# Patient Record
Sex: FEMALE | Race: WHITE | HISPANIC OR LATINO | Employment: OTHER | URBAN - METROPOLITAN AREA
[De-identification: names, ages, dates, MRNs, and addresses within clinical notes are randomized per-mention and may not be internally consistent; named-entity substitution may affect disease eponyms.]

---

## 2017-01-13 RX ORDER — PEN NEEDLE, DIABETIC 29 G X1/2"
NEEDLE, DISPOSABLE MISCELLANEOUS
Qty: 120 EACH | Refills: 4 | Status: SHIPPED | OUTPATIENT
Start: 2017-01-13 | End: 2017-09-25 | Stop reason: SDUPTHER

## 2017-01-18 DIAGNOSIS — E11.42 TYPE 2 DIABETES MELLITUS WITH DIABETIC POLYNEUROPATHY: ICD-10-CM

## 2017-01-18 RX ORDER — GLIMEPIRIDE 2 MG/1
TABLET ORAL
Qty: 90 TABLET | Refills: 3 | Status: SHIPPED | OUTPATIENT
Start: 2017-01-18 | End: 2018-01-25 | Stop reason: SDUPTHER

## 2017-02-07 ENCOUNTER — OFFICE VISIT (OUTPATIENT)
Dept: PAIN MEDICINE | Facility: CLINIC | Age: 72
End: 2017-02-07
Payer: MEDICARE

## 2017-02-07 VITALS
DIASTOLIC BLOOD PRESSURE: 72 MMHG | BODY MASS INDEX: 26.13 KG/M2 | SYSTOLIC BLOOD PRESSURE: 116 MMHG | WEIGHT: 125 LBS | HEART RATE: 64 BPM

## 2017-02-07 DIAGNOSIS — M79.18 MYOFASCIAL MUSCLE PAIN: ICD-10-CM

## 2017-02-07 DIAGNOSIS — M25.511 RIGHT SHOULDER PAIN, UNSPECIFIED CHRONICITY: Primary | ICD-10-CM

## 2017-02-07 DIAGNOSIS — M47.816 FACET HYPERTROPHY OF LUMBAR REGION: ICD-10-CM

## 2017-02-07 DIAGNOSIS — M54.12 CERVICAL RADICULOPATHY: ICD-10-CM

## 2017-02-07 DIAGNOSIS — M75.81 TENDINITIS OF RIGHT ROTATOR CUFF: Primary | ICD-10-CM

## 2017-02-07 DIAGNOSIS — M54.2 NECK PAIN: ICD-10-CM

## 2017-02-07 PROCEDURE — 20553 NJX 1/MLT TRIGGER POINTS 3/>: CPT | Mod: PBBFAC,PO | Performed by: ANESTHESIOLOGY

## 2017-02-07 PROCEDURE — 20553 NJX 1/MLT TRIGGER POINTS 3/>: CPT | Mod: 59,S$PBB,, | Performed by: ANESTHESIOLOGY

## 2017-02-07 PROCEDURE — 20610 DRAIN/INJ JOINT/BURSA W/O US: CPT | Mod: 59,S$PBB,RT, | Performed by: ANESTHESIOLOGY

## 2017-02-07 PROCEDURE — 99214 OFFICE O/P EST MOD 30 MIN: CPT | Mod: 25,S$PBB,, | Performed by: ANESTHESIOLOGY

## 2017-02-07 PROCEDURE — 99213 OFFICE O/P EST LOW 20 MIN: CPT | Mod: PBBFAC,PO | Performed by: ANESTHESIOLOGY

## 2017-02-07 PROCEDURE — 99999 PR PBB SHADOW E&M-EST. PATIENT-LVL III: CPT | Mod: PBBFAC,,, | Performed by: ANESTHESIOLOGY

## 2017-02-07 PROCEDURE — 20610 DRAIN/INJ JOINT/BURSA W/O US: CPT | Mod: PBBFAC,PO | Performed by: ANESTHESIOLOGY

## 2017-02-07 RX ORDER — TRIAMCINOLONE ACETONIDE 40 MG/ML
40 INJECTION, SUSPENSION INTRA-ARTICULAR; INTRAMUSCULAR
Status: COMPLETED | OUTPATIENT
Start: 2017-02-07 | End: 2017-02-07

## 2017-02-07 RX ADMIN — TRIAMCINOLONE ACETONIDE 40 MG: 40 INJECTION, SUSPENSION INTRA-ARTICULAR; INTRAMUSCULAR at 09:02

## 2017-02-07 NOTE — MR AVS SNAPSHOT
[facility]  [address]  [place] [address]  Phone: [phone]                  Juliette Seo   2017 9:30 AM   Office Visit    Description:  Female : 1945   Provider:  Meagan Pena MD   Department:  [facility]           Reason for Visit     Low-back Pain     Neck Pain     Shoulder Pain           Diagnoses this Visit        Comments    Tendinitis of right rotator cuff    -  Primary     Cervical radiculopathy         Myofascial muscle pain         Facet hypertrophy of lumbar region                To Do List           Future Appointments        Provider Department Dept Phone    2/15/2017 7:00 AM [facility] [phone]    2/15/2017 7:30 AM [facility] [phone]    2017 7:00 AM [facility] [place] [phone]    3/1/2017 9:15 AM Danie Beltre MD [facility] [phone]    3/7/2017 9:45 AM [facility] [phone]      Goals (5 Years of Data)     None      [facility]     [facility] -  Assistance  Registered nurses in the [facility] provide clinical advisement, health education, appointment booking, and other advisory services.  Call for this free service at [phone].             Medications           Message regarding Medications     Verify the changes and/or additions to your medication regime listed below are the same as discussed with your clinician today.  If any of these changes or additions are incorrect, please notify your healthcare provider.             Verify that the below list of medications is an accurate representation of the medications you are currently taking.  If none reported, the list may be blank. If incorrect, please contact your healthcare provider. Carry this list with you in case of emergency.           Current Medications     ACCU-CHEK  FASTCLIX Misc once daily.     ACCU-CHEK SMARTVIEW TEST STRIP Strp USE AS DIRECTED TWICE A DAY    acetaminophen (TYLENOL) 325 MG tablet Take 500 mg by mouth every 6 (six) hours as needed for Pain.     aspirin 81 MG Chew Take 81 mg by mouth once daily.    atorvastatin (LIPITOR) 80 MG tablet TAKE 1 TABLET(S) EVERY OTHER DAY BY ORAL ROUTE FOR 90 DAYS.    BD INSULIN SYRINGE ULTRA-FINE 0.5 mL 31 gauge x 5/16 Syrg USE AS DIRECTED TWICE A DAY    BENZOCAINE/MENTHOL (LOZENGES MM) by Mucous Membrane route.    bimatoprost (LUMIGAN) 0.01 % Drop 1 drop every evening.    fluticasone (FLONASE) 50 mcg/actuation nasal spray USE 1 SPRAY IN EACH NOSTRIL 2 TIMES A DAY    gabapentin (NEURONTIN) 300 MG capsule TAKE 1 CAPSULE (300 MG TOTAL) BY MOUTH 3 (THREE) TIMES DAILY.    gabapentin (NEURONTIN) 300 MG capsule Take 1 capsule (300 mg total) by mouth 3 (three) times daily.    glimepiride (AMARYL) 2 MG tablet TAKE 1 TABLET (2 MG TOTAL) BY MOUTH BEFORE BREAKFAST.    hydrocortisone 2.5 % cream APPLY TO THE AFFECTED AREA(S) BY TOPICAL ROUTE THREE TIMES DAILY AS NEEDED FOR ITCHING    hydrOXYzine HCl (ATARAX) 25 MG tablet Take 1 tablet (25 mg total) by mouth 3 (three) times daily as needed for Itching.    insulin NPH (NOVOLIN N) 100 unit/mL injection 40u qAM 20u qPM    lisinopril (PRINIVIL,ZESTRIL) 20 MG tablet Take 1 tablet (20 mg total) by mouth once daily.    meclizine (ANTIVERT) 12.5 mg tablet TAKE 1 TABLET BY MOUTH 3 TIMES A DAY AS NEEDED    metoclopramide HCl (REGLAN) 5 MG tablet TAKE 1 TABLET (5 MG TOTAL) BY MOUTH 4 (FOUR) TIMES DAILY BEFORE MEALS AND NIGHTLY.    montelukast (SINGULAIR) 10 mg tablet Take 10 mg by mouth once daily.     omeprazole (PRILOSEC) 40 MG capsule Take 1 capsule (40 mg total) by mouth every morning.    PROAIR HFA 90 mcg/actuation inhaler Inhale 1-2 puffs into the lungs every 6 (six) hours as needed.     PROCTOSOL HC 2.5 % rectal cream APPLY TO RECTUM TWICE A DAY FOR 7 DAYS    RESTASIS 0.05 % ophthalmic emulsion  Place 1 drop into both eyes 2 (two) times daily.    simethicone (GAS RELIEF) 180 mg Cap Take by mouth.    sulfamethoxazole-trimethoprim 200-40 mg/5 ml (BACTRIM,SEPTRA) 200-40 mg/5 mL Susp Take by mouth every 12 (twelve) hours.    tizanidine (ZANAFLEX) 2 MG tablet TAKE 1 TABLET TWICE A DAY BY ORAL ROUTE FOR 30 DAYS.    tizanidine (ZANAFLEX) 2 MG tablet TAKE 1 TABLET TWICE A DAY BY ORAL ROUTE FOR 30 DAYS.    triamcinolone acetonide 0.5% (KENALOG) 0.5 % Crea Apply topically 2 (two) times daily.           Clinical Reference Information           Your Vitals Were     BP Pulse Weight BMI       116/72 64 56.7 kg (125 lb) 26.13 kg/m2       Blood Pressure          Most Recent Value    BP  116/72      Allergies as of 2/7/2017     Actos [Pioglitazone]      Immunizations Administered on Date of Encounter - 2/7/2017     None      Orders Placed During Today's Visit     Future Labs/Procedures Expected by Expires    MRI Cervical Spine Without Contrast  2/7/2017 2/7/2018    MRI Upper Extremity Joint W Wo Contrast Right  2/7/2017 2/7/2018      Language Assistance Services     ATTENTION: Language assistance services are available, free of charge. Please call [phone].      ATENCIÓN: Si carinela sergio, tiene a aragon disposición servicios gratuitos de asistencia lingüística. Llame al [phone].     EARL Ý: N?u b?n nói Ti?ng Vi?t, có các d?ch v? h? tr? ngôn ng? mi?n phí dành cho b?n. G?i s? [phone].         [facility] complies with applicable Federal civil rights laws and does not discriminate on the basis of race, color, national origin, age, disability, or sex.

## 2017-02-07 NOTE — PROGRESS NOTES
Chronic patient Established Note (Follow up visit)      SUBJECTIVE:    Juliette Seo presents to the clinic for a follow-up appointment for lower back, neck and right shoulder pain. Since the last visit, Juliette Seo states the pain has been worsening. Current pain intensity is 9/10.  She had bilateral L3,4,5 facet MBB on 10/26/16 with 80% pain relief , carla her main pian is over the right shoudelr/arm as well as neck     Pain Disability Index Review:  Last 3 PDI Scores 2/7/2017 11/7/2016 10/17/2016   Pain Disability Index (PDI) 52 34 44       Pain Medications:  - Opioids: Tylenol   - Adjuvant Medications: Gabapentin, Tizanidine and Aleve   - Anti-Coagulants: Aspirin  - Others: See Medication List     Opioid Contract: no     report:  Reviewed and consistent with medication use as prescribed.    Pain Procedures: 10/26/16 BILATERAL L3,4,5  LUMBAR FACET MEDIAL BRANCH NERVE BLOCK    Physical Therapy/Home Exercise: no    Imaging: Xray Knee 10/18/16  HISTORY: Pain    COMPARISON: None    FINDINGS: 5 views.      The osseous structures are intact with no evidence of fracture or dislocation.  There is degenerative osteoarthritic change of the bilateral tibiofemoral and patellofemoral compartments.  The joint spaces and soft tissues are otherwise unremarkable.   Impression       #1. As above.      Electronically signed by: EVAN SLOAN MD  Date: 10/18/16  Time: 09:38          Xray Cervical 10/18/16  HISTORY: Pain    COMPARISON: None    FINDINGS: 4 views.    The cervical spine alignment and vertebral body heights are well-maintained.  There is no evidence of osseous fracture.    There are advanced degenerative osteoarthritic changes of the cervical spine with facet hypertrophy and disc space narrowing.  The joint spaces, soft tissues, and osseous structures are otherwise unremarkable.   Impression       #1. As Above.        Electronically signed by: EVAN SLOAN MD  Date: 10/18/16  Time: 10:01          Xray Lumbar  10/17/16  Technique: AP, lateral and bilateral oblique views of the lumbar spine         Comparison: None    Results: The lumbar sagittal alignment is within normal limits.  The lungs are mild endplate degenerative change lumbar vertebral body heights and contours are within normal limits without evidence for acute fracture or subluxation.  No definite spondylolysis.  Surgical clips project over the upper abdomen.  Scattered vascular calcifications identified. Further evaluation as warrented clinically.   Impression    See above.      Electronically signed by: STEPHAN SMITH DO  Date: 10/17/16  Time: 14:26          CT Thoracic Spine 6/22/16  CT thoracic spine with and without contrast.    Findings: 75 mL of Omnipaque 350 IV contrast was administered for today's examination.    The visualized portion of the mediastinum is significant for calcification of the aorta, aortic annulus and coronary arteries.  The visualized portion of the intra-abdominal contents is significant for a punctate right-sided nonobstructing renal stone.  The visualized portion of the lungs is unremarkable.  There is no abnormal enhancement following the administration of contrast.  The thoracic spinal alignment and vertebral body heights are satisfactorily preserved.  There is no fracture, dislocation, or bony erosion.   Impression    No acute findings.  ______________________________________     Electronically signed by: ISA GUY MD  Date: 06/22/16  Time: 08:18              Allergies:   Review of patient's allergies indicates:   Allergen Reactions    Actos [pioglitazone] Nausea Only       Current Medications:   Current Outpatient Prescriptions   Medication Sig Dispense Refill    ACCU-CHEK FASTCLIX Misc once daily.   3    ACCU-CHEK SMARTVIEW TEST STRIP Strp USE AS DIRECTED TWICE A  strip 3    acetaminophen (TYLENOL) 325 MG tablet Take 500 mg by mouth every 6 (six) hours as needed for Pain.       aspirin 81 MG Chew Take 81 mg by  mouth once daily.      atorvastatin (LIPITOR) 80 MG tablet TAKE 1 TABLET(S) EVERY OTHER DAY BY ORAL ROUTE FOR 90 DAYS. 45 tablet 4    BD INSULIN SYRINGE ULTRA-FINE 0.5 mL 31 gauge x 5/16 Syrg USE AS DIRECTED TWICE A  each 4    BENZOCAINE/MENTHOL (LOZENGES MM) by Mucous Membrane route.      bimatoprost (LUMIGAN) 0.01 % Drop 1 drop every evening.      fluticasone (FLONASE) 50 mcg/actuation nasal spray USE 1 SPRAY IN EACH NOSTRIL 2 TIMES A DAY  1    gabapentin (NEURONTIN) 300 MG capsule TAKE 1 CAPSULE (300 MG TOTAL) BY MOUTH 3 (THREE) TIMES DAILY. 90 capsule 3    gabapentin (NEURONTIN) 300 MG capsule Take 1 capsule (300 mg total) by mouth 3 (three) times daily. 90 capsule 11    glimepiride (AMARYL) 2 MG tablet TAKE 1 TABLET (2 MG TOTAL) BY MOUTH BEFORE BREAKFAST. 90 tablet 3    hydrocortisone 2.5 % cream APPLY TO THE AFFECTED AREA(S) BY TOPICAL ROUTE THREE TIMES DAILY AS NEEDED FOR ITCHING  2    hydrOXYzine HCl (ATARAX) 25 MG tablet Take 1 tablet (25 mg total) by mouth 3 (three) times daily as needed for Itching. 100 tablet 1    insulin NPH (NOVOLIN N) 100 unit/mL injection 40u qAM 20u qPM 3 vial 4    lisinopril (PRINIVIL,ZESTRIL) 20 MG tablet Take 1 tablet (20 mg total) by mouth once daily. 90 tablet 3    meclizine (ANTIVERT) 12.5 mg tablet TAKE 1 TABLET BY MOUTH 3 TIMES A DAY AS NEEDED 60 tablet 1    metoclopramide HCl (REGLAN) 5 MG tablet TAKE 1 TABLET (5 MG TOTAL) BY MOUTH 4 (FOUR) TIMES DAILY BEFORE MEALS AND NIGHTLY. 360 tablet 3    montelukast (SINGULAIR) 10 mg tablet Take 10 mg by mouth once daily.   5    omeprazole (PRILOSEC) 40 MG capsule Take 1 capsule (40 mg total) by mouth every morning. 90 capsule 3    PROAIR HFA 90 mcg/actuation inhaler Inhale 1-2 puffs into the lungs every 6 (six) hours as needed.   5    PROCTOSOL HC 2.5 % rectal cream APPLY TO RECTUM TWICE A DAY FOR 7 DAYS 28.35 g 3    RESTASIS 0.05 % ophthalmic emulsion Place 1 drop into both eyes 2 (two) times daily.  3     simethicone (GAS RELIEF) 180 mg Cap Take by mouth.      sulfamethoxazole-trimethoprim 200-40 mg/5 ml (BACTRIM,SEPTRA) 200-40 mg/5 mL Susp Take by mouth every 12 (twelve) hours.      tizanidine (ZANAFLEX) 2 MG tablet TAKE 1 TABLET TWICE A DAY BY ORAL ROUTE FOR 30 DAYS. 60 tablet 1    tizanidine (ZANAFLEX) 2 MG tablet TAKE 1 TABLET TWICE A DAY BY ORAL ROUTE FOR 30 DAYS. 60 tablet 1    triamcinolone acetonide 0.5% (KENALOG) 0.5 % Crea Apply topically 2 (two) times daily. 15 g 0     No current facility-administered medications for this visit.        REVIEW OF SYSTEMS:    GENERAL: No weight loss, malaise or fevers.  HEENT: Negative for frequent or significant headaches.  NECK: + neck pain   RESPIRATORY: Negative for cough, wheezing or shortness of breath.  CARDIOVASCULAR: + CAD, Negative for chest pain, leg swelling or palpitations.  GI: Negative for abdominal discomfort, blood in stools or black stools or change in bowel habits.  MUSCULOSKELETAL: See HPI, + right shoulder pain   SKIN: Negative for lesions, rash, and itching.  PSYCH: Negative for sleep disturbance, mood disorder and recent psychosocial stressors.  HEMATOLOGY/LYMPHOLOGY: Negative for prolonged bleeding, bruising easily or swollen nodes.  NEURO: No history of headaches, syncope, paralysis, seizures or tremors.  All other reviewed and negative other than HPI.    Past Medical History:  Past Medical History   Diagnosis Date    Allergy     Anxiety     Arthritis      osteo    Asthma     Diabetes mellitus     Diabetes mellitus, type 2     GERD (gastroesophageal reflux disease)     High cholesterol     Hypertension        Past Surgical History:  Past Surgical History   Procedure Laterality Date    Hysterectomy      Cholecystectomy      Hernia repair         Family History:  Family History   Problem Relation Age of Onset    Stroke Mother     Diabetes Sister     Diabetes Brother        Social History:  Social History     Social History     Marital status:      Spouse name: N/A    Number of children: N/A    Years of education: N/A     Social History Main Topics    Smoking status: Never Smoker    Smokeless tobacco: Never Used    Alcohol use Yes    Drug use: No    Sexual activity: Yes     Partners: Male     Other Topics Concern    None     Social History Narrative       OBJECTIVE:    Visit Vitals    /72    Pulse 64    Wt 56.7 kg (125 lb)    BMI 26.13 kg/m2       PHYSICAL EXAMINATION:    General appearance: Well appearing, in no acute distress, alert and oriented x3.  Psych: Mood and affect appropriate.  Skin: Skin color, texture, turgor normal, no rashes or lesions, in both upper and lower body.  Head/face: Normocephalic, atraumatic. No palpable lymph nodes.  Neck: + pain to palpation over the cervical paraspinous muscle and C spine as well as right trapezus . Spurling Negative. No pain with neck flexion, extension, or lateral flexion.   Back: Straight leg raising in the sitting and supine positions is negative to radicular pain.   Extremities:+ inverted can test on the right shoulder Pain on ROM of the right shoulder Peripheral joint ROM is full and pain free without obvious instability or laxity in all four extremities. No deformities, edema, or skin discoloration. Good capillary refill.  Musculoskeletal: Shoulder, hip, sacroiliac and knee provocative maneuvers are negative. Bilateral upper and lower extremity strength is normal and symmetric. No atrophy or tone abnormalities are noted.   Neuro: Bilateral upper and lower extremity coordination and muscle stretch reflexes are physiologic and symmetric. Plantar response are downgoing. No loss of sensation is noted.  Gait: normal    ASSESSMENT: 71 y.o. year old female with lwo back, neck/ and right shoulder/arm   pain, consistent with lumbar facet arthropathy, rotator cuff tendinitis /subacromial bursitis of the right shoulder as well as myofacial muscle pain. Cervical  radiculopathy cannot be ruled out  She had bilateral L3,4,5 facet MBB on 10/26/16 with 80% pain relief , now her main pain is over the right shoulder /arm as well as neck     1. Tendinitis of right rotator cuff    2. Cervical radiculopathy    3. Myofascial muscle pain    4. Facet hypertrophy of lumbar region          PLAN:     - I have stressed the importance of physical activity and a home exercise plan to help with pain and improve health.  -Will perform right shoulder subacromial bursa streoid injection as well as TPI of the right trapezius and right cervical paraspinal muscles  -Will order MRI of  The right shoulder and C spine   - RTC in 1 month   - Counseled patient regarding the importance of physical therapy.    The above plan and management options were discussed at length with patient. Patient is in agreement with the above and verbalized understanding.    Meagan Pena MD  2/7/2017      INFORMED CONSENT: The procedure, risks, benefits and options were discussed with patient. There are no contraindications to the procedure. The patient expressed understanding and agreed to proceed. The personnel performing the procedure was discussed. I verify that I personally obtained Juliette's consent prior to the start of the procedure and the signed consent can be found on the patient's chart.    PROCEDURE: RIGHT SUBACROMIAL bursa SHOULDER STEROID INJECTION    The patient in the sitting position.The area of the shoulder was prepped with chlorhexidine x 2. A 25 gauge 1.5 inch needle was slowly advanced into the subacromial BURSA of the right shoulder.After negative aspiration 5 cc of a mixture of bupivacaine 0.25% and 20 mg Kenalog was easily injected with no resistance. Patient tolerated procedure well. No complications.              PROCEDURE: TRIGGER POINT INJECTION  The patient was placed in a seated position. The site of pain and procedure were confirmed with the patient prior to starting the procedure. The  patient's  trigger points were identified and marked. The skin was prepped with chlorhexidine three times.  A 25-gauge 1.5 inch  needle was advanced through the skin and subcutaneous tissues.  Aspiration for blood, air and CSF was negative.  A total of 5 ml of Bupivacaine 0.25% and 20 mg Kenalog  was injected at all trigger points.  No complications were evident. No specimens collected.    Meagan Pena MD  2/7/2017

## 2017-02-15 ENCOUNTER — HOSPITAL ENCOUNTER (OUTPATIENT)
Dept: RADIOLOGY | Facility: HOSPITAL | Age: 72
Discharge: HOME | End: 2017-02-15
Attending: ANESTHESIOLOGY
Payer: MEDICARE

## 2017-02-15 DIAGNOSIS — M54.12 CERVICAL RADICULOPATHY: ICD-10-CM

## 2017-02-15 DIAGNOSIS — M75.81 TENDINITIS OF RIGHT ROTATOR CUFF: ICD-10-CM

## 2017-02-15 PROCEDURE — 72141 MRI NECK SPINE W/O DYE: CPT | Mod: 26,,, | Performed by: RADIOLOGY

## 2017-02-15 PROCEDURE — 72141 MRI NECK SPINE W/O DYE: CPT | Mod: TC

## 2017-02-15 PROCEDURE — 73221 MRI JOINT UPR EXTREM W/O DYE: CPT | Mod: TC,RT

## 2017-02-15 PROCEDURE — 73221 MRI JOINT UPR EXTREM W/O DYE: CPT | Mod: 26,RT,, | Performed by: RADIOLOGY

## 2017-02-16 ENCOUNTER — TELEPHONE (OUTPATIENT)
Dept: PAIN MEDICINE | Facility: CLINIC | Age: 72
End: 2017-02-16

## 2017-02-16 NOTE — TELEPHONE ENCOUNTER
Called patient and informed her about MRi results. She is doing much better after right subacromial bursa steroid injection and TPI

## 2017-02-24 ENCOUNTER — LAB VISIT (OUTPATIENT)
Dept: LAB | Facility: HOSPITAL | Age: 72
End: 2017-02-24
Attending: INTERNAL MEDICINE
Payer: MEDICARE

## 2017-02-24 DIAGNOSIS — Z79.4 TYPE 2 DIABETES MELLITUS WITH DIABETIC POLYNEUROPATHY, WITH LONG-TERM CURRENT USE OF INSULIN: ICD-10-CM

## 2017-02-24 DIAGNOSIS — E78.00 PURE HYPERCHOLESTEROLEMIA: ICD-10-CM

## 2017-02-24 DIAGNOSIS — E11.42 TYPE 2 DIABETES MELLITUS WITH DIABETIC POLYNEUROPATHY, WITH LONG-TERM CURRENT USE OF INSULIN: ICD-10-CM

## 2017-02-24 LAB
CHOLEST/HDLC SERPL: 4.7 {RATIO}
ESTIMATED AVG GLUCOSE: 177 MG/DL
HBA1C MFR BLD HPLC: 7.8 %
HDL/CHOLESTEROL RATIO: 21.1 %
HDLC SERPL-MCNC: 251 MG/DL
HDLC SERPL-MCNC: 53 MG/DL
LDLC SERPL CALC-MCNC: 178.2 MG/DL
NONHDLC SERPL-MCNC: 198 MG/DL
TRIGL SERPL-MCNC: 99 MG/DL

## 2017-02-24 PROCEDURE — 80061 LIPID PANEL: CPT

## 2017-02-24 PROCEDURE — 83036 HEMOGLOBIN GLYCOSYLATED A1C: CPT | Mod: PO

## 2017-02-24 PROCEDURE — 36415 COLL VENOUS BLD VENIPUNCTURE: CPT | Mod: PO

## 2017-03-01 ENCOUNTER — OFFICE VISIT (OUTPATIENT)
Dept: INTERNAL MEDICINE | Facility: CLINIC | Age: 72
End: 2017-03-01
Payer: MEDICARE

## 2017-03-01 VITALS
RESPIRATION RATE: 12 BRPM | HEIGHT: 58 IN | DIASTOLIC BLOOD PRESSURE: 68 MMHG | BODY MASS INDEX: 26.15 KG/M2 | WEIGHT: 124.56 LBS | HEART RATE: 66 BPM | SYSTOLIC BLOOD PRESSURE: 105 MMHG | TEMPERATURE: 97 F

## 2017-03-01 DIAGNOSIS — I15.2 HYPERTENSION COMPLICATING DIABETES: ICD-10-CM

## 2017-03-01 DIAGNOSIS — I25.10 CORONARY ARTERY DISEASE INVOLVING NATIVE CORONARY ARTERY WITHOUT ANGINA PECTORIS, UNSPECIFIED WHETHER NATIVE OR TRANSPLANTED HEART: Primary | ICD-10-CM

## 2017-03-01 DIAGNOSIS — E11.59 HYPERTENSION COMPLICATING DIABETES: ICD-10-CM

## 2017-03-01 DIAGNOSIS — K21.9 GASTROESOPHAGEAL REFLUX DISEASE WITHOUT ESOPHAGITIS: ICD-10-CM

## 2017-03-01 DIAGNOSIS — F32.A DEPRESSION, UNSPECIFIED DEPRESSION TYPE: ICD-10-CM

## 2017-03-01 DIAGNOSIS — I10 ESSENTIAL HYPERTENSION: ICD-10-CM

## 2017-03-01 DIAGNOSIS — M17.0 PRIMARY OSTEOARTHRITIS OF BOTH KNEES: ICD-10-CM

## 2017-03-01 DIAGNOSIS — Z79.4 TYPE 2 DIABETES MELLITUS WITH DIABETIC POLYNEUROPATHY, WITH LONG-TERM CURRENT USE OF INSULIN: ICD-10-CM

## 2017-03-01 DIAGNOSIS — E78.00 PURE HYPERCHOLESTEROLEMIA: ICD-10-CM

## 2017-03-01 DIAGNOSIS — M48.061 SPINAL STENOSIS OF LUMBAR REGION: ICD-10-CM

## 2017-03-01 DIAGNOSIS — E11.42 TYPE 2 DIABETES MELLITUS WITH DIABETIC POLYNEUROPATHY, WITH LONG-TERM CURRENT USE OF INSULIN: ICD-10-CM

## 2017-03-01 PROBLEM — E11.9 HYPERTENSION COMPLICATING DIABETES: Status: ACTIVE | Noted: 2017-03-01

## 2017-03-01 PROCEDURE — 99213 OFFICE O/P EST LOW 20 MIN: CPT | Mod: PBBFAC,PN | Performed by: INTERNAL MEDICINE

## 2017-03-01 PROCEDURE — 99214 OFFICE O/P EST MOD 30 MIN: CPT | Mod: S$PBB,,, | Performed by: INTERNAL MEDICINE

## 2017-03-01 PROCEDURE — 99999 PR PBB SHADOW E&M-EST. PATIENT-LVL III: CPT | Mod: PBBFAC,,, | Performed by: INTERNAL MEDICINE

## 2017-03-01 NOTE — MR AVS SNAPSHOT
[facility]  [address] [place], [place] [address], [address]  [place] [address]  Phone: [phone]  Fax: [phone]                  Juliette Seo   3/1/2017 9:15 AM   Office Visit    Description:  Female : 1945   Provider:  Danie Beltre MD   Department:  [facility]           Reason for Visit     Follow-up     Results           Diagnoses this Visit        Comments    Coronary artery disease involving native coronary artery without angina pectoris, unspecified whether native or transplanted heart    -  Primary     Type 2 diabetes mellitus with diabetic polyneuropathy, with long-term current use of insulin         Essential hypertension         Primary osteoarthritis of both knees         Gastroesophageal reflux disease without esophagitis         Pure hypercholesterolemia         Depression, unspecified depression type         Hypertension complicating diabetes                To Do List           Future Appointments        Provider Department Dept Phone    3/7/2017 9:45 AM Meagan Pena MD [facility] Pain Management [phone]      Goals (5 Years of Data)     None      Follow-Up and Disposition     Return in about 3 months (around 2017).      [facility]     [facility] On Call Nurse [facility] - 24/7 Assistance  Registered nurses in the [facility] Call Center provide clinical advisement, health education, appointment booking, and other advisory services.  Call for this free service at [phone].             Medications           Message regarding Medications     Verify the changes and/or additions to your medication regime listed below are the same as discussed with your clinician today.  If any of these changes or additions are incorrect, please notify your healthcare provider.        STOP taking these medications     BENZOCAINE/MENTHOL (LOZENGES MM) by Mucous Membrane route.    sulfamethoxazole-trimethoprim 200-40 mg/5 ml (BACTRIM,SEPTRA) 200-40 mg/5 mL Susp Take by  mouth every 12 (twelve) hours.           Verify that the below list of medications is an accurate representation of the medications you are currently taking.  If none reported, the list may be blank. If incorrect, please contact your healthcare provider. Carry this list with you in case of emergency.           Current Medications     ACCU-CHEK FASTCLIX Misc once daily.     ACCU-CHEK SMARTVIEW TEST STRIP Strp USE AS DIRECTED TWICE A DAY    acetaminophen (TYLENOL) 325 MG tablet Take 500 mg by mouth every 6 (six) hours as needed for Pain.     aspirin 81 MG Chew Take 81 mg by mouth once daily.    atorvastatin (LIPITOR) 80 MG tablet TAKE 1 TABLET(S) EVERY OTHER DAY BY ORAL ROUTE FOR 90 DAYS.    BD INSULIN SYRINGE ULTRA-FINE 0.5 mL 31 gauge x 5/16 Syrg USE AS DIRECTED TWICE A DAY    bimatoprost (LUMIGAN) 0.01 % Drop 1 drop every evening.    fluticasone (FLONASE) 50 mcg/actuation nasal spray USE 1 SPRAY IN EACH NOSTRIL 2 TIMES A DAY    gabapentin (NEURONTIN) 300 MG capsule Take 1 capsule (300 mg total) by mouth 3 (three) times daily.    glimepiride (AMARYL) 2 MG tablet TAKE 1 TABLET (2 MG TOTAL) BY MOUTH BEFORE BREAKFAST.    hydrocortisone 2.5 % cream APPLY TO THE AFFECTED AREA(S) BY TOPICAL ROUTE THREE TIMES DAILY AS NEEDED FOR ITCHING    hydrOXYzine HCl (ATARAX) 25 MG tablet Take 1 tablet (25 mg total) by mouth 3 (three) times daily as needed for Itching.    insulin NPH (NOVOLIN N) 100 unit/mL injection 40u qAM 20u qPM    lisinopril (PRINIVIL,ZESTRIL) 20 MG tablet Take 1 tablet (20 mg total) by mouth once daily.    meclizine (ANTIVERT) 12.5 mg tablet TAKE 1 TABLET BY MOUTH 3 TIMES A DAY AS NEEDED    metoclopramide HCl (REGLAN) 5 MG tablet TAKE 1 TABLET (5 MG TOTAL) BY MOUTH 4 (FOUR) TIMES DAILY BEFORE MEALS AND NIGHTLY.    montelukast (SINGULAIR) 10 mg tablet Take 10 mg by mouth once daily.     omeprazole (PRILOSEC) 40 MG capsule Take 1 capsule (40 mg total) by mouth every morning.    PROAIR HFA 90 mcg/actuation inhaler  Inhale 1-2 puffs into the lungs every 6 (six) hours as needed.     PROCTOSOL HC 2.5 % rectal cream APPLY TO RECTUM TWICE A DAY FOR 7 DAYS    RESTASIS 0.05 % ophthalmic emulsion Place 1 drop into both eyes 2 (two) times daily.    simethicone (GAS RELIEF) 180 mg Cap Take by mouth.    tizanidine (ZANAFLEX) 2 MG tablet TAKE 1 TABLET TWICE A DAY BY ORAL ROUTE FOR 30 DAYS.    triamcinolone acetonide 0.5% (KENALOG) 0.5 % Crea Apply topically 2 (two) times daily.           Clinical Reference Information           Your Vitals Were     BP                   105/68           Blood Pressure          Most Recent Value    BP  105/68      Allergies as of 3/1/2017     Actos [Pioglitazone]      Immunizations Administered on Date of Encounter - 3/1/2017     None      Orders Placed During Today's Visit     Future Labs/Procedures Expected by Expires    Hemoglobin A1c  3/1/2017 4/30/2018    Lipid panel  3/1/2017 4/30/2018      Language Assistance Services     ATTENTION: Language assistance services are available, free of charge. Please call [phone].      ATENCIÓN: Si carinela sergio, tiene a aragon disposición servicios gratuitos de asistencia lingüística. Llame al [phone].     EARL Ý: N?u b?n nói Ti?ng Vi?t, có các d?ch v? h? tr? ngôn ng? mi?n phí dành cho b?n. G?i s? [phone].         [facility] complies with applicable Federal civil rights laws and does not discriminate on the basis of race, color, national origin, age, disability, or sex.

## 2017-03-01 NOTE — PROGRESS NOTES
Subjective:       Patient ID: Juliette Seo is a 71 y.o. female.    Chief Complaint: Follow-up (pain on right buttock) and Results    HPI  Checkup. Labs reviewed.  Stopped Lipitor x 2 weeks.  BSs erratic with epidural steroid injections, but LBP, paresthesias improved.  Still with L shoulder pain.  Eye check overdue.  No CP, SOB.  Mammo last year.  C/O pruritic lesion on R buttock.  Review of Systems   All other systems reviewed and are negative.      Objective:      Physical Exam   Constitutional: She appears well-developed. No distress.   HENT:   Head: Normocephalic.   Eyes: EOM are normal.   Neck: Normal range of motion. No tracheal deviation present.   Cardiovascular: Normal rate, regular rhythm, normal heart sounds and intact distal pulses.    Pulmonary/Chest: Effort normal and breath sounds normal. No respiratory distress.   Abdominal: Soft. Bowel sounds are normal. She exhibits no distension and no mass. There is no tenderness.   Musculoskeletal: She exhibits no edema.   Crepitation R shoulder   Neurological: She is alert. No cranial nerve deficit.   Skin: Skin is warm and dry. No rash noted. She is not diaphoretic. No erythema.   1 cm SK R buttock   Psychiatric: She has a normal mood and affect. Her behavior is normal.   Vitals reviewed.      Assessment:       1. Coronary artery disease involving native coronary artery without angina pectoris, unspecified whether native or transplanted heart    2. Type 2 diabetes mellitus with diabetic polyneuropathy, with long-term current use of insulin    3. Essential hypertension    4. Primary osteoarthritis of both knees    5. Gastroesophageal reflux disease without esophagitis    6. Pure hypercholesterolemia    7. Depression, unspecified depression type    8. Hypertension complicating diabetes    9. Spinal stenosis of lumbar region        Plan:       Juliette was seen today for follow-up and results.    Diagnoses and all orders for this visit:    Coronary artery disease  involving native coronary artery without angina pectoris, unspecified whether native or transplanted heart   Quiescent    Type 2 diabetes mellitus with diabetic polyneuropathy, with long-term current use of insulin  -     Hemoglobin A1c; Future    Essential hypertension   Well-cont    Primary osteoarthritis of both knees    Gastroesophageal reflux disease without esophagitis   Quiescent    Pure hypercholesterolemia  -     Lipid panel; Future   Resume Lipitor    Depression, unspecified depression type   Well-cont    Hypertension complicating diabetes      Return in about 3 months (around 6/1/2017).

## 2017-03-07 ENCOUNTER — OFFICE VISIT (OUTPATIENT)
Dept: PAIN MEDICINE | Facility: CLINIC | Age: 72
End: 2017-03-07
Payer: MEDICARE

## 2017-03-07 VITALS
SYSTOLIC BLOOD PRESSURE: 118 MMHG | DIASTOLIC BLOOD PRESSURE: 60 MMHG | WEIGHT: 125 LBS | HEART RATE: 74 BPM | BODY MASS INDEX: 26.13 KG/M2

## 2017-03-07 DIAGNOSIS — M75.81 TENDINITIS OF RIGHT ROTATOR CUFF: Primary | ICD-10-CM

## 2017-03-07 DIAGNOSIS — M47.816 LUMBAR FACET ARTHROPATHY: ICD-10-CM

## 2017-03-07 PROCEDURE — 99213 OFFICE O/P EST LOW 20 MIN: CPT | Mod: S$PBB,,, | Performed by: ANESTHESIOLOGY

## 2017-03-07 PROCEDURE — 99214 OFFICE O/P EST MOD 30 MIN: CPT | Mod: PBBFAC,PO | Performed by: ANESTHESIOLOGY

## 2017-03-07 PROCEDURE — 99999 PR PBB SHADOW E&M-EST. PATIENT-LVL IV: CPT | Mod: PBBFAC,,, | Performed by: ANESTHESIOLOGY

## 2017-03-07 NOTE — PROGRESS NOTES
Chronic patient Established Note (Follow up visit)      SUBJECTIVE:    Juliette Seo presents to the clinic for a follow-up appointment for lower back, cervical and right shoulder pain.She had 70% pain relief after TPI and right subacromial bursa steroid injection done on 2/7/17  Since the last visit, Juliette Seo states the pain has been improving . Current pain intensity is 5/10.  She previously has had good relief for her axial back pain after Bilateral L3,4,5  facet MBB on 10/26/16    Pain Disability Index Review:  Last 3 PDI Scores 3/7/2017 2/7/2017 11/7/2016   Pain Disability Index (PDI) 42 52 34       Pain Medications:  - Opioids: Tylenol   - Adjuvant Medications: Gabapentin, Tizanidine and Aleve   - Anti-Coagulants: Aspirin  - Others: See Medication List     Opioid Contract: no     report:  Reviewed and consistent with medication use as prescribed.    Pain Procedures: right subacromial bursa steroid injection  And TRIGGER POINT INJECTION 2/7/17   10/26/16 BILATERAL L3,4,5  LUMBAR FACET MEDIAL BRANCH NERVE BLOCK       Physical Therapy/Home Exercise: yes    Imaging:  MRI Cervical Spine Without Contrast 2/15/17  Narrative   Time of Procedure: 02/15/17 07:13:02  Accession # [id number]    MR of the cervical spine without contrast:    Technique: Multiplanar, multisequence MRI of the cervical spine obtained. No IV contrast.     Comparison: None     Findings:    Cervical spine alignment is within normal limits. Vertebral body heights and intervertebral disc spaces are satisfactorily maintained. No evidence of bone marrow replacement process is tumor or infection.  No fracture.     The spinal cord demonstrates no abnormal signal.  The base of the brain, base of the skull, and craniocervical junction demonstrate no significant abnormalities.  The adjacent soft tissue structures show no significant abnormalities.      C2-C3:  There is no posterior disc osteophyte complex formation. No significant central canal or  neural foraminal narrowing.    C3-C4: There is diffuse posterior disc osteophyte complex formation and mild bilateral uncovertebral spurring. No significant central canal or neural foraminal narrowing.    C4-C5:  Mild posterior disc osteophyte complex formation, uncovertebral spurring, and facet arthropathy causing mild bilateral neuroforaminal narrowing.  No central spinal canal stenosis.     C5-C6:  Mild diffuse posterior disc osteophyte complex formation causing effacement of the left anterior CSF sleeve with facet arthropathy and uncovertebral spurring causing mild bilateral neuroforaminal narrowing.     C6-C7:  Mild posterior disc osteophyte complex formation and facet arthropathy.No significant central canal or neural foraminal narrowing.    C7-T1:   There is no posterior disc osteophyte complex formation. No significant central canal or neural foraminal narrowing.   Impression        Mild degenerative changes as described above.      Electronically signed by: SHORTY KEATING MD  Date: 02/15/17  Time: 09:09        MRI Upper Extremity Joint Without Contrast Right 2/15/17  Narrative   Time of Procedure: 02/15/17 07:13:08  Accession # [id number]    MRI RIGHT SHOULDER    INDICATION: Right shoulder lesion    TECHNIQUE: MRI of right shoulder was performed on a 1.5T magnet utilizing the following sequences: Localizer; axial T2 FS; coronal T2 FS and PD FS; sagittal T1, T2 FS and PD FS.    COMPARISON: Not available.    FINDINGS:    Rotator cuff: Partial thickness tear of the posterior insertional fibers of the supraspinatus tendon and significant tendinopathy of the supraspinatus tendon.  Infraspinatus, teres minor, and subscapularis tendons are intact.    Labrum: Global degeneration.    Biceps: Long head biceps tendon is intact.    Bone: There is no fracture.  Bone marrow signal is unremarkable.    Acromioclavicular joint: Moderate arthrosis.    Cartilage: Articular cartilage of the glenohumeral joint is  preserved.    Miscellaneous: Small amount of fluid in the subdeltoid and subacromion bursae.   Impression       Partial-thickness tear of the posterior insertional fibers of the supraspinatus tendon with associated significant tendinopathy of the supraspinatus tendon.      Electronically signed by: SHORTY KEATING MD  Date: 02/15/17  Time: 08:36         Xray Knee 10/18/16  HISTORY: Pain    COMPARISON: None    FINDINGS: 5 views.      The osseous structures are intact with no evidence of fracture or dislocation.  There is degenerative osteoarthritic change of the bilateral tibiofemoral and patellofemoral compartments.  The joint spaces and soft tissues are otherwise unremarkable.   Impression       #1. As above.      Electronically signed by: EVAN SLOAN MD  Date: 10/18/16  Time: 09:38            Xray Cervical 10/18/16  HISTORY: Pain    COMPARISON: None    FINDINGS: 4 views.    The cervical spine alignment and vertebral body heights are well-maintained.  There is no evidence of osseous fracture.    There are advanced degenerative osteoarthritic changes of the cervical spine with facet hypertrophy and disc space narrowing.  The joint spaces, soft tissues, and osseous structures are otherwise unremarkable.   Impression       #1. As Above.        Electronically signed by: EVAN SLOAN MD  Date: 10/18/16  Time: 10:01            Xray Lumbar 10/17/16  Technique: AP, lateral and bilateral oblique views of the lumbar spine         Comparison: None    Results: The lumbar sagittal alignment is within normal limits.  The lungs are mild endplate degenerative change lumbar vertebral body heights and contours are within normal limits without evidence for acute fracture or subluxation.  No definite spondylolysis.  Surgical clips project over the upper abdomen.  Scattered vascular calcifications identified. Further evaluation as warrented clinically.   Impression    See above.      Electronically signed by: STEPHAN SMITH  DO  Date: 10/17/16  Time: 14:26            CT Thoracic Spine 6/22/16  CT thoracic spine with and without contrast.    Findings: 75 mL of Omnipaque 350 IV contrast was administered for today's examination.    The visualized portion of the mediastinum is significant for calcification of the aorta, aortic annulus and coronary arteries.  The visualized portion of the intra-abdominal contents is significant for a punctate right-sided nonobstructing renal stone.  The visualized portion of the lungs is unremarkable.  There is no abnormal enhancement following the administration of contrast.  The thoracic spinal alignment and vertebral body heights are satisfactorily preserved.  There is no fracture, dislocation, or bony erosion.   Impression    No acute findings.  ______________________________________     Electronically signed by: ISA GUY MD  Date: 06/22/16  Time: 08:18          Allergies:   Review of patient's allergies indicates:   Allergen Reactions    Actos [pioglitazone] Nausea Only       Current Medications:   Current Outpatient Prescriptions   Medication Sig Dispense Refill    ACCU-CHEK FASTCLIX Misc once daily.   3    ACCU-CHEK SMARTVIEW TEST STRIP Strp USE AS DIRECTED TWICE A  strip 3    acetaminophen (TYLENOL) 325 MG tablet Take 500 mg by mouth every 6 (six) hours as needed for Pain.       aspirin 81 MG Chew Take 81 mg by mouth once daily.      atorvastatin (LIPITOR) 80 MG tablet TAKE 1 TABLET(S) EVERY OTHER DAY BY ORAL ROUTE FOR 90 DAYS. 45 tablet 4    BD INSULIN SYRINGE ULTRA-FINE 0.5 mL 31 gauge x 5/16 Syrg USE AS DIRECTED TWICE A  each 4    bimatoprost (LUMIGAN) 0.01 % Drop 1 drop every evening.      fluticasone (FLONASE) 50 mcg/actuation nasal spray USE 1 SPRAY IN EACH NOSTRIL 2 TIMES A DAY  1    gabapentin (NEURONTIN) 300 MG capsule Take 1 capsule (300 mg total) by mouth 3 (three) times daily. 90 capsule 11    glimepiride (AMARYL) 2 MG tablet TAKE 1 TABLET (2 MG TOTAL) BY MOUTH  BEFORE BREAKFAST. 90 tablet 3    hydrocortisone 2.5 % cream APPLY TO THE AFFECTED AREA(S) BY TOPICAL ROUTE THREE TIMES DAILY AS NEEDED FOR ITCHING  2    hydrOXYzine HCl (ATARAX) 25 MG tablet Take 1 tablet (25 mg total) by mouth 3 (three) times daily as needed for Itching. 100 tablet 1    insulin NPH (NOVOLIN N) 100 unit/mL injection 40u qAM 20u qPM 3 vial 4    lisinopril (PRINIVIL,ZESTRIL) 20 MG tablet Take 1 tablet (20 mg total) by mouth once daily. 90 tablet 3    meclizine (ANTIVERT) 12.5 mg tablet TAKE 1 TABLET BY MOUTH 3 TIMES A DAY AS NEEDED 60 tablet 1    metoclopramide HCl (REGLAN) 5 MG tablet TAKE 1 TABLET (5 MG TOTAL) BY MOUTH 4 (FOUR) TIMES DAILY BEFORE MEALS AND NIGHTLY. 360 tablet 3    montelukast (SINGULAIR) 10 mg tablet Take 10 mg by mouth once daily.   5    omeprazole (PRILOSEC) 40 MG capsule Take 1 capsule (40 mg total) by mouth every morning. 90 capsule 3    PROAIR HFA 90 mcg/actuation inhaler Inhale 1-2 puffs into the lungs every 6 (six) hours as needed.   5    PROCTOSOL HC 2.5 % rectal cream APPLY TO RECTUM TWICE A DAY FOR 7 DAYS 28.35 g 3    RESTASIS 0.05 % ophthalmic emulsion Place 1 drop into both eyes 2 (two) times daily.  3    simethicone (GAS RELIEF) 180 mg Cap Take by mouth.      tizanidine (ZANAFLEX) 2 MG tablet TAKE 1 TABLET TWICE A DAY BY ORAL ROUTE FOR 30 DAYS. 60 tablet 1    triamcinolone acetonide 0.5% (KENALOG) 0.5 % Crea Apply topically 2 (two) times daily. 15 g 0     No current facility-administered medications for this visit.        REVIEW OF SYSTEMS:    GENERAL: No weight loss, malaise or fevers.  HEENT: Negative for frequent or significant headaches.  NECK: + neck pain   RESPIRATORY: Negative for cough, wheezing or shortness of breath.  CARDIOVASCULAR: + CAD, Negative for chest pain, leg swelling or palpitations.  GI: Negative for abdominal discomfort, blood in stools or black stools or change in bowel habits.  MUSCULOSKELETAL: See HPI, + right shoulder pain    SKIN: Negative for lesions, rash, and itching.  PSYCH: Negative for sleep disturbance, mood disorder and recent psychosocial stressors.  HEMATOLOGY/LYMPHOLOGY: Negative for prolonged bleeding, bruising easily or swollen nodes.  NEURO: No history of headaches, syncope, paralysis, seizures or tremors.  All other reviewed and negative other than HPI.    Past Medical History:  Past Medical History:   Diagnosis Date    Allergy     Anxiety     Arthritis     osteo    Asthma     Diabetes mellitus     Diabetes mellitus, type 2     GERD (gastroesophageal reflux disease)     High cholesterol     Hypertension        Past Surgical History:  Past Surgical History:   Procedure Laterality Date    CHOLECYSTECTOMY      HERNIA REPAIR      HYSTERECTOMY         Family History:  Family History   Problem Relation Age of Onset    Stroke Mother     Diabetes Sister     Diabetes Brother        Social History:  Social History     Social History    Marital status:      Spouse name: N/A    Number of children: N/A    Years of education: N/A     Social History Main Topics    Smoking status: Never Smoker    Smokeless tobacco: Never Used    Alcohol use Yes    Drug use: No    Sexual activity: Yes     Partners: Male     Other Topics Concern    None     Social History Narrative       OBJECTIVE:    /60  Pulse 74  Wt 56.7 kg (125 lb)  BMI 26.13 kg/m2    PHYSICAL EXAMINATION:    General appearance: Well appearing, in no acute distress, alert and oriented x3.  Psych: Mood and affect appropriate.  Skin: Skin color, texture, turgor normal, no rashes or lesions, in both upper and lower body.  Head/face: Normocephalic, atraumatic. No palpable lymph nodes.  Neck: + pain to palpation over the cervical paraspinous muscle and C spine as well as right trapezus . Spurling Negative. No pain with neck flexion, extension, or lateral flexion.   Back: Straight leg raising in the sitting and supine positions is negative to  radicular pain.   Extremities:+ inverted can test on the right shoulder Pain on ROM of the right shoulder Peripheral joint ROM is full and pain free without obvious instability or laxity in all four extremities. No deformities, edema, or skin discoloration. Good capillary refill.  Musculoskeletal: Shoulder, hip, sacroiliac and knee provocative maneuvers are negative. Bilateral upper and lower extremity strength is normal and symmetric. No atrophy or tone abnormalities are noted.   Neuro: Bilateral upper and lower extremity coordination and muscle stretch reflexes are physiologic and symmetric. Plantar response are downgoing. No loss of sensation is noted.  Gait: normal    ASSESSMENT: 71 y.o. year old female with low back, neck/ and right shoulder/arm  pain, consistent with lumbar facet arthropathy, rotator cuff tendinitis /subacromial bursitis of the right shoulder as well as myofacial muscle pain. She had bilateral L3,4,5 facet MBB on 10/26/16 with 80% pain relief , now her main pain is over the right shoulder /arm as well as neck .She had 70% pain relief after TPI and right subacromial bursa steroid injection done on 2/7/17     MRI of the right shoulder showed partial-thickness tear of the posterior insertional fibers of the supraspinatus tendon with associated significant tendinopathy of the supraspinatus tendon.      1. Tendinitis of right rotator cuff    2. Lumbar facet arthropathy          PLAN:     - I have stressed the importance of physical activity and a home exercise plan to help with pain and improve health.  - Referral to Physical therapy for right shoulder  - RTC in 2 months  - Counseled patient regarding the importance of physical therapy.    The above plan and management options were discussed at length with patient. Patient is in agreement with the above and verbalized understanding.    Meagan Pena  03/07/2017

## 2017-03-07 NOTE — MR AVS SNAPSHOT
[facility]  [address]  [place] [address]  Phone: [phone]                  Juliette Seo   3/7/2017 9:45 AM   Office Visit    Description:  Female : 1945   Provider:  Meagan Pena MD   Department:  [facility]           Reason for Visit     Low-back Pain     Cervical Spine Pain (C-spine)     Shoulder Pain           Diagnoses this Visit        Comments    Tendinitis of right rotator cuff    -  Primary     Lumbar facet arthropathy                To Do List           Future Appointments        Provider Department Dept Phone    3/7/2017 9:45 AM [facility] [phone]    2017 10:45 AM [facility] [phone]    2017 7:00 AM [facility] [place] [facility] [phone]    2017 10:00 AM Danie Beltre MD [facility] [phone]      Goals (5 Years of Data)     None      [facility]     [facility] Care [facility] -  Assistance  Registered nurses in the [facility] provide clinical advisement, health education, appointment booking, and other advisory services.  Call for this free service at [phone].             Medications           Message regarding Medications     Verify the changes and/or additions to your medication regime listed below are the same as discussed with your clinician today.  If any of these changes or additions are incorrect, please notify your healthcare provider.             Verify that the below list of medications is an accurate representation of the medications you are currently taking.  If none reported, the list may be blank. If incorrect, please contact your healthcare provider. Carry this list with you in case of emergency.           Current Medications     ACCU-CHEK FASTCLIX Misc once daily.     ACCU-CHEK SMARTVIEW TEST STRIP Strp USE AS DIRECTED TWICE A DAY    acetaminophen (TYLENOL)  325 MG tablet Take 500 mg by mouth every 6 (six) hours as needed for Pain.     aspirin 81 MG Chew Take 81 mg by mouth once daily.    atorvastatin (LIPITOR) 80 MG tablet TAKE 1 TABLET(S) EVERY OTHER DAY BY ORAL ROUTE FOR 90 DAYS.    BD INSULIN SYRINGE ULTRA-FINE 0.5 mL 31 gauge x 5/16 Syrg USE AS DIRECTED TWICE A DAY    bimatoprost (LUMIGAN) 0.01 % Drop 1 drop every evening.    fluticasone (FLONASE) 50 mcg/actuation nasal spray USE 1 SPRAY IN EACH NOSTRIL 2 TIMES A DAY    gabapentin (NEURONTIN) 300 MG capsule Take 1 capsule (300 mg total) by mouth 3 (three) times daily.    glimepiride (AMARYL) 2 MG tablet TAKE 1 TABLET (2 MG TOTAL) BY MOUTH BEFORE BREAKFAST.    hydrocortisone 2.5 % cream APPLY TO THE AFFECTED AREA(S) BY TOPICAL ROUTE THREE TIMES DAILY AS NEEDED FOR ITCHING    hydrOXYzine HCl (ATARAX) 25 MG tablet Take 1 tablet (25 mg total) by mouth 3 (three) times daily as needed for Itching.    insulin NPH (NOVOLIN N) 100 unit/mL injection 40u qAM 20u qPM    lisinopril (PRINIVIL,ZESTRIL) 20 MG tablet Take 1 tablet (20 mg total) by mouth once daily.    meclizine (ANTIVERT) 12.5 mg tablet TAKE 1 TABLET BY MOUTH 3 TIMES A DAY AS NEEDED    metoclopramide HCl (REGLAN) 5 MG tablet TAKE 1 TABLET (5 MG TOTAL) BY MOUTH 4 (FOUR) TIMES DAILY BEFORE MEALS AND NIGHTLY.    montelukast (SINGULAIR) 10 mg tablet Take 10 mg by mouth once daily.     omeprazole (PRILOSEC) 40 MG capsule Take 1 capsule (40 mg total) by mouth every morning.    PROAIR HFA 90 mcg/actuation inhaler Inhale 1-2 puffs into the lungs every 6 (six) hours as needed.     PROCTOSOL HC 2.5 % rectal cream APPLY TO RECTUM TWICE A DAY FOR 7 DAYS    RESTASIS 0.05 % ophthalmic emulsion Place 1 drop into both eyes 2 (two) times daily.    simethicone (GAS RELIEF) 180 mg Cap Take by mouth.    tizanidine (ZANAFLEX) 2 MG tablet TAKE 1 TABLET TWICE A DAY BY ORAL ROUTE FOR 30 DAYS.    triamcinolone acetonide 0.5% (KENALOG) 0.5 % Crea Apply topically 2 (two) times daily.            Clinical Reference Information           Your Vitals Were     BP Pulse Weight BMI       118/60 74 56.7 kg (125 lb) 26.13 kg/m2       Blood Pressure          Most Recent Value    BP  118/60      Allergies as of 3/7/2017     Actos [Pioglitazone]      Immunizations Administered on Date of Encounter - 3/7/2017     None      Orders Placed During Today's Visit      Normal Orders This Visit    Ambulatory consult to Physical Therapy       Language Assistance Services     ATTENTION: Language assistance services are available, free of charge. Please call [phone].      ATENCIÓN: Si habla sergio, tiene a aragon disposición servicios gratuitos de asistencia lingüística. Llame al [phone].     EARL Ý: N?u b?n nói Ti?ng Vi?t, có các d?ch v? h? tr? ngôn ng? mi?n phí dành cho b?n. G?i s? [phone].         [facility] complies with applicable Federal civil rights laws and does not discriminate on the basis of race, color, national origin, age, disability, or sex.

## 2017-03-17 ENCOUNTER — TELEPHONE (OUTPATIENT)
Dept: PAIN MEDICINE | Facility: CLINIC | Age: 72
End: 2017-03-17

## 2017-03-17 NOTE — TELEPHONE ENCOUNTER
----- Message from Mia Queen sent at 3/17/2017  8:56 AM CDT -----  Contact: [phone]  Patient is requesting to speak with you regarding pain in her right foot, patient states she may have to go emergency room

## 2017-03-21 ENCOUNTER — OFFICE VISIT (OUTPATIENT)
Dept: INTERNAL MEDICINE | Facility: CLINIC | Age: 72
End: 2017-03-21
Payer: MEDICARE

## 2017-03-21 ENCOUNTER — LAB VISIT (OUTPATIENT)
Dept: LAB | Facility: HOSPITAL | Age: 72
End: 2017-03-21
Attending: INTERNAL MEDICINE
Payer: MEDICARE

## 2017-03-21 VITALS
DIASTOLIC BLOOD PRESSURE: 80 MMHG | HEART RATE: 100 BPM | WEIGHT: 126.63 LBS | TEMPERATURE: 98 F | SYSTOLIC BLOOD PRESSURE: 130 MMHG | BODY MASS INDEX: 25.53 KG/M2 | RESPIRATION RATE: 12 BRPM | HEIGHT: 59 IN

## 2017-03-21 DIAGNOSIS — M25.571 ACUTE RIGHT ANKLE PAIN: Primary | ICD-10-CM

## 2017-03-21 DIAGNOSIS — M25.571 ACUTE RIGHT ANKLE PAIN: ICD-10-CM

## 2017-03-21 LAB — URATE SERPL-MCNC: 4.4 MG/DL

## 2017-03-21 PROCEDURE — 99213 OFFICE O/P EST LOW 20 MIN: CPT | Mod: S$PBB,,, | Performed by: INTERNAL MEDICINE

## 2017-03-21 PROCEDURE — 84550 ASSAY OF BLOOD/URIC ACID: CPT

## 2017-03-21 PROCEDURE — 36415 COLL VENOUS BLD VENIPUNCTURE: CPT | Mod: PO

## 2017-03-21 PROCEDURE — 99213 OFFICE O/P EST LOW 20 MIN: CPT | Mod: PBBFAC,PN | Performed by: INTERNAL MEDICINE

## 2017-03-21 PROCEDURE — 99999 PR PBB SHADOW E&M-EST. PATIENT-LVL III: CPT | Mod: PBBFAC,,, | Performed by: INTERNAL MEDICINE

## 2017-03-21 RX ORDER — PREDNISONE 20 MG/1
TABLET ORAL
Qty: 14 TABLET | Refills: 0 | Status: SHIPPED | OUTPATIENT
Start: 2017-03-21 | End: 2017-05-24 | Stop reason: ALTCHOICE

## 2017-03-21 RX ORDER — OXYCODONE AND ACETAMINOPHEN 5; 325 MG/1; MG/1
1 TABLET ORAL EVERY 4 HOURS PRN
Qty: 30 TABLET | Refills: 0 | Status: SHIPPED | OUTPATIENT
Start: 2017-03-21 | End: 2017-06-01

## 2017-03-21 NOTE — MR AVS SNAPSHOT
[facility] - Internal Medicine  [address] [place] [address] [place], [address]  [place] [address]  Phone: [phone]  Fax: [phone]                  Juliette Seo   3/21/2017 11:30 AM   Office Visit    Description:  Female : 1945   Provider:  Danie Beltre MD   Department:  [place] - Internal Medicine           Reason for Visit     Pain           Diagnoses this Visit        Comments    Acute right ankle pain    -  Primary            To Do List           Future Appointments        Provider Department Dept Phone    3/21/2017 11:30 AM [facility] [phone]    3/29/2017 10:15 AM David Mills Jr., KEN [place] - Podiatry [phone]    2017 10:45 AM Meagan Pena MD [place] - Pain Management [phone]    2017 7:00 AM LAB, [place] [facility] [phone]    2017 10:00 AM Danie Beltre MD [facility] Internal Medicine [phone]      Goals (5 Years of Data)     None      Follow-Up and Disposition     Return if symptoms worsen or fail to improve.       These Medications        Disp Refills Start End    predniSONE (DELTASONE) 20 MG tablet 14 tablet 0 3/21/2017     2 tabs po qd x 7 days    Pharmacy: [facility]/pharmacy #[id number] - [place] - [address] AT [facility] Ph #: [phone]       oxycodone-acetaminophen (PERCOCET) 5-325 mg per tablet 30 tablet 0 3/21/2017     Take 1 tablet by mouth every 4 (four) hours as needed for Pain. - Oral    Pharmacy: [facility]/pharmacy #[id number] - [place] - [address] AT [facility] Ph #: [phone]         [facility]     [facility] On Call Nurse Care Line -  Assistance  Registered nurses in the [facility] provide clinical advisement, health education, appointment booking, and other advisory services.  Call for this free service at [phone].             Medications           Message regarding Medications     Verify the changes and/or additions to your  medication regime listed below are the same as discussed with your clinician today.  If any of these changes or additions are incorrect, please notify your healthcare provider.        START taking these NEW medications        Refills    predniSONE (DELTASONE) 20 MG tablet 0    Si tabs po qd x 7 days    Class: Normal    oxycodone-acetaminophen (PERCOCET) 5-325 mg per tablet 0    Sig: Take 1 tablet by mouth every 4 (four) hours as needed for Pain.    Class: Normal    Route: Oral           Verify that the below list of medications is an accurate representation of the medications you are currently taking.  If none reported, the list may be blank. If incorrect, please contact your healthcare provider. Carry this list with you in case of emergency.           Current Medications     ACCU-CHEK FASTCLIX Misc once daily.     ACCU-CHEK SMARTVIEW TEST STRIP Strp USE AS DIRECTED TWICE A DAY    acetaminophen (TYLENOL) 325 MG tablet Take 500 mg by mouth every 6 (six) hours as needed for Pain.     aspirin 81 MG Chew Take 81 mg by mouth once daily.    atorvastatin (LIPITOR) 80 MG tablet TAKE 1 TABLET(S) EVERY OTHER DAY BY ORAL ROUTE FOR 90 DAYS.    BD INSULIN SYRINGE ULTRA-FINE 0.5 mL 31 gauge x 5/16 Syrg USE AS DIRECTED TWICE A DAY    bimatoprost (LUMIGAN) 0.01 % Drop 1 drop every evening.    fluticasone (FLONASE) 50 mcg/actuation nasal spray USE 1 SPRAY IN EACH NOSTRIL 2 TIMES A DAY    glimepiride (AMARYL) 2 MG tablet TAKE 1 TABLET (2 MG TOTAL) BY MOUTH BEFORE BREAKFAST.    hydrocortisone 2.5 % cream APPLY TO THE AFFECTED AREA(S) BY TOPICAL ROUTE THREE TIMES DAILY AS NEEDED FOR ITCHING    hydrOXYzine HCl (ATARAX) 25 MG tablet Take 1 tablet (25 mg total) by mouth 3 (three) times daily as needed for Itching.    insulin NPH (NOVOLIN N) 100 unit/mL injection 40u qAM 20u qPM    lisinopril (PRINIVIL,ZESTRIL) 20 MG tablet Take 1 tablet (20 mg total) by mouth once daily.    meclizine (ANTIVERT) 12.5 mg tablet TAKE 1 TABLET BY MOUTH 3  "TIMES A DAY AS NEEDED    metoclopramide HCl (REGLAN) 5 MG tablet TAKE 1 TABLET (5 MG TOTAL) BY MOUTH 4 (FOUR) TIMES DAILY BEFORE MEALS AND NIGHTLY.    montelukast (SINGULAIR) 10 mg tablet Take 10 mg by mouth once daily.     omeprazole (PRILOSEC) 40 MG capsule Take 1 capsule (40 mg total) by mouth every morning.    PROAIR HFA 90 mcg/actuation inhaler Inhale 1-2 puffs into the lungs every 6 (six) hours as needed.     PROCTOSOL HC 2.5 % rectal cream APPLY TO RECTUM TWICE A DAY FOR 7 DAYS    RESTASIS 0.05 % ophthalmic emulsion Place 1 drop into both eyes 2 (two) times daily.    simethicone (GAS RELIEF) 180 mg Cap Take by mouth.    tizanidine (ZANAFLEX) 2 MG tablet TAKE 1 TABLET TWICE A DAY BY ORAL ROUTE FOR 30 DAYS.    triamcinolone acetonide 0.5% (KENALOG) 0.5 % Crea Apply topically 2 (two) times daily.    gabapentin (NEURONTIN) 300 MG capsule Take 1 capsule (300 mg total) by mouth 3 (three) times daily.    oxycodone-acetaminophen (PERCOCET) 5-325 mg per tablet Take 1 tablet by mouth every 4 (four) hours as needed for Pain.    predniSONE (DELTASONE) 20 MG tablet 2 tabs po qd x 7 days           Clinical Reference Information           Your Vitals Were     BP Pulse Temp Resp Height Weight    130/80 100 97.7 °F (36.5 °C) (Oral) 12 4' 11" (1.499 m) 57.5 kg (126 lb 10.5 oz)    BMI                25.58 kg/m2          Blood Pressure          Most Recent Value    BP  130/80      Allergies as of 3/21/2017     Actos [Pioglitazone]      Immunizations Administered on Date of Encounter - 3/21/2017     None      Orders Placed During Today's Visit     Future Labs/Procedures Expected by Expires    Uric acid  3/21/2017 5/20/2018      Language Assistance Services     ATTENTION: Language assistance services are available, free of charge. Please call [phone].      ATENCIÓN: Si habla sergio, tiene a aragon disposición servicios gratuitos de asistencia lingüística. Llame al [phone].     CHÚ Ý: N?u b?n nói Ti?ng Vi?t, có các d?ch " v? h? tr? david ng? mi?n phí bernardinoh cho b?n. G?i s? [phone].         [facility] - Internal Medicine complies with applicable Federal civil rights laws and does not discriminate on the basis of race, color, national origin, age, disability, or sex.

## 2017-03-21 NOTE — PROGRESS NOTES
Subjective:       Patient ID: Juliette Seo is a 71 y.o. female.    Chief Complaint: Pain (of right foot x1 week)    HPI  Pt with 1 week of R ankle pain, bears weight with difficulty.  No FH of gout.  Denies trauma.  BSs OK.  Review of Systems    Objective:      Physical Exam   Musculoskeletal:   L medial ankle red, warm, tender       Assessment:       1. Acute right ankle pain        Plan:       Juliette was seen today for pain.    Diagnoses and all orders for this visit:    Acute right ankle pain  -     Uric acid; Future  -     predniSONE (DELTASONE) 20 MG tablet; 2 tabs po qd x 7 days  -     oxycodone-acetaminophen (PERCOCET) 5-325 mg per tablet; Take 1 tablet by mouth every 4 (four) hours as needed for Pain.      Return if symptoms worsen or fail to improve.

## 2017-03-22 ENCOUNTER — HOSPITAL ENCOUNTER (EMERGENCY)
Facility: HOSPITAL | Age: 72
Discharge: HOME | End: 2017-03-22
Attending: EMERGENCY MEDICINE
Payer: MEDICARE

## 2017-03-22 VITALS
WEIGHT: 120 LBS | SYSTOLIC BLOOD PRESSURE: 152 MMHG | HEIGHT: 59 IN | TEMPERATURE: 99 F | HEART RATE: 100 BPM | BODY MASS INDEX: 24.19 KG/M2 | DIASTOLIC BLOOD PRESSURE: 69 MMHG | OXYGEN SATURATION: 100 % | RESPIRATION RATE: 20 BRPM

## 2017-03-22 DIAGNOSIS — M10.9 GOUT, UNSPECIFIED CAUSE, UNSPECIFIED CHRONICITY, UNSPECIFIED SITE: Primary | ICD-10-CM

## 2017-03-22 LAB — GLUCOSE SERPL-MCNC: 111 MG/DL (ref 70–110)

## 2017-03-22 PROCEDURE — 82962 GLUCOSE BLOOD TEST: CPT

## 2017-03-22 PROCEDURE — 99283 EMERGENCY DEPT VISIT LOW MDM: CPT | Mod: 25

## 2017-03-22 RX ORDER — COLCHICINE 0.6 MG/1
TABLET ORAL
Qty: 3 TABLET | Refills: 1 | Status: SHIPPED | OUTPATIENT
Start: 2017-03-22 | End: 2017-05-24

## 2017-03-22 NOTE — ED AVS SNAPSHOT
[facility] [address]  [place] [address]               Juliette Seo   3/22/2017  4:29 PM   ED    Description:  Female : 1945   Department:  [facility]           Your Care was Coordinated By:     Provider Role From To    Nghia Ac MD Attending Provider [phone] --      Reason for Visit     Wound Check           Diagnoses this Visit        Comments    Gout, unspecified cause, unspecified chronicity, unspecified site    -  Primary       ED Disposition     ED Disposition Condition Comment    Discharge             To Do List           Follow-up Information     Follow up with Rheumatology. Schedule an appointment as soon as possible for a visit in 2 days.       These Medications        Disp Refills Start End    colchicine 0.6 mg tablet 3 tablet 1 3/22/2017     Take 2 tablets by mouth for the first dose, wait one hour and then take 1 tablet by mouth.    Pharmacy: [facility]/pharmacy #[id number] - [place] - [address] AT [facility] Ph #: [phone]         [facility] Call     [facility] Nurse Care Line -  Assistance  Registered nurses in the [facility] provide clinical advisement, health education, appointment booking, and other advisory services.  Call for this free service at [phone].             Medications           Message regarding Medications     Verify the changes and/or additions to your medication regime listed below are the same as discussed with your clinician today.  If any of these changes or additions are incorrect, please notify your healthcare provider.        START taking these NEW medications        Refills    colchicine 0.6 mg tablet 1    Sig: Take 2 tablets by mouth for the first dose, wait one hour and then take 1 tablet by mouth.    Class: Print           Verify that the below list of medications is an accurate representation of the medications you are currently taking.  If  none reported, the list may be blank. If incorrect, please contact your healthcare provider. Carry this list with you in case of emergency.           Current Medications     ACCU-CHEK FASTCLIX Misc once daily.     ACCU-CHEK SMARTVIEW TEST STRIP Strp USE AS DIRECTED TWICE A DAY    acetaminophen (TYLENOL) 325 MG tablet Take 500 mg by mouth every 6 (six) hours as needed for Pain.     aspirin 81 MG Chew Take 81 mg by mouth once daily.    atorvastatin (LIPITOR) 80 MG tablet TAKE 1 TABLET(S) EVERY OTHER DAY BY ORAL ROUTE FOR 90 DAYS.    BD INSULIN SYRINGE ULTRA-FINE 0.5 mL 31 gauge x 5/16 Syrg USE AS DIRECTED TWICE A DAY    bimatoprost (LUMIGAN) 0.01 % Drop 1 drop every evening.    colchicine 0.6 mg tablet Take 2 tablets by mouth for the first dose, wait one hour and then take 1 tablet by mouth.    fluticasone (FLONASE) 50 mcg/actuation nasal spray USE 1 SPRAY IN EACH NOSTRIL 2 TIMES A DAY    gabapentin (NEURONTIN) 300 MG capsule Take 1 capsule (300 mg total) by mouth 3 (three) times daily.    glimepiride (AMARYL) 2 MG tablet TAKE 1 TABLET (2 MG TOTAL) BY MOUTH BEFORE BREAKFAST.    hydrocortisone 2.5 % cream APPLY TO THE AFFECTED AREA(S) BY TOPICAL ROUTE THREE TIMES DAILY AS NEEDED FOR ITCHING    hydrOXYzine HCl (ATARAX) 25 MG tablet Take 1 tablet (25 mg total) by mouth 3 (three) times daily as needed for Itching.    insulin NPH (NOVOLIN N) 100 unit/mL injection 40u qAM 20u qPM    lisinopril (PRINIVIL,ZESTRIL) 20 MG tablet Take 1 tablet (20 mg total) by mouth once daily.    meclizine (ANTIVERT) 12.5 mg tablet TAKE 1 TABLET BY MOUTH 3 TIMES A DAY AS NEEDED    metoclopramide HCl (REGLAN) 5 MG tablet TAKE 1 TABLET (5 MG TOTAL) BY MOUTH 4 (FOUR) TIMES DAILY BEFORE MEALS AND NIGHTLY.    montelukast (SINGULAIR) 10 mg tablet Take 10 mg by mouth once daily.     omeprazole (PRILOSEC) 40 MG capsule Take 1 capsule (40 mg total) by mouth every morning.    oxycodone-acetaminophen (PERCOCET) 5-325 mg per tablet Take 1 tablet by mouth  "every 4 (four) hours as needed for Pain.    predniSONE (DELTASONE) 20 MG tablet 2 tabs po qd x 7 days    PROAIR HFA 90 mcg/actuation inhaler Inhale 1-2 puffs into the lungs every 6 (six) hours as needed.     PROCTOSOL HC 2.5 % rectal cream APPLY TO RECTUM TWICE A DAY FOR 7 DAYS    RESTASIS 0.05 % ophthalmic emulsion Place 1 drop into both eyes 2 (two) times daily.    simethicone (GAS RELIEF) 180 mg Cap Take by mouth.    tizanidine (ZANAFLEX) 2 MG tablet TAKE 1 TABLET TWICE A DAY BY ORAL ROUTE FOR 30 DAYS.    triamcinolone acetonide 0.5% (KENALOG) 0.5 % Crea Apply topically 2 (two) times daily.           Clinical Reference Information           Your Vitals Were     BP Pulse Temp Resp Height Weight    152/69 100 98.6 °F (37 °C) 20 4' 11" (1.499 m) 54.4 kg (120 lb)    SpO2 BMI             100% 24.24 kg/m2         Allergies as of 3/22/2017        Reactions    Actos [Pioglitazone] Nausea Only      Immunizations Administered on Date of Encounter - 3/22/2017     None      ED Micro, Lab, POCT     Start Ordered       Status Ordering Provider    03/22/17 0000 03/22/17 1530  POCT glucose     Comments:  This order was created through External Result Entry    Completed       ED Imaging Orders     None        Discharge Instructions         Eating to Prevent Gout  Gout is a painful form of arthritis caused by an excess of uric acid. This is a waste product made by the body. It builds up in the body and forms crystals that collect in the joints, bringing on a gout attack. Alcohol and certain foods can trigger a gout attack. Below are some guidelines for changing your diet to help you manage gout. Your healthcare provider can work with you to determine the best eating plan for you. Know that diet is only one part of managing gout. Take your medicines as prescribed and follow the other guidelines your healthcare provider has given you.  Foods to limit  Eating too many foods containing purines may increase the levels of uric acid in " your body and increase your risk for a gout attack. It may be best to limit these high-purine foods:  · Alcohol (beer, red wine). You may be told to avoid alcohol completely.  · Certain fish (anchovies, sardines, fish roes, herring, tuna, mussels, codfish, scallops, trout, and laurence)  · Certain meats (red meat, processed meat, valles, turkey, wild game, and goose)  · Sauces and gravies made with meat  · Organ meats (such as liver, kidneys, sweetbreads, and tripe)  · Legumes (such as dried beans, peas)  · Mushrooms, spinach, asparagus, and cauliflower  · Yeast and yeast extract supplements  Foods to try  Some foods may be helpful for people with gout. You may want to try adding some of the following foods to your diet:  · Dark berries: These include blueberries, blackberries, and cherries. These berries contain chemicals that may lower uric acid.  · Tofu: Tofu, which is made from soy, is a good source of protein. Studies have shown that it may be a better choice than meat for people with gout.  · Omega fatty acids: These acids are found in fatty fish (such as salmon), certain oils (such as flax, olive, or nut oils), or nuts. They may help prevent inflammation due to gout.  The following guidelines are recommended by the American Medical Association for people with gout. Your diet should be:  · High in fiber, whole grains, fruits, and vegetables.  · Low in protein (15% of calories should come from protein. Choose lean sources such as soy, lean meats, and poultry).  · Low in fat (no more than 30% of calories should come from fat, with only 10% coming from animal fat).   Date Last Reviewed: 6/17/2015  © 0949-9495 [employer]. [address] [place] [address], [place] [address]. All rights reserved. This information is not intended as a substitute for professional medical care. Always follow your healthcare professional's instructions.          Your Scheduled Appointments     Mar 29, 2017 10:15 AM CDT   New Patient  with David Mills Jr., DPM   [place] - Podiatry ([place])    [address] [facility]  [place] [address]   [phone]            May 08, 2017 10:45 AM CDT   Established Patient Visit with MD [facility] Enrique - Pain Management ([facility])    [address]  [place] [address]   [phone]            May 29, 2017  7:00 AM CDT   Fasting Lab with LAB, [place] [facility]    [address]  [place] [address]   [phone]            Jun 01, 2017 10:00 AM CDT   Established Patient Visit with MD César Candelaria - Internal Medicine ([facility])    [address], [address]  [place] [address]   [phone]               [facility] complies with applicable Federal civil rights laws and does not discriminate on the basis of race, color, national origin, age, disability, or sex.        Language Assistance Services     ATTENTION: Language assistance services are available, free of charge. Please call [phone].      ATENCIÓN: Si habla español, tiene a aragon disposición servicios gratuitos de asistencia lingüística. Llame al [phone].     CHÚ Ý: N?u b?n nói Ti?ng Vi?t, có các d?ch v? h? tr? ngôn ng? mi?n phí dành cho b?n. G?i s? [phone].

## 2017-03-22 NOTE — DISCHARGE INSTRUCTIONS
Eating to Prevent Gout  Gout is a painful form of arthritis caused by an excess of uric acid. This is a waste product made by the body. It builds up in the body and forms crystals that collect in the joints, bringing on a gout attack. Alcohol and certain foods can trigger a gout attack. Below are some guidelines for changing your diet to help you manage gout. Your healthcare provider can work with you to determine the best eating plan for you. Know that diet is only one part of managing gout. Take your medicines as prescribed and follow the other guidelines your healthcare provider has given you.  Foods to limit  Eating too many foods containing purines may increase the levels of uric acid in your body and increase your risk for a gout attack. It may be best to limit these high-purine foods:  · Alcohol (beer, red wine). You may be told to avoid alcohol completely.  · Certain fish (anchovies, sardines, fish roes, herring, tuna, mussels, codfish, scallops, trout, and laurence)  · Certain meats (red meat, processed meat, valles, turkey, wild game, and goose)  · Sauces and gravies made with meat  · Organ meats (such as liver, kidneys, sweetbreads, and tripe)  · Legumes (such as dried beans, peas)  · Mushrooms, spinach, asparagus, and cauliflower  · Yeast and yeast extract supplements  Foods to try  Some foods may be helpful for people with gout. You may want to try adding some of the following foods to your diet:  · Dark berries: These include blueberries, blackberries, and cherries. These berries contain chemicals that may lower uric acid.  · Tofu: Tofu, which is made from soy, is a good source of protein. Studies have shown that it may be a better choice than meat for people with gout.  · Omega fatty acids: These acids are found in fatty fish (such as salmon), certain oils (such as flax, olive, or nut oils), or nuts. They may help prevent inflammation due to gout.  The following guidelines are recommended by the  American Medical Association for people with gout. Your diet should be:  · High in fiber, whole grains, fruits, and vegetables.  · Low in protein (15% of calories should come from protein. Choose lean sources such as soy, lean meats, and poultry).  · Low in fat (no more than 30% of calories should come from fat, with only 10% coming from animal fat).   Date Last Reviewed: 6/17/2015  © 2524-0050 [employer]. [address] [place] [address], [place] [address]. All rights reserved. This information is not intended as a substitute for professional medical care. Always follow your healthcare professional's instructions.

## 2017-03-22 NOTE — ED TRIAGE NOTES
Pt reports pain redness and swelling to R medial ankle, reports drinking wine and eating meats and cheeses often.

## 2017-03-22 NOTE — ED PROVIDER NOTES
Encounter Date: 3/22/2017       History     Chief Complaint   Patient presents with    Wound Check     right foot red, swollen, and painful, nausea     Review of patient's allergies indicates:   Allergen Reactions    Actos [pioglitazone] Nausea Only     HPI   71 y.o. female presents to ER with complaint of constant sharp right medial ankle pain worse with movement and palpation and improves with rest.  No associated fever nausea vomiting.  Patient admits to drinking wine and eating red meat as well as cheeses.    Past Medical History:   Diagnosis Date    Allergy     Anxiety     Arthritis     osteo    Asthma     Diabetes mellitus     Diabetes mellitus, type 2     GERD (gastroesophageal reflux disease)     High cholesterol     Hypertension      Past Surgical History:   Procedure Laterality Date    CHOLECYSTECTOMY      HERNIA REPAIR      HYSTERECTOMY       Family History   Problem Relation Age of Onset    Stroke Mother     Diabetes Sister     Diabetes Brother      Social History   Substance Use Topics    Smoking status: Never Smoker    Smokeless tobacco: Never Used    Alcohol use Yes     Review of Systems  Constitution - denies fever   Eyes - No change in vision   Ear, Nose, Mouth, Throat - no dysphagia  Respiratory - no shortness of breath   Cardiovascular - Denies chest pain   Gastrointestinal -  Denies abdominal pain  Genitourinary - denies dysuria  Musculoskeletal - admits to right ankle pain  Skin - No rash or changes in skin   Neurological - No weakness, headache, loss of consciousness   All other systems reviewed and negative    Physical Exam   Initial Vitals   BP Pulse Resp Temp SpO2   03/22/17 1529 03/22/17 1529 03/22/17 1529 03/22/17 1529 03/22/17 1529   152/69 100 20 98.6 °F (37 °C) 100 %     Physical Exam  .  Nurses notes reviewed and confirmed.  Constitutional: Vitals reviewed.    Vitals:    03/22/17 1529   BP: (!) 152/69   Pulse: 100   Resp: 20   Temp: 98.6 °F (37 °C)    . No apparent  distress   Head: Atraumatic. Normocephalic   Eyes: EOM are normal. Pupils are equal, round, and reactive to light. Normal conjunctiva and lids   HENT: Mucous membranes moist, pharynx normal, external ears and nose normal in appearance, Hearing grossly normal   Neck: Normal range of motion. Neck supple. No masses, trachea midline  Cardiovascular: Normal rate, regular rhythm and normal heart sounds. No bruit, 2+ radial pulses equal, no edema   Pulmonary/Chest: normal respiratory effort, breath sounds normal. Chest not tender to palpation, breast and ribs symmetrical   Abdominal: Soft. There is no tenderness. Normal bowel sounds. Not Distended  Musculoskeletal: Right ankle tender to palpation medially, right ankle painful with active and passive range of motion, mild erythema skin overlying medial ankle and right great toe, pain with passive range of motion of great toe and tenderness to palpation of the medial aspect of the great toe, otherwise Normal range of motion without pain of major joints. No clubbing of digits, no obvious effusions. Major joints grossly stable, no spinal midline TTP and no step offs or deformities noted.   Neurological: Alert and oriented to person, place, and time. No focal neurological deficits.  Skin: Skin is warm and dry. No evidence of rash or cellulitis   Lymphatics: no cervical or axillary lymphadenopathy   Psychiatric: Normal mood and affect. Normal recent and remote memory    ED Course   Procedures  Labs Reviewed - No data to display     71-year-old female with medial ankle and right great toe pain concerning for podagra/gout versus arthritis.  I'll prescribe colchicine and have the patient follow up with primary care/rheumatology                       ED Course     Clinical Impression:   There were no encounter diagnoses.          Nghia Ac MD  03/22/17 0303

## 2017-03-24 LAB — POCT GLUCOSE: 111 MG/DL (ref 70–110)

## 2017-04-24 PROBLEM — G89.29 CHRONIC RIGHT SHOULDER PAIN: Status: ACTIVE | Noted: 2017-04-24

## 2017-04-24 PROBLEM — M25.511 CHRONIC RIGHT SHOULDER PAIN: Status: ACTIVE | Noted: 2017-04-24

## 2017-05-08 ENCOUNTER — HOSPITAL ENCOUNTER (OUTPATIENT)
Dept: RADIOLOGY | Facility: HOSPITAL | Age: 72
Discharge: HOME | End: 2017-05-08
Attending: ANESTHESIOLOGY
Payer: MEDICARE

## 2017-05-08 ENCOUNTER — OFFICE VISIT (OUTPATIENT)
Dept: PAIN MEDICINE | Facility: CLINIC | Age: 72
End: 2017-05-08
Payer: MEDICARE

## 2017-05-08 VITALS
SYSTOLIC BLOOD PRESSURE: 137 MMHG | HEIGHT: 59 IN | DIASTOLIC BLOOD PRESSURE: 63 MMHG | RESPIRATION RATE: 18 BRPM | HEART RATE: 102 BPM | WEIGHT: 124.75 LBS | BODY MASS INDEX: 25.15 KG/M2

## 2017-05-08 DIAGNOSIS — M47.816 LUMBAR FACET ARTHROPATHY: ICD-10-CM

## 2017-05-08 DIAGNOSIS — M46.1 SACROILIITIS: Primary | ICD-10-CM

## 2017-05-08 DIAGNOSIS — M46.1 SACROILIITIS: ICD-10-CM

## 2017-05-08 PROCEDURE — 72100 X-RAY EXAM L-S SPINE 2/3 VWS: CPT | Mod: 26,,, | Performed by: RADIOLOGY

## 2017-05-08 PROCEDURE — 99215 OFFICE O/P EST HI 40 MIN: CPT | Mod: 25,PBBFAC,PO | Performed by: ANESTHESIOLOGY

## 2017-05-08 PROCEDURE — 73521 X-RAY EXAM HIPS BI 2 VIEWS: CPT | Mod: TC

## 2017-05-08 PROCEDURE — 72100 X-RAY EXAM L-S SPINE 2/3 VWS: CPT | Mod: TC

## 2017-05-08 PROCEDURE — 99214 OFFICE O/P EST MOD 30 MIN: CPT | Mod: S$PBB,,, | Performed by: ANESTHESIOLOGY

## 2017-05-08 PROCEDURE — 73521 X-RAY EXAM HIPS BI 2 VIEWS: CPT | Mod: 26,,, | Performed by: RADIOLOGY

## 2017-05-08 PROCEDURE — 99999 PR PBB SHADOW E&M-EST. PATIENT-LVL V: CPT | Mod: 25,PBBFAC,, | Performed by: ANESTHESIOLOGY

## 2017-05-08 RX ORDER — MELOXICAM 7.5 MG/1
7.5 TABLET ORAL DAILY
Qty: 30 TABLET | Refills: 3 | Status: SHIPPED | OUTPATIENT
Start: 2017-05-08 | End: 2017-06-07

## 2017-05-08 NOTE — PROGRESS NOTES
Chronic patient Established Note (Follow up visit)      SUBJECTIVE:    Juliette Seo presents to the clinic for a follow-up appointment for lower back and right shoulder pain. Since the last visit, Juliette Seo states the pain has been worsening. Current pain intensity is 10/10.  The low back pain radiates to the right buttock./groin and hip.  She had 70% pain relief after TPI and right subacromial bursa steroid injection done on 2/7/17  She has been doing PT  She previously has had good relief for her axial back pain after Bilateral L3,4,5 facet MBB on 10/26/16    Pain Disability Index Review:  Last 3 PDI Scores 5/8/2017 3/7/2017 2/7/2017   Pain Disability Index (PDI) 52 [phone] 52       Pain Medications:    - Opioids:None   - Adjuvant Medications: Gabapentin, Tizanidine, Aleve, Tylenol  - Anti-Coagulants: Aspirin  - Others: See Medication List      Opioid Contract: no      report: Reviewed and consistent with medication use as prescribed.     Pain Procedures: right subacromial bursa steroid injection  And TRIGGER POINT INJECTION 2/7/17  10/26/16 BILATERAL L3,4,5  LUMBAR FACET MEDIAL BRANCH NERVE BLOCK        Physical Therapy/Home Exercise: yes     Imaging: MRI Cervical Spine Without Contrast 2/15/17  Time of Procedure: 02/15/17 07:13:02  Accession # [id number]    MR of the cervical spine without contrast:    Technique: Multiplanar, multisequence MRI of the cervical spine obtained. No IV contrast.     Comparison: None     Findings:    Cervical spine alignment is within normal limits. Vertebral body heights and intervertebral disc spaces are satisfactorily maintained. No evidence of bone marrow replacement process is tumor or infection.  No fracture.     The spinal cord demonstrates no abnormal signal.  The base of the brain, base of the skull, and craniocervical junction demonstrate no significant abnormalities.  The adjacent soft tissue structures show no significant abnormalities.      C2-C3:  There is no posterior  disc osteophyte complex formation. No significant central canal or neural foraminal narrowing.    C3-C4: There is diffuse posterior disc osteophyte complex formation and mild bilateral uncovertebral spurring. No significant central canal or neural foraminal narrowing.    C4-C5:  Mild posterior disc osteophyte complex formation, uncovertebral spurring, and facet arthropathy causing mild bilateral neuroforaminal narrowing.  No central spinal canal stenosis.     C5-C6:  Mild diffuse posterior disc osteophyte complex formation causing effacement of the left anterior CSF sleeve with facet arthropathy and uncovertebral spurring causing mild bilateral neuroforaminal narrowing.     C6-C7:  Mild posterior disc osteophyte complex formation and facet arthropathy.No significant central canal or neural foraminal narrowing.    C7-T1:   There is no posterior disc osteophyte complex formation. No significant central canal or neural foraminal narrowing.   Impression        Mild degenerative changes as described above.      Electronically signed by: SHORTY KEATING MD  Date: 02/15/17  Time: 09:09          MRI Upper Extremity Joint Without Contrast Right 2/15/17  Narrative   Time of Procedure: 02/15/17 07:13:08  Accession # [id number]    MRI RIGHT SHOULDER    INDICATION: Right shoulder lesion    TECHNIQUE: MRI of right shoulder was performed on a 1.5T magnet utilizing the following sequences: Localizer; axial T2 FS; coronal T2 FS and PD FS; sagittal T1, T2 FS and PD FS.    COMPARISON: Not available.    FINDINGS:    Rotator cuff: Partial thickness tear of the posterior insertional fibers of the supraspinatus tendon and significant tendinopathy of the supraspinatus tendon.  Infraspinatus, teres minor, and subscapularis tendons are intact.    Labrum: Global degeneration.    Biceps: Long head biceps tendon is intact.    Bone: There is no fracture.  Bone marrow signal is unremarkable.    Acromioclavicular joint: Moderate  arthrosis.    Cartilage: Articular cartilage of the glenohumeral joint is preserved.    Miscellaneous: Small amount of fluid in the subdeltoid and subacromion bursae.   Impression       Partial-thickness tear of the posterior insertional fibers of the supraspinatus tendon with associated significant tendinopathy of the supraspinatus tendon.      Electronically signed by: SHORTY KEATING MD  Date: 02/15/17  Time: 08:36          Xray Knee 10/18/16  HISTORY: Pain    COMPARISON: None    FINDINGS: 5 views.      The osseous structures are intact with no evidence of fracture or dislocation.  There is degenerative osteoarthritic change of the bilateral tibiofemoral and patellofemoral compartments.  The joint spaces and soft tissues are otherwise unremarkable.   Impression       #1. As above.      Electronically signed by: EVAN SLOAN MD  Date: 10/18/16  Time: 09:38            Xray Cervical 10/18/16  HISTORY: Pain    COMPARISON: None    FINDINGS: 4 views.    The cervical spine alignment and vertebral body heights are well-maintained.  There is no evidence of osseous fracture.    There are advanced degenerative osteoarthritic changes of the cervical spine with facet hypertrophy and disc space narrowing.  The joint spaces, soft tissues, and osseous structures are otherwise unremarkable.   Impression       #1. As Above.        Electronically signed by: EVAN SLOAN MD  Date: 10/18/16  Time: 10:01            Xray Lumbar 10/17/16  Technique: AP, lateral and bilateral oblique views of the lumbar spine         Comparison: None    Results: The lumbar sagittal alignment is within normal limits.  The lungs are mild endplate degenerative change lumbar vertebral body heights and contours are within normal limits without evidence for acute fracture or subluxation.  No definite spondylolysis.  Surgical clips project over the upper abdomen.  Scattered vascular calcifications identified. Further evaluation as warrented clinically.    Impression    See above.      Electronically signed by: STEPHAN SMITH DO  Date: 10/17/16  Time: 14:26            CT Thoracic Spine 6/22/16  CT thoracic spine with and without contrast.    Findings: 75 mL of Omnipaque 350 IV contrast was administered for today's examination.    The visualized portion of the mediastinum is significant for calcification of the aorta, aortic annulus and coronary arteries.  The visualized portion of the intra-abdominal contents is significant for a punctate right-sided nonobstructing renal stone.  The visualized portion of the lungs is unremarkable.  There is no abnormal enhancement following the administration of contrast.  The thoracic spinal alignment and vertebral body heights are satisfactorily preserved.  There is no fracture, dislocation, or bony erosion.   Impression    No acute findings.  ______________________________________     Electronically signed by: ISA GUY MD  Date: 06/22/16  Time: 08:18               Allergies:   Review of patient's allergies indicates:   Allergen Reactions    Actos [pioglitazone] Nausea Only       Current Medications:   Current Outpatient Prescriptions   Medication Sig Dispense Refill    ACCU-CHEK FASTCLIX Misc USE AS DIRECTED TWICE A  each 3    ACCU-CHEK SMARTVIEW TEST STRIP Strp USE AS DIRECTED TWICE A  strip 3    acetaminophen (TYLENOL) 325 MG tablet Take 500 mg by mouth every 6 (six) hours as needed for Pain.       aspirin 81 MG Chew Take 81 mg by mouth once daily.      atorvastatin (LIPITOR) 80 MG tablet TAKE 1 TABLET(S) EVERY OTHER DAY BY ORAL ROUTE FOR 90 DAYS. 45 tablet 4    BD INSULIN SYRINGE ULTRA-FINE 0.5 mL 31 gauge x 5/16 Syrg USE AS DIRECTED TWICE A  each 4    bimatoprost (LUMIGAN) 0.01 % Drop 1 drop every evening.      colchicine 0.6 mg tablet Take 2 tablets by mouth for the first dose, wait one hour and then take 1 tablet by mouth. 3 tablet 1    fluticasone (FLONASE) 50 mcg/actuation nasal spray USE 1  SPRAY IN EACH NOSTRIL 2 TIMES A DAY  1    glimepiride (AMARYL) 2 MG tablet TAKE 1 TABLET (2 MG TOTAL) BY MOUTH BEFORE BREAKFAST. 90 tablet 3    hydrocortisone 2.5 % cream APPLY TO THE AFFECTED AREA(S) BY TOPICAL ROUTE THREE TIMES DAILY AS NEEDED FOR ITCHING  2    hydrOXYzine HCl (ATARAX) 25 MG tablet Take 1 tablet (25 mg total) by mouth 3 (three) times daily as needed for Itching. 100 tablet 1    insulin NPH (NOVOLIN N) 100 unit/mL injection 40u qAM 20u qPM 3 vial 4    lisinopril (PRINIVIL,ZESTRIL) 20 MG tablet Take 1 tablet (20 mg total) by mouth once daily. 90 tablet 3    meclizine (ANTIVERT) 12.5 mg tablet TAKE 1 TABLET BY MOUTH 3 TIMES A DAY AS NEEDED 60 tablet 1    metoclopramide HCl (REGLAN) 5 MG tablet TAKE 1 TABLET (5 MG TOTAL) BY MOUTH 4 (FOUR) TIMES DAILY BEFORE MEALS AND NIGHTLY. 360 tablet 3    montelukast (SINGULAIR) 10 mg tablet Take 10 mg by mouth once daily.   5    omeprazole (PRILOSEC) 40 MG capsule Take 1 capsule (40 mg total) by mouth every morning. 90 capsule 3    PROAIR HFA 90 mcg/actuation inhaler Inhale 1-2 puffs into the lungs every 6 (six) hours as needed.   5    PROCTOSOL HC 2.5 % rectal cream APPLY TO RECTUM TWICE A DAY FOR 7 DAYS 28.35 g 3    RESTASIS 0.05 % ophthalmic emulsion Place 1 drop into both eyes 2 (two) times daily.  3    tizanidine (ZANAFLEX) 2 MG tablet TAKE 1 TABLET TWICE A DAY BY ORAL ROUTE FOR 30 DAYS. 60 tablet 1    gabapentin (NEURONTIN) 300 MG capsule Take 1 capsule (300 mg total) by mouth 3 (three) times daily. 90 capsule 11    oxycodone-acetaminophen (PERCOCET) 5-325 mg per tablet Take 1 tablet by mouth every 4 (four) hours as needed for Pain. 30 tablet 0    predniSONE (DELTASONE) 20 MG tablet 2 tabs po qd x 7 days 14 tablet 0    simethicone (GAS RELIEF) 180 mg Cap Take by mouth.      triamcinolone acetonide 0.5% (KENALOG) 0.5 % Crea Apply topically 2 (two) times daily. 15 g 0     No current facility-administered medications for this visit.   "      REVIEW OF SYSTEMS:    GENERAL: No weight loss, malaise or fevers.  HEENT: Negative for frequent or significant headaches.  NECK: + neck pain   RESPIRATORY: Negative for cough, wheezing or shortness of breath.  CARDIOVASCULAR: + CAD, Negative for chest pain, leg swelling or palpitations.  GI: Negative for abdominal discomfort, blood in stools or black stools or change in bowel habits.  MUSCULOSKELETAL: See HPI, + right shoulder pain   SKIN: Negative for lesions, rash, and itching.  PSYCH: Negative for sleep disturbance, mood disorder and recent psychosocial stressors.  HEMATOLOGY/LYMPHOLOGY: Negative for prolonged bleeding, bruising easily or swollen nodes.  NEURO: No history of headaches, syncope, paralysis, seizures or tremors.  All other reviewed and negative other than HPI    Past Medical History:  Past Medical History:   Diagnosis Date    Allergy     Anxiety     Arthritis     osteo    Asthma     Diabetes mellitus     Diabetes mellitus, type 2     GERD (gastroesophageal reflux disease)     High cholesterol     Hypertension        Past Surgical History:  Past Surgical History:   Procedure Laterality Date    CHOLECYSTECTOMY      HERNIA REPAIR      HYSTERECTOMY         Family History:  Family History   Problem Relation Age of Onset    Stroke Mother     Diabetes Sister     Diabetes Brother        Social History:  Social History     Social History    Marital status:      Spouse name: N/A    Number of children: N/A    Years of education: N/A     Social History Main Topics    Smoking status: Never Smoker    Smokeless tobacco: Never Used    Alcohol use Yes    Drug use: No    Sexual activity: Yes     Partners: Male     Other Topics Concern    None     Social History Narrative       OBJECTIVE:    /63 (BP Location: Left arm, Patient Position: Sitting, BP Method: Automatic)  Pulse 102  Resp 18  Ht 4' 11" (1.499 m)  Wt 56.6 kg (124 lb 12.5 oz)  BMI 25.2 kg/m2    PHYSICAL " EXAMINATION:    General appearance: Well appearing, in no acute distress, alert and oriented x3.  Psych: Mood and affect appropriate.  Skin: Skin color, texture, turgor normal, no rashes or lesions, in both upper and lower body.  Head/face: Normocephalic, atraumatic. No palpable lymph nodes.  Neck: + pain to palpation over the cervical paraspinous muscle and C spine as well as right trapezus . Spurling Negative. No pain with neck flexion, extension, or lateral flexion.   Back: Straight leg raising in the sitting and supine positions is negative to radicular pain. + TTP over the right PSIS and L spine   Extremities:+ inverted can test on the right shoulder Pain on ROM of the right shoulder Peripheral joint ROM is full and pain free without obvious instability or laxity in all four extremities. No deformities, edema, or skin discoloration. Good capillary refill.  Musculoskeletal: + Aguila test on the right. + facet loading . Bilateral upper and lower extremity strength is normal and symmetric. No atrophy or tone abnormalities are noted.   Neuro: Bilateral upper and lower extremity coordination and muscle stretch reflexes are physiologic and symmetric. Plantar response are downgoing. No loss of sensation is noted.  Gait: normal    ASSESSMENT: 71 y.o. year old female with worsening low back , right buttock.hip and groin  pain, consistent with likely sacroiliitis and lumbar facet arthropathy. She also has right shoulder pain related top rotator cuff tear and subacromial bursitis  MRI of the right shoulder showed partial-thickness tear of the posterior insertional fibers of the supraspinatus tendon with associated significant tendinopathy of the supraspinatus tendon.   She is doing PT for her shoudler pain      1. Sacroiliitis    2. Lumbar facet arthropathy          PLAN:     - I have stressed the importance of physical activity and a home exercise plan to help with pain and improve health.  - Continue PT  -Rx Mobic 7.5  mg once daily after food # 30 REFILLS 3. Pt to stop any other NSAIDS, and she can continue Tylenol .  -Order xray of the L spine and pelvis   -SF left SIJ steroid injection   -Total time spent face to face with patient was 25 minutes.   More than 50% of that time was spent in counseling  - RTC 3 weeks after injection  - Counseled patient regarding the importance of activity modification, constant sleeping habits and physical therapy.    The above plan and management options were discussed at length with patient. Patient is in agreement with the above and verbalized understanding.    Meagan Pena  05/08/2017

## 2017-05-15 NOTE — DISCHARGE INSTRUCTIONS
Home Care Instructions Pain Management:    1.  DIET:    You may resume your normal diet today.    2.  BATHING:    You may shower with luke warm water.    3.  DRESSING:    You may remove your bandage today.    4.  ACTIVITY LEVEL:      You may resume your normal activities 24 hours after your procedure.    5.  MEDICATIONS:    You may resume your normal medications today.    6.  SPECIAL INSTRUCTIONS:    No heat to the injection site for 24 hours including bath or shower, heating pad, moist heat or hot tubs.    Use an ice pack to the injection site for any pain or discomfort.  Apply ice packs for 20 minute intervals as needed.    If you have received any sedatives by mouth today, you can not drive for 12 hours.    If you have received sedation through an IV, you can not drive for 24 hours.    PLEASE CALL YOUR DOCTOR FOR THE FOLLOWIN.  Redness or swelling around the injection site.  2.  Fever of 101 degrees.  3.  Drainage (pus) from the injection site.  4.  For any continuous bleeding (some dried blood over the incision is normal.)    FOR EMERGENCIES:    If any unusual problems or difficulties occur during clinic hours, call [phone] or dial [phone].    Follow up with with your physician in 2-3 weeks.

## 2017-05-16 ENCOUNTER — TELEPHONE (OUTPATIENT)
Dept: PAIN MEDICINE | Facility: CLINIC | Age: 72
End: 2017-05-16

## 2017-05-16 NOTE — TELEPHONE ENCOUNTER
Attempted to contact patient regarding time of arrival for procedure that is scheduled on 5/18/17. No answer at this time.

## 2017-05-18 ENCOUNTER — HOSPITAL ENCOUNTER (OUTPATIENT)
Facility: HOSPITAL | Age: 72
Discharge: HOME | End: 2017-05-18
Attending: ANESTHESIOLOGY | Admitting: ANESTHESIOLOGY
Payer: MEDICARE

## 2017-05-18 ENCOUNTER — SURGERY (OUTPATIENT)
Age: 72
End: 2017-05-18

## 2017-05-18 VITALS
BODY MASS INDEX: 28.7 KG/M2 | HEIGHT: 55 IN | WEIGHT: 124 LBS | DIASTOLIC BLOOD PRESSURE: 55 MMHG | TEMPERATURE: 99 F | SYSTOLIC BLOOD PRESSURE: 132 MMHG | HEART RATE: 80 BPM | RESPIRATION RATE: 16 BRPM | OXYGEN SATURATION: 98 %

## 2017-05-18 LAB
GLUCOSE SERPL-MCNC: 185 MG/DL (ref 70–110)
GLUCOSE SERPL-MCNC: 228 MG/DL (ref 70–110)
POCT GLUCOSE: 185 MG/DL (ref 70–110)
POCT GLUCOSE: 228 MG/DL (ref 70–110)

## 2017-05-18 PROCEDURE — 27096 INJECT SACROILIAC JOINT: CPT | Mod: RT,,, | Performed by: ANESTHESIOLOGY

## 2017-05-18 PROCEDURE — 25500020 PHARM REV CODE 255: Performed by: ANESTHESIOLOGY

## 2017-05-18 PROCEDURE — 99152 MOD SED SAME PHYS/QHP 5/>YRS: CPT | Mod: ,,, | Performed by: ANESTHESIOLOGY

## 2017-05-18 PROCEDURE — 27096 INJECT SACROILIAC JOINT: CPT | Performed by: ANESTHESIOLOGY

## 2017-05-18 PROCEDURE — 25000003 PHARM REV CODE 250: Performed by: ANESTHESIOLOGY

## 2017-05-18 PROCEDURE — 63600175 PHARM REV CODE 636 W HCPCS: Performed by: ANESTHESIOLOGY

## 2017-05-18 RX ORDER — TRIAMCINOLONE ACETONIDE 40 MG/ML
INJECTION, SUSPENSION INTRA-ARTICULAR; INTRAMUSCULAR
Status: DISCONTINUED | OUTPATIENT
Start: 2017-05-18 | End: 2017-05-18 | Stop reason: HOSPADM

## 2017-05-18 RX ORDER — MIDAZOLAM HYDROCHLORIDE 1 MG/ML
INJECTION, SOLUTION INTRAMUSCULAR; INTRAVENOUS
Status: DISCONTINUED | OUTPATIENT
Start: 2017-05-18 | End: 2017-05-18 | Stop reason: HOSPADM

## 2017-05-18 RX ORDER — LIDOCAINE HYDROCHLORIDE 10 MG/ML
INJECTION INFILTRATION; PERINEURAL
Status: DISCONTINUED | OUTPATIENT
Start: 2017-05-18 | End: 2017-05-18 | Stop reason: HOSPADM

## 2017-05-18 RX ORDER — FENTANYL CITRATE 50 UG/ML
INJECTION, SOLUTION INTRAMUSCULAR; INTRAVENOUS
Status: DISCONTINUED | OUTPATIENT
Start: 2017-05-18 | End: 2017-05-18 | Stop reason: HOSPADM

## 2017-05-18 RX ORDER — SODIUM CHLORIDE, SODIUM LACTATE, POTASSIUM CHLORIDE, CALCIUM CHLORIDE 600; 310; 30; 20 MG/100ML; MG/100ML; MG/100ML; MG/100ML
INJECTION, SOLUTION INTRAVENOUS CONTINUOUS
Status: DISCONTINUED | OUTPATIENT
Start: 2017-05-18 | End: 2017-05-18 | Stop reason: HOSPADM

## 2017-05-18 RX ORDER — LIDOCAINE HYDROCHLORIDE 5 MG/ML
INJECTION, SOLUTION INFILTRATION; PERINEURAL
Status: DISCONTINUED | OUTPATIENT
Start: 2017-05-18 | End: 2017-05-18 | Stop reason: HOSPADM

## 2017-05-18 RX ADMIN — LIDOCAINE HYDROCHLORIDE 5 ML: 10 INJECTION, SOLUTION INFILTRATION; PERINEURAL at 09:05

## 2017-05-18 RX ADMIN — MIDAZOLAM 2 MG: 1 INJECTION INTRAMUSCULAR; INTRAVENOUS at 09:05

## 2017-05-18 RX ADMIN — IOHEXOL 5 ML: 300 INJECTION, SOLUTION INTRAVENOUS at 09:05

## 2017-05-18 RX ADMIN — LIDOCAINE HYDROCHLORIDE 5 ML: 5 INJECTION, SOLUTION INFILTRATION; PERINEURAL at 09:05

## 2017-05-18 RX ADMIN — TRIAMCINOLONE ACETONIDE 40 MG: 40 INJECTION, SUSPENSION INTRA-ARTICULAR; INTRAMUSCULAR at 09:05

## 2017-05-18 RX ADMIN — FENTANYL CITRATE 50 MCG: 50 INJECTION, SOLUTION INTRAMUSCULAR; INTRAVENOUS at 09:05

## 2017-05-18 NOTE — IP AVS SNAPSHOT
[facility]  [address]  [place] [address]  Phone: [phone]           Patient Discharge Instructions   Our goal is to set you up for success. This packet includes information on your condition, medications, and your home care.  It will help you care for yourself to prevent having to return to the hospital.     Please ask your nurse if you have any questions.      There are many details to remember when preparing to leave the hospital. Here is what you will need to do:    1. Take your medicine. If you are prescribed medications, review your Medication List on the following pages. You may have new medications to  at the pharmacy and others that you'll need to stop taking. Review the instructions for how and when to take your medications. Talk with your doctor or nurses if you are unsure of what to do.     2. Go to your follow-up appointments. Specific follow-up information is listed in the following pages. Your may be contacted by a nurse or clinical provider about future appointments. Be sure we have all of the phone numbers to reach you. Please contact your provider's office if you are unable to make an appointment.     3. Watch for warning signs. Your doctor or nurse will give you detailed warning signs to watch for and when to call for assistance. These instructions may also include educational information about your condition. If you experience any of warning signs to your health, call your doctor.           [facility]  Unless otherwise directed by your provider, please   contact [facility], our nurse care line   that is available for 24/7 assistance.     [phone] (toll-free)     Registered nurses in the [facility]   provide: appointment scheduling, clinical advisement, health education, and other advisory services.                  ** Verify the list of medication(s) below is accurate and up to date. Carry this with you in case of emergency. If your  medications have changed, please notify your healthcare provider.             Medication List      Notice     Cannot display discharge medications because the patient has not yet been admitted.               Please bring to all follow up appointments:    1. A copy of your discharge instructions.  2. All medicines you are currently taking in their original bottles.  3. Identification and insurance card.    Please arrive 15 minutes ahead of scheduled appointment time.    Please call 24 hours in advance if you must reschedule your appointment and/or time.        Your Scheduled Appointments     May 16, 2017  9:45 AM CDT   Established Physical Therapy with Elliot Dempsey, PT   [facility] ([facility])    [address] [place] [address]   [phone]            May 19, 2017 11:15 AM CDT   Established Physical Therapy with Elliot Dempsey PT   [facility] ([facility])    [address] [place] [address]   [phone]            May 29, 2017  7:00 AM CDT   Fasting Lab with [facility]    [address] [place] [address]   [phone]            Jun 01, 2017 10:00 AM CDT   Established Patient Visit with Danie Beltre MD   [facility] ([facility])    [address], [address]  [place] [address]   [phone]            Robin 15, 2017  9:00 AM CDT   Established Patient Visit with [facility] ([facility])    [address]  [place] [id number] [phone]              Your Future Surgeries/Procedures     May 18, 2017   Surgery with Meagan Pena MD   [facility] ([facility])    [address]  [place] [id number] [phone]                  Discharge Instructions       Home Care Instructions Pain Management:    1.  DIET:    You may  resume your normal diet today.    2.  BATHING:    You may shower with luke warm water.    3.  DRESSING:    You may remove your bandage today.    4.  ACTIVITY LEVEL:      You may resume your normal activities 24 hours after your procedure.    5.  MEDICATIONS:    You may resume your normal medications today.    6.  SPECIAL INSTRUCTIONS:    No heat to the injection site for 24 hours including bath or shower, heating pad, moist heat or hot tubs.    Use an ice pack to the injection site for any pain or discomfort.  Apply ice packs for 20 minute intervals as needed.    If you have received any sedatives by mouth today, you can not drive for 12 hours.    If you have received sedation through an IV, you can not drive for 24 hours.    PLEASE CALL YOUR DOCTOR FOR THE FOLLOWIN.  Redness or swelling around the injection site.  2.  Fever of 101 degrees.  3.  Drainage (pus) from the injection site.  4.  For any continuous bleeding (some dried blood over the incision is normal.)    FOR EMERGENCIES:    If any unusual problems or difficulties occur during clinic hours, call [phone] or dial 911.    Follow up with with your physician in 2-3 weeks.         Admission Information     Date & Time Provider Department CSN    2017  8:45 AM Meagan Pena MD [facility] [id number]      Care Providers     Provider Role Specialty Primary office phone    Meagan Pena MD Attending Provider Pain Medicine [phone]      Recent Lab Values        2015 2015 10/19/2015 2016 2016 2016 2016 2017      8:10 AM 10:10 AM 11:05 AM  7:10 AM  8:25 AM 11:30 AM  8:01 AM  7:42 AM    A1C 8.6 (H) 9.0 (H) 8.0 (H) 8.6 (H) 8.2 (H) 9.5 (H) 8.2 (H) 7.8 (H)    Comment for A1C at 11:30 AM on 2016:  According to ADA guidelines, hemoglobin A1C <7.0% represents  optimal control in non-pregnant diabetic patients.  Different  metrics may apply to specific populations.   Standards of Medical Care in  Diabetes - 2016.  For the purpose of screening for the presence of diabetes:  <5.7%     Consistent with the absence of diabetes  5.7-6.4%  Consistent with increasing risk for diabetes   (prediabetes)  >or=6.5%  Consistent with diabetes  Currently no consensus exists for use of hemoglobin A1C  for diagnosis of diabetes for children.      Comment for A1C at  8:01 AM on 11/28/2016:  According to ADA guidelines, hemoglobin A1C <7.0% represents  optimal control in non-pregnant diabetic patients.  Different  metrics may apply to specific populations.   Standards of Medical Care in Diabetes - 2016.  For the purpose of screening for the presence of diabetes:  <5.7%     Consistent with the absence of diabetes  5.7-6.4%  Consistent with increasing risk for diabetes   (prediabetes)  >or=6.5%  Consistent with diabetes  Currently no consensus exists for use of hemoglobin A1C  for diagnosis of diabetes for children.      Comment for A1C at  7:42 AM on 2/24/2017:  According to ADA guidelines, hemoglobin A1C <7.0% represents  optimal control in non-pregnant diabetic patients.  Different  metrics may apply to specific populations.   Standards of Medical Care in Diabetes - 2016.  For the purpose of screening for the presence of diabetes:  <5.7%     Consistent with the absence of diabetes  5.7-6.4%  Consistent with increasing risk for diabetes   (prediabetes)  >or=6.5%  Consistent with diabetes  Currently no consensus exists for use of hemoglobin A1C  for diagnosis of diabetes for children.        Allergies as of 5/15/2017        Reactions    Actos [Pioglitazone] Nausea Only      Advance Directives     An advance directive is a document which, in the event you are no longer able to make decisions for yourself, tells your healthcare team what kind of treatment you do or do not want to receive, or who you would like to make those decisions for you.  If you do not currently have an advance directive, [facility] encourages you to create one.   For more information call:  [phone]), [phone] ([phone]),  or log on to [email or web address].        Language Assistance Services     ATTENTION: Language assistance services are available, free of charge. Please call [phone].      ATENCIÓN: Si habla español, tiene a aragon disposición servicios gratuitos de asistencia lingüística. Llame al [phone].     CHÚ Ý: N?u b?n nói Ti?ng Vi?t, có các d?ch v? h? tr? ngôn ng? mi?n phí dành cho b?n. G?i s? [phone].        Diabetes Discharge Instructions                                    [facility] complies with applicable Federal civil rights laws and does not discriminate on the basis of race, color, national origin, age, disability, or sex.

## 2017-05-18 NOTE — H&P (VIEW-ONLY)
Chronic patient Established Note (Follow up visit)      SUBJECTIVE:    Juliette Seo presents to the clinic for a follow-up appointment for lower back and right shoulder pain. Since the last visit, Juliette Seo states the pain has been worsening. Current pain intensity is 10/10.  The low back pain radiates to the right buttock./groin and hip.  She had 70% pain relief after TPI and right subacromial bursa steroid injection done on 2/7/17  She has been doing PT  She previously has had good relief for her axial back pain after Bilateral L3,4,5 facet MBB on 10/26/16    Pain Disability Index Review:  Last 3 PDI Scores 5/8/2017 3/7/2017 2/7/2017   Pain Disability Index (PDI) 52 42 52       Pain Medications:    - Opioids:None   - Adjuvant Medications: Gabapentin, Tizanidine, Aleve, Tylenol  - Anti-Coagulants: Aspirin  - Others: See Medication List      Opioid Contract: no      report: Reviewed and consistent with medication use as prescribed.     Pain Procedures: right subacromial bursa steroid injection  And TRIGGER POINT INJECTION 2/7/17  10/26/16 BILATERAL L3,4,5  LUMBAR FACET MEDIAL BRANCH NERVE BLOCK        Physical Therapy/Home Exercise: yes     Imaging: MRI Cervical Spine Without Contrast 2/15/17  Time of Procedure: 02/15/17 07:13:02  Accession # [id number]    MR of the cervical spine without contrast:    Technique: Multiplanar, multisequence MRI of the cervical spine obtained. No IV contrast.     Comparison: None     Findings:    Cervical spine alignment is within normal limits. Vertebral body heights and intervertebral disc spaces are satisfactorily maintained. No evidence of bone marrow replacement process is tumor or infection.  No fracture.     The spinal cord demonstrates no abnormal signal.  The base of the brain, base of the skull, and craniocervical junction demonstrate no significant abnormalities.  The adjacent soft tissue structures show no significant abnormalities.      C2-C3:  There is no posterior  disc osteophyte complex formation. No significant central canal or neural foraminal narrowing.    C3-C4: There is diffuse posterior disc osteophyte complex formation and mild bilateral uncovertebral spurring. No significant central canal or neural foraminal narrowing.    C4-C5:  Mild posterior disc osteophyte complex formation, uncovertebral spurring, and facet arthropathy causing mild bilateral neuroforaminal narrowing.  No central spinal canal stenosis.     C5-C6:  Mild diffuse posterior disc osteophyte complex formation causing effacement of the left anterior CSF sleeve with facet arthropathy and uncovertebral spurring causing mild bilateral neuroforaminal narrowing.     C6-C7:  Mild posterior disc osteophyte complex formation and facet arthropathy.No significant central canal or neural foraminal narrowing.    C7-T1:   There is no posterior disc osteophyte complex formation. No significant central canal or neural foraminal narrowing.   Impression        Mild degenerative changes as described above.      Electronically signed by: SHORTY KEATING MD  Date: 02/15/17  Time: 09:09          MRI Upper Extremity Joint Without Contrast Right 2/15/17  Narrative   Time of Procedure: 02/15/17 07:13:08  Accession # [id number]    MRI RIGHT SHOULDER    INDICATION: Right shoulder lesion    TECHNIQUE: MRI of right shoulder was performed on a 1.5T magnet utilizing the following sequences: Localizer; axial T2 FS; coronal T2 FS and PD FS; sagittal T1, T2 FS and PD FS.    COMPARISON: Not available.    FINDINGS:    Rotator cuff: Partial thickness tear of the posterior insertional fibers of the supraspinatus tendon and significant tendinopathy of the supraspinatus tendon.  Infraspinatus, teres minor, and subscapularis tendons are intact.    Labrum: Global degeneration.    Biceps: Long head biceps tendon is intact.    Bone: There is no fracture.  Bone marrow signal is unremarkable.    Acromioclavicular joint: Moderate  arthrosis.    Cartilage: Articular cartilage of the glenohumeral joint is preserved.    Miscellaneous: Small amount of fluid in the subdeltoid and subacromion bursae.   Impression       Partial-thickness tear of the posterior insertional fibers of the supraspinatus tendon with associated significant tendinopathy of the supraspinatus tendon.      Electronically signed by: SHORTY KEATING MD  Date: 02/15/17  Time: 08:36          Xray Knee 10/18/16  HISTORY: Pain    COMPARISON: None    FINDINGS: 5 views.      The osseous structures are intact with no evidence of fracture or dislocation.  There is degenerative osteoarthritic change of the bilateral tibiofemoral and patellofemoral compartments.  The joint spaces and soft tissues are otherwise unremarkable.   Impression       #1. As above.      Electronically signed by: EVAN SLOAN MD  Date: 10/18/16  Time: 09:38            Xray Cervical 10/18/16  HISTORY: Pain    COMPARISON: None    FINDINGS: 4 views.    The cervical spine alignment and vertebral body heights are well-maintained.  There is no evidence of osseous fracture.    There are advanced degenerative osteoarthritic changes of the cervical spine with facet hypertrophy and disc space narrowing.  The joint spaces, soft tissues, and osseous structures are otherwise unremarkable.   Impression       #1. As Above.        Electronically signed by: EVAN SLOAN MD  Date: 10/18/16  Time: 10:01            Xray Lumbar 10/17/16  Technique: AP, lateral and bilateral oblique views of the lumbar spine         Comparison: None    Results: The lumbar sagittal alignment is within normal limits.  The lungs are mild endplate degenerative change lumbar vertebral body heights and contours are within normal limits without evidence for acute fracture or subluxation.  No definite spondylolysis.  Surgical clips project over the upper abdomen.  Scattered vascular calcifications identified. Further evaluation as warrented clinically.    Impression    See above.      Electronically signed by: STEPHAN SMITH DO  Date: 10/17/16  Time: 14:26            CT Thoracic Spine 6/22/16  CT thoracic spine with and without contrast.    Findings: 75 mL of Omnipaque 350 IV contrast was administered for today's examination.    The visualized portion of the mediastinum is significant for calcification of the aorta, aortic annulus and coronary arteries.  The visualized portion of the intra-abdominal contents is significant for a punctate right-sided nonobstructing renal stone.  The visualized portion of the lungs is unremarkable.  There is no abnormal enhancement following the administration of contrast.  The thoracic spinal alignment and vertebral body heights are satisfactorily preserved.  There is no fracture, dislocation, or bony erosion.   Impression    No acute findings.  ______________________________________     Electronically signed by: ISA GUY MD  Date: 06/22/16  Time: 08:18               Allergies:   Review of patient's allergies indicates:   Allergen Reactions    Actos [pioglitazone] Nausea Only       Current Medications:   Current Outpatient Prescriptions   Medication Sig Dispense Refill    ACCU-CHEK FASTCLIX Misc USE AS DIRECTED TWICE A  each 3    ACCU-CHEK SMARTVIEW TEST STRIP Strp USE AS DIRECTED TWICE A  strip 3    acetaminophen (TYLENOL) 325 MG tablet Take 500 mg by mouth every 6 (six) hours as needed for Pain.       aspirin 81 MG Chew Take 81 mg by mouth once daily.      atorvastatin (LIPITOR) 80 MG tablet TAKE 1 TABLET(S) EVERY OTHER DAY BY ORAL ROUTE FOR 90 DAYS. 45 tablet 4    BD INSULIN SYRINGE ULTRA-FINE 0.5 mL 31 gauge x 5/16 Syrg USE AS DIRECTED TWICE A  each 4    bimatoprost (LUMIGAN) 0.01 % Drop 1 drop every evening.      colchicine 0.6 mg tablet Take 2 tablets by mouth for the first dose, wait one hour and then take 1 tablet by mouth. 3 tablet 1    fluticasone (FLONASE) 50 mcg/actuation nasal spray USE 1  SPRAY IN EACH NOSTRIL 2 TIMES A DAY  1    glimepiride (AMARYL) 2 MG tablet TAKE 1 TABLET (2 MG TOTAL) BY MOUTH BEFORE BREAKFAST. 90 tablet 3    hydrocortisone 2.5 % cream APPLY TO THE AFFECTED AREA(S) BY TOPICAL ROUTE THREE TIMES DAILY AS NEEDED FOR ITCHING  2    hydrOXYzine HCl (ATARAX) 25 MG tablet Take 1 tablet (25 mg total) by mouth 3 (three) times daily as needed for Itching. 100 tablet 1    insulin NPH (NOVOLIN N) 100 unit/mL injection 40u qAM 20u qPM 3 vial 4    lisinopril (PRINIVIL,ZESTRIL) 20 MG tablet Take 1 tablet (20 mg total) by mouth once daily. 90 tablet 3    meclizine (ANTIVERT) 12.5 mg tablet TAKE 1 TABLET BY MOUTH 3 TIMES A DAY AS NEEDED 60 tablet 1    metoclopramide HCl (REGLAN) 5 MG tablet TAKE 1 TABLET (5 MG TOTAL) BY MOUTH 4 (FOUR) TIMES DAILY BEFORE MEALS AND NIGHTLY. 360 tablet 3    montelukast (SINGULAIR) 10 mg tablet Take 10 mg by mouth once daily.   5    omeprazole (PRILOSEC) 40 MG capsule Take 1 capsule (40 mg total) by mouth every morning. 90 capsule 3    PROAIR HFA 90 mcg/actuation inhaler Inhale 1-2 puffs into the lungs every 6 (six) hours as needed.   5    PROCTOSOL HC 2.5 % rectal cream APPLY TO RECTUM TWICE A DAY FOR 7 DAYS 28.35 g 3    RESTASIS 0.05 % ophthalmic emulsion Place 1 drop into both eyes 2 (two) times daily.  3    tizanidine (ZANAFLEX) 2 MG tablet TAKE 1 TABLET TWICE A DAY BY ORAL ROUTE FOR 30 DAYS. 60 tablet 1    gabapentin (NEURONTIN) 300 MG capsule Take 1 capsule (300 mg total) by mouth 3 (three) times daily. 90 capsule 11    oxycodone-acetaminophen (PERCOCET) 5-325 mg per tablet Take 1 tablet by mouth every 4 (four) hours as needed for Pain. 30 tablet 0    predniSONE (DELTASONE) 20 MG tablet 2 tabs po qd x 7 days 14 tablet 0    simethicone (GAS RELIEF) 180 mg Cap Take by mouth.      triamcinolone acetonide 0.5% (KENALOG) 0.5 % Crea Apply topically 2 (two) times daily. 15 g 0     No current facility-administered medications for this visit.   "      REVIEW OF SYSTEMS:    GENERAL: No weight loss, malaise or fevers.  HEENT: Negative for frequent or significant headaches.  NECK: + neck pain   RESPIRATORY: Negative for cough, wheezing or shortness of breath.  CARDIOVASCULAR: + CAD, Negative for chest pain, leg swelling or palpitations.  GI: Negative for abdominal discomfort, blood in stools or black stools or change in bowel habits.  MUSCULOSKELETAL: See HPI, + right shoulder pain   SKIN: Negative for lesions, rash, and itching.  PSYCH: Negative for sleep disturbance, mood disorder and recent psychosocial stressors.  HEMATOLOGY/LYMPHOLOGY: Negative for prolonged bleeding, bruising easily or swollen nodes.  NEURO: No history of headaches, syncope, paralysis, seizures or tremors.  All other reviewed and negative other than HPI    Past Medical History:  Past Medical History:   Diagnosis Date    Allergy     Anxiety     Arthritis     osteo    Asthma     Diabetes mellitus     Diabetes mellitus, type 2     GERD (gastroesophageal reflux disease)     High cholesterol     Hypertension        Past Surgical History:  Past Surgical History:   Procedure Laterality Date    CHOLECYSTECTOMY      HERNIA REPAIR      HYSTERECTOMY         Family History:  Family History   Problem Relation Age of Onset    Stroke Mother     Diabetes Sister     Diabetes Brother        Social History:  Social History     Social History    Marital status:      Spouse name: N/A    Number of children: N/A    Years of education: N/A     Social History Main Topics    Smoking status: Never Smoker    Smokeless tobacco: Never Used    Alcohol use Yes    Drug use: No    Sexual activity: Yes     Partners: Male     Other Topics Concern    None     Social History Narrative       OBJECTIVE:    /63 (BP Location: Left arm, Patient Position: Sitting, BP Method: Automatic)  Pulse 102  Resp 18  Ht 4' 11" (1.499 m)  Wt 56.6 kg (124 lb 12.5 oz)  BMI 25.2 kg/m2    PHYSICAL " EXAMINATION:    General appearance: Well appearing, in no acute distress, alert and oriented x3.  Psych: Mood and affect appropriate.  Skin: Skin color, texture, turgor normal, no rashes or lesions, in both upper and lower body.  Head/face: Normocephalic, atraumatic. No palpable lymph nodes.  Neck: + pain to palpation over the cervical paraspinous muscle and C spine as well as right trapezus . Spurling Negative. No pain with neck flexion, extension, or lateral flexion.   Back: Straight leg raising in the sitting and supine positions is negative to radicular pain. + TTP over the right PSIS and L spine   Extremities:+ inverted can test on the right shoulder Pain on ROM of the right shoulder Peripheral joint ROM is full and pain free without obvious instability or laxity in all four extremities. No deformities, edema, or skin discoloration. Good capillary refill.  Musculoskeletal: + Aguila test on the right. + facet loading . Bilateral upper and lower extremity strength is normal and symmetric. No atrophy or tone abnormalities are noted.   Neuro: Bilateral upper and lower extremity coordination and muscle stretch reflexes are physiologic and symmetric. Plantar response are downgoing. No loss of sensation is noted.  Gait: normal    ASSESSMENT: 71 y.o. year old female with worsening low back , right buttock.hip and groin  pain, consistent with likely sacroiliitis and lumbar facet arthropathy. She also has right shoulder pain related top rotator cuff tear and subacromial bursitis  MRI of the right shoulder showed partial-thickness tear of the posterior insertional fibers of the supraspinatus tendon with associated significant tendinopathy of the supraspinatus tendon.   She is doing PT for her shoudler pain      1. Sacroiliitis    2. Lumbar facet arthropathy          PLAN:     - I have stressed the importance of physical activity and a home exercise plan to help with pain and improve health.  - Continue PT  -Rx Mobic 7.5  mg once daily after food # 30 REFILLS 3. Pt to stop any other NSAIDS, and she can continue Tylenol .  -Order xray of the L spine and pelvis   -SF left SIJ steroid injection   -Total time spent face to face with patient was 25 minutes.   More than 50% of that time was spent in counseling  - RTC 3 weeks after injection  - Counseled patient regarding the importance of activity modification, constant sleeping habits and physical therapy.    The above plan and management options were discussed at length with patient. Patient is in agreement with the above and verbalized understanding.    Meagan Pena  05/08/2017

## 2017-05-18 NOTE — OP NOTE
Patient Name: Juliette Seo  MRN: [id number]    INFORMED CONSENT: The procedure, risks, benefits and options were discussed with patient. There are no contraindications to the procedure. The patient expressed understanding and agreed to proceed. The personnel performing the procedure was discussed. I verify that I personally obtained Juliette's consent prior to the start of the procedure and the signed consent can be found on the patient's chart.      Procedure Date: 5/18/2017  Anesthesia:   systemic  Pre Procedure diagnosis:   Sacroiliitis   Post-Procedure diagnosis:   Same    Sedation: Yes - Fentanyl 50 mcg and Midazolam 2 mg    PROCEDURE: right  SACROILIAC JOINT INJECTION  DESCRIPTION OF PROCEDURE: The patient was brought to the fluoroscopy suite.  IV access was obtained prior to the procedure. The patient was positioned prone on the fluoroscopy table. Continuous hemodynamic monitoring was initiated including blood pressure, EKG, and pulse oximetry.  The lumbosacral area was prepped with chlorhexidine three times and draped into a sterile field. The skin overlying the right  sacroiliac joint was anesthetized using 5 cc of lidocaine 1%. The inferior pole of the sacroiliac joint was identified by cephalo-oblique fluoroscopy. A 22 gauge, 3 1/2 inch spinal needle was slowly advanced through the sacroiliac joint capsule under fluoroscopic guidance. The needle position was confirmed using oblique, AP and lateral fluoroscopic imaging.  Negative aspiration was confirmed. 5 cc of Omnipaque 300 was injected confirming intra-articular contrast spread. A combination of 5 cc of Bupivacaine 0.25% and 40 mg kenalog was easily injected. The needle was removed and bleeding was nil.  A sterile dressing was applied. PATIENT was taken to the Post-block Recovery Area for further observation.      Blood Loss: Nill  Specimen: None    Pre Procedure Pain Level: 10/10  Post-Procedure Pain Level: 0/10        Discharge Diagnosis: Same as Pre and  Post Procedure  Condition on Discharge: Stable.  Diet on Discharge: Same as before.  Activity: as per instruction sheet.  Discharge to: Home with a responsible adult.  Follow up: as per Discharge instructions

## 2017-05-22 DIAGNOSIS — E11.42 DIABETIC POLYNEUROPATHY ASSOCIATED WITH TYPE 2 DIABETES MELLITUS: Primary | ICD-10-CM

## 2017-05-22 RX ORDER — BLOOD SUGAR DIAGNOSTIC
STRIP MISCELLANEOUS
Qty: 100 STRIP | Refills: 3 | Status: SHIPPED | OUTPATIENT
Start: 2017-05-22 | End: 2017-05-22 | Stop reason: SDUPTHER

## 2017-05-22 NOTE — TELEPHONE ENCOUNTER
----- Message from Rosie De La Cruz sent at 5/22/2017 10:23 AM CDT -----  Contact: [facility] called  Need a new rx sent over for her test strips.  MUST HAVE A DIAG CODE ON IT.  Ok to send electronically or by fax # [phone].  She is medicare

## 2017-05-24 ENCOUNTER — OFFICE VISIT (OUTPATIENT)
Dept: INTERNAL MEDICINE | Facility: CLINIC | Age: 72
End: 2017-05-24
Payer: MEDICARE

## 2017-05-24 VITALS
BODY MASS INDEX: 29.69 KG/M2 | DIASTOLIC BLOOD PRESSURE: 62 MMHG | HEART RATE: 78 BPM | SYSTOLIC BLOOD PRESSURE: 118 MMHG | TEMPERATURE: 97 F | RESPIRATION RATE: 12 BRPM | WEIGHT: 123.13 LBS

## 2017-05-24 DIAGNOSIS — N39.3 STRESS INCONTINENCE, FEMALE: ICD-10-CM

## 2017-05-24 DIAGNOSIS — M48.061 SPINAL STENOSIS OF LUMBAR REGION: Primary | ICD-10-CM

## 2017-05-24 PROCEDURE — 99213 OFFICE O/P EST LOW 20 MIN: CPT | Mod: S$PBB,,, | Performed by: INTERNAL MEDICINE

## 2017-05-24 PROCEDURE — 99999 PR PBB SHADOW E&M-EST. PATIENT-LVL III: CPT | Mod: PBBFAC,,, | Performed by: INTERNAL MEDICINE

## 2017-05-24 PROCEDURE — 99213 OFFICE O/P EST LOW 20 MIN: CPT | Mod: PBBFAC,PN | Performed by: INTERNAL MEDICINE

## 2017-05-24 RX ORDER — GABAPENTIN 300 MG/1
300 CAPSULE ORAL 3 TIMES DAILY
Qty: 270 CAPSULE | Refills: 3 | Status: SHIPPED | OUTPATIENT
Start: 2017-05-24 | End: 2018-01-25

## 2017-05-24 NOTE — PATIENT INSTRUCTIONS
Ejercicios de Kegel  Para los ejercicios de Kegel no necesita ropa ni equipos especiales. Son fáciles de aprender y fáciles de hacer. Y si los hace correctamente, nadie se dará cuenta de que los está haciendo, de manera que los puede hacer en cualquier lugar. Aragon proveedor de atención médica, aragon enfermera o aragon fisioterapeuta pueden responder latasha preguntas y ayudarle a comenzar.    Un suelo pélvico débil  Los músculos del suelo pélvico pueden debilitarse con la edad, un embarazo o un parto vaginal, shahid cirugía, tos crónica o falta de ejercicio. Si el suelo pélvico se debilita, la vejiga y los demás órganos pélvicos pueden bajar fuera de aragon sitio. También es posible que la uretra se adrian más fácilmente y cause la salida involuntaria de orina. Los ejercicios de Kegel le ayudarán a fortalecer los músculos del suelo pélvico. Así podrán soportar mejor los órganos pélvicos y controlar la salida de orina.  Cómo se hacen los ejercicios de Kegel  Pruebe todos los ejercicios de Kegel descritos a continuación. Mientras los hace, intente no  los músculos de las piernas, las nalgas o el estómago:  · Francis contracciones stephen si quisiera detener la salida de la orina, mandy hágalo cuando no esté orinando.  · Apriete el recto stephen si estuviera tratando de evitar la salida de gas. Contraiga el ano sin  las nalgas.  · Puede introducir leo o dos dedos en aragon vagina y apretarlos con aragon vagina para darse cuenta de cuáles son los músculos que debe contraer.  Intente mantener cada contracción contando lentamente hasta siddhartha. Probablemente al principio no podrá mantenerlas tanto tiempo, mandy siga practicando. Le resultará más fácil a medida que se van fortaleciendo los músculos del suelo pélvico. Con el tiempo, juan vez le recomienden el uso de pesos especiales en la vagina para hacer que los ejercicios surekha todavía más eficaces. Consulte a aragon proveedor de atención médica si tiene dificultades para hacer los ejercicios de  Glory.  Consejos útiles  Aquí tiene algunas sugerencias que puede seguir:  · Francis los ejercicios de Kegel con la frecuencia que pueda. Mientras más frecuentemente los francis, más pronto obtendrá resultados.  · Elija shahid actividad común stephen recordatorio. Por ejemplo, francis los ejercicios cada vez que se siente.  · Apriete el suelo pélvico antes de estornudar, levantarse de shahid silla, toser, reírse o alzar algo. [place] la protegerá de lesiones y puede ayudarle a evitar la pérdida involuntaria de orina.   Date Last Reviewed: 8/5/2015  © 4599-8877 [employer]. [address] [place] [id number]. Todos los derechos reservados. Esta información no pretende sustituir la atención médica profesional. Sólo aragon médico puede diagnosticar y tratar un problema de radha.

## 2017-05-29 ENCOUNTER — LAB VISIT (OUTPATIENT)
Dept: LAB | Facility: HOSPITAL | Age: 72
End: 2017-05-29
Attending: INTERNAL MEDICINE
Payer: MEDICARE

## 2017-05-29 DIAGNOSIS — E11.42 TYPE 2 DIABETES MELLITUS WITH DIABETIC POLYNEUROPATHY, WITH LONG-TERM CURRENT USE OF INSULIN: ICD-10-CM

## 2017-05-29 DIAGNOSIS — E78.00 PURE HYPERCHOLESTEROLEMIA: ICD-10-CM

## 2017-05-29 DIAGNOSIS — Z79.4 TYPE 2 DIABETES MELLITUS WITH DIABETIC POLYNEUROPATHY, WITH LONG-TERM CURRENT USE OF INSULIN: ICD-10-CM

## 2017-05-29 LAB
CHOLEST/HDLC SERPL: 4.9 {RATIO}
ESTIMATED AVG GLUCOSE: 192 MG/DL
HBA1C MFR BLD HPLC: 8.3 %
HDL/CHOLESTEROL RATIO: 20.5 %
HDLC SERPL-MCNC: 219 MG/DL
HDLC SERPL-MCNC: 45 MG/DL
LDLC SERPL CALC-MCNC: 150.2 MG/DL
NONHDLC SERPL-MCNC: 174 MG/DL
TRIGL SERPL-MCNC: 119 MG/DL

## 2017-05-29 PROCEDURE — 83036 HEMOGLOBIN GLYCOSYLATED A1C: CPT | Mod: PO

## 2017-05-29 PROCEDURE — 36415 COLL VENOUS BLD VENIPUNCTURE: CPT | Mod: PO

## 2017-05-29 PROCEDURE — 80061 LIPID PANEL: CPT

## 2017-06-01 ENCOUNTER — OFFICE VISIT (OUTPATIENT)
Dept: INTERNAL MEDICINE | Facility: CLINIC | Age: 72
End: 2017-06-01
Payer: MEDICARE

## 2017-06-01 VITALS
RESPIRATION RATE: 12 BRPM | HEART RATE: 60 BPM | HEIGHT: 55 IN | SYSTOLIC BLOOD PRESSURE: 110 MMHG | DIASTOLIC BLOOD PRESSURE: 68 MMHG | WEIGHT: 124 LBS | TEMPERATURE: 98 F | BODY MASS INDEX: 28.7 KG/M2

## 2017-06-01 DIAGNOSIS — I10 ESSENTIAL HYPERTENSION: ICD-10-CM

## 2017-06-01 DIAGNOSIS — I15.2 HYPERTENSION COMPLICATING DIABETES: ICD-10-CM

## 2017-06-01 DIAGNOSIS — N39.3 STRESS INCONTINENCE, FEMALE: ICD-10-CM

## 2017-06-01 DIAGNOSIS — M48.061 SPINAL STENOSIS OF LUMBAR REGION: ICD-10-CM

## 2017-06-01 DIAGNOSIS — K58.0 IRRITABLE BOWEL SYNDROME WITH DIARRHEA: ICD-10-CM

## 2017-06-01 DIAGNOSIS — E11.59 HYPERTENSION COMPLICATING DIABETES: ICD-10-CM

## 2017-06-01 DIAGNOSIS — E11.42 DIABETIC POLYNEUROPATHY ASSOCIATED WITH TYPE 2 DIABETES MELLITUS: ICD-10-CM

## 2017-06-01 DIAGNOSIS — M17.0 PRIMARY OSTEOARTHRITIS OF BOTH KNEES: ICD-10-CM

## 2017-06-01 DIAGNOSIS — Z79.4 TYPE 2 DIABETES MELLITUS WITH DIABETIC POLYNEUROPATHY, WITH LONG-TERM CURRENT USE OF INSULIN: Primary | ICD-10-CM

## 2017-06-01 DIAGNOSIS — L30.9 ECZEMA, UNSPECIFIED TYPE: ICD-10-CM

## 2017-06-01 DIAGNOSIS — E11.42 TYPE 2 DIABETES MELLITUS WITH DIABETIC POLYNEUROPATHY, WITH LONG-TERM CURRENT USE OF INSULIN: Primary | ICD-10-CM

## 2017-06-01 DIAGNOSIS — E78.00 PURE HYPERCHOLESTEROLEMIA: ICD-10-CM

## 2017-06-01 DIAGNOSIS — J30.9 ALLERGIC RHINITIS, UNSPECIFIED ALLERGIC RHINITIS TRIGGER, UNSPECIFIED RHINITIS SEASONALITY: ICD-10-CM

## 2017-06-01 DIAGNOSIS — K21.9 GASTROESOPHAGEAL REFLUX DISEASE WITHOUT ESOPHAGITIS: ICD-10-CM

## 2017-06-01 DIAGNOSIS — F32.A DEPRESSION, UNSPECIFIED DEPRESSION TYPE: ICD-10-CM

## 2017-06-01 PROCEDURE — 99999 PR PBB SHADOW E&M-EST. PATIENT-LVL IV: CPT | Mod: PBBFAC,,, | Performed by: INTERNAL MEDICINE

## 2017-06-01 PROCEDURE — 99214 OFFICE O/P EST MOD 30 MIN: CPT | Mod: PBBFAC,PN | Performed by: INTERNAL MEDICINE

## 2017-06-01 PROCEDURE — 99214 OFFICE O/P EST MOD 30 MIN: CPT | Mod: S$PBB,,, | Performed by: INTERNAL MEDICINE

## 2017-06-01 RX ORDER — ATORVASTATIN CALCIUM 80 MG/1
80 TABLET, FILM COATED ORAL DAILY
Qty: 90 TABLET | Refills: 3 | Status: SHIPPED | OUTPATIENT
Start: 2017-06-01 | End: 2021-02-17 | Stop reason: SDUPTHER

## 2017-06-01 RX ORDER — MONTELUKAST SODIUM 10 MG/1
10 TABLET ORAL DAILY
Qty: 90 TABLET | Refills: 3 | Status: SHIPPED | OUTPATIENT
Start: 2017-06-01 | End: 2018-10-04

## 2017-06-01 RX ORDER — TRIAMCINOLONE ACETONIDE 5 MG/G
CREAM TOPICAL 2 TIMES DAILY
Qty: 15 G | Refills: 0 | Status: SHIPPED | OUTPATIENT
Start: 2017-06-01 | End: 2017-08-07 | Stop reason: SDUPTHER

## 2017-06-01 NOTE — PROGRESS NOTES
Subjective:       Patient ID: Juliette Seo is a 71 y.o. female.    Chief Complaint: Follow-up; Results; and Back Pain    HPI  Checkup.  Labs reviewed.  BSs erratic.  States that glucometer recently broke.  No paresthesias.  Eye check overdue.  LBP at baseline.  Abd pain at baseline.  No CP, SOB.  Anxious over numerous issues.  No current GERD sxs.  Review of Systems   All other systems reviewed and are negative.      Objective:      Physical Exam   Constitutional: She appears well-developed. No distress.   HENT:   Head: Normocephalic.   Eyes: EOM are normal.   Neck: Normal range of motion. No tracheal deviation present.   Cardiovascular: Normal rate, regular rhythm, normal heart sounds and intact distal pulses.    Pulmonary/Chest: Effort normal and breath sounds normal. No respiratory distress.   Abdominal: Soft. Bowel sounds are normal. She exhibits no distension. There is no tenderness.   Musculoskeletal: Normal range of motion. She exhibits tenderness (Ls spine). She exhibits no edema.   Neurological: She is alert. No cranial nerve deficit. She exhibits normal muscle tone. Coordination normal.   Skin: Skin is warm and dry. No rash noted. She is not diaphoretic. No erythema.   Psychiatric: She has a normal mood and affect. Her behavior is normal.   Vitals reviewed.      Assessment:       1. Type 2 diabetes mellitus with diabetic polyneuropathy, with long-term current use of insulin    2. Spinal stenosis of lumbar region    3. Stress incontinence, female    4. Essential hypertension    5. Primary osteoarthritis of both knees    6. Gastroesophageal reflux disease without esophagitis    7. Pure hypercholesterolemia    8. Depression, unspecified depression type    9. Hypertension complicating diabetes    10. Irritable bowel syndrome with diarrhea    11. Diabetic polyneuropathy associated with type 2 diabetes mellitus    12. Eczema, unspecified type    13. Allergic rhinitis, unspecified allergic rhinitis trigger,  unspecified rhinitis seasonality        Plan:       Juliette was seen today for follow-up, results and back pain.    Diagnoses and all orders for this visit:    Type 2 diabetes mellitus with diabetic polyneuropathy, with long-term current use of insulin  -     Hemoglobin A1c; Future  -     Ambulatory Referral to Diabetes Education    Spinal stenosis of lumbar region   Per pain mgt    Stress incontinence, female    Essential hypertension   Well-cont    Primary osteoarthritis of both knees    Gastroesophageal reflux disease without esophagitis   Cont PPI    Pure hypercholesterolemia  -     Lipid panel; Future  -     atorvastatin (LIPITOR) 80 MG tablet; Take 1 tablet (80 mg total) by mouth once daily.    Depression, unspecified depression type   Cont rx    Hypertension complicating diabetes    Irritable bowel syndrome with diarrhea    Diabetic polyneuropathy associated with type 2 diabetes mellitus    Eczema, unspecified type  -     triamcinolone acetonide 0.5% (KENALOG) 0.5 % Crea; Apply topically 2 (two) times daily.    Allergic rhinitis, unspecified allergic rhinitis trigger, unspecified rhinitis seasonality  -     montelukast (SINGULAIR) 10 mg tablet; Take 1 tablet (10 mg total) by mouth once daily.      Return in about 3 months (around 9/1/2017).

## 2017-06-07 ENCOUNTER — HOSPITAL ENCOUNTER (EMERGENCY)
Facility: HOSPITAL | Age: 72
Discharge: HOME | End: 2017-06-07
Attending: EMERGENCY MEDICINE
Payer: MEDICARE

## 2017-06-07 VITALS
RESPIRATION RATE: 16 BRPM | BODY MASS INDEX: 27.77 KG/M2 | DIASTOLIC BLOOD PRESSURE: 85 MMHG | HEART RATE: 85 BPM | TEMPERATURE: 99 F | HEIGHT: 55 IN | OXYGEN SATURATION: 100 % | WEIGHT: 120 LBS | SYSTOLIC BLOOD PRESSURE: 138 MMHG

## 2017-06-07 DIAGNOSIS — M62.838 TRAPEZIUS MUSCLE SPASM: Primary | ICD-10-CM

## 2017-06-07 DIAGNOSIS — K21.9 GASTROESOPHAGEAL REFLUX DISEASE, ESOPHAGITIS PRESENCE NOT SPECIFIED: ICD-10-CM

## 2017-06-07 PROCEDURE — 99283 EMERGENCY DEPT VISIT LOW MDM: CPT | Mod: 25

## 2017-06-07 PROCEDURE — 25000003 PHARM REV CODE 250: Performed by: EMERGENCY MEDICINE

## 2017-06-07 PROCEDURE — 63600175 PHARM REV CODE 636 W HCPCS: Performed by: EMERGENCY MEDICINE

## 2017-06-07 PROCEDURE — 96372 THER/PROPH/DIAG INJ SC/IM: CPT

## 2017-06-07 RX ORDER — ORPHENADRINE CITRATE 30 MG/ML
60 INJECTION INTRAMUSCULAR; INTRAVENOUS
Status: COMPLETED | OUTPATIENT
Start: 2017-06-07 | End: 2017-06-07

## 2017-06-07 RX ORDER — OXYCODONE AND ACETAMINOPHEN 5; 325 MG/1; MG/1
1 TABLET ORAL EVERY 4 HOURS PRN
Qty: 15 TABLET | Refills: 0 | Status: SHIPPED | OUTPATIENT
Start: 2017-06-07 | End: 2017-09-01

## 2017-06-07 RX ORDER — KETOROLAC TROMETHAMINE 30 MG/ML
30 INJECTION, SOLUTION INTRAMUSCULAR; INTRAVENOUS
Status: COMPLETED | OUTPATIENT
Start: 2017-06-07 | End: 2017-06-07

## 2017-06-07 RX ORDER — OXYCODONE AND ACETAMINOPHEN 5; 325 MG/1; MG/1
1 TABLET ORAL
Status: COMPLETED | OUTPATIENT
Start: 2017-06-07 | End: 2017-06-07

## 2017-06-07 RX ORDER — SUCRALFATE 1 G/1
1 TABLET ORAL
Qty: 40 TABLET | Refills: 0 | Status: SHIPPED | OUTPATIENT
Start: 2017-06-07 | End: 2017-09-01

## 2017-06-07 RX ORDER — METHOCARBAMOL 500 MG/1
1000 TABLET, FILM COATED ORAL 3 TIMES DAILY
Qty: 30 TABLET | Refills: 0 | Status: SHIPPED | OUTPATIENT
Start: 2017-06-07 | End: 2017-06-12

## 2017-06-07 RX ADMIN — KETOROLAC TROMETHAMINE 30 MG: 30 INJECTION, SOLUTION INTRAMUSCULAR at 02:06

## 2017-06-07 RX ADMIN — OXYCODONE AND ACETAMINOPHEN 1 TABLET: 5; 325 TABLET ORAL at 02:06

## 2017-06-07 RX ADMIN — ORPHENADRINE CITRATE 60 MG: 30 INJECTION INTRAMUSCULAR; INTRAVENOUS at 02:06

## 2017-06-07 NOTE — ED NOTES
APPEARANCE: Alert, oriented and in no acute distress.  CARDIAC: Normal rate and rhythm, no murmur heard.   PERIPHERAL VASCULAR: peripheral pulses present. Normal cap refill. No edema. Warm to touch.    RESPIRATORY:Normal rate and effort, breath sounds clear bilaterally throughout chest. Respirations are equal and unlabored no obvious signs of distress.  GASTRO: soft, bowel sounds normal, no tenderness, no abdominal distention.  MUSC: Full ROM. No bony tenderness or soft tissue tenderness. No obvious deformity.  SKIN: Skin is warm and dry, normal skin turgor, mucous membranes moist.  NEURO: 5/5 strength major flexors/extensors bilaterally. Sensory intact to light touch bilaterally. [place] coma scale: eyes open spontaneously-4, oriented & converses-5, obeys commands-6. No neurological abnormalities.   MENTAL STATUS: awake, alert and aware of environment.  EYE: PERRL, both eyes: pupils brisk and reactive to light. Normal size.

## 2017-06-07 NOTE — ED PROVIDER NOTES
Encounter Date: 6/7/2017       History     Chief Complaint   Patient presents with    Sore Throat     sore throat with swelling to right side of neck and low grade fever.       Review of patient's allergies indicates:   Allergen Reactions    Actos [pioglitazone] Nausea Only     Patient is a 71-year-old female who complains of pain at the area of her right trapezial ridge.  She denies trauma.  She has no chest pain or shortness of breath.  No fever or cough.  Pain is worsened with movement of her neck and right shoulder.  No arm weakness and numbness.  Patient also complains of  and gastric reflux.  She is currently taking omeprazole which she says only helps some of the time.        The history is provided by the patient.     Past Medical History:   Diagnosis Date    Allergy     Anxiety     Arthritis     osteo    Asthma     Diabetes mellitus     Diabetes mellitus, type 2     GERD (gastroesophageal reflux disease)     High cholesterol     Hypertension      Past Surgical History:   Procedure Laterality Date    CHOLECYSTECTOMY      HERNIA REPAIR      HYSTERECTOMY       Family History   Problem Relation Age of Onset    Stroke Mother     Diabetes Sister     Diabetes Brother      Social History   Substance Use Topics    Smoking status: Never Smoker    Smokeless tobacco: Never Used    Alcohol use Yes     Review of Systems   HENT: Negative for sore throat and trouble swallowing.    Eyes: Negative for visual disturbance.   Respiratory: Negative for cough and shortness of breath.    Cardiovascular: Negative for chest pain.   Gastrointestinal: Negative for nausea and vomiting.   Musculoskeletal: Positive for myalgias and neck pain. Negative for back pain.   All other systems reviewed and are negative.      Physical Exam     Initial Vitals [06/07/17 1241]   BP Pulse Resp Temp SpO2   (!) 146/65 88 16 98.2 °F (36.8 °C) 99 %     Physical Exam    Nursing note and vitals reviewed.  Constitutional: No distress.    HENT:   Head: Normocephalic and atraumatic.   Mouth/Throat: Oropharynx is clear and moist.   Eyes: Conjunctivae and EOM are normal. Pupils are equal, round, and reactive to light.   Neck:   Decreased range of motion of the neck to the right side due to pain.  There is tenderness at the distal right SCM muscle extending onto the entire right trapezial ridge with palpable muscle spasm.   Cardiovascular: Normal rate, regular rhythm and normal heart sounds.   Pulmonary/Chest: Breath sounds normal.   Abdominal: Soft. There is no tenderness.   Musculoskeletal: Normal range of motion. She exhibits no edema.   Neurological: She is alert and oriented to person, place, and time. She has normal strength. No sensory deficit.   Skin: Skin is warm and dry. Capillary refill takes less than 2 seconds.   Psychiatric: Her behavior is normal. Thought content normal.         ED Course   Procedures  Labs Reviewed - No data to display                            ED Course     Clinical Impression:   The primary encounter diagnosis was Trapezius muscle spasm. A diagnosis of Gastroesophageal reflux disease, esophagitis presence not specified was also pertinent to this visit.          Johnson Lim MD  06/07/17 [id number]

## 2017-06-13 NOTE — PROGRESS NOTES
Chronic patient Established Note (Follow up visit)      SUBJECTIVE:    Juliette Seo presents to the clinic for a 4 wk follow-up appointment for lower back and right shoulder pain. She is S/P right  SACROILIAC JOINT INJECTION on 5/18/17 with 50% relief. Recent X-rays show Moderate to severe facet arthropathy is seen at L5/S1.  Mild disc space narrowing is seen throughout the lumbar spine.  Mild spondylosis is seen throughout the lumbar spine. She has had a Bilateral L3,4,5 MBB with 80% relief, discussed that we will repeat, she has mild pain on the left, most of her pain is right.   Since the last visit, Juliette Seo states the pain has been persistant. Current pain intensity is 8/10.    Pain Disability Index Review:  Last 3 PDI Scores 6/15/2017 5/8/2017 3/7/2017   Pain Disability Index (PDI) 51 52 42       Pain Medications:     - Opioids:None   - Adjuvant Medications: Gabapentin, Tizanidine, Aleve, Tylenol  - Anti-Coagulants: Aspirin  - Others: See Medication List     Opioid Contract: no     report:  Reviewed and consistent with medication use as prescribed.    Pain Procedures: 5/18/17 right  SACROILIAC JOINT INJECTION  right subacromial bursa steroid injection  And TRIGGER POINT INJECTION 2/7/17  10/26/16 BILATERAL L3,4,5  LUMBAR FACET MEDIAL BRANCH NERVE BLOCK       Physical Therapy/Home Exercise: yes    Imaging: MRI Cervical Spine Without Contrast 2/15/17  Time of Procedure: 02/15/17 07:13:02  Accession # [id number]    MR of the cervical spine without contrast:    Technique: Multiplanar, multisequence MRI of the cervical spine obtained. No IV contrast.     Comparison: None     Findings:    Cervical spine alignment is within normal limits. Vertebral body heights and intervertebral disc spaces are satisfactorily maintained. No evidence of bone marrow replacement process is tumor or infection.  No fracture.     The spinal cord demonstrates no abnormal signal.  The base of the brain, base of the skull, and  craniocervical junction demonstrate no significant abnormalities.  The adjacent soft tissue structures show no significant abnormalities.      C2-C3:  There is no posterior disc osteophyte complex formation. No significant central canal or neural foraminal narrowing.    C3-C4: There is diffuse posterior disc osteophyte complex formation and mild bilateral uncovertebral spurring. No significant central canal or neural foraminal narrowing.    C4-C5:  Mild posterior disc osteophyte complex formation, uncovertebral spurring, and facet arthropathy causing mild bilateral neuroforaminal narrowing.  No central spinal canal stenosis.     C5-C6:  Mild diffuse posterior disc osteophyte complex formation causing effacement of the left anterior CSF sleeve with facet arthropathy and uncovertebral spurring causing mild bilateral neuroforaminal narrowing.     C6-C7:  Mild posterior disc osteophyte complex formation and facet arthropathy.No significant central canal or neural foraminal narrowing.    C7-T1:   There is no posterior disc osteophyte complex formation. No significant central canal or neural foraminal narrowing.   Impression        Mild degenerative changes as described above.      Electronically signed by: SHORTY KEATING MD  Date: 02/15/17  Time: 09:09          MRI Upper Extremity Joint Without Contrast Right 2/15/17  Narrative   Time of Procedure: 02/15/17 07:13:08  Accession # [id number]    MRI RIGHT SHOULDER    INDICATION: Right shoulder lesion    TECHNIQUE: MRI of right shoulder was performed on a 1.5T magnet utilizing the following sequences: Localizer; axial T2 FS; coronal T2 FS and PD FS; sagittal T1, T2 FS and PD FS.    COMPARISON: Not available.    FINDINGS:    Rotator cuff: Partial thickness tear of the posterior insertional fibers of the supraspinatus tendon and significant tendinopathy of the supraspinatus tendon.  Infraspinatus, teres minor, and subscapularis tendons are intact.    Labrum: Global  degeneration.    Biceps: Long head biceps tendon is intact.    Bone: There is no fracture.  Bone marrow signal is unremarkable.    Acromioclavicular joint: Moderate arthrosis.    Cartilage: Articular cartilage of the glenohumeral joint is preserved.    Miscellaneous: Small amount of fluid in the subdeltoid and subacromion bursae.   Impression       Partial-thickness tear of the posterior insertional fibers of the supraspinatus tendon with associated significant tendinopathy of the supraspinatus tendon.      Electronically signed by: SHORTY KEATING MD  Date: 02/15/17  Time: 08:36          Xray Knee 10/18/16  HISTORY: Pain    COMPARISON: None    FINDINGS: 5 views.      The osseous structures are intact with no evidence of fracture or dislocation.  There is degenerative osteoarthritic change of the bilateral tibiofemoral and patellofemoral compartments.  The joint spaces and soft tissues are otherwise unremarkable.   Impression       #1. As above.      Electronically signed by: EVAN SLOAN MD  Date: 10/18/16  Time: 09:38          Xray Cervical 10/18/16  HISTORY: Pain    COMPARISON: None    FINDINGS: 4 views.    The cervical spine alignment and vertebral body heights are well-maintained.  There is no evidence of osseous fracture.    There are advanced degenerative osteoarthritic changes of the cervical spine with facet hypertrophy and disc space narrowing.  The joint spaces, soft tissues, and osseous structures are otherwise unremarkable.   Impression       #1. As Above.        Electronically signed by: EVAN SLOAN MD  Date: 10/18/16  Time: 10:01          Xray Lumbar 10/17/16  Technique: AP, lateral and bilateral oblique views of the lumbar spine         Comparison: None    Results: The lumbar sagittal alignment is within normal limits.  The lungs are mild endplate degenerative change lumbar vertebral body heights and contours are within normal limits without evidence for acute fracture or subluxation.  No  definite spondylolysis.  Surgical clips project over the upper abdomen.  Scattered vascular calcifications identified. Further evaluation as warrented clinically.   Impression    See above.      Electronically signed by: STEPHAN SMITH DO  Date: 10/17/16  Time: 14:26          CT Thoracic Spine 6/22/16  CT thoracic spine with and without contrast.    Findings: 75 mL of Omnipaque 350 IV contrast was administered for today's examination.    The visualized portion of the mediastinum is significant for calcification of the aorta, aortic annulus and coronary arteries.  The visualized portion of the intra-abdominal contents is significant for a punctate right-sided nonobstructing renal stone.  The visualized portion of the lungs is unremarkable.  There is no abnormal enhancement following the administration of contrast.  The thoracic spinal alignment and vertebral body heights are satisfactorily preserved.  There is no fracture, dislocation, or bony erosion.   Impression    No acute findings.  ______________________________________     Electronically signed by: ISA GUY MD  Date: 06/22/16  Time: 08:18           Allergies:   Review of patient's allergies indicates:   Allergen Reactions    Actos [pioglitazone] Nausea Only       Current Medications:   Current Outpatient Prescriptions   Medication Sig Dispense Refill    ACCU-CHEK FASTCLIX Misc USE AS DIRECTED TWICE A  each 3    atorvastatin (LIPITOR) 80 MG tablet Take 1 tablet (80 mg total) by mouth once daily. 90 tablet 3    BD INSULIN SYRINGE ULTRA-FINE 0.5 mL 31 gauge x 5/16 Syrg USE AS DIRECTED TWICE A  each 4    blood sugar diagnostic (ACCU-CHEK SMARTVIEW TEST STRIP) Strp USE AS DIRECTED TWICE A  strip 3    gabapentin (NEURONTIN) 300 MG capsule Take 1 capsule (300 mg total) by mouth 3 (three) times daily. 270 capsule 3    glimepiride (AMARYL) 2 MG tablet TAKE 1 TABLET (2 MG TOTAL) BY MOUTH BEFORE BREAKFAST. 90 tablet 3    hydrocortisone 2.5 %  cream APPLY TO THE AFFECTED AREA(S) BY TOPICAL ROUTE THREE TIMES DAILY AS NEEDED FOR ITCHING  2    hydrOXYzine HCl (ATARAX) 25 MG tablet Take 1 tablet (25 mg total) by mouth 3 (three) times daily as needed for Itching. 100 tablet 1    insulin NPH (NOVOLIN N) 100 unit/mL injection 40u qAM 20u qPM 3 vial 4    lisinopril (PRINIVIL,ZESTRIL) 20 MG tablet Take 1 tablet (20 mg total) by mouth once daily. 90 tablet 3    meclizine (ANTIVERT) 12.5 mg tablet TAKE 1 TABLET BY MOUTH 3 TIMES A DAY AS NEEDED 60 tablet 1    montelukast (SINGULAIR) 10 mg tablet Take 1 tablet (10 mg total) by mouth once daily. 90 tablet 3    omeprazole (PRILOSEC) 40 MG capsule Take 1 capsule (40 mg total) by mouth every morning. 90 capsule 3    ondansetron (ZOFRAN) 4 MG tablet Take 1 tablet (4 mg total) by mouth every 6 (six) hours. 20 tablet 0    PROAIR HFA 90 mcg/actuation inhaler Inhale 1-2 puffs into the lungs every 6 (six) hours as needed.   5    PROCTOSOL HC 2.5 % rectal cream APPLY TO RECTUM TWICE A DAY FOR 7 DAYS 28.35 g 3    RESTASIS 0.05 % ophthalmic emulsion Place 1 drop into both eyes 2 (two) times daily.  3    simethicone (GAS RELIEF) 180 mg Cap Take by mouth.      sucralfate (CARAFATE) 1 gram tablet Take 1 tablet (1 g total) by mouth 4 (four) times daily before meals and nightly. 40 tablet 0    tizanidine (ZANAFLEX) 2 MG tablet TAKE 1 TABLET TWICE A DAY BY ORAL ROUTE FOR 30 DAYS. 60 tablet 1    oxycodone-acetaminophen (PERCOCET) 5-325 mg per tablet Take 1 tablet by mouth every 4 (four) hours as needed for Pain. 15 tablet 0    triamcinolone acetonide 0.5% (KENALOG) 0.5 % Crea Apply topically 2 (two) times daily. 15 g 0     No current facility-administered medications for this visit.        REVIEW OF SYSTEMS:    GENERAL: No weight loss, malaise or fevers.  HEENT: Negative for frequent or significant headaches.  NECK: + neck pain   RESPIRATORY: Negative for cough, wheezing or shortness of breath.  CARDIOVASCULAR: + CAD,  Negative for chest pain, leg swelling or palpitations.  GI: Negative for abdominal discomfort, blood in stools or black stools or change in bowel habits.  MUSCULOSKELETAL: See HPI, + right shoulder pain   SKIN: Negative for lesions, rash, and itching.  PSYCH: Negative for sleep disturbance, mood disorder and recent psychosocial stressors.  HEMATOLOGY/LYMPHOLOGY: Negative for prolonged bleeding, bruising easily or swollen nodes.  NEURO: No history of headaches, syncope, paralysis, seizures or tremors.  All other reviewed and negative other than HPI     Past Medical History:  Past Medical History:   Diagnosis Date    Allergy     Anxiety     Arthritis     osteo    Asthma     Diabetes mellitus     Diabetes mellitus, type 2     GERD (gastroesophageal reflux disease)     High cholesterol     Hypertension        Past Surgical History:  Past Surgical History:   Procedure Laterality Date    CHOLECYSTECTOMY      HERNIA REPAIR      HYSTERECTOMY         Family History:  Family History   Problem Relation Age of Onset    Stroke Mother     Diabetes Sister     Diabetes Brother        Social History:  Social History     Social History    Marital status:      Spouse name: N/A    Number of children: N/A    Years of education: N/A     Social History Main Topics    Smoking status: Never Smoker    Smokeless tobacco: Never Used    Alcohol use Yes    Drug use: No    Sexual activity: Yes     Partners: Male     Other Topics Concern    None     Social History Narrative    None       OBJECTIVE:    /76   Pulse 98   Wt 56.2 kg (123 lb 14.4 oz)   BMI 31.01 kg/m²     PHYSICAL EXAMINATION:    General appearance: Well appearing, in no acute distress, alert and oriented x3.  Psych: Mood and affect appropriate.  Skin: Skin color, texture, turgor normal, no rashes or lesions, in both upper and lower body.  Head/face: Normocephalic, atraumatic. No palpable lymph nodes.  Neck: + pain to palpation over the cervical  paraspinous muscle and C spine as well as right trapezus . Spurling Negative. No pain with neck flexion, extension, or lateral flexion.   Back: Straight leg raising in the sitting and supine positions is negative to radicular pain. + TTP over the right PSIS and L spine   Extremities:+ inverted can test on the right shoulder Pain on ROM of the right shoulder Peripheral joint ROM is full and pain free without obvious instability or laxity in all four extremities. No deformities, edema, or skin discoloration. Good capillary refill.  Musculoskeletal: + Aguila test on the right. + facet loading . Bilateral upper and lower extremity strength is normal and symmetric. No atrophy or tone abnormalities are noted.   Neuro: Bilateral upper and lower extremity coordination and muscle stretch reflexes are physiologic and symmetric. Plantar response are downgoing. No loss of sensation is noted.  Gait: normal    ASSESSMENT: 71 y.o. year old female with  pain, consistent with      Diagnosis:    1. Lumbar facet arthropathy     2. Facet hypertrophy of lumbar region     3. Rotator cuff tendinitis, right     4. Sacroiliitis           PLAN:     - I have stressed the importance of physical activity and a home exercise plan to help with pain and improve health.  - Patient can continue with medications for now since they are providing benefits, using them appropriately, and without side effects.  - Counseled patient regarding the importance of activity modification, constant sleeping habits and physical therapy.  - Schedule for a Diagnostic/Therapeutic Medial branch block at Bilaterally @  L 3,4, and L5 to help with axial low back pain more on the right than left and progress with a home exercise program.   -I have explained the risks, benefits, and alternatives of the procedure in detail. The patient voices understanding and all questions have been answered. The patient agrees to proceed as planned. Written Consent obtained.   -RTC 2-3  weeks following procedure.  -The above plan and management options were discussed at length with patient. Patient is in agreement with the above and verbalized understanding. Dr. Pena   was consulted on this patient  and agrees with this plan.       RYLIE Macedo    06/15/2017

## 2017-06-15 ENCOUNTER — OFFICE VISIT (OUTPATIENT)
Dept: PAIN MEDICINE | Facility: CLINIC | Age: 72
End: 2017-06-15
Payer: MEDICARE

## 2017-06-15 VITALS
DIASTOLIC BLOOD PRESSURE: 76 MMHG | HEART RATE: 98 BPM | BODY MASS INDEX: 31.01 KG/M2 | WEIGHT: 123.88 LBS | SYSTOLIC BLOOD PRESSURE: 124 MMHG

## 2017-06-15 DIAGNOSIS — M47.816 FACET HYPERTROPHY OF LUMBAR REGION: ICD-10-CM

## 2017-06-15 DIAGNOSIS — M75.81 ROTATOR CUFF TENDINITIS, RIGHT: ICD-10-CM

## 2017-06-15 DIAGNOSIS — M47.816 LUMBAR FACET ARTHROPATHY: Primary | ICD-10-CM

## 2017-06-15 DIAGNOSIS — M47.816 FACET ARTHROPATHY, LUMBAR: Primary | ICD-10-CM

## 2017-06-15 DIAGNOSIS — M46.1 SACROILIITIS: ICD-10-CM

## 2017-06-15 PROCEDURE — 1125F AMNT PAIN NOTED PAIN PRSNT: CPT | Mod: ,,, | Performed by: NURSE PRACTITIONER

## 2017-06-15 PROCEDURE — 1159F MED LIST DOCD IN RCRD: CPT | Mod: ,,, | Performed by: NURSE PRACTITIONER

## 2017-06-15 PROCEDURE — 99214 OFFICE O/P EST MOD 30 MIN: CPT | Mod: PBBFAC,PO | Performed by: NURSE PRACTITIONER

## 2017-06-15 PROCEDURE — 99213 OFFICE O/P EST LOW 20 MIN: CPT | Mod: S$PBB,,, | Performed by: NURSE PRACTITIONER

## 2017-06-15 PROCEDURE — 99999 PR PBB SHADOW E&M-EST. PATIENT-LVL IV: CPT | Mod: PBBFAC,,, | Performed by: NURSE PRACTITIONER

## 2017-06-21 ENCOUNTER — TELEPHONE (OUTPATIENT)
Dept: PAIN MEDICINE | Facility: CLINIC | Age: 72
End: 2017-06-21

## 2017-06-21 NOTE — TELEPHONE ENCOUNTER
Spoke with patient regarding time of arrival for procedure that is scheduled on 6/22/17. Patient verbalized instructions and confirmed procedure date and time of arrival on 6/22/17 at 1030 AM.

## 2017-06-22 ENCOUNTER — SURGERY (OUTPATIENT)
Age: 72
End: 2017-06-22

## 2017-07-06 DIAGNOSIS — Z79.4 TYPE 2 DIABETES MELLITUS WITH DIABETIC POLYNEUROPATHY, WITH LONG-TERM CURRENT USE OF INSULIN: ICD-10-CM

## 2017-07-06 DIAGNOSIS — E11.42 TYPE 2 DIABETES MELLITUS WITH DIABETIC POLYNEUROPATHY, WITH LONG-TERM CURRENT USE OF INSULIN: ICD-10-CM

## 2017-07-06 RX ORDER — HUMAN INSULIN 100 [IU]/ML
INJECTION, SUSPENSION SUBCUTANEOUS
Qty: 30 ML | Refills: 4 | Status: SHIPPED | OUTPATIENT
Start: 2017-07-06 | End: 2018-02-25 | Stop reason: SDUPTHER

## 2017-07-10 RX ORDER — MELOXICAM 7.5 MG/1
7.5 TABLET ORAL DAILY
Qty: 90 TABLET | Refills: 1 | Status: SHIPPED | OUTPATIENT
Start: 2017-07-10 | End: 2017-09-01

## 2017-07-11 ENCOUNTER — CLINICAL SUPPORT (OUTPATIENT)
Dept: DIABETES | Facility: CLINIC | Age: 72
End: 2017-07-11
Payer: MEDICARE

## 2017-07-11 VITALS — WEIGHT: 125 LBS | BODY MASS INDEX: 31.29 KG/M2

## 2017-07-11 DIAGNOSIS — E11.42 TYPE 2 DIABETES MELLITUS WITH DIABETIC POLYNEUROPATHY, WITH LONG-TERM CURRENT USE OF INSULIN: ICD-10-CM

## 2017-07-11 DIAGNOSIS — Z79.4 TYPE 2 DIABETES MELLITUS WITH DIABETIC POLYNEUROPATHY, WITH LONG-TERM CURRENT USE OF INSULIN: ICD-10-CM

## 2017-07-11 PROCEDURE — G0108 DIAB MANAGE TRN  PER INDIV: HCPCS | Mod: PBBFAC,PN | Performed by: REGISTERED NURSE

## 2017-07-11 PROCEDURE — 99211 OFF/OP EST MAY X REQ PHY/QHP: CPT | Mod: PBBFAC,PN | Performed by: REGISTERED NURSE

## 2017-07-11 PROCEDURE — 99999 PR PBB SHADOW E&M-EST. PATIENT-LVL I: CPT | Mod: PBBFAC,,,

## 2017-07-11 NOTE — PROGRESS NOTES
DIABETES EDUCATION ASSESSMENT    : 1945  Referring Physician: Danie Beltre MD    Previous Diabetes Education: yes  YR: Individual:  1 hour  YR: Group:   Number of hours remaining in annual dsmt benefit - subject to insurance benefits 9 hours    Social History:  Occupation retired  Highest Level of Education 12  Learning Challenges: mild  Learning Styles: reading, listening, repeating  Primary Support:   Major Stressors: none    Diabetes History:  Diagnosed: 20 years  Family History:  Sisters and brothers    Home Glucose Monitoring Freqency: Pt has a meter and is testing before her breakfast and dinner doses of insulin. Instructed pt on the normal blood glucose ranges. Instructed pt to keep a record of her blood sugars and to bring the record to her md visits.    Labs: Recent A1C was 8.3%    Current Meal Plan: Meal plan explained to pt. Review of pts current meal plan showed that she drinks, juice, water and diet soda. Pt was advised that juice is a carb and needs to be counted as part of her food for her meal. Pt is eating 3 meals per day and eats out rarely. Pt is not measuring her food or reading labels. Pt is not eating balanced meals. Instructed pt on the food groups, how to read labels and count carbs. Pt was given sample menus and meal plans as examples. Discussed with pt the importance of eating her 3 completer meals per day. Discussed with pt foods that she likes that she could include in her meal plan. Pt had fair understanding of meal plan and compliance is hoped for.    Exercise Assessment:  Do you exercise: yes  Type of Exercise:  walking  Frequency:  Every day    Standards of Care  Last Eye Exam: due  Last Dental Exam: dentures  Last Podiatry Exam: never  Stress Test:    During today's visit the patient was introduces to/educated on the following content areas:  · Diabetes Disease Process and Treatment Options  · Chronic and Acute Complication: Hypoglycemia signs, symptoms, and  treatment.  · Nutritional Management  · Physical activity  · Diabetes Medications: Reviewed oral medication Glimperide, action, med/meal timing, s/e. Reviewed insulin Novolin onset, peak, duration, med/meal timing, and s/e.  · Blood Glucose Monitoring: , provided instructions on usage of machine, logs, AMBG schedule, blood glucose, and estimated glucose average goals. Patient was able to return back demonstration of use.  · Importance of Self Care: Standards of care for eye exam, foot exam, dental exam, stress test, and kidney tests.  · Addressing psychosocial issues and concerns  · Importance of making self care behavioral changes and goal setting.      EDUCATION PLAN:    Based on educational assessment:  Patient has selected the following goal(s) based on his/her individual needs: eat more balanced. The selected goal will have an impact on the patient's health by:lower blood sugars. In order to meet the above goal and self care plan, patient will attend the following Diabetic Self Management Sessions:  · Patient able to attend group session covering Basic Diabetes Self Management Class reflecting current evidence and diabetes practice guidelines.  · Patient will attend Diabetes Nutrition Workshop        Time spent counseling patient today 60 mins  Pt has had diabetes for 20 years. Pt has a family history of diabetes. Instructed pt to call for any problems or questions.      Provided with written materials and phone numbers for Clinic. Questions addressed.

## 2017-07-14 ENCOUNTER — CLINICAL SUPPORT (OUTPATIENT)
Dept: DIABETES | Facility: CLINIC | Age: 72
End: 2017-07-14
Payer: MEDICARE

## 2017-07-14 DIAGNOSIS — Z79.4 TYPE 2 DIABETES MELLITUS WITH DIABETIC POLYNEUROPATHY, WITH LONG-TERM CURRENT USE OF INSULIN: ICD-10-CM

## 2017-07-14 DIAGNOSIS — E11.42 TYPE 2 DIABETES MELLITUS WITH DIABETIC POLYNEUROPATHY, WITH LONG-TERM CURRENT USE OF INSULIN: ICD-10-CM

## 2017-07-14 PROCEDURE — G0109 DIAB MANAGE TRN IND/GROUP: HCPCS | Mod: PBBFAC,PN | Performed by: REGISTERED NURSE

## 2017-07-14 NOTE — PROGRESS NOTES
"Diabetes Education - Combined Group Class  Part I  "The Basics and Beyond of Diabetes Self-Management - Moving Forward with Diabetes"    Pre Test Score - Verbal pretest completed; patient participated during the verbal test using [place] Conversation Map discussion "On The Road to Better Managing Your Diabetes."  Patient attended Diabetes Self Management Training group class today. Class education content included:  Diabetes Overview  Monitoring and Use of Results  The ABCs of Diabetes  Acute and Chronic Complications - Reducing Risks  Medications  Behavior Change Strategies for Healthy Self-Care Behaviors, Done in both part I and II  Support Systems and Healthy Coping, Done in both Part I and II  Patient interaction during the [place] Conversation Map discussion "Continuing Your Journey with Diabetes" was excellent. Part I class time 3 hours--taught by MARITA Angel.    Part II, Post Test score :   "Eating and Moving with Diabetes: "It's all about Choice and Change" --Taught by   Class education content included:  Basic Meal Planning  Behavior Modification   Cholesterol Management  Portion Control  Sodium and Fiber Guidelines  Basic Carbohydrate Counting  Label Reading  Exercise Precautions/Guidelines-Taught by MRAITA Angel    Information presented today focused on behavior modification and meal planning. Pt was given time to practice planning meals and counting carbohydrates. Guidelines and precautions in regards to physical activity were also reviewed today. Pertinent handouts were provided as added resources.   Patient was provided with the self-care behavior change goal (STG) as identified on the initial assessment visit. Using this information along with the "My S.M.A.R.T Goals" worksheet; the patient participated in individualized goal development with a focus on developing personal strategies to promote health and behavior change.  Class evaluation was given and completed.   Patient was participative in both parts of the " class and asked appropriate questions.       Part II  hours; Total Class Duration:  hours,

## 2017-07-27 ENCOUNTER — OFFICE VISIT (OUTPATIENT)
Dept: PAIN MEDICINE | Facility: CLINIC | Age: 72
End: 2017-07-27
Payer: MEDICARE

## 2017-07-27 ENCOUNTER — TELEPHONE (OUTPATIENT)
Dept: PAIN MEDICINE | Facility: CLINIC | Age: 72
End: 2017-07-27

## 2017-07-27 VITALS
HEART RATE: 102 BPM | WEIGHT: 124.75 LBS | SYSTOLIC BLOOD PRESSURE: 119 MMHG | BODY MASS INDEX: 31.23 KG/M2 | DIASTOLIC BLOOD PRESSURE: 70 MMHG

## 2017-07-27 DIAGNOSIS — M25.511 RIGHT SHOULDER PAIN, UNSPECIFIED CHRONICITY: ICD-10-CM

## 2017-07-27 DIAGNOSIS — M47.812 FACET HYPERTROPHY OF CERVICAL REGION: Primary | ICD-10-CM

## 2017-07-27 DIAGNOSIS — M54.2 NECK PAIN: ICD-10-CM

## 2017-07-27 DIAGNOSIS — M47.812 FACET ARTHROPATHY, CERVICAL: ICD-10-CM

## 2017-07-27 DIAGNOSIS — M79.18 MYOFASCIAL MUSCLE PAIN: ICD-10-CM

## 2017-07-27 PROCEDURE — 99214 OFFICE O/P EST MOD 30 MIN: CPT | Mod: PBBFAC,PO | Performed by: NURSE PRACTITIONER

## 2017-07-27 PROCEDURE — 99999 PR PBB SHADOW E&M-EST. PATIENT-LVL IV: CPT | Mod: PBBFAC,,, | Performed by: NURSE PRACTITIONER

## 2017-07-27 PROCEDURE — 1159F MED LIST DOCD IN RCRD: CPT | Mod: ,,, | Performed by: NURSE PRACTITIONER

## 2017-07-27 PROCEDURE — 1125F AMNT PAIN NOTED PAIN PRSNT: CPT | Mod: ,,, | Performed by: NURSE PRACTITIONER

## 2017-07-27 PROCEDURE — 99213 OFFICE O/P EST LOW 20 MIN: CPT | Mod: S$PBB,,, | Performed by: NURSE PRACTITIONER

## 2017-07-27 NOTE — TELEPHONE ENCOUNTER
----- Message from Kerry Jean sent at 7/27/2017 11:51 AM CDT -----  Contact: Self [phone]  Patient is calling to see if she can come in today she is having severe pain. Please advice

## 2017-07-27 NOTE — PROGRESS NOTES
Chronic patient Established Note (Follow up visit)      SUBJECTIVE:    Juliette Seo presents to the clinic for a follow-up appointment for neck, thoracic and bilateral  shoulder pain R>L, latest imaging of shoulders shows that  there is degenerative osteoarthritic change of the right shoulder joint and of the visualized cervical spine.  The joint spaces and soft tissues are otherwise unremarkable. She states neck pain started 3 days ago, duration of pain is on and off, pain is soar and ache, she takes tylenol that helps but not for long, pain is aggravated with movement. She has no radicular symptoms currently.  Previous imaging was reviewed and discussed with the patient today, discussed that we will s/f a Cervical  MBB @ C3, 4, 5 Bilaterally.   This patient has multiple pain generators, discussed today that today it is her neck pain, therefore that's what we have planned to treat.   Since the last visit, Juliette Seo states the pain has been persistant. Current pain intensity is 10/10.    Pain Disability Index Review:  Last 3 PDI Scores 7/27/2017 6/15/2017 5/8/2017   Pain Disability Index (PDI) 51 51 52       Pain Medications:     - Opioids:None   - Adjuvant Medications: Gabapentin, Tizanidine, Aleve, Tylenol  - Anti-Coagulants: Aspirin  - Others: See Medication List    Opioid Contract: no     report:  Reviewed and consistent with medication use as prescribed.    Pain Procedures: 5/18/17 right  SACROILIAC JOINT INJECTION  right subacromial bursa steroid injection  And TRIGGER POINT INJECTION 2/7/17  10/26/16 BILATERAL L3,4,5  LUMBAR FACET MEDIAL BRANCH NERVE BLOCK    Physical Therapy/Home Exercise: yes    Imaging: MRI Cervical Spine Without Contrast 2/15/17  Time of Procedure: 02/15/17 07:13:02  Accession # [id number]    MR of the cervical spine without contrast:    Technique: Multiplanar, multisequence MRI of the cervical spine obtained. No IV contrast.     Comparison: None     Findings:    Cervical spine  alignment is within normal limits. Vertebral body heights and intervertebral disc spaces are satisfactorily maintained. No evidence of bone marrow replacement process is tumor or infection.  No fracture.     The spinal cord demonstrates no abnormal signal.  The base of the brain, base of the skull, and craniocervical junction demonstrate no significant abnormalities.  The adjacent soft tissue structures show no significant abnormalities.      C2-C3:  There is no posterior disc osteophyte complex formation. No significant central canal or neural foraminal narrowing.    C3-C4: There is diffuse posterior disc osteophyte complex formation and mild bilateral uncovertebral spurring. No significant central canal or neural foraminal narrowing.    C4-C5:  Mild posterior disc osteophyte complex formation, uncovertebral spurring, and facet arthropathy causing mild bilateral neuroforaminal narrowing.  No central spinal canal stenosis.     C5-C6:  Mild diffuse posterior disc osteophyte complex formation causing effacement of the left anterior CSF sleeve with facet arthropathy and uncovertebral spurring causing mild bilateral neuroforaminal narrowing.     C6-C7:  Mild posterior disc osteophyte complex formation and facet arthropathy.No significant central canal or neural foraminal narrowing.    C7-T1:   There is no posterior disc osteophyte complex formation. No significant central canal or neural foraminal narrowing.   Impression        Mild degenerative changes as described above.      Electronically signed by: SHORTY KEATING MD  Date: 02/15/17  Time: 09:09          MRI Upper Extremity Joint Without Contrast Right 2/15/17  Narrative   Time of Procedure: 02/15/17 07:13:08  Accession # [id number]    MRI RIGHT SHOULDER    INDICATION: Right shoulder lesion    TECHNIQUE: MRI of right shoulder was performed on a 1.5T magnet utilizing the following sequences: Localizer; axial T2 FS; coronal T2 FS and PD FS; sagittal T1, T2 FS and PD  FS.    COMPARISON: Not available.    FINDINGS:    Rotator cuff: Partial thickness tear of the posterior insertional fibers of the supraspinatus tendon and significant tendinopathy of the supraspinatus tendon.  Infraspinatus, teres minor, and subscapularis tendons are intact.    Labrum: Global degeneration.    Biceps: Long head biceps tendon is intact.    Bone: There is no fracture.  Bone marrow signal is unremarkable.    Acromioclavicular joint: Moderate arthrosis.    Cartilage: Articular cartilage of the glenohumeral joint is preserved.    Miscellaneous: Small amount of fluid in the subdeltoid and subacromion bursae.   Impression       Partial-thickness tear of the posterior insertional fibers of the supraspinatus tendon with associated significant tendinopathy of the supraspinatus tendon.      Electronically signed by: SHORTY KEATING MD  Date: 02/15/17  Time: 08:36          Xray Knee 10/18/16  HISTORY: Pain    COMPARISON: None    FINDINGS: 5 views.      The osseous structures are intact with no evidence of fracture or dislocation.  There is degenerative osteoarthritic change of the bilateral tibiofemoral and patellofemoral compartments.  The joint spaces and soft tissues are otherwise unremarkable.   Impression       #1. As above.      Electronically signed by: EVAN SLOAN MD  Date: 10/18/16  Time: 09:38          Xray Cervical 10/18/16  HISTORY: Pain    COMPARISON: None    FINDINGS: 4 views.    The cervical spine alignment and vertebral body heights are well-maintained.  There is no evidence of osseous fracture.    There are advanced degenerative osteoarthritic changes of the cervical spine with facet hypertrophy and disc space narrowing.  The joint spaces, soft tissues, and osseous structures are otherwise unremarkable.   Impression       #1. As Above.        Electronically signed by: EVAN SLOAN MD  Date: 10/18/16  Time: 10:01          Xray Lumbar 10/17/16  Technique: AP, lateral and bilateral oblique  views of the lumbar spine         Comparison: None    Results: The lumbar sagittal alignment is within normal limits.  The lungs are mild endplate degenerative change lumbar vertebral body heights and contours are within normal limits without evidence for acute fracture or subluxation.  No definite spondylolysis.  Surgical clips project over the upper abdomen.  Scattered vascular calcifications identified. Further evaluation as warrented clinically.   Impression    See above.      Electronically signed by: STEPHAN SMITH DO  Date: 10/17/16  Time: 14:26          CT Thoracic Spine 6/22/16  CT thoracic spine with and without contrast.    Findings: 75 mL of Omnipaque 350 IV contrast was administered for today's examination.    The visualized portion of the mediastinum is significant for calcification of the aorta, aortic annulus and coronary arteries.  The visualized portion of the intra-abdominal contents is significant for a punctate right-sided nonobstructing renal stone.  The visualized portion of the lungs is unremarkable.  There is no abnormal enhancement following the administration of contrast.  The thoracic spinal alignment and vertebral body heights are satisfactorily preserved.  There is no fracture, dislocation, or bony erosion.   Impression    No acute findings.  ______________________________________     Electronically signed by: ISA GUY MD  Date: 06/22/16  Time: 08:18          Allergies:   Review of patient's allergies indicates:   Allergen Reactions    Actos [pioglitazone] Nausea Only       Current Medications:   Current Outpatient Prescriptions   Medication Sig Dispense Refill    ACCU-CHEK FASTCLIX Misc USE AS DIRECTED TWICE A  each 3    atorvastatin (LIPITOR) 80 MG tablet Take 1 tablet (80 mg total) by mouth once daily. 90 tablet 3    BD INSULIN SYRINGE ULTRA-FINE 0.5 mL 31 gauge x 5/16 Syrg USE AS DIRECTED TWICE A  each 4    blood sugar diagnostic (ACCU-CHEK SMARTVIEW TEST STRIP)  Strp USE AS DIRECTED TWICE A  strip 3    gabapentin (NEURONTIN) 300 MG capsule Take 1 capsule (300 mg total) by mouth 3 (three) times daily. 270 capsule 3    glimepiride (AMARYL) 2 MG tablet TAKE 1 TABLET (2 MG TOTAL) BY MOUTH BEFORE BREAKFAST. 90 tablet 3    hydrocortisone 2.5 % cream APPLY TO THE AFFECTED AREA(S) BY TOPICAL ROUTE THREE TIMES DAILY AS NEEDED FOR ITCHING  2    hydrOXYzine HCl (ATARAX) 25 MG tablet Take 1 tablet (25 mg total) by mouth 3 (three) times daily as needed for Itching. 100 tablet 1    lisinopril (PRINIVIL,ZESTRIL) 20 MG tablet Take 1 tablet (20 mg total) by mouth once daily. 90 tablet 3    meclizine (ANTIVERT) 12.5 mg tablet TAKE 1 TABLET BY MOUTH 3 TIMES A DAY AS NEEDED 60 tablet 1    meloxicam (MOBIC) 7.5 MG tablet Take 1 tablet (7.5 mg total) by mouth once daily. 90 tablet 1    montelukast (SINGULAIR) 10 mg tablet Take 1 tablet (10 mg total) by mouth once daily. 90 tablet 3    NOVOLIN N 100 unit/mL injection INJECT SUBCUTANEOUSLY 40 UNITS EVERY MORNING & 20 UNITS EVERY EVENING 30 mL 4    omeprazole (PRILOSEC) 40 MG capsule Take 1 capsule (40 mg total) by mouth every morning. 90 capsule 3    ondansetron (ZOFRAN) 4 MG tablet Take 1 tablet (4 mg total) by mouth every 6 (six) hours. 20 tablet 0    PROAIR HFA 90 mcg/actuation inhaler Inhale 1-2 puffs into the lungs every 6 (six) hours as needed.   5    PROCTOSOL HC 2.5 % rectal cream APPLY TO RECTUM TWICE A DAY FOR 7 DAYS 28.35 g 3    RESTASIS 0.05 % ophthalmic emulsion Place 1 drop into both eyes 2 (two) times daily.  3    simethicone (GAS RELIEF) 180 mg Cap Take by mouth.      sucralfate (CARAFATE) 1 gram tablet Take 1 tablet (1 g total) by mouth 4 (four) times daily before meals and nightly. 40 tablet 0    tizanidine (ZANAFLEX) 2 MG tablet TAKE 1 TABLET TWICE A DAY BY ORAL ROUTE FOR 30 DAYS. 60 tablet 1    oxycodone-acetaminophen (PERCOCET) 5-325 mg per tablet Take 1 tablet by mouth every 4 (four) hours as needed  for Pain. 15 tablet 0    triamcinolone acetonide 0.5% (KENALOG) 0.5 % Crea Apply topically 2 (two) times daily. 15 g 0     No current facility-administered medications for this visit.        REVIEW OF SYSTEMS:    GENERAL: No weight loss, malaise or fevers.  HEENT: Negative for frequent or significant headaches.  NECK: + neck pain   RESPIRATORY: Negative for cough, wheezing or shortness of breath.  CARDIOVASCULAR: + CAD, Negative for chest pain, leg swelling or palpitations.  GI: Negative for abdominal discomfort, blood in stools or black stools or change in bowel habits.  MUSCULOSKELETAL: See HPI, + right shoulder pain   SKIN: Negative for lesions, rash, and itching.  PSYCH: Negative for sleep disturbance, mood disorder and recent psychosocial stressors.  HEMATOLOGY/LYMPHOLOGY: Negative for prolonged bleeding, bruising easily or swollen nodes.  NEURO: No history of headaches, syncope, paralysis, seizures or tremors.  All other reviewed and negative other than HPI     Past Medical History:  Past Medical History:   Diagnosis Date    Allergy     Anxiety     Arthritis     osteo    Asthma     Diabetes mellitus     Diabetes mellitus, type 2     GERD (gastroesophageal reflux disease)     High cholesterol     Hypertension        Past Surgical History:  Past Surgical History:   Procedure Laterality Date    CHOLECYSTECTOMY      HERNIA REPAIR      HYSTERECTOMY         Family History:  Family History   Problem Relation Age of Onset    Stroke Mother     Diabetes Sister     Diabetes Brother        Social History:  Social History     Social History    Marital status:      Spouse name: N/A    Number of children: N/A    Years of education: N/A     Social History Main Topics    Smoking status: Never Smoker    Smokeless tobacco: Never Used    Alcohol use Yes    Drug use: No    Sexual activity: Yes     Partners: Male     Other Topics Concern    None     Social History Narrative    None        OBJECTIVE:    /70   Pulse 102   Wt 56.6 kg (124 lb 12.5 oz)   BMI 31.23 kg/m²     PHYSICAL EXAMINATION:    General appearance: Well appearing, in no acute distress, alert and oriented x3.  Psych: Mood and affect appropriate.  Skin: Skin color, texture, turgor normal, no rashes or lesions, in both upper and lower body.  Head/face: Normocephalic, atraumatic. No palpable lymph nodes.  Neck: + pain to palpation over the cervical paraspinous muscle and C spine as well as right trapezus . Spurling +. + pain with neck flexion, extension, or lateral flexion.   Back: Straight leg raising in the sitting and supine positions is negative to radicular pain.   Extremities:+ inverted can test on the right shoulder Pain on ROM of the right shoulder Peripheral joint ROM is full and pain free without obvious instability or laxity in all four extremities. No deformities, edema, or skin discoloration. Good capillary refill.  Musculoskeletal: + Aguila test on the right. + facet loading mild. Bilateral upper and lower extremity strength is normal and symmetric. No atrophy or tone abnormalities are noted.   Neuro: Bilateral upper and lower extremity coordination and muscle stretch reflexes are physiologic and symmetric. Plantar response are downgoing. No loss of sensation is noted.  Gait: normal    ASSESSMENT: 71 y.o. year old female with neck, thoracic and bilateral  shoulder  pain      Diagnosis:    1. Facet hypertrophy of cervical region     2. Facet arthropathy, cervical     3. Right shoulder pain, unspecified chronicity  Ambulatory Referral to Orthopedics   4. Neck pain     5. Myofascial muscle pain           PLAN:     - I have stressed the importance of physical activity and a home exercise plan to help with pain and improve health.  - Patient can continue with medications for now since they are providing benefits, using them appropriately, and without side effects.  - Counseled patient regarding the importance of  activity modification, constant sleeping habits and physical therapy.  -Referral to Orthopedics for right shoulder pain.   - Schedule for a Diagnostic/Therapeutic Medial branch block at Cervical C 3,4, and 5 to help with axial neck  pain and progress with a home exercise program.  - I have explained the risks, benefits, and alternatives of the procedure in detail. The patient voices understanding and all questions have been answered. The patient agrees to proceed as planned. Written Consent obtained.   -RTC 2-3 weeks following procedure.  -The above plan and management options were discussed at length with patient. Patient is in agreement with the above and verbalized understanding. Dr. Pena   was consulted on this patient  and agrees with this plan.       RYLIE Macedo    07/27/2017

## 2017-07-27 NOTE — TELEPHONE ENCOUNTER
Spoke with patient regarding pain to her shoulders and arms. Patient stated that she is having  pain to her shoulders and arms and would like to see the doctor today if possible. Patient verbalized and confirmed appt date and time of arrival on 7/27/17 at 3 PM.

## 2017-07-28 ENCOUNTER — CLINICAL SUPPORT (OUTPATIENT)
Dept: DIABETES | Facility: CLINIC | Age: 72
End: 2017-07-28
Payer: MEDICARE

## 2017-07-28 DIAGNOSIS — Z79.4 TYPE 2 DIABETES MELLITUS WITH DIABETIC POLYNEUROPATHY, WITH LONG-TERM CURRENT USE OF INSULIN: ICD-10-CM

## 2017-07-28 DIAGNOSIS — E11.42 TYPE 2 DIABETES MELLITUS WITH DIABETIC POLYNEUROPATHY, WITH LONG-TERM CURRENT USE OF INSULIN: ICD-10-CM

## 2017-07-28 PROCEDURE — G0109 DIAB MANAGE TRN IND/GROUP: HCPCS | Mod: PBBFAC,PN | Performed by: REGISTERED NURSE

## 2017-07-28 NOTE — PROGRESS NOTES
"Diabetes Education - Combined Group Class  Part I  "The Basics and Beyond of Diabetes Self-Management - Moving Forward with Diabetes"    Pre Test Score - Verbal pretest completed; patient participated during the verbal test using [place] Conversation Map discussion "On The Road to Better Managing Your Diabetes."  Patient attended Diabetes Self Management Training group class today. Class education content included:  Diabetes Overview  Monitoring and Use of Results  The ABCs of Diabetes  Acute and Chronic Complications - Reducing Risks  Medications  Behavior Change Strategies for Healthy Self-Care Behaviors, Done in both part I and II  Support Systems and Healthy Coping, Done in both Part I and II  Patient interaction during the [place] Conversation Map discussion "Continuing Your Journey with Diabetes" was excellent. Part I class time  hours--taught by     Part II, Post Test score :   "Eating and Moving with Diabetes: "It's all about Choice and Change" --Taught by MARITA Angel  Class education content included:  Basic Meal Planning  Behavior Modification   Cholesterol Management  Portion Control  Sodium and Fiber Guidelines  Basic Carbohydrate Counting  Label Reading  Exercise Precautions/Guidelines-Taught by     Information presented today focused on behavior modification and meal planning. Pt was given time to practice planning meals and counting carbohydrates. Guidelines and precautions in regards to physical activity were also reviewed today. Pertinent handouts were provided as added resources.   Patient was provided with the self-care behavior change goal (STG) as identified on the initial assessment visit. Using this information along with the "My S.M.A.R.T Goals" worksheet; the patient participated in individualized goal development with a focus on developing personal strategies to promote health and behavior change.  Class evaluation was given and completed.   Patient was participative in both parts of the class and " asked appropriate questions.       Part II 3 hours; Total Class Duration:  hours,

## 2017-08-02 ENCOUNTER — TELEPHONE (OUTPATIENT)
Dept: PAIN MEDICINE | Facility: CLINIC | Age: 72
End: 2017-08-02

## 2017-08-02 NOTE — TELEPHONE ENCOUNTER
Spoke with patient regarding time of arrival for procedure that is scheduled on 8/3/17. Patient verbalized instructions and confirmed procedure date and time of arrival on 8/3/17 at 945 AM.

## 2017-08-03 ENCOUNTER — TELEPHONE (OUTPATIENT)
Dept: PAIN MEDICINE | Facility: CLINIC | Age: 72
End: 2017-08-03

## 2017-08-03 NOTE — TELEPHONE ENCOUNTER
----- Message from Valerie Lei sent at 8/3/2017  8:57 AM CDT -----  Contact: Self/ [phone]  Patient would like to speak with you about cancelling her procedure because her sugar is too high. Please advise.

## 2017-08-03 NOTE — TELEPHONE ENCOUNTER
Spoke with pt in regards to her procedure scheduled for this morning with Dr. Pena. Pt stated her blood sugar is too high to have her procedure done today and she will give us a call when she's ready to reschedule. Pt verbalized understanding.

## 2017-08-07 DIAGNOSIS — L30.9 ECZEMA, UNSPECIFIED TYPE: ICD-10-CM

## 2017-08-07 RX ORDER — HYDROCORTISONE 25 MG/G
CREAM TOPICAL
Qty: 1 TUBE | Refills: 3 | Status: SHIPPED | OUTPATIENT
Start: 2017-08-07 | End: 2018-01-25

## 2017-08-07 RX ORDER — TRIAMCINOLONE ACETONIDE 5 MG/G
CREAM TOPICAL
Qty: 15 G | Refills: 0 | Status: SHIPPED | OUTPATIENT
Start: 2017-08-07 | End: 2017-08-23 | Stop reason: SDUPTHER

## 2017-08-07 NOTE — TELEPHONE ENCOUNTER
"  SUBJECTIVE:                                                    Roslyn Palmer is a 60 year old female who presents to clinic today for the following health issues:    Elbow Pain     Onset: 5 days ago    Description:   Location: right elbow  Character: Sharp and acing    Intensity: 4/10    Progression of Symptoms: worse    Accompanying Signs & Symptoms:  Other symptoms: warmth, swelling and redness   History:   Previous similar pain: no       Precipitating factors:   Trauma or overuse: no     Alleviating factors:  Improved by: nothing       Therapies Tried and outcome: aleve- no improvement.    SUBJECTIVE:  Here today for the above symptoms. 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 She notes it's very painful to lean on her right elbow. Essentially full range of motion but it feels a bit stiff. Denies any fever chills or sense of illness. No previous history.    Review of systems otherwise negative.  Past medical, family, and social history reviewed and updated in chart.    OBJECTIVE:  /90 (BP Location: Left arm, Patient Position: Chair, Cuff Size: Adult Regular)  Pulse 98  Temp 98.5  F (36.9  C) (Oral)  Ht 1.666 m (5' 5.59\")  Wt 82.5 kg (181 lb 14.4 oz)  SpO2 99%  BMI 29.73 kg/m2  Alert, pleasant, upbeat, and in no apparent discomfort.  S1 and S2 normal, no murmurs, clicks, gallops or rubs. Regular rate and rhythm. Chest is clear; no wheezes or rales. No edema or JVD.  Right elbow - moderately swollen directly over the olecranon - well demarcated and soft. It is a bit red and warm to the touch  Other joints unaffected  Past labs reviewed with the patient.     ASSESSMENT / PLAN:  (M70.21) Olecranon bursitis of right elbow  (primary encounter diagnosis)  Comment: Unlikely to be septic based upon her symptoms as well as the fact that she is already on a daily antibiotic. I counseled patient on the benign nature of this, " Yes x 4   including treatment options and expected time course.  Plan: predniSONE (DELTASONE) 20 MG tablet        Discussed mechanism of action of the proposed medication, as well as potential effects, both good and bad.  Patient expressed understanding and agreed with treatment.     Hypertension - not currently controlled but had been - likely related to current pain- will follow up at next visit     Follow up as needed   ANGEL Macias MD    (Chart documentation completed in part with Dragon voice-recognition software.  Even though reviewed some grammatical, spelling, and word errors may remain.)

## 2017-08-15 ENCOUNTER — OFFICE VISIT (OUTPATIENT)
Dept: PAIN MEDICINE | Facility: CLINIC | Age: 72
End: 2017-08-15
Payer: MEDICARE

## 2017-08-15 ENCOUNTER — HOSPITAL ENCOUNTER (OUTPATIENT)
Dept: RADIOLOGY | Facility: HOSPITAL | Age: 72
Discharge: HOME | End: 2017-08-15
Attending: ANESTHESIOLOGY
Payer: MEDICARE

## 2017-08-15 VITALS
HEART RATE: 70 BPM | BODY MASS INDEX: 30.79 KG/M2 | SYSTOLIC BLOOD PRESSURE: 122 MMHG | DIASTOLIC BLOOD PRESSURE: 60 MMHG | WEIGHT: 123 LBS

## 2017-08-15 DIAGNOSIS — M47.812 ARTHROPATHY OF CERVICAL FACET JOINT: Primary | ICD-10-CM

## 2017-08-15 DIAGNOSIS — M75.80 ROTATOR CUFF TENDINITIS, UNSPECIFIED LATERALITY: ICD-10-CM

## 2017-08-15 DIAGNOSIS — M25.511 BILATERAL SHOULDER PAIN, UNSPECIFIED CHRONICITY: Primary | ICD-10-CM

## 2017-08-15 DIAGNOSIS — M25.512 BILATERAL SHOULDER PAIN, UNSPECIFIED CHRONICITY: Primary | ICD-10-CM

## 2017-08-15 PROCEDURE — 3074F SYST BP LT 130 MM HG: CPT | Mod: ,,, | Performed by: ANESTHESIOLOGY

## 2017-08-15 PROCEDURE — 73030 X-RAY EXAM OF SHOULDER: CPT | Mod: 26,LT,, | Performed by: RADIOLOGY

## 2017-08-15 PROCEDURE — 20610 DRAIN/INJ JOINT/BURSA W/O US: CPT | Mod: PBBFAC,PO | Performed by: ANESTHESIOLOGY

## 2017-08-15 PROCEDURE — 1125F AMNT PAIN NOTED PAIN PRSNT: CPT | Mod: ,,, | Performed by: ANESTHESIOLOGY

## 2017-08-15 PROCEDURE — 99999 PR PBB SHADOW E&M-EST. PATIENT-LVL IV: CPT | Mod: PBBFAC,,, | Performed by: ANESTHESIOLOGY

## 2017-08-15 PROCEDURE — 99214 OFFICE O/P EST MOD 30 MIN: CPT | Mod: PBBFAC,PO,25 | Performed by: ANESTHESIOLOGY

## 2017-08-15 PROCEDURE — 73030 X-RAY EXAM OF SHOULDER: CPT | Mod: TC,LT

## 2017-08-15 PROCEDURE — 99213 OFFICE O/P EST LOW 20 MIN: CPT | Mod: 25,S$PBB,, | Performed by: ANESTHESIOLOGY

## 2017-08-15 PROCEDURE — 3078F DIAST BP <80 MM HG: CPT | Mod: ,,, | Performed by: ANESTHESIOLOGY

## 2017-08-15 PROCEDURE — 20610 DRAIN/INJ JOINT/BURSA W/O US: CPT | Mod: S$PBB,,, | Performed by: ANESTHESIOLOGY

## 2017-08-15 PROCEDURE — 1159F MED LIST DOCD IN RCRD: CPT | Mod: ,,, | Performed by: ANESTHESIOLOGY

## 2017-08-15 RX ORDER — TRIAMCINOLONE ACETONIDE 40 MG/ML
40 INJECTION, SUSPENSION INTRA-ARTICULAR; INTRAMUSCULAR
Status: COMPLETED | OUTPATIENT
Start: 2017-08-15 | End: 2017-08-15

## 2017-08-15 RX ADMIN — TRIAMCINOLONE ACETONIDE 40 MG: 40 INJECTION, SUSPENSION INTRA-ARTICULAR; INTRAMUSCULAR at 11:08

## 2017-08-15 NOTE — PROGRESS NOTES
Chronic patient Established Note (Follow up visit)      SUBJECTIVE:    Juliette Seo presents to the clinic for a follow-up appointment for persistent neck, thoracic and shoulder which gives her axillary pain.  Pt was unable to have MBB 8/3/17 due to elevated glucose. Since the last visit, Juliette Seo states the pain has been worsening. Current pain intensity is 8/10.  She was SF bilateral C3,4,5 facet MBB, but procedure was postponed due to poorly controled glucose. Now she says the levels have been improved.   She also has right shoulder pain, and she partial supraspinatus tear and rotator cuff tendinitis , has had good relief from previous shoulder injection.    Pain Disability Index Review:  Last 3 PDI Scores 8/15/2017 7/27/2017 6/15/2017   Pain Disability Index (PDI) 55 51 51       Pain Medications:    - Opioids:None   - Adjuvant Medications: Gabapentin, Mobic, Aleve, Tylenol  - Anti-Coagulants: Aspirin  - Others: See Medication List    Opioid Contract: no     report:  Reviewed and consistent with medication use as prescribed.    Pain Procedures: 5/18/17 right  SACROILIAC JOINT INJECTION  right subacromial bursa steroid injection  And TRIGGER POINT INJECTION 2/7/17  10/26/16 BILATERAL L3,4,5  LUMBAR FACET MEDIAL BRANCH NERVE BLOCK    Physical Therapy/Home Exercise: no    Imaging:  MRI Cervical Spine Without Contrast 2/15/17  Time of Procedure: 02/15/17 07:13:02  Accession # [id number]    MR of the cervical spine without contrast:    Technique: Multiplanar, multisequence MRI of the cervical spine obtained. No IV contrast.     Comparison: None     Findings:    Cervical spine alignment is within normal limits. Vertebral body heights and intervertebral disc spaces are satisfactorily maintained. No evidence of bone marrow replacement process is tumor or infection.  No fracture.     The spinal cord demonstrates no abnormal signal.  The base of the brain, base of the skull, and craniocervical junction demonstrate no  significant abnormalities.  The adjacent soft tissue structures show no significant abnormalities.      C2-C3:  There is no posterior disc osteophyte complex formation. No significant central canal or neural foraminal narrowing.    C3-C4: There is diffuse posterior disc osteophyte complex formation and mild bilateral uncovertebral spurring. No significant central canal or neural foraminal narrowing.    C4-C5:  Mild posterior disc osteophyte complex formation, uncovertebral spurring, and facet arthropathy causing mild bilateral neuroforaminal narrowing.  No central spinal canal stenosis.     C5-C6:  Mild diffuse posterior disc osteophyte complex formation causing effacement of the left anterior CSF sleeve with facet arthropathy and uncovertebral spurring causing mild bilateral neuroforaminal narrowing.     C6-C7:  Mild posterior disc osteophyte complex formation and facet arthropathy.No significant central canal or neural foraminal narrowing.    C7-T1:   There is no posterior disc osteophyte complex formation. No significant central canal or neural foraminal narrowing.   Impression        Mild degenerative changes as described above.      Electronically signed by: SHORTY KEATING MD  Date: 02/15/17  Time: 09:09          MRI Upper Extremity Joint Without Contrast Right 2/15/17  Narrative   Time of Procedure: 02/15/17 07:13:08  Accession # [id number]    MRI RIGHT SHOULDER    INDICATION: Right shoulder lesion    TECHNIQUE: MRI of right shoulder was performed on a 1.5T magnet utilizing the following sequences: Localizer; axial T2 FS; coronal T2 FS and PD FS; sagittal T1, T2 FS and PD FS.    COMPARISON: Not available.    FINDINGS:    Rotator cuff: Partial thickness tear of the posterior insertional fibers of the supraspinatus tendon and significant tendinopathy of the supraspinatus tendon.  Infraspinatus, teres minor, and subscapularis tendons are intact.    Labrum: Global degeneration.    Biceps: Long head biceps tendon is  intact.    Bone: There is no fracture.  Bone marrow signal is unremarkable.    Acromioclavicular joint: Moderate arthrosis.    Cartilage: Articular cartilage of the glenohumeral joint is preserved.    Miscellaneous: Small amount of fluid in the subdeltoid and subacromion bursae.   Impression       Partial-thickness tear of the posterior insertional fibers of the supraspinatus tendon with associated significant tendinopathy of the supraspinatus tendon.      Electronically signed by: SHORTY KEATING MD  Date: 02/15/17  Time: 08:36          Xray Knee 10/18/16  HISTORY: Pain    COMPARISON: None    FINDINGS: 5 views.      The osseous structures are intact with no evidence of fracture or dislocation.  There is degenerative osteoarthritic change of the bilateral tibiofemoral and patellofemoral compartments.  The joint spaces and soft tissues are otherwise unremarkable.   Impression       #1. As above.      Electronically signed by: EVAN SLOAN MD  Date: 10/18/16  Time: 09:38          Xray Cervical 10/18/16  HISTORY: Pain    COMPARISON: None    FINDINGS: 4 views.    The cervical spine alignment and vertebral body heights are well-maintained.  There is no evidence of osseous fracture.    There are advanced degenerative osteoarthritic changes of the cervical spine with facet hypertrophy and disc space narrowing.  The joint spaces, soft tissues, and osseous structures are otherwise unremarkable.   Impression       #1. As Above.        Electronically signed by: EVAN SLOAN MD  Date: 10/18/16  Time: 10:01          Xray Lumbar 10/17/16  Technique: AP, lateral and bilateral oblique views of the lumbar spine         Comparison: None    Results: The lumbar sagittal alignment is within normal limits.  The lungs are mild endplate degenerative change lumbar vertebral body heights and contours are within normal limits without evidence for acute fracture or subluxation.  No definite spondylolysis.  Surgical clips project over  the upper abdomen.  Scattered vascular calcifications identified. Further evaluation as warrented clinically.   Impression    See above.      Electronically signed by: STEPHAN SMITH DO  Date: 10/17/16  Time: 14:26          CT Thoracic Spine 6/22/16  CT thoracic spine with and without contrast.    Findings: 75 mL of Omnipaque 350 IV contrast was administered for today's examination.    The visualized portion of the mediastinum is significant for calcification of the aorta, aortic annulus and coronary arteries.  The visualized portion of the intra-abdominal contents is significant for a punctate right-sided nonobstructing renal stone.  The visualized portion of the lungs is unremarkable.  There is no abnormal enhancement following the administration of contrast.  The thoracic spinal alignment and vertebral body heights are satisfactorily preserved.  There is no fracture, dislocation, or bony erosion.   Impression    No acute findings.  ______________________________________     Electronically signed by: ISA GUY MD  Date: 06/22/16  Time: 08:18           Allergies:   Review of patient's allergies indicates:   Allergen Reactions    Actos [pioglitazone] Nausea Only       Current Medications:   Current Outpatient Prescriptions   Medication Sig Dispense Refill    ACCU-CHEK FASTCLIX Misc USE AS DIRECTED TWICE A  each 3    atorvastatin (LIPITOR) 80 MG tablet Take 1 tablet (80 mg total) by mouth once daily. 90 tablet 3    BD INSULIN SYRINGE ULTRA-FINE 0.5 mL 31 gauge x 5/16 Syrg USE AS DIRECTED TWICE A  each 4    blood sugar diagnostic (ACCU-CHEK SMARTVIEW TEST STRIP) Strp USE AS DIRECTED TWICE A  strip 3    gabapentin (NEURONTIN) 300 MG capsule Take 1 capsule (300 mg total) by mouth 3 (three) times daily. 270 capsule 3    glimepiride (AMARYL) 2 MG tablet TAKE 1 TABLET (2 MG TOTAL) BY MOUTH BEFORE BREAKFAST. 90 tablet 3    hydrocortisone 2.5 % cream APPLY TO THE AFFECTED AREA(S) BY TOPICAL ROUTE  THREE TIMES DAILY AS NEEDED FOR ITCHING  2    hydrOXYzine HCl (ATARAX) 25 MG tablet Take 1 tablet (25 mg total) by mouth 3 (three) times daily as needed for Itching. 100 tablet 1    lisinopril (PRINIVIL,ZESTRIL) 20 MG tablet Take 1 tablet (20 mg total) by mouth once daily. 90 tablet 3    meclizine (ANTIVERT) 12.5 mg tablet TAKE 1 TABLET BY MOUTH 3 TIMES A DAY AS NEEDED 60 tablet 1    meloxicam (MOBIC) 7.5 MG tablet Take 1 tablet (7.5 mg total) by mouth once daily. 90 tablet 1    montelukast (SINGULAIR) 10 mg tablet Take 1 tablet (10 mg total) by mouth once daily. 90 tablet 3    NOVOLIN N 100 unit/mL injection INJECT SUBCUTANEOUSLY 40 UNITS EVERY MORNING & 20 UNITS EVERY EVENING 30 mL 4    omeprazole (PRILOSEC) 40 MG capsule Take 1 capsule (40 mg total) by mouth every morning. 90 capsule 3    ondansetron (ZOFRAN) 4 MG tablet Take 1 tablet (4 mg total) by mouth every 6 (six) hours. 20 tablet 0    PROAIR HFA 90 mcg/actuation inhaler Inhale 1-2 puffs into the lungs every 6 (six) hours as needed.   5    PROCTOSOL HC 2.5 % rectal cream APPLY TO RECTUM TWICE A DAY FOR 7 DAYS 1 Tube 3    RESTASIS 0.05 % ophthalmic emulsion Place 1 drop into both eyes 2 (two) times daily.  3    simethicone (GAS RELIEF) 180 mg Cap Take by mouth.      sucralfate (CARAFATE) 1 gram tablet Take 1 tablet (1 g total) by mouth 4 (four) times daily before meals and nightly. 40 tablet 0    triamcinolone acetonide 0.5% (KENALOG) 0.5 % Crea APPLY TOPICALLY TWICE A DAY 15 g 0    oxycodone-acetaminophen (PERCOCET) 5-325 mg per tablet Take 1 tablet by mouth every 4 (four) hours as needed for Pain. 15 tablet 0    tizanidine (ZANAFLEX) 2 MG tablet TAKE 1 TABLET TWICE A DAY BY ORAL ROUTE FOR 30 DAYS. 60 tablet 1     No current facility-administered medications for this visit.        REVIEW OF SYSTEMS:    GENERAL: No weight loss, malaise or fevers.  HEENT: Negative for frequent or significant headaches.  NECK: + neck pain   RESPIRATORY:  Negative for cough, wheezing or shortness of breath.  CARDIOVASCULAR: + CAD, Negative for chest pain, leg swelling or palpitations.  GI: Negative for abdominal discomfort, blood in stools or black stools or change in bowel habits.  MUSCULOSKELETAL: See HPI, + right shoulder pain   SKIN: Negative for lesions, rash, and itching.  PSYCH: Negative for sleep disturbance, mood disorder and recent psychosocial stressors.  HEMATOLOGY/LYMPHOLOGY: Negative for prolonged bleeding, bruising easily or swollen nodes.  NEURO: No history of headaches, syncope, paralysis, seizures or tremors.  All other reviewed and negative other than HPI.    Past Medical History:  Past Medical History:   Diagnosis Date    Allergy     Anxiety     Arthritis     osteo    Asthma     Diabetes mellitus     Diabetes mellitus, type 2     GERD (gastroesophageal reflux disease)     High cholesterol     Hypertension        Past Surgical History:  Past Surgical History:   Procedure Laterality Date    CHOLECYSTECTOMY      HERNIA REPAIR      HYSTERECTOMY         Family History:  Family History   Problem Relation Age of Onset    Stroke Mother     Diabetes Sister     Diabetes Brother        Social History:  Social History     Social History    Marital status:      Spouse name: N/A    Number of children: N/A    Years of education: N/A     Social History Main Topics    Smoking status: Never Smoker    Smokeless tobacco: Never Used    Alcohol use Yes    Drug use: No    Sexual activity: Yes     Partners: Male     Other Topics Concern    None     Social History Narrative    None       OBJECTIVE:    /60   Pulse 70   Wt 55.8 kg (123 lb)   BMI 30.79 kg/m²     PHYSICAL EXAMINATION:    General appearance: Well appearing, in no acute distress, alert and oriented x3.  Psych: Mood and affect appropriate.  Skin: Skin color, texture, turgor normal, no rashes or lesions, in both upper and lower body.  Head/face: Normocephalic, atraumatic.  No palpable lymph nodes.  Neck: + pain to palpation over the cervical paraspinous muscle and C spine as well as right trapezus . Spurling +. + pain with neck flexion, extension, or lateral flexion.   Back: Straight leg raising in the sitting and supine positions is negative to radicular pain.   Extremities:+ inverted can test on the shoulder Pain on ROM of both  shoulders Peripheral joint ROM is full and pain free without obvious instability or laxity in all four extremities. No deformities, edema, or skin discoloration. Good capillary refill.  Musculoskeletal: + Aguila test on the right. + facet loading mild. Bilateral upper and lower extremity strength is normal and symmetric. No atrophy or tone abnormalities are noted.   Neuro: Bilateral upper and lower extremity coordination and muscle stretch reflexes are physiologic and symmetric. Plantar response are downgoing. No loss of sensation is noted.  Gait: normal    ASSESSMENT: 71 y.o. year old female with neck, bilateral   shoulder and thoracic pain, consistent with     1. Arthropathy of cervical facet joint    2. Rotator cuff tendinitis, unspecified laterality        PLAN:     - I have stressed the importance of physical activity and a home exercise plan to help with pain and improve health.  -Order xray left shoulder   -Will perform bilateral subacromial bursa steroid injection  - RTC in 1 month   - Counseled patient regarding the importance of activity modification, constant sleeping habits and physical therapy.    The above plan and management options were discussed at length with patient. Patient is in agreement with the above and verbalized understanding.    Meagan Pena  08/15/2017        INFORMED CONSENT: The procedure, risks, benefits and options were discussed with patient. There are no contraindications to the procedure. The patient expressed understanding and agreed to proceed. The personnel performing the procedure was discussed. I verify that I  personally obtained Juliette's consent prior to the start of the procedure and the signed consent can be found on the patient's chart.    PROCEDURE: BILATERAL SUBACROMIAL BURSA SHOULDER STEROID INJECTION    The patient in the sitting position.The area of the shoulder was prepped with chlorhexidine x 2. A 25 gauge 1.5 inch needle was slowly advanced into the subacromial bursa of the shoulder.After negative aspiration 5 cc of a mixture of bupivacaine 0.25% and 20 mg Kenalog was easily injected  On each side with no resistance. Patient tolerated procedure well. No complications.    Meagan Pena MD  8/15/2017

## 2017-08-23 DIAGNOSIS — L30.9 ECZEMA, UNSPECIFIED TYPE: ICD-10-CM

## 2017-08-23 RX ORDER — TRIAMCINOLONE ACETONIDE 5 MG/G
CREAM TOPICAL
Qty: 15 G | Refills: 0 | Status: SHIPPED | OUTPATIENT
Start: 2017-08-23 | End: 2017-11-21 | Stop reason: SDUPTHER

## 2017-08-28 ENCOUNTER — LAB VISIT (OUTPATIENT)
Dept: LAB | Facility: HOSPITAL | Age: 72
End: 2017-08-28
Attending: INTERNAL MEDICINE
Payer: MEDICARE

## 2017-08-28 DIAGNOSIS — E78.00 PURE HYPERCHOLESTEROLEMIA: ICD-10-CM

## 2017-08-28 DIAGNOSIS — E11.42 TYPE 2 DIABETES MELLITUS WITH DIABETIC POLYNEUROPATHY, WITH LONG-TERM CURRENT USE OF INSULIN: ICD-10-CM

## 2017-08-28 DIAGNOSIS — Z79.4 TYPE 2 DIABETES MELLITUS WITH DIABETIC POLYNEUROPATHY, WITH LONG-TERM CURRENT USE OF INSULIN: ICD-10-CM

## 2017-08-28 LAB
CHOLEST/HDLC SERPL: 4.7 {RATIO}
ESTIMATED AVG GLUCOSE: 200 MG/DL
HBA1C MFR BLD HPLC: 8.6 %
HDL/CHOLESTEROL RATIO: 21.4 %
HDLC SERPL-MCNC: 159 MG/DL
HDLC SERPL-MCNC: 34 MG/DL
LDLC SERPL CALC-MCNC: 98 MG/DL
NONHDLC SERPL-MCNC: 125 MG/DL
TRIGL SERPL-MCNC: 135 MG/DL

## 2017-08-28 PROCEDURE — 80061 LIPID PANEL: CPT

## 2017-08-28 PROCEDURE — 83036 HEMOGLOBIN GLYCOSYLATED A1C: CPT | Mod: PO

## 2017-08-28 PROCEDURE — 36415 COLL VENOUS BLD VENIPUNCTURE: CPT | Mod: PO

## 2017-08-29 ENCOUNTER — OFFICE VISIT (OUTPATIENT)
Dept: ORTHOPEDICS | Facility: CLINIC | Age: 72
End: 2017-08-29
Payer: MEDICARE

## 2017-08-29 VITALS — WEIGHT: 123 LBS | HEIGHT: 55 IN | BODY MASS INDEX: 28.46 KG/M2

## 2017-08-29 DIAGNOSIS — G89.29 CHRONIC RIGHT SHOULDER PAIN: Primary | ICD-10-CM

## 2017-08-29 DIAGNOSIS — M25.511 CHRONIC RIGHT SHOULDER PAIN: Primary | ICD-10-CM

## 2017-08-29 PROCEDURE — 1125F AMNT PAIN NOTED PAIN PRSNT: CPT | Mod: ,,, | Performed by: ORTHOPAEDIC SURGERY

## 2017-08-29 PROCEDURE — 99213 OFFICE O/P EST LOW 20 MIN: CPT | Mod: PBBFAC,PO | Performed by: ORTHOPAEDIC SURGERY

## 2017-08-29 PROCEDURE — 99204 OFFICE O/P NEW MOD 45 MIN: CPT | Mod: S$PBB,,, | Performed by: ORTHOPAEDIC SURGERY

## 2017-08-29 PROCEDURE — 1159F MED LIST DOCD IN RCRD: CPT | Mod: ,,, | Performed by: ORTHOPAEDIC SURGERY

## 2017-08-29 PROCEDURE — 99999 PR PBB SHADOW E&M-EST. PATIENT-LVL III: CPT | Mod: PBBFAC,,, | Performed by: ORTHOPAEDIC SURGERY

## 2017-08-29 NOTE — PROGRESS NOTES
INITIAL VISIT AND PREOP H AND P    CHIEF COMPLAINT:  Right shoulder pain.    HISTORY OF PRESENT ILLNESS:  A 71-year-old female with ongoing symptoms in right   shoulder and arm for the past year or so.  Symptoms seem to wax and wane, but   are largely in the right arm and shoulder area.  She has seen Dr. Pena for   injections in her neck, back and shoulder with temporary improvement, but   symptoms recur.  She has had an MRI of the right shoulder in the past.    PAST MEDICAL HISTORY:  Significant for allergies, anxiety, arthritis, asthma,   diabetes, GERD, elevated cholesterol and hypertension.    PAST SURGICAL HISTORY:  Includes hysterectomy, cholecystectomy and hernia   repair.    FAMILY HISTORY:  Positive for diabetes and stroke.    SOCIAL HISTORY:  The patient does not smoke, drinks alcohol occasionally.    REVIEW OF SYSTEMS:  Negative fever, chills, rashes.    CURRENT MEDICATIONS:  Reviewed on chart.    ALLERGIES:  Actos.    PHYSICAL EXAMINATION:  GENERAL:  Well-developed, well-nourished female in no acute distress, alert and   oriented x3.  HEENT:  Unremarkable.  LUNGS:  Clear to auscultation.  HEART:  Regular rate and rhythm.  ABDOMEN:  Soft, nontender.  EXTREMITIES:  Significant for the right shoulder demonstrating some tenderness   in anterolateral acromion.  Range of motion of right shoulder is limited   secondary to pain.  Positive impingement sign, positive supraspinatus stress   test, some weakness of the rotator cuff on the right side is noted.    X-RAYS:  AP and lateral of the right shoulder were reviewed demonstrates   irregularity at the greater tuberosity and spurring at the anterolateral   acromion.    MRI of the right shoulder were reviewed demonstrates a partial versus complete   tear of the rotator cuff supraspinatus tendon including AC arthritis.    IMPRESSION:  1.  Partial versus complete tear of rotator cuff, right shoulder, symptomatic.  2.  AC arthritis, right shoulder.    PLAN:  Because  of the patient's ongoing symptoms, which have failed to improve   with nonoperative treatment, I have recommended surgery for the right shoulder.    This would include right shoulder arthroscopy, AC resection and rotator cuff   repair.  The risks and benefits of surgery were explained to the patient, she   understands and would like to go ahead and set this up as an outpatient surgery.      LIA  dd: 08/29/2017 09:25:57 (CDT)  td: 08/29/2017 23:50:39 (CDT)  Doc ID   #[id number]  Job ID #[id number]    CC:

## 2017-08-29 NOTE — LETTER
August 29, 2017        Shaan Toledo, [place]  [address]  [address]  [facility] [address]             [facility]  [address]  [facility] [address]  Phone: [phone]   Patient: Juliette Seo   MR Number: [id number]   YOB: 1945   Date of Visit: 8/29/2017       Dear Dr. Toledo:    Thank you for referring Juliette Seo to me for evaluation. Below are the relevant portions of my assessment and plan of care.            If you have questions, please do not hesitate to call me. I look forward to following Juliette along with you.    Sincerely,      Sarmad Wesley Jr., MD           CC  No Recipients

## 2017-09-01 ENCOUNTER — OFFICE VISIT (OUTPATIENT)
Dept: INTERNAL MEDICINE | Facility: CLINIC | Age: 72
End: 2017-09-01
Payer: MEDICARE

## 2017-09-01 VITALS
BODY MASS INDEX: 28.57 KG/M2 | RESPIRATION RATE: 20 BRPM | SYSTOLIC BLOOD PRESSURE: 132 MMHG | WEIGHT: 123.44 LBS | HEIGHT: 55 IN | TEMPERATURE: 97 F | DIASTOLIC BLOOD PRESSURE: 48 MMHG | HEART RATE: 92 BPM

## 2017-09-01 DIAGNOSIS — M48.061 SPINAL STENOSIS OF LUMBAR REGION: ICD-10-CM

## 2017-09-01 DIAGNOSIS — E11.42 DIABETIC POLYNEUROPATHY ASSOCIATED WITH TYPE 2 DIABETES MELLITUS: ICD-10-CM

## 2017-09-01 DIAGNOSIS — K58.0 IRRITABLE BOWEL SYNDROME WITH DIARRHEA: ICD-10-CM

## 2017-09-01 DIAGNOSIS — M17.0 PRIMARY OSTEOARTHRITIS OF BOTH KNEES: ICD-10-CM

## 2017-09-01 DIAGNOSIS — F32.A DEPRESSION, UNSPECIFIED DEPRESSION TYPE: ICD-10-CM

## 2017-09-01 DIAGNOSIS — E78.00 PURE HYPERCHOLESTEROLEMIA: ICD-10-CM

## 2017-09-01 DIAGNOSIS — I15.2 HYPERTENSION COMPLICATING DIABETES: ICD-10-CM

## 2017-09-01 DIAGNOSIS — I10 ESSENTIAL HYPERTENSION: ICD-10-CM

## 2017-09-01 DIAGNOSIS — E11.42 TYPE 2 DIABETES MELLITUS WITH DIABETIC POLYNEUROPATHY, WITH LONG-TERM CURRENT USE OF INSULIN: Primary | ICD-10-CM

## 2017-09-01 DIAGNOSIS — N39.3 STRESS INCONTINENCE, FEMALE: ICD-10-CM

## 2017-09-01 DIAGNOSIS — I25.10 CORONARY ARTERY DISEASE INVOLVING NATIVE CORONARY ARTERY WITHOUT ANGINA PECTORIS, UNSPECIFIED WHETHER NATIVE OR TRANSPLANTED HEART: ICD-10-CM

## 2017-09-01 DIAGNOSIS — E11.59 HYPERTENSION COMPLICATING DIABETES: ICD-10-CM

## 2017-09-01 DIAGNOSIS — Z79.4 TYPE 2 DIABETES MELLITUS WITH DIABETIC POLYNEUROPATHY, WITH LONG-TERM CURRENT USE OF INSULIN: Primary | ICD-10-CM

## 2017-09-01 DIAGNOSIS — K21.9 GASTROESOPHAGEAL REFLUX DISEASE WITHOUT ESOPHAGITIS: ICD-10-CM

## 2017-09-01 PROCEDURE — 3078F DIAST BP <80 MM HG: CPT | Mod: ,,, | Performed by: INTERNAL MEDICINE

## 2017-09-01 PROCEDURE — 1159F MED LIST DOCD IN RCRD: CPT | Mod: ,,, | Performed by: INTERNAL MEDICINE

## 2017-09-01 PROCEDURE — 3075F SYST BP GE 130 - 139MM HG: CPT | Mod: ,,, | Performed by: INTERNAL MEDICINE

## 2017-09-01 PROCEDURE — 1126F AMNT PAIN NOTED NONE PRSNT: CPT | Mod: ,,, | Performed by: INTERNAL MEDICINE

## 2017-09-01 PROCEDURE — 3045F PR MOST RECENT HEMOGLOBIN A1C LEVEL 7.0-9.0%: CPT | Mod: ,,, | Performed by: INTERNAL MEDICINE

## 2017-09-01 PROCEDURE — 99214 OFFICE O/P EST MOD 30 MIN: CPT | Mod: S$PBB,,, | Performed by: INTERNAL MEDICINE

## 2017-09-01 PROCEDURE — 99215 OFFICE O/P EST HI 40 MIN: CPT | Mod: PBBFAC,PN | Performed by: INTERNAL MEDICINE

## 2017-09-01 PROCEDURE — 99999 PR PBB SHADOW E&M-EST. PATIENT-LVL V: CPT | Mod: PBBFAC,,, | Performed by: INTERNAL MEDICINE

## 2017-09-01 NOTE — PROGRESS NOTES
Subjective:       Patient ID: Juliette Seo is a 71 y.o. female.    Chief Complaint: Follow-up and Results    HPI  Checkup.  Labs reviewed.  Most BSs > 150.  Has minimal pedal paresthesias.  Eye check UTD.  Still with LBP, r shoulder pain.  schedued for R rotator cuff repair.  Anxious.  Abdominal pain minimal.  Review of Systems   All other systems reviewed and are negative.      Objective:      Physical Exam   Constitutional: She appears well-developed. No distress.   HENT:   Head: Normocephalic.   Eyes: EOM are normal.   Neck: Normal range of motion. No tracheal deviation present.   Cardiovascular: Normal rate, regular rhythm, normal heart sounds and intact distal pulses.    Pulses:       Dorsalis pedis pulses are 3+ on the right side, and 3+ on the left side.        Posterior tibial pulses are 3+ on the right side, and 3+ on the left side.   Pulmonary/Chest: Effort normal and breath sounds normal. No respiratory distress.   Abdominal: Soft. Bowel sounds are normal. She exhibits no distension. There is no tenderness.   Musculoskeletal: She exhibits no edema.        Right foot: There is deformity (bunion, hammer toes). There is normal range of motion.        Left foot: There is deformity (bunion, hammer toes). There is normal range of motion.   Feet:   Right Foot:   Protective Sensation: 6 sites tested. 6 sites sensed.   Left Foot:   Protective Sensation: 6 sites tested. 6 sites sensed.   Neurological: She is alert. No cranial nerve deficit. She exhibits normal muscle tone. Coordination normal.   Skin: Skin is warm and dry. No rash noted. She is not diaphoretic. No erythema.   Psychiatric: She has a normal mood and affect. Her behavior is normal.   Vitals reviewed.      Assessment:       1. Type 2 diabetes mellitus with diabetic polyneuropathy, with long-term current use of insulin    2. Spinal stenosis of lumbar region    3. Stress incontinence, female    4. Essential hypertension    5. Primary osteoarthritis of  both knees    6. Gastroesophageal reflux disease without esophagitis    7. Pure hypercholesterolemia    8. Depression, unspecified depression type    9. Hypertension complicating diabetes    10. Irritable bowel syndrome with diarrhea    11. Diabetic polyneuropathy associated with type 2 diabetes mellitus    12. Coronary artery disease involving native coronary artery without angina pectoris, unspecified whether native or transplanted heart        Plan:       Juliette was seen today for follow-up and results.    Diagnoses and all orders for this visit:    Type 2 diabetes mellitus with diabetic polyneuropathy, with long-term current use of insulin  -     Hemoglobin A1c; Future   Raise insulin to 48u qAM 24u qPM    Spinal stenosis of lumbar region    Stress incontinence, female    Essential hypertension   Well-cont    Primary osteoarthritis of both knees    Gastroesophageal reflux disease without esophagitis    Pure hypercholesterolemia   Well-cont    Depression, unspecified depression type   Cont rx    Hypertension complicating diabetes    Irritable bowel syndrome with diarrhea    Diabetic polyneuropathy associated with type 2 diabetes mellitus    Coronary artery disease involving native coronary artery without angina pectoris, unspecified whether native or transplanted heart   Quiescent      Return in about 3 months (around 12/1/2017).

## 2017-09-21 DIAGNOSIS — R20.2 NOTALGIA PARESTHETICA: ICD-10-CM

## 2017-09-21 RX ORDER — HYDROXYZINE HYDROCHLORIDE 25 MG/1
25 TABLET, FILM COATED ORAL 3 TIMES DAILY PRN
Qty: 100 TABLET | Refills: 1 | Status: SHIPPED | OUTPATIENT
Start: 2017-09-21 | End: 2017-11-29 | Stop reason: SDUPTHER

## 2017-09-25 ENCOUNTER — ANESTHESIA EVENT (OUTPATIENT)
Dept: SURGERY | Facility: HOSPITAL | Age: 72
End: 2017-09-25
Payer: MEDICARE

## 2017-09-25 ENCOUNTER — CLINICAL SUPPORT (OUTPATIENT)
Dept: LAB | Facility: HOSPITAL | Age: 72
End: 2017-09-25
Attending: ORTHOPAEDIC SURGERY
Payer: MEDICARE

## 2017-09-25 ENCOUNTER — HOSPITAL ENCOUNTER (OUTPATIENT)
Dept: PREADMISSION TESTING | Facility: HOSPITAL | Age: 72
Discharge: HOME | End: 2017-09-25
Attending: ORTHOPAEDIC SURGERY
Payer: MEDICARE

## 2017-09-25 VITALS
DIASTOLIC BLOOD PRESSURE: 69 MMHG | SYSTOLIC BLOOD PRESSURE: 154 MMHG | HEART RATE: 85 BPM | RESPIRATION RATE: 18 BRPM | WEIGHT: 123 LBS | HEIGHT: 59 IN | BODY MASS INDEX: 24.8 KG/M2

## 2017-09-25 DIAGNOSIS — G89.29 CHRONIC RIGHT SHOULDER PAIN: ICD-10-CM

## 2017-09-25 DIAGNOSIS — Z01.818 PRE-OP TESTING: Primary | ICD-10-CM

## 2017-09-25 DIAGNOSIS — Z01.818 PRE-OP TESTING: ICD-10-CM

## 2017-09-25 DIAGNOSIS — M25.511 CHRONIC RIGHT SHOULDER PAIN: ICD-10-CM

## 2017-09-25 PROCEDURE — 93005 ELECTROCARDIOGRAM TRACING: CPT

## 2017-09-25 PROCEDURE — 93010 ELECTROCARDIOGRAM REPORT: CPT | Mod: ,,, | Performed by: INTERNAL MEDICINE

## 2017-09-25 RX ORDER — LIDOCAINE HYDROCHLORIDE 10 MG/ML
1 INJECTION, SOLUTION EPIDURAL; INFILTRATION; INTRACAUDAL; PERINEURAL ONCE
Status: CANCELLED | OUTPATIENT
Start: 2017-09-25 | End: 2017-09-25

## 2017-09-25 RX ORDER — SODIUM CHLORIDE, SODIUM LACTATE, POTASSIUM CHLORIDE, CALCIUM CHLORIDE 600; 310; 30; 20 MG/100ML; MG/100ML; MG/100ML; MG/100ML
INJECTION, SOLUTION INTRAVENOUS CONTINUOUS
Status: CANCELLED | OUTPATIENT
Start: 2017-09-25

## 2017-09-25 RX ORDER — FAMOTIDINE 10 MG/ML
20 INJECTION INTRAVENOUS
Status: CANCELLED | OUTPATIENT
Start: 2017-09-25 | End: 2017-09-25

## 2017-09-25 RX ORDER — PEN NEEDLE, DIABETIC 29 G X1/2"
NEEDLE, DISPOSABLE MISCELLANEOUS
Qty: 180 EACH | Refills: 4 | Status: SHIPPED | OUTPATIENT
Start: 2017-09-25 | End: 2018-11-11 | Stop reason: SDUPTHER

## 2017-09-25 NOTE — ANESTHESIA PREPROCEDURE EVALUATION
09/25/2017  Juliette Seo is a 71 y.o., female is scheduled for right RCR under reg/gen on 9/29/2017.    Past Surgical History:   Procedure Laterality Date    CHOLECYSTECTOMY      HERNIA REPAIR      HYSTERECTOMY           Anesthesia Evaluation    I have reviewed the Patient Summary Reports.    I have reviewed the Nursing Notes.   I have reviewed the Medications.     Review of Systems  Anesthesia Hx:  No problems with previous Anesthesia  History of prior surgery of interest to airway management or planning: Previous anesthesia: General  Denies Personal Hx of Anesthesia complications.   Social:  Non-Smoker, No Alcohol Use    Hematology/Oncology:  Hematology Normal        EENT/Dental:EENT/Dental Normal   Cardiovascular:   Exercise tolerance: good Hypertension, well controlled CAD asymptomatic  Denies Dysrhythmias.   Denies Angina. hyperlipidemia        Pulmonary:   Denies Asthma.  Denies Shortness of breath.    Renal/:  Renal/ Normal     Hepatic/GI:   GERD, poorly controlled  Bowel Conditions:  Irritable Bowel Syndrome    Musculoskeletal:   Right shoulder with decreased ROM Spine Disorders: cervical and lumbar Disc disease    Neurological:  Neurology Normal    Endocrine:   Diabetes, poorly controlled, type 2, using insulin  Diabetes, Type 2 Diabetes , controlled by insulin pump. , most recent HgA1c value was 8.6 on 8/2017.    Psych:   depression          Physical Exam  General:  Obesity    Airway/Jaw/Neck:  Airway Findings: Mouth Opening: Normal Tongue: Normal  General Airway Assessment: Adult  Mallampati: II  TM Distance: Normal, at least 6 cm  Jaw/Neck Findings:  Neck Findings:  Short Neck, Girth Increased     Eyes/Ears/Nose:  EYES/EARS/NOSE FINDINGS: Normal   Dental:  Dental Findings:   Chest/Lungs:  Chest/Lungs Clear    Heart/Vascular:  Heart Findings: Normal Heart murmur: negative     Abdomen:  Abdomen Findings: Normal    Musculoskeletal:  Musculoskeletal Findings: (right shoulder) Tender Joint     Mental Status:  Mental Status Findings: Normal        Anesthesia Plan  Type of Anesthesia, risks & benefits discussed:  Anesthesia Type:  regional, general  Patient's Preference:   Intra-op Monitoring Plan:   Intra-op Monitoring Plan Comments:   Post Op Pain Control Plan:   Post Op Pain Control Plan Comments:   Induction:   IV  Beta Blocker:  Patient is not currently on a Beta-Blocker (No further documentation required).       Informed Consent: Patient understands risks and agrees with Anesthesia plan.  Questions answered.   ASA Score: 3     Day of Surgery Review of History & Physical:    H&P update referred to the surgeon.     Anesthesia Plan Notes: Anesthesia consent will be obtained prior to procedure on 9/29/2017.        Ready For Surgery From Anesthesia Perspective.

## 2017-09-25 NOTE — DISCHARGE INSTRUCTIONS
Your surgery is scheduled for 9/29/17.    Please report to Outpatient Surgery Intake Office on the 2nd FLOOR at 9:30a.m.          INSTRUCTIONS IMPORTANT!!!  ¨ Do not eat or drink after 12 midnight-including water. OK to brush teeth, no   gum, candy or mints!    ¨ Take only these medicines with a small swallow of water-morning of surgery: Lisinopril        ____  Proceed to [facility] on 9/25/17 for additional blood test.        ____  Do not wear makeup, including mascara.  ____  No powder, lotions or creams to surgical area.  ____  Please remove all jewelry, including piercings and leave at home.  ____  No money or valuables needed. Please leave at home.  ____  Please bring any documents given by your doctor.  ____  If going home the same day, arrange for a ride home. You will not be able to             drive if Anesthesia was used.  ____  Wear loose fitting clothing. Allow for dressings, bandages.  ____  Stop Aspirin, Ibuprofen, Motrin and Aleve at least 3-5 days before surgery, unless otherwise instructed by your doctor, or the nurse.   You MAY use Tylenol/acetaminophen until day of surgery.  ____  Wash the surgical area with Hibiclens the night before surgery, and again the             morning of surgery.  Be sure to rinse hibiclens off completely (if instructed by nurse).  ____  If you take diabetic medication, do not take am of surgery unless instructed by Doctor.  ____  Call MD for temperature above 101 degrees.  ____ Stop taking any Fish Oil supplement or any Vitamins that contain Vitamin E at least 5 days prior to surgery.  ____ Do Not wear your contact lenses the day of your procedure.  You may wear your glasses.        I have read or had read and explained to me, and understand the above information.  Additional comments or instructions:  For additional questions call [phone]     Take a Hibiclens shower twice a day for 3 days prior to surgery, including the morning of surgery.   Gargle  with Listerine twice a day for 3 days prior to surgery, including the morning of surgery.      Pre-Op Bathing Instructions    Before surgery, you can play an important role in your own health.    Because skin is not sterile, we need to be sure that your skin is as free of germs as possible. By following the instructions below, you can reduce the number of germs on your skin before surgery.    IMPORTANT: You will need to shower with a special soap called Hibiclens*, available at any pharmacy.  If you are allergic to Chlorhexidine (the antiseptic in Hibiclens), use an antibacterial soap such as Dial Soap for your preoperative shower.  You will shower with Hibiclens both the night before your surgery and the morning of your surgery.  Do not use Hibiclens on the head, face or genitals to avoid injury to those areas.    STEP #1: THE NIGHT BEFORE YOUR SURGERY     1. Do not shave the area of your body where your surgery will be performed.  2. Shower and wash your hair and body as usual with your normal soap and shampoo.  3. Rinse your hair and body thoroughly after you shower to remove all soap residue.  4. With your hand, apply one packet of Hibiclens soap to the surgical site.   5. Wash the site gently for five (5) minutes. Do not scrub your skin too hard.   6. Do not wash with your regular soap after Hibiclens is used.  7. Rinse your body thoroughly.  8. Pat yourself dry with a clean, soft towel.  9. Do not use lotion, cream, or powder.  10. Wear clean clothes.    STEP #2: THE MORNING OF YOUR SURGERY     1. Repeat Step #1.    * Not to be used by people allergic to Chlorhexidine.          Anesthesia: General Anesthesia     You are watched continuously during your procedure by your anesthesia provider.     Youre due to have surgery. During surgery, youll be given medicine called anesthesia or anesthetic. This will keep you comfortable and pain-free. Your anesthesia provider will use general anesthesia.  What is general  anesthesia?  General anesthesia puts you into a state like deep sleep. It goes into the bloodstream (IV anesthetics), into the lungs (gas anesthetics), or both. You feel nothing during the procedure. You will not remember it. During the procedure, the anesthesia provider monitors you continuously. He or she checks your heart rate and rhythm, blood pressure, breathing, and blood oxygen.  · IV anesthetics. IV anesthetics are given through an IV line in your arm. Theyre often given first. This is so you are asleep before a gas anesthetic is started. Some kinds of IV anesthetics relieve pain. Others relax you. Your doctor will decide which kind is best in your case.  · Gas anesthetics. Gas anesthetics are breathed into the lungs. They are often used to keep you asleep. They can be given through a facemask or a tube placed in your larynx or trachea (breathing tube).  ¨ If you have a facemask, your anesthesia provider will most likely place it over your nose and mouth while youre still awake. Youll breathe oxygen through the mask as your IV anesthetic is started. Gas anesthetic may be added through the mask.  ¨ If you have a tube in the larynx or trachea, it will be inserted into your throat after youre asleep.  Anesthesia tools and medicines  You will likely have:  · IV anesthetics. These are put into an IV line into your bloodstream.  · Gas anesthetics. You breathe these anesthetics into your lungs, where they pass into your bloodstream.  · Pulse oximeter. This is a small clip that is attached to the end of your finger. This measures your blood oxygen level.  · Electrocardiography leads (electrodes). These are small sticky pads that are placed on your chest. They record your heart rate and rhythm.  · Blood pressure cuff. This reads your blood pressure.  Risks and possible complications  General anesthesia has some risks. These include:  · Breathing problems  · Nausea and vomiting  · Sore throat or hoarseness  (usually temporary)  · Allergic reaction to the anesthetic  · Irregular heartbeat (rare)  · Cardiac arrest (rare)   Anesthesia safety  · Follow all instructions you are given for how long not to eat or drink before your procedure.  · Be sure your doctor knows what medicines and drugs you take. This includes over-the-counter medicines, herbs, supplements, alcohol or other drugs. You will be asked when those were last taken.  · Have an adult family member or friend drive you home after the procedure.  · For the first 24 hours after your surgery:  ¨ Do not drive or use heavy equipment.  ¨ Do not make important decisions or sign legal documents. If important decisions or signing legal documents is necessary during the first 24 hours after surgery, have a trusted family member or spouse act on your behalf.  ¨ Avoid alcohol.  ¨ Have a responsible adult stay with you. He or she can watch for problems and help keep you safe.  Date Last Reviewed: 12/1/2016 © 2000-2017 [employer]. [address] [place] [address]. All rights reserved. This information is not intended as a substitute for professional medical care. Always follow your healthcare professional's instructions.        Shoulder Arthroscopy  The shoulder is your bodys most flexible joint. It lets the arm move in almost any direction. But this flexibility has a price -- it makes the joint prone to injury. If you have a shoulder problem, a surgical procedure called arthroscopy can help.     A camera in the arthroscope allows your surgeon to view your shoulder joint on a monitor.   Your orthopaedic evaluation  Your doctor will ask about your symptoms and the history of your shoulder problem. He or she will examine your shoulder and may give you tests, such as an X-ray or MRI. These help your doctor find the cause of your shoulder problem.  Arthroscopy: Looking inside your joint  Arthroscopy is a procedure that allows your doctor to see and work  inside your shoulder joint. Your doctor makes small incision in your shoulder and inserts a long, thin, lighted instrument, called an arthroscope. During surgery, the arthroscope sends live video images from inside your joint to a screen that your doctor views. Using these images, your doctor can diagnose and treat your shoulder problem. Because arthroscopy uses much smaller incisions than open surgery, recovery is often shorter and less painful.  Risks and possible complications of shoulder arthroscopy  · Stiffness or ongoing pain in your shoulder  · Bleeding or blood clots  · Infection  · Damage to nerves or blood vessels  You may still need open surgery after having arthroscopy.  Date Last Reviewed: 9/10/2015  © 2923-6971 [employer]. [address] [place] [address], [place] [address]. All rights reserved. This information is not intended as a substitute for professional medical care. Always follow your healthcare professional's instructions.

## 2017-09-29 ENCOUNTER — ANESTHESIA (OUTPATIENT)
Dept: SURGERY | Facility: HOSPITAL | Age: 72
End: 2017-09-29
Payer: MEDICARE

## 2017-09-29 ENCOUNTER — SURGERY (OUTPATIENT)
Age: 72
End: 2017-09-29

## 2017-09-29 ENCOUNTER — HOSPITAL ENCOUNTER (OUTPATIENT)
Facility: HOSPITAL | Age: 72
Discharge: HOME | End: 2017-09-29
Attending: ORTHOPAEDIC SURGERY | Admitting: ORTHOPAEDIC SURGERY
Payer: MEDICARE

## 2017-09-29 DIAGNOSIS — M25.511 CHRONIC RIGHT SHOULDER PAIN: ICD-10-CM

## 2017-09-29 DIAGNOSIS — G89.29 CHRONIC RIGHT SHOULDER PAIN: ICD-10-CM

## 2017-09-29 LAB
POCT GLUCOSE: 174 MG/DL (ref 70–110)
POCT GLUCOSE: 176 MG/DL (ref 70–110)

## 2017-09-29 PROCEDURE — 25000003 PHARM REV CODE 250: Performed by: EMERGENCY MEDICINE

## 2017-09-29 PROCEDURE — 37000009 HC ANESTHESIA EA ADD 15 MINS: Performed by: ORTHOPAEDIC SURGERY

## 2017-09-29 PROCEDURE — 76942 ECHO GUIDE FOR BIOPSY: CPT | Performed by: ANESTHESIOLOGY

## 2017-09-29 PROCEDURE — 71000033 HC RECOVERY, INTIAL HOUR: Performed by: ORTHOPAEDIC SURGERY

## 2017-09-29 PROCEDURE — 63600175 PHARM REV CODE 636 W HCPCS: Performed by: EMERGENCY MEDICINE

## 2017-09-29 PROCEDURE — 71000015 HC POSTOP RECOV 1ST HR: Performed by: ORTHOPAEDIC SURGERY

## 2017-09-29 PROCEDURE — 29826 SHO ARTHRS SRG DECOMPRESSION: CPT | Mod: RT,,, | Performed by: ORTHOPAEDIC SURGERY

## 2017-09-29 PROCEDURE — C1713 ANCHOR/SCREW BN/BN,TIS/BN: HCPCS | Performed by: ORTHOPAEDIC SURGERY

## 2017-09-29 PROCEDURE — 27200664 HC NERVE BLOCK COMPLETE KIT: Performed by: ANESTHESIOLOGY

## 2017-09-29 PROCEDURE — 25000003 PHARM REV CODE 250: Performed by: NURSE PRACTITIONER

## 2017-09-29 PROCEDURE — 29827 SHO ARTHRS SRG RT8TR CUF RPR: CPT | Mod: RT,,, | Performed by: ORTHOPAEDIC SURGERY

## 2017-09-29 PROCEDURE — 63600175 PHARM REV CODE 636 W HCPCS: Performed by: ORTHOPAEDIC SURGERY

## 2017-09-29 PROCEDURE — 36000711: Performed by: ORTHOPAEDIC SURGERY

## 2017-09-29 PROCEDURE — 29824 SHO ARTHRS SRG DSTL CLAVICLC: CPT | Mod: 51,RT,, | Performed by: ORTHOPAEDIC SURGERY

## 2017-09-29 PROCEDURE — 27201423 OPTIME MED/SURG SUP & DEVICES STERILE SUPPLY: Performed by: ORTHOPAEDIC SURGERY

## 2017-09-29 PROCEDURE — 27200665 HC NERVE BLOCK NEEDLE/ CATHETER: Performed by: ANESTHESIOLOGY

## 2017-09-29 PROCEDURE — 37000008 HC ANESTHESIA 1ST 15 MINUTES: Performed by: ORTHOPAEDIC SURGERY

## 2017-09-29 PROCEDURE — 71000016 HC POSTOP RECOV ADDL HR: Performed by: ORTHOPAEDIC SURGERY

## 2017-09-29 PROCEDURE — 36000710: Performed by: ORTHOPAEDIC SURGERY

## 2017-09-29 PROCEDURE — 63600175 PHARM REV CODE 636 W HCPCS: Performed by: ANESTHESIOLOGY

## 2017-09-29 DEVICE — ANCHOR SUT KNOTLESS MAG 2: Type: IMPLANTABLE DEVICE | Site: SHOULDER | Status: FUNCTIONAL

## 2017-09-29 RX ORDER — ONDANSETRON 2 MG/ML
4 INJECTION INTRAMUSCULAR; INTRAVENOUS DAILY PRN
Status: DISCONTINUED | OUTPATIENT
Start: 2017-09-29 | End: 2017-09-29 | Stop reason: HOSPADM

## 2017-09-29 RX ORDER — KETOROLAC TROMETHAMINE 30 MG/ML
INJECTION, SOLUTION INTRAMUSCULAR; INTRAVENOUS
Status: DISCONTINUED | OUTPATIENT
Start: 2017-09-29 | End: 2017-09-29

## 2017-09-29 RX ORDER — LIDOCAINE HYDROCHLORIDE 10 MG/ML
1 INJECTION, SOLUTION EPIDURAL; INFILTRATION; INTRACAUDAL; PERINEURAL ONCE
Status: DISCONTINUED | OUTPATIENT
Start: 2017-09-29 | End: 2017-09-29 | Stop reason: HOSPADM

## 2017-09-29 RX ORDER — CEFAZOLIN SODIUM 2 G/50ML
2 SOLUTION INTRAVENOUS
Status: DISCONTINUED | OUTPATIENT
Start: 2017-09-29 | End: 2017-09-29 | Stop reason: HOSPADM

## 2017-09-29 RX ORDER — ONDANSETRON 8 MG/1
8 TABLET, ORALLY DISINTEGRATING ORAL EVERY 8 HOURS PRN
Status: DISCONTINUED | OUTPATIENT
Start: 2017-09-29 | End: 2017-09-29 | Stop reason: HOSPADM

## 2017-09-29 RX ORDER — PROPOFOL 10 MG/ML
VIAL (ML) INTRAVENOUS
Status: DISCONTINUED | OUTPATIENT
Start: 2017-09-29 | End: 2017-09-29

## 2017-09-29 RX ORDER — FENTANYL CITRATE 50 UG/ML
INJECTION, SOLUTION INTRAMUSCULAR; INTRAVENOUS
Status: DISCONTINUED | OUTPATIENT
Start: 2017-09-29 | End: 2017-09-29

## 2017-09-29 RX ORDER — SUCCINYLCHOLINE CHLORIDE 20 MG/ML
INJECTION INTRAMUSCULAR; INTRAVENOUS
Status: DISCONTINUED | OUTPATIENT
Start: 2017-09-29 | End: 2017-09-29

## 2017-09-29 RX ORDER — SODIUM CHLORIDE, SODIUM LACTATE, POTASSIUM CHLORIDE, CALCIUM CHLORIDE 600; 310; 30; 20 MG/100ML; MG/100ML; MG/100ML; MG/100ML
INJECTION, SOLUTION INTRAVENOUS CONTINUOUS
Status: DISCONTINUED | OUTPATIENT
Start: 2017-09-29 | End: 2017-09-29 | Stop reason: HOSPADM

## 2017-09-29 RX ORDER — MIDAZOLAM HYDROCHLORIDE 1 MG/ML
INJECTION, SOLUTION INTRAMUSCULAR; INTRAVENOUS
Status: DISCONTINUED | OUTPATIENT
Start: 2017-09-29 | End: 2017-09-29

## 2017-09-29 RX ORDER — CLONIDINE 100 UG/ML
INJECTION, SOLUTION EPIDURAL
Status: DISCONTINUED | OUTPATIENT
Start: 2017-09-29 | End: 2017-09-29

## 2017-09-29 RX ORDER — HYDROMORPHONE HYDROCHLORIDE 2 MG/ML
0.4 INJECTION, SOLUTION INTRAMUSCULAR; INTRAVENOUS; SUBCUTANEOUS EVERY 5 MIN PRN
Status: DISCONTINUED | OUTPATIENT
Start: 2017-09-29 | End: 2017-09-29 | Stop reason: HOSPADM

## 2017-09-29 RX ORDER — ROCURONIUM BROMIDE 10 MG/ML
INJECTION, SOLUTION INTRAVENOUS
Status: DISCONTINUED | OUTPATIENT
Start: 2017-09-29 | End: 2017-09-29

## 2017-09-29 RX ORDER — HYDROMORPHONE HYDROCHLORIDE 2 MG/ML
0.5 INJECTION, SOLUTION INTRAMUSCULAR; INTRAVENOUS; SUBCUTANEOUS EVERY 5 MIN PRN
Status: DISCONTINUED | OUTPATIENT
Start: 2017-09-29 | End: 2017-09-29 | Stop reason: HOSPADM

## 2017-09-29 RX ORDER — ONDANSETRON 2 MG/ML
INJECTION INTRAMUSCULAR; INTRAVENOUS
Status: DISCONTINUED | OUTPATIENT
Start: 2017-09-29 | End: 2017-09-29

## 2017-09-29 RX ORDER — KETOROLAC TROMETHAMINE 30 MG/ML
15 INJECTION, SOLUTION INTRAMUSCULAR; INTRAVENOUS ONCE
Status: DISCONTINUED | OUTPATIENT
Start: 2017-09-29 | End: 2017-09-29 | Stop reason: HOSPADM

## 2017-09-29 RX ORDER — EPINEPHRINE 1 MG/ML
INJECTION, SOLUTION INTRACARDIAC; INTRAMUSCULAR; INTRAVENOUS; SUBCUTANEOUS
Status: DISCONTINUED | OUTPATIENT
Start: 2017-09-29 | End: 2017-09-29

## 2017-09-29 RX ORDER — DEXAMETHASONE SODIUM PHOSPHATE 4 MG/ML
INJECTION, SOLUTION INTRA-ARTICULAR; INTRALESIONAL; INTRAMUSCULAR; INTRAVENOUS; SOFT TISSUE
Status: DISCONTINUED | OUTPATIENT
Start: 2017-09-29 | End: 2017-09-29

## 2017-09-29 RX ORDER — ACETAMINOPHEN 325 MG/1
650 TABLET ORAL EVERY 4 HOURS PRN
Status: DISCONTINUED | OUTPATIENT
Start: 2017-09-29 | End: 2017-09-29 | Stop reason: HOSPADM

## 2017-09-29 RX ORDER — SODIUM CHLORIDE 0.9 % (FLUSH) 0.9 %
3 SYRINGE (ML) INJECTION
Status: DISCONTINUED | OUTPATIENT
Start: 2017-09-29 | End: 2017-09-29 | Stop reason: HOSPADM

## 2017-09-29 RX ORDER — ONDANSETRON 8 MG/1
8 TABLET, ORALLY DISINTEGRATING ORAL EVERY 6 HOURS PRN
Qty: 12 TABLET | Refills: 0 | Status: SHIPPED | OUTPATIENT
Start: 2017-09-29 | End: 2018-01-25

## 2017-09-29 RX ORDER — OXYCODONE AND ACETAMINOPHEN 5; 325 MG/1; MG/1
1 TABLET ORAL
Status: DISCONTINUED | OUTPATIENT
Start: 2017-09-29 | End: 2017-09-29 | Stop reason: HOSPADM

## 2017-09-29 RX ORDER — ROPIVACAINE HYDROCHLORIDE 2 MG/ML
INJECTION, SOLUTION EPIDURAL; INFILTRATION; PERINEURAL
Status: DISCONTINUED | OUTPATIENT
Start: 2017-09-29 | End: 2017-09-29

## 2017-09-29 RX ORDER — PHENYLEPHRINE HYDROCHLORIDE 10 MG/ML
INJECTION INTRAVENOUS
Status: DISCONTINUED | OUTPATIENT
Start: 2017-09-29 | End: 2017-09-29

## 2017-09-29 RX ORDER — LIDOCAINE HCL/PF 100 MG/5ML
SYRINGE (ML) INTRAVENOUS
Status: DISCONTINUED | OUTPATIENT
Start: 2017-09-29 | End: 2017-09-29

## 2017-09-29 RX ORDER — HYDROMORPHONE HYDROCHLORIDE 2 MG/ML
0.2 INJECTION, SOLUTION INTRAMUSCULAR; INTRAVENOUS; SUBCUTANEOUS EVERY 5 MIN PRN
Status: DISCONTINUED | OUTPATIENT
Start: 2017-09-29 | End: 2017-09-29 | Stop reason: HOSPADM

## 2017-09-29 RX ORDER — EPINEPHRINE 1 MG/ML
INJECTION, SOLUTION INTRACARDIAC; INTRAMUSCULAR; INTRAVENOUS; SUBCUTANEOUS
Status: DISCONTINUED | OUTPATIENT
Start: 2017-09-29 | End: 2017-09-29 | Stop reason: HOSPADM

## 2017-09-29 RX ORDER — ACETAMINOPHEN 10 MG/ML
INJECTION, SOLUTION INTRAVENOUS
Status: DISCONTINUED | OUTPATIENT
Start: 2017-09-29 | End: 2017-09-29

## 2017-09-29 RX ORDER — OXYCODONE HYDROCHLORIDE 5 MG/1
10 TABLET ORAL EVERY 4 HOURS PRN
Status: DISCONTINUED | OUTPATIENT
Start: 2017-09-29 | End: 2017-09-29 | Stop reason: HOSPADM

## 2017-09-29 RX ORDER — OXYCODONE AND ACETAMINOPHEN 7.5; 325 MG/1; MG/1
1 TABLET ORAL EVERY 4 HOURS PRN
Qty: 40 TABLET | Refills: 0 | Status: SHIPPED | OUTPATIENT
Start: 2017-09-29 | End: 2018-06-14

## 2017-09-29 RX ADMIN — KETOROLAC TROMETHAMINE 30 MG: 30 INJECTION, SOLUTION INTRAMUSCULAR; INTRAVENOUS at 01:09

## 2017-09-29 RX ADMIN — EPINEPHRINE 3 ML: 1 INJECTION, SOLUTION INTRAMUSCULAR; SUBCUTANEOUS at 12:09

## 2017-09-29 RX ADMIN — CLONIDINE HYDROCHLORIDE 0.25 MCG: 0.1 INJECTION, SOLUTION EPIDURAL at 11:09

## 2017-09-29 RX ADMIN — ROPIVACAINE HYDROCHLORIDE 30 ML: 2 INJECTION, SOLUTION EPIDURAL; INFILTRATION at 11:09

## 2017-09-29 RX ADMIN — ACETAMINOPHEN 1000 MG: 10 INJECTION, SOLUTION INTRAVENOUS at 01:09

## 2017-09-29 RX ADMIN — CEFAZOLIN SODIUM 2 G: 2 SOLUTION INTRAVENOUS at 12:09

## 2017-09-29 RX ADMIN — ROCURONIUM BROMIDE 5 MG: 10 INJECTION, SOLUTION INTRAVENOUS at 12:09

## 2017-09-29 RX ADMIN — PROPOFOL 200 MG: 10 INJECTION, EMULSION INTRAVENOUS at 12:09

## 2017-09-29 RX ADMIN — DEXAMETHASONE SODIUM PHOSPHATE 8 MG: 4 INJECTION, SOLUTION INTRAMUSCULAR; INTRAVENOUS at 12:09

## 2017-09-29 RX ADMIN — PHENYLEPHRINE HYDROCHLORIDE 100 MCG: 10 INJECTION INTRAVENOUS at 12:09

## 2017-09-29 RX ADMIN — FENTANYL CITRATE 100 MCG: 50 INJECTION, SOLUTION INTRAMUSCULAR; INTRAVENOUS at 12:09

## 2017-09-29 RX ADMIN — PHENYLEPHRINE HYDROCHLORIDE 100 MCG: 10 INJECTION INTRAVENOUS at 01:09

## 2017-09-29 RX ADMIN — LIDOCAINE HYDROCHLORIDE 80 MG: 20 INJECTION, SOLUTION INTRAVENOUS at 12:09

## 2017-09-29 RX ADMIN — SODIUM CHLORIDE, SODIUM LACTATE, POTASSIUM CHLORIDE, AND CALCIUM CHLORIDE 10 ML/HR: 600; 310; 30; 20 INJECTION, SOLUTION INTRAVENOUS at 10:09

## 2017-09-29 RX ADMIN — SUCCINYLCHOLINE CHLORIDE 120 MG: 20 INJECTION, SOLUTION INTRAMUSCULAR; INTRAVENOUS at 12:09

## 2017-09-29 RX ADMIN — ONDANSETRON 4 MG: 2 INJECTION, SOLUTION INTRAMUSCULAR; INTRAVENOUS at 01:09

## 2017-09-29 RX ADMIN — MIDAZOLAM 5 MG: 1 INJECTION INTRAMUSCULAR; INTRAVENOUS at 11:09

## 2017-09-29 RX ADMIN — EPINEPHRINE 150 MCG: 1 INJECTION, SOLUTION INTRAMUSCULAR; SUBCUTANEOUS at 11:09

## 2017-09-29 NOTE — ANESTHESIA POSTPROCEDURE EVALUATION
"Anesthesia Post Evaluation    Patient: Juliette Seo    Procedure(s) Performed: Procedure(s) (LRB):  REPAIR ROTATOR CUFF ARTHROSCOPIC (Right)  RESECTION-ACROMIOCLAVICULAR JOINT-ARTHROSCOPIC (Right)    Final Anesthesia Type: general  Patient location during evaluation: PACU  Patient participation: Yes- Able to Participate  Level of consciousness: awake and alert  Post-procedure vital signs: reviewed and stable  Pain management: adequate  Airway patency: patent  PONV status at discharge: No PONV  Anesthetic complications: no      Cardiovascular status: blood pressure returned to baseline  Respiratory status: unassisted  Hydration status: euvolemic  Follow-up not needed.        Visit Vitals  BP (!) 145/65   Pulse 95   Temp 36.4 °C (97.5 °F)   Resp 20   Ht 4' 11" (1.499 m)   Wt 55.8 kg (123 lb)   SpO2 98%   Breastfeeding? No   BMI 24.84 kg/m²       Pain/Shira Score: Pain Assessment Performed: Yes (9/29/2017  2:27 PM)  Presence of Pain: denies (9/29/2017  2:27 PM)  Shira Score: 10 (9/29/2017  2:27 PM)      "

## 2017-09-29 NOTE — PLAN OF CARE
Pt arrived to PACU with CORDELIA Mata RN and Dr Cameron. Pt sleeping but arouses to voice. vss (see flow sheet) respirations even and unlabored. Dressing to RIGHT has small amount of sanguinous drainage. Area marked. Pt denies pain. Pt's family updated.

## 2017-09-29 NOTE — ANESTHESIA PROCEDURE NOTES
Peripheral    Patient location during procedure: holding area   Block not for primary anesthetic.  Reason for block: at surgeon's request and post-op pain management   Post-op Pain Location: shoulder pain  Start time: 9/29/2017 11:32 AM  Timeout: 9/29/2017 11:25 AM   End time: 9/29/2017 11:43 AM  Surgery related to: right shoulder rotator cuff injury  Staffing  Anesthesiologist: PA MODI  Performed: anesthesiologist   Preanesthetic Checklist  Completed: patient identified, site marked, surgical consent, pre-op evaluation, timeout performed, IV checked, risks and benefits discussed and monitors and equipment checked  Peripheral Block  Patient position: supine  Prep: ChloraPrep and site prepped and draped  Patient monitoring: heart rate, cardiac monitor, continuous pulse ox, continuous capnometry and frequent blood pressure checks  Block type: interscalene  Laterality: right  Injection technique: continuous  Needle  Needle type: echogenic   Needle gauge: 20 G  Needle length: 4 in  Needle localization: ultrasound guidance and nerve stimulator  Needle insertion depth: 5 cm  Catheter type: non-stimulating  Catheter size: 18 G  Catheter at skin depth: 5 cm  Test dose: negative   -ultrasound image captured on disc.  Assessment  Injection assessment: negative aspiration, negative parasthesia and local visualized surrounding nerve  Paresthesia pain: none  Heart rate change: no  Slow fractionated injection: yes  Medications:  Bolus administered: 30 mL of 0.2 ropivacaine  Epinephrine added: 5 mcg/mL (1/200,000)  Additional Notes  Clonidine 25mcg added

## 2017-09-29 NOTE — BRIEF OP NOTE
[facility]  Brief Operative Note     SUMMARY     Surgery Date: 9/29/2017     Surgeon(s) and Role:     * Sarmad Wesley Jr., MD - Primary    Assisting Surgeon: None    Pre-op Diagnosis:  Chronic right shoulder pain [M25.511, G89.29]    Post-op Diagnosis:  Post-Op Diagnosis Codes:     * Chronic right shoulder pain [M25.511, G89.29]    Procedure(s) (LRB):  REPAIR ROTATOR CUFF ARTHROSCOPIC (Right)  RESECTION-ACROMIOCLAVICULAR JOINT-ARTHROSCOPIC (Right)    Anesthesia: General    Description of the findings of the procedure: rot cuff tear right shoulder    Findings/Key Components: rot cuff repair right shoulder    Estimated Blood Loss: 7 mL         Specimens:   Specimen (12h ago through future)    None          Discharge Note    SUMMARY     Admit Date: 9/29/2017    Discharge Date and Time:  09/29/2017 1:44 PM    Hospital Course (synopsis of major diagnoses, care, treatment, and services provided during the course of the hospital stay): see op note     Final Diagnosis: Post-Op Diagnosis Codes:     * Chronic right shoulder pain [M25.511, G89.29]    Disposition: Home or Self Care    Follow Up/Patient Instructions:     Medications:  Reconciled Home Medications:   Current Discharge Medication List      START taking these medications    Details   oxycodone-acetaminophen (PERCOCET) 7.5-325 mg per tablet Take 1 tablet by mouth every 4 (four) hours as needed for Pain.  Qty: 40 tablet, Refills: 0         CONTINUE these medications which have NOT CHANGED    Details   lisinopril (PRINIVIL,ZESTRIL) 20 MG tablet Take 1 tablet (20 mg total) by mouth once daily.  Qty: 90 tablet, Refills: 3    Associated Diagnoses: Essential hypertension      PROAIR HFA 90 mcg/actuation inhaler Inhale 1-2 puffs into the lungs every 6 (six) hours as needed.   Refills: 5      PROCTOSOL HC 2.5 % rectal cream APPLY TO RECTUM TWICE A DAY FOR 7 DAYS  Qty: 1 Tube, Refills: 3      RESTASIS 0.05 % ophthalmic emulsion Place 1 drop into both  eyes 2 (two) times daily.  Refills: 3      simethicone (GAS RELIEF) 180 mg Cap Take by mouth.      tizanidine (ZANAFLEX) 2 MG tablet TAKE 1 TABLET TWICE A DAY BY ORAL ROUTE FOR 30 DAYS.  Qty: 60 tablet, Refills: 1      ACCU-CHEK FASTCLIX Misc USE AS DIRECTED TWICE A DAY  Qty: 102 each, Refills: 3    Comments: DX Code Needed  .      atorvastatin (LIPITOR) 80 MG tablet Take 1 tablet (80 mg total) by mouth once daily.  Qty: 90 tablet, Refills: 3    Associated Diagnoses: Pure hypercholesterolemia      BD INSULIN SYRINGE ULTRA-FINE 0.5 mL 31 gauge x 5/16 Syrg USE AS DIRECTED TWICE A DAY  Qty: 180 each, Refills: 4      blood sugar diagnostic (ACCU-CHEK SMARTVIEW TEST STRIP) Strp USE AS DIRECTED TWICE A DAY  Qty: 100 strip, Refills: 3    Comments: DX Code Needed  NEED NEW RX WITH DIAGNOSIS CODE.      gabapentin (NEURONTIN) 300 MG capsule Take 1 capsule (300 mg total) by mouth 3 (three) times daily.  Qty: 270 capsule, Refills: 3      glimepiride (AMARYL) 2 MG tablet TAKE 1 TABLET (2 MG TOTAL) BY MOUTH BEFORE BREAKFAST.  Qty: 90 tablet, Refills: 3    Associated Diagnoses: Type 2 diabetes mellitus with diabetic polyneuropathy      hydrocortisone 2.5 % cream APPLY TO THE AFFECTED AREA(S) BY TOPICAL ROUTE THREE TIMES DAILY AS NEEDED FOR ITCHING  Refills: 2      hydrOXYzine HCl (ATARAX) 25 MG tablet TAKE 1 TABLET (25 MG TOTAL) BY MOUTH 3 (THREE) TIMES DAILY AS NEEDED FOR ITCHING.  Qty: 100 tablet, Refills: 1    Associated Diagnoses: Notalgia paresthetica      meclizine (ANTIVERT) 12.5 mg tablet TAKE 1 TABLET BY MOUTH 3 TIMES A DAY AS NEEDED  Qty: 60 tablet, Refills: 1      montelukast (SINGULAIR) 10 mg tablet Take 1 tablet (10 mg total) by mouth once daily.  Qty: 90 tablet, Refills: 3    Associated Diagnoses: Allergic rhinitis, unspecified allergic rhinitis trigger, unspecified rhinitis seasonality      NOVOLIN N 100 unit/mL injection INJECT SUBCUTANEOUSLY 40 UNITS EVERY MORNING & 20 UNITS EVERY EVENING  Qty: 30 mL, Refills: 4     Associated Diagnoses: Type 2 diabetes mellitus with diabetic polyneuropathy, with long-term current use of insulin      omeprazole (PRILOSEC) 40 MG capsule Take 1 capsule (40 mg total) by mouth every morning.  Qty: 90 capsule, Refills: 3    Associated Diagnoses: Gastroesophageal reflux disease without esophagitis      triamcinolone acetonide 0.5% (KENALOG) 0.5 % Crea APPLY TOPICALLY TWICE A DAY  Qty: 15 g, Refills: 0    Associated Diagnoses: Eczema, unspecified type             Discharge Procedure Orders  Diet general     Shower on day dressing removed (No bath)     Call MD for:  temperature >100.4     Call MD for:  persistent nausea and vomiting     Call MD for:  severe uncontrolled pain     Ice to affected area     Remove dressing in 24 hours       Follow-up Information     Sarmad Wesley Jr, MD. Schedule an appointment as soon as possible for a visit in 9 days.    Specialties:  Hand Surgery, Orthopedic Surgery  Why:  For suture removal  Contact information:  [address]  [address]  [facility] [id number] [phone]

## 2017-09-29 NOTE — PLAN OF CARE
Handoff communication given to or team. Patient transported on stretcher to surgery in good condition with nurse and crna

## 2017-09-29 NOTE — PLAN OF CARE
Patient awake and alert. Sates no pain.  Dressing and sling on to r shoulder and arm. Ice pack on. Discharge instructions gone over with patient and family.

## 2017-09-29 NOTE — PLAN OF CARE
Dr Cameron at bedside to connect pain ball. Pt meets PACU discharge criteria. Pt signed out of PACU by Dr Crowder

## 2017-09-29 NOTE — TRANSFER OF CARE
"Anesthesia Transfer of Care Note    Patient: Juliette Seo    Procedure(s) Performed: Procedure(s) (LRB):  REPAIR ROTATOR CUFF ARTHROSCOPIC (Right)  RESECTION-ACROMIOCLAVICULAR JOINT-ARTHROSCOPIC (Right)    Patient location: PACU    Anesthesia Type: general    Transport from OR: Transported from OR on 6-10 L/min O2 by face mask with adequate spontaneous ventilation    Post pain: adequate analgesia    Post assessment: no apparent anesthetic complications and tolerated procedure well    Post vital signs: stable    Level of consciousness: alert and responds to stimulation    Nausea/Vomiting: no nausea/vomiting    Complications: none    Transfer of care protocol was followed      Last vitals:   Visit Vitals  BP (!) 180/89   Pulse 86   Temp 36.2 °C (97.2 °F) (Temporal)   Resp 18   Ht 4' 11" (1.499 m)   Wt 55.8 kg (123 lb)   SpO2 100%   Breastfeeding? No   BMI 24.84 kg/m²     "

## 2017-09-29 NOTE — H&P
INITIAL VISIT AND PREOP H AND P     CHIEF COMPLAINT:  Right shoulder pain.     HISTORY OF PRESENT ILLNESS:  A 71-year-old female with ongoing symptoms in right   shoulder and arm for the past year or so.  Symptoms seem to wax and wane, but   are largely in the right arm and shoulder area.  She has seen Dr. Pena for   injections in her neck, back and shoulder with temporary improvement, but   symptoms recur.  She has had an MRI of the right shoulder in the past.     PAST MEDICAL HISTORY:  Significant for allergies, anxiety, arthritis, asthma,   diabetes, GERD, elevated cholesterol and hypertension.     PAST SURGICAL HISTORY:  Includes hysterectomy, cholecystectomy and hernia   repair.     FAMILY HISTORY:  Positive for diabetes and stroke.     SOCIAL HISTORY:  The patient does not smoke, drinks alcohol occasionally.     REVIEW OF SYSTEMS:  Negative fever, chills, rashes.     CURRENT MEDICATIONS:  Reviewed on chart.     ALLERGIES:  Actos.     PHYSICAL EXAMINATION:  GENERAL:  Well-developed, well-nourished female in no acute distress, alert and   oriented x3.  HEENT:  Unremarkable.  LUNGS:  Clear to auscultation.  HEART:  Regular rate and rhythm.  ABDOMEN:  Soft, nontender.  EXTREMITIES:  Significant for the right shoulder demonstrating some tenderness   in anterolateral acromion.  Range of motion of right shoulder is limited   secondary to pain.  Positive impingement sign, positive supraspinatus stress   test, some weakness of the rotator cuff on the right side is noted.     X-RAYS:  AP and lateral of the right shoulder were reviewed demonstrates   irregularity at the greater tuberosity and spurring at the anterolateral   acromion.     MRI of the right shoulder were reviewed demonstrates a partial versus complete   tear of the rotator cuff supraspinatus tendon including AC arthritis.     IMPRESSION:  1.  Partial versus complete tear of rotator cuff, right shoulder, symptomatic.  2.  AC arthritis, right  shoulder.     PLAN:  Because of the patient's ongoing symptoms, which have failed to improve   with nonoperative treatment, I have recommended surgery for the right shoulder.    This would include right shoulder arthroscopy, AC resection and rotator cuff   repair.  The risks and benefits of surgery were explained to the patient, she   understands and would like to go ahead and set this up as an outpatient surgery.

## 2017-09-30 NOTE — OP NOTE
DATE OF PROCEDURE:  09/29/2017    PREOPERATIVE DIAGNOSES:  1.  Right shoulder rotator cuff tear.  2.  Right shoulder acromioclavicular arthritis.    POSTOPERATIVE DIAGNOSES:  1.  Right shoulder rotator cuff tear.  2.  Right shoulder acromioclavicular arthritis.    OPERATIVE PROCEDURES:  1.  Right shoulder arthroscopy with subacromial decompression.  2.  Right shoulder arthroscopic rotator cuff repair using Opus suture anchors   x3.  3.  Right shoulder arthroscopic acromioclavicular joint resection.    SURGEON:  Sarmad Wesley Jr., M.D.    ANESTHESIA:  General endotracheal.    ESTIMATED BLOOD LOSS:  Minimal.    COMPLICATIONS:  None.    SPECIMENS:  None.    BRIEF INDICATIONS:  A 71-year-old female with right shoulder pain related to   rotator cuff tear, taken to surgery for the above procedure.    OPERATIVE PROCEDURE IN DETAIL:  After operative consent was obtained, the   patient brought to the Operating Room, placed supine on the operating room   table.  Anesthesia by GET method performed by the Anesthesia staff.  After the   patient was asleep, carefully turned to lateral decubitus position and   stabilized on the bean bag.  The right shoulder and upper extremity prepped and   draped out in the normal sterile fashion.  The right arm suspended in   longitudinal traction 12 pounds posterolateral stab incision made with a #15   blade and the scope inserted into the right shoulder joint.  Diagnostic   arthroscopy showed a large rotator cuff tear measuring about 4 cm in width.  A   lateral portal was created with a #15 blade, the sucker shaver inserted   laterally and a complete bursectomy performed.  The leading edge of the rotator   cuff was freshened up, the tear was identified again and the CA ligament divided   and excised.  The bur was brought in and acromioplasty performed from lateral   to medial, decompressing the subacromial space all the way to the AC joint.  The   greater tuberosity was just touched up a bit  to allow for attachment of the   rotator cuff.  The distal clavicle was degenerative and the AC joint was excised   with the bur removing the distal 8 mm of bone in the distal clavicle.  After   smoothing all bony surfaces, attention then turned back to the rotator cuff   where 3 sutures of inverted horizontal mattress sutures were placed in the   leading edge of the rotator cuff.  Then 3 anchors were placed, 2 medial and 1   lateral sequentially through accessory portals superiorly.  The 3 anchors were   placed sequentially and tightened up locking the suture into the anchor and   trimming the suture out.  The lateral anchor brought the tendon in a nice   footprint from the back to front.  Good repair was achieved.  Range of motion   checked and noted to be full without impingement.  All bony surfaces smoothed   down.  An excess fluid and debris evacuated.  The instruments were removed.  The   portals closed using interrupted 3-0 nylon suture on the skin.  Sterile   dressing applied followed by a pillow sling.  The patient extubated and brought   to the Recovery Room in stable condition.  All sponge and needle counts were   reported as correct.  No complications.      LIA  dd: 09/29/2017 13:48:35 (CDT)  td: 09/30/2017 01:08:17 (CD)  Doc ID   #[id number]  Job ID #[id number]    CC:

## 2017-10-02 VITALS
TEMPERATURE: 98 F | HEART RATE: 98 BPM | HEIGHT: 59 IN | DIASTOLIC BLOOD PRESSURE: 68 MMHG | WEIGHT: 123 LBS | SYSTOLIC BLOOD PRESSURE: 142 MMHG | RESPIRATION RATE: 20 BRPM | BODY MASS INDEX: 24.8 KG/M2 | OXYGEN SATURATION: 98 %

## 2017-10-02 DIAGNOSIS — I10 ESSENTIAL HYPERTENSION: ICD-10-CM

## 2017-10-02 RX ORDER — LISINOPRIL 20 MG/1
TABLET ORAL
Qty: 90 TABLET | Refills: 3 | Status: SHIPPED | OUTPATIENT
Start: 2017-10-02 | End: 2018-10-03 | Stop reason: SDUPTHER

## 2017-10-04 ENCOUNTER — TELEPHONE (OUTPATIENT)
Dept: ORTHOPEDICS | Facility: CLINIC | Age: 72
End: 2017-10-04

## 2017-10-04 NOTE — TELEPHONE ENCOUNTER
----- Message from Helen Reece sent at 10/4/2017 11:54 AM CDT -----  Contact: [phone]/ Ashley pt's daughter   Patient needs a post op scheduled 7-9 days after her procedure but the soonest appt is 3 weeks away. Please advise.

## 2017-10-09 ENCOUNTER — OFFICE VISIT (OUTPATIENT)
Dept: ORTHOPEDICS | Facility: CLINIC | Age: 72
End: 2017-10-09
Payer: MEDICARE

## 2017-10-09 VITALS — BODY MASS INDEX: 24.8 KG/M2 | WEIGHT: 123 LBS | HEIGHT: 59 IN

## 2017-10-09 DIAGNOSIS — G89.29 CHRONIC RIGHT SHOULDER PAIN: ICD-10-CM

## 2017-10-09 DIAGNOSIS — M25.511 CHRONIC RIGHT SHOULDER PAIN: ICD-10-CM

## 2017-10-09 DIAGNOSIS — M75.121 COMPLETE TEAR OF RIGHT ROTATOR CUFF: Primary | ICD-10-CM

## 2017-10-09 PROCEDURE — 99213 OFFICE O/P EST LOW 20 MIN: CPT | Mod: PBBFAC,PO | Performed by: ORTHOPAEDIC SURGERY

## 2017-10-09 PROCEDURE — 99024 POSTOP FOLLOW-UP VISIT: CPT | Mod: ,,, | Performed by: ORTHOPAEDIC SURGERY

## 2017-10-09 PROCEDURE — 99999 PR PBB SHADOW E&M-EST. PATIENT-LVL III: CPT | Mod: PBBFAC,,, | Performed by: ORTHOPAEDIC SURGERY

## 2017-10-09 RX ORDER — HYDROCODONE BITARTRATE AND ACETAMINOPHEN 5; 325 MG/1; MG/1
1 TABLET ORAL EVERY 4 HOURS PRN
Qty: 40 TABLET | Refills: 0 | Status: SHIPPED | OUTPATIENT
Start: 2017-10-09 | End: 2017-10-19

## 2017-10-09 NOTE — PROGRESS NOTES
INITIAL VISIT HISTORY:  Ms. Seo in followup of rotator cuff repair of the   right shoulder.  She is about 10 days postop.  She is doing well except for some   pain.    PHYSICAL EXAMINATION:  RIGHT SHOULDER:  The incision is healing well.  No evidence of infection.    Slight bruising.  Slight swelling.  Sutures are in place.    Range of motion is slight limited secondary to pain.    PLAN:  Sutures removed and instructed in appropriate wound care.  Continue with   use of the sling mainly when out of the house.  She can have it off at home for   light activities only.  We will also order some therapy in [facility]   where she lives for passive and active assist range of motion.  No heavy   lifting.  Follow up in 3-4 weeks.      LIA  dd: 10/09/2017 09:40:24 (CDT)  td: 10/09/2017 21:48:50 (CDT)  Doc ID   #[id number]  Job ID #[id number]    CC:

## 2017-11-09 ENCOUNTER — OFFICE VISIT (OUTPATIENT)
Dept: ORTHOPEDICS | Facility: CLINIC | Age: 72
End: 2017-11-09
Payer: MEDICARE

## 2017-11-09 DIAGNOSIS — M25.511 CHRONIC RIGHT SHOULDER PAIN: Primary | ICD-10-CM

## 2017-11-09 DIAGNOSIS — G89.29 CHRONIC RIGHT SHOULDER PAIN: Primary | ICD-10-CM

## 2017-11-09 PROCEDURE — 99999 PR PBB SHADOW E&M-EST. PATIENT-LVL III: CPT | Mod: PBBFAC,,, | Performed by: ORTHOPAEDIC SURGERY

## 2017-11-09 PROCEDURE — 99024 POSTOP FOLLOW-UP VISIT: CPT | Mod: ,,, | Performed by: ORTHOPAEDIC SURGERY

## 2017-11-09 PROCEDURE — 99213 OFFICE O/P EST LOW 20 MIN: CPT | Mod: PBBFAC,PO | Performed by: ORTHOPAEDIC SURGERY

## 2017-11-09 RX ORDER — HYDROCODONE BITARTRATE AND ACETAMINOPHEN 5; 325 MG/1; MG/1
1 TABLET ORAL EVERY 6 HOURS PRN
Qty: 40 TABLET | Refills: 0 | Status: SHIPPED | OUTPATIENT
Start: 2017-11-09 | End: 2017-11-19

## 2017-11-09 NOTE — PROGRESS NOTES
HISTORY OF PRESENT ILLNESS:  Ms. Seo about five weeks out right shoulder   rotator cuff repair.  She did have a large tear.  Currently in therapy, having   some pain and stiffness.    PHYSICAL EXAMINATION:  RIGHT SHOULDER:  The incisions are all well healed.  No evidence of infection.    Some slight swelling.  Range of motion is limited.  Both stiffness and some   pain.    PLAN:  I recommended she continue therapy.  She also is wearing the sling pretty   constantly now, so I want her to start weaning out of the sling, especially at   home, increase activities a bit.  No heavy lifting.  Norco for pain.  Follow up   in three to four weeks.      LIA  dd: 11/09/2017 09:25:06 (CST)  td: 11/09/2017 21:52:33 (CST)  Doc ID   #[id number]  Job ID #[id number]    CC:

## 2017-11-16 DIAGNOSIS — M25.519 SHOULDER PAIN, UNSPECIFIED CHRONICITY, UNSPECIFIED LATERALITY: Primary | ICD-10-CM

## 2017-11-17 ENCOUNTER — LAB VISIT (OUTPATIENT)
Dept: LAB | Facility: HOSPITAL | Age: 72
End: 2017-11-17
Attending: INTERNAL MEDICINE
Payer: MEDICARE

## 2017-11-17 DIAGNOSIS — Z79.4 TYPE 2 DIABETES MELLITUS WITH DIABETIC POLYNEUROPATHY, WITH LONG-TERM CURRENT USE OF INSULIN: ICD-10-CM

## 2017-11-17 DIAGNOSIS — E11.42 TYPE 2 DIABETES MELLITUS WITH DIABETIC POLYNEUROPATHY, WITH LONG-TERM CURRENT USE OF INSULIN: ICD-10-CM

## 2017-11-17 LAB
ESTIMATED AVG GLUCOSE: 174 MG/DL
HBA1C MFR BLD HPLC: 7.7 %

## 2017-11-17 PROCEDURE — 36415 COLL VENOUS BLD VENIPUNCTURE: CPT | Mod: PO

## 2017-11-17 PROCEDURE — 83036 HEMOGLOBIN GLYCOSYLATED A1C: CPT | Mod: PO

## 2017-11-21 DIAGNOSIS — L30.9 ECZEMA, UNSPECIFIED TYPE: ICD-10-CM

## 2017-11-21 RX ORDER — TRIAMCINOLONE ACETONIDE 5 MG/G
CREAM TOPICAL
Qty: 15 G | Refills: 0 | Status: SHIPPED | OUTPATIENT
Start: 2017-11-21 | End: 2018-07-28 | Stop reason: SDUPTHER

## 2017-11-23 DIAGNOSIS — K21.9 GASTROESOPHAGEAL REFLUX DISEASE WITHOUT ESOPHAGITIS: ICD-10-CM

## 2017-11-27 RX ORDER — OMEPRAZOLE 40 MG/1
40 CAPSULE, DELAYED RELEASE ORAL EVERY MORNING
Qty: 90 CAPSULE | Refills: 3 | Status: SHIPPED | OUTPATIENT
Start: 2017-11-27 | End: 2018-12-12 | Stop reason: SDUPTHER

## 2017-11-29 DIAGNOSIS — R20.2 NOTALGIA PARESTHETICA: ICD-10-CM

## 2017-11-29 RX ORDER — HYDROXYZINE HYDROCHLORIDE 25 MG/1
25 TABLET, FILM COATED ORAL 3 TIMES DAILY PRN
Qty: 100 TABLET | Refills: 1 | Status: SHIPPED | OUTPATIENT
Start: 2017-11-29 | End: 2018-02-08 | Stop reason: SDUPTHER

## 2017-12-01 ENCOUNTER — OFFICE VISIT (OUTPATIENT)
Dept: INTERNAL MEDICINE | Facility: CLINIC | Age: 72
End: 2017-12-01
Payer: MEDICARE

## 2017-12-01 VITALS
WEIGHT: 124.75 LBS | HEIGHT: 59 IN | HEART RATE: 108 BPM | SYSTOLIC BLOOD PRESSURE: 130 MMHG | RESPIRATION RATE: 16 BRPM | DIASTOLIC BLOOD PRESSURE: 60 MMHG | BODY MASS INDEX: 25.15 KG/M2 | TEMPERATURE: 98 F

## 2017-12-01 DIAGNOSIS — E78.00 PURE HYPERCHOLESTEROLEMIA: ICD-10-CM

## 2017-12-01 DIAGNOSIS — I10 ESSENTIAL HYPERTENSION: ICD-10-CM

## 2017-12-01 DIAGNOSIS — Z23 NEED FOR PROPHYLACTIC VACCINATION AND INOCULATION AGAINST INFLUENZA: ICD-10-CM

## 2017-12-01 DIAGNOSIS — E11.42 TYPE 2 DIABETES MELLITUS WITH DIABETIC POLYNEUROPATHY, WITH LONG-TERM CURRENT USE OF INSULIN: Primary | ICD-10-CM

## 2017-12-01 DIAGNOSIS — L30.9 ECZEMA, UNSPECIFIED TYPE: ICD-10-CM

## 2017-12-01 DIAGNOSIS — F32.A DEPRESSION, UNSPECIFIED DEPRESSION TYPE: ICD-10-CM

## 2017-12-01 DIAGNOSIS — Z79.4 TYPE 2 DIABETES MELLITUS WITH DIABETIC POLYNEUROPATHY, WITH LONG-TERM CURRENT USE OF INSULIN: Primary | ICD-10-CM

## 2017-12-01 DIAGNOSIS — L29.9 PRURITUS: ICD-10-CM

## 2017-12-01 PROCEDURE — 99214 OFFICE O/P EST MOD 30 MIN: CPT | Mod: PBBFAC,PN | Performed by: INTERNAL MEDICINE

## 2017-12-01 PROCEDURE — 99999 PR PBB SHADOW E&M-EST. PATIENT-LVL IV: CPT | Mod: PBBFAC,,, | Performed by: INTERNAL MEDICINE

## 2017-12-01 PROCEDURE — 99214 OFFICE O/P EST MOD 30 MIN: CPT | Mod: S$PBB,,, | Performed by: INTERNAL MEDICINE

## 2017-12-01 PROCEDURE — G0008 ADMIN INFLUENZA VIRUS VAC: HCPCS | Mod: PBBFAC,PN

## 2017-12-01 NOTE — PROGRESS NOTES
Subjective:       Patient ID: Juliette Seo is a 72 y.o. female.    Chief Complaint: Results; Follow-up; Cough (dry cough x 2 days); and itchy (all over body)    HPI  Checkup.  Labs reviewed.  BSs better.  No paresthesias, eye check UTD.  C/O tatal body pruritus w/o rash.  No resp to antihistamines.  Bathing 2/d.  No CP, SOB, nausea.  Review of Systems   Respiratory: Positive for cough.    All other systems reviewed and are negative.      Objective:      Physical Exam   Constitutional: She appears well-developed. No distress.   HENT:   Head: Normocephalic.   Eyes: EOM are normal.   Neck: Normal range of motion. No tracheal deviation present.   Cardiovascular: Normal rate, regular rhythm, normal heart sounds and intact distal pulses.    Pulmonary/Chest: Effort normal and breath sounds normal. No respiratory distress.   Abdominal: Soft. Bowel sounds are normal. She exhibits no distension. There is no tenderness.   Musculoskeletal: Normal range of motion. She exhibits no edema.   Neurological: She is alert. No cranial nerve deficit. She exhibits normal muscle tone. Coordination normal.   Skin: Skin is warm and dry. No rash noted. She is not diaphoretic. No erythema.   Psychiatric: She has a normal mood and affect. Her behavior is normal.   Vitals reviewed.      Assessment:       1. Type 2 diabetes mellitus with diabetic polyneuropathy, with long-term current use of insulin    2. Essential hypertension    3. Pure hypercholesterolemia    4. Depression, unspecified depression type    5. Eczema, unspecified type    6. Pruritus    7. Need for prophylactic vaccination and inoculation against influenza        Plan:       Juliette was seen today for results, follow-up, cough and itchy.    Diagnoses and all orders for this visit:    Type 2 diabetes mellitus with diabetic polyneuropathy, with long-term current use of insulin  -     Hemoglobin A1c; Future    Essential hypertension   Well-cont    Pure  hypercholesterolemia    Depression, unspecified depression type   Cont rx      Pruritus  -     Ambulatory referral to Dermatology      Return in about 6 months (around 6/1/2018).

## 2017-12-28 ENCOUNTER — TELEPHONE (OUTPATIENT)
Dept: ORTHOPEDICS | Facility: CLINIC | Age: 72
End: 2017-12-28

## 2018-01-08 ENCOUNTER — OFFICE VISIT (OUTPATIENT)
Dept: ORTHOPEDICS | Facility: CLINIC | Age: 73
End: 2018-01-08
Payer: MEDICARE

## 2018-01-08 DIAGNOSIS — G89.29 CHRONIC RIGHT SHOULDER PAIN: ICD-10-CM

## 2018-01-08 DIAGNOSIS — M75.121 COMPLETE TEAR OF RIGHT ROTATOR CUFF: Primary | ICD-10-CM

## 2018-01-08 DIAGNOSIS — M25.511 CHRONIC RIGHT SHOULDER PAIN: ICD-10-CM

## 2018-01-08 PROCEDURE — 99213 OFFICE O/P EST LOW 20 MIN: CPT | Mod: S$PBB,,, | Performed by: ORTHOPAEDIC SURGERY

## 2018-01-08 PROCEDURE — 99999 PR PBB SHADOW E&M-EST. PATIENT-LVL II: CPT | Mod: PBBFAC,,, | Performed by: ORTHOPAEDIC SURGERY

## 2018-01-08 PROCEDURE — 99212 OFFICE O/P EST SF 10 MIN: CPT | Mod: PBBFAC,PO | Performed by: ORTHOPAEDIC SURGERY

## 2018-01-08 RX ORDER — HYDROCODONE BITARTRATE AND ACETAMINOPHEN 5; 325 MG/1; MG/1
1 TABLET ORAL EVERY 12 HOURS PRN
Qty: 60 TABLET | Refills: 0 | Status: SHIPPED | OUTPATIENT
Start: 2018-01-08 | End: 2018-01-18

## 2018-01-08 RX ORDER — MELOXICAM 15 MG/1
15 TABLET ORAL DAILY
Qty: 30 TABLET | Refills: 1 | Status: SHIPPED | OUTPATIENT
Start: 2018-01-08 | End: 2018-02-07

## 2018-01-08 NOTE — PROGRESS NOTES
HISTORY OF PRESENT ILLNESS:  Ms. Seo in followup rotator cuff repair of the   right shoulder.  She is about three months' postop.  She did have a large tear.    She is currently in therapy, but reporting continuing pain and difficulty with   use of the right arm.  She is also having similar problems in the opposite left   shoulder.    PHYSICAL EXAMINATION:  RIGHT SHOULDER:  The incision is well healed.  Swelling minimal.  No tenderness.    Range of motion right shoulder is pretty good passively, but she has limited   active elevation due to some weakness.    PLAN:  I have reassured her that it should improve over time.  As a matter of   fact, she is already getting better, so I would like her to continue therapy for   another month or two.  Increase activities as tolerated.  As far as pain   medicine is concerned I want her to start cutting back on that no more than two   per day, but I did refill Norco 5 mg.  We will add some meloxicam 15 mg once a   day for anti-inflammatory effect.  Follow up in one month for recheck.      LIA  dd: 01/08/2018 11:09:08 (CST)  td: 01/09/2018 03:45:45 (CST)  Doc ID   #[id number]  Job ID #[id number]    CC:

## 2018-01-25 ENCOUNTER — OFFICE VISIT (OUTPATIENT)
Dept: INTERNAL MEDICINE | Facility: CLINIC | Age: 73
End: 2018-01-25
Payer: MEDICARE

## 2018-01-25 VITALS
SYSTOLIC BLOOD PRESSURE: 126 MMHG | TEMPERATURE: 97 F | BODY MASS INDEX: 24.56 KG/M2 | HEART RATE: 92 BPM | DIASTOLIC BLOOD PRESSURE: 58 MMHG | WEIGHT: 121.56 LBS | RESPIRATION RATE: 18 BRPM

## 2018-01-25 DIAGNOSIS — J20.9 ACUTE BRONCHITIS, UNSPECIFIED ORGANISM: Primary | ICD-10-CM

## 2018-01-25 DIAGNOSIS — E11.42 TYPE 2 DIABETES MELLITUS WITH DIABETIC POLYNEUROPATHY: ICD-10-CM

## 2018-01-25 PROCEDURE — 99999 PR PBB SHADOW E&M-EST. PATIENT-LVL III: CPT | Mod: PBBFAC,,, | Performed by: INTERNAL MEDICINE

## 2018-01-25 PROCEDURE — 99213 OFFICE O/P EST LOW 20 MIN: CPT | Mod: PBBFAC,PN | Performed by: INTERNAL MEDICINE

## 2018-01-25 PROCEDURE — 99213 OFFICE O/P EST LOW 20 MIN: CPT | Mod: S$PBB,,, | Performed by: INTERNAL MEDICINE

## 2018-01-25 RX ORDER — ALBUTEROL SULFATE 90 UG/1
2 AEROSOL, METERED RESPIRATORY (INHALATION) EVERY 6 HOURS PRN
Qty: 18 G | Refills: 6 | Status: SHIPPED | OUTPATIENT
Start: 2018-01-25 | End: 2018-04-08 | Stop reason: SDUPTHER

## 2018-01-25 RX ORDER — CODEINE PHOSPHATE AND GUAIFENESIN 10; 100 MG/5ML; MG/5ML
5 SOLUTION ORAL 3 TIMES DAILY PRN
Qty: 180 ML | Refills: 0 | Status: SHIPPED | OUTPATIENT
Start: 2018-01-25 | End: 2018-02-04

## 2018-01-25 RX ORDER — GLIMEPIRIDE 2 MG/1
TABLET ORAL
Qty: 90 TABLET | Refills: 3 | Status: SHIPPED | OUTPATIENT
Start: 2018-01-25 | End: 2019-04-02 | Stop reason: SDUPTHER

## 2018-01-25 NOTE — PROGRESS NOTES
Subjective:       Patient ID: Juliette Seo is a 72 y.o. female.    Chief Complaint: Cough (x 3 weeks, tessalon perles not working)    HPI  Pt now with 3-4 weeks of nonprod cough, some coryza, had fever the 1st 2 days.  Tested - for the flu, had a clar CXR 10 days ago.  No resp to Tessalon Perles.  Review of Systems    Objective:      Physical Exam   Constitutional: She appears well-developed. No distress.   HENT:   Head: Normocephalic.   Mouth/Throat: Oropharynx is clear and moist.   Eyes: EOM are normal.   Neck: Normal range of motion. No tracheal deviation present.   Cardiovascular: Normal rate, regular rhythm and intact distal pulses.    Pulmonary/Chest: Effort normal and breath sounds normal. No respiratory distress.   Abdominal: She exhibits no distension.   Musculoskeletal: Normal range of motion. She exhibits no edema.   Neurological: She is alert. No cranial nerve deficit. She exhibits normal muscle tone. Coordination normal.   Skin: Skin is warm and dry. No rash noted. She is not diaphoretic. No erythema.   Psychiatric: She has a normal mood and affect. Her behavior is normal.   Vitals reviewed.      Assessment:       1. Acute bronchitis, unspecified organism        Plan:       Juliette was seen today for cough.    Diagnoses and all orders for this visit:    Acute bronchitis, unspecified organism  -     albuterol 90 mcg/actuation inhaler; Inhale 2 puffs into the lungs every 6 (six) hours as needed for Wheezing.  -     guaifenesin-codeine 100-10 mg/5 ml (CHERATUSSIN AC)  mg/5 mL syrup; Take 5 mLs by mouth 3 (three) times daily as needed for Cough.      Follow-up if symptoms worsen or fail to improve.

## 2018-02-08 DIAGNOSIS — R20.2 NOTALGIA PARESTHETICA: ICD-10-CM

## 2018-02-08 RX ORDER — HYDROXYZINE HYDROCHLORIDE 25 MG/1
25 TABLET, FILM COATED ORAL 3 TIMES DAILY PRN
Qty: 100 TABLET | Refills: 1 | Status: SHIPPED | OUTPATIENT
Start: 2018-02-08 | End: 2021-11-19 | Stop reason: SDUPTHER

## 2018-02-25 DIAGNOSIS — E11.42 TYPE 2 DIABETES MELLITUS WITH DIABETIC POLYNEUROPATHY, WITH LONG-TERM CURRENT USE OF INSULIN: ICD-10-CM

## 2018-02-25 DIAGNOSIS — Z79.4 TYPE 2 DIABETES MELLITUS WITH DIABETIC POLYNEUROPATHY, WITH LONG-TERM CURRENT USE OF INSULIN: ICD-10-CM

## 2018-02-26 RX ORDER — HUMAN INSULIN 100 [IU]/ML
INJECTION, SUSPENSION SUBCUTANEOUS
Qty: 30 ML | Refills: 4 | Status: SHIPPED | OUTPATIENT
Start: 2018-02-26 | End: 2019-01-05 | Stop reason: SDUPTHER

## 2018-03-08 ENCOUNTER — OFFICE VISIT (OUTPATIENT)
Dept: ORTHOPEDICS | Facility: CLINIC | Age: 73
End: 2018-03-08
Payer: MEDICARE

## 2018-03-08 DIAGNOSIS — G89.29 CHRONIC RIGHT SHOULDER PAIN: Primary | ICD-10-CM

## 2018-03-08 DIAGNOSIS — M25.511 CHRONIC RIGHT SHOULDER PAIN: Primary | ICD-10-CM

## 2018-03-08 PROCEDURE — 99213 OFFICE O/P EST LOW 20 MIN: CPT | Mod: S$PBB,25,, | Performed by: ORTHOPAEDIC SURGERY

## 2018-03-08 PROCEDURE — 99999 PR PBB SHADOW E&M-EST. PATIENT-LVL II: CPT | Mod: PBBFAC,,, | Performed by: ORTHOPAEDIC SURGERY

## 2018-03-08 PROCEDURE — 20610 DRAIN/INJ JOINT/BURSA W/O US: CPT | Mod: S$PBB,RT,, | Performed by: ORTHOPAEDIC SURGERY

## 2018-03-08 PROCEDURE — 20610 DRAIN/INJ JOINT/BURSA W/O US: CPT | Mod: PBBFAC,PO | Performed by: ORTHOPAEDIC SURGERY

## 2018-03-08 PROCEDURE — 99212 OFFICE O/P EST SF 10 MIN: CPT | Mod: PBBFAC,PO | Performed by: ORTHOPAEDIC SURGERY

## 2018-03-08 RX ORDER — DICLOFENAC SODIUM 10 MG/G
2 GEL TOPICAL 3 TIMES DAILY
Qty: 1 TUBE | Refills: 3 | Status: SHIPPED | OUTPATIENT
Start: 2018-03-08 | End: 2018-06-14

## 2018-03-08 RX ORDER — TRAMADOL HYDROCHLORIDE 50 MG/1
50 TABLET ORAL EVERY 12 HOURS PRN
Qty: 40 TABLET | Refills: 0 | Status: SHIPPED | OUTPATIENT
Start: 2018-03-08 | End: 2018-03-18

## 2018-03-08 RX ORDER — TRIAMCINOLONE ACETONIDE 40 MG/ML
40 INJECTION, SUSPENSION INTRA-ARTICULAR; INTRAMUSCULAR
Status: COMPLETED | OUTPATIENT
Start: 2018-03-08 | End: 2018-03-08

## 2018-03-08 RX ADMIN — TRIAMCINOLONE ACETONIDE 40 MG: 40 INJECTION, SUSPENSION INTRA-ARTICULAR; INTRAMUSCULAR at 08:03

## 2018-03-08 NOTE — PROGRESS NOTES
HISTORY OF PRESENT ILLNESS:  Ms. Seo almost five months out from rotator cuff   repair of the right shoulder.  She did have a large tear and she is finally   doing better.  She had sort of a slow progress through therapy last few months,   had a lot of pain, but symptoms are finally better.    PHYSICAL EXAMINATION:  RIGHT SHOULDER:  No tenderness, no swelling.  Range of motion much improved   including active elevation with minimal pain.    However, the opposite left shoulder is hurting worse now and on examination, she   has a moderately positive impingement sign on the left.  Rotator cuff strength   intact.    PLAN:  For the right shoulder, I would like her to continue a home exercise   program.    For the left shoulder, she would like to have an injection performed today.    After pause for timeout, she identified the left shoulder injected with   combination of Kenalog 40 mg, 2 mL Xylocaine, sterile technique.    I also am trying to get her off the hydrocodone, so we will switch her over to   tramadol now augment with Advil or Motrin and we have also prescribed some   Voltaren gel for topical use.  Follow up in 1-2 months.      LIA  dd: 03/08/2018 08:55:43 (CST)  td: 03/09/2018 04:08:59 (CST)  Doc ID   #[id number]  Job ID #[id number]    CC:

## 2018-04-09 ENCOUNTER — OFFICE VISIT (OUTPATIENT)
Dept: ORTHOPEDICS | Facility: CLINIC | Age: 73
End: 2018-04-09
Payer: MEDICARE

## 2018-04-09 ENCOUNTER — PES CALL (OUTPATIENT)
Dept: ADMINISTRATIVE | Facility: CLINIC | Age: 73
End: 2018-04-09

## 2018-04-09 VITALS — HEIGHT: 64 IN | BODY MASS INDEX: 20.49 KG/M2 | WEIGHT: 120 LBS

## 2018-04-09 DIAGNOSIS — G89.29 CHRONIC RIGHT SHOULDER PAIN: Primary | ICD-10-CM

## 2018-04-09 DIAGNOSIS — M25.511 CHRONIC RIGHT SHOULDER PAIN: Primary | ICD-10-CM

## 2018-04-09 PROCEDURE — 99213 OFFICE O/P EST LOW 20 MIN: CPT | Mod: S$PBB,,, | Performed by: ORTHOPAEDIC SURGERY

## 2018-04-09 PROCEDURE — 99999 PR PBB SHADOW E&M-EST. PATIENT-LVL III: CPT | Mod: PBBFAC,,, | Performed by: ORTHOPAEDIC SURGERY

## 2018-04-09 PROCEDURE — 99213 OFFICE O/P EST LOW 20 MIN: CPT | Mod: PBBFAC,PO | Performed by: ORTHOPAEDIC SURGERY

## 2018-04-09 NOTE — PROGRESS NOTES
HISTORY OF PRESENT ILLNESS:  Ms. Seo in followup of rotator cuff repair,   right shoulder.  She is about six months' postop and is really just started   getting better in the last several months, but she is much better now.  Her left   shoulder is also better since her injection.    PHYSICAL EXAMINATION:  Right shoulder has excellent passive range of motion,   still a little bit of limited active elevation.  Strength is improved.  Left   shoulder has full range of motion without pain.    PLAN:  Continue home exercises for the right shoulder, Advil or Motrin by mouth.    She takes Ultram occasionally.  Increase activities as tolerated.  Follow up   in 1-2 months or as needed.      LIA  dd: 04/09/2018 08:35:13 (CDT)  td: 04/09/2018 23:25:34 (CDT)  Doc ID   #[id number]  Job ID #[id number]    CC:

## 2018-04-23 ENCOUNTER — OFFICE VISIT (OUTPATIENT)
Dept: INTERNAL MEDICINE | Facility: CLINIC | Age: 73
End: 2018-04-23
Payer: MEDICARE

## 2018-04-23 VITALS
WEIGHT: 121.94 LBS | TEMPERATURE: 99 F | DIASTOLIC BLOOD PRESSURE: 64 MMHG | HEIGHT: 55 IN | RESPIRATION RATE: 16 BRPM | SYSTOLIC BLOOD PRESSURE: 132 MMHG | BODY MASS INDEX: 28.22 KG/M2 | HEART RATE: 84 BPM

## 2018-04-23 DIAGNOSIS — R05.9 COUGH: Primary | ICD-10-CM

## 2018-04-23 DIAGNOSIS — E11.59 HYPERTENSION COMPLICATING DIABETES: ICD-10-CM

## 2018-04-23 DIAGNOSIS — I10 ESSENTIAL HYPERTENSION: ICD-10-CM

## 2018-04-23 DIAGNOSIS — Z12.39 BREAST CANCER SCREENING, HIGH RISK PATIENT: ICD-10-CM

## 2018-04-23 DIAGNOSIS — I15.2 HYPERTENSION COMPLICATING DIABETES: ICD-10-CM

## 2018-04-23 DIAGNOSIS — M48.061 SPINAL STENOSIS OF LUMBAR REGION WITHOUT NEUROGENIC CLAUDICATION: ICD-10-CM

## 2018-04-23 DIAGNOSIS — E11.42 TYPE 2 DIABETES MELLITUS WITH DIABETIC POLYNEUROPATHY, WITH LONG-TERM CURRENT USE OF INSULIN: ICD-10-CM

## 2018-04-23 DIAGNOSIS — F33.42 RECURRENT MAJOR DEPRESSIVE DISORDER, IN FULL REMISSION: ICD-10-CM

## 2018-04-23 DIAGNOSIS — Z79.4 TYPE 2 DIABETES MELLITUS WITH DIABETIC POLYNEUROPATHY, WITH LONG-TERM CURRENT USE OF INSULIN: ICD-10-CM

## 2018-04-23 DIAGNOSIS — R05.3 COUGH, PERSISTENT: ICD-10-CM

## 2018-04-23 DIAGNOSIS — R94.2 RESTRICTIVE PATTERN PRESENT ON PULMONARY FUNCTION TESTING: ICD-10-CM

## 2018-04-23 PROBLEM — M46.1 SACROILIITIS: Status: RESOLVED | Noted: 2017-05-08 | Resolved: 2018-04-23

## 2018-04-23 PROBLEM — M47.812 ARTHROPATHY OF CERVICAL FACET JOINT: Status: RESOLVED | Noted: 2017-08-15 | Resolved: 2018-04-23

## 2018-04-23 PROCEDURE — 99215 OFFICE O/P EST HI 40 MIN: CPT | Mod: S$PBB,,, | Performed by: INTERNAL MEDICINE

## 2018-04-23 PROCEDURE — 99999 PR PBB SHADOW E&M-EST. PATIENT-LVL IV: CPT | Mod: PBBFAC,,, | Performed by: INTERNAL MEDICINE

## 2018-04-23 PROCEDURE — 99214 OFFICE O/P EST MOD 30 MIN: CPT | Mod: PBBFAC,PN | Performed by: INTERNAL MEDICINE

## 2018-04-23 RX ORDER — MELOXICAM 15 MG/1
15 TABLET ORAL DAILY
Refills: 1 | COMMUNITY
Start: 2018-03-07 | End: 2018-10-04

## 2018-04-23 RX ORDER — BIMATOPROST 0.1 MG/ML
1 SOLUTION/ DROPS OPHTHALMIC NIGHTLY
Refills: 4 | Status: ON HOLD | COMMUNITY
Start: 2018-02-24 | End: 2023-06-17 | Stop reason: CLARIF

## 2018-04-23 NOTE — PROGRESS NOTES
Subjective:       Patient ID: Juliette Seo is a 72 y.o. female.    Chief Complaint: Cough (prod cough (white)) and Abdominal Pain    HPI  Pt now with 4-5 months waxing and waning cough and wheezing.  No f/c, + PITTMAN, responds some what to albuterol MDI. CXR clear.   also with chronic cough.  PFT today show a restrictive pattern. No hx of asthma.BSs .  Neck pain, LBP improving.  Having her usual abd pain.  Review of Systems    Objective:      Physical Exam   Constitutional: She appears well-developed. No distress.   HENT:   Head: Normocephalic.   Eyes: EOM are normal.   Neck: Normal range of motion. No tracheal deviation present.   Cardiovascular: Normal rate, regular rhythm, normal heart sounds and intact distal pulses.    Pulmonary/Chest: Effort normal. No respiratory distress. She has wheezes (expiratory all fields).   Abdominal: She exhibits no distension.   Musculoskeletal: Normal range of motion. She exhibits no edema.   Neurological: She is alert. No cranial nerve deficit. She exhibits normal muscle tone. Coordination normal.   Skin: Skin is warm and dry. No rash noted. She is not diaphoretic. No erythema.   Psychiatric: She has a normal mood and affect. Her behavior is normal.   Vitals reviewed.      Assessment:       1. Cough    2. Essential hypertension    3. Type 2 diabetes mellitus with diabetic polyneuropathy, with long-term current use of insulin    4. Recurrent major depressive disorder, in full remission    5. Spinal stenosis of lumbar region without neurogenic claudication    6. Hypertension complicating diabetes    7. Breast cancer screening, high risk patient    8. Cough, persistent    9. Restrictive pattern present on pulmonary function testing        Plan:       Juliette was seen today for cough and abdominal pain.    Diagnoses and all orders for this visit:    Cough  -     Spirometry without Bronchodilator   High res CT chest  Essential hypertension   Well-cont    Type 2 diabetes  mellitus with diabetic polyneuropathy, with long-term current use of insulin   Cont rx    Recurrent major depressive disorder, in full remission   Cont rx    Spinal stenosis of lumbar region without neurogenic claudication   Cont rx    Hypertension complicating diabetes   monitor    Follow-up if symptoms worsen or fail to improve.

## 2018-04-24 ENCOUNTER — HOSPITAL ENCOUNTER (OUTPATIENT)
Dept: RADIOLOGY | Facility: HOSPITAL | Age: 73
Discharge: HOME | End: 2018-04-24
Attending: INTERNAL MEDICINE
Payer: MEDICARE

## 2018-04-24 VITALS — WEIGHT: 121 LBS | BODY MASS INDEX: 28 KG/M2 | HEIGHT: 55 IN

## 2018-04-24 DIAGNOSIS — R94.2 RESTRICTIVE PATTERN PRESENT ON PULMONARY FUNCTION TESTING: ICD-10-CM

## 2018-04-24 DIAGNOSIS — Z12.39 BREAST CANCER SCREENING, HIGH RISK PATIENT: ICD-10-CM

## 2018-04-24 DIAGNOSIS — R05.3 COUGH, PERSISTENT: ICD-10-CM

## 2018-04-24 PROCEDURE — 71250 CT THORAX DX C-: CPT | Mod: TC,PO

## 2018-04-24 PROCEDURE — 77067 SCR MAMMO BI INCL CAD: CPT | Mod: TC,PO

## 2018-05-09 DIAGNOSIS — J47.9 BRONCHIECTASIS WITHOUT COMPLICATION: Primary | ICD-10-CM

## 2018-05-09 DIAGNOSIS — R92.8 ABNORMAL MAMMOGRAM: ICD-10-CM

## 2018-05-18 ENCOUNTER — HOSPITAL ENCOUNTER (OUTPATIENT)
Dept: RADIOLOGY | Facility: HOSPITAL | Age: 73
Discharge: HOME | End: 2018-05-18
Attending: INTERNAL MEDICINE
Payer: MEDICARE

## 2018-05-18 DIAGNOSIS — R92.8 ABNORMAL MAMMOGRAM: ICD-10-CM

## 2018-05-18 PROCEDURE — 77065 DX MAMMO INCL CAD UNI: CPT | Mod: 26,LT,, | Performed by: RADIOLOGY

## 2018-05-18 PROCEDURE — 77061 BREAST TOMOSYNTHESIS UNI: CPT | Mod: 26,LT,, | Performed by: RADIOLOGY

## 2018-05-18 PROCEDURE — 77065 DX MAMMO INCL CAD UNI: CPT | Mod: TC,LT

## 2018-05-18 PROCEDURE — 77061 BREAST TOMOSYNTHESIS UNI: CPT | Mod: TC,LT

## 2018-05-21 ENCOUNTER — TELEPHONE (OUTPATIENT)
Dept: INTERNAL MEDICINE | Facility: CLINIC | Age: 73
End: 2018-05-21

## 2018-06-14 ENCOUNTER — HOSPITAL ENCOUNTER (OUTPATIENT)
Dept: PULMONOLOGY | Facility: CLINIC | Age: 73
Discharge: HOME | End: 2018-06-14
Payer: MEDICARE

## 2018-06-14 ENCOUNTER — OFFICE VISIT (OUTPATIENT)
Dept: PULMONOLOGY | Facility: CLINIC | Age: 73
End: 2018-06-14
Payer: MEDICARE

## 2018-06-14 VITALS
HEART RATE: 103 BPM | WEIGHT: 121 LBS | HEIGHT: 55 IN | OXYGEN SATURATION: 98 % | DIASTOLIC BLOOD PRESSURE: 70 MMHG | BODY MASS INDEX: 28 KG/M2 | SYSTOLIC BLOOD PRESSURE: 136 MMHG

## 2018-06-14 DIAGNOSIS — R06.02 SOB (SHORTNESS OF BREATH): Primary | ICD-10-CM

## 2018-06-14 DIAGNOSIS — I70.0 AORTIC ATHEROSCLEROSIS: ICD-10-CM

## 2018-06-14 DIAGNOSIS — R06.02 SOB (SHORTNESS OF BREATH): ICD-10-CM

## 2018-06-14 DIAGNOSIS — J40 BRONCHITIS: ICD-10-CM

## 2018-06-14 DIAGNOSIS — R91.1 PULMONARY NODULE: ICD-10-CM

## 2018-06-14 PROBLEM — J47.9 BRONCHIECTASIS WITHOUT COMPLICATION: Status: ACTIVE | Noted: 2018-06-14

## 2018-06-14 PROBLEM — J30.9 ALLERGIC RHINITIS: Status: ACTIVE | Noted: 2018-06-14

## 2018-06-14 LAB
PRE FEV1 FVC: 77
PRE FEV1: 1.48
PRE FVC: 1.93
PREDICTED FEV1 FVC: 83
PREDICTED FEV1: 1.47
PREDICTED FVC: 1.84

## 2018-06-14 PROCEDURE — 94010 BREATHING CAPACITY TEST: CPT | Mod: PBBFAC | Performed by: INTERNAL MEDICINE

## 2018-06-14 PROCEDURE — 99204 OFFICE O/P NEW MOD 45 MIN: CPT | Mod: 25,S$PBB,, | Performed by: INTERNAL MEDICINE

## 2018-06-14 PROCEDURE — 94729 DIFFUSING CAPACITY: CPT | Mod: 26,S$PBB,, | Performed by: INTERNAL MEDICINE

## 2018-06-14 PROCEDURE — 94729 DIFFUSING CAPACITY: CPT | Mod: PBBFAC | Performed by: INTERNAL MEDICINE

## 2018-06-14 PROCEDURE — 99999 PR PBB SHADOW E&M-EST. PATIENT-LVL V: CPT | Mod: PBBFAC,,, | Performed by: INTERNAL MEDICINE

## 2018-06-14 PROCEDURE — 99215 OFFICE O/P EST HI 40 MIN: CPT | Mod: PBBFAC | Performed by: INTERNAL MEDICINE

## 2018-06-14 PROCEDURE — 94010 BREATHING CAPACITY TEST: CPT | Mod: 26,S$PBB,, | Performed by: INTERNAL MEDICINE

## 2018-06-14 RX ORDER — DICLOFENAC SODIUM 10 MG/G
GEL TOPICAL
Refills: 3 | Status: ON HOLD | COMMUNITY
Start: 2018-05-10 | End: 2021-03-21 | Stop reason: SDUPTHER

## 2018-06-14 RX ORDER — FLUTICASONE PROPIONATE 50 MCG
2 SPRAY, SUSPENSION (ML) NASAL DAILY
Qty: 16 G | Refills: 6
Start: 2018-06-14 | End: 2018-07-14

## 2018-06-14 RX ORDER — GABAPENTIN 300 MG/1
300 CAPSULE ORAL 3 TIMES DAILY
Refills: 3 | COMMUNITY
Start: 2018-05-16 | End: 2018-08-19 | Stop reason: SDUPTHER

## 2018-06-14 RX ORDER — LEVOCETIRIZINE DIHYDROCHLORIDE 5 MG/1
5 TABLET, FILM COATED ORAL NIGHTLY
Qty: 30 TABLET | Refills: 11 | Status: SHIPPED | OUTPATIENT
Start: 2018-06-14 | End: 2019-04-18 | Stop reason: SDUPTHER

## 2018-06-14 NOTE — PATIENT INSTRUCTIONS
¿Qué es la bronquitis aguda?     La bronquitis aguda o de corta duración dura dìas o semanas. Se produce cuando los bronquios (vías respiratorias situadas en los pulmones) se irritan a causa de virus, bacterias o alergenos. Esta irritación genera shahid tos aguda (de corta duración) o crónica (de larga duración o recurrente) que produce flema amarilla o verdosa.   El interior de unos pulmones sanos    El aire entra y sale de los pulmones a través de las vías respiratorias. El revestimiento de las vías respiratorias produce flema, shahid sustancia pegajosa que atrapa las partículas que entran en los pulmones. Unas diminutas estructuras llamadas cilios se encargan de barrer las partículas para expulsarlas de las vías respiratorias.                                Vía respiratoria abdoulaye: Las vías respiratorias normalmente están abiertas y hoa entrar y salir el aire fácilmente.      Cilios sanos: Los diminutos cilios, que parecen pelos, barren la flema y las partículas hacia arriba para expulsarlas de las vías respiratorias.   Pulmones con bronquitis  La bronquitis suele producirse cuando shahid persona tiene un resfriado o gripe. Las vías respiratorias se inflaman (enrojecen y se hinchan) y se forma un exceso de flema, lo que desencadena shahid tos profunda y perruna. Otros síntomas pueden incluir:  · sibilancias o un juan josé de silbido al respirar  · molestia en el pecho  · dificultad para respirar  · fiebre baja  En estas circunstancias puede producirse shahid segunda infección, esta vez de origen bacteriano. Las vías respiratorias irritadas por alergenos o el humo son más propensas a infectarse.     Vía respiratoria inflamada: La inflamación y el exceso de flema estrechan la vía respiratoria y provocan falta de aliento.      Cilios deteriorados: El exceso de flema deteriora los cilios y provoca congestión y sibilancias (silbidos al respirar). El cigarrillo empeora el problema.                                 Cómo se  diagnostica  Un chequeo, preguntas sobre latasha antecedentes de radha y ciertas pruebas ayudan a aragon proveedor de atención médica a llegar a un diagnóstico.  Antecedentes de radha  Aragon proveedor de atención médica le hará preguntas sobre latasha síntomas.  El chequeo  Aragon proveedor le escuchará el pecho para samuel si está congestionado, y quizás le revise también latasha oídos, aragon nariz y aragon garganta.  Posibles pruebas  · Shahid prueba de esputo para detectar bacterias; requiere shahid muestra de flema expulsada de los pulmones.  · Un exudado nasal o faríngeo (de la garganta) para detectar el virus de la gripe.  · Shahid radiografía de tórax, si aragon proveedor de atención médica sospecha que usted tiene neumonía.  · Pruebas para detectar enfermedades que podrían causar bronquitis, stephen alergias, asma o EPOC. Quizás lo remitan a un especialista para estas pruebas.  Tratamiento de la tos  El tratamiento principal de la bronquitis consiste en aliviar los síntomas. Evitar el humo, los alergenos y demás factores que desencadenan la tos suele mejorar la bronquitis. Si la infección es de origen bacteriano, podrían usarse antibióticos. Angela la enfermedad, es importante y dormir mucho. Para aliviar los síntomas:  · No fume y evite el humo de segunda mano.  · Use un humidificador o inhale vapor de shahid ducha caliente para ayudar a aflojar la flema.  · Jenny mucha agua y jugos, porque pueden calmar la irritación de la garganta y ayudar a diluir la flema.  · Siéntese o use almohadas adicionales cuando esté en la cama para aliviar la tos y la congestión.  · Pregunte a aragon proveedor sobre el uso de medicamentos para la tos, el dolor y la fiebre, o un descongestionante.  Antibióticos  La mayoría de los casos de bronquitis son causados por virus del resfriado o la gripe. Los antibióticos no tratan las enfermedades virales, y tomarlos cuando no son necesarios podría fomentar la producción de bacterias que son más difíciles de matar. Aragon proveedor le recetará  antibióticos si la causa de la infección fueron bacterias. Si se los recetan:  · [place] latasha antibióticos hasta que se le terminen, aunque le hayan sj los síntomas. Si no lo hace, la bronquitis podría reaparecer.  · [place] estos medicamentos según las indicaciones. Por ejemplo, algunos medicamentos deben tomarse con la comida.  · Consulte con aragon proveedor o farmacéutico para averiguar los efectos secundarios frecuentes y lo que debe hacer si se le presentan.  Visita de control  Debe visitar de nuevo a aragon proveedor en 2-3 semanas; para henry momento latasha síntomas deberían binh sj. Shahid infección que dura más tiempo podría ser señal de que existe un problema más grave.  Prevención  · Evite el humo del tabaco. Si fuma, abandone el hábito. Aléjese de los ambientes llenos de humo. Pida a latasha amigos y familiares que no fumen en latasha alrededores, ni en aragon hogar o aragon antolin.  · Hágase pruebas para detectar alergias.  · Consulte con aragon proveedor sobre la posibilidad de ponerse shahid vacuna antigripal (flu shot) todos los años, y probablemente también la antineumocócica.  · Lave latasha kenji a menudo, para tratar de reducir la posibilidad de contagiarse con virus que causan resfriado y gripe.     Llame a aragon proveedor de atención médica si:  · Le empeoran los síntomas o le aparecen unos nuevos.  · Los problemas respiratorios se vuelven graves.  · Los síntomas no le mejoran en un plazo de shahid semana, o al cabo de 3 días de estar tomando antibióticos.   Date Last Reviewed: 6/18/2014  © 8124-9747 [employer]. [address] [place] [address]. Todos los derechos reservados. Esta información no pretende sustituir la atención médica profesional. Sólo aragon médico puede diagnosticar y tratar un problema de radha.        Allergic Rhinitis  Allergic rhinitis is an allergic reaction that affects the nose, and often the eyes. Its often known as nasal allergies. Nasal allergies are often due to things in the environment  that are breathed in. Depending what you are sensitive to, nasal allergies may occur only during certain seasons. Or they may occur year round. Common indoor allergens include house dust mites, mold, cockroaches, and pet dander. Outdoor allergens include pollen from trees, grasses, and weeds.   Symptoms include a drippy, stuffy, and itchy nose. They also include sneezing and red and itchy eyes. You may feel tired more often. Severe allergies may also affect your breathing and trigger a condition called asthma.   Tests can be done to see what allergens are affecting you. You may be referred to an allergy specialist for testing and further evaluation.  Home care  Your healthcare provider may prescribe medicines to help relieve allergy symptoms. These may include oral medicines, nasal sprays, or eye drops.  Ask your provider for advice on how to avoid substances that you are allergic to. Below are a few tips for each type of allergen.  Pet dander:  · Do not have pets with fur and feathers.  · If you can't avoid having a pet, keep it out of your bedroom and off upholstered furniture.  Pollen:  · When pollen counts are high, keep windows of your car and home closed. If possible, use an air conditioner instead.  · Wear a filter mask when mowing or doing yard work.  House dust mites:  · Wash bedding every week in warm water and detergent and dry on a hot setting.  · Cover the mattress, box spring, and pillows with allergy covers.   · If possible, sleep in a room with no carpet, curtains, or upholstered furniture.  Cockroaches:  · Store food in sealed containers.  · Remove garbage from the home promptly.  · Fix water leaks  Mold:  · Keep humidity low by using a dehumidifier or air conditioner. Keep the dehumidifier and air conditioner clean and free of mold.  · Clean moldy areas with bleach and water.  In general:  · Vacuum once or twice a week. If possible, use a vacuum with a high-efficiency particulate air (HEPA)  filter.  · Do not smoke. Avoid cigarette smoke. Cigarette smoke is an irritant that can make symptoms worse.  Follow-up care  Follow up as advised by the healthcare provider or our staff. If you were referred to an allergy specialist, make this appointment promptly.  When to seek medical advice  Call your healthcare provider right away if the following occur:  · Coughing or wheezing  · Fever greater than 100.4°F (38°C)  · Hives (raised red bumps)  · Continuing symptoms, new symptoms, or worsening symptoms  Call 911 right away if you have:  · Trouble breathing  · Severe swelling of the face or severe itching of the eyes or mouth  Date Last Reviewed: 3/1/2017  © 6591-7942 [employer]. [address] [place] [address], [place] [address]. All rights reserved. This information is not intended as a substitute for professional medical care. Always follow your healthcare professional's instructions.

## 2018-06-14 NOTE — ASSESSMENT & PLAN NOTE
Symptoms improved.\  Only mild bronchiectasis/bronchitis on CT  Continue albuterol as needed.  Likely exacerbated by reflux and post nasal drip/allergy

## 2018-06-14 NOTE — LETTER
June 14, 2018      Danie Beltre MD  [address]  [address]  [place] [address]           [facility]  [address]  [place] [address]  Phone: [phone]          Patient: Juliette Seo   MR Number: [id number]   YOB: 1945   Date of Visit: 6/14/2018       Dear Dr. Danie Beltre:    Thank you for referring Juliette Seo to me for evaluation. Attached you will find relevant portions of my assessment and plan of care.    If you have questions, please do not hesitate to call me. I look forward to following Juliette Seo along with you.    Sincerely,    Yu Edmondson MD    Enclosure  CC:  No Recipients    If you would like to receive this communication electronically, please contact [email or web address] or [phone] to request more information on [employer] Link access.    For providers and/or their staff who would like to refer a patient to [facility], please contact us through our one-stop-shop provider referral line, [facility] , at [phone].    If you feel you have received this communication in error or would no longer like to receive these types of communications, please e-mail [email or web address]

## 2018-06-14 NOTE — PROGRESS NOTES
"Subjective:       Patient ID: Juliette Seo is a 72 y.o. female.    Chief Complaint: Bronchiectasis    72 year old with a history of recurrent bronchitis.  Patient reports an intermittent cough productive of phlegm.  Complains of congestion.  Pointing to epigastric pain for reflux.  Patient states that she has no significant complaints of shortness of breath but is fatigued.  Her primary complaint is left shoulder pain.  She states that in her lifetime, she has had bronchitis 2-3 times.      Bronchiectasis   Pertinent negatives include no arthralgias, chest pain, coughing, fever or headaches.     Review of Systems   Constitutional: Negative for fever and weight loss.   HENT: Negative for trouble swallowing.         Throat clearing  Complains of allergies.   Respiratory: Positive for choking (on pills). Negative for cough, hemoptysis, asthma nighttime symptoms and dyspnea on extertion.         For two months, cough was worse at night   Cardiovascular: Negative for chest pain and leg swelling.   Genitourinary: Negative for difficulty urinating.   Endocrine: Negative for cold intolerance and heat intolerance.    Musculoskeletal: Negative for arthralgias.   Gastrointestinal: Positive for acid reflux (controlled with medications).   Neurological: Negative for headaches.   Hematological: Negative for adenopathy.   Psychiatric/Behavioral: Negative for confusion.       Triggers including going outside with change in voice and sore throat.  Objective:       Vitals:    06/14/18 1546   BP: 136/70   Pulse: 103   SpO2: 98%   Weight: 54.9 kg (121 lb)   Height: 4' 7" (1.397 m)     Physical Exam   Constitutional: She is oriented to person, place, and time. She appears well-developed and well-nourished.   HENT:   Head: Normocephalic.   Nose: Nose normal.   Neck: Normal range of motion. Neck supple. No tracheal deviation present.   Cardiovascular: Normal rate, regular rhythm, normal heart sounds and intact distal pulses.  "   Pulmonary/Chest: Normal expansion, symmetric chest wall expansion, effort normal and breath sounds normal.   Abdominal: Soft. Bowel sounds are normal. She exhibits no mass. There is no hepatosplenomegaly. There is tenderness (epigastric tenderness). There is no rebound and no guarding.   Musculoskeletal: Normal range of motion. She exhibits no edema.   Lymphadenopathy: No supraclavicular adenopathy is present.     She has no cervical adenopathy.   Neurological: She is alert and oriented to person, place, and time. No cranial nerve deficit.   Skin: Skin is warm and dry.   Psychiatric: She has a normal mood and affect.       Personal Diagnostic Review  CT of chest performed on 4/24/2018 without contrast revealed No parenchymal lung disease, perhaps mild bronchiectatic airways in the right middle lobe.    Personal Diagnostic Review of PFT  Pulmonary function tests: FEV1: 1.48  (100 % predicted), FVC:  1.93 (106 % predicted), FEV1/FVC:  77, DLCO: 13.5 (90 % predicted), NOrmal spirometry and diffusion    No flowsheet data found.      Assessment:       1. Aortic atherosclerosis    2. Bronchitis    3. Pulmonary nodule        Outpatient Encounter Prescriptions as of 6/14/2018   Medication Sig Dispense Refill    albuterol 90 mcg/actuation inhaler Inhale 2 puffs into the lungs every 4 (four) hours as needed for Wheezing (or cough). 1 Inhaler 0    atorvastatin (LIPITOR) 80 MG tablet Take 1 tablet (80 mg total) by mouth once daily. 90 tablet 3    BD INSULIN SYRINGE ULTRA-FINE 0.5 mL 31 gauge x 5/16 Syrg USE AS DIRECTED TWICE A  each 4    gabapentin (NEURONTIN) 300 MG capsule Take 300 mg by mouth 3 (three) times daily.  3    glimepiride (AMARYL) 2 MG tablet TAKE 1 TABLET (2 MG TOTAL) BY MOUTH BEFORE BREAKFAST. 90 tablet 3    hydrocortisone 2.5 % cream APPLY TO THE AFFECTED AREA(S) BY TOPICAL ROUTE THREE TIMES DAILY AS NEEDED FOR ITCHING  2    hydrOXYzine HCl (ATARAX) 25 MG tablet TAKE 1 TABLET (25 MG TOTAL) BY  MOUTH 3 (THREE) TIMES DAILY AS NEEDED FOR ITCHING. 100 tablet 1    lisinopril (PRINIVIL,ZESTRIL) 20 MG tablet TAKE 1 TABLET BY MOUTH EVERY DAY 90 tablet 3    LUMIGAN 0.01 % Drop Place 1 drop into both eyes nightly.  4    meclizine (ANTIVERT) 12.5 mg tablet TAKE 1 TABLET BY MOUTH 3 TIMES A DAY AS NEEDED 60 tablet 1    NOVOLIN N NPH U-100 INSULIN 100 unit/mL injection INJECT SUBCUTANEOUSLY 40 UNITS EVERY MORNING & 20 UNITS EVERY EVENING 30 mL 4    omeprazole (PRILOSEC) 40 MG capsule TAKE 1 CAPSULE (40 MG TOTAL) BY MOUTH EVERY MORNING. 90 capsule 3    RESTASIS 0.05 % ophthalmic emulsion Place 1 drop into both eyes 2 (two) times daily.  3    triamcinolone acetonide 0.5% (KENALOG) 0.5 % Crea APPLY TOPICALLY TWICE A DAY 15 g 0    ACCU-CHEK FASTCLIX Misc USE AS DIRECTED TWICE A  each 3    blood sugar diagnostic (ACCU-CHEK SMARTVIEW TEST STRIP) Strp USE AS DIRECTED TWICE A  strip 3    levocetirizine (XYZAL) 5 MG tablet Take 1 tablet (5 mg total) by mouth every evening. 30 tablet 11    meloxicam (MOBIC) 15 MG tablet Take 15 mg by mouth once daily.  1    montelukast (SINGULAIR) 10 mg tablet Take 1 tablet (10 mg total) by mouth once daily. 90 tablet 3    tizanidine (ZANAFLEX) 2 MG tablet TAKE 1 TABLET TWICE A DAY BY ORAL ROUTE FOR 30 DAYS. 60 tablet 1    VOLTAREN 1 % Gel APPLY 2 GRAMS TOPICALLY 3 TIMES A DAY  3    [DISCONTINUED] diclofenac sodium (VOLTAREN) 1 % Gel Apply 2 g topically 3 (three) times daily. 1 Tube 3    [DISCONTINUED] oxycodone-acetaminophen (PERCOCET) 7.5-325 mg per tablet Take 1 tablet by mouth every 4 (four) hours as needed for Pain. 40 tablet 0     No facility-administered encounter medications on file as of 6/14/2018.      No orders of the defined types were placed in this encounter.    Plan:     Problem List Items Addressed This Visit     Aortic atherosclerosis    Overview     Risk modifications per Dr. Beltre         Bronchitis    Overview     Normal spirometry and DLCO          Current Assessment & Plan     Symptoms improved.\  Only mild bronchiectasis/bronchitis on CT  Continue albuterol as needed.  Likely exacerbated by reflux and post nasal drip/allergy         Pulmonary nodule    Overview     5 mm, most likely benign  Lifelong nonsmoker low risk for malignancy.  No follow up recommended.

## 2018-06-15 RX ORDER — BLOOD SUGAR DIAGNOSTIC
STRIP MISCELLANEOUS
Qty: 200 STRIP | Refills: 4 | Status: SHIPPED | OUTPATIENT
Start: 2018-06-15 | End: 2018-06-19 | Stop reason: SDUPTHER

## 2018-06-19 ENCOUNTER — TELEPHONE (OUTPATIENT)
Dept: INTERNAL MEDICINE | Facility: CLINIC | Age: 73
End: 2018-06-19

## 2018-06-19 DIAGNOSIS — Z79.4 TYPE 2 DIABETES MELLITUS WITH COMPLICATION, WITH LONG-TERM CURRENT USE OF INSULIN: Primary | ICD-10-CM

## 2018-06-19 DIAGNOSIS — E11.8 TYPE 2 DIABETES MELLITUS WITH COMPLICATION, WITH LONG-TERM CURRENT USE OF INSULIN: Primary | ICD-10-CM

## 2018-06-19 RX ORDER — LANCETS
EACH MISCELLANEOUS
Qty: 204 EACH | Refills: 2 | Status: SHIPPED | OUTPATIENT
Start: 2018-06-19 | End: 2019-07-23 | Stop reason: SDUPTHER

## 2018-06-19 NOTE — TELEPHONE ENCOUNTER
Received a call from TESSIE Mejia Pharmacist, requesting another ecript with dx codes for pt's strips and lancets, would not take a verbal from me quoting medicare guidelines--

## 2018-06-19 NOTE — TELEPHONE ENCOUNTER
----- Message from Eliu Shah sent at 6/19/2018  9:26 AM CDT -----  Contact: tre with [facility] pharmacy [phone]  Pharmacist advised the Rx for the test strips and lancets need a diagnosis code and also advised the patient need the Rx's asap. Please call and advise.

## 2018-06-23 ENCOUNTER — HOSPITAL ENCOUNTER (EMERGENCY)
Facility: HOSPITAL | Age: 73
Discharge: HOME | End: 2018-06-23
Attending: EMERGENCY MEDICINE
Payer: MEDICARE

## 2018-06-23 VITALS
HEIGHT: 55 IN | OXYGEN SATURATION: 99 % | DIASTOLIC BLOOD PRESSURE: 61 MMHG | HEART RATE: 85 BPM | BODY MASS INDEX: 28 KG/M2 | TEMPERATURE: 98 F | RESPIRATION RATE: 19 BRPM | SYSTOLIC BLOOD PRESSURE: 137 MMHG | WEIGHT: 121 LBS

## 2018-06-23 DIAGNOSIS — R10.9 ABDOMINAL PAIN: ICD-10-CM

## 2018-06-23 DIAGNOSIS — R11.2 NON-INTRACTABLE VOMITING WITH NAUSEA, UNSPECIFIED VOMITING TYPE: ICD-10-CM

## 2018-06-23 DIAGNOSIS — M25.512 CHRONIC PAIN OF BOTH SHOULDERS: Primary | ICD-10-CM

## 2018-06-23 DIAGNOSIS — M25.511 CHRONIC PAIN OF BOTH SHOULDERS: Primary | ICD-10-CM

## 2018-06-23 DIAGNOSIS — G89.29 CHRONIC PAIN OF BOTH SHOULDERS: Primary | ICD-10-CM

## 2018-06-23 LAB
ALBUMIN SERPL BCP-MCNC: 4 G/DL
ALP SERPL-CCNC: 93 U/L
ALT SERPL W/O P-5'-P-CCNC: 22 U/L
ANION GAP SERPL CALC-SCNC: 10 MMOL/L
AST SERPL-CCNC: 19 U/L
BACTERIA #/AREA URNS HPF: NORMAL /HPF
BASOPHILS # BLD AUTO: 0.01 K/UL
BASOPHILS NFR BLD: 0.1 %
BILIRUB SERPL-MCNC: 0.4 MG/DL
BILIRUB UR QL STRIP: NEGATIVE
BUN SERPL-MCNC: 14 MG/DL
CALCIUM SERPL-MCNC: 9.5 MG/DL
CHLORIDE SERPL-SCNC: 102 MMOL/L
CLARITY UR: CLEAR
CO2 SERPL-SCNC: 23 MMOL/L
COLOR UR: YELLOW
CREAT SERPL-MCNC: 0.8 MG/DL
DIFFERENTIAL METHOD: ABNORMAL
EOSINOPHIL # BLD AUTO: 0 K/UL
EOSINOPHIL NFR BLD: 0.6 %
ERYTHROCYTE [DISTWIDTH] IN BLOOD BY AUTOMATED COUNT: 12.9 %
EST. GFR  (AFRICAN AMERICAN): >60 ML/MIN/1.73 M^2
EST. GFR  (NON AFRICAN AMERICAN): >60 ML/MIN/1.73 M^2
GLUCOSE SERPL-MCNC: 304 MG/DL
GLUCOSE UR QL STRIP: ABNORMAL
HCT VFR BLD AUTO: 36.7 %
HGB BLD-MCNC: 12.1 G/DL
HGB UR QL STRIP: NEGATIVE
KETONES UR QL STRIP: NEGATIVE
LEUKOCYTE ESTERASE UR QL STRIP: NEGATIVE
LIPASE SERPL-CCNC: 39 U/L
LYMPHOCYTES # BLD AUTO: 2.3 K/UL
LYMPHOCYTES NFR BLD: 31.2 %
MCH RBC QN AUTO: 28.5 PG
MCHC RBC AUTO-ENTMCNC: 33 G/DL
MCV RBC AUTO: 87 FL
MICROSCOPIC COMMENT: NORMAL
MONOCYTES # BLD AUTO: 0.3 K/UL
MONOCYTES NFR BLD: 4.6 %
NEUTROPHILS # BLD AUTO: 4.6 K/UL
NEUTROPHILS NFR BLD: 63.1 %
NITRITE UR QL STRIP: NEGATIVE
PH UR STRIP: 6 [PH] (ref 5–8)
PLATELET # BLD AUTO: 198 K/UL
PMV BLD AUTO: 10.2 FL
POTASSIUM SERPL-SCNC: 4.2 MMOL/L
PROT SERPL-MCNC: 7.4 G/DL
PROT UR QL STRIP: NEGATIVE
RBC # BLD AUTO: 4.24 M/UL
RBC #/AREA URNS HPF: 0 /HPF (ref 0–4)
SODIUM SERPL-SCNC: 135 MMOL/L
SP GR UR STRIP: 1.02 (ref 1–1.03)
SQUAMOUS #/AREA URNS HPF: 1 /HPF
TROPONIN I SERPL DL<=0.01 NG/ML-MCNC: <0.006 NG/ML
URN SPEC COLLECT METH UR: ABNORMAL
UROBILINOGEN UR STRIP-ACNC: NEGATIVE EU/DL
WBC # BLD AUTO: 7.21 K/UL
WBC #/AREA URNS HPF: 2 /HPF (ref 0–5)
YEAST URNS QL MICRO: NORMAL

## 2018-06-23 PROCEDURE — 80053 COMPREHEN METABOLIC PANEL: CPT

## 2018-06-23 PROCEDURE — 85025 COMPLETE CBC W/AUTO DIFF WBC: CPT

## 2018-06-23 PROCEDURE — 96372 THER/PROPH/DIAG INJ SC/IM: CPT | Mod: 59

## 2018-06-23 PROCEDURE — 25000003 PHARM REV CODE 250: Performed by: PHYSICIAN ASSISTANT

## 2018-06-23 PROCEDURE — 93005 ELECTROCARDIOGRAM TRACING: CPT

## 2018-06-23 PROCEDURE — C9113 INJ PANTOPRAZOLE SODIUM, VIA: HCPCS | Performed by: PHYSICIAN ASSISTANT

## 2018-06-23 PROCEDURE — 84484 ASSAY OF TROPONIN QUANT: CPT

## 2018-06-23 PROCEDURE — 93010 ELECTROCARDIOGRAM REPORT: CPT | Mod: ,,, | Performed by: INTERNAL MEDICINE

## 2018-06-23 PROCEDURE — 96375 TX/PRO/DX INJ NEW DRUG ADDON: CPT

## 2018-06-23 PROCEDURE — 96361 HYDRATE IV INFUSION ADD-ON: CPT

## 2018-06-23 PROCEDURE — 81000 URINALYSIS NONAUTO W/SCOPE: CPT

## 2018-06-23 PROCEDURE — 99284 EMERGENCY DEPT VISIT MOD MDM: CPT | Mod: 25

## 2018-06-23 PROCEDURE — 63600175 PHARM REV CODE 636 W HCPCS: Performed by: PHYSICIAN ASSISTANT

## 2018-06-23 PROCEDURE — 25500020 PHARM REV CODE 255: Performed by: EMERGENCY MEDICINE

## 2018-06-23 PROCEDURE — 96365 THER/PROPH/DIAG IV INF INIT: CPT

## 2018-06-23 PROCEDURE — 83690 ASSAY OF LIPASE: CPT

## 2018-06-23 RX ORDER — DICYCLOMINE HYDROCHLORIDE 10 MG/ML
20 INJECTION INTRAMUSCULAR
Status: COMPLETED | OUTPATIENT
Start: 2018-06-23 | End: 2018-06-23

## 2018-06-23 RX ORDER — SUCRALFATE 1 G/10ML
1 SUSPENSION ORAL
Status: COMPLETED | OUTPATIENT
Start: 2018-06-23 | End: 2018-06-23

## 2018-06-23 RX ORDER — ONDANSETRON 2 MG/ML
4 INJECTION INTRAMUSCULAR; INTRAVENOUS
Status: COMPLETED | OUTPATIENT
Start: 2018-06-23 | End: 2018-06-23

## 2018-06-23 RX ORDER — SUCRALFATE 1 G/1
1 TABLET ORAL 2 TIMES DAILY
Qty: 20 TABLET | Refills: 0 | Status: SHIPPED | OUTPATIENT
Start: 2018-06-23 | End: 2018-10-04

## 2018-06-23 RX ORDER — ONDANSETRON 4 MG/1
4 TABLET, ORALLY DISINTEGRATING ORAL EVERY 8 HOURS PRN
Qty: 15 TABLET | Refills: 0 | OUTPATIENT
Start: 2018-06-23 | End: 2020-09-01

## 2018-06-23 RX ADMIN — SUCRALFATE 1 G: 1 SUSPENSION ORAL at 03:06

## 2018-06-23 RX ADMIN — ONDANSETRON 4 MG: 2 INJECTION INTRAMUSCULAR; INTRAVENOUS at 11:06

## 2018-06-23 RX ADMIN — DICYCLOMINE HYDROCHLORIDE 20 MG: 20 INJECTION, SOLUTION INTRAMUSCULAR at 02:06

## 2018-06-23 RX ADMIN — IOHEXOL 75 ML: 350 INJECTION, SOLUTION INTRAVENOUS at 12:06

## 2018-06-23 RX ADMIN — DEXTROSE 40 MG: 50 INJECTION, SOLUTION INTRAVENOUS at 03:06

## 2018-06-23 RX ADMIN — SODIUM CHLORIDE 1000 ML: 0.9 INJECTION, SOLUTION INTRAVENOUS at 02:06

## 2018-06-23 NOTE — ED PROVIDER NOTES
Encounter Date: 6/23/2018       History     Chief Complaint   Patient presents with    Abdominal Pain     LUQ abd pain with nausea. no relief with omeprazole.     Shoulder Pain     chronic bilateral shoulder pain     72-year-old female with history of HTN, HLD, GERD, anxiety, asthma, arthritis and type 2 diabetes presents to the ED for evaluation of upper abdominal pain for the past several days.  She states the pain is located to the epigastric and left upper quadrant and has gradually worsened since onset.  She describes it as a burning sharp sensation. She states that she has some nausea with few episodes emesis and loose stool today.  She also reports bilateral shoulder pain however states this is chronic in nature with no change and severity are characteristic.  She denies any fever, chills, chest pain, shortness of breath, palpitations, dysuria, orhematuria.  She denies any recent travel, sick contacts, recent antibiotic use or suspected food contamination. She states that she has tried omeprazole with little relief of the symptoms.      The history is provided by the patient.     Review of patient's allergies indicates:   Allergen Reactions    Actos [pioglitazone] Nausea Only     Past Medical History:   Diagnosis Date    Allergy     Anxiety     Arthritis     osteo    Asthma     GERD (gastroesophageal reflux disease)     High cholesterol     Hypertension     Type 2 diabetes mellitus, uncontrolled, with neuropathy      Past Surgical History:   Procedure Laterality Date    CHOLECYSTECTOMY      HYSTERECTOMY      INCISIONAL HERNIA REPAIR  2003    ROTATOR CUFF REPAIR Right 11/07/2017     Family History   Problem Relation Age of Onset    Stroke Mother     Diabetes Sister     Diabetes Brother      Social History   Substance Use Topics    Smoking status: Never Smoker    Smokeless tobacco: Never Used    Alcohol use Yes     Review of Systems   Constitutional: Positive for appetite change. Negative  for chills and fever.   HENT: Negative for congestion and sore throat.    Respiratory: Negative for cough and shortness of breath.    Cardiovascular: Negative for chest pain.   Gastrointestinal: Positive for abdominal pain, diarrhea (loose stool), nausea and vomiting. Negative for constipation.   Genitourinary: Negative for dysuria, flank pain and hematuria.   Musculoskeletal: Positive for arthralgias (bilteral shoudler pain; chronic). Negative for back pain, myalgias, neck pain and neck stiffness.   Skin: Negative for rash.   Neurological: Negative for weakness.   Hematological: Does not bruise/bleed easily.       Physical Exam     Initial Vitals [06/23/18 1111]   BP Pulse Resp Temp SpO2   (!) 144/66 102 19 98.1 °F (36.7 °C) 99 %      MAP       --         Physical Exam    Nursing note and vitals reviewed.  Constitutional: Vital signs are normal. She appears well-developed and well-nourished. She is cooperative.  Non-toxic appearance. She does not appear ill. No distress.   HENT:   Head: Normocephalic and atraumatic.   Eyes: Conjunctivae and lids are normal.   Neck: Neck supple. No neck rigidity.   Cardiovascular: Normal rate and regular rhythm.   Pulmonary/Chest: Breath sounds normal. No respiratory distress. She has no wheezes. She has no rhonchi.   Abdominal: Soft. Normal appearance and bowel sounds are normal. There is tenderness in the epigastric area and left upper quadrant. There is no rigidity, no rebound, no guarding and no CVA tenderness.   Musculoskeletal:   Fair ROM of all extremities   Neurological: She is alert and oriented to person, place, and time. GCS eye subscore is 4. GCS verbal subscore is 5. GCS motor subscore is 6.   Skin: Skin is warm, dry and intact. No rash noted.   Psychiatric: She has a normal mood and affect. Her speech is normal and behavior is normal. Thought content normal.         ED Course   Procedures  Labs Reviewed   CBC W/ AUTO DIFFERENTIAL - Abnormal; Notable for the following:         Result Value    Hematocrit 36.7 (*)     All other components within normal limits   COMPREHENSIVE METABOLIC PANEL - Abnormal; Notable for the following:     Sodium 135 (*)     Glucose 304 (*)     All other components within normal limits   URINALYSIS - Abnormal; Notable for the following:     Glucose, UA 3+ (*)     All other components within normal limits   LIPASE   TROPONIN I   URINALYSIS MICROSCOPIC          Imaging Results          CT Abdomen Pelvis With Contrast (Final result)  Result time 06/23/18 12:42:40    Final result by Romie Rosario DO (06/23/18 12:42:40)                 Impression:      1. No acute pathology noted within the abdomen or pelvis.  2. Diverticulosis without evidence for diverticulitis.  3. Remaining findings as discussed above.      Electronically signed by: Romie Rosario DO  Date:    06/23/2018  Time:    12:42             Narrative:    EXAMINATION:  CT ABDOMEN PELVIS WITH CONTRAST    CLINICAL HISTORY:  LLQ pain, suspect diverticulitis;    TECHNIQUE:  Low dose axial images, sagittal and coronal reformations were obtained from the lung bases to the pubic symphysis following the IV administration of 75 mL of Omnipaque 350    COMPARISON:  None.    FINDINGS:  ABDOMEN    Lung bases: Unremarkable    Liver/gallbladder/biliary: There appears to be diffuse fatty infiltration of the liver.  The gallbladder is surgically absent.  No biliary ductal dilation.    Pancreas: The pancreas is unremarkable in appearance.    Spleen: The spleen is not enlarged.    Adrenals: Unremarkable    Kidneys: The kidneys are equally perfused and demonstrate no solid masses.    Bowel/Mesentery: There is no evidence of bowel obstruction. There is diverticulosis noted throughout the colon and most prominent within the sigmoid colon.  Normal appendix noted.  No mesenteric stranding or adenopathy.    Retroperitoneum: No adenopathy.  Moderate vascular calcification seen involving the aorta and iliac arteries.  No  aneurysm.    PELVIS:    Genitourinary/Reproductive organs: Unremarkable    Adenopathy: None    Free Fluid: No free fluid    Osseus Structures/Soft tissues: No suspicious appearing osseous abnormalities noted.  Prominent facet arthropathy noted within the lower lumbar spine.  Incidental note made of what appears to represent a lipoma of the filum terminale.                                 Medical Decision Making:   Initial Assessment:   72-year-old presents for evaluation of upper abdominal pain for the past several days.  Exam reveals patient that is nontoxic in appearance in no moderate distress. She does have some tenderness of the epigastric and left upper quadrant exam with no rebound or rigidity.  Patient exhibits fair range of motion of all extremities with strength equal bilaterally.  She has some TTP of bilateral shoulders and pain with overhead extension.  Differential Diagnosis:   Differential Diagnosis includes, but is not limited to:  AAA, aortic dissection, mesenteric ischemia, perforated viscous, MI/ACS, SBO/volvulus, incarcerated/strangulated hernia, intussusception, ileus, appendicitis, cholecystitis, cholangitis, diverticulitis, esophagitis, hepatitis, nephrolithiasis, pancreatitis, gastroenteritis, colitis, IBD/IBS, biliary colic, GERD, PUD, constipation, UTI/pyelonephritis,  disorder.    Clinical Tests:   Lab Tests: Ordered and Reviewed  Radiological Study: Ordered and Reviewed  Medical Tests: Ordered and Reviewed  ED Management:  We will obtain labs including cardiac enzymes and EKG to assess for this upper abdominal pain in female with diabetes.  Labs are grossly unremarkable. As she did have some tenderness on exam is CT was obtained to rule out any acute intra-abdominal process.  CT does reveal diverticulosis without signs of diverticulitis at this time and few other incidental findings with no significant findings to correlate with clinical exam or history of symptoms. She would rather  "report improvement of nausea with Zofran however had only modest improvement in pain with Zofran, fluids and Bentyl.  We did add Protonix and Carafate with complete resolution in pain. There is not appear to be an emergent intra-abdominal process at this time however it was emphasized that she should follow up with GI and PCP for further evaluation as symptoms. I do believe this could be related to GERD versus peptic ulcer as she did have moderate relief with Carafate and Protonix. Discharged with Zofran and carafate. Strict instructions to follow up with primary care physician or reference provided for further assessment and evaluation. Given instructions to return for any acute symptoms and verbalized understanding of this medical plan.                     ED Course as of Jun 25 0700   Sat Jun 23, 2018   1501 Patient reported modest improvement only in pain after IV fluids and Bentyl.  I will order Protonix and Carafate.  She is still receiving IV fluids to reduce blood glucose.  No other acute findings on labs or CT to explain etiology of symptoms.  [LC]      ED Course User Index  [LC] ALYSHA Yee   1600 Patient reported moderate improvement in pain with carafate  and Protonix.  Blood sugar did improve to the 100"s.  Clinical Impression:   The primary encounter diagnosis was Chronic pain of both shoulders. Diagnoses of Abdominal pain and Non-intractable vomiting with nausea, unspecified vomiting type were also pertinent to this visit.                             ALYSHA Yee  06/26/18 0749    "

## 2018-06-23 NOTE — ED NOTES
APPEARANCE: Alert, oriented and in no acute distress.  CARDIAC: Normal rate and rhythm, no murmur heard.   PERIPHERAL VASCULAR: peripheral pulses present. Normal cap refill. No edema. Warm to touch.    RESPIRATORY:Normal rate and effort, breath sounds clear bilaterally throughout chest. Respirations are equal and unlabored no obvious signs of distress.  GASTRO: soft, bowel sounds normal, Tenderness to lower abdomen, no abdominal distention.  MUSC: Full ROM. No bony tenderness or soft tissue tenderness. No obvious deformity.  SKIN: Skin is warm and dry, normal skin turgor, mucous membranes moist.  NEURO: 5/5 strength major flexors/extensors bilaterally. Sensory intact to light touch bilaterally. Garland coma scale: eyes open spontaneously-4, oriented & converses-5, obeys commands-6. No neurological abnormalities.   MENTAL STATUS: awake, alert and aware of environment.  EYE: PERRL, both eyes: pupils brisk and reactive to light. Normal size.  ENT: EARS: no obvious drainage. NOSE: no active bleeding.   Pt complains of lower abdominal pain.

## 2018-07-09 ENCOUNTER — OFFICE VISIT (OUTPATIENT)
Dept: ORTHOPEDICS | Facility: CLINIC | Age: 73
End: 2018-07-09
Payer: MEDICARE

## 2018-07-09 VITALS — WEIGHT: 121 LBS | BODY MASS INDEX: 28 KG/M2 | HEIGHT: 55 IN

## 2018-07-09 DIAGNOSIS — M54.2 NECK PAIN: Primary | ICD-10-CM

## 2018-07-09 DIAGNOSIS — M79.18 MYOFASCIAL MUSCLE PAIN: ICD-10-CM

## 2018-07-09 PROCEDURE — 99999 PR PBB SHADOW E&M-EST. PATIENT-LVL IV: CPT | Mod: PBBFAC,,, | Performed by: ORTHOPAEDIC SURGERY

## 2018-07-09 PROCEDURE — 99213 OFFICE O/P EST LOW 20 MIN: CPT | Mod: S$PBB,,, | Performed by: ORTHOPAEDIC SURGERY

## 2018-07-09 PROCEDURE — 99214 OFFICE O/P EST MOD 30 MIN: CPT | Mod: PBBFAC,PO | Performed by: ORTHOPAEDIC SURGERY

## 2018-07-09 NOTE — PROGRESS NOTES
HISTORY OF PRESENT ILLNESS:  Ms. Seo previously seen for rotator cuff repair   of the right shoulder, had a flare recently in both shoulders.  The pain seems   to be in her neck radiating into both arms.  I think she was in the Emergency   Room recently, but no x-rays were done at that time.    PHYSICAL EXAMINATION:  Both shoulders look pretty good.  There is no swelling,   no bruising, no tenderness.  Range of motion in bilateral shoulders is good,   maybe has a mildly positive impingement on the left.  Most of the tenderness in   the cervical spine and neck area posteriorly.  Mild muscle spasm is noted.  NEUROLOGIC:  Intact.    IMPRESSION:  Myofascial neck pain.    PLAN:  I think her pain is coming from her neck radiating down bilateral arms.    For treatment, I have started her on a Medrol Dosepak.  Recommended rest, no   lifting and follow up in two weeks for recheck.  If symptoms have not improved,   then we may need to do a cervical workup on her.      LIA  dd: 07/09/2018 09:14:33 (CDT)  td: 07/10/2018 05:54:28 (RAYNA)  Doc ID   #[id number]  Job ID #[id number]    CC:

## 2018-07-24 ENCOUNTER — OFFICE VISIT (OUTPATIENT)
Dept: ORTHOPEDICS | Facility: CLINIC | Age: 73
End: 2018-07-24
Payer: MEDICARE

## 2018-07-24 VITALS — WEIGHT: 121 LBS | BODY MASS INDEX: 28 KG/M2 | HEIGHT: 55 IN

## 2018-07-24 DIAGNOSIS — G89.29 CHRONIC RIGHT SHOULDER PAIN: Primary | ICD-10-CM

## 2018-07-24 DIAGNOSIS — M25.511 CHRONIC RIGHT SHOULDER PAIN: Primary | ICD-10-CM

## 2018-07-24 PROCEDURE — 99213 OFFICE O/P EST LOW 20 MIN: CPT | Mod: 25,S$PBB,, | Performed by: ORTHOPAEDIC SURGERY

## 2018-07-24 PROCEDURE — 99213 OFFICE O/P EST LOW 20 MIN: CPT | Mod: PBBFAC,PO,25 | Performed by: ORTHOPAEDIC SURGERY

## 2018-07-24 PROCEDURE — 20610 DRAIN/INJ JOINT/BURSA W/O US: CPT | Mod: S$PBB,LT,, | Performed by: ORTHOPAEDIC SURGERY

## 2018-07-24 PROCEDURE — 20610 DRAIN/INJ JOINT/BURSA W/O US: CPT | Mod: PBBFAC,PO | Performed by: ORTHOPAEDIC SURGERY

## 2018-07-24 PROCEDURE — 99999 PR PBB SHADOW E&M-EST. PATIENT-LVL III: CPT | Mod: PBBFAC,,, | Performed by: ORTHOPAEDIC SURGERY

## 2018-07-24 RX ORDER — TRIAMCINOLONE ACETONIDE 40 MG/ML
40 INJECTION, SUSPENSION INTRA-ARTICULAR; INTRAMUSCULAR
Status: COMPLETED | OUTPATIENT
Start: 2018-07-24 | End: 2018-07-24

## 2018-07-24 RX ADMIN — TRIAMCINOLONE ACETONIDE 40 MG: 40 INJECTION, SUSPENSION INTRA-ARTICULAR; INTRAMUSCULAR at 11:07

## 2018-07-24 NOTE — PROGRESS NOTES
HISTORY OF PRESENT ILLNESS:  Ms. Seo seen previously for her right shoulder,   doing better with the right shoulder, but now having similar symptoms in the   opposite left shoulder.  Symptoms are worse with overhead lifting.  No recent   trauma or injury is reported.  No numbness or tingling reported in the left arm.    PHYSICAL EXAMINATION:  RIGHT SHOULDER:  No tenderness, no swelling, full range of motion, good   strength.  LEFT SHOULDER:  Mildly positive impingement sign.  No swelling.  No tenderness.    Rotator cuff strength intact.  No instability.    IMPRESSION:  Left shoulder impingement.    PLAN:  After pause for timeout, the patient identified the left shoulder   injected with Kenalog 40 mg, 2 mL Xylocaine, sterile technique.  Tolerated the   procedure well without complications.    Continue Advil and Motrin by mouth, avoid overhead lifting and follow up as   needed.  If symptoms persist, we will need to get an MRI scan of the left   shoulder.      LIA  dd: 07/24/2018 11:10:26 (CDT)  td: 07/24/2018 15:10:16 (CDT)  Doc ID   #[id number]  Job ID #[id number]    CC:

## 2018-07-28 DIAGNOSIS — L30.9 ECZEMA, UNSPECIFIED TYPE: ICD-10-CM

## 2018-08-06 RX ORDER — TRIAMCINOLONE ACETONIDE 5 MG/G
CREAM TOPICAL
Qty: 15 G | Refills: 0 | Status: SHIPPED | OUTPATIENT
Start: 2018-08-06 | End: 2019-04-18 | Stop reason: SDUPTHER

## 2018-08-06 RX ORDER — MECLIZINE HCL 12.5 MG 12.5 MG/1
TABLET ORAL
Qty: 60 TABLET | Refills: 1 | Status: SHIPPED | OUTPATIENT
Start: 2018-08-06 | End: 2020-03-16

## 2018-08-20 RX ORDER — GABAPENTIN 300 MG/1
CAPSULE ORAL
Qty: 270 CAPSULE | Refills: 3 | Status: SHIPPED | OUTPATIENT
Start: 2018-08-20 | End: 2018-10-18

## 2018-09-14 DIAGNOSIS — E11.9 TYPE 2 DIABETES MELLITUS WITHOUT COMPLICATION: ICD-10-CM

## 2018-10-03 DIAGNOSIS — I10 ESSENTIAL HYPERTENSION: ICD-10-CM

## 2018-10-03 RX ORDER — LISINOPRIL 20 MG/1
TABLET ORAL
Qty: 90 TABLET | Refills: 3 | Status: SHIPPED | OUTPATIENT
Start: 2018-10-03 | End: 2019-11-16 | Stop reason: SDUPTHER

## 2018-10-04 ENCOUNTER — OFFICE VISIT (OUTPATIENT)
Dept: INTERNAL MEDICINE | Facility: CLINIC | Age: 73
End: 2018-10-04
Payer: MEDICARE

## 2018-10-04 VITALS
OXYGEN SATURATION: 98 % | WEIGHT: 123.13 LBS | TEMPERATURE: 97 F | BODY MASS INDEX: 28.49 KG/M2 | HEART RATE: 85 BPM | DIASTOLIC BLOOD PRESSURE: 70 MMHG | SYSTOLIC BLOOD PRESSURE: 150 MMHG | HEIGHT: 55 IN

## 2018-10-04 DIAGNOSIS — Z79.4 TYPE 2 DIABETES MELLITUS WITH COMPLICATION, WITH LONG-TERM CURRENT USE OF INSULIN: Primary | ICD-10-CM

## 2018-10-04 DIAGNOSIS — F33.42 RECURRENT MAJOR DEPRESSIVE DISORDER, IN FULL REMISSION: ICD-10-CM

## 2018-10-04 DIAGNOSIS — M15.9 PRIMARY OSTEOARTHRITIS INVOLVING MULTIPLE JOINTS: ICD-10-CM

## 2018-10-04 DIAGNOSIS — J47.9 BRONCHIECTASIS WITHOUT COMPLICATION: ICD-10-CM

## 2018-10-04 DIAGNOSIS — E11.8 TYPE 2 DIABETES MELLITUS WITH COMPLICATION, WITH LONG-TERM CURRENT USE OF INSULIN: Primary | ICD-10-CM

## 2018-10-04 DIAGNOSIS — M81.0 OSTEOPOROSIS, UNSPECIFIED OSTEOPOROSIS TYPE, UNSPECIFIED PATHOLOGICAL FRACTURE PRESENCE: ICD-10-CM

## 2018-10-04 DIAGNOSIS — I10 ESSENTIAL HYPERTENSION: ICD-10-CM

## 2018-10-04 DIAGNOSIS — Z91.89 AT RISK FOR OSTEOPOROSIS: ICD-10-CM

## 2018-10-04 PROCEDURE — 99214 OFFICE O/P EST MOD 30 MIN: CPT | Mod: S$PBB,,, | Performed by: INTERNAL MEDICINE

## 2018-10-04 PROCEDURE — 99213 OFFICE O/P EST LOW 20 MIN: CPT | Mod: PBBFAC,PN,25 | Performed by: INTERNAL MEDICINE

## 2018-10-04 PROCEDURE — 90662 IIV NO PRSV INCREASED AG IM: CPT | Mod: PBBFAC,PN

## 2018-10-04 PROCEDURE — 99999 PR PBB SHADOW E&M-EST. PATIENT-LVL III: CPT | Mod: PBBFAC,,, | Performed by: INTERNAL MEDICINE

## 2018-10-04 RX ORDER — DICLOFENAC SODIUM 50 MG/1
50 TABLET, DELAYED RELEASE ORAL 2 TIMES DAILY
Qty: 180 TABLET | Refills: 3 | Status: SHIPPED | OUTPATIENT
Start: 2018-10-04 | End: 2019-01-08 | Stop reason: SDUPTHER

## 2018-10-04 NOTE — PROGRESS NOTES
Subjective:       Patient ID: Juliette Seo is a 72 y.o. female.    Chief Complaint: Back Pain (back and shoulder, and neck pain, she thinks she had a reaction to a medication)    HPI  Pt c/o worsening pain in her neck and shoulders.  No radicular sxs.  Also with pain in her l foot.  No paresthesias.  BSs erratic,  Having her usual abd pain.  No depression.  Anxious. No cough, SOB  Review of Systems   All other systems reviewed and are negative.      Objective:      Physical Exam   Constitutional: She appears well-developed. No distress.   HENT:   Head: Normocephalic.   Mouth/Throat: Oropharynx is clear and moist.   Eyes: EOM are normal.   Neck: Normal range of motion. No tracheal deviation present.   Cardiovascular: Normal rate, regular rhythm, normal heart sounds and intact distal pulses.   Pulses:       Dorsalis pedis pulses are 3+ on the right side, and 3+ on the left side.        Posterior tibial pulses are 1+ on the right side, and 1+ on the left side.   Pulmonary/Chest: Effort normal and breath sounds normal. No respiratory distress.   Abdominal: Soft. Bowel sounds are normal. She exhibits no distension. There is no tenderness.   Musculoskeletal: Normal range of motion. She exhibits no edema.        Right foot: There is deformity (bunion).        Left foot: There is deformity (bunion).   Feet:   Right Foot:   Protective Sensation: 0 sites tested. 0 sites sensed.   Left Foot:   Protective Sensation: 0 sites tested. 0 sites sensed.   Neurological: She is alert. No cranial nerve deficit. She exhibits normal muscle tone. Coordination normal.   Skin: Skin is warm and dry. No rash noted. She is not diaphoretic. No erythema.   Psychiatric: She has a normal mood and affect. Her behavior is normal.   Vitals reviewed.      Assessment:       1. Type 2 diabetes mellitus with complication, with long-term current use of insulin    2. Bronchiectasis without complication    3. Essential hypertension    4. Recurrent major  depressive disorder, in full remission    5. Primary osteoarthritis involving multiple joints    6. At risk for osteoporosis    7. Osteoporosis, unspecified osteoporosis type, unspecified pathological fracture presence        Plan:       Juliette was seen today for back pain.    Diagnoses and all orders for this visit:    Type 2 diabetes mellitus with complication, with long-term current use of insulin  -     Influenza - High Dose (65+) (PF) (IM)  -     Hemoglobin A1c; Future  -     Lipid panel; Future    Bronchiectasis without complication   Quiescent    Essential hypertension   Well-cont    Recurrent major depressive disorder, in full remission   Cont rx    Primary osteoarthritis involving multiple joints  -     diclofenac (VOLTAREN) 50 MG EC tablet; Take 1 tablet (50 mg total) by mouth 2 (two) times daily.    At risk for osteoporosis  -     DXA Bone Density Spine And Hip; Future    Osteoporosis, unspecified osteoporosis type, unspecified pathological fracture presence  -     DXA Bone Density Spine And Hip; Future      Follow-up in about 2 weeks (around 10/18/2018).

## 2018-10-08 ENCOUNTER — HOSPITAL ENCOUNTER (OUTPATIENT)
Dept: RADIOLOGY | Facility: HOSPITAL | Age: 73
Discharge: HOME | End: 2018-10-08
Attending: INTERNAL MEDICINE
Payer: MEDICARE

## 2018-10-08 ENCOUNTER — TELEPHONE (OUTPATIENT)
Dept: INTERNAL MEDICINE | Facility: CLINIC | Age: 73
End: 2018-10-08

## 2018-10-08 DIAGNOSIS — Z91.89 AT RISK FOR OSTEOPOROSIS: ICD-10-CM

## 2018-10-08 DIAGNOSIS — M81.0 OSTEOPOROSIS, UNSPECIFIED OSTEOPOROSIS TYPE, UNSPECIFIED PATHOLOGICAL FRACTURE PRESENCE: ICD-10-CM

## 2018-10-08 PROCEDURE — 77080 DXA BONE DENSITY AXIAL: CPT | Mod: TC,PO

## 2018-10-18 ENCOUNTER — OFFICE VISIT (OUTPATIENT)
Dept: INTERNAL MEDICINE | Facility: CLINIC | Age: 73
End: 2018-10-18
Payer: MEDICARE

## 2018-10-18 VITALS
HEART RATE: 78 BPM | SYSTOLIC BLOOD PRESSURE: 136 MMHG | TEMPERATURE: 99 F | BODY MASS INDEX: 28.16 KG/M2 | DIASTOLIC BLOOD PRESSURE: 76 MMHG | OXYGEN SATURATION: 96 % | HEIGHT: 55 IN | WEIGHT: 121.69 LBS

## 2018-10-18 DIAGNOSIS — I10 ESSENTIAL HYPERTENSION: ICD-10-CM

## 2018-10-18 DIAGNOSIS — Z79.4 TYPE 2 DIABETES MELLITUS WITH COMPLICATION, WITH LONG-TERM CURRENT USE OF INSULIN: ICD-10-CM

## 2018-10-18 DIAGNOSIS — E11.8 TYPE 2 DIABETES MELLITUS WITH COMPLICATION, WITH LONG-TERM CURRENT USE OF INSULIN: ICD-10-CM

## 2018-10-18 DIAGNOSIS — E78.2 MIXED HYPERLIPIDEMIA: ICD-10-CM

## 2018-10-18 DIAGNOSIS — F33.42 RECURRENT MAJOR DEPRESSIVE DISORDER, IN FULL REMISSION: ICD-10-CM

## 2018-10-18 DIAGNOSIS — Z00.00 ROUTINE GENERAL MEDICAL EXAMINATION AT A HEALTH CARE FACILITY: Primary | ICD-10-CM

## 2018-10-18 PROCEDURE — 99397 PER PM REEVAL EST PAT 65+ YR: CPT | Mod: S$PBB,,, | Performed by: INTERNAL MEDICINE

## 2018-10-18 PROCEDURE — 99214 OFFICE O/P EST MOD 30 MIN: CPT | Mod: PBBFAC,PN | Performed by: INTERNAL MEDICINE

## 2018-10-18 PROCEDURE — 99999 PR PBB SHADOW E&M-EST. PATIENT-LVL IV: CPT | Mod: PBBFAC,,, | Performed by: INTERNAL MEDICINE

## 2018-10-18 NOTE — PROGRESS NOTES
Subjective:       Patient ID: Juliette Seo is a 73 y.o. female.    Chief Complaint: Follow-up    HPI  Wellness check.  Labs, DEXA reviewed.  BSs labile.  Having less neck pain.  No CP, SOB.  Eye exem due.  Had stopped Lipitor, now resumed.  Having her usual abd pain.  Review of Systems   All other systems reviewed and are negative.      Objective:      Physical Exam   Constitutional: She appears well-developed. No distress.   HENT:   Head: Normocephalic.   Eyes: EOM are normal.   Neck: Normal range of motion. No tracheal deviation present.   Cardiovascular: Normal rate, regular rhythm, normal heart sounds and intact distal pulses.   Pulmonary/Chest: Effort normal and breath sounds normal. No respiratory distress.   Abdominal: Soft. Bowel sounds are normal. She exhibits distension. There is tenderness (mild, diffuse).   Musculoskeletal: Normal range of motion. She exhibits no edema.   Neurological: She is alert. No cranial nerve deficit. She exhibits normal muscle tone. Coordination normal.   Skin: Skin is warm and dry. No rash noted. She is not diaphoretic. No erythema.   Psychiatric: She has a normal mood and affect. Her behavior is normal.   Vitals reviewed.      Assessment:       1. Routine general medical examination at a health care facility    2. Type 2 diabetes mellitus with complication, with long-term current use of insulin    3. Essential hypertension    4. Recurrent major depressive disorder, in full remission    5. Mixed hyperlipidemia        Plan:       Juliette was seen today for follow-up.    Diagnoses and all orders for this visit:    Routine general medical examination at a health care facility    Type 2 diabetes mellitus with complication, with long-term current use of insulin  -     Diabetic Eye Screening Photo; Future  -     Hemoglobin A1c; Future    Essential hypertension   Well-cont    Recurrent major depressive disorder, in full remission   Cont rx  Mixed hyperlipidemia  -     Lipid panel;  Future      Follow-up in about 6 months (around 4/18/2019).

## 2018-11-12 RX ORDER — PEN NEEDLE, DIABETIC 29 G X1/2"
NEEDLE, DISPOSABLE MISCELLANEOUS
Qty: 180 EACH | Refills: 4 | Status: SHIPPED | OUTPATIENT
Start: 2018-11-12 | End: 2019-12-26

## 2018-12-12 DIAGNOSIS — K21.9 GASTROESOPHAGEAL REFLUX DISEASE WITHOUT ESOPHAGITIS: ICD-10-CM

## 2018-12-12 RX ORDER — OMEPRAZOLE 40 MG/1
40 CAPSULE, DELAYED RELEASE ORAL EVERY MORNING
Qty: 90 CAPSULE | Refills: 3 | Status: SHIPPED | OUTPATIENT
Start: 2018-12-12 | End: 2020-03-16

## 2018-12-14 ENCOUNTER — HOSPITAL ENCOUNTER (EMERGENCY)
Facility: HOSPITAL | Age: 73
Discharge: HOME | End: 2018-12-14
Attending: EMERGENCY MEDICINE
Payer: MEDICARE

## 2018-12-14 VITALS
SYSTOLIC BLOOD PRESSURE: 120 MMHG | RESPIRATION RATE: 18 BRPM | HEART RATE: 90 BPM | DIASTOLIC BLOOD PRESSURE: 62 MMHG | TEMPERATURE: 98 F | OXYGEN SATURATION: 98 %

## 2018-12-14 DIAGNOSIS — G89.29 CHRONIC LEFT SHOULDER PAIN: Primary | ICD-10-CM

## 2018-12-14 DIAGNOSIS — M25.512 CHRONIC LEFT SHOULDER PAIN: Primary | ICD-10-CM

## 2018-12-14 DIAGNOSIS — M25.512 SHOULDER PAIN, LEFT: ICD-10-CM

## 2018-12-14 DIAGNOSIS — M25.512 LEFT SHOULDER PAIN: ICD-10-CM

## 2018-12-14 LAB
ALBUMIN SERPL BCP-MCNC: 4.2 G/DL
ALP SERPL-CCNC: 104 U/L
ALT SERPL W/O P-5'-P-CCNC: 17 U/L
ANION GAP SERPL CALC-SCNC: 13 MMOL/L
AST SERPL-CCNC: 19 U/L
BASOPHILS # BLD AUTO: 0.01 K/UL
BASOPHILS NFR BLD: 0.1 %
BILIRUB SERPL-MCNC: 0.6 MG/DL
BILIRUB UR QL STRIP: NEGATIVE
BUN SERPL-MCNC: 12 MG/DL
CALCIUM SERPL-MCNC: 9.9 MG/DL
CHLORIDE SERPL-SCNC: 103 MMOL/L
CLARITY UR: CLEAR
CO2 SERPL-SCNC: 22 MMOL/L
COLOR UR: YELLOW
CREAT SERPL-MCNC: 0.8 MG/DL
DIFFERENTIAL METHOD: ABNORMAL
EOSINOPHIL # BLD AUTO: 0 K/UL
EOSINOPHIL NFR BLD: 0.4 %
ERYTHROCYTE [DISTWIDTH] IN BLOOD BY AUTOMATED COUNT: 12.6 %
EST. GFR  (AFRICAN AMERICAN): >60 ML/MIN/1.73 M^2
EST. GFR  (NON AFRICAN AMERICAN): >60 ML/MIN/1.73 M^2
GLUCOSE SERPL-MCNC: 204 MG/DL
GLUCOSE UR QL STRIP: NEGATIVE
HCT VFR BLD AUTO: 41.1 %
HGB BLD-MCNC: 13.7 G/DL
HGB UR QL STRIP: ABNORMAL
KETONES UR QL STRIP: NEGATIVE
LEUKOCYTE ESTERASE UR QL STRIP: NEGATIVE
LYMPHOCYTES # BLD AUTO: 1.4 K/UL
LYMPHOCYTES NFR BLD: 14.7 %
MCH RBC QN AUTO: 29.5 PG
MCHC RBC AUTO-ENTMCNC: 33.3 G/DL
MCV RBC AUTO: 88 FL
MONOCYTES # BLD AUTO: 0.6 K/UL
MONOCYTES NFR BLD: 5.7 %
NEUTROPHILS # BLD AUTO: 7.6 K/UL
NEUTROPHILS NFR BLD: 78.8 %
NITRITE UR QL STRIP: NEGATIVE
PH UR STRIP: 6 [PH] (ref 5–8)
PLATELET # BLD AUTO: 234 K/UL
PMV BLD AUTO: 9.9 FL
POTASSIUM SERPL-SCNC: 4.1 MMOL/L
PROT SERPL-MCNC: 8 G/DL
PROT UR QL STRIP: NEGATIVE
RBC # BLD AUTO: 4.65 M/UL
SODIUM SERPL-SCNC: 138 MMOL/L
SP GR UR STRIP: 1.02 (ref 1–1.03)
TROPONIN I SERPL DL<=0.01 NG/ML-MCNC: <0.006 NG/ML
URN SPEC COLLECT METH UR: ABNORMAL
UROBILINOGEN UR STRIP-ACNC: NEGATIVE EU/DL
WBC # BLD AUTO: 9.59 K/UL

## 2018-12-14 PROCEDURE — 80053 COMPREHEN METABOLIC PANEL: CPT

## 2018-12-14 PROCEDURE — 93005 ELECTROCARDIOGRAM TRACING: CPT

## 2018-12-14 PROCEDURE — 84484 ASSAY OF TROPONIN QUANT: CPT

## 2018-12-14 PROCEDURE — 25000003 PHARM REV CODE 250: Performed by: EMERGENCY MEDICINE

## 2018-12-14 PROCEDURE — 93010 ELECTROCARDIOGRAM REPORT: CPT | Mod: ,,, | Performed by: INTERNAL MEDICINE

## 2018-12-14 PROCEDURE — 81003 URINALYSIS AUTO W/O SCOPE: CPT

## 2018-12-14 PROCEDURE — 99285 EMERGENCY DEPT VISIT HI MDM: CPT | Mod: 25

## 2018-12-14 PROCEDURE — 85025 COMPLETE CBC W/AUTO DIFF WBC: CPT

## 2018-12-14 RX ORDER — ACETAMINOPHEN 500 MG
500 TABLET ORAL EVERY 4 HOURS PRN
Qty: 42 TABLET | Refills: 0 | Status: SHIPPED | OUTPATIENT
Start: 2018-12-14

## 2018-12-14 RX ORDER — HYDROCODONE BITARTRATE AND ACETAMINOPHEN 5; 325 MG/1; MG/1
2 TABLET ORAL
Status: COMPLETED | OUTPATIENT
Start: 2018-12-14 | End: 2018-12-14

## 2018-12-14 RX ORDER — ONDANSETRON 4 MG/1
4 TABLET, ORALLY DISINTEGRATING ORAL
Status: COMPLETED | OUTPATIENT
Start: 2018-12-14 | End: 2018-12-14

## 2018-12-14 RX ORDER — CYCLOBENZAPRINE HCL 10 MG
10 TABLET ORAL 3 TIMES DAILY PRN
Qty: 15 TABLET | Refills: 0 | Status: SHIPPED | OUTPATIENT
Start: 2018-12-14 | End: 2018-12-19

## 2018-12-14 RX ADMIN — HYDROCODONE BITARTRATE AND ACETAMINOPHEN 2 TABLET: 5; 325 TABLET ORAL at 10:12

## 2018-12-14 RX ADMIN — ONDANSETRON 4 MG: 4 TABLET, ORALLY DISINTEGRATING ORAL at 10:12

## 2018-12-14 NOTE — ED NOTES
Pt here c/o bilateral shoulder pain similar to previous pain but worse last 2 days. Hs no muscle relaxers at home -states they have helped her in the past. Took ibuprofen, ice packs with no relief. Denies SOB,nausea or chest discomfort. No edema present. Denies abd or urinary changes.speech is clear. resp are regular and unlabored.

## 2018-12-14 NOTE — ED PROVIDER NOTES
Encounter Date: 12/14/2018    SCRIBE #1 NOTE: I, Lukasz Bagley, am scribing for, and in the presence of,  Dr. Sarmad Morillo. I have scribed the entire note.       History     Chief Complaint   Patient presents with    Shoulder Pain     right side shoulder pain x 3 days. pain now in bilateral shoulders, L>R     Juliette Seo is a 73 y.o. female who  has a past medical history of Allergy, Anxiety, Arthritis, Asthma, GERD (gastroesophageal reflux disease), High cholesterol, Hypertension, and Type 2 diabetes mellitus, uncontrolled, with neuropathy.    The patient presents to the ED for evaluation of chronic left shoulder pain that worsened 2-3 days ago. Patient reports initially having left shoulder pain but notes pain is now in bilateral shoulders, worse on left. Ibuprofen 400 mg taken for pain today with minimal relief. She adenies headache, chest pain, SOB, vomiting, or abd pain. Patient states has been seen here before for same complaint and is followed by Dr. Wesley. No other complaints at this time.      The history is provided by the patient.     Review of patient's allergies indicates:   Allergen Reactions    Actos [pioglitazone] Nausea Only     Past Medical History:   Diagnosis Date    Allergy     Anxiety     Arthritis     osteo    Asthma     GERD (gastroesophageal reflux disease)     High cholesterol     Hypertension     Type 2 diabetes mellitus, uncontrolled, with neuropathy      Past Surgical History:   Procedure Laterality Date    BLOCK-JOINT-SACROILIAC- Right Right 5/18/2017    Performed [facility] at [facility] PAIN [facility]    BLOCK-NERVE-MEDIAL BRANCH-LUMBAR- Bilateral L3,4,5 Bilateral 10/26/2016    Performed by [facility] at [facility] PAIN [facility]    CHOLECYSTECTOMY      HYSTERECTOMY      INCISIONAL HERNIA REPAIR  2003    REPAIR ROTATOR CUFF ARTHROSCOPIC Right 9/29/2017    Performed by [facility]ad Wesley Jr., [facility] at [facility] OR    RESECTION-ACROMIOCLAVICULAR JOINT-ARTHROSCOPIC Right 9/29/2017     [facility] OR    ROTATOR CUFF REPAIR Right 11/07/2017     Family History   Problem Relation Age of Onset    Stroke Mother     Diabetes Sister     Diabetes Brother      Social History     Tobacco Use    Smoking status: Never Smoker    Smokeless tobacco: Never Used   Substance Use Topics    Alcohol use: Yes    Drug use: No     Review of Systems   Constitutional: Negative for chills and fever.   HENT: Negative for congestion, rhinorrhea and sore throat.    Eyes: Negative for redness and visual disturbance.   Respiratory: Negative for cough, shortness of breath and wheezing.    Cardiovascular: Negative for chest pain and palpitations.   Gastrointestinal: Negative for abdominal pain, diarrhea, nausea and vomiting.   Genitourinary: Negative for dysuria and hematuria.   Musculoskeletal: Negative for back pain and myalgias.        + bilateral shoulder pain, worse on left.   Skin: Negative for rash.   Neurological: Negative for dizziness, weakness and light-headedness.   Psychiatric/Behavioral: Negative for confusion.   All other systems reviewed and are negative.      Physical Exam     Initial Vitals [12/14/18 0945]   BP Pulse Resp Temp SpO2   (!) 143/72 109 20 97.9 °F (36.6 °C) 100 %      MAP       --         Physical Exam    Nursing note and vitals reviewed.  Constitutional: She appears well-developed and well-nourished.   HENT:   Head: Normocephalic and atraumatic.   Eyes: Conjunctivae and EOM are normal. Pupils are equal, round, and reactive to light.   Neck: Normal range of motion. Neck supple.   No bruit    Cardiovascular: Normal rate, regular rhythm, normal heart sounds and intact distal pulses.   Pulmonary/Chest: Breath sounds normal. No respiratory distress. She has no wheezes. She has no rales.   Abdominal: Soft. She exhibits no distension and no mass. There is no tenderness. There is no CVA tenderness.   Musculoskeletal: Normal range of motion. She exhibits tenderness.  She exhibits no edema.   Diffuse tenderness to left shoulder.   Tenderness to left trapezius muscle.  Full ROM.  Palpable radial pulse bilaterally.  R shoulder WNL    Neurological: She is alert and oriented to person, place, and time. She has normal strength. No cranial nerve deficit or sensory deficit.   Equal  strength bilaterally.  Sensitive to light touch.  Gait wnl    Skin: Skin is warm and dry.         ED Course   Procedures  Labs Reviewed   URINALYSIS, REFLEX TO URINE CULTURE - Abnormal; Notable for the following components:       Result Value    Occult Blood UA Trace (*)     All other components within normal limits    Narrative:     Preferred Collection Type->Urine, Clean Catch   CBC W/ AUTO DIFFERENTIAL - Abnormal; Notable for the following components:    Gran% 78.8 (*)     Lymph% 14.7 (*)     All other components within normal limits   COMPREHENSIVE METABOLIC PANEL - Abnormal; Notable for the following components:    CO2 22 (*)     Glucose 204 (*)     All other components within normal limits   TROPONIN I     EKG Readings: (Independently Interpreted)   EKG: Rate: 102 bpm. Sinus tachycardia. No ST segment changes. No STEMI.       Imaging Results          X-Ray Chest PA And Lateral (Final result)  Result time 12/14/18 11:02:25    Final result by Vince Tavares MD (12/14/18 11:02:25)                 Impression:      No acute findings.      Electronically signed by: Vince Tavares MD  Date:    12/14/2018  Time:    11:02             Narrative:    EXAMINATION:  XR CHEST PA AND LATERAL    CLINICAL HISTORY:  Pain in left shoulder    TECHNIQUE:  PA and lateral views of the chest were performed.    COMPARISON:  04/08/2018    FINDINGS:  The cardiomediastinal contour is within normal limits.  The lungs are clear.  No pneumothorax.  No pleural effusions.                               X-Ray Shoulder Trauma Left (Final result)  Result time 12/14/18 11:01:09    Final result by Vince Tavares MD (12/14/18 11:01:09)                  Impression:      No fracture identified.      Electronically signed by: Vince Tavares MD  Date:    12/14/2018  Time:    11:01             Narrative:    EXAMINATION:  XR SHOULDER TRAUMA 3 VIEW LEFT    CLINICAL HISTORY:  Pain in left shoulder    TECHNIQUE:  Three views of the left shoulder were performed.    COMPARISON  None    FINDINGS:  The alignment is within normal limits.  No fracture.  No marrow replacement process.  AC joint arthropathy noted.  Osteopenia noted.                                 Medical Decision Making:   Initial Assessment:   The patient presents to the ED for evaluation of chronic left shoulder pain that worsened 2-3 days ago.  Clinical Tests:   Lab Tests: Ordered and Reviewed  Radiological Study: Ordered and Reviewed  Medical Tests: Ordered and Reviewed  ED Management:  74 yo female with acute on chronic shoulder pain. Neuro exam non focal, patient initially tachycardic, HR to 90s on reassessment.  Suspect acute on chronic musculoskeletal pain. No ECG changes, troponin negative, labs wnl. Plan for dc with apap/ flexeril, and follow up with Dr. Wesley, return precautions for worsening pain, dizziness, weakness or other concern    After taking into careful account the historical factors and physical exam findings of the patient's presentation today, in conjunction with the empirical and objective data obtained on ED workup, no acute emergent medical condition has been identified. The patient appears to be low risk for an emergent medical condition and I feel it is safe and appropriate at this time for the patient to be discharged to follow-up as detailed in their discharge instructions for reevaluation and possible continued outpatient workup and management. I have discussed the specifics of the workup with the patient and the patient has verbalized understanding of the details of the workup, the diagnosis, the treatment plan, and the need for outpatient follow-up.  Although the  patient has no emergent etiology today this does not preclude the development of an emergent condition so in addition, I have advised the patient that they can return to the ED and/or activate EMS at any time with worsening of their symptoms, change of their symptoms, or with any other medical complaint.  The patient remained comfortable and stable during their visit in the ED.  Discharge and follow-up instructions discussed with the patient who expressed understanding and willingness to comply with my recommendations.                     ED Course as of Dec 14 1005   Fri Dec 14, 2018   0959 Sort note: Juliette Seo nontoxic/afebrile 73 y.o.  presented to the ED with c/o left shoulder pain with no recent trauma. She states pain radiates to left axilla.     Patient seen and medically screened by Physician assistant in Sort process due to ED crowding.  Appropriate tests and/or medications ordered.  Care transferred to an alternate provider when patient was placed in an Exam Room from the [facility] for physical exam, additional orders, and disposition. 10:00 AM. LC    [LC]      ED Course User Index  [LC] ALYSHA Yee     Clinical Impression:     1. Left shoulder pain    2. Shoulder pain, left            Disposition:   Disposition: Discharged  Condition: Stable       I, Sarmad Morillo,  personally performed the services described in this documentation. All medical record entries made by the scribe were at my direction and in my presence.  I have reviewed the chart and agree that the record reflects my personal performance and is accurate and complete. Sarmad Morillo M.D. 12:01 PM12/14/2018                   Sarmad Morillo Jr., MD  12/14/18 [id number]

## 2018-12-17 ENCOUNTER — TELEPHONE (OUTPATIENT)
Dept: ORTHOPEDICS | Facility: CLINIC | Age: 73
End: 2018-12-17

## 2018-12-17 NOTE — TELEPHONE ENCOUNTER
----- Message from Bria Mcbride sent at 12/17/2018  9:09 AM CST -----  Contact: [phone]/self  Pt would like to be seen for an hospital f/u for shoulder pain. States medication from ER is not enough. Please call and advise.

## 2018-12-18 ENCOUNTER — OFFICE VISIT (OUTPATIENT)
Dept: ORTHOPEDICS | Facility: CLINIC | Age: 73
End: 2018-12-18
Payer: MEDICARE

## 2018-12-18 VITALS — HEIGHT: 55 IN | BODY MASS INDEX: 28 KG/M2 | WEIGHT: 121 LBS

## 2018-12-18 DIAGNOSIS — M75.40 SHOULDER IMPINGEMENT SYNDROME, UNSPECIFIED LATERALITY: ICD-10-CM

## 2018-12-18 PROCEDURE — 99213 OFFICE O/P EST LOW 20 MIN: CPT | Mod: PBBFAC,PN,25 | Performed by: ORTHOPAEDIC SURGERY

## 2018-12-18 PROCEDURE — 20610 DRAIN/INJ JOINT/BURSA W/O US: CPT | Mod: 50,S$PBB,, | Performed by: ORTHOPAEDIC SURGERY

## 2018-12-18 PROCEDURE — 20610 DRAIN/INJ JOINT/BURSA W/O US: CPT | Mod: 50,PBBFAC,PN | Performed by: ORTHOPAEDIC SURGERY

## 2018-12-18 PROCEDURE — 99213 OFFICE O/P EST LOW 20 MIN: CPT | Mod: 25,S$PBB,, | Performed by: ORTHOPAEDIC SURGERY

## 2018-12-18 PROCEDURE — 99999 PR PBB SHADOW E&M-EST. PATIENT-LVL III: CPT | Mod: PBBFAC,,, | Performed by: ORTHOPAEDIC SURGERY

## 2018-12-18 RX ORDER — TRIAMCINOLONE ACETONIDE 40 MG/ML
40 INJECTION, SUSPENSION INTRA-ARTICULAR; INTRAMUSCULAR
Status: COMPLETED | OUTPATIENT
Start: 2018-12-18 | End: 2018-12-18

## 2018-12-18 RX ADMIN — TRIAMCINOLONE ACETONIDE 40 MG: 40 INJECTION, SUSPENSION INTRA-ARTICULAR; INTRAMUSCULAR at 09:12

## 2018-12-18 NOTE — PROGRESS NOTES
HISTORY OF PRESENT ILLNESS:  Ms. Seo in followup bilateral shoulder   impingement, having a flare-up on the right side this time.  Previously, it was   more in the left shoulder and I had recommended an MRI of left shoulder, but now   seems to be worse than the right.    PHYSICAL EXAMINATION:  Both shoulders have slight tenderness.  No bruising.  No   swelling.  Range of motion right shoulder limited secondary to pain.  Positive   impingement sign.  Slight weakness rotator cuff.  Left shoulder has pretty good   range of motion, but a mildly positive impingement sign.    IMPRESSION:  1.  Bilateral shoulder impingement.  2.  Possible rotator cuff tear, right shoulder.    PLAN:  The patient would like to try injections again today.  After pause for   timeout, she identified each shoulder, injected bilaterally with combination of   Kenalog 40 mg, 2 mL Xylocaine, sterile technique, tolerated the procedure well   without complication.    I have also recommended anti-inflammatory medication by mouth and we have   ordered an MRI scan of the right shoulder.  Follow up after the MRI is complete.      LIA  dd: 12/18/2018 09:43:32 (CST)  td: 12/19/2018 06:44:55 (CST)  Doc ID   #[id number]  Job ID #[id number]    CC:

## 2018-12-28 ENCOUNTER — TELEPHONE (OUTPATIENT)
Dept: ORTHOPEDICS | Facility: CLINIC | Age: 73
End: 2018-12-28

## 2018-12-28 NOTE — TELEPHONE ENCOUNTER
----- Message from Shelia Ribeiro sent at 12/28/2018 10:40 AM CST -----  Contact: Radu - [phone] with MRI   Radu with MRI in [place] was not able to do the test due to the patient has a Rodríguez In her shoulder. Please advise

## 2018-12-28 NOTE — TELEPHONE ENCOUNTER
Spoke with Radu, he stated because of the metal sammi it is hard to read the MRI. The MRI image is blurry due to the metal.

## 2018-12-31 NOTE — TELEPHONE ENCOUNTER
ATTEMPTED TO SPEAK WITH PT IN REGARDS TO PLANS FOR MRI. NO ANSWER NOTED. UNABLE TO LM. PHONE HUNG UP.

## 2019-01-05 DIAGNOSIS — E11.42 TYPE 2 DIABETES MELLITUS WITH DIABETIC POLYNEUROPATHY, WITH LONG-TERM CURRENT USE OF INSULIN: ICD-10-CM

## 2019-01-05 DIAGNOSIS — Z79.4 TYPE 2 DIABETES MELLITUS WITH DIABETIC POLYNEUROPATHY, WITH LONG-TERM CURRENT USE OF INSULIN: ICD-10-CM

## 2019-01-07 RX ORDER — HUMAN INSULIN 100 [IU]/ML
INJECTION, SUSPENSION SUBCUTANEOUS
Qty: 30 ML | Refills: 4 | Status: SHIPPED | OUTPATIENT
Start: 2019-01-07 | End: 2019-07-08 | Stop reason: SDUPTHER

## 2019-01-08 ENCOUNTER — OFFICE VISIT (OUTPATIENT)
Dept: ORTHOPEDICS | Facility: CLINIC | Age: 74
End: 2019-01-08
Payer: MEDICARE

## 2019-01-08 ENCOUNTER — HOSPITAL ENCOUNTER (OUTPATIENT)
Dept: RADIOLOGY | Facility: HOSPITAL | Age: 74
Discharge: HOME | End: 2019-01-08
Attending: ORTHOPAEDIC SURGERY
Payer: MEDICARE

## 2019-01-08 VITALS — BODY MASS INDEX: 28 KG/M2 | WEIGHT: 121 LBS | HEIGHT: 55 IN

## 2019-01-08 DIAGNOSIS — M25.511 CHRONIC RIGHT SHOULDER PAIN: ICD-10-CM

## 2019-01-08 DIAGNOSIS — M25.511 RIGHT SHOULDER PAIN, UNSPECIFIED CHRONICITY: Primary | ICD-10-CM

## 2019-01-08 DIAGNOSIS — M25.511 RIGHT SHOULDER PAIN, UNSPECIFIED CHRONICITY: ICD-10-CM

## 2019-01-08 DIAGNOSIS — G89.29 CHRONIC RIGHT SHOULDER PAIN: ICD-10-CM

## 2019-01-08 DIAGNOSIS — M15.9 PRIMARY OSTEOARTHRITIS INVOLVING MULTIPLE JOINTS: ICD-10-CM

## 2019-01-08 PROCEDURE — 99999 PR PBB SHADOW E&M-EST. PATIENT-LVL III: CPT | Mod: PBBFAC,,, | Performed by: ORTHOPAEDIC SURGERY

## 2019-01-08 PROCEDURE — 73030 X-RAY EXAM OF SHOULDER: CPT | Mod: 26,RT,, | Performed by: RADIOLOGY

## 2019-01-08 PROCEDURE — 99213 OFFICE O/P EST LOW 20 MIN: CPT | Mod: PBBFAC,25,PN | Performed by: ORTHOPAEDIC SURGERY

## 2019-01-08 PROCEDURE — 73030 XR SHOULDER COMPLETE 2 OR MORE VIEWS RIGHT: ICD-10-PCS | Mod: 26,RT,, | Performed by: RADIOLOGY

## 2019-01-08 PROCEDURE — 99213 PR OFFICE/OUTPT VISIT, EST, LEVL III, 20-29 MIN: ICD-10-PCS | Mod: S$PBB,,, | Performed by: ORTHOPAEDIC SURGERY

## 2019-01-08 PROCEDURE — 73030 X-RAY EXAM OF SHOULDER: CPT | Mod: TC,PN,RT

## 2019-01-08 PROCEDURE — 99999 PR PBB SHADOW E&M-EST. PATIENT-LVL III: ICD-10-PCS | Mod: PBBFAC,,, | Performed by: ORTHOPAEDIC SURGERY

## 2019-01-08 PROCEDURE — 99213 OFFICE O/P EST LOW 20 MIN: CPT | Mod: S$PBB,,, | Performed by: ORTHOPAEDIC SURGERY

## 2019-01-08 RX ORDER — DICLOFENAC SODIUM 50 MG/1
50 TABLET, DELAYED RELEASE ORAL 2 TIMES DAILY
Qty: 180 TABLET | Refills: 3 | Status: SHIPPED | OUTPATIENT
Start: 2019-01-08 | End: 2020-08-14

## 2019-01-08 NOTE — PROGRESS NOTES
HISTORY OF PRESENT ILLNESS:  Ms. Seo in followup of bilateral shoulder pain,   had injections last visit with good results.  She is reporting minimal pain now.    Also previously, I ordered an MRI of the right shoulder, but they did not do   it because she had some suture anchors in place.  They told her it could not be   done.    PHYSICAL EXAMINATION:  Both shoulders are nontender, no swelling, full range of   motion.  Negative impingement.    PLAN:  She is doing better, so I think we will just hold off on the MRI for now   and if we need to, we can always get a CT arthrogram of the right shoulder.    Continue anti-inflammatory medication by mouth and follow up in 2-3 months.      LIA  dd: 01/08/2019 09:57:07 (CST)  td: 01/09/2019 02:14:06 (CST)  Doc ID   #[id number]  Job ID #[id number]    CC:

## 2019-04-01 DIAGNOSIS — E11.42 TYPE 2 DIABETES MELLITUS WITH DIABETIC POLYNEUROPATHY: ICD-10-CM

## 2019-04-02 RX ORDER — GLIMEPIRIDE 2 MG/1
TABLET ORAL
Qty: 90 TABLET | Refills: 3 | Status: SHIPPED | OUTPATIENT
Start: 2019-04-02 | End: 2020-05-15

## 2019-04-15 ENCOUNTER — LAB VISIT (OUTPATIENT)
Dept: LAB | Facility: HOSPITAL | Age: 74
End: 2019-04-15
Attending: INTERNAL MEDICINE
Payer: MEDICARE

## 2019-04-15 DIAGNOSIS — E11.42 TYPE 2 DIABETES MELLITUS WITH DIABETIC POLYNEUROPATHY, WITH LONG-TERM CURRENT USE OF INSULIN: ICD-10-CM

## 2019-04-15 DIAGNOSIS — Z79.4 TYPE 2 DIABETES MELLITUS WITH DIABETIC POLYNEUROPATHY, WITH LONG-TERM CURRENT USE OF INSULIN: ICD-10-CM

## 2019-04-15 DIAGNOSIS — E11.9 TYPE 2 DIABETES MELLITUS WITHOUT COMPLICATION: ICD-10-CM

## 2019-04-15 LAB
CHOLEST SERPL-MCNC: 138 MG/DL (ref 120–199)
CHOLEST/HDLC SERPL: 4.2 {RATIO} (ref 2–5)
ESTIMATED AVG GLUCOSE: 163 MG/DL (ref 68–131)
HBA1C MFR BLD HPLC: 7.3 % (ref 4–5.6)
HDLC SERPL-MCNC: 33 MG/DL (ref 40–75)
HDLC SERPL: 23.9 % (ref 20–50)
LDLC SERPL CALC-MCNC: 82.8 MG/DL (ref 63–159)
NONHDLC SERPL-MCNC: 105 MG/DL
TRIGL SERPL-MCNC: 111 MG/DL (ref 30–150)

## 2019-04-15 PROCEDURE — 36415 COLL VENOUS BLD VENIPUNCTURE: CPT | Mod: PO

## 2019-04-15 PROCEDURE — 83036 HEMOGLOBIN GLYCOSYLATED A1C: CPT

## 2019-04-15 PROCEDURE — 80061 LIPID PANEL: CPT

## 2019-04-18 ENCOUNTER — OFFICE VISIT (OUTPATIENT)
Dept: INTERNAL MEDICINE | Facility: CLINIC | Age: 74
End: 2019-04-18
Payer: MEDICARE

## 2019-04-18 VITALS
HEART RATE: 81 BPM | DIASTOLIC BLOOD PRESSURE: 70 MMHG | OXYGEN SATURATION: 99 % | SYSTOLIC BLOOD PRESSURE: 130 MMHG | HEIGHT: 55 IN | WEIGHT: 121.56 LBS | BODY MASS INDEX: 28.13 KG/M2

## 2019-04-18 DIAGNOSIS — I10 ESSENTIAL HYPERTENSION: ICD-10-CM

## 2019-04-18 DIAGNOSIS — M15.9 PRIMARY OSTEOARTHRITIS INVOLVING MULTIPLE JOINTS: ICD-10-CM

## 2019-04-18 DIAGNOSIS — L30.9 ECZEMA, UNSPECIFIED TYPE: ICD-10-CM

## 2019-04-18 DIAGNOSIS — L29.9 PRURITUS: ICD-10-CM

## 2019-04-18 DIAGNOSIS — Z79.4 TYPE 2 DIABETES MELLITUS WITH COMPLICATION, WITH LONG-TERM CURRENT USE OF INSULIN: ICD-10-CM

## 2019-04-18 DIAGNOSIS — J47.9 BRONCHIECTASIS WITHOUT COMPLICATION: ICD-10-CM

## 2019-04-18 DIAGNOSIS — E11.42 TYPE 2 DIABETES MELLITUS WITH DIABETIC POLYNEUROPATHY, WITH LONG-TERM CURRENT USE OF INSULIN: ICD-10-CM

## 2019-04-18 DIAGNOSIS — E11.59 HYPERTENSION COMPLICATING DIABETES: ICD-10-CM

## 2019-04-18 DIAGNOSIS — F33.42 RECURRENT MAJOR DEPRESSIVE DISORDER, IN FULL REMISSION: ICD-10-CM

## 2019-04-18 DIAGNOSIS — I15.2 HYPERTENSION COMPLICATING DIABETES: ICD-10-CM

## 2019-04-18 DIAGNOSIS — Z79.4 TYPE 2 DIABETES MELLITUS WITH DIABETIC POLYNEUROPATHY, WITH LONG-TERM CURRENT USE OF INSULIN: ICD-10-CM

## 2019-04-18 DIAGNOSIS — Z00.00 ROUTINE GENERAL MEDICAL EXAMINATION AT A HEALTH CARE FACILITY: Primary | ICD-10-CM

## 2019-04-18 DIAGNOSIS — E11.8 TYPE 2 DIABETES MELLITUS WITH COMPLICATION, WITH LONG-TERM CURRENT USE OF INSULIN: ICD-10-CM

## 2019-04-18 DIAGNOSIS — E78.2 MIXED HYPERLIPIDEMIA: ICD-10-CM

## 2019-04-18 PROBLEM — J40 BRONCHITIS: Status: RESOLVED | Noted: 2018-06-14 | Resolved: 2019-04-18

## 2019-04-18 PROCEDURE — 99397 PER PM REEVAL EST PAT 65+ YR: CPT | Mod: S$PBB,,, | Performed by: INTERNAL MEDICINE

## 2019-04-18 PROCEDURE — 99397 PR PREVENTIVE VISIT,EST,65 & OVER: ICD-10-PCS | Mod: S$PBB,,, | Performed by: INTERNAL MEDICINE

## 2019-04-18 PROCEDURE — G0009 ADMIN PNEUMOCOCCAL VACCINE: HCPCS | Mod: PBBFAC,PN

## 2019-04-18 PROCEDURE — 99213 OFFICE O/P EST LOW 20 MIN: CPT | Mod: PBBFAC,PN,25 | Performed by: INTERNAL MEDICINE

## 2019-04-18 PROCEDURE — 99999 PR PBB SHADOW E&M-EST. PATIENT-LVL III: ICD-10-PCS | Mod: PBBFAC,,, | Performed by: INTERNAL MEDICINE

## 2019-04-18 PROCEDURE — 99999 PR PBB SHADOW E&M-EST. PATIENT-LVL III: CPT | Mod: PBBFAC,,, | Performed by: INTERNAL MEDICINE

## 2019-04-18 RX ORDER — LEVOCETIRIZINE DIHYDROCHLORIDE 5 MG/1
5 TABLET, FILM COATED ORAL NIGHTLY
Qty: 90 TABLET | Refills: 3 | Status: SHIPPED | OUTPATIENT
Start: 2019-04-18 | End: 2020-04-25 | Stop reason: SDUPTHER

## 2019-04-18 RX ORDER — HYDROCORTISONE 25 MG/G
CREAM TOPICAL
Qty: 1 TUBE | Refills: 2 | Status: ON HOLD | OUTPATIENT
Start: 2019-04-18 | End: 2021-03-21 | Stop reason: HOSPADM

## 2019-04-18 RX ORDER — TRIAMCINOLONE ACETONIDE 5 MG/G
CREAM TOPICAL
Qty: 15 G | Refills: 6 | Status: ON HOLD | OUTPATIENT
Start: 2019-04-18 | End: 2021-03-21 | Stop reason: HOSPADM

## 2019-04-18 NOTE — PROGRESS NOTES
Subjective:       Patient ID: Juliette Seo is a 73 y.o. female.    Chief Complaint: Follow-up (with labs)    HPI  Wellness check.  Chart reviewed.  BSs better.  Pt with chronic prod cough, no SOB.  No CP except when she doesn't take her PPI.  Arthralgias at baseline.  C/O pruritus.  Review of Systems   All other systems reviewed and are negative.      Objective:      Physical Exam   Constitutional: She appears well-developed. No distress.   HENT:   Head: Normocephalic.   Eyes: EOM are normal.   Neck: Normal range of motion. No tracheal deviation present.   Cardiovascular: Normal rate, regular rhythm, normal heart sounds and intact distal pulses.   Pulmonary/Chest: Effort normal and breath sounds normal. No respiratory distress.   Abdominal: Soft. Bowel sounds are normal. She exhibits no distension.   Musculoskeletal: Normal range of motion. She exhibits no edema.   Neurological: She is alert. No cranial nerve deficit. She exhibits normal muscle tone. Coordination normal.   Skin: Skin is warm and dry. No rash noted. She is not diaphoretic. No erythema.   Psychiatric: She has a normal mood and affect. Her behavior is normal.   Vitals reviewed.      Assessment:       1. Routine general medical examination at a health care facility    2. Type 2 diabetes mellitus with complication, with long-term current use of insulin    3. Essential hypertension    4. Recurrent major depressive disorder, in full remission    5. Mixed hyperlipidemia    6. Bronchiectasis without complication    7. Primary osteoarthritis involving multiple joints    8. Type 2 diabetes mellitus with diabetic polyneuropathy, with long-term current use of insulin    9. Hypertension complicating diabetes    10. Eczema, unspecified type    11. Pruritus        Plan:       Juliette was seen today for follow-up.    Diagnoses and all orders for this visit:    Routine general medical examination at a health care facility    Type 2 diabetes mellitus with  complication, with long-term current use of insulin  -     Hemoglobin A1c; Future    Essential hypertension   Well-cont    Recurrent major depressive disorder, in full remission   Well-cont    Mixed hyperlipidemia   Well-cont    Bronchiectasis without complication  -     Pneumococcal Polysaccharide Vaccine (23 Valent) (SQ/IM)    Primary osteoarthritis involving multiple joints   Stable    Type 2 diabetes mellitus with diabetic polyneuropathy, with long-term current use of insulin   Well-cont    Hypertension complicating diabetes   Well-cont    Eczema, unspecified type  -     triamcinolone acetonide 0.5% (KENALOG) 0.5 % Crea; APPLY TOPICALLY TWICE A DAY    Pruritus  -     hydrocortisone 2.5 % cream; APPLY TO THE AFFECTED AREA(S) BY TOPICAL ROUTE THREE TIMES DAILY AS NEEDED FOR ITCHING  -     levocetirizine (XYZAL) 5 MG tablet; Take 1 tablet (5 mg total) by mouth every evening.      Follow up in about 6 months (around 10/18/2019).

## 2019-05-13 ENCOUNTER — TELEPHONE (OUTPATIENT)
Dept: INTERNAL MEDICINE | Facility: CLINIC | Age: 74
End: 2019-05-13

## 2019-05-13 DIAGNOSIS — Z12.39 BREAST CANCER SCREENING, HIGH RISK PATIENT: Primary | ICD-10-CM

## 2019-05-13 NOTE — TELEPHONE ENCOUNTER
----- Message from Florencio Mccormick MA sent at 5/13/2019  2:27 PM CDT -----  Contact: Self   Type: Orders Request    What orders/ testing are being requested? Annual Mammogram screening    Is there a future appointment scheduled for the patient with PCP? Yes    When?10/18/2019    Comments:Please advice    Thanks      renato francois

## 2019-05-13 NOTE — TELEPHONE ENCOUNTER
----- Message from Amelia Burkett sent at 5/13/2019  2:18 PM CDT -----  Type:  Mammogram    Caller is requesting to schedule their annual mammogram appointment.  Order is not listed in EPIC.  Please enter order and contact patient to schedule.  Name of Caller: PT  Where would they like the mammogram performed? [facility]  Would the patient rather a call back or a response via [facility]? CALL BACK  Best Call Back Number: [phone]  Additional Information:

## 2019-06-07 ENCOUNTER — HOSPITAL ENCOUNTER (OUTPATIENT)
Dept: RADIOLOGY | Facility: HOSPITAL | Age: 74
Discharge: HOME | End: 2019-06-07
Attending: INTERNAL MEDICINE
Payer: MEDICARE

## 2019-06-07 VITALS — BODY MASS INDEX: 28 KG/M2 | HEIGHT: 55 IN | WEIGHT: 121 LBS

## 2019-06-07 DIAGNOSIS — Z12.39 BREAST CANCER SCREENING, HIGH RISK PATIENT: ICD-10-CM

## 2019-06-07 PROCEDURE — 77063 BREAST TOMOSYNTHESIS BI: CPT | Mod: 26,,, | Performed by: RADIOLOGY

## 2019-06-07 PROCEDURE — 77063 MAMMO DIGITAL SCREENING BILAT WITH TOMOSYNTHESIS_CAD: ICD-10-PCS | Mod: 26,,, | Performed by: RADIOLOGY

## 2019-06-07 PROCEDURE — 77067 SCR MAMMO BI INCL CAD: CPT | Mod: TC

## 2019-06-07 PROCEDURE — 77067 MAMMO DIGITAL SCREENING BILAT WITH TOMOSYNTHESIS_CAD: ICD-10-PCS | Mod: 26,,, | Performed by: RADIOLOGY

## 2019-06-07 PROCEDURE — 77067 SCR MAMMO BI INCL CAD: CPT | Mod: 26,,, | Performed by: RADIOLOGY

## 2019-07-08 DIAGNOSIS — Z79.4 TYPE 2 DIABETES MELLITUS WITH DIABETIC POLYNEUROPATHY, WITH LONG-TERM CURRENT USE OF INSULIN: ICD-10-CM

## 2019-07-08 DIAGNOSIS — E11.42 TYPE 2 DIABETES MELLITUS WITH DIABETIC POLYNEUROPATHY, WITH LONG-TERM CURRENT USE OF INSULIN: ICD-10-CM

## 2019-07-08 RX ORDER — HUMAN INSULIN 100 [IU]/ML
INJECTION, SUSPENSION SUBCUTANEOUS
Qty: 50 ML | Refills: 2 | Status: SHIPPED | OUTPATIENT
Start: 2019-07-08 | End: 2020-03-16

## 2019-07-23 DIAGNOSIS — E11.8 TYPE 2 DIABETES MELLITUS WITH COMPLICATION, WITH LONG-TERM CURRENT USE OF INSULIN: ICD-10-CM

## 2019-07-23 DIAGNOSIS — Z79.4 TYPE 2 DIABETES MELLITUS WITH COMPLICATION, WITH LONG-TERM CURRENT USE OF INSULIN: ICD-10-CM

## 2019-07-23 RX ORDER — BLOOD SUGAR DIAGNOSTIC
STRIP MISCELLANEOUS
Qty: 100 STRIP | Refills: 9 | Status: SHIPPED | OUTPATIENT
Start: 2019-07-23 | End: 2020-08-04

## 2019-07-23 RX ORDER — LANCETS
EACH MISCELLANEOUS
Qty: 204 EACH | Refills: 2 | Status: SHIPPED | OUTPATIENT
Start: 2019-07-23 | End: 2020-03-16

## 2019-10-04 ENCOUNTER — PATIENT OUTREACH (OUTPATIENT)
Dept: ADMINISTRATIVE | Facility: HOSPITAL | Age: 74
End: 2019-10-04

## 2019-10-08 ENCOUNTER — PES CALL (OUTPATIENT)
Dept: ADMINISTRATIVE | Facility: CLINIC | Age: 74
End: 2019-10-08

## 2019-10-21 ENCOUNTER — LAB VISIT (OUTPATIENT)
Dept: LAB | Facility: HOSPITAL | Age: 74
End: 2019-10-21
Attending: INTERNAL MEDICINE
Payer: MEDICARE

## 2019-10-21 DIAGNOSIS — Z79.4 TYPE 2 DIABETES MELLITUS WITH COMPLICATION, WITH LONG-TERM CURRENT USE OF INSULIN: ICD-10-CM

## 2019-10-21 DIAGNOSIS — E11.8 TYPE 2 DIABETES MELLITUS WITH COMPLICATION, WITH LONG-TERM CURRENT USE OF INSULIN: ICD-10-CM

## 2019-10-21 LAB
ESTIMATED AVG GLUCOSE: 169 MG/DL (ref 68–131)
HBA1C MFR BLD HPLC: 7.5 % (ref 4–5.6)

## 2019-10-21 PROCEDURE — 83036 HEMOGLOBIN GLYCOSYLATED A1C: CPT

## 2019-10-21 PROCEDURE — 36415 COLL VENOUS BLD VENIPUNCTURE: CPT | Mod: PO

## 2019-10-24 ENCOUNTER — OFFICE VISIT (OUTPATIENT)
Dept: INTERNAL MEDICINE | Facility: CLINIC | Age: 74
End: 2019-10-24
Payer: MEDICARE

## 2019-10-24 VITALS
SYSTOLIC BLOOD PRESSURE: 124 MMHG | HEIGHT: 58 IN | OXYGEN SATURATION: 98 % | HEART RATE: 108 BPM | BODY MASS INDEX: 24.81 KG/M2 | DIASTOLIC BLOOD PRESSURE: 70 MMHG | WEIGHT: 118.19 LBS

## 2019-10-24 DIAGNOSIS — E11.8 TYPE 2 DIABETES MELLITUS WITH COMPLICATION, WITH LONG-TERM CURRENT USE OF INSULIN: Primary | ICD-10-CM

## 2019-10-24 DIAGNOSIS — I10 ESSENTIAL HYPERTENSION: ICD-10-CM

## 2019-10-24 DIAGNOSIS — Z79.4 TYPE 2 DIABETES MELLITUS WITH COMPLICATION, WITH LONG-TERM CURRENT USE OF INSULIN: Primary | ICD-10-CM

## 2019-10-24 DIAGNOSIS — E78.2 MIXED HYPERLIPIDEMIA: ICD-10-CM

## 2019-10-24 DIAGNOSIS — F33.42 RECURRENT MAJOR DEPRESSIVE DISORDER, IN FULL REMISSION: ICD-10-CM

## 2019-10-24 DIAGNOSIS — M15.9 PRIMARY OSTEOARTHRITIS INVOLVING MULTIPLE JOINTS: ICD-10-CM

## 2019-10-24 PROCEDURE — 99999 PR PBB SHADOW E&M-EST. PATIENT-LVL III: CPT | Mod: PBBFAC,,, | Performed by: INTERNAL MEDICINE

## 2019-10-24 PROCEDURE — 99999 PR PBB SHADOW E&M-EST. PATIENT-LVL III: ICD-10-PCS | Mod: PBBFAC,,, | Performed by: INTERNAL MEDICINE

## 2019-10-24 PROCEDURE — 99214 PR OFFICE/OUTPT VISIT, EST, LEVL IV, 30-39 MIN: ICD-10-PCS | Mod: S$PBB,,, | Performed by: INTERNAL MEDICINE

## 2019-10-24 PROCEDURE — 90662 IIV NO PRSV INCREASED AG IM: CPT | Mod: PBBFAC,PN

## 2019-10-24 PROCEDURE — 99213 OFFICE O/P EST LOW 20 MIN: CPT | Mod: PBBFAC,PN | Performed by: INTERNAL MEDICINE

## 2019-10-24 PROCEDURE — 99214 OFFICE O/P EST MOD 30 MIN: CPT | Mod: S$PBB,,, | Performed by: INTERNAL MEDICINE

## 2019-10-24 RX ORDER — TIZANIDINE 4 MG/1
4 TABLET ORAL EVERY 6 HOURS PRN
Qty: 60 TABLET | Refills: 3 | Status: SHIPPED | OUTPATIENT
Start: 2019-10-24 | End: 2019-11-03

## 2019-10-24 NOTE — PROGRESS NOTES
Subjective:       Patient ID: Juliette Seo is a 74 y.o. female.    Chief Complaint: Follow-up    HPI  Checkup.  Chart reviewed.  Weight stable.  BSs mostly OK.  Arthralgias at baseline.  Abd pain waxing and waning.  No CP, SOB, but still with occ cough.  No dysuria, no pedal paresthesias.;  Review of Systems   All other systems reviewed and are negative.      Objective:      Physical Exam   Constitutional: She appears well-developed.   HENT:   Head: Normocephalic.   Eyes: EOM are normal.   Neck: Normal range of motion.   Cardiovascular: Normal rate, regular rhythm, normal heart sounds and intact distal pulses.   Pulses:       Dorsalis pedis pulses are 2+ on the right side, and 2+ on the left side.        Posterior tibial pulses are 2+ on the right side, and 2+ on the left side.   Pulmonary/Chest: Effort normal and breath sounds normal.   Abdominal: Soft. Bowel sounds are normal. She exhibits no distension. There is no tenderness. There is no guarding.   Musculoskeletal: She exhibits no edema.        Right foot: There is normal range of motion and no deformity.        Left foot: There is normal range of motion and no deformity.   Feet:   Right Foot:   Protective Sensation: 0 sites tested. 0 sites sensed.   Left Foot:   Protective Sensation: 0 sites tested. 0 sites sensed.   Lymphadenopathy:     She has no cervical adenopathy.   Neurological: She is alert. No cranial nerve deficit. She exhibits normal muscle tone. Coordination normal.   Skin: Skin is warm and dry.   Psychiatric: She has a normal mood and affect. Her behavior is normal.   Vitals reviewed.      Assessment:       1. Type 2 diabetes mellitus with complication, with long-term current use of insulin    2. Essential hypertension    3. Recurrent major depressive disorder, in full remission    4. Mixed hyperlipidemia    5. Primary osteoarthritis involving multiple joints        Plan:       Juliette was seen today for follow-up.    Diagnoses and all orders for  this visit:    Type 2 diabetes mellitus with complication, with long-term current use of insulin  -     Hemoglobin A1c; Future    Essential hypertension   Well-cont    Recurrent major depressive disorder, in full remission   Cont rx    Mixed hyperlipidemia  -     Comprehensive metabolic panel; Future  -     Lipid panel; Future    Primary osteoarthritis involving multiple joints  -     tiZANidine (ZANAFLEX) 4 MG tablet; Take 1 tablet (4 mg total) by mouth every 6 (six) hours as needed.    Other orders  -     Influenza - High Dose (65+) (PF) (IM)      Follow up in about 6 months (around 4/24/2020).

## 2019-11-16 DIAGNOSIS — I10 ESSENTIAL HYPERTENSION: ICD-10-CM

## 2019-11-18 RX ORDER — LISINOPRIL 20 MG/1
TABLET ORAL
Qty: 90 TABLET | Refills: 4 | Status: SHIPPED | OUTPATIENT
Start: 2019-11-18 | End: 2020-11-23 | Stop reason: SDUPTHER

## 2019-12-26 RX ORDER — PEN NEEDLE, DIABETIC 29 G X1/2"
NEEDLE, DISPOSABLE MISCELLANEOUS
Qty: 100 EACH | Refills: 4 | Status: SHIPPED | OUTPATIENT
Start: 2019-12-26 | End: 2020-08-10 | Stop reason: SDUPTHER

## 2020-03-14 DIAGNOSIS — E11.42 TYPE 2 DIABETES MELLITUS WITH DIABETIC POLYNEUROPATHY, WITH LONG-TERM CURRENT USE OF INSULIN: ICD-10-CM

## 2020-03-14 DIAGNOSIS — K21.9 GASTROESOPHAGEAL REFLUX DISEASE WITHOUT ESOPHAGITIS: ICD-10-CM

## 2020-03-14 DIAGNOSIS — Z79.4 TYPE 2 DIABETES MELLITUS WITH DIABETIC POLYNEUROPATHY, WITH LONG-TERM CURRENT USE OF INSULIN: ICD-10-CM

## 2020-03-14 DIAGNOSIS — Z79.4 TYPE 2 DIABETES MELLITUS WITH COMPLICATION, WITH LONG-TERM CURRENT USE OF INSULIN: ICD-10-CM

## 2020-03-14 DIAGNOSIS — E11.8 TYPE 2 DIABETES MELLITUS WITH COMPLICATION, WITH LONG-TERM CURRENT USE OF INSULIN: ICD-10-CM

## 2020-03-16 RX ORDER — HUMAN INSULIN 100 [IU]/ML
INJECTION, SUSPENSION SUBCUTANEOUS
Qty: 50 ML | Refills: 4 | Status: ON HOLD | OUTPATIENT
Start: 2020-03-16 | End: 2021-03-19

## 2020-03-16 RX ORDER — MECLIZINE HCL 12.5 MG 12.5 MG/1
TABLET ORAL
Qty: 60 TABLET | Refills: 4 | Status: ON HOLD | OUTPATIENT
Start: 2020-03-16 | End: 2021-03-21 | Stop reason: HOSPADM

## 2020-03-16 RX ORDER — LANCETS
EACH MISCELLANEOUS
Qty: 204 EACH | Refills: 4 | Status: SHIPPED | OUTPATIENT
Start: 2020-03-16 | End: 2021-03-23 | Stop reason: SDUPTHER

## 2020-03-16 RX ORDER — OMEPRAZOLE 40 MG/1
40 CAPSULE, DELAYED RELEASE ORAL EVERY MORNING
Qty: 90 CAPSULE | Refills: 4 | Status: SHIPPED | OUTPATIENT
Start: 2020-03-16 | End: 2021-03-24 | Stop reason: SDUPTHER

## 2020-04-25 DIAGNOSIS — L29.9 PRURITUS: ICD-10-CM

## 2020-04-25 RX ORDER — LEVOCETIRIZINE DIHYDROCHLORIDE 5 MG/1
TABLET, FILM COATED ORAL
Qty: 90 TABLET | Refills: 3 | Status: SHIPPED | OUTPATIENT
Start: 2020-04-25 | End: 2021-03-26 | Stop reason: SDUPTHER

## 2020-04-27 ENCOUNTER — OFFICE VISIT (OUTPATIENT)
Dept: FAMILY MEDICINE | Facility: CLINIC | Age: 75
End: 2020-04-27
Payer: MEDICARE

## 2020-04-27 VITALS
TEMPERATURE: 99 F | HEIGHT: 58 IN | HEART RATE: 82 BPM | WEIGHT: 122.94 LBS | SYSTOLIC BLOOD PRESSURE: 130 MMHG | BODY MASS INDEX: 25.8 KG/M2 | OXYGEN SATURATION: 99 % | DIASTOLIC BLOOD PRESSURE: 70 MMHG

## 2020-04-27 DIAGNOSIS — L30.4 INTERTRIGO: Primary | ICD-10-CM

## 2020-04-27 DIAGNOSIS — E11.8 TYPE 2 DIABETES MELLITUS WITH COMPLICATION, WITH LONG-TERM CURRENT USE OF INSULIN: ICD-10-CM

## 2020-04-27 DIAGNOSIS — Z79.4 TYPE 2 DIABETES MELLITUS WITH COMPLICATION, WITH LONG-TERM CURRENT USE OF INSULIN: ICD-10-CM

## 2020-04-27 PROCEDURE — 99213 OFFICE O/P EST LOW 20 MIN: CPT | Mod: S$GLB,,, | Performed by: INTERNAL MEDICINE

## 2020-04-27 PROCEDURE — 99213 PR OFFICE/OUTPT VISIT, EST, LEVL III, 20-29 MIN: ICD-10-PCS | Mod: S$GLB,,, | Performed by: INTERNAL MEDICINE

## 2020-04-27 RX ORDER — NYSTATIN 100000 [USP'U]/G
POWDER TOPICAL 4 TIMES DAILY
Qty: 1 BOTTLE | Refills: 1 | Status: ON HOLD | OUTPATIENT
Start: 2020-04-27 | End: 2021-03-21 | Stop reason: HOSPADM

## 2020-04-27 NOTE — PROGRESS NOTES
Subjective:       Patient ID: Juliette Seo is a 74 y.o. female.    Chief Complaint: Rash (pelvic and pain under right rib cage) and Cough    HPI   Pt c/o 3 days of pruritic, sl painful rash in groin.  BSs erratic.  Review of Systems   All other systems reviewed and are negative.      Objective:      Physical Exam   Constitutional: She appears well-developed.   HENT:   Head: Normocephalic.   Eyes: EOM are normal.   Neck: Normal range of motion.   Cardiovascular: Normal rate and intact distal pulses.   Pulmonary/Chest: Effort normal.   Abdominal: Bowel sounds are normal.   Musculoskeletal: Normal range of motion.   Lymphadenopathy:     She has no cervical adenopathy.   Neurological: She is alert. No cranial nerve deficit. She exhibits normal muscle tone. Coordination normal.   Skin: Skin is warm and dry. Rash (intertrigo groin) noted.   Psychiatric: She has a normal mood and affect. Her behavior is normal.   Vitals reviewed.      Assessment:       1. Intertrigo    2. Type 2 diabetes mellitus with complication, with long-term current use of insulin        Plan:       Juliette was seen today for rash and cough.    Diagnoses and all orders for this visit:    Intertrigo  -     nystatin (MYCOSTATIN) powder; Apply topically 4 (four) times daily.    Type 2 diabetes mellitus with complication, with long-term current use of insulin   Await labs    Follow up in about 2 weeks (around 5/11/2020), or if symptoms worsen or fail to improve.

## 2020-05-01 ENCOUNTER — LAB VISIT (OUTPATIENT)
Dept: LAB | Facility: HOSPITAL | Age: 75
End: 2020-05-01
Attending: INTERNAL MEDICINE
Payer: MEDICARE

## 2020-05-01 DIAGNOSIS — E78.2 MIXED HYPERLIPIDEMIA: ICD-10-CM

## 2020-05-01 DIAGNOSIS — E11.8 TYPE 2 DIABETES MELLITUS WITH COMPLICATION, WITH LONG-TERM CURRENT USE OF INSULIN: ICD-10-CM

## 2020-05-01 DIAGNOSIS — Z79.4 TYPE 2 DIABETES MELLITUS WITH COMPLICATION, WITH LONG-TERM CURRENT USE OF INSULIN: ICD-10-CM

## 2020-05-01 LAB
ALBUMIN SERPL BCP-MCNC: 4.4 G/DL (ref 3.5–5.2)
ALP SERPL-CCNC: 79 U/L (ref 38–126)
ALT SERPL W/O P-5'-P-CCNC: 23 U/L (ref 10–44)
ANION GAP SERPL CALC-SCNC: 9 MMOL/L (ref 8–16)
AST SERPL-CCNC: 24 U/L (ref 15–46)
BILIRUB SERPL-MCNC: 0.3 MG/DL (ref 0.1–1)
BUN SERPL-MCNC: 21 MG/DL (ref 7–17)
CALCIUM SERPL-MCNC: 9.1 MG/DL (ref 8.7–10.5)
CHLORIDE SERPL-SCNC: 103 MMOL/L (ref 95–110)
CHOLEST SERPL-MCNC: 212 MG/DL (ref 120–199)
CHOLEST/HDLC SERPL: 6.6 {RATIO} (ref 2–5)
CO2 SERPL-SCNC: 26 MMOL/L (ref 23–29)
CREAT SERPL-MCNC: 0.6 MG/DL (ref 0.5–1.4)
EST. GFR  (AFRICAN AMERICAN): >60 ML/MIN/1.73 M^2
EST. GFR  (NON AFRICAN AMERICAN): >60 ML/MIN/1.73 M^2
ESTIMATED AVG GLUCOSE: 197 MG/DL (ref 68–131)
GLUCOSE SERPL-MCNC: 221 MG/DL (ref 70–110)
HBA1C MFR BLD HPLC: 8.5 % (ref 4–5.6)
HDLC SERPL-MCNC: 32 MG/DL (ref 40–75)
HDLC SERPL: 15.1 % (ref 20–50)
LDLC SERPL CALC-MCNC: 143 MG/DL (ref 63–159)
NONHDLC SERPL-MCNC: 180 MG/DL
POTASSIUM SERPL-SCNC: 4.6 MMOL/L (ref 3.5–5.1)
PROT SERPL-MCNC: 7.6 G/DL (ref 6–8.4)
SODIUM SERPL-SCNC: 138 MMOL/L (ref 136–145)
TRIGL SERPL-MCNC: 185 MG/DL (ref 30–150)

## 2020-05-01 PROCEDURE — 83036 HEMOGLOBIN GLYCOSYLATED A1C: CPT

## 2020-05-01 PROCEDURE — 80061 LIPID PANEL: CPT

## 2020-05-01 PROCEDURE — 80053 COMPREHEN METABOLIC PANEL: CPT | Mod: PO

## 2020-05-01 PROCEDURE — 36415 COLL VENOUS BLD VENIPUNCTURE: CPT | Mod: PO

## 2020-05-08 ENCOUNTER — OFFICE VISIT (OUTPATIENT)
Dept: INTERNAL MEDICINE | Facility: CLINIC | Age: 75
End: 2020-05-08
Payer: MEDICARE

## 2020-05-08 VITALS
WEIGHT: 122.13 LBS | HEIGHT: 58 IN | TEMPERATURE: 98 F | BODY MASS INDEX: 25.64 KG/M2 | OXYGEN SATURATION: 98 % | HEART RATE: 87 BPM | SYSTOLIC BLOOD PRESSURE: 132 MMHG | DIASTOLIC BLOOD PRESSURE: 72 MMHG

## 2020-05-08 DIAGNOSIS — I10 ESSENTIAL HYPERTENSION: ICD-10-CM

## 2020-05-08 DIAGNOSIS — M54.31 SCIATICA OF RIGHT SIDE: ICD-10-CM

## 2020-05-08 DIAGNOSIS — E11.8 TYPE 2 DIABETES MELLITUS WITH COMPLICATION, WITH LONG-TERM CURRENT USE OF INSULIN: ICD-10-CM

## 2020-05-08 DIAGNOSIS — E78.2 MIXED HYPERLIPIDEMIA: ICD-10-CM

## 2020-05-08 DIAGNOSIS — J47.9 BRONCHIECTASIS WITHOUT COMPLICATION: ICD-10-CM

## 2020-05-08 DIAGNOSIS — Z79.4 TYPE 2 DIABETES MELLITUS WITH COMPLICATION, WITH LONG-TERM CURRENT USE OF INSULIN: ICD-10-CM

## 2020-05-08 DIAGNOSIS — F33.42 RECURRENT MAJOR DEPRESSIVE DISORDER, IN FULL REMISSION: ICD-10-CM

## 2020-05-08 DIAGNOSIS — Z00.00 ROUTINE GENERAL MEDICAL EXAMINATION AT A HEALTH CARE FACILITY: Primary | ICD-10-CM

## 2020-05-08 PROCEDURE — 99999 PR PBB SHADOW E&M-EST. PATIENT-LVL IV: CPT | Mod: PBBFAC,,, | Performed by: INTERNAL MEDICINE

## 2020-05-08 PROCEDURE — 99214 OFFICE O/P EST MOD 30 MIN: CPT | Mod: PBBFAC,PN | Performed by: INTERNAL MEDICINE

## 2020-05-08 PROCEDURE — 99999 PR PBB SHADOW E&M-EST. PATIENT-LVL IV: ICD-10-PCS | Mod: PBBFAC,,, | Performed by: INTERNAL MEDICINE

## 2020-05-08 PROCEDURE — 99397 PR PREVENTIVE VISIT,EST,65 & OVER: ICD-10-PCS | Mod: S$PBB,,, | Performed by: INTERNAL MEDICINE

## 2020-05-08 PROCEDURE — 99397 PER PM REEVAL EST PAT 65+ YR: CPT | Mod: S$PBB,,, | Performed by: INTERNAL MEDICINE

## 2020-05-08 RX ORDER — GABAPENTIN 100 MG/1
100 CAPSULE ORAL 3 TIMES DAILY
Qty: 90 CAPSULE | Refills: 11 | Status: SHIPPED | OUTPATIENT
Start: 2020-05-08 | End: 2021-03-26 | Stop reason: SDUPTHER

## 2020-05-08 NOTE — PROGRESS NOTES
Subjective:       Patient ID: Juliette Seo is a 74 y.o. female.    Chief Complaint: Cough and Rash    HPI  Wellness check.  Chart reviewed.  Weight stable.  BSs mostly in 200s, activity limited by pain in R sciatic nerve dist.  Worse with lying down and sitting.  No CP, sOB.  Has an occ nonprod cough.  Having more intertrigo cont w Nizoral.  Review of Systems   All other systems reviewed and are negative.      Objective:      Physical Exam   Constitutional: She appears well-developed.   HENT:   Head: Normocephalic.   Eyes: EOM are normal.   Neck: Normal range of motion.   Cardiovascular: Normal rate, regular rhythm, normal heart sounds and intact distal pulses.   Pulmonary/Chest: Effort normal and breath sounds normal.   Abdominal: Soft. Bowel sounds are normal. She exhibits no distension. There is no guarding.   Musculoskeletal: Normal range of motion.   Lymphadenopathy:     She has no cervical adenopathy.   Neurological: She is alert. No cranial nerve deficit. She exhibits normal muscle tone. Coordination normal.   -SLR   Skin: Skin is warm and dry.   Psychiatric: She has a normal mood and affect. Her behavior is normal.   Vitals reviewed.      Assessment:       1. Routine general medical examination at a health care facility    2. Type 2 diabetes mellitus with complication, with long-term current use of insulin    3. Essential hypertension    4. Recurrent major depressive disorder, in full remission    5. Mixed hyperlipidemia    6. Bronchiectasis without complication    7. Sciatica of right side        Plan:       Juliette was seen today for cough and rash.    Diagnoses and all orders for this visit:    Routine general medical examination at a health care facility    Type 2 diabetes mellitus with complication, with long-term current use of insulin  -     Hemoglobin A1C; Future    Essential hypertension   Well-cont    Recurrent major depressive disorder, in full remission   Well-cont    Mixed  hyperlipidemia   Well-cont    Bronchiectasis without complication   Stable    Sciatica of right side  -     Ambulatory referral/consult to Physical/Occupational Therapy; Future  -     gabapentin (NEURONTIN) 100 MG capsule; Take 1 capsule (100 mg total) by mouth 3 (three) times daily.      Follow up in about 3 months (around 8/8/2020).

## 2020-05-15 DIAGNOSIS — E11.42 TYPE 2 DIABETES MELLITUS WITH DIABETIC POLYNEUROPATHY: ICD-10-CM

## 2020-05-15 RX ORDER — GLIMEPIRIDE 2 MG/1
TABLET ORAL
Qty: 90 TABLET | Refills: 4 | Status: SHIPPED | OUTPATIENT
Start: 2020-05-15 | End: 2021-03-26 | Stop reason: ALTCHOICE

## 2020-05-19 PROBLEM — R52 PAIN: Status: ACTIVE | Noted: 2020-05-19

## 2020-05-19 PROBLEM — R53.1 WEAKNESS: Status: ACTIVE | Noted: 2020-05-19

## 2020-06-02 ENCOUNTER — TELEPHONE (OUTPATIENT)
Dept: ORTHOPEDICS | Facility: CLINIC | Age: 75
End: 2020-06-02

## 2020-06-02 ENCOUNTER — OFFICE VISIT (OUTPATIENT)
Dept: ORTHOPEDICS | Facility: CLINIC | Age: 75
End: 2020-06-02
Payer: MEDICARE

## 2020-06-02 ENCOUNTER — HOSPITAL ENCOUNTER (OUTPATIENT)
Dept: RADIOLOGY | Facility: HOSPITAL | Age: 75
Discharge: HOME | End: 2020-06-02
Attending: PHYSICIAN ASSISTANT
Payer: MEDICARE

## 2020-06-02 VITALS — HEIGHT: 58 IN | WEIGHT: 122 LBS | BODY MASS INDEX: 25.61 KG/M2

## 2020-06-02 DIAGNOSIS — M25.511 RIGHT SHOULDER PAIN, UNSPECIFIED CHRONICITY: ICD-10-CM

## 2020-06-02 DIAGNOSIS — M54.6 ACUTE RIGHT-SIDED THORACIC BACK PAIN: Primary | ICD-10-CM

## 2020-06-02 PROCEDURE — 73030 X-RAY EXAM OF SHOULDER: CPT | Mod: 26,RT,, | Performed by: RADIOLOGY

## 2020-06-02 PROCEDURE — 99999 PR PBB SHADOW E&M-EST. PATIENT-LVL IV: CPT | Mod: PBBFAC,,, | Performed by: PHYSICIAN ASSISTANT

## 2020-06-02 PROCEDURE — 73030 X-RAY EXAM OF SHOULDER: CPT | Mod: TC,PN,RT

## 2020-06-02 PROCEDURE — 99213 PR OFFICE/OUTPT VISIT, EST, LEVL III, 20-29 MIN: ICD-10-PCS | Mod: S$PBB,,, | Performed by: PHYSICIAN ASSISTANT

## 2020-06-02 PROCEDURE — 99213 OFFICE O/P EST LOW 20 MIN: CPT | Mod: S$PBB,,, | Performed by: PHYSICIAN ASSISTANT

## 2020-06-02 PROCEDURE — 99214 OFFICE O/P EST MOD 30 MIN: CPT | Mod: PBBFAC,25,PN | Performed by: PHYSICIAN ASSISTANT

## 2020-06-02 PROCEDURE — 99999 PR PBB SHADOW E&M-EST. PATIENT-LVL IV: ICD-10-PCS | Mod: PBBFAC,,, | Performed by: PHYSICIAN ASSISTANT

## 2020-06-02 PROCEDURE — 73030 XR SHOULDER COMPLETE 2 OR MORE VIEWS RIGHT: ICD-10-PCS | Mod: 26,RT,, | Performed by: RADIOLOGY

## 2020-06-02 RX ORDER — TIZANIDINE 4 MG/1
4 TABLET ORAL EVERY 6 HOURS PRN
COMMUNITY
End: 2023-01-10

## 2020-06-02 NOTE — TELEPHONE ENCOUNTER
----- Message from Valerie Lei sent at 6/2/2020  9:47 AM CDT -----  Contact: SElf [phone]  Patient would like to speak with you about being seen today for shoulder pain. Please advise.

## 2020-06-02 NOTE — TELEPHONE ENCOUNTER
Spoke with patient. Appointment made for today with ALYSHA Thornton. Patient is aware of date and time.

## 2020-06-03 NOTE — PROGRESS NOTES
"Subjective:      Patient ID: Juliette Seo is a 74 y.o. female.    Chief Complaint: Pain of the Right Shoulder      HPI: Juliette Seo is a 73 yo female in clinic today for right back and shoulder pain. Patient states the pain began yesterday when she woke up. She denies history of trauma, but states she picked up her grandchild and may have pulled something doing that.  She also has a history of right shoulder arthroscopic rotator cuff repair with 4 suture anchors done by Dr. Wesley in September 2017, but this has not caused any problems recently.    Past Medical History:   Diagnosis Date    Allergy     Anxiety     Arthritis     osteo    Asthma     GERD (gastroesophageal reflux disease)     High cholesterol     Hypertension     Type 2 diabetes mellitus, uncontrolled, with neuropathy        Current Outpatient Medications:     ACCU-CHEK FASTCLIX LANCET DRUM Misc, USE AS DIRECTED TWICE A DAY, Disp: 204 each, Rfl: 4    ACCU-CHEK SMARTVIEW TEST STRIP Strp, USE AS DIRECTED TWICE A DAY, Disp: 100 strip, Rfl: 9    acetaminophen (TYLENOL) 500 MG tablet, Take 1 tablet (500 mg total) by mouth every 4 (four) hours as needed for Pain., Disp: 42 tablet, Rfl: 0    BD INSULIN SYRINGE ULTRA-FINE 0.5 mL 31 gauge x 5/16" Syrg, USE AS DIRECTED TWICE A DAY, Disp: 100 each, Rfl: 4    glimepiride (AMARYL) 2 MG tablet, TAKE 1 TABLET (2 MG TOTAL) BY MOUTH BEFORE BREAKFAST., Disp: 90 tablet, Rfl: 4    hydrocortisone 2.5 % cream, APPLY TO THE AFFECTED AREA(S) BY TOPICAL ROUTE THREE TIMES DAILY AS NEEDED FOR ITCHING, Disp: 1 Tube, Rfl: 2    hydrOXYzine HCl (ATARAX) 25 MG tablet, TAKE 1 TABLET (25 MG TOTAL) BY MOUTH 3 (THREE) TIMES DAILY AS NEEDED FOR ITCHING., Disp: 100 tablet, Rfl: 1    levocetirizine (XYZAL) 5 MG tablet, TAKE 1 TABLET BY MOUTH EVERY DAY IN THE EVENING, Disp: 90 tablet, Rfl: 3    lisinopril (PRINIVIL,ZESTRIL) 20 MG tablet, TAKE 1 TABLET BY MOUTH EVERY DAY, Disp: 90 tablet, Rfl: 4    LUMIGAN 0.01 % Drop, " Place 1 drop into both eyes nightly., Disp: , Rfl: 4    meclizine (ANTIVERT) 12.5 mg tablet, TAKE 1 TABLET BY MOUTH 3 TIMES A DAY AS NEEDED, Disp: 60 tablet, Rfl: 4    NOVOLIN N NPH U-100 INSULIN 100 unit/mL injection, INJECT SUBCUTANEOUSLY 40 UNITS EVERY MORNING & 20 UNITS EVERY EVENING, Disp: 50 mL, Rfl: 4    omeprazole (PRILOSEC) 40 MG capsule, TAKE 1 CAPSULE (40 MG TOTAL) BY MOUTH EVERY MORNING., Disp: 90 capsule, Rfl: 4    tiZANidine (ZANAFLEX) 4 MG tablet, Take 4 mg by mouth every 6 (six) hours as needed., Disp: , Rfl:     triamcinolone acetonide 0.5% (KENALOG) 0.5 % Crea, APPLY TOPICALLY TWICE A DAY, Disp: 15 g, Rfl: 6    albuterol 90 mcg/actuation inhaler, Inhale 2 puffs into the lungs every 4 (four) hours as needed for Wheezing (or cough)., Disp: 1 Inhaler, Rfl: 0    atorvastatin (LIPITOR) 80 MG tablet, Take 1 tablet (80 mg total) by mouth once daily. (Patient not taking: Reported on 6/2/2020), Disp: 90 tablet, Rfl: 3    diclofenac (VOLTAREN) 50 MG EC tablet, Take 1 tablet (50 mg total) by mouth 2 (two) times daily. (Patient not taking: Reported on 6/2/2020), Disp: 180 tablet, Rfl: 3    gabapentin (NEURONTIN) 100 MG capsule, Take 1 capsule (100 mg total) by mouth 3 (three) times daily. (Patient not taking: Reported on 6/2/2020), Disp: 90 capsule, Rfl: 11    nystatin (MYCOSTATIN) powder, Apply topically 4 (four) times daily. (Patient not taking: Reported on 6/2/2020), Disp: 1 Bottle, Rfl: 1    ondansetron (ZOFRAN-ODT) 4 MG TbDL, Take 1 tablet (4 mg total) by mouth every 8 (eight) hours as needed. (Patient not taking: Reported on 6/2/2020), Disp: 15 tablet, Rfl: 0    ondansetron (ZOFRAN-ODT) 8 MG TbDL, Take 1 tablet (8 mg total) by mouth every 6 (six) hours as needed. (Patient not taking: Reported on 6/2/2020), Disp: 10 tablet, Rfl: 0    RESTASIS 0.05 % ophthalmic emulsion, Place 1 drop into both eyes 2 (two) times daily., Disp: , Rfl: 3    VOLTAREN 1 % Gel, APPLY 2 GRAMS TOPICALLY 3 TIMES A  "DAY, Disp: , Rfl: 3  Review of patient's allergies indicates:   Allergen Reactions    Actos [pioglitazone] Nausea Only    Darvocet a500 [propoxyphene n-acetaminophen] Nausea And Vomiting    Dilaudid [hydromorphone (bulk)] Nausea And Vomiting       Ht 4' 10" (1.473 m)   Wt 55.3 kg (122 lb)   BMI 25.50 kg/m²     Review of Systems   Constitution: Negative for chills and fever.   HENT: Negative for congestion and hoarse voice.    Eyes: Negative for discharge and redness.   Cardiovascular: Negative for chest pain and palpitations.   Respiratory: Negative for cough and wheezing.    Skin: Negative for itching and rash.   Musculoskeletal: Positive for myalgias. Negative for arthritis, joint pain, joint swelling and stiffness.   Gastrointestinal: Negative for diarrhea, nausea and vomiting.   Neurological: Negative for dizziness and numbness.   Psychiatric/Behavioral: Negative for altered mental status.   Allergic/Immunologic: Negative for environmental allergies and persistent infections.           Objective:    Ortho Exam   Right shoulder:  General appearance: Alert, oriented, no acute distress.  C spine exam: no tenderness noted.  Skin: No rashes or lesions on exposed areas.  Atrophy: none noted.  Tenderness to palpation: None.  AROM (deg): abduction-100°, flexion-110°, rotation- unrestricted, painful rotation- absent.  Rotator cuff: normal, Impingement test- negative.  Cross arm adduction test- negative.  Instability testing: negative.   Distal neuro: normal, normal 5/5 strength in all tested muscle groups.  Pulses: Positive peripheral pulses..        Assessment:     Imaging:  X-ray right shoulder obtained today shows suture anchors from surgery in place. No acute fracture or dislocation        1. Acute right-sided thoracic back pain    2. Right shoulder pain, unspecified chronicity          Plan:       I explained to the patient that there is nothing wrong with her x-ray physical exam of the than some muscle " soreness.  Therefore think that her symptoms are due to a strain of muscle in her back and should be self-limiting.  Instructed the patient to use a heating pad and rest until the symptoms resolve.  Follow-up if symptoms worsen or do not improve    Orders Placed This Encounter    X-ray Shoulder 2 or More Views Right     Follow up in about 6 weeks (around 7/14/2020).

## 2020-06-09 ENCOUNTER — TELEPHONE (OUTPATIENT)
Dept: ORTHOPEDICS | Facility: CLINIC | Age: 75
End: 2020-06-09

## 2020-06-09 RX ORDER — DICLOFENAC SODIUM 10 MG/G
4 GEL TOPICAL 4 TIMES DAILY
Qty: 1 TUBE | Refills: 3 | Status: SHIPPED | OUTPATIENT
Start: 2020-06-09

## 2020-06-09 NOTE — TELEPHONE ENCOUNTER
----- Message from Norberto Merrill PA-C sent at 6/9/2020  3:33 PM CDT -----  Contact: [phone]/ Daughter, Felicia  Yes, I told her I would order Voltaren. It has been sent in.    ----- Message -----  From: Katherine Gil MA  Sent: 6/9/2020   1:53 PM CDT  To: Norberto Merrill PA-C    Where you going to give her some type of cream?  ----- Message -----  From: Javier Ron  Sent: 6/9/2020  12:23 PM CDT  To: [facility] Staff    Patient requesting to speak with you regarding a cream that was supposed to be called in after the patient last office visit. She says she called the pharmacy and they have no record of anything being called in. Please advise.

## 2020-06-09 NOTE — TELEPHONE ENCOUNTER
Returned call to Felicia, Ms Rayray daughter let her know that Norberto Merrill PA-C called in [facility] for her mom.

## 2020-06-19 ENCOUNTER — HOSPITAL ENCOUNTER (OUTPATIENT)
Dept: RADIOLOGY | Facility: HOSPITAL | Age: 75
Discharge: HOME | End: 2020-06-19
Attending: PHYSICIAN ASSISTANT
Payer: MEDICARE

## 2020-06-19 ENCOUNTER — OFFICE VISIT (OUTPATIENT)
Dept: ORTHOPEDICS | Facility: CLINIC | Age: 75
End: 2020-06-19
Payer: MEDICARE

## 2020-06-19 VITALS — HEIGHT: 58 IN | BODY MASS INDEX: 25.61 KG/M2 | WEIGHT: 122 LBS

## 2020-06-19 DIAGNOSIS — G89.29 CHRONIC PAIN OF BOTH KNEES: ICD-10-CM

## 2020-06-19 DIAGNOSIS — M25.562 CHRONIC PAIN OF BOTH KNEES: ICD-10-CM

## 2020-06-19 DIAGNOSIS — M25.561 CHRONIC PAIN OF BOTH KNEES: ICD-10-CM

## 2020-06-19 DIAGNOSIS — M65.321 TRIGGER FINGER, RIGHT INDEX FINGER: Primary | ICD-10-CM

## 2020-06-19 DIAGNOSIS — M65.341 TRIGGER FINGER, RIGHT RING FINGER: ICD-10-CM

## 2020-06-19 PROCEDURE — 73562 X-RAY EXAM OF KNEE 3: CPT | Mod: TC,50,PN

## 2020-06-19 PROCEDURE — 99213 OFFICE O/P EST LOW 20 MIN: CPT | Mod: S$PBB,25,, | Performed by: PHYSICIAN ASSISTANT

## 2020-06-19 PROCEDURE — 99999 PR PBB SHADOW E&M-EST. PATIENT-LVL IV: ICD-10-PCS | Mod: PBBFAC,,, | Performed by: PHYSICIAN ASSISTANT

## 2020-06-19 PROCEDURE — 99214 OFFICE O/P EST MOD 30 MIN: CPT | Mod: PBBFAC,25,PN | Performed by: PHYSICIAN ASSISTANT

## 2020-06-19 PROCEDURE — 99213 PR OFFICE/OUTPT VISIT, EST, LEVL III, 20-29 MIN: ICD-10-PCS | Mod: S$PBB,25,, | Performed by: PHYSICIAN ASSISTANT

## 2020-06-19 PROCEDURE — 73562 XR KNEE 3 VIEW BILATERAL: ICD-10-PCS | Mod: 26,50,, | Performed by: RADIOLOGY

## 2020-06-19 PROCEDURE — 99999 PR PBB SHADOW E&M-EST. PATIENT-LVL IV: CPT | Mod: PBBFAC,,, | Performed by: PHYSICIAN ASSISTANT

## 2020-06-19 PROCEDURE — 20550 TENDON SHEATH: ICD-10-PCS | Mod: S$PBB,RT,, | Performed by: PHYSICIAN ASSISTANT

## 2020-06-19 PROCEDURE — 20550 NJX 1 TENDON SHEATH/LIGAMENT: CPT | Mod: PBBFAC,PN | Performed by: PHYSICIAN ASSISTANT

## 2020-06-19 PROCEDURE — 73562 X-RAY EXAM OF KNEE 3: CPT | Mod: 26,50,, | Performed by: RADIOLOGY

## 2020-06-19 PROCEDURE — 20550 NJX 1 TENDON SHEATH/LIGAMENT: CPT | Mod: S$PBB,RT,, | Performed by: PHYSICIAN ASSISTANT

## 2020-06-19 RX ORDER — TRIAMCINOLONE ACETONIDE 40 MG/ML
20 INJECTION, SUSPENSION INTRA-ARTICULAR; INTRAMUSCULAR
Status: COMPLETED | OUTPATIENT
Start: 2020-06-19 | End: 2020-06-19

## 2020-06-19 RX ADMIN — TRIAMCINOLONE ACETONIDE 20 MG: 40 INJECTION, SUSPENSION INTRA-ARTICULAR; INTRAMUSCULAR at 02:06

## 2020-06-19 NOTE — PROCEDURES
Tendon Sheath    Date/Time: 6/19/2020 2:00 PM  Performed by: Norberto Merrill PA-C  Authorized by: Norberto Merrill PA-C       Additional Comments: PROCEDURE:  I have explained the risks, benefits, and alternatives of the procedure in detail.  The patient voices understanding, gives consent, and all questions have been answered.  Pause for timeout. A sterile prep of the skin performed, then the right index and ring finger flexor tendon is injected using a 25 gauge needle with a combination of 0.5 cc 1% plain xylocaine and 20 mg of Kenalog.  The remaining 20 mg of Kenolog was properly wasted. The patient is cautioned and immediate relief of pain is secondary to the local anesthetic and will be temporary.  After the anesthetic wears off there may be a increase in pain that may last for a few hours or a few days and they should use ice to help alleviate this flair up of pain. Patient tolerated the procedure well.

## 2020-06-19 NOTE — PROGRESS NOTES
"Subjective:      Patient ID: Juliette Seo is a 74 y.o. female.    Chief Complaint: Follow-up of the Right Shoulder and Follow-up of the Right Hand      HPI: Juliette Seo is a 74-year-old female in clinic today for right hand pain.  Patient was seen a couple weeks ago for right-sided back pain which she states is somewhat improved, but not all the way better.  Her problem today is that her right ring and index finger gets stuck in a flexed position and she has to push them to extend.  This happens throughout the day, but is worse when she 1st wakes having to sometimes run them under warm water to mobilize them.  Patient also complains of bilateral knee pain, but states that she is only concerned about having hand treated today.    Past Medical History:   Diagnosis Date    Allergy     Anxiety     Arthritis     osteo    Asthma     GERD (gastroesophageal reflux disease)     High cholesterol     Hypertension     Type 2 diabetes mellitus, uncontrolled, with neuropathy        Current Outpatient Medications:     ACCU-CHEK FASTCLIX LANCET DRUM Misc, USE AS DIRECTED TWICE A DAY, Disp: 204 each, Rfl: 4    ACCU-CHEK SMARTVIEW TEST STRIP Strp, USE AS DIRECTED TWICE A DAY, Disp: 100 strip, Rfl: 9    acetaminophen (TYLENOL) 500 MG tablet, Take 1 tablet (500 mg total) by mouth every 4 (four) hours as needed for Pain., Disp: 42 tablet, Rfl: 0    BD INSULIN SYRINGE ULTRA-FINE 0.5 mL 31 gauge x 5/16" Syrg, USE AS DIRECTED TWICE A DAY, Disp: 100 each, Rfl: 4    diclofenac (VOLTAREN) 50 MG EC tablet, Take 1 tablet (50 mg total) by mouth 2 (two) times daily., Disp: 180 tablet, Rfl: 3    diclofenac sodium (VOLTAREN) 1 % Gel, Apply 4 g topically 4 (four) times daily., Disp: 1 Tube, Rfl: 3    gabapentin (NEURONTIN) 100 MG capsule, Take 1 capsule (100 mg total) by mouth 3 (three) times daily., Disp: 90 capsule, Rfl: 11    glimepiride (AMARYL) 2 MG tablet, TAKE 1 TABLET (2 MG TOTAL) BY MOUTH BEFORE BREAKFAST., Disp: 90 " tablet, Rfl: 4    hydrocortisone 2.5 % cream, APPLY TO THE AFFECTED AREA(S) BY TOPICAL ROUTE THREE TIMES DAILY AS NEEDED FOR ITCHING, Disp: 1 Tube, Rfl: 2    hydrOXYzine HCl (ATARAX) 25 MG tablet, TAKE 1 TABLET (25 MG TOTAL) BY MOUTH 3 (THREE) TIMES DAILY AS NEEDED FOR ITCHING., Disp: 100 tablet, Rfl: 1    levocetirizine (XYZAL) 5 MG tablet, TAKE 1 TABLET BY MOUTH EVERY DAY IN THE EVENING, Disp: 90 tablet, Rfl: 3    lisinopril (PRINIVIL,ZESTRIL) 20 MG tablet, TAKE 1 TABLET BY MOUTH EVERY DAY, Disp: 90 tablet, Rfl: 4    LUMIGAN 0.01 % Drop, Place 1 drop into both eyes nightly., Disp: , Rfl: 4    meclizine (ANTIVERT) 12.5 mg tablet, TAKE 1 TABLET BY MOUTH 3 TIMES A DAY AS NEEDED, Disp: 60 tablet, Rfl: 4    NOVOLIN N NPH U-100 INSULIN 100 unit/mL injection, INJECT SUBCUTANEOUSLY 40 UNITS EVERY MORNING & 20 UNITS EVERY EVENING, Disp: 50 mL, Rfl: 4    nystatin (MYCOSTATIN) powder, Apply topically 4 (four) times daily., Disp: 1 Bottle, Rfl: 1    omeprazole (PRILOSEC) 40 MG capsule, TAKE 1 CAPSULE (40 MG TOTAL) BY MOUTH EVERY MORNING., Disp: 90 capsule, Rfl: 4    ondansetron (ZOFRAN-ODT) 4 MG TbDL, Take 1 tablet (4 mg total) by mouth every 8 (eight) hours as needed., Disp: 15 tablet, Rfl: 0    ondansetron (ZOFRAN-ODT) 8 MG TbDL, Take 1 tablet (8 mg total) by mouth every 6 (six) hours as needed., Disp: 10 tablet, Rfl: 0    RESTASIS 0.05 % ophthalmic emulsion, Place 1 drop into both eyes 2 (two) times daily., Disp: , Rfl: 3    tiZANidine (ZANAFLEX) 4 MG tablet, Take 4 mg by mouth every 6 (six) hours as needed., Disp: , Rfl:     triamcinolone acetonide 0.5% (KENALOG) 0.5 % Crea, APPLY TOPICALLY TWICE A DAY, Disp: 15 g, Rfl: 6    VOLTAREN 1 % Gel, APPLY 2 GRAMS TOPICALLY 3 TIMES A DAY, Disp: , Rfl: 3    albuterol 90 mcg/actuation inhaler, Inhale 2 puffs into the lungs every 4 (four) hours as needed for Wheezing (or cough)., Disp: 1 Inhaler, Rfl: 0    atorvastatin (LIPITOR) 80 MG tablet, Take 1 tablet (80 mg  "total) by mouth once daily. (Patient not taking: Reported on 6/2/2020), Disp: 90 tablet, Rfl: 3  No current facility-administered medications for this visit.   Review of patient's allergies indicates:   Allergen Reactions    Actos [pioglitazone] Nausea Only    Darvocet a500 [propoxyphene n-acetaminophen] Nausea And Vomiting    Dilaudid [hydromorphone (bulk)] Nausea And Vomiting       Ht 4' 10" (1.473 m)   Wt 55.3 kg (122 lb)   BMI 25.50 kg/m²     Review of Systems   Constitution: Negative for chills and fever.   HENT: Negative for congestion and hoarse voice.    Eyes: Negative for discharge and redness.   Cardiovascular: Negative for chest pain and palpitations.   Respiratory: Negative for cough and wheezing.    Skin: Negative for itching and rash.   Musculoskeletal: Positive for joint swelling and stiffness. Negative for arthritis and joint pain.   Gastrointestinal: Negative for diarrhea, nausea and vomiting.   Neurological: Negative for dizziness and numbness.   Psychiatric/Behavioral: Negative for altered mental status.   Allergic/Immunologic: Negative for environmental allergies and persistent infections.           Objective:    Ortho Exam   Right hand:  Triggering noted at the index and ring finger  TTP over the A1 pulley  Sensation and pulses are intact  Cap refill brisk    GEN: Well developed, well nourished female. AAOX3. No acute distress.   Normocephalic, atraumatic.   YU  Breathing unlabored.  Mood and affect appropriate.        Assessment:     Imaging:  X-ray bilateral knees obtained today shows some degenerative changes.  No acute fractures or dislocation        1. Trigger finger, right index finger    2. Chronic pain of both knees    3. Trigger finger, right ring finger          Plan:       Recommended and performed corticosteroid injection of the right index and right ring finger for trigger finger.  Patient tolerated the procedure well.  I suggested that she may want to ice the injection " sites later this evening.  Patient will continue to take Tylenol for arthritic pain of heard bilateral knees and will follow up in clinic to discuss this with me at a later time.    Orders Placed This Encounter    Tendon Sheath    X-Ray Knee 3 View Bilateral    triamcinolone acetonide injection 20 mg    triamcinolone acetonide injection 20 mg     No follow-ups on file.

## 2020-07-17 ENCOUNTER — OFFICE VISIT (OUTPATIENT)
Dept: ORTHOPEDICS | Facility: CLINIC | Age: 75
End: 2020-07-17
Payer: MEDICARE

## 2020-07-17 VITALS — BODY MASS INDEX: 25.61 KG/M2 | WEIGHT: 122 LBS | HEIGHT: 58 IN

## 2020-07-17 DIAGNOSIS — M65.321 TRIGGER FINGER, RIGHT INDEX FINGER: Primary | ICD-10-CM

## 2020-07-17 DIAGNOSIS — M25.511 RIGHT SHOULDER PAIN, UNSPECIFIED CHRONICITY: ICD-10-CM

## 2020-07-17 DIAGNOSIS — G89.29 CHRONIC PAIN OF RIGHT KNEE: ICD-10-CM

## 2020-07-17 DIAGNOSIS — M25.561 CHRONIC PAIN OF RIGHT KNEE: ICD-10-CM

## 2020-07-17 DIAGNOSIS — M65.341 TRIGGER FINGER, RIGHT RING FINGER: ICD-10-CM

## 2020-07-17 PROCEDURE — 99214 OFFICE O/P EST MOD 30 MIN: CPT | Mod: PBBFAC,PN | Performed by: PHYSICIAN ASSISTANT

## 2020-07-17 PROCEDURE — 99213 PR OFFICE/OUTPT VISIT, EST, LEVL III, 20-29 MIN: ICD-10-PCS | Mod: S$PBB,,, | Performed by: PHYSICIAN ASSISTANT

## 2020-07-17 PROCEDURE — 99999 PR PBB SHADOW E&M-EST. PATIENT-LVL IV: ICD-10-PCS | Mod: PBBFAC,,, | Performed by: PHYSICIAN ASSISTANT

## 2020-07-17 PROCEDURE — 99999 PR PBB SHADOW E&M-EST. PATIENT-LVL IV: CPT | Mod: PBBFAC,,, | Performed by: PHYSICIAN ASSISTANT

## 2020-07-17 PROCEDURE — 99213 OFFICE O/P EST LOW 20 MIN: CPT | Mod: S$PBB,,, | Performed by: PHYSICIAN ASSISTANT

## 2020-07-17 NOTE — PROGRESS NOTES
"Subjective:      Patient ID: Juliette Seo is a 74 y.o. female.    Chief Complaint: Follow-up of the Right Shoulder, Pain of the Right Knee, and Follow-up of the Right Hand      HPI: Juliette Seo  Is a 74-year-old female in clinic today for follow-up of trigger finger of the right index and ring fingers.  Patient was seen about 1 month ago and received corticosteroid injections for these problems.  Patient reports that symptoms are no longer present since the injections.  She still complains of some pain of her right shoulder and right knee.  She has arthritis in each of these and is not currently treating them in any way.  Patient has prescription for Voltaren gel.    Past Medical History:   Diagnosis Date    Allergy     Anxiety     Arthritis     osteo    Asthma     GERD (gastroesophageal reflux disease)     High cholesterol     Hypertension     Type 2 diabetes mellitus, uncontrolled, with neuropathy        Current Outpatient Medications:     ACCU-CHEK FASTCLIX LANCET DRUM Misc, USE AS DIRECTED TWICE A DAY, Disp: 204 each, Rfl: 4    ACCU-CHEK SMARTVIEW TEST STRIP Strp, USE AS DIRECTED TWICE A DAY, Disp: 100 strip, Rfl: 9    acetaminophen (TYLENOL) 500 MG tablet, Take 1 tablet (500 mg total) by mouth every 4 (four) hours as needed for Pain., Disp: 42 tablet, Rfl: 0    albuterol 90 mcg/actuation inhaler, Inhale 2 puffs into the lungs every 4 (four) hours as needed for Wheezing (or cough)., Disp: 1 Inhaler, Rfl: 0    BD INSULIN SYRINGE ULTRA-FINE 0.5 mL 31 gauge x 5/16" Syrg, USE AS DIRECTED TWICE A DAY, Disp: 100 each, Rfl: 4    diclofenac (VOLTAREN) 50 MG EC tablet, Take 1 tablet (50 mg total) by mouth 2 (two) times daily., Disp: 180 tablet, Rfl: 3    diclofenac sodium (VOLTAREN) 1 % Gel, Apply 4 g topically 4 (four) times daily., Disp: 1 Tube, Rfl: 3    gabapentin (NEURONTIN) 100 MG capsule, Take 1 capsule (100 mg total) by mouth 3 (three) times daily., Disp: 90 capsule, Rfl: 11    glimepiride " (AMARYL) 2 MG tablet, TAKE 1 TABLET (2 MG TOTAL) BY MOUTH BEFORE BREAKFAST., Disp: 90 tablet, Rfl: 4    hydrocortisone 2.5 % cream, APPLY TO THE AFFECTED AREA(S) BY TOPICAL ROUTE THREE TIMES DAILY AS NEEDED FOR ITCHING, Disp: 1 Tube, Rfl: 2    hydrOXYzine HCl (ATARAX) 25 MG tablet, TAKE 1 TABLET (25 MG TOTAL) BY MOUTH 3 (THREE) TIMES DAILY AS NEEDED FOR ITCHING., Disp: 100 tablet, Rfl: 1    levocetirizine (XYZAL) 5 MG tablet, TAKE 1 TABLET BY MOUTH EVERY DAY IN THE EVENING, Disp: 90 tablet, Rfl: 3    lisinopril (PRINIVIL,ZESTRIL) 20 MG tablet, TAKE 1 TABLET BY MOUTH EVERY DAY, Disp: 90 tablet, Rfl: 4    LUMIGAN 0.01 % Drop, Place 1 drop into both eyes nightly., Disp: , Rfl: 4    meclizine (ANTIVERT) 12.5 mg tablet, TAKE 1 TABLET BY MOUTH 3 TIMES A DAY AS NEEDED, Disp: 60 tablet, Rfl: 4    NOVOLIN N NPH U-100 INSULIN 100 unit/mL injection, INJECT SUBCUTANEOUSLY 40 UNITS EVERY MORNING & 20 UNITS EVERY EVENING, Disp: 50 mL, Rfl: 4    nystatin (MYCOSTATIN) powder, Apply topically 4 (four) times daily., Disp: 1 Bottle, Rfl: 1    omeprazole (PRILOSEC) 40 MG capsule, TAKE 1 CAPSULE (40 MG TOTAL) BY MOUTH EVERY MORNING., Disp: 90 capsule, Rfl: 4    ondansetron (ZOFRAN-ODT) 4 MG TbDL, Take 1 tablet (4 mg total) by mouth every 8 (eight) hours as needed., Disp: 15 tablet, Rfl: 0    ondansetron (ZOFRAN-ODT) 8 MG TbDL, Take 1 tablet (8 mg total) by mouth every 6 (six) hours as needed., Disp: 10 tablet, Rfl: 0    RESTASIS 0.05 % ophthalmic emulsion, Place 1 drop into both eyes 2 (two) times daily., Disp: , Rfl: 3    tiZANidine (ZANAFLEX) 4 MG tablet, Take 4 mg by mouth every 6 (six) hours as needed., Disp: , Rfl:     triamcinolone acetonide 0.5% (KENALOG) 0.5 % Crea, APPLY TOPICALLY TWICE A DAY, Disp: 15 g, Rfl: 6    VOLTAREN 1 % Gel, APPLY 2 GRAMS TOPICALLY 3 TIMES A DAY, Disp: , Rfl: 3    atorvastatin (LIPITOR) 80 MG tablet, Take 1 tablet (80 mg total) by mouth once daily. (Patient not taking: Reported on  "6/2/2020), Disp: 90 tablet, Rfl: 3  Review of patient's allergies indicates:   Allergen Reactions    Actos [pioglitazone] Nausea Only    Darvocet a500 [propoxyphene n-acetaminophen] Nausea And Vomiting    Dilaudid [hydromorphone (bulk)] Nausea And Vomiting       Ht 4' 10" (1.473 m)   Wt 55.3 kg (122 lb)   BMI 25.50 kg/m²     Review of Systems   Constitution: Negative for chills and fever.   HENT: Negative for congestion and hoarse voice.    Eyes: Negative for discharge and redness.   Cardiovascular: Negative for chest pain and palpitations.   Respiratory: Negative for cough and wheezing.    Skin: Negative for itching and rash.   Musculoskeletal: Positive for arthritis and joint pain. Negative for joint swelling and stiffness.   Gastrointestinal: Negative for diarrhea, nausea and vomiting.   Neurological: Negative for dizziness and numbness.   Psychiatric/Behavioral: Negative for altered mental status.   Allergic/Immunologic: Negative for environmental allergies and persistent infections.           Objective:    Ortho Exam   Right hand:   Index and ring finger are no longer triggering, no tenderness noted   sensation and pulses are intact    Right shoulder:   No TTP noted  No pain with internal/ external rotation  Shoulder range of motion is full   Negative impingement test     Right knee:   No TTP noted  ROM full   No laxity  Sensation and pulses intact    GEN: Well developed, well nourished Female. AAOX3. No acute distress.   Normocephalic, atraumatic.   YU  Breathing unlabored.  Mood and affect  appropriate.        Assessment:     Imaging:  No new imaging ordered        1. Trigger finger, right index finger    2. Trigger finger, right ring finger    3. Right shoulder pain, unspecified chronicity    4. Chronic pain of right knee          Plan:       I showed the patient her x-ray of her knee and explained that she has arthritis and that she may benefit from corticosteroid injection.  Patient would like to " hold off on this for the time being.  She will begin taking over-the-counter Aleve for her pain as well as using her Voltaren prescription that I wrote for in the past.  Patient will follow up for potential injection in the future.  I explained that her Aleve will also help with the shoulder pain she has experienced     Follow up in about 4 weeks (around 8/14/2020).

## 2020-07-28 ENCOUNTER — TELEPHONE (OUTPATIENT)
Dept: ORTHOPEDICS | Facility: CLINIC | Age: 75
End: 2020-07-28

## 2020-07-28 NOTE — TELEPHONE ENCOUNTER
----- Message from Kerry Mendieta sent at 7/28/2020  9:22 AM CDT -----  Regarding: call back  Contact: [phone]  Type:  Same Day Appointment Request    Caller is requesting a same day appointment.  Caller declined first available appointment listed below.    Name of Caller: KARI ANDUJAR [[id number]]  When is the first available appointment? August   Symptoms: Last attending: Sprain of right knee having pain and also is not able to put pressure  Best Call Back Number: [phone]  Additional Information: patient was seen in the ER

## 2020-08-04 DIAGNOSIS — Z79.4 TYPE 2 DIABETES MELLITUS WITH COMPLICATION, WITH LONG-TERM CURRENT USE OF INSULIN: ICD-10-CM

## 2020-08-04 DIAGNOSIS — E11.8 TYPE 2 DIABETES MELLITUS WITH COMPLICATION, WITH LONG-TERM CURRENT USE OF INSULIN: ICD-10-CM

## 2020-08-04 RX ORDER — BLOOD SUGAR DIAGNOSTIC
STRIP MISCELLANEOUS
Qty: 100 STRIP | Refills: 4 | Status: SHIPPED | OUTPATIENT
Start: 2020-08-04 | End: 2021-03-23 | Stop reason: SDUPTHER

## 2020-08-04 NOTE — TELEPHONE ENCOUNTER
----- Message from Juliette Gerard sent at 8/4/2020 10:49 AM CDT -----  Contact: Daughter Ashley [phone]  Patient's daughter is requesting a refill for ACCU-CHEK SMARTVIEW TEST STRIP [facility]. [facility]. Please advise.

## 2020-08-05 ENCOUNTER — TELEPHONE (OUTPATIENT)
Dept: INTERNAL MEDICINE | Facility: CLINIC | Age: 75
End: 2020-08-05

## 2020-08-05 NOTE — TELEPHONE ENCOUNTER
----- Message from Evelin Gomez sent at 8/5/2020  9:17 AM CDT -----  Contact: Patient  Patient would like the office to call the pharmacy to see what happened with Test strip refill   Pharmacy say they never received anything     Please call to verify  [id number] - [place] - [address] AT [facility] [phone] (Phone)  [phone] (Fax)

## 2020-08-07 ENCOUNTER — LAB VISIT (OUTPATIENT)
Dept: LAB | Facility: HOSPITAL | Age: 75
End: 2020-08-07
Attending: INTERNAL MEDICINE
Payer: MEDICARE

## 2020-08-07 DIAGNOSIS — E11.8 TYPE 2 DIABETES MELLITUS WITH COMPLICATION, WITH LONG-TERM CURRENT USE OF INSULIN: ICD-10-CM

## 2020-08-07 DIAGNOSIS — Z79.4 TYPE 2 DIABETES MELLITUS WITH COMPLICATION, WITH LONG-TERM CURRENT USE OF INSULIN: ICD-10-CM

## 2020-08-07 LAB
ESTIMATED AVG GLUCOSE: 206 MG/DL (ref 68–131)
HBA1C MFR BLD HPLC: 8.8 % (ref 4–5.6)

## 2020-08-07 PROCEDURE — 36415 COLL VENOUS BLD VENIPUNCTURE: CPT | Mod: PO

## 2020-08-07 PROCEDURE — 83036 HEMOGLOBIN GLYCOSYLATED A1C: CPT

## 2020-08-14 ENCOUNTER — OFFICE VISIT (OUTPATIENT)
Dept: INTERNAL MEDICINE | Facility: CLINIC | Age: 75
End: 2020-08-14
Payer: MEDICARE

## 2020-08-14 VITALS
SYSTOLIC BLOOD PRESSURE: 120 MMHG | HEIGHT: 55 IN | HEART RATE: 77 BPM | OXYGEN SATURATION: 98 % | WEIGHT: 122.56 LBS | DIASTOLIC BLOOD PRESSURE: 64 MMHG | BODY MASS INDEX: 28.36 KG/M2

## 2020-08-14 DIAGNOSIS — I70.0 AORTIC ATHEROSCLEROSIS: ICD-10-CM

## 2020-08-14 DIAGNOSIS — Z79.4 TYPE 2 DIABETES MELLITUS WITH COMPLICATION, WITH LONG-TERM CURRENT USE OF INSULIN: Primary | ICD-10-CM

## 2020-08-14 DIAGNOSIS — I10 ESSENTIAL HYPERTENSION: ICD-10-CM

## 2020-08-14 DIAGNOSIS — E11.8 TYPE 2 DIABETES MELLITUS WITH COMPLICATION, WITH LONG-TERM CURRENT USE OF INSULIN: Primary | ICD-10-CM

## 2020-08-14 DIAGNOSIS — M25.561 RECURRENT PAIN OF RIGHT KNEE: ICD-10-CM

## 2020-08-14 DIAGNOSIS — R05.9 COUGH: ICD-10-CM

## 2020-08-14 DIAGNOSIS — E78.2 MIXED HYPERLIPIDEMIA: ICD-10-CM

## 2020-08-14 PROCEDURE — 99214 OFFICE O/P EST MOD 30 MIN: CPT | Mod: PBBFAC,PN | Performed by: INTERNAL MEDICINE

## 2020-08-14 PROCEDURE — 99214 PR OFFICE/OUTPT VISIT, EST, LEVL IV, 30-39 MIN: ICD-10-PCS | Mod: S$PBB,,, | Performed by: INTERNAL MEDICINE

## 2020-08-14 PROCEDURE — 99214 OFFICE O/P EST MOD 30 MIN: CPT | Mod: S$PBB,,, | Performed by: INTERNAL MEDICINE

## 2020-08-14 PROCEDURE — 99999 PR PBB SHADOW E&M-EST. PATIENT-LVL IV: CPT | Mod: PBBFAC,,, | Performed by: INTERNAL MEDICINE

## 2020-08-14 PROCEDURE — 99999 PR PBB SHADOW E&M-EST. PATIENT-LVL IV: ICD-10-PCS | Mod: PBBFAC,,, | Performed by: INTERNAL MEDICINE

## 2020-08-14 RX ORDER — ALBUTEROL SULFATE 90 UG/1
2 AEROSOL, METERED RESPIRATORY (INHALATION) EVERY 4 HOURS PRN
Qty: 18 G | Refills: 3 | Status: SHIPPED | OUTPATIENT
Start: 2020-08-14 | End: 2020-11-13 | Stop reason: SDUPTHER

## 2020-08-14 RX ORDER — MELOXICAM 7.5 MG/1
7.5 TABLET ORAL DAILY
Qty: 90 TABLET | Refills: 3 | Status: SHIPPED | OUTPATIENT
Start: 2020-08-14 | End: 2021-03-26 | Stop reason: SDUPTHER

## 2020-08-14 NOTE — PROGRESS NOTES
Subjective:       Patient ID: Juliette Seo is a 74 y.o. female.    Chief Complaint: Follow-up (with labs) and Medication Refill (albuterol inhaler)    HPI  Checkup.  Chart reviewed.  Weight stable.  BSs erratic.  C/O 1 month of R knee pain, having trouble walking, emil climbing steps.  No trauma.  Having some pedal paresthesias.  No CP, SOB.  Review of Systems   All other systems reviewed and are negative.      Objective:      Physical Exam  Vitals signs reviewed.   Constitutional:       Appearance: She is well-developed. She is obese.   HENT:      Head: Normocephalic.      Mouth/Throat:      Mouth: Mucous membranes are moist.   Eyes:      General: No scleral icterus.     Extraocular Movements: Extraocular movements intact.   Neck:      Musculoskeletal: Normal range of motion.   Cardiovascular:      Rate and Rhythm: Normal rate and regular rhythm.      Heart sounds: Normal heart sounds.   Pulmonary:      Effort: Pulmonary effort is normal.      Breath sounds: Normal breath sounds.   Abdominal:      General: There is no distension.   Musculoskeletal:      Right lower leg: No edema.      Left lower leg: No edema.      Comments: Crepitation R knee  No effusion   Lymphadenopathy:      Cervical: No cervical adenopathy.   Skin:     General: Skin is warm and dry.   Neurological:      Mental Status: She is alert.      Cranial Nerves: No cranial nerve deficit.      Motor: No abnormal muscle tone.      Coordination: Coordination normal.   Psychiatric:         Mood and Affect: Mood normal.         Behavior: Behavior normal.         Assessment:       1. Type 2 diabetes mellitus with complication, with long-term current use of insulin    2. Essential hypertension    3. Mixed hyperlipidemia    4. Aortic atherosclerosis    5. Recurrent pain of right knee        Plan:       Juliette was seen today for follow-up and medication refill.    Diagnoses and all orders for this visit:    Type 2 diabetes mellitus with complication, with  long-term current use of insulin  -     Hemoglobin A1C; Future    Essential hypertension   Well-cont    Mixed hyperlipidemia   Well-cont    Aortic atherosclerosis   Monitor    Recurrent pain of right knee  -     KNEE BRACE FOR HOME USE  -     meloxicam (MOBIC) 7.5 MG tablet; Take 1 tablet (7.5 mg total) by mouth once daily.   Has appt with ortho    Follow up in about 3 months (around 11/14/2020).

## 2020-08-20 ENCOUNTER — OFFICE VISIT (OUTPATIENT)
Dept: ORTHOPEDICS | Facility: CLINIC | Age: 75
End: 2020-08-20
Payer: MEDICARE

## 2020-08-20 VITALS — HEIGHT: 55 IN | WEIGHT: 122 LBS | BODY MASS INDEX: 28.23 KG/M2

## 2020-08-20 DIAGNOSIS — G89.29 CHRONIC PAIN OF BOTH KNEES: ICD-10-CM

## 2020-08-20 DIAGNOSIS — M25.511 RIGHT SHOULDER PAIN, UNSPECIFIED CHRONICITY: ICD-10-CM

## 2020-08-20 DIAGNOSIS — M25.561 CHRONIC PAIN OF BOTH KNEES: ICD-10-CM

## 2020-08-20 DIAGNOSIS — M65.341 TRIGGER FINGER, RIGHT RING FINGER: ICD-10-CM

## 2020-08-20 DIAGNOSIS — M54.31 SCIATIC NERVE PAIN, RIGHT: Primary | ICD-10-CM

## 2020-08-20 DIAGNOSIS — M65.321 TRIGGER FINGER, RIGHT INDEX FINGER: ICD-10-CM

## 2020-08-20 DIAGNOSIS — M25.562 CHRONIC PAIN OF BOTH KNEES: ICD-10-CM

## 2020-08-20 PROCEDURE — 99214 OFFICE O/P EST MOD 30 MIN: CPT | Mod: S$PBB,,, | Performed by: PHYSICIAN ASSISTANT

## 2020-08-20 PROCEDURE — 99214 PR OFFICE/OUTPT VISIT, EST, LEVL IV, 30-39 MIN: ICD-10-PCS | Mod: S$PBB,,, | Performed by: PHYSICIAN ASSISTANT

## 2020-08-20 PROCEDURE — 99214 OFFICE O/P EST MOD 30 MIN: CPT | Mod: PBBFAC,PN | Performed by: PHYSICIAN ASSISTANT

## 2020-08-20 PROCEDURE — 99999 PR PBB SHADOW E&M-EST. PATIENT-LVL IV: CPT | Mod: PBBFAC,,, | Performed by: PHYSICIAN ASSISTANT

## 2020-08-20 PROCEDURE — 99999 PR PBB SHADOW E&M-EST. PATIENT-LVL IV: ICD-10-PCS | Mod: PBBFAC,,, | Performed by: PHYSICIAN ASSISTANT

## 2020-08-20 RX ORDER — METHYLPREDNISOLONE 4 MG/1
TABLET ORAL
Qty: 1 PACKAGE | Refills: 1 | Status: SHIPPED | OUTPATIENT
Start: 2020-08-20 | End: 2020-09-10

## 2020-08-20 NOTE — PROGRESS NOTES
"Subjective:      Patient ID: Juliette Seo is a 74 y.o. female.    Chief Complaint: Pain of the Right Knee      HPI: Juliette Seo is a 74 year old female in clinic today for follow up of right shoulder and right knee pain. Patient also had right index and ring finger trigger finger injections about 2 months ago, but all symptoms have resolved from that.  Patient states right knee and right shoulder pain is the same that has been and is not getting much better with her Tylenol use.  Patient reports knee and recently starting the right buttocks and radiating down leg patient states this is worsened when straightening the leg.    Past Medical History:   Diagnosis Date    Allergy     Anxiety     Arthritis     osteo    Asthma     GERD (gastroesophageal reflux disease)     High cholesterol     Hypertension     Type 2 diabetes mellitus, uncontrolled, with neuropathy        Current Outpatient Medications:     ACCU-CHEK FASTCLIX LANCET DRUM Misc, USE AS DIRECTED TWICE A DAY, Disp: 204 each, Rfl: 4    ACCU-CHEK SMARTVIEW TEST STRIP Strp, USE AS DIRECTED TWICE A DAY, Disp: 100 strip, Rfl: 4    acetaminophen (TYLENOL) 500 MG tablet, Take 1 tablet (500 mg total) by mouth every 4 (four) hours as needed for Pain., Disp: 42 tablet, Rfl: 0    albuterol (PROVENTIL/VENTOLIN HFA) 90 mcg/actuation inhaler, Inhale 2 puffs into the lungs every 4 (four) hours as needed for Wheezing (or cough)., Disp: 18 g, Rfl: 3    BD INSULIN SYRINGE ULTRA-FINE 0.5 mL 31 gauge x 5/16" Syrg, USE AS DIRECTED TWICE A DAY, Disp: 90 each, Rfl: 2    diclofenac sodium (VOLTAREN) 1 % Gel, Apply 4 g topically 4 (four) times daily., Disp: 1 Tube, Rfl: 3    gabapentin (NEURONTIN) 100 MG capsule, Take 1 capsule (100 mg total) by mouth 3 (three) times daily., Disp: 90 capsule, Rfl: 11    glimepiride (AMARYL) 2 MG tablet, TAKE 1 TABLET (2 MG TOTAL) BY MOUTH BEFORE BREAKFAST., Disp: 90 tablet, Rfl: 4    hydrocortisone 2.5 % cream, APPLY TO THE AFFECTED " AREA(S) BY TOPICAL ROUTE THREE TIMES DAILY AS NEEDED FOR ITCHING, Disp: 1 Tube, Rfl: 2    hydrOXYzine HCl (ATARAX) 25 MG tablet, TAKE 1 TABLET (25 MG TOTAL) BY MOUTH 3 (THREE) TIMES DAILY AS NEEDED FOR ITCHING., Disp: 100 tablet, Rfl: 1    levocetirizine (XYZAL) 5 MG tablet, TAKE 1 TABLET BY MOUTH EVERY DAY IN THE EVENING, Disp: 90 tablet, Rfl: 3    lisinopril (PRINIVIL,ZESTRIL) 20 MG tablet, TAKE 1 TABLET BY MOUTH EVERY DAY, Disp: 90 tablet, Rfl: 4    LUMIGAN 0.01 % Drop, Place 1 drop into both eyes nightly., Disp: , Rfl: 4    meclizine (ANTIVERT) 12.5 mg tablet, TAKE 1 TABLET BY MOUTH 3 TIMES A DAY AS NEEDED, Disp: 60 tablet, Rfl: 4    meloxicam (MOBIC) 7.5 MG tablet, Take 1 tablet (7.5 mg total) by mouth once daily., Disp: 90 tablet, Rfl: 3    NOVOLIN N NPH U-100 INSULIN 100 unit/mL injection, INJECT SUBCUTANEOUSLY 40 UNITS EVERY MORNING & 20 UNITS EVERY EVENING, Disp: 50 mL, Rfl: 4    nystatin (MYCOSTATIN) powder, Apply topically 4 (four) times daily., Disp: 1 Bottle, Rfl: 1    omeprazole (PRILOSEC) 40 MG capsule, TAKE 1 CAPSULE (40 MG TOTAL) BY MOUTH EVERY MORNING., Disp: 90 capsule, Rfl: 4    ondansetron (ZOFRAN-ODT) 4 MG TbDL, Take 1 tablet (4 mg total) by mouth every 8 (eight) hours as needed., Disp: 15 tablet, Rfl: 0    ondansetron (ZOFRAN-ODT) 8 MG TbDL, Take 1 tablet (8 mg total) by mouth every 6 (six) hours as needed., Disp: 10 tablet, Rfl: 0    RESTASIS 0.05 % ophthalmic emulsion, Place 1 drop into both eyes 2 (two) times daily., Disp: , Rfl: 3    tiZANidine (ZANAFLEX) 4 MG tablet, Take 4 mg by mouth every 6 (six) hours as needed., Disp: , Rfl:     triamcinolone acetonide 0.5% (KENALOG) 0.5 % Crea, APPLY TOPICALLY TWICE A DAY, Disp: 15 g, Rfl: 6    VOLTAREN 1 % Gel, APPLY 2 GRAMS TOPICALLY 3 TIMES A DAY, Disp: , Rfl: 3    atorvastatin (LIPITOR) 80 MG tablet, Take 1 tablet (80 mg total) by mouth once daily. (Patient not taking: Reported on 8/14/2020), Disp: 90 tablet, Rfl: 3     "methylPREDNISolone (MEDROL DOSEPACK) 4 mg tablet, use as directed, Disp: 1 Package, Rfl: 1  Review of patient's allergies indicates:   Allergen Reactions    Actos [pioglitazone] Nausea Only    Darvocet a500 [propoxyphene n-acetaminophen] Nausea And Vomiting    Dilaudid [hydromorphone (bulk)] Nausea And Vomiting       Ht 4' 5" (1.346 m)   Wt 55.3 kg (122 lb)   BMI 30.54 kg/m²     ROS      Objective:    Ortho Exam   Right leg:  Positive straight leg raise  Knee is tender medially  ROM full  No laxity  Sensation and pulses intact    Right shoulder:  No TTP noted  No pain with internal/external rotation  ROM full    GEN: Well developed, well nourished female. AAOX3. No acute distress.   Normocephalic, atraumatic.   YU  Breathing unlabored.  Mood and affect appropriate.          Assessment:     Imaging: No new imaging ordered today        1. Sciatic nerve pain, right    2. Chronic pain of both knees    3. Right shoulder pain, unspecified chronicity    4. Trigger finger, right index finger    5. Trigger finger, right ring finger          Plan:       Dosepak ordered for sciatic nerve pain. Explained that it may also help with shoulder and knee pain the patient is experiencing. We discussed physical therapy, but the patient is opposed to this. Patient also opposed to corticosteroid injection at this time.    Orders Placed This Encounter    methylPREDNISolone (MEDROL DOSEPACK) 4 mg tablet     Follow up in about 6 weeks (around 10/1/2020).          "

## 2020-09-01 ENCOUNTER — TELEPHONE (OUTPATIENT)
Dept: INTERNAL MEDICINE | Facility: CLINIC | Age: 75
End: 2020-09-01

## 2020-09-01 NOTE — TELEPHONE ENCOUNTER
----- Message from Valerie Lei sent at 9/1/2020 12:26 PM CDT -----  Contact: SELF [phone]  Patient would like to speak with you about having pain. Please advise

## 2020-09-01 NOTE — TELEPHONE ENCOUNTER
Returned call back to pt, stated she's having Abdominal pain, and burning when she Urinates, advised pt that she need to come see Provider, pt stated that she will call office back tomorrow, when she can get someone to give her a ride.

## 2020-09-09 ENCOUNTER — TELEPHONE (OUTPATIENT)
Dept: INTERNAL MEDICINE | Facility: CLINIC | Age: 75
End: 2020-09-09

## 2020-09-09 NOTE — TELEPHONE ENCOUNTER
----- Message from Valerie Lei sent at 9/9/2020  1:43 PM CDT -----  Contact: SELF [phone]  Patient would like to speak with you about never receiving her knee brace Please advise

## 2020-09-09 NOTE — TELEPHONE ENCOUNTER
Called and spoke with pt's daughter, informed her that the order was faxed on the day of the office visit, and I just re-faxed it to them again, verbalized understanding

## 2020-09-29 ENCOUNTER — PATIENT MESSAGE (OUTPATIENT)
Dept: OTHER | Facility: OTHER | Age: 75
End: 2020-09-29

## 2020-09-29 ENCOUNTER — PATIENT OUTREACH (OUTPATIENT)
Dept: ADMINISTRATIVE | Facility: OTHER | Age: 75
End: 2020-09-29

## 2020-10-01 ENCOUNTER — PATIENT MESSAGE (OUTPATIENT)
Dept: INTERNAL MEDICINE | Facility: CLINIC | Age: 75
End: 2020-10-01

## 2020-10-01 ENCOUNTER — OFFICE VISIT (OUTPATIENT)
Dept: ORTHOPEDICS | Facility: CLINIC | Age: 75
End: 2020-10-01
Payer: MEDICARE

## 2020-10-01 VITALS — TEMPERATURE: 98 F

## 2020-10-01 DIAGNOSIS — M25.561 CHRONIC PAIN OF RIGHT KNEE: Primary | ICD-10-CM

## 2020-10-01 DIAGNOSIS — G89.29 CHRONIC PAIN OF RIGHT KNEE: Primary | ICD-10-CM

## 2020-10-01 PROCEDURE — 99213 OFFICE O/P EST LOW 20 MIN: CPT | Mod: S$PBB,25,, | Performed by: PHYSICIAN ASSISTANT

## 2020-10-01 PROCEDURE — 99999 PR PBB SHADOW E&M-EST. PATIENT-LVL III: ICD-10-PCS | Mod: PBBFAC,,, | Performed by: PHYSICIAN ASSISTANT

## 2020-10-01 PROCEDURE — 99213 PR OFFICE/OUTPT VISIT, EST, LEVL III, 20-29 MIN: ICD-10-PCS | Mod: S$PBB,25,, | Performed by: PHYSICIAN ASSISTANT

## 2020-10-01 PROCEDURE — 20610 PR DRAIN/INJECT LARGE JOINT/BURSA: ICD-10-PCS | Mod: S$PBB,RT,, | Performed by: PHYSICIAN ASSISTANT

## 2020-10-01 PROCEDURE — 20610 DRAIN/INJ JOINT/BURSA W/O US: CPT | Mod: S$PBB,RT,, | Performed by: PHYSICIAN ASSISTANT

## 2020-10-01 PROCEDURE — 99999 PR PBB SHADOW E&M-EST. PATIENT-LVL III: CPT | Mod: PBBFAC,,, | Performed by: PHYSICIAN ASSISTANT

## 2020-10-01 PROCEDURE — 99213 OFFICE O/P EST LOW 20 MIN: CPT | Mod: PBBFAC,PN | Performed by: PHYSICIAN ASSISTANT

## 2020-10-01 RX ORDER — DICLOFENAC SODIUM 10 MG/G
2 GEL TOPICAL
Qty: 1 TUBE | Refills: 2 | Status: SHIPPED | OUTPATIENT
Start: 2020-10-01 | End: 2022-01-21 | Stop reason: SDUPTHER

## 2020-10-01 RX ORDER — TRIAMCINOLONE ACETONIDE 40 MG/ML
40 INJECTION, SUSPENSION INTRA-ARTICULAR; INTRAMUSCULAR
Status: COMPLETED | OUTPATIENT
Start: 2020-10-01 | End: 2020-10-01

## 2020-10-01 RX ADMIN — TRIAMCINOLONE ACETONIDE 40 MG: 40 INJECTION, SUSPENSION INTRA-ARTICULAR; INTRAMUSCULAR at 03:10

## 2020-10-01 NOTE — PROCEDURES
Large Joint Aspiration/Injection    Date/Time: 10/1/2020 1:30 PM  Performed by: Norberto Merrill PA-C  Authorized by: Norberto Merrill PA-C        PROCEDURE:  I have explained the risks, benefits, and alternatives of the procedure in detail.  The patient voices understanding and all questions have been answered.  The patient agrees to proceed as planned. So after I performed a sterile prep of the skin in the normal fashion the right knee is injected using a 21 gauge needle with a combination of 1cc 1% plain xylocaine and 40mg triamcinolone.  The patient is cautioned that immediate relief of pain is secondary to the local anesthetic and will be temporary. After the anesthetic wears off there may be a increase in pain that may last for a few hours or a few days and they should use ice to help alleviate this this pain. Patient tolerated the procedure well.

## 2020-10-02 NOTE — PROGRESS NOTES
"Subjective:      Patient ID: Juliette Seo is a 74 y.o. female.    Chief Complaint: Follow-up (right knee, gatito hand pain)      HPI: Juliette Seo is a 74-year-old female in clinic today for follow-up of right knee pain.  Patient was offered corticosteroid injection at previous visit, but she declined.  Patient would like to have the injection performed today.  Patient also complains of mild swelling and stiffness of her hands, most notably the MCP joint of the right hand 3rd finger.  Patient has not tried any treatment for this ROM.  Patient denies any relevant trauma or history    Past Medical History:   Diagnosis Date    Allergy     Anxiety     Arthritis     osteo    Asthma     GERD (gastroesophageal reflux disease)     High cholesterol     Hypertension     Type 2 diabetes mellitus, uncontrolled, with neuropathy        Current Outpatient Medications:     ACCU-CHEK FASTCLIX LANCET DRUM Misc, USE AS DIRECTED TWICE A DAY, Disp: 204 each, Rfl: 4    ACCU-CHEK SMARTVIEW TEST STRIP Strp, USE AS DIRECTED TWICE A DAY, Disp: 100 strip, Rfl: 4    acetaminophen (TYLENOL) 500 MG tablet, Take 1 tablet (500 mg total) by mouth every 4 (four) hours as needed for Pain., Disp: 42 tablet, Rfl: 0    albuterol (PROVENTIL/VENTOLIN HFA) 90 mcg/actuation inhaler, Inhale 2 puffs into the lungs every 4 (four) hours as needed for Wheezing (or cough)., Disp: 18 g, Rfl: 3    BD INSULIN SYRINGE ULTRA-FINE 0.5 mL 31 gauge x 5/16" Syrg, USE AS DIRECTED TWICE A DAY, Disp: 90 each, Rfl: 2    diclofenac sodium (VOLTAREN) 1 % Gel, Apply 4 g topically 4 (four) times daily., Disp: 1 Tube, Rfl: 3    gabapentin (NEURONTIN) 100 MG capsule, Take 1 capsule (100 mg total) by mouth 3 (three) times daily., Disp: 90 capsule, Rfl: 11    glimepiride (AMARYL) 2 MG tablet, TAKE 1 TABLET (2 MG TOTAL) BY MOUTH BEFORE BREAKFAST., Disp: 90 tablet, Rfl: 4    hydrocortisone 2.5 % cream, APPLY TO THE AFFECTED AREA(S) BY TOPICAL ROUTE THREE TIMES DAILY AS " NEEDED FOR ITCHING, Disp: 1 Tube, Rfl: 2    hydrOXYzine HCl (ATARAX) 25 MG tablet, TAKE 1 TABLET (25 MG TOTAL) BY MOUTH 3 (THREE) TIMES DAILY AS NEEDED FOR ITCHING., Disp: 100 tablet, Rfl: 1    levocetirizine (XYZAL) 5 MG tablet, TAKE 1 TABLET BY MOUTH EVERY DAY IN THE EVENING, Disp: 90 tablet, Rfl: 3    lisinopril (PRINIVIL,ZESTRIL) 20 MG tablet, TAKE 1 TABLET BY MOUTH EVERY DAY, Disp: 90 tablet, Rfl: 4    LUMIGAN 0.01 % Drop, Place 1 drop into both eyes nightly., Disp: , Rfl: 4    meclizine (ANTIVERT) 12.5 mg tablet, TAKE 1 TABLET BY MOUTH 3 TIMES A DAY AS NEEDED, Disp: 60 tablet, Rfl: 4    meloxicam (MOBIC) 7.5 MG tablet, Take 1 tablet (7.5 mg total) by mouth once daily., Disp: 90 tablet, Rfl: 3    NOVOLIN N NPH U-100 INSULIN 100 unit/mL injection, INJECT SUBCUTANEOUSLY 40 UNITS EVERY MORNING & 20 UNITS EVERY EVENING, Disp: 50 mL, Rfl: 4    nystatin (MYCOSTATIN) powder, Apply topically 4 (four) times daily., Disp: 1 Bottle, Rfl: 1    omeprazole (PRILOSEC) 40 MG capsule, TAKE 1 CAPSULE (40 MG TOTAL) BY MOUTH EVERY MORNING., Disp: 90 capsule, Rfl: 4    ondansetron (ZOFRAN-ODT) 4 MG TbDL, Take 1 tablet (4 mg total) by mouth every 8 (eight) hours as needed., Disp: 10 tablet, Rfl: 0    RESTASIS 0.05 % ophthalmic emulsion, Place 1 drop into both eyes 2 (two) times daily., Disp: , Rfl: 3    tiZANidine (ZANAFLEX) 4 MG tablet, Take 4 mg by mouth every 6 (six) hours as needed., Disp: , Rfl:     triamcinolone acetonide 0.5% (KENALOG) 0.5 % Crea, APPLY TOPICALLY TWICE A DAY, Disp: 15 g, Rfl: 6    VOLTAREN 1 % Gel, APPLY 2 GRAMS TOPICALLY 3 TIMES A DAY, Disp: , Rfl: 3    atorvastatin (LIPITOR) 80 MG tablet, Take 1 tablet (80 mg total) by mouth once daily. (Patient not taking: Reported on 8/14/2020), Disp: 90 tablet, Rfl: 3    diclofenac sodium (VOLTAREN) 1 % Gel, Apply 2 g topically as needed., Disp: 1 Tube, Rfl: 2  No current facility-administered medications for this visit.   Review of patient's allergies  indicates:   Allergen Reactions    Actos [pioglitazone] Nausea Only    Darvocet a500 [propoxyphene n-acetaminophen] Nausea And Vomiting    Dilaudid [hydromorphone (bulk)] Nausea And Vomiting       Temp 98.3 °F (36.8 °C)     ROS      Objective:    Ortho Exam   Right knee:  TTP medially  ROM full  Sensation and pulses intact    Bilateral hands:  Diffuse TTP  Diffuse swelling  ROM full  Sensation and pulses intact    GEN: Well developed, well nourished female. AAOX3. No acute distress.   Normocephalic, atraumatic.   YU  Breathing unlabored.  Mood and affect appropriate.        Assessment:     Imaging:  No new imaging obtained today        1. Chronic pain of right knee          Plan:       Recommended performed corticosteroid injection of the right knee.  Patient tolerated the procedure well.  I have also prescribed Voltaren gel for the patient to use on her knees and hands.  Patient will follow-up in 6 weeks    Orders Placed This Encounter    Large Joint Aspiration/Injection    diclofenac sodium (VOLTAREN) 1 % Gel    triamcinolone acetonide injection 40 mg     Follow up in about 6 weeks (around 11/12/2020).

## 2020-10-30 ENCOUNTER — PATIENT OUTREACH (OUTPATIENT)
Dept: ADMINISTRATIVE | Facility: HOSPITAL | Age: 75
End: 2020-10-30

## 2020-11-13 ENCOUNTER — OFFICE VISIT (OUTPATIENT)
Dept: INTERNAL MEDICINE | Facility: CLINIC | Age: 75
End: 2020-11-13
Payer: MEDICARE

## 2020-11-13 ENCOUNTER — TELEPHONE (OUTPATIENT)
Dept: INTERNAL MEDICINE | Facility: CLINIC | Age: 75
End: 2020-11-13

## 2020-11-13 VITALS
DIASTOLIC BLOOD PRESSURE: 70 MMHG | HEART RATE: 77 BPM | WEIGHT: 121.56 LBS | SYSTOLIC BLOOD PRESSURE: 110 MMHG | OXYGEN SATURATION: 98 % | BODY MASS INDEX: 28.13 KG/M2 | HEIGHT: 55 IN

## 2020-11-13 DIAGNOSIS — R05.9 COUGH: ICD-10-CM

## 2020-11-13 DIAGNOSIS — E11.8 TYPE 2 DIABETES MELLITUS WITH COMPLICATION, WITH LONG-TERM CURRENT USE OF INSULIN: Primary | ICD-10-CM

## 2020-11-13 DIAGNOSIS — I10 ESSENTIAL HYPERTENSION: ICD-10-CM

## 2020-11-13 DIAGNOSIS — E78.2 MIXED HYPERLIPIDEMIA: ICD-10-CM

## 2020-11-13 DIAGNOSIS — R10.9 ABDOMINAL PAIN, UNSPECIFIED ABDOMINAL LOCATION: ICD-10-CM

## 2020-11-13 DIAGNOSIS — Z79.4 TYPE 2 DIABETES MELLITUS WITH COMPLICATION, WITH LONG-TERM CURRENT USE OF INSULIN: Primary | ICD-10-CM

## 2020-11-13 PROBLEM — R53.1 WEAKNESS: Status: RESOLVED | Noted: 2020-05-19 | Resolved: 2020-11-13

## 2020-11-13 PROBLEM — R52 PAIN: Status: RESOLVED | Noted: 2020-05-19 | Resolved: 2020-11-13

## 2020-11-13 PROBLEM — M79.18 MYOFASCIAL MUSCLE PAIN: Status: RESOLVED | Noted: 2017-02-07 | Resolved: 2020-11-13

## 2020-11-13 PROCEDURE — G0008 ADMIN INFLUENZA VIRUS VAC: HCPCS | Mod: PBBFAC,PN

## 2020-11-13 PROCEDURE — 99214 PR OFFICE/OUTPT VISIT, EST, LEVL IV, 30-39 MIN: ICD-10-PCS | Mod: S$PBB,,, | Performed by: INTERNAL MEDICINE

## 2020-11-13 PROCEDURE — 99999 PR PBB SHADOW E&M-EST. PATIENT-LVL V: ICD-10-PCS | Mod: PBBFAC,,, | Performed by: INTERNAL MEDICINE

## 2020-11-13 PROCEDURE — 99215 OFFICE O/P EST HI 40 MIN: CPT | Mod: PBBFAC,PN | Performed by: INTERNAL MEDICINE

## 2020-11-13 PROCEDURE — 99214 OFFICE O/P EST MOD 30 MIN: CPT | Mod: S$PBB,,, | Performed by: INTERNAL MEDICINE

## 2020-11-13 PROCEDURE — 90694 VACC AIIV4 NO PRSRV 0.5ML IM: CPT | Mod: PBBFAC,PN

## 2020-11-13 PROCEDURE — 99999 PR PBB SHADOW E&M-EST. PATIENT-LVL V: CPT | Mod: PBBFAC,,, | Performed by: INTERNAL MEDICINE

## 2020-11-13 RX ORDER — BENZONATATE 200 MG/1
200 CAPSULE ORAL 3 TIMES DAILY PRN
Qty: 30 CAPSULE | Refills: 3 | Status: SHIPPED | OUTPATIENT
Start: 2020-11-13 | End: 2020-11-23

## 2020-11-13 RX ORDER — ALBUTEROL SULFATE 90 UG/1
2 AEROSOL, METERED RESPIRATORY (INHALATION) EVERY 4 HOURS PRN
Qty: 18 G | Refills: 3 | Status: SHIPPED | OUTPATIENT
Start: 2020-11-13 | End: 2021-03-26 | Stop reason: SDUPTHER

## 2020-11-13 NOTE — PROGRESS NOTES
Subjective:       Patient ID: Juliette Seo is a 75 y.o. female.    Chief Complaint: Follow-up (3 month f/u, acid reflux), Diabetes, and Abdominal Pain (gas)    HPI  Recheck.  Chart reviewed.  Weight stable. BSs erratic.  Pt remains with chronic abd pain, recently worse.  Exacerbated perhaps by meals, usually comes on unexpectedly.  Rel with Mylanta.  Has had extended - w/u in the past.  Requests another GI referral.  Coughing again.  Review of Systems   All other systems reviewed and are negative.      Objective:      Physical Exam  Vitals signs reviewed.   Constitutional:       General: She is not in acute distress.     Appearance: She is well-developed.   HENT:      Head: Normocephalic.      Mouth/Throat:      Mouth: Mucous membranes are moist.   Eyes:      General: No scleral icterus.     Extraocular Movements: Extraocular movements intact.      Conjunctiva/sclera: Conjunctivae normal.   Neck:      Musculoskeletal: Normal range of motion.   Cardiovascular:      Rate and Rhythm: Normal rate and regular rhythm.      Pulses: Normal pulses.      Heart sounds: Normal heart sounds.   Pulmonary:      Effort: Pulmonary effort is normal.      Breath sounds: Normal breath sounds.   Abdominal:      General: There is no distension.   Musculoskeletal: Normal range of motion.      Right lower leg: No edema.      Left lower leg: No edema.   Lymphadenopathy:      Cervical: No cervical adenopathy.   Skin:     General: Skin is warm and dry.   Neurological:      General: No focal deficit present.      Mental Status: She is alert.      Cranial Nerves: No cranial nerve deficit.      Motor: No abnormal muscle tone.      Coordination: Coordination normal.   Psychiatric:         Mood and Affect: Mood normal.         Behavior: Behavior normal.         Assessment:       1. Type 2 diabetes mellitus with complication, with long-term current use of insulin    2. Essential hypertension    3. Mixed hyperlipidemia    4. Cough    5. Abdominal  pain, unspecified abdominal location        Plan:       Juliette was seen today for follow-up, diabetes and abdominal pain.    Diagnoses and all orders for this visit:    Type 2 diabetes mellitus with complication, with long-term current use of insulin  -     Influenza - Quadrivalent (Adjuvanted)  -     Hemoglobin A1C; Future    Essential hypertension   Well-cont    Mixed hyperlipidemia  -     Lipid Panel; Future    Cough  -     benzonatate (TESSALON) 200 MG capsule; Take 1 capsule (200 mg total) by mouth 3 (three) times daily as needed for Cough.  -     albuterol (PROVENTIL/VENTOLIN HFA) 90 mcg/actuation inhaler; Inhale 2 puffs into the lungs every 4 (four) hours as needed for Wheezing (or cough).    Abdominal pain, unspecified abdominal location  -     CBC Auto Differential; Future  -     Comprehensive Metabolic Panel; Future  -     Ambulatory referral/consult to Gastroenterology; Future      Follow up in about 6 months (around 5/13/2021).

## 2020-11-16 ENCOUNTER — TELEPHONE (OUTPATIENT)
Dept: GASTROENTEROLOGY | Facility: CLINIC | Age: 75
End: 2020-11-16

## 2020-12-04 ENCOUNTER — OFFICE VISIT (OUTPATIENT)
Dept: ORTHOPEDICS | Facility: CLINIC | Age: 75
End: 2020-12-04
Payer: MEDICARE

## 2020-12-04 ENCOUNTER — HOSPITAL ENCOUNTER (OUTPATIENT)
Dept: RADIOLOGY | Facility: HOSPITAL | Age: 75
Discharge: HOME | End: 2020-12-04
Attending: PHYSICIAN ASSISTANT
Payer: MEDICARE

## 2020-12-04 ENCOUNTER — TELEPHONE (OUTPATIENT)
Dept: ORTHOPEDICS | Facility: CLINIC | Age: 75
End: 2020-12-04

## 2020-12-04 VITALS — WEIGHT: 121.69 LBS | BODY MASS INDEX: 28.16 KG/M2 | HEIGHT: 55 IN | TEMPERATURE: 98 F

## 2020-12-04 DIAGNOSIS — G89.29 CHRONIC PAIN OF RIGHT KNEE: Primary | ICD-10-CM

## 2020-12-04 DIAGNOSIS — M25.551 HIP PAIN, BILATERAL: Primary | ICD-10-CM

## 2020-12-04 DIAGNOSIS — M25.551 HIP PAIN, BILATERAL: ICD-10-CM

## 2020-12-04 DIAGNOSIS — M25.552 HIP PAIN, BILATERAL: Primary | ICD-10-CM

## 2020-12-04 DIAGNOSIS — M25.552 HIP PAIN, BILATERAL: ICD-10-CM

## 2020-12-04 DIAGNOSIS — M15.9 PRIMARY OSTEOARTHRITIS INVOLVING MULTIPLE JOINTS: ICD-10-CM

## 2020-12-04 DIAGNOSIS — M25.561 CHRONIC PAIN OF RIGHT KNEE: Primary | ICD-10-CM

## 2020-12-04 DIAGNOSIS — M54.31 SCIATIC NERVE PAIN, RIGHT: ICD-10-CM

## 2020-12-04 PROCEDURE — 73521 X-RAY EXAM HIPS BI 2 VIEWS: CPT | Mod: 26,,, | Performed by: RADIOLOGY

## 2020-12-04 PROCEDURE — 99999 PR PBB SHADOW E&M-EST. PATIENT-LVL V: CPT | Mod: PBBFAC,,, | Performed by: PHYSICIAN ASSISTANT

## 2020-12-04 PROCEDURE — 73521 XR HIPS BILATERAL 2 VIEW INCL AP PELVIS: ICD-10-PCS | Mod: 26,,, | Performed by: RADIOLOGY

## 2020-12-04 PROCEDURE — 99215 OFFICE O/P EST HI 40 MIN: CPT | Mod: PBBFAC,25,PN | Performed by: PHYSICIAN ASSISTANT

## 2020-12-04 PROCEDURE — 99213 PR OFFICE/OUTPT VISIT, EST, LEVL III, 20-29 MIN: ICD-10-PCS | Mod: S$PBB,,, | Performed by: PHYSICIAN ASSISTANT

## 2020-12-04 PROCEDURE — 99213 OFFICE O/P EST LOW 20 MIN: CPT | Mod: S$PBB,,, | Performed by: PHYSICIAN ASSISTANT

## 2020-12-04 PROCEDURE — 99999 PR PBB SHADOW E&M-EST. PATIENT-LVL V: ICD-10-PCS | Mod: PBBFAC,,, | Performed by: PHYSICIAN ASSISTANT

## 2020-12-04 PROCEDURE — 73521 X-RAY EXAM HIPS BI 2 VIEWS: CPT | Mod: TC,PN

## 2020-12-04 NOTE — PROGRESS NOTES
"Subjective:      Patient ID: Juliette Seo is a 75 y.o. female.    Chief Complaint: Follow-up (right new c/o left hip pain)      HPI: Juliette Seo is a 75-year-old female in clinic today for follow-up of right knee pain.  Patient was last seen 2 months ago for this same problem at which time she received corticosteroid injection.  Patient reports that knee symptoms are much improved this time.  Patient has also been using Voltaren gel for her right knee, right hand, and right shoulder arthritis.  Patient reports some low back pain which began just a couple days ago.  She denies any history of trauma or injury.  She is unsure if this pain is associated with her sciatica that we have treated her for in the past.  Patient does not wish to receive any treatment for this at this time.     Past Medical History:   Diagnosis Date    Allergy     Anxiety     Arthritis     osteo    Asthma     GERD (gastroesophageal reflux disease)     High cholesterol     Hypertension     Type 2 diabetes mellitus, uncontrolled, with neuropathy        Current Outpatient Medications:     ACCU-CHEK FASTCLIX LANCET DRUM Misc, USE AS DIRECTED TWICE A DAY, Disp: 204 each, Rfl: 4    ACCU-CHEK SMARTVIEW TEST STRIP Strp, USE AS DIRECTED TWICE A DAY, Disp: 100 strip, Rfl: 4    acetaminophen (TYLENOL) 500 MG tablet, Take 1 tablet (500 mg total) by mouth every 4 (four) hours as needed for Pain., Disp: 42 tablet, Rfl: 0    albuterol (PROVENTIL/VENTOLIN HFA) 90 mcg/actuation inhaler, Inhale 2 puffs into the lungs every 4 (four) hours as needed for Wheezing (or cough)., Disp: 18 g, Rfl: 3    BD INSULIN SYRINGE ULTRA-FINE 0.5 mL 31 gauge x 5/16" Syrg, USE AS DIRECTED TWICE A DAY, Disp: 90 each, Rfl: 2    diclofenac sodium (VOLTAREN) 1 % Gel, Apply 4 g topically 4 (four) times daily., Disp: 1 Tube, Rfl: 3    diclofenac sodium (VOLTAREN) 1 % Gel, Apply 2 g topically as needed., Disp: 1 Tube, Rfl: 2    gabapentin (NEURONTIN) 100 MG capsule, Take " 1 capsule (100 mg total) by mouth 3 (three) times daily., Disp: 90 capsule, Rfl: 11    glimepiride (AMARYL) 2 MG tablet, TAKE 1 TABLET (2 MG TOTAL) BY MOUTH BEFORE BREAKFAST., Disp: 90 tablet, Rfl: 4    hydrocortisone 2.5 % cream, APPLY TO THE AFFECTED AREA(S) BY TOPICAL ROUTE THREE TIMES DAILY AS NEEDED FOR ITCHING, Disp: 1 Tube, Rfl: 2    hydrOXYzine HCl (ATARAX) 25 MG tablet, TAKE 1 TABLET (25 MG TOTAL) BY MOUTH 3 (THREE) TIMES DAILY AS NEEDED FOR ITCHING., Disp: 100 tablet, Rfl: 1    levocetirizine (XYZAL) 5 MG tablet, TAKE 1 TABLET BY MOUTH EVERY DAY IN THE EVENING, Disp: 90 tablet, Rfl: 3    lisinopriL (PRINIVIL,ZESTRIL) 20 MG tablet, TAKE 1 TABLET BY MOUTH EVERY DAY, Disp: 90 tablet, Rfl: 4    LUMIGAN 0.01 % Drop, Place 1 drop into both eyes nightly., Disp: , Rfl: 4    meclizine (ANTIVERT) 12.5 mg tablet, TAKE 1 TABLET BY MOUTH 3 TIMES A DAY AS NEEDED, Disp: 60 tablet, Rfl: 4    meloxicam (MOBIC) 7.5 MG tablet, Take 1 tablet (7.5 mg total) by mouth once daily., Disp: 90 tablet, Rfl: 3    NOVOLIN N NPH U-100 INSULIN 100 unit/mL injection, INJECT SUBCUTANEOUSLY 40 UNITS EVERY MORNING & 20 UNITS EVERY EVENING, Disp: 50 mL, Rfl: 4    nystatin (MYCOSTATIN) powder, Apply topically 4 (four) times daily., Disp: 1 Bottle, Rfl: 1    omeprazole (PRILOSEC) 40 MG capsule, TAKE 1 CAPSULE (40 MG TOTAL) BY MOUTH EVERY MORNING., Disp: 90 capsule, Rfl: 4    ondansetron (ZOFRAN-ODT) 4 MG TbDL, Take 1 tablet (4 mg total) by mouth every 8 (eight) hours as needed., Disp: 10 tablet, Rfl: 0    RESTASIS 0.05 % ophthalmic emulsion, Place 1 drop into both eyes 2 (two) times daily., Disp: , Rfl: 3    tiZANidine (ZANAFLEX) 4 MG tablet, Take 4 mg by mouth every 6 (six) hours as needed., Disp: , Rfl:     triamcinolone acetonide 0.5% (KENALOG) 0.5 % Crea, APPLY TOPICALLY TWICE A DAY, Disp: 15 g, Rfl: 6    VOLTAREN 1 % Gel, APPLY 2 GRAMS TOPICALLY 3 TIMES A DAY, Disp: , Rfl: 3    atorvastatin (LIPITOR) 80 MG tablet, Take 1  "tablet (80 mg total) by mouth once daily. (Patient not taking: Reported on 8/14/2020), Disp: 90 tablet, Rfl: 3  Review of patient's allergies indicates:   Allergen Reactions    Actos [pioglitazone] Nausea Only    Darvocet a500 [propoxyphene n-acetaminophen] Nausea And Vomiting    Dilaudid [hydromorphone (bulk)] Nausea And Vomiting       Temp 98.3 °F (36.8 °C)   Ht 4' 6" (1.372 m)   Wt 55.2 kg (121 lb 11.1 oz)   BMI 29.34 kg/m²     ROS      Objective:    Ortho Exam   Right knee:  No TTP  ROM full  Sensation and pulses intact    GEN: Well developed, well nourished female. AAOX3. No acute distress.   Normocephalic, atraumatic.   YU  Breathing unlabored.  Mood and affect appropriate.        Assessment:     Imaging:  X-ray of bilateral hips obtained today shows mild degenerative changes, no acute fracture dislocation        1. Chronic pain of right knee    2. Sciatic nerve pain, right    3. Primary osteoarthritis involving multiple joints          Plan:       Patient will continue with Voltaren for her diffuse osteoarthritis.  We will monitor symptoms of low back pain and consider treatment at her next visit if they persist     Follow up in about 6 weeks (around 1/15/2021).          "

## 2020-12-11 ENCOUNTER — PATIENT MESSAGE (OUTPATIENT)
Dept: OTHER | Facility: OTHER | Age: 75
End: 2020-12-11

## 2020-12-31 RX ORDER — PEN NEEDLE, DIABETIC 29 G X1/2"
NEEDLE, DISPOSABLE MISCELLANEOUS
Qty: 180 EACH | Refills: 4 | Status: SHIPPED | OUTPATIENT
Start: 2020-12-31 | End: 2021-09-29 | Stop reason: SDUPTHER

## 2021-01-13 ENCOUNTER — TELEPHONE (OUTPATIENT)
Dept: INTERNAL MEDICINE | Facility: CLINIC | Age: 76
End: 2021-01-13

## 2021-02-01 ENCOUNTER — PATIENT OUTREACH (OUTPATIENT)
Dept: ADMINISTRATIVE | Facility: HOSPITAL | Age: 76
End: 2021-02-01

## 2021-02-01 DIAGNOSIS — Z79.4 TYPE 2 DIABETES MELLITUS WITH COMPLICATION, WITH LONG-TERM CURRENT USE OF INSULIN: Primary | ICD-10-CM

## 2021-02-01 DIAGNOSIS — E11.8 TYPE 2 DIABETES MELLITUS WITH COMPLICATION, WITH LONG-TERM CURRENT USE OF INSULIN: Primary | ICD-10-CM

## 2021-02-04 ENCOUNTER — OFFICE VISIT (OUTPATIENT)
Dept: ORTHOPEDICS | Facility: CLINIC | Age: 76
End: 2021-02-04
Payer: MEDICARE

## 2021-02-04 VITALS
BODY MASS INDEX: 28.16 KG/M2 | DIASTOLIC BLOOD PRESSURE: 70 MMHG | WEIGHT: 121.69 LBS | HEART RATE: 89 BPM | HEIGHT: 55 IN | SYSTOLIC BLOOD PRESSURE: 137 MMHG

## 2021-02-04 DIAGNOSIS — M17.11 PRIMARY OSTEOARTHRITIS OF RIGHT KNEE: Primary | ICD-10-CM

## 2021-02-04 PROCEDURE — 3078F DIAST BP <80 MM HG: CPT | Mod: CPTII,S$GLB,, | Performed by: PHYSICIAN ASSISTANT

## 2021-02-04 PROCEDURE — 99999 PR PBB SHADOW E&M-EST. PATIENT-LVL V: CPT | Mod: PBBFAC,,, | Performed by: PHYSICIAN ASSISTANT

## 2021-02-04 PROCEDURE — 99213 OFFICE O/P EST LOW 20 MIN: CPT | Mod: 25,S$GLB,, | Performed by: PHYSICIAN ASSISTANT

## 2021-02-04 PROCEDURE — 1125F AMNT PAIN NOTED PAIN PRSNT: CPT | Mod: S$GLB,,, | Performed by: PHYSICIAN ASSISTANT

## 2021-02-04 PROCEDURE — 1125F PR PAIN SEVERITY QUANTIFIED, PAIN PRESENT: ICD-10-PCS | Mod: S$GLB,,, | Performed by: PHYSICIAN ASSISTANT

## 2021-02-04 PROCEDURE — 99213 PR OFFICE/OUTPT VISIT, EST, LEVL III, 20-29 MIN: ICD-10-PCS | Mod: 25,S$GLB,, | Performed by: PHYSICIAN ASSISTANT

## 2021-02-04 PROCEDURE — 3288F FALL RISK ASSESSMENT DOCD: CPT | Mod: CPTII,S$GLB,, | Performed by: PHYSICIAN ASSISTANT

## 2021-02-04 PROCEDURE — 20610 PR DRAIN/INJECT LARGE JOINT/BURSA: ICD-10-PCS | Mod: RT,S$GLB,, | Performed by: PHYSICIAN ASSISTANT

## 2021-02-04 PROCEDURE — 20610 DRAIN/INJ JOINT/BURSA W/O US: CPT | Mod: RT,S$GLB,, | Performed by: PHYSICIAN ASSISTANT

## 2021-02-04 PROCEDURE — 99999 PR PBB SHADOW E&M-EST. PATIENT-LVL V: ICD-10-PCS | Mod: PBBFAC,,, | Performed by: PHYSICIAN ASSISTANT

## 2021-02-04 PROCEDURE — 1159F MED LIST DOCD IN RCRD: CPT | Mod: S$GLB,,, | Performed by: PHYSICIAN ASSISTANT

## 2021-02-04 PROCEDURE — 1159F PR MEDICATION LIST DOCUMENTED IN MEDICAL RECORD: ICD-10-PCS | Mod: S$GLB,,, | Performed by: PHYSICIAN ASSISTANT

## 2021-02-04 PROCEDURE — 3075F PR MOST RECENT SYSTOLIC BLOOD PRESS GE 130-139MM HG: ICD-10-PCS | Mod: CPTII,S$GLB,, | Performed by: PHYSICIAN ASSISTANT

## 2021-02-04 PROCEDURE — 3288F PR FALLS RISK ASSESSMENT DOCUMENTED: ICD-10-PCS | Mod: CPTII,S$GLB,, | Performed by: PHYSICIAN ASSISTANT

## 2021-02-04 PROCEDURE — 3075F SYST BP GE 130 - 139MM HG: CPT | Mod: CPTII,S$GLB,, | Performed by: PHYSICIAN ASSISTANT

## 2021-02-04 PROCEDURE — 1101F PR PT FALLS ASSESS DOC 0-1 FALLS W/OUT INJ PAST YR: ICD-10-PCS | Mod: CPTII,S$GLB,, | Performed by: PHYSICIAN ASSISTANT

## 2021-02-04 PROCEDURE — 1101F PT FALLS ASSESS-DOCD LE1/YR: CPT | Mod: CPTII,S$GLB,, | Performed by: PHYSICIAN ASSISTANT

## 2021-02-04 PROCEDURE — 3078F PR MOST RECENT DIASTOLIC BLOOD PRESSURE < 80 MM HG: ICD-10-PCS | Mod: CPTII,S$GLB,, | Performed by: PHYSICIAN ASSISTANT

## 2021-02-04 RX ORDER — TRIAMCINOLONE ACETONIDE 40 MG/ML
40 INJECTION, SUSPENSION INTRA-ARTICULAR; INTRAMUSCULAR
Status: COMPLETED | OUTPATIENT
Start: 2021-02-04 | End: 2021-02-04

## 2021-02-04 RX ADMIN — TRIAMCINOLONE ACETONIDE 40 MG: 40 INJECTION, SUSPENSION INTRA-ARTICULAR; INTRAMUSCULAR at 03:02

## 2021-02-17 ENCOUNTER — OFFICE VISIT (OUTPATIENT)
Dept: INTERNAL MEDICINE | Facility: CLINIC | Age: 76
End: 2021-02-17
Payer: MEDICARE

## 2021-02-17 VITALS
OXYGEN SATURATION: 97 % | BODY MASS INDEX: 27.96 KG/M2 | TEMPERATURE: 98 F | HEIGHT: 55 IN | HEART RATE: 81 BPM | SYSTOLIC BLOOD PRESSURE: 126 MMHG | DIASTOLIC BLOOD PRESSURE: 76 MMHG | WEIGHT: 120.81 LBS

## 2021-02-17 DIAGNOSIS — R10.9 ABDOMINAL PAIN, UNSPECIFIED ABDOMINAL LOCATION: ICD-10-CM

## 2021-02-17 DIAGNOSIS — E78.00 PURE HYPERCHOLESTEROLEMIA: ICD-10-CM

## 2021-02-17 DIAGNOSIS — I70.0 AORTIC ATHEROSCLEROSIS: ICD-10-CM

## 2021-02-17 DIAGNOSIS — Z79.4 TYPE 2 DIABETES MELLITUS WITH COMPLICATION, WITH LONG-TERM CURRENT USE OF INSULIN: Primary | ICD-10-CM

## 2021-02-17 DIAGNOSIS — E11.8 TYPE 2 DIABETES MELLITUS WITH COMPLICATION, WITH LONG-TERM CURRENT USE OF INSULIN: ICD-10-CM

## 2021-02-17 DIAGNOSIS — J47.9 BRONCHIECTASIS WITHOUT COMPLICATION: ICD-10-CM

## 2021-02-17 DIAGNOSIS — E11.8 TYPE 2 DIABETES MELLITUS WITH COMPLICATION, WITH LONG-TERM CURRENT USE OF INSULIN: Primary | ICD-10-CM

## 2021-02-17 DIAGNOSIS — F33.42 RECURRENT MAJOR DEPRESSIVE DISORDER, IN FULL REMISSION: ICD-10-CM

## 2021-02-17 DIAGNOSIS — Z79.4 TYPE 2 DIABETES MELLITUS WITH COMPLICATION, WITH LONG-TERM CURRENT USE OF INSULIN: ICD-10-CM

## 2021-02-17 DIAGNOSIS — I10 ESSENTIAL HYPERTENSION: ICD-10-CM

## 2021-02-17 PROCEDURE — 3288F PR FALLS RISK ASSESSMENT DOCUMENTED: ICD-10-PCS | Mod: CPTII,S$GLB,, | Performed by: INTERNAL MEDICINE

## 2021-02-17 PROCEDURE — 3078F DIAST BP <80 MM HG: CPT | Mod: CPTII,S$GLB,, | Performed by: INTERNAL MEDICINE

## 2021-02-17 PROCEDURE — 99214 OFFICE O/P EST MOD 30 MIN: CPT | Mod: S$GLB,,, | Performed by: INTERNAL MEDICINE

## 2021-02-17 PROCEDURE — 1126F PR PAIN SEVERITY QUANTIFIED, NO PAIN PRESENT: ICD-10-PCS | Mod: S$GLB,,, | Performed by: INTERNAL MEDICINE

## 2021-02-17 PROCEDURE — 3052F PR MOST RECENT HEMOGLOBIN A1C LEVEL 8.0 - < 9.0%: ICD-10-PCS | Mod: CPTII,S$GLB,, | Performed by: INTERNAL MEDICINE

## 2021-02-17 PROCEDURE — 99999 PR PBB SHADOW E&M-EST. PATIENT-LVL V: CPT | Mod: PBBFAC,,, | Performed by: INTERNAL MEDICINE

## 2021-02-17 PROCEDURE — 3078F PR MOST RECENT DIASTOLIC BLOOD PRESSURE < 80 MM HG: ICD-10-PCS | Mod: CPTII,S$GLB,, | Performed by: INTERNAL MEDICINE

## 2021-02-17 PROCEDURE — 99499 RISK ADDL DX/OHS AUDIT: ICD-10-PCS | Mod: S$PBB,HCNC,, | Performed by: INTERNAL MEDICINE

## 2021-02-17 PROCEDURE — 3052F HG A1C>EQUAL 8.0%<EQUAL 9.0%: CPT | Mod: CPTII,S$GLB,, | Performed by: INTERNAL MEDICINE

## 2021-02-17 PROCEDURE — 99999 PR PBB SHADOW E&M-EST. PATIENT-LVL V: ICD-10-PCS | Mod: PBBFAC,,, | Performed by: INTERNAL MEDICINE

## 2021-02-17 PROCEDURE — 99499 UNLISTED E&M SERVICE: CPT | Mod: S$PBB,HCNC,, | Performed by: INTERNAL MEDICINE

## 2021-02-17 PROCEDURE — 1126F AMNT PAIN NOTED NONE PRSNT: CPT | Mod: S$GLB,,, | Performed by: INTERNAL MEDICINE

## 2021-02-17 PROCEDURE — 1101F PT FALLS ASSESS-DOCD LE1/YR: CPT | Mod: CPTII,S$GLB,, | Performed by: INTERNAL MEDICINE

## 2021-02-17 PROCEDURE — 99214 PR OFFICE/OUTPT VISIT, EST, LEVL IV, 30-39 MIN: ICD-10-PCS | Mod: S$GLB,,, | Performed by: INTERNAL MEDICINE

## 2021-02-17 PROCEDURE — 1159F MED LIST DOCD IN RCRD: CPT | Mod: S$GLB,,, | Performed by: INTERNAL MEDICINE

## 2021-02-17 PROCEDURE — 1159F PR MEDICATION LIST DOCUMENTED IN MEDICAL RECORD: ICD-10-PCS | Mod: S$GLB,,, | Performed by: INTERNAL MEDICINE

## 2021-02-17 PROCEDURE — 3288F FALL RISK ASSESSMENT DOCD: CPT | Mod: CPTII,S$GLB,, | Performed by: INTERNAL MEDICINE

## 2021-02-17 PROCEDURE — 3074F SYST BP LT 130 MM HG: CPT | Mod: CPTII,S$GLB,, | Performed by: INTERNAL MEDICINE

## 2021-02-17 PROCEDURE — 3074F PR MOST RECENT SYSTOLIC BLOOD PRESSURE < 130 MM HG: ICD-10-PCS | Mod: CPTII,S$GLB,, | Performed by: INTERNAL MEDICINE

## 2021-02-17 PROCEDURE — 1101F PR PT FALLS ASSESS DOC 0-1 FALLS W/OUT INJ PAST YR: ICD-10-PCS | Mod: CPTII,S$GLB,, | Performed by: INTERNAL MEDICINE

## 2021-02-17 RX ORDER — ATORVASTATIN CALCIUM 80 MG/1
80 TABLET, FILM COATED ORAL DAILY
Qty: 90 TABLET | Refills: 3 | Status: SHIPPED | OUTPATIENT
Start: 2021-02-17 | End: 2021-03-26 | Stop reason: SDUPTHER

## 2021-02-19 ENCOUNTER — TELEPHONE (OUTPATIENT)
Dept: INTERNAL MEDICINE | Facility: CLINIC | Age: 76
End: 2021-02-19

## 2021-03-01 ENCOUNTER — NURSE TRIAGE (OUTPATIENT)
Dept: ADMINISTRATIVE | Facility: CLINIC | Age: 76
End: 2021-03-01

## 2021-03-02 ENCOUNTER — HOSPITAL ENCOUNTER (INPATIENT)
Facility: HOSPITAL | Age: 76
LOS: 19 days | Discharge: HOME | DRG: 177 | End: 2021-03-21
Attending: EMERGENCY MEDICINE | Admitting: FAMILY MEDICINE
Payer: MEDICARE

## 2021-03-02 ENCOUNTER — INFUSION (OUTPATIENT)
Dept: INFECTIOUS DISEASES | Facility: HOSPITAL | Age: 76
DRG: 177 | End: 2021-03-02
Attending: EMERGENCY MEDICINE
Payer: MEDICARE

## 2021-03-02 VITALS
OXYGEN SATURATION: 97 % | TEMPERATURE: 99 F | DIASTOLIC BLOOD PRESSURE: 53 MMHG | HEART RATE: 95 BPM | HEIGHT: 59 IN | WEIGHT: 116 LBS | SYSTOLIC BLOOD PRESSURE: 102 MMHG | BODY MASS INDEX: 23.39 KG/M2 | RESPIRATION RATE: 32 BRPM

## 2021-03-02 DIAGNOSIS — K21.9 GASTROESOPHAGEAL REFLUX DISEASE WITHOUT ESOPHAGITIS: ICD-10-CM

## 2021-03-02 DIAGNOSIS — U07.1 COVID-19 VIRUS INFECTION: ICD-10-CM

## 2021-03-02 DIAGNOSIS — I10 ESSENTIAL HYPERTENSION: ICD-10-CM

## 2021-03-02 DIAGNOSIS — E11.42 DIABETIC POLYNEUROPATHY ASSOCIATED WITH TYPE 2 DIABETES MELLITUS: ICD-10-CM

## 2021-03-02 DIAGNOSIS — E78.2 MIXED HYPERLIPIDEMIA: ICD-10-CM

## 2021-03-02 DIAGNOSIS — U07.1 COVID-19: ICD-10-CM

## 2021-03-02 DIAGNOSIS — E11.8 TYPE 2 DIABETES MELLITUS WITH COMPLICATION, WITH LONG-TERM CURRENT USE OF INSULIN: ICD-10-CM

## 2021-03-02 DIAGNOSIS — Z79.4 TYPE 2 DIABETES MELLITUS WITH COMPLICATION, WITH LONG-TERM CURRENT USE OF INSULIN: ICD-10-CM

## 2021-03-02 DIAGNOSIS — J96.01 ACUTE HYPOXEMIC RESPIRATORY FAILURE DUE TO COVID-19: ICD-10-CM

## 2021-03-02 DIAGNOSIS — U07.1 PNEUMONIA DUE TO COVID-19 VIRUS: Primary | ICD-10-CM

## 2021-03-02 DIAGNOSIS — J12.82 PNEUMONIA DUE TO COVID-19 VIRUS: Primary | ICD-10-CM

## 2021-03-02 DIAGNOSIS — U07.1 ACUTE HYPOXEMIC RESPIRATORY FAILURE DUE TO COVID-19: ICD-10-CM

## 2021-03-02 DIAGNOSIS — R05.9 COUGH: ICD-10-CM

## 2021-03-02 DIAGNOSIS — R53.81 DEBILITY: ICD-10-CM

## 2021-03-02 DIAGNOSIS — U07.1 COVID-19: Primary | ICD-10-CM

## 2021-03-02 LAB
ALBUMIN SERPL BCP-MCNC: 2.7 G/DL (ref 3.5–5.2)
ALP SERPL-CCNC: 51 U/L (ref 55–135)
ALT SERPL W/O P-5'-P-CCNC: 20 U/L (ref 10–44)
ANION GAP SERPL CALC-SCNC: 11 MMOL/L (ref 8–16)
AST SERPL-CCNC: 42 U/L (ref 10–40)
BASOPHILS # BLD AUTO: 0 K/UL (ref 0–0.2)
BASOPHILS NFR BLD: 0 % (ref 0–1.9)
BILIRUB SERPL-MCNC: 0.3 MG/DL (ref 0.1–1)
BNP SERPL-MCNC: 38 PG/ML (ref 0–99)
BUN SERPL-MCNC: 10 MG/DL (ref 8–23)
BUN SERPL-MCNC: 8 MG/DL (ref 6–30)
CALCIUM SERPL-MCNC: 7.1 MG/DL (ref 8.7–10.5)
CHLORIDE SERPL-SCNC: 107 MMOL/L (ref 95–110)
CHLORIDE SERPL-SCNC: 110 MMOL/L (ref 95–110)
CK SERPL-CCNC: 75 U/L (ref 20–180)
CO2 SERPL-SCNC: 18 MMOL/L (ref 23–29)
CREAT SERPL-MCNC: 0.5 MG/DL (ref 0.5–1.4)
CREAT SERPL-MCNC: 0.7 MG/DL (ref 0.5–1.4)
CRP SERPL-MCNC: 141.1 MG/L (ref 0–8.2)
D DIMER PPP IA.FEU-MCNC: 1.48 MG/L FEU
DIFFERENTIAL METHOD: ABNORMAL
EOSINOPHIL # BLD AUTO: 0 K/UL (ref 0–0.5)
EOSINOPHIL NFR BLD: 0 % (ref 0–8)
ERYTHROCYTE [DISTWIDTH] IN BLOOD BY AUTOMATED COUNT: 12.7 % (ref 11.5–14.5)
EST. GFR  (AFRICAN AMERICAN): >60 ML/MIN/1.73 M^2
EST. GFR  (NON AFRICAN AMERICAN): >60 ML/MIN/1.73 M^2
ESTIMATED AVG GLUCOSE: 197 MG/DL (ref 68–131)
FERRITIN SERPL-MCNC: 589 NG/ML (ref 20–300)
GLUCOSE SERPL-MCNC: 80 MG/DL (ref 70–110)
GLUCOSE SERPL-MCNC: 93 MG/DL (ref 70–110)
HBA1C MFR BLD: 8.5 % (ref 4–5.6)
HCT VFR BLD AUTO: 34.3 % (ref 37–48.5)
HCT VFR BLD CALC: 33 %PCV (ref 36–54)
HGB BLD-MCNC: 11.5 G/DL (ref 12–16)
IMM GRANULOCYTES # BLD AUTO: 0.03 K/UL (ref 0–0.04)
IMM GRANULOCYTES NFR BLD AUTO: 0.7 % (ref 0–0.5)
LACTATE SERPL-SCNC: 1 MMOL/L (ref 0.5–2.2)
LDH SERPL L TO P-CCNC: 335 U/L (ref 110–260)
LYMPHOCYTES # BLD AUTO: 0.4 K/UL (ref 1–4.8)
LYMPHOCYTES NFR BLD: 8.5 % (ref 18–48)
MCH RBC QN AUTO: 29 PG (ref 27–31)
MCHC RBC AUTO-ENTMCNC: 33.5 G/DL (ref 32–36)
MCV RBC AUTO: 86 FL (ref 82–98)
MONOCYTES # BLD AUTO: 0.3 K/UL (ref 0.3–1)
MONOCYTES NFR BLD: 6.4 % (ref 4–15)
NEUTROPHILS # BLD AUTO: 3.7 K/UL (ref 1.8–7.7)
NEUTROPHILS NFR BLD: 84.4 % (ref 38–73)
NRBC BLD-RTO: 0 /100 WBC
PLATELET # BLD AUTO: 160 K/UL (ref 150–350)
PMV BLD AUTO: 10 FL (ref 9.2–12.9)
POC IONIZED CALCIUM: 1.06 MMOL/L (ref 1.06–1.42)
POC TCO2 (MEASURED): 23 MMOL/L (ref 23–29)
POCT GLUCOSE: 116 MG/DL (ref 70–110)
POCT GLUCOSE: 143 MG/DL (ref 70–110)
POCT GLUCOSE: 70 MG/DL (ref 70–110)
POTASSIUM BLD-SCNC: 3.5 MMOL/L (ref 3.5–5.1)
POTASSIUM SERPL-SCNC: 3.5 MMOL/L (ref 3.5–5.1)
PROCALCITONIN SERPL IA-MCNC: 0.07 NG/ML
PROT SERPL-MCNC: 5.9 G/DL (ref 6–8.4)
RBC # BLD AUTO: 3.97 M/UL (ref 4–5.4)
SAMPLE: ABNORMAL
SODIUM BLD-SCNC: 140 MMOL/L (ref 136–145)
SODIUM SERPL-SCNC: 139 MMOL/L (ref 136–145)
TROPONIN I SERPL DL<=0.01 NG/ML-MCNC: <0.006 NG/ML (ref 0–0.03)
WBC # BLD AUTO: 4.37 K/UL (ref 3.9–12.7)

## 2021-03-02 PROCEDURE — 82728 ASSAY OF FERRITIN: CPT

## 2021-03-02 PROCEDURE — C9399 UNCLASSIFIED DRUGS OR BIOLOG: HCPCS | Performed by: FAMILY MEDICINE

## 2021-03-02 PROCEDURE — 99285 PR EMERGENCY DEPT VISIT,LEVEL V: ICD-10-PCS | Mod: ,,, | Performed by: PHYSICIAN ASSISTANT

## 2021-03-02 PROCEDURE — 96374 THER/PROPH/DIAG INJ IV PUSH: CPT

## 2021-03-02 PROCEDURE — 25000003 PHARM REV CODE 250

## 2021-03-02 PROCEDURE — 63600175 PHARM REV CODE 636 W HCPCS

## 2021-03-02 PROCEDURE — 63600175 PHARM REV CODE 636 W HCPCS: Performed by: FAMILY MEDICINE

## 2021-03-02 PROCEDURE — 82550 ASSAY OF CK (CPK): CPT

## 2021-03-02 PROCEDURE — 80047 BASIC METABLC PNL IONIZED CA: CPT

## 2021-03-02 PROCEDURE — 83880 ASSAY OF NATRIURETIC PEPTIDE: CPT

## 2021-03-02 PROCEDURE — 86140 C-REACTIVE PROTEIN: CPT

## 2021-03-02 PROCEDURE — 80053 COMPREHEN METABOLIC PANEL: CPT

## 2021-03-02 PROCEDURE — 99285 EMERGENCY DEPT VISIT HI MDM: CPT | Mod: 25

## 2021-03-02 PROCEDURE — 84145 PROCALCITONIN (PCT): CPT

## 2021-03-02 PROCEDURE — 99222 PR INITIAL HOSPITAL CARE,LEVL II: ICD-10-PCS | Mod: AI,GC,, | Performed by: FAMILY MEDICINE

## 2021-03-02 PROCEDURE — 25000003 PHARM REV CODE 250: Performed by: FAMILY MEDICINE

## 2021-03-02 PROCEDURE — 83605 ASSAY OF LACTIC ACID: CPT

## 2021-03-02 PROCEDURE — 83615 LACTATE (LD) (LDH) ENZYME: CPT

## 2021-03-02 PROCEDURE — 94761 N-INVAS EAR/PLS OXIMETRY MLT: CPT

## 2021-03-02 PROCEDURE — 83036 HEMOGLOBIN GLYCOSYLATED A1C: CPT

## 2021-03-02 PROCEDURE — 99285 EMERGENCY DEPT VISIT HI MDM: CPT | Mod: ,,, | Performed by: PHYSICIAN ASSISTANT

## 2021-03-02 PROCEDURE — 84484 ASSAY OF TROPONIN QUANT: CPT

## 2021-03-02 PROCEDURE — 20600001 HC STEP DOWN PRIVATE ROOM

## 2021-03-02 PROCEDURE — 99222 1ST HOSP IP/OBS MODERATE 55: CPT | Mod: AI,GC,, | Performed by: FAMILY MEDICINE

## 2021-03-02 PROCEDURE — M0239 BAMLANIVIMAB-XXXX INFUSION: HCPCS

## 2021-03-02 PROCEDURE — 85025 COMPLETE CBC W/AUTO DIFF WBC: CPT

## 2021-03-02 PROCEDURE — 85379 FIBRIN DEGRADATION QUANT: CPT

## 2021-03-02 RX ORDER — GABAPENTIN 100 MG/1
100 CAPSULE ORAL 3 TIMES DAILY
Status: DISCONTINUED | OUTPATIENT
Start: 2021-03-02 | End: 2021-03-21 | Stop reason: HOSPADM

## 2021-03-02 RX ORDER — AZITHROMYCIN 250 MG/1
500 TABLET, FILM COATED ORAL DAILY
Status: COMPLETED | OUTPATIENT
Start: 2021-03-03 | End: 2021-03-05

## 2021-03-02 RX ORDER — LISINOPRIL 20 MG/1
20 TABLET ORAL DAILY
Status: DISCONTINUED | OUTPATIENT
Start: 2021-03-03 | End: 2021-03-03

## 2021-03-02 RX ORDER — TALC
6 POWDER (GRAM) TOPICAL NIGHTLY PRN
Status: DISCONTINUED | OUTPATIENT
Start: 2021-03-02 | End: 2021-03-21 | Stop reason: HOSPADM

## 2021-03-02 RX ORDER — CEFTRIAXONE 1 G/1
1 INJECTION, POWDER, FOR SOLUTION INTRAMUSCULAR; INTRAVENOUS
Status: DISCONTINUED | OUTPATIENT
Start: 2021-03-03 | End: 2021-03-02

## 2021-03-02 RX ORDER — IBUPROFEN 200 MG
16 TABLET ORAL
Status: DISCONTINUED | OUTPATIENT
Start: 2021-03-02 | End: 2021-03-09

## 2021-03-02 RX ORDER — CEFTRIAXONE 1 G/1
1 INJECTION, POWDER, FOR SOLUTION INTRAMUSCULAR; INTRAVENOUS
Status: COMPLETED | OUTPATIENT
Start: 2021-03-02 | End: 2021-03-06

## 2021-03-02 RX ORDER — METOCLOPRAMIDE HYDROCHLORIDE 5 MG/ML
10 INJECTION INTRAMUSCULAR; INTRAVENOUS
Status: ACTIVE | OUTPATIENT
Start: 2021-03-02 | End: 2021-03-02

## 2021-03-02 RX ORDER — ASCORBIC ACID 500 MG
500 TABLET ORAL 2 TIMES DAILY
Status: DISCONTINUED | OUTPATIENT
Start: 2021-03-02 | End: 2021-03-21 | Stop reason: HOSPADM

## 2021-03-02 RX ORDER — INSULIN ASPART 100 [IU]/ML
7 INJECTION, SOLUTION INTRAVENOUS; SUBCUTANEOUS
Status: DISCONTINUED | OUTPATIENT
Start: 2021-03-02 | End: 2021-03-03

## 2021-03-02 RX ORDER — GLUCAGON 1 MG
1 KIT INJECTION
Status: DISCONTINUED | OUTPATIENT
Start: 2021-03-02 | End: 2021-03-09

## 2021-03-02 RX ORDER — PANTOPRAZOLE SODIUM 40 MG/1
40 TABLET, DELAYED RELEASE ORAL DAILY
Refills: 4 | Status: DISCONTINUED | OUTPATIENT
Start: 2021-03-03 | End: 2021-03-21 | Stop reason: HOSPADM

## 2021-03-02 RX ORDER — LOPERAMIDE HYDROCHLORIDE 2 MG/1
2 CAPSULE ORAL EVERY 6 HOURS PRN
Status: DISCONTINUED | OUTPATIENT
Start: 2021-03-02 | End: 2021-03-21 | Stop reason: HOSPADM

## 2021-03-02 RX ORDER — AMOXICILLIN 250 MG
1 CAPSULE ORAL 2 TIMES DAILY
Status: DISCONTINUED | OUTPATIENT
Start: 2021-03-02 | End: 2021-03-21 | Stop reason: HOSPADM

## 2021-03-02 RX ORDER — HEPARIN SODIUM 5000 [USP'U]/ML
5000 INJECTION, SOLUTION INTRAVENOUS; SUBCUTANEOUS EVERY 8 HOURS
Status: DISCONTINUED | OUTPATIENT
Start: 2021-03-02 | End: 2021-03-21 | Stop reason: HOSPADM

## 2021-03-02 RX ORDER — HYDROCHLOROTHIAZIDE 25 MG/1
25 TABLET ORAL DAILY
Status: DISCONTINUED | OUTPATIENT
Start: 2021-03-02 | End: 2021-03-11

## 2021-03-02 RX ORDER — GUAIFENESIN/DEXTROMETHORPHAN 100-10MG/5
5 SYRUP ORAL EVERY 6 HOURS
Status: DISCONTINUED | OUTPATIENT
Start: 2021-03-02 | End: 2021-03-06

## 2021-03-02 RX ORDER — IBUPROFEN 200 MG
24 TABLET ORAL
Status: DISCONTINUED | OUTPATIENT
Start: 2021-03-02 | End: 2021-03-09

## 2021-03-02 RX ORDER — INSULIN ASPART 100 [IU]/ML
1-10 INJECTION, SOLUTION INTRAVENOUS; SUBCUTANEOUS
Status: DISCONTINUED | OUTPATIENT
Start: 2021-03-02 | End: 2021-03-09

## 2021-03-02 RX ORDER — BENZONATATE 100 MG/1
200 CAPSULE ORAL 3 TIMES DAILY PRN
Status: DISCONTINUED | OUTPATIENT
Start: 2021-03-02 | End: 2021-03-06

## 2021-03-02 RX ORDER — AZITHROMYCIN 250 MG/1
500 TABLET, FILM COATED ORAL
Status: DISCONTINUED | OUTPATIENT
Start: 2021-03-03 | End: 2021-03-02

## 2021-03-02 RX ORDER — SODIUM CHLORIDE 0.9 % (FLUSH) 0.9 %
10 SYRINGE (ML) INJECTION
Status: DISCONTINUED | OUTPATIENT
Start: 2021-03-02 | End: 2021-03-21 | Stop reason: HOSPADM

## 2021-03-02 RX ORDER — INSULIN ASPART 100 [IU]/ML
10 INJECTION, SOLUTION INTRAVENOUS; SUBCUTANEOUS
Status: DISCONTINUED | OUTPATIENT
Start: 2021-03-02 | End: 2021-03-02

## 2021-03-02 RX ORDER — ACETAMINOPHEN 325 MG/1
650 TABLET ORAL EVERY 4 HOURS PRN
Status: DISCONTINUED | OUTPATIENT
Start: 2021-03-02 | End: 2021-03-21 | Stop reason: HOSPADM

## 2021-03-02 RX ORDER — EPINEPHRINE 0.1 MG/ML
0.3 INJECTION INTRAVENOUS
Status: DISCONTINUED | OUTPATIENT
Start: 2021-03-02 | End: 2021-03-21 | Stop reason: HOSPADM

## 2021-03-02 RX ORDER — ATORVASTATIN CALCIUM 20 MG/1
80 TABLET, FILM COATED ORAL DAILY
Status: DISCONTINUED | OUTPATIENT
Start: 2021-03-03 | End: 2021-03-21 | Stop reason: HOSPADM

## 2021-03-02 RX ORDER — ALBUTEROL SULFATE 90 UG/1
2 AEROSOL, METERED RESPIRATORY (INHALATION)
Status: DISCONTINUED | OUTPATIENT
Start: 2021-03-02 | End: 2021-03-21 | Stop reason: HOSPADM

## 2021-03-02 RX ORDER — CETIRIZINE HYDROCHLORIDE 10 MG/1
10 TABLET ORAL NIGHTLY
Status: DISCONTINUED | OUTPATIENT
Start: 2021-03-02 | End: 2021-03-21 | Stop reason: HOSPADM

## 2021-03-02 RX ORDER — HYDROXYZINE HYDROCHLORIDE 25 MG/1
25 TABLET, FILM COATED ORAL 3 TIMES DAILY PRN
Status: DISCONTINUED | OUTPATIENT
Start: 2021-03-02 | End: 2021-03-21 | Stop reason: HOSPADM

## 2021-03-02 RX ORDER — ALBUTEROL SULFATE 90 UG/1
2 AEROSOL, METERED RESPIRATORY (INHALATION) EVERY 6 HOURS PRN
Status: DISCONTINUED | OUTPATIENT
Start: 2021-03-02 | End: 2021-03-21 | Stop reason: HOSPADM

## 2021-03-02 RX ORDER — CALCIUM CARBONATE 500(1250)
500 TABLET ORAL 3 TIMES DAILY
Status: DISPENSED | OUTPATIENT
Start: 2021-03-02 | End: 2021-03-03

## 2021-03-02 RX ORDER — ONDANSETRON 8 MG/1
8 TABLET, ORALLY DISINTEGRATING ORAL EVERY 8 HOURS PRN
Status: DISCONTINUED | OUTPATIENT
Start: 2021-03-02 | End: 2021-03-21 | Stop reason: HOSPADM

## 2021-03-02 RX ORDER — ONDANSETRON 2 MG/ML
4 INJECTION INTRAMUSCULAR; INTRAVENOUS EVERY 6 HOURS PRN
Status: DISCONTINUED | OUTPATIENT
Start: 2021-03-02 | End: 2021-03-21 | Stop reason: HOSPADM

## 2021-03-02 RX ORDER — HYDRALAZINE HYDROCHLORIDE 25 MG/1
25 TABLET, FILM COATED ORAL EVERY 8 HOURS PRN
Status: DISCONTINUED | OUTPATIENT
Start: 2021-03-02 | End: 2021-03-21 | Stop reason: HOSPADM

## 2021-03-02 RX ORDER — ONDANSETRON 4 MG/1
4 TABLET, ORALLY DISINTEGRATING ORAL ONCE AS NEEDED
Status: DISCONTINUED | OUTPATIENT
Start: 2021-03-02 | End: 2021-03-21 | Stop reason: HOSPADM

## 2021-03-02 RX ORDER — SODIUM CHLORIDE 0.9 % (FLUSH) 0.9 %
10 SYRINGE (ML) INJECTION
Status: DISCONTINUED | OUTPATIENT
Start: 2021-03-02 | End: 2021-03-09

## 2021-03-02 RX ORDER — INSULIN ASPART 100 [IU]/ML
12 INJECTION, SOLUTION INTRAVENOUS; SUBCUTANEOUS
Status: DISCONTINUED | OUTPATIENT
Start: 2021-03-02 | End: 2021-03-02

## 2021-03-02 RX ORDER — DIPHENHYDRAMINE HYDROCHLORIDE 50 MG/ML
25 INJECTION INTRAMUSCULAR; INTRAVENOUS ONCE AS NEEDED
Status: DISCONTINUED | OUTPATIENT
Start: 2021-03-02 | End: 2021-03-21 | Stop reason: HOSPADM

## 2021-03-02 RX ORDER — ACETAMINOPHEN 325 MG/1
650 TABLET ORAL ONCE AS NEEDED
Status: DISCONTINUED | OUTPATIENT
Start: 2021-03-02 | End: 2021-03-21 | Stop reason: HOSPADM

## 2021-03-02 RX ADMIN — CETIRIZINE HYDROCHLORIDE 10 MG: 10 TABLET, FILM COATED ORAL at 08:03

## 2021-03-02 RX ADMIN — BENZONATATE 200 MG: 100 CAPSULE ORAL at 08:03

## 2021-03-02 RX ADMIN — ACETAMINOPHEN 650 MG: 325 TABLET ORAL at 05:03

## 2021-03-02 RX ADMIN — MELATONIN TAB 3 MG 6 MG: 3 TAB at 08:03

## 2021-03-02 RX ADMIN — SODIUM CHLORIDE 700 MG: 9 INJECTION, SOLUTION INTRAVENOUS at 08:03

## 2021-03-02 RX ADMIN — INSULIN DETEMIR 20 UNITS: 100 INJECTION, SOLUTION SUBCUTANEOUS at 08:03

## 2021-03-02 RX ADMIN — OXYCODONE HYDROCHLORIDE AND ACETAMINOPHEN 500 MG: 500 TABLET ORAL at 08:03

## 2021-03-02 RX ADMIN — ONDANSETRON 4 MG: 2 INJECTION INTRAMUSCULAR; INTRAVENOUS at 05:03

## 2021-03-02 RX ADMIN — CALCIUM 500 MG: 500 TABLET ORAL at 06:03

## 2021-03-02 RX ADMIN — CEFTRIAXONE 1 G: 1 INJECTION, POWDER, FOR SOLUTION INTRAMUSCULAR; INTRAVENOUS at 06:03

## 2021-03-02 RX ADMIN — INSULIN ASPART 7 UNITS: 100 INJECTION, SOLUTION INTRAVENOUS; SUBCUTANEOUS at 06:03

## 2021-03-02 RX ADMIN — REMDESIVIR 200 MG: 100 INJECTION, POWDER, LYOPHILIZED, FOR SOLUTION INTRAVENOUS at 06:03

## 2021-03-02 RX ADMIN — GUAIFENESIN AND DEXTROMETHORPHAN 5 ML: 100; 10 SYRUP ORAL at 06:03

## 2021-03-02 RX ADMIN — HYDROCHLOROTHIAZIDE 25 MG: 25 TABLET ORAL at 06:03

## 2021-03-03 LAB
POCT GLUCOSE: 154 MG/DL (ref 70–110)
POCT GLUCOSE: 188 MG/DL (ref 70–110)
POCT GLUCOSE: 433 MG/DL (ref 70–110)
POCT GLUCOSE: 60 MG/DL (ref 70–110)
POCT GLUCOSE: 83 MG/DL (ref 70–110)

## 2021-03-03 PROCEDURE — 94761 N-INVAS EAR/PLS OXIMETRY MLT: CPT

## 2021-03-03 PROCEDURE — 20600001 HC STEP DOWN PRIVATE ROOM

## 2021-03-03 PROCEDURE — 63700000 PHARM REV CODE 250 ALT 637 W/O HCPCS: Performed by: FAMILY MEDICINE

## 2021-03-03 PROCEDURE — 63600175 PHARM REV CODE 636 W HCPCS: Performed by: FAMILY MEDICINE

## 2021-03-03 PROCEDURE — C9399 UNCLASSIFIED DRUGS OR BIOLOG: HCPCS | Performed by: FAMILY MEDICINE

## 2021-03-03 PROCEDURE — 99232 PR SUBSEQUENT HOSPITAL CARE,LEVL II: ICD-10-PCS | Mod: 95,,, | Performed by: FAMILY MEDICINE

## 2021-03-03 PROCEDURE — 99232 SBSQ HOSP IP/OBS MODERATE 35: CPT | Mod: 95,,, | Performed by: FAMILY MEDICINE

## 2021-03-03 PROCEDURE — 25000003 PHARM REV CODE 250: Performed by: FAMILY MEDICINE

## 2021-03-03 RX ORDER — LISINOPRIL 10 MG/1
10 TABLET ORAL DAILY
Status: DISCONTINUED | OUTPATIENT
Start: 2021-03-03 | End: 2021-03-13

## 2021-03-03 RX ORDER — MUPIROCIN 20 MG/G
OINTMENT TOPICAL 2 TIMES DAILY
Status: DISCONTINUED | OUTPATIENT
Start: 2021-03-03 | End: 2021-03-03

## 2021-03-03 RX ADMIN — ONDANSETRON 8 MG: 8 TABLET, ORALLY DISINTEGRATING ORAL at 12:03

## 2021-03-03 RX ADMIN — LISINOPRIL 10 MG: 10 TABLET ORAL at 09:03

## 2021-03-03 RX ADMIN — HYDROCHLOROTHIAZIDE 25 MG: 25 TABLET ORAL at 08:03

## 2021-03-03 RX ADMIN — CETIRIZINE HYDROCHLORIDE 10 MG: 10 TABLET, FILM COATED ORAL at 09:03

## 2021-03-03 RX ADMIN — HEPARIN SODIUM 5000 UNITS: 5000 INJECTION INTRAVENOUS; SUBCUTANEOUS at 06:03

## 2021-03-03 RX ADMIN — GABAPENTIN 100 MG: 100 CAPSULE ORAL at 08:03

## 2021-03-03 RX ADMIN — INSULIN ASPART 7 UNITS: 100 INJECTION, SOLUTION INTRAVENOUS; SUBCUTANEOUS at 08:03

## 2021-03-03 RX ADMIN — REMDESIVIR 100 MG: 100 INJECTION, POWDER, LYOPHILIZED, FOR SOLUTION INTRAVENOUS at 04:03

## 2021-03-03 RX ADMIN — AZITHROMYCIN MONOHYDRATE 500 MG: 250 TABLET ORAL at 08:03

## 2021-03-03 RX ADMIN — BENZONATATE 200 MG: 100 CAPSULE ORAL at 09:03

## 2021-03-03 RX ADMIN — THERA TABS 1 TABLET: TAB at 08:03

## 2021-03-03 RX ADMIN — CALCIUM 500 MG: 500 TABLET ORAL at 08:03

## 2021-03-03 RX ADMIN — GUAIFENESIN AND DEXTROMETHORPHAN 5 ML: 100; 10 SYRUP ORAL at 12:03

## 2021-03-03 RX ADMIN — HEPARIN SODIUM 5000 UNITS: 5000 INJECTION INTRAVENOUS; SUBCUTANEOUS at 09:03

## 2021-03-03 RX ADMIN — BIMATOPROST 1 DROP: 0.1 SOLUTION/ DROPS OPHTHALMIC at 09:03

## 2021-03-03 RX ADMIN — OXYCODONE HYDROCHLORIDE AND ACETAMINOPHEN 500 MG: 500 TABLET ORAL at 09:03

## 2021-03-03 RX ADMIN — ATORVASTATIN CALCIUM 80 MG: 20 TABLET, FILM COATED ORAL at 08:03

## 2021-03-03 RX ADMIN — HEPARIN SODIUM 5000 UNITS: 5000 INJECTION INTRAVENOUS; SUBCUTANEOUS at 02:03

## 2021-03-03 RX ADMIN — PANTOPRAZOLE SODIUM 40 MG: 40 TABLET, DELAYED RELEASE ORAL at 08:03

## 2021-03-03 RX ADMIN — GABAPENTIN 100 MG: 100 CAPSULE ORAL at 09:03

## 2021-03-03 RX ADMIN — GABAPENTIN 100 MG: 100 CAPSULE ORAL at 02:03

## 2021-03-03 RX ADMIN — HYDROXYZINE HYDROCHLORIDE 25 MG: 25 TABLET, FILM COATED ORAL at 09:03

## 2021-03-03 RX ADMIN — CEFTRIAXONE 1 G: 1 INJECTION, POWDER, FOR SOLUTION INTRAMUSCULAR; INTRAVENOUS at 05:03

## 2021-03-03 RX ADMIN — INSULIN ASPART 5 UNITS: 100 INJECTION, SOLUTION INTRAVENOUS; SUBCUTANEOUS at 09:03

## 2021-03-03 RX ADMIN — ACETAMINOPHEN 650 MG: 325 TABLET ORAL at 06:03

## 2021-03-03 RX ADMIN — GUAIFENESIN AND DEXTROMETHORPHAN 5 ML: 100; 10 SYRUP ORAL at 05:03

## 2021-03-03 RX ADMIN — DEXAMETHASONE 6 MG: 4 TABLET ORAL at 08:03

## 2021-03-03 RX ADMIN — MUPIROCIN: 20 OINTMENT TOPICAL at 10:03

## 2021-03-03 RX ADMIN — INSULIN DETEMIR 20 UNITS: 100 INJECTION, SOLUTION SUBCUTANEOUS at 08:03

## 2021-03-03 RX ADMIN — HYDROXYZINE HYDROCHLORIDE 25 MG: 25 TABLET, FILM COATED ORAL at 12:03

## 2021-03-03 RX ADMIN — GUAIFENESIN AND DEXTROMETHORPHAN 5 ML: 100; 10 SYRUP ORAL at 06:03

## 2021-03-03 RX ADMIN — OXYCODONE HYDROCHLORIDE AND ACETAMINOPHEN 500 MG: 500 TABLET ORAL at 08:03

## 2021-03-03 RX ADMIN — INSULIN DETEMIR 20 UNITS: 100 INJECTION, SOLUTION SUBCUTANEOUS at 09:03

## 2021-03-04 LAB
CRP SERPL-MCNC: 175.3 MG/L (ref 0–8.2)
D DIMER PPP IA.FEU-MCNC: 0.56 MG/L FEU
D DIMER PPP IA.FEU-MCNC: 0.56 MG/L FEU
FERRITIN SERPL-MCNC: 838 NG/ML (ref 20–300)
POCT GLUCOSE: 174 MG/DL (ref 70–110)
POCT GLUCOSE: 194 MG/DL (ref 70–110)
POCT GLUCOSE: 221 MG/DL (ref 70–110)
POCT GLUCOSE: 334 MG/DL (ref 70–110)

## 2021-03-04 PROCEDURE — 25000003 PHARM REV CODE 250: Performed by: STUDENT IN AN ORGANIZED HEALTH CARE EDUCATION/TRAINING PROGRAM

## 2021-03-04 PROCEDURE — 85379 FIBRIN DEGRADATION QUANT: CPT | Performed by: FAMILY MEDICINE

## 2021-03-04 PROCEDURE — 63600175 PHARM REV CODE 636 W HCPCS: Performed by: FAMILY MEDICINE

## 2021-03-04 PROCEDURE — 63700000 PHARM REV CODE 250 ALT 637 W/O HCPCS: Performed by: FAMILY MEDICINE

## 2021-03-04 PROCEDURE — 11000001 HC ACUTE MED/SURG PRIVATE ROOM

## 2021-03-04 PROCEDURE — 25000003 PHARM REV CODE 250: Performed by: FAMILY MEDICINE

## 2021-03-04 PROCEDURE — 82728 ASSAY OF FERRITIN: CPT | Performed by: FAMILY MEDICINE

## 2021-03-04 PROCEDURE — 99900035 HC TECH TIME PER 15 MIN (STAT)

## 2021-03-04 PROCEDURE — 25500020 PHARM REV CODE 255: Performed by: STUDENT IN AN ORGANIZED HEALTH CARE EDUCATION/TRAINING PROGRAM

## 2021-03-04 PROCEDURE — A4216 STERILE WATER/SALINE, 10 ML: HCPCS | Performed by: FAMILY MEDICINE

## 2021-03-04 PROCEDURE — 36415 COLL VENOUS BLD VENIPUNCTURE: CPT | Performed by: FAMILY MEDICINE

## 2021-03-04 PROCEDURE — 99233 SBSQ HOSP IP/OBS HIGH 50: CPT | Mod: ,,, | Performed by: STUDENT IN AN ORGANIZED HEALTH CARE EDUCATION/TRAINING PROGRAM

## 2021-03-04 PROCEDURE — 27000221 HC OXYGEN, UP TO 24 HOURS

## 2021-03-04 PROCEDURE — 99233 PR SUBSEQUENT HOSPITAL CARE,LEVL III: ICD-10-PCS | Mod: ,,, | Performed by: STUDENT IN AN ORGANIZED HEALTH CARE EDUCATION/TRAINING PROGRAM

## 2021-03-04 PROCEDURE — 86140 C-REACTIVE PROTEIN: CPT | Performed by: FAMILY MEDICINE

## 2021-03-04 RX ORDER — ALPRAZOLAM 0.5 MG/1
0.5 TABLET ORAL 3 TIMES DAILY PRN
Status: DISCONTINUED | OUTPATIENT
Start: 2021-03-04 | End: 2021-03-21 | Stop reason: HOSPADM

## 2021-03-04 RX ADMIN — BENZONATATE 200 MG: 100 CAPSULE ORAL at 11:03

## 2021-03-04 RX ADMIN — GABAPENTIN 100 MG: 100 CAPSULE ORAL at 09:03

## 2021-03-04 RX ADMIN — REMDESIVIR 100 MG: 100 INJECTION, POWDER, LYOPHILIZED, FOR SOLUTION INTRAVENOUS at 04:03

## 2021-03-04 RX ADMIN — HYDROCHLOROTHIAZIDE 25 MG: 25 TABLET ORAL at 08:03

## 2021-03-04 RX ADMIN — INSULIN DETEMIR 20 UNITS: 100 INJECTION, SOLUTION SUBCUTANEOUS at 09:03

## 2021-03-04 RX ADMIN — GUAIFENESIN AND DEXTROMETHORPHAN 5 ML: 100; 10 SYRUP ORAL at 05:03

## 2021-03-04 RX ADMIN — INSULIN ASPART 2 UNITS: 100 INJECTION, SOLUTION INTRAVENOUS; SUBCUTANEOUS at 05:03

## 2021-03-04 RX ADMIN — INSULIN DETEMIR 20 UNITS: 100 INJECTION, SOLUTION SUBCUTANEOUS at 08:03

## 2021-03-04 RX ADMIN — GABAPENTIN 100 MG: 100 CAPSULE ORAL at 04:03

## 2021-03-04 RX ADMIN — PANTOPRAZOLE SODIUM 40 MG: 40 TABLET, DELAYED RELEASE ORAL at 08:03

## 2021-03-04 RX ADMIN — CEFTRIAXONE 1 G: 1 INJECTION, POWDER, FOR SOLUTION INTRAMUSCULAR; INTRAVENOUS at 04:03

## 2021-03-04 RX ADMIN — INSULIN ASPART 4 UNITS: 100 INJECTION, SOLUTION INTRAVENOUS; SUBCUTANEOUS at 11:03

## 2021-03-04 RX ADMIN — HEPARIN SODIUM 5000 UNITS: 5000 INJECTION INTRAVENOUS; SUBCUTANEOUS at 06:03

## 2021-03-04 RX ADMIN — AZITHROMYCIN MONOHYDRATE 500 MG: 250 TABLET ORAL at 08:03

## 2021-03-04 RX ADMIN — CETIRIZINE HYDROCHLORIDE 10 MG: 10 TABLET, FILM COATED ORAL at 09:03

## 2021-03-04 RX ADMIN — HEPARIN SODIUM 5000 UNITS: 5000 INJECTION INTRAVENOUS; SUBCUTANEOUS at 09:03

## 2021-03-04 RX ADMIN — ALPRAZOLAM 0.5 MG: 0.5 TABLET ORAL at 05:03

## 2021-03-04 RX ADMIN — DEXAMETHASONE 6 MG: 4 TABLET ORAL at 08:03

## 2021-03-04 RX ADMIN — THERA TABS 1 TABLET: TAB at 08:03

## 2021-03-04 RX ADMIN — INSULIN ASPART 4 UNITS: 100 INJECTION, SOLUTION INTRAVENOUS; SUBCUTANEOUS at 09:03

## 2021-03-04 RX ADMIN — LISINOPRIL 10 MG: 10 TABLET ORAL at 08:03

## 2021-03-04 RX ADMIN — HEPARIN SODIUM 5000 UNITS: 5000 INJECTION INTRAVENOUS; SUBCUTANEOUS at 04:03

## 2021-03-04 RX ADMIN — GUAIFENESIN AND DEXTROMETHORPHAN 5 ML: 100; 10 SYRUP ORAL at 06:03

## 2021-03-04 RX ADMIN — GUAIFENESIN AND DEXTROMETHORPHAN 5 ML: 100; 10 SYRUP ORAL at 11:03

## 2021-03-04 RX ADMIN — MELATONIN TAB 3 MG 6 MG: 3 TAB at 09:03

## 2021-03-04 RX ADMIN — OXYCODONE HYDROCHLORIDE AND ACETAMINOPHEN 500 MG: 500 TABLET ORAL at 09:03

## 2021-03-04 RX ADMIN — IOHEXOL 75 ML: 350 INJECTION, SOLUTION INTRAVENOUS at 03:03

## 2021-03-04 RX ADMIN — DOCUSATE SODIUM AND SENNOSIDES 1 TABLET: 8.6; 5 TABLET, FILM COATED ORAL at 08:03

## 2021-03-04 RX ADMIN — BENZONATATE 200 MG: 100 CAPSULE ORAL at 09:03

## 2021-03-04 RX ADMIN — GABAPENTIN 100 MG: 100 CAPSULE ORAL at 08:03

## 2021-03-04 RX ADMIN — Medication 10 ML: at 09:03

## 2021-03-04 RX ADMIN — OXYCODONE HYDROCHLORIDE AND ACETAMINOPHEN 500 MG: 500 TABLET ORAL at 08:03

## 2021-03-04 RX ADMIN — ATORVASTATIN CALCIUM 80 MG: 20 TABLET, FILM COATED ORAL at 08:03

## 2021-03-04 RX ADMIN — INSULIN ASPART 2 UNITS: 100 INJECTION, SOLUTION INTRAVENOUS; SUBCUTANEOUS at 08:03

## 2021-03-05 LAB
ALBUMIN SERPL BCP-MCNC: 2.6 G/DL (ref 3.5–5.2)
ALP SERPL-CCNC: 64 U/L (ref 55–135)
ALT SERPL W/O P-5'-P-CCNC: 28 U/L (ref 10–44)
ANION GAP SERPL CALC-SCNC: 12 MMOL/L (ref 8–16)
AST SERPL-CCNC: 39 U/L (ref 10–40)
BASOPHILS # BLD AUTO: 0.02 K/UL (ref 0–0.2)
BASOPHILS NFR BLD: 0.3 % (ref 0–1.9)
BILIRUB SERPL-MCNC: 0.3 MG/DL (ref 0.1–1)
BUN SERPL-MCNC: 27 MG/DL (ref 8–23)
CALCIUM SERPL-MCNC: 8.1 MG/DL (ref 8.7–10.5)
CHLORIDE SERPL-SCNC: 104 MMOL/L (ref 95–110)
CO2 SERPL-SCNC: 22 MMOL/L (ref 23–29)
CREAT SERPL-MCNC: 0.9 MG/DL (ref 0.5–1.4)
D DIMER PPP IA.FEU-MCNC: 0.42 MG/L FEU
DIFFERENTIAL METHOD: ABNORMAL
EOSINOPHIL # BLD AUTO: 0 K/UL (ref 0–0.5)
EOSINOPHIL NFR BLD: 0 % (ref 0–8)
ERYTHROCYTE [DISTWIDTH] IN BLOOD BY AUTOMATED COUNT: 13 % (ref 11.5–14.5)
EST. GFR  (AFRICAN AMERICAN): >60 ML/MIN/1.73 M^2
EST. GFR  (NON AFRICAN AMERICAN): >60 ML/MIN/1.73 M^2
GIANT PLATELETS BLD QL SMEAR: PRESENT
GLUCOSE SERPL-MCNC: 217 MG/DL (ref 70–110)
HCT VFR BLD AUTO: 36.8 % (ref 37–48.5)
HGB BLD-MCNC: 12.3 G/DL (ref 12–16)
IMM GRANULOCYTES # BLD AUTO: 0.08 K/UL (ref 0–0.04)
IMM GRANULOCYTES NFR BLD AUTO: 1.1 % (ref 0–0.5)
LYMPHOCYTES # BLD AUTO: 1 K/UL (ref 1–4.8)
LYMPHOCYTES NFR BLD: 12.9 % (ref 18–48)
MCH RBC QN AUTO: 28.8 PG (ref 27–31)
MCHC RBC AUTO-ENTMCNC: 33.4 G/DL (ref 32–36)
MCV RBC AUTO: 86 FL (ref 82–98)
MONOCYTES # BLD AUTO: 0.3 K/UL (ref 0.3–1)
MONOCYTES NFR BLD: 4.4 % (ref 4–15)
NEUTROPHILS # BLD AUTO: 6.1 K/UL (ref 1.8–7.7)
NEUTROPHILS NFR BLD: 81.3 % (ref 38–73)
NRBC BLD-RTO: 0 /100 WBC
PLATELET # BLD AUTO: 328 K/UL (ref 150–350)
PLATELET BLD QL SMEAR: ABNORMAL
PMV BLD AUTO: 9.9 FL (ref 9.2–12.9)
POCT GLUCOSE: 153 MG/DL (ref 70–110)
POCT GLUCOSE: 188 MG/DL (ref 70–110)
POCT GLUCOSE: 261 MG/DL (ref 70–110)
POTASSIUM SERPL-SCNC: 4.3 MMOL/L (ref 3.5–5.1)
PROT SERPL-MCNC: 6.3 G/DL (ref 6–8.4)
RBC # BLD AUTO: 4.27 M/UL (ref 4–5.4)
SODIUM SERPL-SCNC: 138 MMOL/L (ref 136–145)
WBC # BLD AUTO: 7.47 K/UL (ref 3.9–12.7)

## 2021-03-05 PROCEDURE — 63600175 PHARM REV CODE 636 W HCPCS: Performed by: FAMILY MEDICINE

## 2021-03-05 PROCEDURE — C9399 UNCLASSIFIED DRUGS OR BIOLOG: HCPCS | Performed by: FAMILY MEDICINE

## 2021-03-05 PROCEDURE — 36415 COLL VENOUS BLD VENIPUNCTURE: CPT | Performed by: STUDENT IN AN ORGANIZED HEALTH CARE EDUCATION/TRAINING PROGRAM

## 2021-03-05 PROCEDURE — 11000001 HC ACUTE MED/SURG PRIVATE ROOM

## 2021-03-05 PROCEDURE — 85025 COMPLETE CBC W/AUTO DIFF WBC: CPT | Performed by: STUDENT IN AN ORGANIZED HEALTH CARE EDUCATION/TRAINING PROGRAM

## 2021-03-05 PROCEDURE — 85379 FIBRIN DEGRADATION QUANT: CPT | Performed by: STUDENT IN AN ORGANIZED HEALTH CARE EDUCATION/TRAINING PROGRAM

## 2021-03-05 PROCEDURE — 27000646 HC AEROBIKA DEVICE

## 2021-03-05 PROCEDURE — 99900035 HC TECH TIME PER 15 MIN (STAT)

## 2021-03-05 PROCEDURE — 94664 DEMO&/EVAL PT USE INHALER: CPT

## 2021-03-05 PROCEDURE — 25000003 PHARM REV CODE 250: Performed by: STUDENT IN AN ORGANIZED HEALTH CARE EDUCATION/TRAINING PROGRAM

## 2021-03-05 PROCEDURE — 99233 SBSQ HOSP IP/OBS HIGH 50: CPT | Mod: ,,, | Performed by: STUDENT IN AN ORGANIZED HEALTH CARE EDUCATION/TRAINING PROGRAM

## 2021-03-05 PROCEDURE — 94761 N-INVAS EAR/PLS OXIMETRY MLT: CPT

## 2021-03-05 PROCEDURE — 27000221 HC OXYGEN, UP TO 24 HOURS

## 2021-03-05 PROCEDURE — 63700000 PHARM REV CODE 250 ALT 637 W/O HCPCS: Performed by: FAMILY MEDICINE

## 2021-03-05 PROCEDURE — 99233 PR SUBSEQUENT HOSPITAL CARE,LEVL III: ICD-10-PCS | Mod: ,,, | Performed by: STUDENT IN AN ORGANIZED HEALTH CARE EDUCATION/TRAINING PROGRAM

## 2021-03-05 PROCEDURE — 80053 COMPREHEN METABOLIC PANEL: CPT | Performed by: STUDENT IN AN ORGANIZED HEALTH CARE EDUCATION/TRAINING PROGRAM

## 2021-03-05 PROCEDURE — 25000003 PHARM REV CODE 250: Performed by: FAMILY MEDICINE

## 2021-03-05 PROCEDURE — A4216 STERILE WATER/SALINE, 10 ML: HCPCS | Performed by: FAMILY MEDICINE

## 2021-03-05 RX ADMIN — GABAPENTIN 100 MG: 100 CAPSULE ORAL at 02:03

## 2021-03-05 RX ADMIN — ALPRAZOLAM 0.5 MG: 0.5 TABLET ORAL at 09:03

## 2021-03-05 RX ADMIN — OXYCODONE HYDROCHLORIDE AND ACETAMINOPHEN 500 MG: 500 TABLET ORAL at 09:03

## 2021-03-05 RX ADMIN — INSULIN DETEMIR 20 UNITS: 100 INJECTION, SOLUTION SUBCUTANEOUS at 09:03

## 2021-03-05 RX ADMIN — LISINOPRIL 10 MG: 10 TABLET ORAL at 09:03

## 2021-03-05 RX ADMIN — REMDESIVIR 100 MG: 100 INJECTION, POWDER, LYOPHILIZED, FOR SOLUTION INTRAVENOUS at 04:03

## 2021-03-05 RX ADMIN — HEPARIN SODIUM 5000 UNITS: 5000 INJECTION INTRAVENOUS; SUBCUTANEOUS at 05:03

## 2021-03-05 RX ADMIN — GABAPENTIN 100 MG: 100 CAPSULE ORAL at 09:03

## 2021-03-05 RX ADMIN — DOCUSATE SODIUM AND SENNOSIDES 1 TABLET: 8.6; 5 TABLET, FILM COATED ORAL at 09:03

## 2021-03-05 RX ADMIN — PANTOPRAZOLE SODIUM 40 MG: 40 TABLET, DELAYED RELEASE ORAL at 09:03

## 2021-03-05 RX ADMIN — CETIRIZINE HYDROCHLORIDE 10 MG: 10 TABLET, FILM COATED ORAL at 09:03

## 2021-03-05 RX ADMIN — MELATONIN TAB 3 MG 6 MG: 3 TAB at 09:03

## 2021-03-05 RX ADMIN — INSULIN ASPART 3 UNITS: 100 INJECTION, SOLUTION INTRAVENOUS; SUBCUTANEOUS at 09:03

## 2021-03-05 RX ADMIN — BIMATOPROST 1 DROP: 0.1 SOLUTION/ DROPS OPHTHALMIC at 09:03

## 2021-03-05 RX ADMIN — HEPARIN SODIUM 5000 UNITS: 5000 INJECTION INTRAVENOUS; SUBCUTANEOUS at 02:03

## 2021-03-05 RX ADMIN — BIMATOPROST 1 DROP: 0.1 SOLUTION/ DROPS OPHTHALMIC at 12:03

## 2021-03-05 RX ADMIN — DEXAMETHASONE 6 MG: 4 TABLET ORAL at 09:03

## 2021-03-05 RX ADMIN — HYDROCHLOROTHIAZIDE 25 MG: 25 TABLET ORAL at 09:03

## 2021-03-05 RX ADMIN — ATORVASTATIN CALCIUM 80 MG: 20 TABLET, FILM COATED ORAL at 09:03

## 2021-03-05 RX ADMIN — THERA TABS 1 TABLET: TAB at 09:03

## 2021-03-05 RX ADMIN — CEFTRIAXONE 1 G: 1 INJECTION, POWDER, FOR SOLUTION INTRAMUSCULAR; INTRAVENOUS at 04:03

## 2021-03-05 RX ADMIN — INSULIN ASPART 4 UNITS: 100 INJECTION, SOLUTION INTRAVENOUS; SUBCUTANEOUS at 04:03

## 2021-03-05 RX ADMIN — Medication 10 ML: at 09:03

## 2021-03-05 RX ADMIN — AZITHROMYCIN MONOHYDRATE 500 MG: 250 TABLET ORAL at 09:03

## 2021-03-05 RX ADMIN — GUAIFENESIN AND DEXTROMETHORPHAN 5 ML: 100; 10 SYRUP ORAL at 05:03

## 2021-03-05 RX ADMIN — GUAIFENESIN AND DEXTROMETHORPHAN 5 ML: 100; 10 SYRUP ORAL at 12:03

## 2021-03-05 RX ADMIN — HEPARIN SODIUM 5000 UNITS: 5000 INJECTION INTRAVENOUS; SUBCUTANEOUS at 09:03

## 2021-03-06 LAB
POCT GLUCOSE: 119 MG/DL (ref 70–110)
POCT GLUCOSE: 202 MG/DL (ref 70–110)
POCT GLUCOSE: 252 MG/DL (ref 70–110)
POCT GLUCOSE: 335 MG/DL (ref 70–110)
POCT GLUCOSE: 57 MG/DL (ref 70–110)
POCT GLUCOSE: 72 MG/DL (ref 70–110)

## 2021-03-06 PROCEDURE — 25000003 PHARM REV CODE 250: Performed by: FAMILY MEDICINE

## 2021-03-06 PROCEDURE — 63600175 PHARM REV CODE 636 W HCPCS: Performed by: FAMILY MEDICINE

## 2021-03-06 PROCEDURE — 11000001 HC ACUTE MED/SURG PRIVATE ROOM

## 2021-03-06 PROCEDURE — 25000003 PHARM REV CODE 250: Performed by: INTERNAL MEDICINE

## 2021-03-06 PROCEDURE — 94761 N-INVAS EAR/PLS OXIMETRY MLT: CPT

## 2021-03-06 PROCEDURE — 99233 PR SUBSEQUENT HOSPITAL CARE,LEVL III: ICD-10-PCS | Mod: 95,,, | Performed by: INTERNAL MEDICINE

## 2021-03-06 PROCEDURE — 27000221 HC OXYGEN, UP TO 24 HOURS

## 2021-03-06 PROCEDURE — 99233 SBSQ HOSP IP/OBS HIGH 50: CPT | Mod: 95,,, | Performed by: INTERNAL MEDICINE

## 2021-03-06 RX ORDER — GUAIFENESIN/DEXTROMETHORPHAN 100-10MG/5
5 SYRUP ORAL EVERY 6 HOURS PRN
Status: DISCONTINUED | OUTPATIENT
Start: 2021-03-06 | End: 2021-03-21 | Stop reason: HOSPADM

## 2021-03-06 RX ORDER — BENZONATATE 100 MG/1
100 CAPSULE ORAL 3 TIMES DAILY
Status: DISCONTINUED | OUTPATIENT
Start: 2021-03-06 | End: 2021-03-09

## 2021-03-06 RX ADMIN — GABAPENTIN 100 MG: 100 CAPSULE ORAL at 03:03

## 2021-03-06 RX ADMIN — GABAPENTIN 100 MG: 100 CAPSULE ORAL at 08:03

## 2021-03-06 RX ADMIN — INSULIN ASPART 4 UNITS: 100 INJECTION, SOLUTION INTRAVENOUS; SUBCUTANEOUS at 08:03

## 2021-03-06 RX ADMIN — HYDROCHLOROTHIAZIDE 25 MG: 25 TABLET ORAL at 08:03

## 2021-03-06 RX ADMIN — OXYCODONE HYDROCHLORIDE AND ACETAMINOPHEN 500 MG: 500 TABLET ORAL at 08:03

## 2021-03-06 RX ADMIN — DEXAMETHASONE 6 MG: 4 TABLET ORAL at 08:03

## 2021-03-06 RX ADMIN — HEPARIN SODIUM 5000 UNITS: 5000 INJECTION INTRAVENOUS; SUBCUTANEOUS at 03:03

## 2021-03-06 RX ADMIN — LISINOPRIL 10 MG: 10 TABLET ORAL at 08:03

## 2021-03-06 RX ADMIN — REMDESIVIR 100 MG: 100 INJECTION, POWDER, LYOPHILIZED, FOR SOLUTION INTRAVENOUS at 03:03

## 2021-03-06 RX ADMIN — INSULIN DETEMIR 20 UNITS: 100 INJECTION, SOLUTION SUBCUTANEOUS at 08:03

## 2021-03-06 RX ADMIN — CEFTRIAXONE 1 G: 1 INJECTION, POWDER, FOR SOLUTION INTRAMUSCULAR; INTRAVENOUS at 04:03

## 2021-03-06 RX ADMIN — GUAIFENESIN AND DEXTROMETHORPHAN 5 ML: 100; 10 SYRUP ORAL at 05:03

## 2021-03-06 RX ADMIN — ATORVASTATIN CALCIUM 80 MG: 20 TABLET, FILM COATED ORAL at 08:03

## 2021-03-06 RX ADMIN — CETIRIZINE HYDROCHLORIDE 10 MG: 10 TABLET, FILM COATED ORAL at 08:03

## 2021-03-06 RX ADMIN — BENZONATATE 100 MG: 100 CAPSULE ORAL at 04:03

## 2021-03-06 RX ADMIN — HEPARIN SODIUM 5000 UNITS: 5000 INJECTION INTRAVENOUS; SUBCUTANEOUS at 05:03

## 2021-03-06 RX ADMIN — DOCUSATE SODIUM AND SENNOSIDES 1 TABLET: 8.6; 5 TABLET, FILM COATED ORAL at 08:03

## 2021-03-06 RX ADMIN — BENZONATATE 100 MG: 100 CAPSULE ORAL at 08:03

## 2021-03-06 RX ADMIN — GUAIFENESIN AND DEXTROMETHORPHAN HYDROBROMIDE 1 TABLET: 600; 30 TABLET, EXTENDED RELEASE ORAL at 08:03

## 2021-03-06 RX ADMIN — PANTOPRAZOLE SODIUM 40 MG: 40 TABLET, DELAYED RELEASE ORAL at 08:03

## 2021-03-06 RX ADMIN — BIMATOPROST 1 DROP: 0.1 SOLUTION/ DROPS OPHTHALMIC at 09:03

## 2021-03-06 RX ADMIN — INSULIN ASPART 4 UNITS: 100 INJECTION, SOLUTION INTRAVENOUS; SUBCUTANEOUS at 03:03

## 2021-03-06 RX ADMIN — GUAIFENESIN AND DEXTROMETHORPHAN 5 ML: 100; 10 SYRUP ORAL at 11:03

## 2021-03-06 RX ADMIN — THERA TABS 1 TABLET: TAB at 08:03

## 2021-03-06 RX ADMIN — HEPARIN SODIUM 5000 UNITS: 5000 INJECTION INTRAVENOUS; SUBCUTANEOUS at 08:03

## 2021-03-07 LAB
ALBUMIN SERPL BCP-MCNC: 3 G/DL (ref 3.5–5.2)
ALP SERPL-CCNC: 74 U/L (ref 55–135)
ALT SERPL W/O P-5'-P-CCNC: 27 U/L (ref 10–44)
ANION GAP SERPL CALC-SCNC: 12 MMOL/L (ref 8–16)
AST SERPL-CCNC: 31 U/L (ref 10–40)
BASOPHILS # BLD AUTO: 0.02 K/UL (ref 0–0.2)
BASOPHILS NFR BLD: 0.2 % (ref 0–1.9)
BILIRUB SERPL-MCNC: 0.5 MG/DL (ref 0.1–1)
BUN SERPL-MCNC: 20 MG/DL (ref 8–23)
CALCIUM SERPL-MCNC: 8.8 MG/DL (ref 8.7–10.5)
CHLORIDE SERPL-SCNC: 103 MMOL/L (ref 95–110)
CO2 SERPL-SCNC: 25 MMOL/L (ref 23–29)
CREAT SERPL-MCNC: 0.7 MG/DL (ref 0.5–1.4)
D DIMER PPP IA.FEU-MCNC: 0.51 MG/L FEU
DIFFERENTIAL METHOD: ABNORMAL
EOSINOPHIL # BLD AUTO: 0 K/UL (ref 0–0.5)
EOSINOPHIL NFR BLD: 0 % (ref 0–8)
ERYTHROCYTE [DISTWIDTH] IN BLOOD BY AUTOMATED COUNT: 12.8 % (ref 11.5–14.5)
EST. GFR  (AFRICAN AMERICAN): >60 ML/MIN/1.73 M^2
EST. GFR  (NON AFRICAN AMERICAN): >60 ML/MIN/1.73 M^2
GLUCOSE SERPL-MCNC: 68 MG/DL (ref 70–110)
HCT VFR BLD AUTO: 43.6 % (ref 37–48.5)
HGB BLD-MCNC: 14.5 G/DL (ref 12–16)
IMM GRANULOCYTES # BLD AUTO: 0.21 K/UL (ref 0–0.04)
IMM GRANULOCYTES NFR BLD AUTO: 2.1 % (ref 0–0.5)
LYMPHOCYTES # BLD AUTO: 1.5 K/UL (ref 1–4.8)
LYMPHOCYTES NFR BLD: 14.8 % (ref 18–48)
MCH RBC QN AUTO: 28.7 PG (ref 27–31)
MCHC RBC AUTO-ENTMCNC: 33.3 G/DL (ref 32–36)
MCV RBC AUTO: 86 FL (ref 82–98)
MONOCYTES # BLD AUTO: 0.8 K/UL (ref 0.3–1)
MONOCYTES NFR BLD: 7.8 % (ref 4–15)
NEUTROPHILS # BLD AUTO: 7.4 K/UL (ref 1.8–7.7)
NEUTROPHILS NFR BLD: 75.1 % (ref 38–73)
NRBC BLD-RTO: 0 /100 WBC
PLATELET # BLD AUTO: 374 K/UL (ref 150–350)
PMV BLD AUTO: 10.1 FL (ref 9.2–12.9)
POCT GLUCOSE: 189 MG/DL (ref 70–110)
POCT GLUCOSE: 365 MG/DL (ref 70–110)
POCT GLUCOSE: 71 MG/DL (ref 70–110)
POTASSIUM SERPL-SCNC: 4.2 MMOL/L (ref 3.5–5.1)
PROT SERPL-MCNC: 7 G/DL (ref 6–8.4)
RBC # BLD AUTO: 5.05 M/UL (ref 4–5.4)
SODIUM SERPL-SCNC: 140 MMOL/L (ref 136–145)
WBC # BLD AUTO: 9.91 K/UL (ref 3.9–12.7)

## 2021-03-07 PROCEDURE — 80053 COMPREHEN METABOLIC PANEL: CPT | Performed by: STUDENT IN AN ORGANIZED HEALTH CARE EDUCATION/TRAINING PROGRAM

## 2021-03-07 PROCEDURE — 85379 FIBRIN DEGRADATION QUANT: CPT | Performed by: INTERNAL MEDICINE

## 2021-03-07 PROCEDURE — 99900035 HC TECH TIME PER 15 MIN (STAT)

## 2021-03-07 PROCEDURE — 85025 COMPLETE CBC W/AUTO DIFF WBC: CPT | Performed by: STUDENT IN AN ORGANIZED HEALTH CARE EDUCATION/TRAINING PROGRAM

## 2021-03-07 PROCEDURE — 94761 N-INVAS EAR/PLS OXIMETRY MLT: CPT

## 2021-03-07 PROCEDURE — 36415 COLL VENOUS BLD VENIPUNCTURE: CPT | Performed by: STUDENT IN AN ORGANIZED HEALTH CARE EDUCATION/TRAINING PROGRAM

## 2021-03-07 PROCEDURE — 27000221 HC OXYGEN, UP TO 24 HOURS

## 2021-03-07 PROCEDURE — 99233 PR SUBSEQUENT HOSPITAL CARE,LEVL III: ICD-10-PCS | Mod: 95,,, | Performed by: INTERNAL MEDICINE

## 2021-03-07 PROCEDURE — 94664 DEMO&/EVAL PT USE INHALER: CPT

## 2021-03-07 PROCEDURE — 99233 SBSQ HOSP IP/OBS HIGH 50: CPT | Mod: 95,,, | Performed by: INTERNAL MEDICINE

## 2021-03-07 PROCEDURE — 25000003 PHARM REV CODE 250: Performed by: FAMILY MEDICINE

## 2021-03-07 PROCEDURE — 25000003 PHARM REV CODE 250: Performed by: INTERNAL MEDICINE

## 2021-03-07 PROCEDURE — 11000001 HC ACUTE MED/SURG PRIVATE ROOM

## 2021-03-07 PROCEDURE — 27000646 HC AEROBIKA DEVICE

## 2021-03-07 PROCEDURE — 63600175 PHARM REV CODE 636 W HCPCS: Performed by: FAMILY MEDICINE

## 2021-03-07 PROCEDURE — 36415 COLL VENOUS BLD VENIPUNCTURE: CPT | Performed by: INTERNAL MEDICINE

## 2021-03-07 RX ADMIN — GABAPENTIN 100 MG: 100 CAPSULE ORAL at 08:03

## 2021-03-07 RX ADMIN — INSULIN DETEMIR 20 UNITS: 100 INJECTION, SOLUTION SUBCUTANEOUS at 08:03

## 2021-03-07 RX ADMIN — DOCUSATE SODIUM AND SENNOSIDES 1 TABLET: 8.6; 5 TABLET, FILM COATED ORAL at 08:03

## 2021-03-07 RX ADMIN — HYDROCHLOROTHIAZIDE 25 MG: 25 TABLET ORAL at 08:03

## 2021-03-07 RX ADMIN — DEXAMETHASONE 6 MG: 4 TABLET ORAL at 08:03

## 2021-03-07 RX ADMIN — CETIRIZINE HYDROCHLORIDE 10 MG: 10 TABLET, FILM COATED ORAL at 08:03

## 2021-03-07 RX ADMIN — ATORVASTATIN CALCIUM 80 MG: 20 TABLET, FILM COATED ORAL at 08:03

## 2021-03-07 RX ADMIN — OXYCODONE HYDROCHLORIDE AND ACETAMINOPHEN 500 MG: 500 TABLET ORAL at 08:03

## 2021-03-07 RX ADMIN — INSULIN ASPART 4 UNITS: 100 INJECTION, SOLUTION INTRAVENOUS; SUBCUTANEOUS at 08:03

## 2021-03-07 RX ADMIN — BENZONATATE 100 MG: 100 CAPSULE ORAL at 03:03

## 2021-03-07 RX ADMIN — GABAPENTIN 100 MG: 100 CAPSULE ORAL at 03:03

## 2021-03-07 RX ADMIN — PANTOPRAZOLE SODIUM 40 MG: 40 TABLET, DELAYED RELEASE ORAL at 08:03

## 2021-03-07 RX ADMIN — BIMATOPROST 1 DROP: 0.1 SOLUTION/ DROPS OPHTHALMIC at 09:03

## 2021-03-07 RX ADMIN — GUAIFENESIN AND DEXTROMETHORPHAN HYDROBROMIDE 1 TABLET: 600; 30 TABLET, EXTENDED RELEASE ORAL at 08:03

## 2021-03-07 RX ADMIN — INSULIN ASPART 8 UNITS: 100 INJECTION, SOLUTION INTRAVENOUS; SUBCUTANEOUS at 04:03

## 2021-03-07 RX ADMIN — BENZONATATE 100 MG: 100 CAPSULE ORAL at 08:03

## 2021-03-07 RX ADMIN — HEPARIN SODIUM 5000 UNITS: 5000 INJECTION INTRAVENOUS; SUBCUTANEOUS at 08:03

## 2021-03-07 RX ADMIN — LISINOPRIL 10 MG: 10 TABLET ORAL at 08:03

## 2021-03-07 RX ADMIN — HEPARIN SODIUM 5000 UNITS: 5000 INJECTION INTRAVENOUS; SUBCUTANEOUS at 03:03

## 2021-03-07 RX ADMIN — THERA TABS 1 TABLET: TAB at 08:03

## 2021-03-07 RX ADMIN — HEPARIN SODIUM 5000 UNITS: 5000 INJECTION INTRAVENOUS; SUBCUTANEOUS at 06:03

## 2021-03-08 LAB
POCT GLUCOSE: 119 MG/DL (ref 70–110)
POCT GLUCOSE: 184 MG/DL (ref 70–110)
POCT GLUCOSE: 340 MG/DL (ref 70–110)
POCT GLUCOSE: 346 MG/DL (ref 70–110)
POCT GLUCOSE: 460 MG/DL (ref 70–110)

## 2021-03-08 PROCEDURE — 94761 N-INVAS EAR/PLS OXIMETRY MLT: CPT

## 2021-03-08 PROCEDURE — 99233 SBSQ HOSP IP/OBS HIGH 50: CPT | Mod: 95,,, | Performed by: INTERNAL MEDICINE

## 2021-03-08 PROCEDURE — 25000003 PHARM REV CODE 250: Performed by: FAMILY MEDICINE

## 2021-03-08 PROCEDURE — 99233 PR SUBSEQUENT HOSPITAL CARE,LEVL III: ICD-10-PCS | Mod: 95,,, | Performed by: INTERNAL MEDICINE

## 2021-03-08 PROCEDURE — 25000003 PHARM REV CODE 250: Performed by: INTERNAL MEDICINE

## 2021-03-08 PROCEDURE — 63600175 PHARM REV CODE 636 W HCPCS: Performed by: FAMILY MEDICINE

## 2021-03-08 PROCEDURE — 11000001 HC ACUTE MED/SURG PRIVATE ROOM

## 2021-03-08 PROCEDURE — 27000221 HC OXYGEN, UP TO 24 HOURS

## 2021-03-08 PROCEDURE — 99900035 HC TECH TIME PER 15 MIN (STAT)

## 2021-03-08 RX ADMIN — HEPARIN SODIUM 5000 UNITS: 5000 INJECTION INTRAVENOUS; SUBCUTANEOUS at 08:03

## 2021-03-08 RX ADMIN — HYDROCHLOROTHIAZIDE 25 MG: 25 TABLET ORAL at 08:03

## 2021-03-08 RX ADMIN — INSULIN ASPART 5 UNITS: 100 INJECTION, SOLUTION INTRAVENOUS; SUBCUTANEOUS at 08:03

## 2021-03-08 RX ADMIN — INSULIN DETEMIR 20 UNITS: 100 INJECTION, SOLUTION SUBCUTANEOUS at 08:03

## 2021-03-08 RX ADMIN — BENZONATATE 100 MG: 100 CAPSULE ORAL at 08:03

## 2021-03-08 RX ADMIN — OXYCODONE HYDROCHLORIDE AND ACETAMINOPHEN 500 MG: 500 TABLET ORAL at 08:03

## 2021-03-08 RX ADMIN — CETIRIZINE HYDROCHLORIDE 10 MG: 10 TABLET, FILM COATED ORAL at 08:03

## 2021-03-08 RX ADMIN — INSULIN ASPART 2 UNITS: 100 INJECTION, SOLUTION INTRAVENOUS; SUBCUTANEOUS at 12:03

## 2021-03-08 RX ADMIN — PANTOPRAZOLE SODIUM 40 MG: 40 TABLET, DELAYED RELEASE ORAL at 08:03

## 2021-03-08 RX ADMIN — HEPARIN SODIUM 5000 UNITS: 5000 INJECTION INTRAVENOUS; SUBCUTANEOUS at 05:03

## 2021-03-08 RX ADMIN — INSULIN ASPART 8 UNITS: 100 INJECTION, SOLUTION INTRAVENOUS; SUBCUTANEOUS at 04:03

## 2021-03-08 RX ADMIN — GABAPENTIN 100 MG: 100 CAPSULE ORAL at 08:03

## 2021-03-08 RX ADMIN — ATORVASTATIN CALCIUM 80 MG: 20 TABLET, FILM COATED ORAL at 08:03

## 2021-03-08 RX ADMIN — LISINOPRIL 10 MG: 10 TABLET ORAL at 08:03

## 2021-03-08 RX ADMIN — GABAPENTIN 100 MG: 100 CAPSULE ORAL at 03:03

## 2021-03-08 RX ADMIN — GUAIFENESIN AND DEXTROMETHORPHAN HYDROBROMIDE 1 TABLET: 600; 30 TABLET, EXTENDED RELEASE ORAL at 08:03

## 2021-03-08 RX ADMIN — BENZONATATE 100 MG: 100 CAPSULE ORAL at 03:03

## 2021-03-08 RX ADMIN — DEXAMETHASONE 6 MG: 4 TABLET ORAL at 08:03

## 2021-03-08 RX ADMIN — HEPARIN SODIUM 5000 UNITS: 5000 INJECTION INTRAVENOUS; SUBCUTANEOUS at 03:03

## 2021-03-08 RX ADMIN — THERA TABS 1 TABLET: TAB at 08:03

## 2021-03-08 RX ADMIN — DOCUSATE SODIUM AND SENNOSIDES 1 TABLET: 8.6; 5 TABLET, FILM COATED ORAL at 08:03

## 2021-03-08 RX ADMIN — BIMATOPROST 1 DROP: 0.1 SOLUTION/ DROPS OPHTHALMIC at 08:03

## 2021-03-09 LAB
ALBUMIN SERPL BCP-MCNC: 2.8 G/DL (ref 3.5–5.2)
ALP SERPL-CCNC: 79 U/L (ref 55–135)
ALT SERPL W/O P-5'-P-CCNC: 28 U/L (ref 10–44)
ANION GAP SERPL CALC-SCNC: 12 MMOL/L (ref 8–16)
AST SERPL-CCNC: 30 U/L (ref 10–40)
BASOPHILS # BLD AUTO: 0.03 K/UL (ref 0–0.2)
BASOPHILS NFR BLD: 0.2 % (ref 0–1.9)
BILIRUB SERPL-MCNC: 0.7 MG/DL (ref 0.1–1)
BUN SERPL-MCNC: 27 MG/DL (ref 8–23)
CALCIUM SERPL-MCNC: 8.9 MG/DL (ref 8.7–10.5)
CHLORIDE SERPL-SCNC: 98 MMOL/L (ref 95–110)
CO2 SERPL-SCNC: 23 MMOL/L (ref 23–29)
CREAT SERPL-MCNC: 0.8 MG/DL (ref 0.5–1.4)
CRP SERPL-MCNC: 64.5 MG/L (ref 0–8.2)
D DIMER PPP IA.FEU-MCNC: 0.33 MG/L FEU
DIFFERENTIAL METHOD: ABNORMAL
EOSINOPHIL # BLD AUTO: 0 K/UL (ref 0–0.5)
EOSINOPHIL NFR BLD: 0.2 % (ref 0–8)
ERYTHROCYTE [DISTWIDTH] IN BLOOD BY AUTOMATED COUNT: 12.4 % (ref 11.5–14.5)
EST. GFR  (AFRICAN AMERICAN): >60 ML/MIN/1.73 M^2
EST. GFR  (NON AFRICAN AMERICAN): >60 ML/MIN/1.73 M^2
FERRITIN SERPL-MCNC: 513 NG/ML (ref 20–300)
GLUCOSE SERPL-MCNC: 95 MG/DL (ref 70–110)
HCT VFR BLD AUTO: 43.6 % (ref 37–48.5)
HGB BLD-MCNC: 14.7 G/DL (ref 12–16)
IMM GRANULOCYTES # BLD AUTO: 0.28 K/UL (ref 0–0.04)
IMM GRANULOCYTES NFR BLD AUTO: 2.2 % (ref 0–0.5)
LYMPHOCYTES # BLD AUTO: 1.4 K/UL (ref 1–4.8)
LYMPHOCYTES NFR BLD: 11.2 % (ref 18–48)
MCH RBC QN AUTO: 28.6 PG (ref 27–31)
MCHC RBC AUTO-ENTMCNC: 33.7 G/DL (ref 32–36)
MCV RBC AUTO: 85 FL (ref 82–98)
MONOCYTES # BLD AUTO: 0.8 K/UL (ref 0.3–1)
MONOCYTES NFR BLD: 6.6 % (ref 4–15)
NEUTROPHILS # BLD AUTO: 10 K/UL (ref 1.8–7.7)
NEUTROPHILS NFR BLD: 79.6 % (ref 38–73)
NRBC BLD-RTO: 0 /100 WBC
PLATELET # BLD AUTO: 406 K/UL (ref 150–350)
PMV BLD AUTO: 9.9 FL (ref 9.2–12.9)
POCT GLUCOSE: 225 MG/DL (ref 70–110)
POCT GLUCOSE: 332 MG/DL (ref 70–110)
POCT GLUCOSE: 442 MG/DL (ref 70–110)
POCT GLUCOSE: 87 MG/DL (ref 70–110)
POTASSIUM SERPL-SCNC: 3.9 MMOL/L (ref 3.5–5.1)
PROT SERPL-MCNC: 7 G/DL (ref 6–8.4)
RBC # BLD AUTO: 5.14 M/UL (ref 4–5.4)
SODIUM SERPL-SCNC: 133 MMOL/L (ref 136–145)
WBC # BLD AUTO: 12.51 K/UL (ref 3.9–12.7)

## 2021-03-09 PROCEDURE — 85379 FIBRIN DEGRADATION QUANT: CPT | Performed by: STUDENT IN AN ORGANIZED HEALTH CARE EDUCATION/TRAINING PROGRAM

## 2021-03-09 PROCEDURE — 94664 DEMO&/EVAL PT USE INHALER: CPT

## 2021-03-09 PROCEDURE — 99233 SBSQ HOSP IP/OBS HIGH 50: CPT | Mod: 95,,, | Performed by: INTERNAL MEDICINE

## 2021-03-09 PROCEDURE — C9399 UNCLASSIFIED DRUGS OR BIOLOG: HCPCS | Performed by: INTERNAL MEDICINE

## 2021-03-09 PROCEDURE — 85025 COMPLETE CBC W/AUTO DIFF WBC: CPT | Performed by: STUDENT IN AN ORGANIZED HEALTH CARE EDUCATION/TRAINING PROGRAM

## 2021-03-09 PROCEDURE — 80053 COMPREHEN METABOLIC PANEL: CPT | Performed by: STUDENT IN AN ORGANIZED HEALTH CARE EDUCATION/TRAINING PROGRAM

## 2021-03-09 PROCEDURE — 99900035 HC TECH TIME PER 15 MIN (STAT)

## 2021-03-09 PROCEDURE — 97116 GAIT TRAINING THERAPY: CPT

## 2021-03-09 PROCEDURE — 94761 N-INVAS EAR/PLS OXIMETRY MLT: CPT

## 2021-03-09 PROCEDURE — 97535 SELF CARE MNGMENT TRAINING: CPT

## 2021-03-09 PROCEDURE — 86140 C-REACTIVE PROTEIN: CPT | Performed by: INTERNAL MEDICINE

## 2021-03-09 PROCEDURE — 99233 PR SUBSEQUENT HOSPITAL CARE,LEVL III: ICD-10-PCS | Mod: 95,,, | Performed by: INTERNAL MEDICINE

## 2021-03-09 PROCEDURE — 63600175 PHARM REV CODE 636 W HCPCS: Performed by: INTERNAL MEDICINE

## 2021-03-09 PROCEDURE — 27000221 HC OXYGEN, UP TO 24 HOURS

## 2021-03-09 PROCEDURE — 11000001 HC ACUTE MED/SURG PRIVATE ROOM

## 2021-03-09 PROCEDURE — 97165 OT EVAL LOW COMPLEX 30 MIN: CPT

## 2021-03-09 PROCEDURE — 82728 ASSAY OF FERRITIN: CPT | Performed by: INTERNAL MEDICINE

## 2021-03-09 PROCEDURE — 97161 PT EVAL LOW COMPLEX 20 MIN: CPT

## 2021-03-09 PROCEDURE — 63600175 PHARM REV CODE 636 W HCPCS: Performed by: FAMILY MEDICINE

## 2021-03-09 PROCEDURE — 25000003 PHARM REV CODE 250: Performed by: INTERNAL MEDICINE

## 2021-03-09 PROCEDURE — 25000003 PHARM REV CODE 250: Performed by: FAMILY MEDICINE

## 2021-03-09 PROCEDURE — 36415 COLL VENOUS BLD VENIPUNCTURE: CPT | Performed by: INTERNAL MEDICINE

## 2021-03-09 RX ORDER — BENZONATATE 100 MG/1
100 CAPSULE ORAL 2 TIMES DAILY
Status: DISCONTINUED | OUTPATIENT
Start: 2021-03-10 | End: 2021-03-21 | Stop reason: HOSPADM

## 2021-03-09 RX ORDER — INSULIN ASPART 100 [IU]/ML
1-10 INJECTION, SOLUTION INTRAVENOUS; SUBCUTANEOUS
Status: DISCONTINUED | OUTPATIENT
Start: 2021-03-09 | End: 2021-03-13

## 2021-03-09 RX ORDER — GLUCAGON 1 MG
1 KIT INJECTION
Status: DISCONTINUED | OUTPATIENT
Start: 2021-03-09 | End: 2021-03-21 | Stop reason: HOSPADM

## 2021-03-09 RX ORDER — IBUPROFEN 200 MG
24 TABLET ORAL
Status: DISCONTINUED | OUTPATIENT
Start: 2021-03-09 | End: 2021-03-21 | Stop reason: HOSPADM

## 2021-03-09 RX ORDER — IBUPROFEN 200 MG
16 TABLET ORAL
Status: DISCONTINUED | OUTPATIENT
Start: 2021-03-09 | End: 2021-03-21 | Stop reason: HOSPADM

## 2021-03-09 RX ORDER — INSULIN ASPART 100 [IU]/ML
6 INJECTION, SOLUTION INTRAVENOUS; SUBCUTANEOUS
Status: DISCONTINUED | OUTPATIENT
Start: 2021-03-09 | End: 2021-03-09

## 2021-03-09 RX ORDER — INSULIN ASPART 100 [IU]/ML
10 INJECTION, SOLUTION INTRAVENOUS; SUBCUTANEOUS
Status: DISCONTINUED | OUTPATIENT
Start: 2021-03-10 | End: 2021-03-11

## 2021-03-09 RX ADMIN — GABAPENTIN 100 MG: 100 CAPSULE ORAL at 09:03

## 2021-03-09 RX ADMIN — HEPARIN SODIUM 5000 UNITS: 5000 INJECTION INTRAVENOUS; SUBCUTANEOUS at 08:03

## 2021-03-09 RX ADMIN — INSULIN ASPART 4 UNITS: 100 INJECTION, SOLUTION INTRAVENOUS; SUBCUTANEOUS at 12:03

## 2021-03-09 RX ADMIN — LISINOPRIL 10 MG: 10 TABLET ORAL at 09:03

## 2021-03-09 RX ADMIN — INSULIN ASPART 5 UNITS: 100 INJECTION, SOLUTION INTRAVENOUS; SUBCUTANEOUS at 08:03

## 2021-03-09 RX ADMIN — BIMATOPROST 1 DROP: 0.1 SOLUTION/ DROPS OPHTHALMIC at 08:03

## 2021-03-09 RX ADMIN — GUAIFENESIN AND DEXTROMETHORPHAN HYDROBROMIDE 1 TABLET: 600; 30 TABLET, EXTENDED RELEASE ORAL at 09:03

## 2021-03-09 RX ADMIN — INSULIN DETEMIR 20 UNITS: 100 INJECTION, SOLUTION SUBCUTANEOUS at 08:03

## 2021-03-09 RX ADMIN — THERA TABS 1 TABLET: TAB at 09:03

## 2021-03-09 RX ADMIN — DOCUSATE SODIUM AND SENNOSIDES 1 TABLET: 8.6; 5 TABLET, FILM COATED ORAL at 08:03

## 2021-03-09 RX ADMIN — DOCUSATE SODIUM AND SENNOSIDES 1 TABLET: 8.6; 5 TABLET, FILM COATED ORAL at 09:03

## 2021-03-09 RX ADMIN — OXYCODONE HYDROCHLORIDE AND ACETAMINOPHEN 500 MG: 500 TABLET ORAL at 08:03

## 2021-03-09 RX ADMIN — BENZONATATE 100 MG: 100 CAPSULE ORAL at 03:03

## 2021-03-09 RX ADMIN — ATORVASTATIN CALCIUM 80 MG: 20 TABLET, FILM COATED ORAL at 09:03

## 2021-03-09 RX ADMIN — OXYCODONE HYDROCHLORIDE AND ACETAMINOPHEN 500 MG: 500 TABLET ORAL at 09:03

## 2021-03-09 RX ADMIN — INSULIN DETEMIR 20 UNITS: 100 INJECTION, SOLUTION SUBCUTANEOUS at 09:03

## 2021-03-09 RX ADMIN — BENZONATATE 100 MG: 100 CAPSULE ORAL at 09:03

## 2021-03-09 RX ADMIN — HEPARIN SODIUM 5000 UNITS: 5000 INJECTION INTRAVENOUS; SUBCUTANEOUS at 03:03

## 2021-03-09 RX ADMIN — PANTOPRAZOLE SODIUM 40 MG: 40 TABLET, DELAYED RELEASE ORAL at 09:03

## 2021-03-09 RX ADMIN — HEPARIN SODIUM 5000 UNITS: 5000 INJECTION INTRAVENOUS; SUBCUTANEOUS at 06:03

## 2021-03-09 RX ADMIN — DEXAMETHASONE 6 MG: 4 TABLET ORAL at 09:03

## 2021-03-09 RX ADMIN — CETIRIZINE HYDROCHLORIDE 10 MG: 10 TABLET, FILM COATED ORAL at 08:03

## 2021-03-09 RX ADMIN — GABAPENTIN 100 MG: 100 CAPSULE ORAL at 08:03

## 2021-03-09 RX ADMIN — INSULIN ASPART 6 UNITS: 100 INJECTION, SOLUTION INTRAVENOUS; SUBCUTANEOUS at 04:03

## 2021-03-09 RX ADMIN — HYDROCHLOROTHIAZIDE 25 MG: 25 TABLET ORAL at 09:03

## 2021-03-09 RX ADMIN — INSULIN ASPART 8 UNITS: 100 INJECTION, SOLUTION INTRAVENOUS; SUBCUTANEOUS at 04:03

## 2021-03-09 RX ADMIN — GABAPENTIN 100 MG: 100 CAPSULE ORAL at 03:03

## 2021-03-10 LAB
POCT GLUCOSE: 255 MG/DL (ref 70–110)
POCT GLUCOSE: 255 MG/DL (ref 70–110)
POCT GLUCOSE: 313 MG/DL (ref 70–110)
POCT GLUCOSE: 99 MG/DL (ref 70–110)

## 2021-03-10 PROCEDURE — 94761 N-INVAS EAR/PLS OXIMETRY MLT: CPT

## 2021-03-10 PROCEDURE — C9399 UNCLASSIFIED DRUGS OR BIOLOG: HCPCS | Performed by: INTERNAL MEDICINE

## 2021-03-10 PROCEDURE — 11000001 HC ACUTE MED/SURG PRIVATE ROOM

## 2021-03-10 PROCEDURE — 25000003 PHARM REV CODE 250: Performed by: INTERNAL MEDICINE

## 2021-03-10 PROCEDURE — 99233 SBSQ HOSP IP/OBS HIGH 50: CPT | Mod: 95,,, | Performed by: INTERNAL MEDICINE

## 2021-03-10 PROCEDURE — 99233 PR SUBSEQUENT HOSPITAL CARE,LEVL III: ICD-10-PCS | Mod: 95,,, | Performed by: INTERNAL MEDICINE

## 2021-03-10 PROCEDURE — 99900035 HC TECH TIME PER 15 MIN (STAT)

## 2021-03-10 PROCEDURE — 63600175 PHARM REV CODE 636 W HCPCS: Performed by: INTERNAL MEDICINE

## 2021-03-10 PROCEDURE — 63600175 PHARM REV CODE 636 W HCPCS: Performed by: FAMILY MEDICINE

## 2021-03-10 PROCEDURE — 25000003 PHARM REV CODE 250: Performed by: FAMILY MEDICINE

## 2021-03-10 RX ADMIN — GABAPENTIN 100 MG: 100 CAPSULE ORAL at 02:03

## 2021-03-10 RX ADMIN — HYDROCHLOROTHIAZIDE 25 MG: 25 TABLET ORAL at 09:03

## 2021-03-10 RX ADMIN — BENZONATATE 100 MG: 100 CAPSULE ORAL at 08:03

## 2021-03-10 RX ADMIN — PANTOPRAZOLE SODIUM 40 MG: 40 TABLET, DELAYED RELEASE ORAL at 09:03

## 2021-03-10 RX ADMIN — GABAPENTIN 100 MG: 100 CAPSULE ORAL at 09:03

## 2021-03-10 RX ADMIN — OXYCODONE HYDROCHLORIDE AND ACETAMINOPHEN 500 MG: 500 TABLET ORAL at 08:03

## 2021-03-10 RX ADMIN — HEPARIN SODIUM 5000 UNITS: 5000 INJECTION INTRAVENOUS; SUBCUTANEOUS at 08:03

## 2021-03-10 RX ADMIN — ATORVASTATIN CALCIUM 80 MG: 20 TABLET, FILM COATED ORAL at 09:03

## 2021-03-10 RX ADMIN — INSULIN DETEMIR 17 UNITS: 100 INJECTION, SOLUTION SUBCUTANEOUS at 08:03

## 2021-03-10 RX ADMIN — BENZONATATE 100 MG: 100 CAPSULE ORAL at 09:03

## 2021-03-10 RX ADMIN — DOCUSATE SODIUM AND SENNOSIDES 1 TABLET: 8.6; 5 TABLET, FILM COATED ORAL at 09:03

## 2021-03-10 RX ADMIN — INSULIN ASPART 10 UNITS: 100 INJECTION, SOLUTION INTRAVENOUS; SUBCUTANEOUS at 09:03

## 2021-03-10 RX ADMIN — DEXAMETHASONE 6 MG: 4 TABLET ORAL at 09:03

## 2021-03-10 RX ADMIN — INSULIN ASPART 6 UNITS: 100 INJECTION, SOLUTION INTRAVENOUS; SUBCUTANEOUS at 05:03

## 2021-03-10 RX ADMIN — INSULIN ASPART 10 UNITS: 100 INJECTION, SOLUTION INTRAVENOUS; SUBCUTANEOUS at 12:03

## 2021-03-10 RX ADMIN — CETIRIZINE HYDROCHLORIDE 10 MG: 10 TABLET, FILM COATED ORAL at 08:03

## 2021-03-10 RX ADMIN — DOCUSATE SODIUM AND SENNOSIDES 1 TABLET: 8.6; 5 TABLET, FILM COATED ORAL at 08:03

## 2021-03-10 RX ADMIN — THERA TABS 1 TABLET: TAB at 09:03

## 2021-03-10 RX ADMIN — HEPARIN SODIUM 5000 UNITS: 5000 INJECTION INTRAVENOUS; SUBCUTANEOUS at 02:03

## 2021-03-10 RX ADMIN — HEPARIN SODIUM 5000 UNITS: 5000 INJECTION INTRAVENOUS; SUBCUTANEOUS at 06:03

## 2021-03-10 RX ADMIN — LISINOPRIL 10 MG: 10 TABLET ORAL at 09:03

## 2021-03-10 RX ADMIN — INSULIN DETEMIR 17 UNITS: 100 INJECTION, SOLUTION SUBCUTANEOUS at 09:03

## 2021-03-10 RX ADMIN — INSULIN ASPART 6 UNITS: 100 INJECTION, SOLUTION INTRAVENOUS; SUBCUTANEOUS at 12:03

## 2021-03-10 RX ADMIN — INSULIN ASPART 10 UNITS: 100 INJECTION, SOLUTION INTRAVENOUS; SUBCUTANEOUS at 05:03

## 2021-03-10 RX ADMIN — GUAIFENESIN AND DEXTROMETHORPHAN HYDROBROMIDE 1 TABLET: 600; 30 TABLET, EXTENDED RELEASE ORAL at 08:03

## 2021-03-10 RX ADMIN — INSULIN ASPART 4 UNITS: 100 INJECTION, SOLUTION INTRAVENOUS; SUBCUTANEOUS at 08:03

## 2021-03-10 RX ADMIN — BIMATOPROST 1 DROP: 0.1 SOLUTION/ DROPS OPHTHALMIC at 08:03

## 2021-03-10 RX ADMIN — OXYCODONE HYDROCHLORIDE AND ACETAMINOPHEN 500 MG: 500 TABLET ORAL at 09:03

## 2021-03-10 RX ADMIN — GUAIFENESIN AND DEXTROMETHORPHAN HYDROBROMIDE 1 TABLET: 600; 30 TABLET, EXTENDED RELEASE ORAL at 09:03

## 2021-03-11 PROBLEM — R53.81 DEBILITY: Status: ACTIVE | Noted: 2020-05-19

## 2021-03-11 LAB
ALBUMIN SERPL BCP-MCNC: 2.5 G/DL (ref 3.5–5.2)
ALP SERPL-CCNC: 75 U/L (ref 55–135)
ALT SERPL W/O P-5'-P-CCNC: 47 U/L (ref 10–44)
ANION GAP SERPL CALC-SCNC: 9 MMOL/L (ref 8–16)
AST SERPL-CCNC: 34 U/L (ref 10–40)
BASOPHILS # BLD AUTO: 0.01 K/UL (ref 0–0.2)
BASOPHILS NFR BLD: 0.1 % (ref 0–1.9)
BILIRUB SERPL-MCNC: 0.6 MG/DL (ref 0.1–1)
BUN SERPL-MCNC: 31 MG/DL (ref 8–23)
CALCIUM SERPL-MCNC: 8.7 MG/DL (ref 8.7–10.5)
CHLORIDE SERPL-SCNC: 100 MMOL/L (ref 95–110)
CO2 SERPL-SCNC: 23 MMOL/L (ref 23–29)
CREAT SERPL-MCNC: 0.9 MG/DL (ref 0.5–1.4)
CRP SERPL-MCNC: 49.4 MG/L (ref 0–8.2)
D DIMER PPP IA.FEU-MCNC: 0.41 MG/L FEU
DIFFERENTIAL METHOD: ABNORMAL
EOSINOPHIL # BLD AUTO: 0 K/UL (ref 0–0.5)
EOSINOPHIL NFR BLD: 0.1 % (ref 0–8)
ERYTHROCYTE [DISTWIDTH] IN BLOOD BY AUTOMATED COUNT: 12.4 % (ref 11.5–14.5)
EST. GFR  (AFRICAN AMERICAN): >60 ML/MIN/1.73 M^2
EST. GFR  (NON AFRICAN AMERICAN): >60 ML/MIN/1.73 M^2
FERRITIN SERPL-MCNC: 461 NG/ML (ref 20–300)
GLUCOSE SERPL-MCNC: 189 MG/DL (ref 70–110)
HCT VFR BLD AUTO: 41.4 % (ref 37–48.5)
HGB BLD-MCNC: 14 G/DL (ref 12–16)
IMM GRANULOCYTES # BLD AUTO: 0.16 K/UL (ref 0–0.04)
IMM GRANULOCYTES NFR BLD AUTO: 1.5 % (ref 0–0.5)
LYMPHOCYTES # BLD AUTO: 0.9 K/UL (ref 1–4.8)
LYMPHOCYTES NFR BLD: 8.8 % (ref 18–48)
MCH RBC QN AUTO: 28.5 PG (ref 27–31)
MCHC RBC AUTO-ENTMCNC: 33.8 G/DL (ref 32–36)
MCV RBC AUTO: 84 FL (ref 82–98)
MONOCYTES # BLD AUTO: 0.6 K/UL (ref 0.3–1)
MONOCYTES NFR BLD: 5.4 % (ref 4–15)
NEUTROPHILS # BLD AUTO: 8.9 K/UL (ref 1.8–7.7)
NEUTROPHILS NFR BLD: 84.1 % (ref 38–73)
NRBC BLD-RTO: 0 /100 WBC
PLATELET # BLD AUTO: 366 K/UL (ref 150–350)
PMV BLD AUTO: 10.1 FL (ref 9.2–12.9)
POCT GLUCOSE: 134 MG/DL (ref 70–110)
POCT GLUCOSE: 144 MG/DL (ref 70–110)
POCT GLUCOSE: 265 MG/DL (ref 70–110)
POCT GLUCOSE: 332 MG/DL (ref 70–110)
POTASSIUM SERPL-SCNC: 4.5 MMOL/L (ref 3.5–5.1)
PROT SERPL-MCNC: 6.6 G/DL (ref 6–8.4)
RBC # BLD AUTO: 4.92 M/UL (ref 4–5.4)
SODIUM SERPL-SCNC: 132 MMOL/L (ref 136–145)
WBC # BLD AUTO: 10.53 K/UL (ref 3.9–12.7)

## 2021-03-11 PROCEDURE — 63600175 PHARM REV CODE 636 W HCPCS: Performed by: FAMILY MEDICINE

## 2021-03-11 PROCEDURE — 25000003 PHARM REV CODE 250: Performed by: INTERNAL MEDICINE

## 2021-03-11 PROCEDURE — C9399 UNCLASSIFIED DRUGS OR BIOLOG: HCPCS | Performed by: INTERNAL MEDICINE

## 2021-03-11 PROCEDURE — 27000221 HC OXYGEN, UP TO 24 HOURS

## 2021-03-11 PROCEDURE — 99232 PR SUBSEQUENT HOSPITAL CARE,LEVL II: ICD-10-PCS | Mod: 95,,, | Performed by: INTERNAL MEDICINE

## 2021-03-11 PROCEDURE — 82728 ASSAY OF FERRITIN: CPT | Performed by: INTERNAL MEDICINE

## 2021-03-11 PROCEDURE — 63600175 PHARM REV CODE 636 W HCPCS: Performed by: INTERNAL MEDICINE

## 2021-03-11 PROCEDURE — 11000001 HC ACUTE MED/SURG PRIVATE ROOM

## 2021-03-11 PROCEDURE — 85025 COMPLETE CBC W/AUTO DIFF WBC: CPT | Performed by: STUDENT IN AN ORGANIZED HEALTH CARE EDUCATION/TRAINING PROGRAM

## 2021-03-11 PROCEDURE — 99232 SBSQ HOSP IP/OBS MODERATE 35: CPT | Mod: 95,,, | Performed by: INTERNAL MEDICINE

## 2021-03-11 PROCEDURE — 25000003 PHARM REV CODE 250: Performed by: FAMILY MEDICINE

## 2021-03-11 PROCEDURE — 80053 COMPREHEN METABOLIC PANEL: CPT | Performed by: STUDENT IN AN ORGANIZED HEALTH CARE EDUCATION/TRAINING PROGRAM

## 2021-03-11 PROCEDURE — 36415 COLL VENOUS BLD VENIPUNCTURE: CPT | Performed by: STUDENT IN AN ORGANIZED HEALTH CARE EDUCATION/TRAINING PROGRAM

## 2021-03-11 PROCEDURE — 94761 N-INVAS EAR/PLS OXIMETRY MLT: CPT

## 2021-03-11 PROCEDURE — 99900035 HC TECH TIME PER 15 MIN (STAT)

## 2021-03-11 PROCEDURE — 85379 FIBRIN DEGRADATION QUANT: CPT | Performed by: STUDENT IN AN ORGANIZED HEALTH CARE EDUCATION/TRAINING PROGRAM

## 2021-03-11 PROCEDURE — 86140 C-REACTIVE PROTEIN: CPT | Performed by: INTERNAL MEDICINE

## 2021-03-11 RX ORDER — INSULIN ASPART 100 [IU]/ML
13 INJECTION, SOLUTION INTRAVENOUS; SUBCUTANEOUS
Status: DISCONTINUED | OUTPATIENT
Start: 2021-03-11 | End: 2021-03-13

## 2021-03-11 RX ADMIN — MELATONIN TAB 3 MG 6 MG: 3 TAB at 08:03

## 2021-03-11 RX ADMIN — OXYCODONE HYDROCHLORIDE AND ACETAMINOPHEN 500 MG: 500 TABLET ORAL at 08:03

## 2021-03-11 RX ADMIN — HEPARIN SODIUM 5000 UNITS: 5000 INJECTION INTRAVENOUS; SUBCUTANEOUS at 06:03

## 2021-03-11 RX ADMIN — THERA TABS 1 TABLET: TAB at 09:03

## 2021-03-11 RX ADMIN — CETIRIZINE HYDROCHLORIDE 10 MG: 10 TABLET, FILM COATED ORAL at 08:03

## 2021-03-11 RX ADMIN — INSULIN ASPART 6 UNITS: 100 INJECTION, SOLUTION INTRAVENOUS; SUBCUTANEOUS at 04:03

## 2021-03-11 RX ADMIN — GUAIFENESIN AND DEXTROMETHORPHAN HYDROBROMIDE 1 TABLET: 600; 30 TABLET, EXTENDED RELEASE ORAL at 09:03

## 2021-03-11 RX ADMIN — OXYCODONE HYDROCHLORIDE AND ACETAMINOPHEN 500 MG: 500 TABLET ORAL at 09:03

## 2021-03-11 RX ADMIN — INSULIN DETEMIR 10 UNITS: 100 INJECTION, SOLUTION SUBCUTANEOUS at 08:03

## 2021-03-11 RX ADMIN — GABAPENTIN 100 MG: 100 CAPSULE ORAL at 08:03

## 2021-03-11 RX ADMIN — BENZONATATE 100 MG: 100 CAPSULE ORAL at 08:03

## 2021-03-11 RX ADMIN — INSULIN DETEMIR 10 UNITS: 100 INJECTION, SOLUTION SUBCUTANEOUS at 09:03

## 2021-03-11 RX ADMIN — DEXAMETHASONE 6 MG: 4 TABLET ORAL at 09:03

## 2021-03-11 RX ADMIN — PANTOPRAZOLE SODIUM 40 MG: 40 TABLET, DELAYED RELEASE ORAL at 09:03

## 2021-03-11 RX ADMIN — GUAIFENESIN AND DEXTROMETHORPHAN HYDROBROMIDE 1 TABLET: 600; 30 TABLET, EXTENDED RELEASE ORAL at 08:03

## 2021-03-11 RX ADMIN — HEPARIN SODIUM 5000 UNITS: 5000 INJECTION INTRAVENOUS; SUBCUTANEOUS at 02:03

## 2021-03-11 RX ADMIN — GABAPENTIN 100 MG: 100 CAPSULE ORAL at 09:03

## 2021-03-11 RX ADMIN — BIMATOPROST 1 DROP: 0.1 SOLUTION/ DROPS OPHTHALMIC at 09:03

## 2021-03-11 RX ADMIN — INSULIN ASPART 13 UNITS: 100 INJECTION, SOLUTION INTRAVENOUS; SUBCUTANEOUS at 08:03

## 2021-03-11 RX ADMIN — BENZONATATE 100 MG: 100 CAPSULE ORAL at 09:03

## 2021-03-11 RX ADMIN — INSULIN ASPART 13 UNITS: 100 INJECTION, SOLUTION INTRAVENOUS; SUBCUTANEOUS at 04:03

## 2021-03-11 RX ADMIN — INSULIN ASPART 13 UNITS: 100 INJECTION, SOLUTION INTRAVENOUS; SUBCUTANEOUS at 12:03

## 2021-03-11 RX ADMIN — ATORVASTATIN CALCIUM 80 MG: 20 TABLET, FILM COATED ORAL at 09:03

## 2021-03-11 RX ADMIN — DOCUSATE SODIUM AND SENNOSIDES 1 TABLET: 8.6; 5 TABLET, FILM COATED ORAL at 09:03

## 2021-03-11 RX ADMIN — DOCUSATE SODIUM AND SENNOSIDES 1 TABLET: 8.6; 5 TABLET, FILM COATED ORAL at 08:03

## 2021-03-11 RX ADMIN — GABAPENTIN 100 MG: 100 CAPSULE ORAL at 02:03

## 2021-03-12 LAB
POCT GLUCOSE: 116 MG/DL (ref 70–110)
POCT GLUCOSE: 156 MG/DL (ref 70–110)
POCT GLUCOSE: 163 MG/DL (ref 70–110)

## 2021-03-12 PROCEDURE — 25000003 PHARM REV CODE 250: Performed by: INTERNAL MEDICINE

## 2021-03-12 PROCEDURE — 25000003 PHARM REV CODE 250: Performed by: FAMILY MEDICINE

## 2021-03-12 PROCEDURE — 99900035 HC TECH TIME PER 15 MIN (STAT)

## 2021-03-12 PROCEDURE — 99232 PR SUBSEQUENT HOSPITAL CARE,LEVL II: ICD-10-PCS | Mod: 95,,, | Performed by: INTERNAL MEDICINE

## 2021-03-12 PROCEDURE — 27000221 HC OXYGEN, UP TO 24 HOURS

## 2021-03-12 PROCEDURE — 94761 N-INVAS EAR/PLS OXIMETRY MLT: CPT

## 2021-03-12 PROCEDURE — 63600175 PHARM REV CODE 636 W HCPCS: Performed by: FAMILY MEDICINE

## 2021-03-12 PROCEDURE — 99232 SBSQ HOSP IP/OBS MODERATE 35: CPT | Mod: 95,,, | Performed by: INTERNAL MEDICINE

## 2021-03-12 PROCEDURE — 94664 DEMO&/EVAL PT USE INHALER: CPT

## 2021-03-12 PROCEDURE — 97530 THERAPEUTIC ACTIVITIES: CPT

## 2021-03-12 PROCEDURE — 11000001 HC ACUTE MED/SURG PRIVATE ROOM

## 2021-03-12 RX ORDER — BISACODYL 5 MG
10 TABLET, DELAYED RELEASE (ENTERIC COATED) ORAL DAILY PRN
Status: DISCONTINUED | OUTPATIENT
Start: 2021-03-12 | End: 2021-03-21 | Stop reason: HOSPADM

## 2021-03-12 RX ADMIN — HEPARIN SODIUM 5000 UNITS: 5000 INJECTION INTRAVENOUS; SUBCUTANEOUS at 05:03

## 2021-03-12 RX ADMIN — ATORVASTATIN CALCIUM 80 MG: 20 TABLET, FILM COATED ORAL at 08:03

## 2021-03-12 RX ADMIN — DOCUSATE SODIUM AND SENNOSIDES 1 TABLET: 8.6; 5 TABLET, FILM COATED ORAL at 08:03

## 2021-03-12 RX ADMIN — DOCUSATE SODIUM AND SENNOSIDES 1 TABLET: 8.6; 5 TABLET, FILM COATED ORAL at 09:03

## 2021-03-12 RX ADMIN — OXYCODONE HYDROCHLORIDE AND ACETAMINOPHEN 500 MG: 500 TABLET ORAL at 08:03

## 2021-03-12 RX ADMIN — GABAPENTIN 100 MG: 100 CAPSULE ORAL at 02:03

## 2021-03-12 RX ADMIN — INSULIN DETEMIR 10 UNITS: 100 INJECTION, SOLUTION SUBCUTANEOUS at 08:03

## 2021-03-12 RX ADMIN — BENZONATATE 100 MG: 100 CAPSULE ORAL at 08:03

## 2021-03-12 RX ADMIN — GUAIFENESIN AND DEXTROMETHORPHAN HYDROBROMIDE 1 TABLET: 600; 30 TABLET, EXTENDED RELEASE ORAL at 08:03

## 2021-03-12 RX ADMIN — INSULIN ASPART 13 UNITS: 100 INJECTION, SOLUTION INTRAVENOUS; SUBCUTANEOUS at 08:03

## 2021-03-12 RX ADMIN — INSULIN ASPART 2 UNITS: 100 INJECTION, SOLUTION INTRAVENOUS; SUBCUTANEOUS at 11:03

## 2021-03-12 RX ADMIN — INSULIN ASPART 13 UNITS: 100 INJECTION, SOLUTION INTRAVENOUS; SUBCUTANEOUS at 11:03

## 2021-03-12 RX ADMIN — MELATONIN TAB 3 MG 6 MG: 3 TAB at 08:03

## 2021-03-12 RX ADMIN — THERA TABS 1 TABLET: TAB at 08:03

## 2021-03-12 RX ADMIN — BIMATOPROST 1 DROP: 0.1 SOLUTION/ DROPS OPHTHALMIC at 08:03

## 2021-03-12 RX ADMIN — CETIRIZINE HYDROCHLORIDE 10 MG: 10 TABLET, FILM COATED ORAL at 08:03

## 2021-03-12 RX ADMIN — HEPARIN SODIUM 5000 UNITS: 5000 INJECTION INTRAVENOUS; SUBCUTANEOUS at 02:03

## 2021-03-12 RX ADMIN — GABAPENTIN 100 MG: 100 CAPSULE ORAL at 08:03

## 2021-03-12 RX ADMIN — INSULIN ASPART 13 UNITS: 100 INJECTION, SOLUTION INTRAVENOUS; SUBCUTANEOUS at 03:03

## 2021-03-12 RX ADMIN — PANTOPRAZOLE SODIUM 40 MG: 40 TABLET, DELAYED RELEASE ORAL at 08:03

## 2021-03-12 RX ADMIN — BISACODYL 10 MG: 5 TABLET, COATED ORAL at 03:03

## 2021-03-13 LAB
ALBUMIN SERPL BCP-MCNC: 2.3 G/DL (ref 3.5–5.2)
ALP SERPL-CCNC: 65 U/L (ref 55–135)
ALT SERPL W/O P-5'-P-CCNC: 48 U/L (ref 10–44)
ANION GAP SERPL CALC-SCNC: 9 MMOL/L (ref 8–16)
AST SERPL-CCNC: 33 U/L (ref 10–40)
BASOPHILS # BLD AUTO: 0.01 K/UL (ref 0–0.2)
BASOPHILS NFR BLD: 0.1 % (ref 0–1.9)
BILIRUB SERPL-MCNC: 0.7 MG/DL (ref 0.1–1)
BUN SERPL-MCNC: 24 MG/DL (ref 8–23)
CALCIUM SERPL-MCNC: 8.2 MG/DL (ref 8.7–10.5)
CHLORIDE SERPL-SCNC: 101 MMOL/L (ref 95–110)
CO2 SERPL-SCNC: 22 MMOL/L (ref 23–29)
CREAT SERPL-MCNC: 0.8 MG/DL (ref 0.5–1.4)
CRP SERPL-MCNC: 67 MG/L (ref 0–8.2)
D DIMER PPP IA.FEU-MCNC: 0.94 MG/L FEU
DIFFERENTIAL METHOD: ABNORMAL
EOSINOPHIL # BLD AUTO: 0.2 K/UL (ref 0–0.5)
EOSINOPHIL NFR BLD: 1.6 % (ref 0–8)
ERYTHROCYTE [DISTWIDTH] IN BLOOD BY AUTOMATED COUNT: 12.9 % (ref 11.5–14.5)
EST. GFR  (AFRICAN AMERICAN): >60 ML/MIN/1.73 M^2
EST. GFR  (NON AFRICAN AMERICAN): >60 ML/MIN/1.73 M^2
FERRITIN SERPL-MCNC: 477 NG/ML (ref 20–300)
GLUCOSE SERPL-MCNC: 92 MG/DL (ref 70–110)
HCT VFR BLD AUTO: 42.4 % (ref 37–48.5)
HGB BLD-MCNC: 13.7 G/DL (ref 12–16)
IMM GRANULOCYTES # BLD AUTO: 0.11 K/UL (ref 0–0.04)
IMM GRANULOCYTES NFR BLD AUTO: 1.2 % (ref 0–0.5)
LYMPHOCYTES # BLD AUTO: 1.3 K/UL (ref 1–4.8)
LYMPHOCYTES NFR BLD: 13.7 % (ref 18–48)
MCH RBC QN AUTO: 28.4 PG (ref 27–31)
MCHC RBC AUTO-ENTMCNC: 32.3 G/DL (ref 32–36)
MCV RBC AUTO: 88 FL (ref 82–98)
MONOCYTES # BLD AUTO: 0.9 K/UL (ref 0.3–1)
MONOCYTES NFR BLD: 9.8 % (ref 4–15)
NEUTROPHILS # BLD AUTO: 6.9 K/UL (ref 1.8–7.7)
NEUTROPHILS NFR BLD: 73.6 % (ref 38–73)
NRBC BLD-RTO: 0 /100 WBC
PLATELET # BLD AUTO: 224 K/UL (ref 150–350)
PMV BLD AUTO: 10.6 FL (ref 9.2–12.9)
POCT GLUCOSE: 118 MG/DL (ref 70–110)
POCT GLUCOSE: 254 MG/DL (ref 70–110)
POCT GLUCOSE: 320 MG/DL (ref 70–110)
POCT GLUCOSE: 84 MG/DL (ref 70–110)
POTASSIUM SERPL-SCNC: 4.1 MMOL/L (ref 3.5–5.1)
PROT SERPL-MCNC: 6 G/DL (ref 6–8.4)
RBC # BLD AUTO: 4.82 M/UL (ref 4–5.4)
SODIUM SERPL-SCNC: 132 MMOL/L (ref 136–145)
WBC # BLD AUTO: 9.32 K/UL (ref 3.9–12.7)

## 2021-03-13 PROCEDURE — 63600175 PHARM REV CODE 636 W HCPCS: Performed by: FAMILY MEDICINE

## 2021-03-13 PROCEDURE — 25000003 PHARM REV CODE 250: Performed by: INTERNAL MEDICINE

## 2021-03-13 PROCEDURE — 97530 THERAPEUTIC ACTIVITIES: CPT

## 2021-03-13 PROCEDURE — 86140 C-REACTIVE PROTEIN: CPT | Performed by: INTERNAL MEDICINE

## 2021-03-13 PROCEDURE — 80053 COMPREHEN METABOLIC PANEL: CPT | Performed by: STUDENT IN AN ORGANIZED HEALTH CARE EDUCATION/TRAINING PROGRAM

## 2021-03-13 PROCEDURE — 82728 ASSAY OF FERRITIN: CPT | Performed by: INTERNAL MEDICINE

## 2021-03-13 PROCEDURE — 85025 COMPLETE CBC W/AUTO DIFF WBC: CPT | Performed by: STUDENT IN AN ORGANIZED HEALTH CARE EDUCATION/TRAINING PROGRAM

## 2021-03-13 PROCEDURE — 11000001 HC ACUTE MED/SURG PRIVATE ROOM

## 2021-03-13 PROCEDURE — 63600175 PHARM REV CODE 636 W HCPCS: Performed by: INTERNAL MEDICINE

## 2021-03-13 PROCEDURE — 85379 FIBRIN DEGRADATION QUANT: CPT | Performed by: STUDENT IN AN ORGANIZED HEALTH CARE EDUCATION/TRAINING PROGRAM

## 2021-03-13 PROCEDURE — 25000003 PHARM REV CODE 250: Performed by: FAMILY MEDICINE

## 2021-03-13 PROCEDURE — 99232 SBSQ HOSP IP/OBS MODERATE 35: CPT | Mod: 95,,, | Performed by: INTERNAL MEDICINE

## 2021-03-13 PROCEDURE — 36415 COLL VENOUS BLD VENIPUNCTURE: CPT | Performed by: STUDENT IN AN ORGANIZED HEALTH CARE EDUCATION/TRAINING PROGRAM

## 2021-03-13 PROCEDURE — 99232 PR SUBSEQUENT HOSPITAL CARE,LEVL II: ICD-10-PCS | Mod: 95,,, | Performed by: INTERNAL MEDICINE

## 2021-03-13 RX ORDER — LISINOPRIL 10 MG/1
10 TABLET ORAL NIGHTLY
Status: DISCONTINUED | OUTPATIENT
Start: 2021-03-14 | End: 2021-03-16

## 2021-03-13 RX ORDER — INSULIN ASPART 100 [IU]/ML
5 INJECTION, SOLUTION INTRAVENOUS; SUBCUTANEOUS
Status: DISCONTINUED | OUTPATIENT
Start: 2021-03-13 | End: 2021-03-13

## 2021-03-13 RX ORDER — INSULIN ASPART 100 [IU]/ML
3 INJECTION, SOLUTION INTRAVENOUS; SUBCUTANEOUS ONCE
Status: COMPLETED | OUTPATIENT
Start: 2021-03-13 | End: 2021-03-13

## 2021-03-13 RX ORDER — INSULIN ASPART 100 [IU]/ML
0-5 INJECTION, SOLUTION INTRAVENOUS; SUBCUTANEOUS
Status: DISCONTINUED | OUTPATIENT
Start: 2021-03-13 | End: 2021-03-21 | Stop reason: HOSPADM

## 2021-03-13 RX ADMIN — GABAPENTIN 100 MG: 100 CAPSULE ORAL at 08:03

## 2021-03-13 RX ADMIN — GABAPENTIN 100 MG: 100 CAPSULE ORAL at 02:03

## 2021-03-13 RX ADMIN — MELATONIN TAB 3 MG 6 MG: 3 TAB at 08:03

## 2021-03-13 RX ADMIN — INSULIN ASPART 2 UNITS: 100 INJECTION, SOLUTION INTRAVENOUS; SUBCUTANEOUS at 08:03

## 2021-03-13 RX ADMIN — HEPARIN SODIUM 5000 UNITS: 5000 INJECTION INTRAVENOUS; SUBCUTANEOUS at 02:03

## 2021-03-13 RX ADMIN — OXYCODONE HYDROCHLORIDE AND ACETAMINOPHEN 500 MG: 500 TABLET ORAL at 08:03

## 2021-03-13 RX ADMIN — GUAIFENESIN AND DEXTROMETHORPHAN HYDROBROMIDE 1 TABLET: 600; 30 TABLET, EXTENDED RELEASE ORAL at 08:03

## 2021-03-13 RX ADMIN — CETIRIZINE HYDROCHLORIDE 10 MG: 10 TABLET, FILM COATED ORAL at 08:03

## 2021-03-13 RX ADMIN — DOCUSATE SODIUM AND SENNOSIDES 1 TABLET: 8.6; 5 TABLET, FILM COATED ORAL at 08:03

## 2021-03-13 RX ADMIN — INSULIN ASPART 13 UNITS: 100 INJECTION, SOLUTION INTRAVENOUS; SUBCUTANEOUS at 08:03

## 2021-03-13 RX ADMIN — INSULIN ASPART 3 UNITS: 100 INJECTION, SOLUTION INTRAVENOUS; SUBCUTANEOUS at 05:03

## 2021-03-13 RX ADMIN — BENZONATATE 100 MG: 100 CAPSULE ORAL at 08:03

## 2021-03-13 RX ADMIN — HEPARIN SODIUM 5000 UNITS: 5000 INJECTION INTRAVENOUS; SUBCUTANEOUS at 08:03

## 2021-03-13 RX ADMIN — SODIUM CHLORIDE 500 ML: 0.9 INJECTION, SOLUTION INTRAVENOUS at 04:03

## 2021-03-13 RX ADMIN — INSULIN DETEMIR 10 UNITS: 100 INJECTION, SOLUTION SUBCUTANEOUS at 08:03

## 2021-03-13 RX ADMIN — BIMATOPROST 1 DROP: 0.1 SOLUTION/ DROPS OPHTHALMIC at 09:03

## 2021-03-13 RX ADMIN — PANTOPRAZOLE SODIUM 40 MG: 40 TABLET, DELAYED RELEASE ORAL at 08:03

## 2021-03-13 RX ADMIN — INSULIN ASPART 5 UNITS: 100 INJECTION, SOLUTION INTRAVENOUS; SUBCUTANEOUS at 12:03

## 2021-03-13 RX ADMIN — THERA TABS 1 TABLET: TAB at 08:03

## 2021-03-13 RX ADMIN — ATORVASTATIN CALCIUM 80 MG: 20 TABLET, FILM COATED ORAL at 08:03

## 2021-03-14 LAB
POCT GLUCOSE: 178 MG/DL (ref 70–110)
POCT GLUCOSE: 226 MG/DL (ref 70–110)
POCT GLUCOSE: 241 MG/DL (ref 70–110)
POCT GLUCOSE: 287 MG/DL (ref 70–110)
POCT GLUCOSE: 96 MG/DL (ref 70–110)

## 2021-03-14 PROCEDURE — 25000003 PHARM REV CODE 250: Performed by: INTERNAL MEDICINE

## 2021-03-14 PROCEDURE — 63600175 PHARM REV CODE 636 W HCPCS: Performed by: INTERNAL MEDICINE

## 2021-03-14 PROCEDURE — 63600175 PHARM REV CODE 636 W HCPCS: Performed by: FAMILY MEDICINE

## 2021-03-14 PROCEDURE — 94761 N-INVAS EAR/PLS OXIMETRY MLT: CPT

## 2021-03-14 PROCEDURE — 25000003 PHARM REV CODE 250: Performed by: FAMILY MEDICINE

## 2021-03-14 PROCEDURE — 99232 PR SUBSEQUENT HOSPITAL CARE,LEVL II: ICD-10-PCS | Mod: 95,,, | Performed by: INTERNAL MEDICINE

## 2021-03-14 PROCEDURE — 99232 SBSQ HOSP IP/OBS MODERATE 35: CPT | Mod: 95,,, | Performed by: INTERNAL MEDICINE

## 2021-03-14 PROCEDURE — 11000001 HC ACUTE MED/SURG PRIVATE ROOM

## 2021-03-14 PROCEDURE — C9399 UNCLASSIFIED DRUGS OR BIOLOG: HCPCS | Performed by: INTERNAL MEDICINE

## 2021-03-14 RX ADMIN — GABAPENTIN 100 MG: 100 CAPSULE ORAL at 08:03

## 2021-03-14 RX ADMIN — INSULIN DETEMIR 10 UNITS: 100 INJECTION, SOLUTION SUBCUTANEOUS at 08:03

## 2021-03-14 RX ADMIN — GUAIFENESIN AND DEXTROMETHORPHAN HYDROBROMIDE 1 TABLET: 600; 30 TABLET, EXTENDED RELEASE ORAL at 09:03

## 2021-03-14 RX ADMIN — HEPARIN SODIUM 5000 UNITS: 5000 INJECTION INTRAVENOUS; SUBCUTANEOUS at 06:03

## 2021-03-14 RX ADMIN — INSULIN ASPART 2 UNITS: 100 INJECTION, SOLUTION INTRAVENOUS; SUBCUTANEOUS at 04:03

## 2021-03-14 RX ADMIN — ATORVASTATIN CALCIUM 80 MG: 20 TABLET, FILM COATED ORAL at 08:03

## 2021-03-14 RX ADMIN — MELATONIN TAB 3 MG 6 MG: 3 TAB at 10:03

## 2021-03-14 RX ADMIN — GUAIFENESIN AND DEXTROMETHORPHAN HYDROBROMIDE 1 TABLET: 600; 30 TABLET, EXTENDED RELEASE ORAL at 08:03

## 2021-03-14 RX ADMIN — THERA TABS 1 TABLET: TAB at 08:03

## 2021-03-14 RX ADMIN — GABAPENTIN 100 MG: 100 CAPSULE ORAL at 03:03

## 2021-03-14 RX ADMIN — DOCUSATE SODIUM AND SENNOSIDES 1 TABLET: 8.6; 5 TABLET, FILM COATED ORAL at 09:03

## 2021-03-14 RX ADMIN — PANTOPRAZOLE SODIUM 40 MG: 40 TABLET, DELAYED RELEASE ORAL at 08:03

## 2021-03-14 RX ADMIN — BENZONATATE 100 MG: 100 CAPSULE ORAL at 08:03

## 2021-03-14 RX ADMIN — OXYCODONE HYDROCHLORIDE AND ACETAMINOPHEN 500 MG: 500 TABLET ORAL at 09:03

## 2021-03-14 RX ADMIN — CETIRIZINE HYDROCHLORIDE 10 MG: 10 TABLET, FILM COATED ORAL at 09:03

## 2021-03-14 RX ADMIN — INSULIN ASPART 3 UNITS: 100 INJECTION, SOLUTION INTRAVENOUS; SUBCUTANEOUS at 11:03

## 2021-03-14 RX ADMIN — LISINOPRIL 10 MG: 10 TABLET ORAL at 09:03

## 2021-03-14 RX ADMIN — DOCUSATE SODIUM AND SENNOSIDES 1 TABLET: 8.6; 5 TABLET, FILM COATED ORAL at 08:03

## 2021-03-14 RX ADMIN — HEPARIN SODIUM 5000 UNITS: 5000 INJECTION INTRAVENOUS; SUBCUTANEOUS at 03:03

## 2021-03-14 RX ADMIN — INSULIN ASPART 1 UNITS: 100 INJECTION, SOLUTION INTRAVENOUS; SUBCUTANEOUS at 09:03

## 2021-03-14 RX ADMIN — BENZONATATE 100 MG: 100 CAPSULE ORAL at 09:03

## 2021-03-14 RX ADMIN — GABAPENTIN 100 MG: 100 CAPSULE ORAL at 09:03

## 2021-03-14 RX ADMIN — OXYCODONE HYDROCHLORIDE AND ACETAMINOPHEN 500 MG: 500 TABLET ORAL at 08:03

## 2021-03-14 RX ADMIN — BIMATOPROST 1 DROP: 0.1 SOLUTION/ DROPS OPHTHALMIC at 09:03

## 2021-03-14 RX ADMIN — HEPARIN SODIUM 5000 UNITS: 5000 INJECTION INTRAVENOUS; SUBCUTANEOUS at 09:03

## 2021-03-15 LAB
ALBUMIN SERPL BCP-MCNC: 2.2 G/DL (ref 3.5–5.2)
ALP SERPL-CCNC: 67 U/L (ref 55–135)
ALT SERPL W/O P-5'-P-CCNC: 39 U/L (ref 10–44)
ANION GAP SERPL CALC-SCNC: 7 MMOL/L (ref 8–16)
AST SERPL-CCNC: 24 U/L (ref 10–40)
BACTERIA #/AREA URNS AUTO: ABNORMAL /HPF
BASOPHILS # BLD AUTO: 0.02 K/UL (ref 0–0.2)
BASOPHILS NFR BLD: 0.2 % (ref 0–1.9)
BILIRUB SERPL-MCNC: 0.6 MG/DL (ref 0.1–1)
BILIRUB UR QL STRIP: NEGATIVE
BUN SERPL-MCNC: 14 MG/DL (ref 8–23)
CALCIUM SERPL-MCNC: 7.9 MG/DL (ref 8.7–10.5)
CHLORIDE SERPL-SCNC: 104 MMOL/L (ref 95–110)
CLARITY UR REFRACT.AUTO: ABNORMAL
CO2 SERPL-SCNC: 23 MMOL/L (ref 23–29)
COLOR UR AUTO: ABNORMAL
CREAT SERPL-MCNC: 0.7 MG/DL (ref 0.5–1.4)
CRP SERPL-MCNC: 84.2 MG/L (ref 0–8.2)
D DIMER PPP IA.FEU-MCNC: 1.45 MG/L FEU
DIFFERENTIAL METHOD: ABNORMAL
EOSINOPHIL # BLD AUTO: 0.1 K/UL (ref 0–0.5)
EOSINOPHIL NFR BLD: 1.2 % (ref 0–8)
ERYTHROCYTE [DISTWIDTH] IN BLOOD BY AUTOMATED COUNT: 12.7 % (ref 11.5–14.5)
EST. GFR  (AFRICAN AMERICAN): >60 ML/MIN/1.73 M^2
EST. GFR  (NON AFRICAN AMERICAN): >60 ML/MIN/1.73 M^2
FERRITIN SERPL-MCNC: 451 NG/ML (ref 20–300)
GLUCOSE SERPL-MCNC: 179 MG/DL (ref 70–110)
GLUCOSE UR QL STRIP: ABNORMAL
HCT VFR BLD AUTO: 35.9 % (ref 37–48.5)
HGB BLD-MCNC: 12.1 G/DL (ref 12–16)
HGB UR QL STRIP: NEGATIVE
IMM GRANULOCYTES # BLD AUTO: 0.11 K/UL (ref 0–0.04)
IMM GRANULOCYTES NFR BLD AUTO: 1.2 % (ref 0–0.5)
KETONES UR QL STRIP: NEGATIVE
LEUKOCYTE ESTERASE UR QL STRIP: ABNORMAL
LYMPHOCYTES # BLD AUTO: 1.5 K/UL (ref 1–4.8)
LYMPHOCYTES NFR BLD: 16.3 % (ref 18–48)
MCH RBC QN AUTO: 29.1 PG (ref 27–31)
MCHC RBC AUTO-ENTMCNC: 33.7 G/DL (ref 32–36)
MCV RBC AUTO: 86 FL (ref 82–98)
MICROSCOPIC COMMENT: ABNORMAL
MONOCYTES # BLD AUTO: 0.8 K/UL (ref 0.3–1)
MONOCYTES NFR BLD: 9.3 % (ref 4–15)
NEUTROPHILS # BLD AUTO: 6.5 K/UL (ref 1.8–7.7)
NEUTROPHILS NFR BLD: 71.8 % (ref 38–73)
NITRITE UR QL STRIP: NEGATIVE
NRBC BLD-RTO: 0 /100 WBC
PH UR STRIP: 6 [PH] (ref 5–8)
PLATELET # BLD AUTO: 219 K/UL (ref 150–350)
PMV BLD AUTO: 10.7 FL (ref 9.2–12.9)
POCT GLUCOSE: 189 MG/DL (ref 70–110)
POCT GLUCOSE: 192 MG/DL (ref 70–110)
POCT GLUCOSE: 216 MG/DL (ref 70–110)
POCT GLUCOSE: 330 MG/DL (ref 70–110)
POTASSIUM SERPL-SCNC: 4.3 MMOL/L (ref 3.5–5.1)
PROT SERPL-MCNC: 5.9 G/DL (ref 6–8.4)
PROT UR QL STRIP: NEGATIVE
RBC # BLD AUTO: 4.16 M/UL (ref 4–5.4)
SODIUM SERPL-SCNC: 134 MMOL/L (ref 136–145)
SP GR UR STRIP: 1.01 (ref 1–1.03)
SQUAMOUS #/AREA URNS AUTO: 1 /HPF
URN SPEC COLLECT METH UR: ABNORMAL
WBC # BLD AUTO: 9.01 K/UL (ref 3.9–12.7)
WBC #/AREA URNS AUTO: 98 /HPF (ref 0–5)
YEAST UR QL AUTO: ABNORMAL

## 2021-03-15 PROCEDURE — 25000003 PHARM REV CODE 250: Performed by: STUDENT IN AN ORGANIZED HEALTH CARE EDUCATION/TRAINING PROGRAM

## 2021-03-15 PROCEDURE — 80053 COMPREHEN METABOLIC PANEL: CPT | Performed by: STUDENT IN AN ORGANIZED HEALTH CARE EDUCATION/TRAINING PROGRAM

## 2021-03-15 PROCEDURE — 85379 FIBRIN DEGRADATION QUANT: CPT | Performed by: STUDENT IN AN ORGANIZED HEALTH CARE EDUCATION/TRAINING PROGRAM

## 2021-03-15 PROCEDURE — 86140 C-REACTIVE PROTEIN: CPT | Performed by: INTERNAL MEDICINE

## 2021-03-15 PROCEDURE — 87086 URINE CULTURE/COLONY COUNT: CPT | Performed by: INTERNAL MEDICINE

## 2021-03-15 PROCEDURE — 63700000 PHARM REV CODE 250 ALT 637 W/O HCPCS: Performed by: INTERNAL MEDICINE

## 2021-03-15 PROCEDURE — 82728 ASSAY OF FERRITIN: CPT | Performed by: INTERNAL MEDICINE

## 2021-03-15 PROCEDURE — 25000003 PHARM REV CODE 250: Performed by: INTERNAL MEDICINE

## 2021-03-15 PROCEDURE — 94761 N-INVAS EAR/PLS OXIMETRY MLT: CPT

## 2021-03-15 PROCEDURE — 99232 PR SUBSEQUENT HOSPITAL CARE,LEVL II: ICD-10-PCS | Mod: 95,,, | Performed by: INTERNAL MEDICINE

## 2021-03-15 PROCEDURE — 81001 URINALYSIS AUTO W/SCOPE: CPT | Performed by: INTERNAL MEDICINE

## 2021-03-15 PROCEDURE — 11000001 HC ACUTE MED/SURG PRIVATE ROOM

## 2021-03-15 PROCEDURE — 36415 COLL VENOUS BLD VENIPUNCTURE: CPT | Performed by: STUDENT IN AN ORGANIZED HEALTH CARE EDUCATION/TRAINING PROGRAM

## 2021-03-15 PROCEDURE — 63600175 PHARM REV CODE 636 W HCPCS: Performed by: INTERNAL MEDICINE

## 2021-03-15 PROCEDURE — 25000003 PHARM REV CODE 250: Performed by: FAMILY MEDICINE

## 2021-03-15 PROCEDURE — 85025 COMPLETE CBC W/AUTO DIFF WBC: CPT | Performed by: STUDENT IN AN ORGANIZED HEALTH CARE EDUCATION/TRAINING PROGRAM

## 2021-03-15 PROCEDURE — 63600175 PHARM REV CODE 636 W HCPCS: Performed by: FAMILY MEDICINE

## 2021-03-15 PROCEDURE — 99232 SBSQ HOSP IP/OBS MODERATE 35: CPT | Mod: 95,,, | Performed by: INTERNAL MEDICINE

## 2021-03-15 RX ORDER — INSULIN ASPART 100 [IU]/ML
5 INJECTION, SOLUTION INTRAVENOUS; SUBCUTANEOUS
Status: DISCONTINUED | OUTPATIENT
Start: 2021-03-15 | End: 2021-03-17

## 2021-03-15 RX ORDER — ALBUTEROL SULFATE 90 UG/1
2 AEROSOL, METERED RESPIRATORY (INHALATION)
Status: CANCELLED | OUTPATIENT
Start: 2021-03-15

## 2021-03-15 RX ORDER — FLUCONAZOLE 100 MG/1
100 TABLET ORAL DAILY
Status: DISCONTINUED | OUTPATIENT
Start: 2021-03-15 | End: 2021-03-17

## 2021-03-15 RX ORDER — INSULIN ASPART 100 [IU]/ML
3 INJECTION, SOLUTION INTRAVENOUS; SUBCUTANEOUS
Status: DISCONTINUED | OUTPATIENT
Start: 2021-03-15 | End: 2021-03-15

## 2021-03-15 RX ADMIN — ALPRAZOLAM 0.5 MG: 0.5 TABLET ORAL at 09:03

## 2021-03-15 RX ADMIN — HEPARIN SODIUM 5000 UNITS: 5000 INJECTION INTRAVENOUS; SUBCUTANEOUS at 06:03

## 2021-03-15 RX ADMIN — DOCUSATE SODIUM AND SENNOSIDES 1 TABLET: 8.6; 5 TABLET, FILM COATED ORAL at 09:03

## 2021-03-15 RX ADMIN — GABAPENTIN 100 MG: 100 CAPSULE ORAL at 02:03

## 2021-03-15 RX ADMIN — OXYCODONE HYDROCHLORIDE AND ACETAMINOPHEN 500 MG: 500 TABLET ORAL at 09:03

## 2021-03-15 RX ADMIN — INSULIN ASPART 4 UNITS: 100 INJECTION, SOLUTION INTRAVENOUS; SUBCUTANEOUS at 12:03

## 2021-03-15 RX ADMIN — INSULIN DETEMIR 10 UNITS: 100 INJECTION, SOLUTION SUBCUTANEOUS at 08:03

## 2021-03-15 RX ADMIN — HEPARIN SODIUM 5000 UNITS: 5000 INJECTION INTRAVENOUS; SUBCUTANEOUS at 09:03

## 2021-03-15 RX ADMIN — CEFTRIAXONE 2 G: 2 INJECTION, SOLUTION INTRAVENOUS at 11:03

## 2021-03-15 RX ADMIN — HEPARIN SODIUM 5000 UNITS: 5000 INJECTION INTRAVENOUS; SUBCUTANEOUS at 02:03

## 2021-03-15 RX ADMIN — GABAPENTIN 100 MG: 100 CAPSULE ORAL at 09:03

## 2021-03-15 RX ADMIN — GUAIFENESIN AND DEXTROMETHORPHAN HYDROBROMIDE 1 TABLET: 600; 30 TABLET, EXTENDED RELEASE ORAL at 09:03

## 2021-03-15 RX ADMIN — BIMATOPROST 1 DROP: 0.1 SOLUTION/ DROPS OPHTHALMIC at 09:03

## 2021-03-15 RX ADMIN — PANTOPRAZOLE SODIUM 40 MG: 40 TABLET, DELAYED RELEASE ORAL at 08:03

## 2021-03-15 RX ADMIN — INSULIN ASPART 3 UNITS: 100 INJECTION, SOLUTION INTRAVENOUS; SUBCUTANEOUS at 05:03

## 2021-03-15 RX ADMIN — ATORVASTATIN CALCIUM 80 MG: 20 TABLET, FILM COATED ORAL at 08:03

## 2021-03-15 RX ADMIN — INSULIN ASPART 3 UNITS: 100 INJECTION, SOLUTION INTRAVENOUS; SUBCUTANEOUS at 12:03

## 2021-03-15 RX ADMIN — THERA TABS 1 TABLET: TAB at 08:03

## 2021-03-15 RX ADMIN — GUAIFENESIN AND DEXTROMETHORPHAN HYDROBROMIDE 1 TABLET: 600; 30 TABLET, EXTENDED RELEASE ORAL at 08:03

## 2021-03-15 RX ADMIN — BENZONATATE 100 MG: 100 CAPSULE ORAL at 08:03

## 2021-03-15 RX ADMIN — INSULIN ASPART 5 UNITS: 100 INJECTION, SOLUTION INTRAVENOUS; SUBCUTANEOUS at 05:03

## 2021-03-15 RX ADMIN — FLUCONAZOLE 100 MG: 100 TABLET ORAL at 05:03

## 2021-03-15 RX ADMIN — GABAPENTIN 100 MG: 100 CAPSULE ORAL at 08:03

## 2021-03-15 RX ADMIN — CETIRIZINE HYDROCHLORIDE 10 MG: 10 TABLET, FILM COATED ORAL at 09:03

## 2021-03-15 RX ADMIN — DOCUSATE SODIUM AND SENNOSIDES 1 TABLET: 8.6; 5 TABLET, FILM COATED ORAL at 08:03

## 2021-03-15 RX ADMIN — LISINOPRIL 10 MG: 10 TABLET ORAL at 09:03

## 2021-03-15 RX ADMIN — OXYCODONE HYDROCHLORIDE AND ACETAMINOPHEN 500 MG: 500 TABLET ORAL at 08:03

## 2021-03-15 RX ADMIN — BENZONATATE 100 MG: 100 CAPSULE ORAL at 09:03

## 2021-03-16 LAB
BACTERIA UR CULT: NORMAL
POCT GLUCOSE: 160 MG/DL (ref 70–110)
POCT GLUCOSE: 243 MG/DL (ref 70–110)
POCT GLUCOSE: 243 MG/DL (ref 70–110)
POCT GLUCOSE: 284 MG/DL (ref 70–110)

## 2021-03-16 PROCEDURE — 25000003 PHARM REV CODE 250: Performed by: INTERNAL MEDICINE

## 2021-03-16 PROCEDURE — 63700000 PHARM REV CODE 250 ALT 637 W/O HCPCS: Performed by: INTERNAL MEDICINE

## 2021-03-16 PROCEDURE — 25000003 PHARM REV CODE 250: Performed by: FAMILY MEDICINE

## 2021-03-16 PROCEDURE — 97535 SELF CARE MNGMENT TRAINING: CPT

## 2021-03-16 PROCEDURE — 99232 SBSQ HOSP IP/OBS MODERATE 35: CPT | Mod: 95,,, | Performed by: INTERNAL MEDICINE

## 2021-03-16 PROCEDURE — 11000001 HC ACUTE MED/SURG PRIVATE ROOM

## 2021-03-16 PROCEDURE — C9399 UNCLASSIFIED DRUGS OR BIOLOG: HCPCS | Performed by: INTERNAL MEDICINE

## 2021-03-16 PROCEDURE — 63600175 PHARM REV CODE 636 W HCPCS: Performed by: INTERNAL MEDICINE

## 2021-03-16 PROCEDURE — 25000003 PHARM REV CODE 250: Performed by: STUDENT IN AN ORGANIZED HEALTH CARE EDUCATION/TRAINING PROGRAM

## 2021-03-16 PROCEDURE — 63600175 PHARM REV CODE 636 W HCPCS: Performed by: FAMILY MEDICINE

## 2021-03-16 PROCEDURE — 99232 PR SUBSEQUENT HOSPITAL CARE,LEVL II: ICD-10-PCS | Mod: 95,,, | Performed by: INTERNAL MEDICINE

## 2021-03-16 RX ORDER — SODIUM CHLORIDE 9 MG/ML
INJECTION, SOLUTION INTRAVENOUS CONTINUOUS
Status: ACTIVE | OUTPATIENT
Start: 2021-03-16 | End: 2021-03-16

## 2021-03-16 RX ADMIN — GABAPENTIN 100 MG: 100 CAPSULE ORAL at 09:03

## 2021-03-16 RX ADMIN — INSULIN ASPART 2 UNITS: 100 INJECTION, SOLUTION INTRAVENOUS; SUBCUTANEOUS at 01:03

## 2021-03-16 RX ADMIN — MELATONIN TAB 3 MG 6 MG: 3 TAB at 08:03

## 2021-03-16 RX ADMIN — OXYCODONE HYDROCHLORIDE AND ACETAMINOPHEN 500 MG: 500 TABLET ORAL at 08:03

## 2021-03-16 RX ADMIN — ALPRAZOLAM 0.5 MG: 0.5 TABLET ORAL at 08:03

## 2021-03-16 RX ADMIN — GUAIFENESIN AND DEXTROMETHORPHAN HYDROBROMIDE 1 TABLET: 600; 30 TABLET, EXTENDED RELEASE ORAL at 08:03

## 2021-03-16 RX ADMIN — INSULIN ASPART 5 UNITS: 100 INJECTION, SOLUTION INTRAVENOUS; SUBCUTANEOUS at 09:03

## 2021-03-16 RX ADMIN — HEPARIN SODIUM 5000 UNITS: 5000 INJECTION INTRAVENOUS; SUBCUTANEOUS at 06:03

## 2021-03-16 RX ADMIN — FLUCONAZOLE 100 MG: 100 TABLET ORAL at 09:03

## 2021-03-16 RX ADMIN — INSULIN ASPART 5 UNITS: 100 INJECTION, SOLUTION INTRAVENOUS; SUBCUTANEOUS at 01:03

## 2021-03-16 RX ADMIN — ATORVASTATIN CALCIUM 80 MG: 20 TABLET, FILM COATED ORAL at 09:03

## 2021-03-16 RX ADMIN — ACETAMINOPHEN 650 MG: 325 TABLET ORAL at 08:03

## 2021-03-16 RX ADMIN — DOCUSATE SODIUM AND SENNOSIDES 1 TABLET: 8.6; 5 TABLET, FILM COATED ORAL at 09:03

## 2021-03-16 RX ADMIN — PANTOPRAZOLE SODIUM 40 MG: 40 TABLET, DELAYED RELEASE ORAL at 09:03

## 2021-03-16 RX ADMIN — CEFTRIAXONE 2 G: 2 INJECTION, SOLUTION INTRAVENOUS at 10:03

## 2021-03-16 RX ADMIN — INSULIN ASPART 5 UNITS: 100 INJECTION, SOLUTION INTRAVENOUS; SUBCUTANEOUS at 06:03

## 2021-03-16 RX ADMIN — GUAIFENESIN AND DEXTROMETHORPHAN HYDROBROMIDE 1 TABLET: 600; 30 TABLET, EXTENDED RELEASE ORAL at 09:03

## 2021-03-16 RX ADMIN — BENZONATATE 100 MG: 100 CAPSULE ORAL at 09:03

## 2021-03-16 RX ADMIN — THERA TABS 1 TABLET: TAB at 09:03

## 2021-03-16 RX ADMIN — INSULIN ASPART 1 UNITS: 100 INJECTION, SOLUTION INTRAVENOUS; SUBCUTANEOUS at 10:03

## 2021-03-16 RX ADMIN — INSULIN DETEMIR 12 UNITS: 100 INJECTION, SOLUTION SUBCUTANEOUS at 09:03

## 2021-03-16 RX ADMIN — INSULIN ASPART 2 UNITS: 100 INJECTION, SOLUTION INTRAVENOUS; SUBCUTANEOUS at 06:03

## 2021-03-16 RX ADMIN — GABAPENTIN 100 MG: 100 CAPSULE ORAL at 08:03

## 2021-03-16 RX ADMIN — HEPARIN SODIUM 5000 UNITS: 5000 INJECTION INTRAVENOUS; SUBCUTANEOUS at 02:03

## 2021-03-16 RX ADMIN — HEPARIN SODIUM 5000 UNITS: 5000 INJECTION INTRAVENOUS; SUBCUTANEOUS at 10:03

## 2021-03-16 RX ADMIN — BENZONATATE 100 MG: 100 CAPSULE ORAL at 08:03

## 2021-03-16 RX ADMIN — SODIUM CHLORIDE: 0.9 INJECTION, SOLUTION INTRAVENOUS at 11:03

## 2021-03-16 RX ADMIN — BIMATOPROST 1 DROP: 0.1 SOLUTION/ DROPS OPHTHALMIC at 08:03

## 2021-03-16 RX ADMIN — CETIRIZINE HYDROCHLORIDE 10 MG: 10 TABLET, FILM COATED ORAL at 08:03

## 2021-03-16 RX ADMIN — OXYCODONE HYDROCHLORIDE AND ACETAMINOPHEN 500 MG: 500 TABLET ORAL at 09:03

## 2021-03-16 RX ADMIN — GABAPENTIN 100 MG: 100 CAPSULE ORAL at 02:03

## 2021-03-17 LAB
ALBUMIN SERPL BCP-MCNC: 2 G/DL (ref 3.5–5.2)
ALP SERPL-CCNC: 77 U/L (ref 55–135)
ALT SERPL W/O P-5'-P-CCNC: 45 U/L (ref 10–44)
ANION GAP SERPL CALC-SCNC: 11 MMOL/L (ref 8–16)
AST SERPL-CCNC: 35 U/L (ref 10–40)
BASOPHILS # BLD AUTO: 0.03 K/UL (ref 0–0.2)
BASOPHILS NFR BLD: 0.3 % (ref 0–1.9)
BILIRUB SERPL-MCNC: 0.4 MG/DL (ref 0.1–1)
BNP SERPL-MCNC: 22 PG/ML (ref 0–99)
BUN SERPL-MCNC: 12 MG/DL (ref 8–23)
CALCIUM SERPL-MCNC: 7.8 MG/DL (ref 8.7–10.5)
CHLORIDE SERPL-SCNC: 108 MMOL/L (ref 95–110)
CO2 SERPL-SCNC: 19 MMOL/L (ref 23–29)
CORTIS SERPL-MCNC: 20.7 UG/DL (ref 4.3–22.4)
CREAT SERPL-MCNC: 0.7 MG/DL (ref 0.5–1.4)
CRP SERPL-MCNC: 134.1 MG/L (ref 0–8.2)
D DIMER PPP IA.FEU-MCNC: 0.71 MG/L FEU
DIFFERENTIAL METHOD: ABNORMAL
EOSINOPHIL # BLD AUTO: 0.1 K/UL (ref 0–0.5)
EOSINOPHIL NFR BLD: 0.8 % (ref 0–8)
ERYTHROCYTE [DISTWIDTH] IN BLOOD BY AUTOMATED COUNT: 12.9 % (ref 11.5–14.5)
EST. GFR  (AFRICAN AMERICAN): >60 ML/MIN/1.73 M^2
EST. GFR  (NON AFRICAN AMERICAN): >60 ML/MIN/1.73 M^2
FERRITIN SERPL-MCNC: 451 NG/ML (ref 20–300)
GLUCOSE SERPL-MCNC: 166 MG/DL (ref 70–110)
HCT VFR BLD AUTO: 33.5 % (ref 37–48.5)
HGB BLD-MCNC: 11.2 G/DL (ref 12–16)
IMM GRANULOCYTES # BLD AUTO: 0.08 K/UL (ref 0–0.04)
IMM GRANULOCYTES NFR BLD AUTO: 0.9 % (ref 0–0.5)
LACTATE SERPL-SCNC: 1.9 MMOL/L (ref 0.5–2.2)
LYMPHOCYTES # BLD AUTO: 1.5 K/UL (ref 1–4.8)
LYMPHOCYTES NFR BLD: 17.1 % (ref 18–48)
MCH RBC QN AUTO: 29.4 PG (ref 27–31)
MCHC RBC AUTO-ENTMCNC: 33.4 G/DL (ref 32–36)
MCV RBC AUTO: 88 FL (ref 82–98)
MONOCYTES # BLD AUTO: 1.1 K/UL (ref 0.3–1)
MONOCYTES NFR BLD: 12 % (ref 4–15)
NEUTROPHILS # BLD AUTO: 6.1 K/UL (ref 1.8–7.7)
NEUTROPHILS NFR BLD: 68.9 % (ref 38–73)
NRBC BLD-RTO: 0 /100 WBC
PLATELET # BLD AUTO: 190 K/UL (ref 150–350)
PMV BLD AUTO: 10.8 FL (ref 9.2–12.9)
POCT GLUCOSE: 158 MG/DL (ref 70–110)
POCT GLUCOSE: 222 MG/DL (ref 70–110)
POCT GLUCOSE: 255 MG/DL (ref 70–110)
POCT GLUCOSE: 296 MG/DL (ref 70–110)
POTASSIUM SERPL-SCNC: 3.7 MMOL/L (ref 3.5–5.1)
PROCALCITONIN SERPL IA-MCNC: 0.11 NG/ML
PROT SERPL-MCNC: 5.9 G/DL (ref 6–8.4)
RBC # BLD AUTO: 3.81 M/UL (ref 4–5.4)
SODIUM SERPL-SCNC: 138 MMOL/L (ref 136–145)
TSH SERPL DL<=0.005 MIU/L-ACNC: 0.92 UIU/ML (ref 0.4–4)
WBC # BLD AUTO: 8.82 K/UL (ref 3.9–12.7)

## 2021-03-17 PROCEDURE — 80053 COMPREHEN METABOLIC PANEL: CPT | Performed by: STUDENT IN AN ORGANIZED HEALTH CARE EDUCATION/TRAINING PROGRAM

## 2021-03-17 PROCEDURE — 63600175 PHARM REV CODE 636 W HCPCS: Performed by: FAMILY MEDICINE

## 2021-03-17 PROCEDURE — 82533 TOTAL CORTISOL: CPT | Performed by: INTERNAL MEDICINE

## 2021-03-17 PROCEDURE — 99233 SBSQ HOSP IP/OBS HIGH 50: CPT | Mod: 95,,, | Performed by: INTERNAL MEDICINE

## 2021-03-17 PROCEDURE — 83880 ASSAY OF NATRIURETIC PEPTIDE: CPT | Performed by: INTERNAL MEDICINE

## 2021-03-17 PROCEDURE — 63600175 PHARM REV CODE 636 W HCPCS: Performed by: INTERNAL MEDICINE

## 2021-03-17 PROCEDURE — 25000003 PHARM REV CODE 250: Performed by: FAMILY MEDICINE

## 2021-03-17 PROCEDURE — 11000001 HC ACUTE MED/SURG PRIVATE ROOM

## 2021-03-17 PROCEDURE — 84145 PROCALCITONIN (PCT): CPT | Performed by: INTERNAL MEDICINE

## 2021-03-17 PROCEDURE — 97116 GAIT TRAINING THERAPY: CPT

## 2021-03-17 PROCEDURE — 99233 PR SUBSEQUENT HOSPITAL CARE,LEVL III: ICD-10-PCS | Mod: 95,,, | Performed by: INTERNAL MEDICINE

## 2021-03-17 PROCEDURE — 36415 COLL VENOUS BLD VENIPUNCTURE: CPT | Performed by: INTERNAL MEDICINE

## 2021-03-17 PROCEDURE — 85025 COMPLETE CBC W/AUTO DIFF WBC: CPT | Performed by: STUDENT IN AN ORGANIZED HEALTH CARE EDUCATION/TRAINING PROGRAM

## 2021-03-17 PROCEDURE — 25000003 PHARM REV CODE 250: Performed by: INTERNAL MEDICINE

## 2021-03-17 PROCEDURE — 86140 C-REACTIVE PROTEIN: CPT | Performed by: INTERNAL MEDICINE

## 2021-03-17 PROCEDURE — 84443 ASSAY THYROID STIM HORMONE: CPT | Performed by: INTERNAL MEDICINE

## 2021-03-17 PROCEDURE — 99900035 HC TECH TIME PER 15 MIN (STAT)

## 2021-03-17 PROCEDURE — 87040 BLOOD CULTURE FOR BACTERIA: CPT | Performed by: INTERNAL MEDICINE

## 2021-03-17 PROCEDURE — 85379 FIBRIN DEGRADATION QUANT: CPT | Performed by: STUDENT IN AN ORGANIZED HEALTH CARE EDUCATION/TRAINING PROGRAM

## 2021-03-17 PROCEDURE — 94761 N-INVAS EAR/PLS OXIMETRY MLT: CPT

## 2021-03-17 PROCEDURE — 82728 ASSAY OF FERRITIN: CPT | Performed by: INTERNAL MEDICINE

## 2021-03-17 PROCEDURE — 25000003 PHARM REV CODE 250: Performed by: STUDENT IN AN ORGANIZED HEALTH CARE EDUCATION/TRAINING PROGRAM

## 2021-03-17 PROCEDURE — 83605 ASSAY OF LACTIC ACID: CPT | Performed by: INTERNAL MEDICINE

## 2021-03-17 PROCEDURE — 36415 COLL VENOUS BLD VENIPUNCTURE: CPT | Performed by: STUDENT IN AN ORGANIZED HEALTH CARE EDUCATION/TRAINING PROGRAM

## 2021-03-17 RX ORDER — CEFEPIME HYDROCHLORIDE 1 G/1
1 INJECTION, POWDER, FOR SOLUTION INTRAMUSCULAR; INTRAVENOUS
Status: DISCONTINUED | OUTPATIENT
Start: 2021-03-17 | End: 2021-03-21 | Stop reason: HOSPADM

## 2021-03-17 RX ORDER — INSULIN ASPART 100 [IU]/ML
7 INJECTION, SOLUTION INTRAVENOUS; SUBCUTANEOUS
Status: DISCONTINUED | OUTPATIENT
Start: 2021-03-18 | End: 2021-03-21 | Stop reason: HOSPADM

## 2021-03-17 RX ORDER — DICLOFENAC SODIUM 10 MG/G
2 GEL TOPICAL 3 TIMES DAILY
Status: DISCONTINUED | OUTPATIENT
Start: 2021-03-17 | End: 2021-03-21 | Stop reason: HOSPADM

## 2021-03-17 RX ADMIN — INSULIN ASPART 5 UNITS: 100 INJECTION, SOLUTION INTRAVENOUS; SUBCUTANEOUS at 09:03

## 2021-03-17 RX ADMIN — ATORVASTATIN CALCIUM 80 MG: 20 TABLET, FILM COATED ORAL at 09:03

## 2021-03-17 RX ADMIN — PANTOPRAZOLE SODIUM 40 MG: 40 TABLET, DELAYED RELEASE ORAL at 09:03

## 2021-03-17 RX ADMIN — DICLOFENAC SODIUM 2 G: 10 GEL TOPICAL at 02:03

## 2021-03-17 RX ADMIN — INSULIN ASPART 5 UNITS: 100 INJECTION, SOLUTION INTRAVENOUS; SUBCUTANEOUS at 03:03

## 2021-03-17 RX ADMIN — ALPRAZOLAM 0.5 MG: 0.5 TABLET ORAL at 08:03

## 2021-03-17 RX ADMIN — DOCUSATE SODIUM AND SENNOSIDES 1 TABLET: 8.6; 5 TABLET, FILM COATED ORAL at 09:03

## 2021-03-17 RX ADMIN — OXYCODONE HYDROCHLORIDE AND ACETAMINOPHEN 500 MG: 500 TABLET ORAL at 08:03

## 2021-03-17 RX ADMIN — BENZONATATE 100 MG: 100 CAPSULE ORAL at 08:03

## 2021-03-17 RX ADMIN — CEFEPIME 1 G: 1 INJECTION, POWDER, FOR SOLUTION INTRAMUSCULAR; INTRAVENOUS at 01:03

## 2021-03-17 RX ADMIN — GABAPENTIN 100 MG: 100 CAPSULE ORAL at 08:03

## 2021-03-17 RX ADMIN — HEPARIN SODIUM 5000 UNITS: 5000 INJECTION INTRAVENOUS; SUBCUTANEOUS at 05:03

## 2021-03-17 RX ADMIN — GABAPENTIN 100 MG: 100 CAPSULE ORAL at 02:03

## 2021-03-17 RX ADMIN — HEPARIN SODIUM 5000 UNITS: 5000 INJECTION INTRAVENOUS; SUBCUTANEOUS at 01:03

## 2021-03-17 RX ADMIN — DICLOFENAC SODIUM 2 G: 10 GEL TOPICAL at 08:03

## 2021-03-17 RX ADMIN — VANCOMYCIN HYDROCHLORIDE 1500 MG: 1.5 INJECTION, POWDER, LYOPHILIZED, FOR SOLUTION INTRAVENOUS at 01:03

## 2021-03-17 RX ADMIN — OXYCODONE HYDROCHLORIDE AND ACETAMINOPHEN 500 MG: 500 TABLET ORAL at 09:03

## 2021-03-17 RX ADMIN — GUAIFENESIN AND DEXTROMETHORPHAN HYDROBROMIDE 1 TABLET: 600; 30 TABLET, EXTENDED RELEASE ORAL at 08:03

## 2021-03-17 RX ADMIN — THERA TABS 1 TABLET: TAB at 09:03

## 2021-03-17 RX ADMIN — INSULIN ASPART 2 UNITS: 100 INJECTION, SOLUTION INTRAVENOUS; SUBCUTANEOUS at 12:03

## 2021-03-17 RX ADMIN — CETIRIZINE HYDROCHLORIDE 10 MG: 10 TABLET, FILM COATED ORAL at 08:03

## 2021-03-17 RX ADMIN — INSULIN ASPART 1 UNITS: 100 INJECTION, SOLUTION INTRAVENOUS; SUBCUTANEOUS at 08:03

## 2021-03-17 RX ADMIN — INSULIN DETEMIR 12 UNITS: 100 INJECTION, SOLUTION SUBCUTANEOUS at 09:03

## 2021-03-17 RX ADMIN — ALPRAZOLAM 0.5 MG: 0.5 TABLET ORAL at 09:03

## 2021-03-17 RX ADMIN — INSULIN ASPART 3 UNITS: 100 INJECTION, SOLUTION INTRAVENOUS; SUBCUTANEOUS at 03:03

## 2021-03-17 RX ADMIN — INSULIN ASPART 5 UNITS: 100 INJECTION, SOLUTION INTRAVENOUS; SUBCUTANEOUS at 12:03

## 2021-03-17 RX ADMIN — BIMATOPROST 1 DROP: 0.1 SOLUTION/ DROPS OPHTHALMIC at 08:03

## 2021-03-17 RX ADMIN — GUAIFENESIN AND DEXTROMETHORPHAN HYDROBROMIDE 1 TABLET: 600; 30 TABLET, EXTENDED RELEASE ORAL at 09:03

## 2021-03-17 RX ADMIN — GABAPENTIN 100 MG: 100 CAPSULE ORAL at 09:03

## 2021-03-17 RX ADMIN — DOCUSATE SODIUM AND SENNOSIDES 1 TABLET: 8.6; 5 TABLET, FILM COATED ORAL at 08:03

## 2021-03-17 RX ADMIN — DICLOFENAC SODIUM 2 G: 10 GEL TOPICAL at 10:03

## 2021-03-17 RX ADMIN — BENZONATATE 100 MG: 100 CAPSULE ORAL at 09:03

## 2021-03-17 RX ADMIN — HEPARIN SODIUM 5000 UNITS: 5000 INJECTION INTRAVENOUS; SUBCUTANEOUS at 09:03

## 2021-03-17 RX ADMIN — ACETAMINOPHEN 650 MG: 325 TABLET ORAL at 08:03

## 2021-03-18 LAB
POCT GLUCOSE: 158 MG/DL (ref 70–110)
POCT GLUCOSE: 221 MG/DL (ref 70–110)
POCT GLUCOSE: 264 MG/DL (ref 70–110)
POCT GLUCOSE: 276 MG/DL (ref 70–110)
POCT GLUCOSE: 295 MG/DL (ref 70–110)

## 2021-03-18 PROCEDURE — 99233 SBSQ HOSP IP/OBS HIGH 50: CPT | Mod: 95,,, | Performed by: INTERNAL MEDICINE

## 2021-03-18 PROCEDURE — 11000001 HC ACUTE MED/SURG PRIVATE ROOM

## 2021-03-18 PROCEDURE — 94664 DEMO&/EVAL PT USE INHALER: CPT

## 2021-03-18 PROCEDURE — 25000003 PHARM REV CODE 250: Performed by: FAMILY MEDICINE

## 2021-03-18 PROCEDURE — 63600175 PHARM REV CODE 636 W HCPCS: Performed by: FAMILY MEDICINE

## 2021-03-18 PROCEDURE — 25000003 PHARM REV CODE 250: Performed by: INTERNAL MEDICINE

## 2021-03-18 PROCEDURE — 99233 PR SUBSEQUENT HOSPITAL CARE,LEVL III: ICD-10-PCS | Mod: 95,,, | Performed by: INTERNAL MEDICINE

## 2021-03-18 PROCEDURE — 63600175 PHARM REV CODE 636 W HCPCS: Performed by: INTERNAL MEDICINE

## 2021-03-18 PROCEDURE — 94761 N-INVAS EAR/PLS OXIMETRY MLT: CPT

## 2021-03-18 PROCEDURE — 99900035 HC TECH TIME PER 15 MIN (STAT)

## 2021-03-18 PROCEDURE — 25000003 PHARM REV CODE 250: Performed by: STUDENT IN AN ORGANIZED HEALTH CARE EDUCATION/TRAINING PROGRAM

## 2021-03-18 RX ADMIN — OXYCODONE HYDROCHLORIDE AND ACETAMINOPHEN 500 MG: 500 TABLET ORAL at 08:03

## 2021-03-18 RX ADMIN — GABAPENTIN 100 MG: 100 CAPSULE ORAL at 08:03

## 2021-03-18 RX ADMIN — BENZONATATE 100 MG: 100 CAPSULE ORAL at 09:03

## 2021-03-18 RX ADMIN — INSULIN ASPART 3 UNITS: 100 INJECTION, SOLUTION INTRAVENOUS; SUBCUTANEOUS at 08:03

## 2021-03-18 RX ADMIN — DICLOFENAC SODIUM 2 G: 10 GEL TOPICAL at 02:03

## 2021-03-18 RX ADMIN — INSULIN ASPART 1 UNITS: 100 INJECTION, SOLUTION INTRAVENOUS; SUBCUTANEOUS at 09:03

## 2021-03-18 RX ADMIN — CEFEPIME 1 G: 1 INJECTION, POWDER, FOR SOLUTION INTRAMUSCULAR; INTRAVENOUS at 01:03

## 2021-03-18 RX ADMIN — THERA TABS 1 TABLET: TAB at 08:03

## 2021-03-18 RX ADMIN — DOCUSATE SODIUM AND SENNOSIDES 1 TABLET: 8.6; 5 TABLET, FILM COATED ORAL at 08:03

## 2021-03-18 RX ADMIN — BIMATOPROST 1 DROP: 0.1 SOLUTION/ DROPS OPHTHALMIC at 09:03

## 2021-03-18 RX ADMIN — OXYCODONE HYDROCHLORIDE AND ACETAMINOPHEN 500 MG: 500 TABLET ORAL at 09:03

## 2021-03-18 RX ADMIN — GABAPENTIN 100 MG: 100 CAPSULE ORAL at 02:03

## 2021-03-18 RX ADMIN — BENZONATATE 100 MG: 100 CAPSULE ORAL at 08:03

## 2021-03-18 RX ADMIN — INSULIN ASPART 7 UNITS: 100 INJECTION, SOLUTION INTRAVENOUS; SUBCUTANEOUS at 08:03

## 2021-03-18 RX ADMIN — MELATONIN TAB 3 MG 6 MG: 3 TAB at 09:03

## 2021-03-18 RX ADMIN — VANCOMYCIN HYDROCHLORIDE 1500 MG: 1.5 INJECTION, POWDER, LYOPHILIZED, FOR SOLUTION INTRAVENOUS at 02:03

## 2021-03-18 RX ADMIN — ALPRAZOLAM 0.5 MG: 0.5 TABLET ORAL at 08:03

## 2021-03-18 RX ADMIN — HEPARIN SODIUM 5000 UNITS: 5000 INJECTION INTRAVENOUS; SUBCUTANEOUS at 09:03

## 2021-03-18 RX ADMIN — DICLOFENAC SODIUM 2 G: 10 GEL TOPICAL at 09:03

## 2021-03-18 RX ADMIN — CETIRIZINE HYDROCHLORIDE 10 MG: 10 TABLET, FILM COATED ORAL at 09:03

## 2021-03-18 RX ADMIN — INSULIN ASPART 7 UNITS: 100 INJECTION, SOLUTION INTRAVENOUS; SUBCUTANEOUS at 12:03

## 2021-03-18 RX ADMIN — GUAIFENESIN AND DEXTROMETHORPHAN HYDROBROMIDE 1 TABLET: 600; 30 TABLET, EXTENDED RELEASE ORAL at 09:03

## 2021-03-18 RX ADMIN — GUAIFENESIN AND DEXTROMETHORPHAN HYDROBROMIDE 1 TABLET: 600; 30 TABLET, EXTENDED RELEASE ORAL at 08:03

## 2021-03-18 RX ADMIN — INSULIN ASPART 2 UNITS: 100 INJECTION, SOLUTION INTRAVENOUS; SUBCUTANEOUS at 04:03

## 2021-03-18 RX ADMIN — DOCUSATE SODIUM AND SENNOSIDES 1 TABLET: 8.6; 5 TABLET, FILM COATED ORAL at 09:03

## 2021-03-18 RX ADMIN — DICLOFENAC SODIUM 2 G: 10 GEL TOPICAL at 08:03

## 2021-03-18 RX ADMIN — PANTOPRAZOLE SODIUM 40 MG: 40 TABLET, DELAYED RELEASE ORAL at 08:03

## 2021-03-18 RX ADMIN — INSULIN ASPART 3 UNITS: 100 INJECTION, SOLUTION INTRAVENOUS; SUBCUTANEOUS at 12:03

## 2021-03-18 RX ADMIN — HEPARIN SODIUM 5000 UNITS: 5000 INJECTION INTRAVENOUS; SUBCUTANEOUS at 01:03

## 2021-03-18 RX ADMIN — INSULIN DETEMIR 12 UNITS: 100 INJECTION, SOLUTION SUBCUTANEOUS at 08:03

## 2021-03-18 RX ADMIN — ATORVASTATIN CALCIUM 80 MG: 20 TABLET, FILM COATED ORAL at 08:03

## 2021-03-18 RX ADMIN — INSULIN ASPART 7 UNITS: 100 INJECTION, SOLUTION INTRAVENOUS; SUBCUTANEOUS at 04:03

## 2021-03-18 RX ADMIN — GABAPENTIN 100 MG: 100 CAPSULE ORAL at 09:03

## 2021-03-18 RX ADMIN — HEPARIN SODIUM 5000 UNITS: 5000 INJECTION INTRAVENOUS; SUBCUTANEOUS at 05:03

## 2021-03-19 DIAGNOSIS — Z79.4 TYPE 2 DIABETES MELLITUS WITH DIABETIC POLYNEUROPATHY, WITH LONG-TERM CURRENT USE OF INSULIN: ICD-10-CM

## 2021-03-19 DIAGNOSIS — E11.42 TYPE 2 DIABETES MELLITUS WITH DIABETIC POLYNEUROPATHY, WITH LONG-TERM CURRENT USE OF INSULIN: ICD-10-CM

## 2021-03-19 LAB
ALBUMIN SERPL BCP-MCNC: 2.1 G/DL (ref 3.5–5.2)
ALP SERPL-CCNC: 93 U/L (ref 55–135)
ALT SERPL W/O P-5'-P-CCNC: 56 U/L (ref 10–44)
ANION GAP SERPL CALC-SCNC: 9 MMOL/L (ref 8–16)
AST SERPL-CCNC: 41 U/L (ref 10–40)
BASOPHILS # BLD AUTO: 0.04 K/UL (ref 0–0.2)
BASOPHILS NFR BLD: 0.5 % (ref 0–1.9)
BILIRUB SERPL-MCNC: 0.4 MG/DL (ref 0.1–1)
BUN SERPL-MCNC: 11 MG/DL (ref 8–23)
CALCIUM SERPL-MCNC: 8.1 MG/DL (ref 8.7–10.5)
CHLORIDE SERPL-SCNC: 104 MMOL/L (ref 95–110)
CO2 SERPL-SCNC: 25 MMOL/L (ref 23–29)
CREAT SERPL-MCNC: 0.7 MG/DL (ref 0.5–1.4)
CRP SERPL-MCNC: 105 MG/L (ref 0–8.2)
D DIMER PPP IA.FEU-MCNC: 0.95 MG/L FEU
DIFFERENTIAL METHOD: ABNORMAL
EOSINOPHIL # BLD AUTO: 0.1 K/UL (ref 0–0.5)
EOSINOPHIL NFR BLD: 1.7 % (ref 0–8)
ERYTHROCYTE [DISTWIDTH] IN BLOOD BY AUTOMATED COUNT: 13 % (ref 11.5–14.5)
EST. GFR  (AFRICAN AMERICAN): >60 ML/MIN/1.73 M^2
EST. GFR  (NON AFRICAN AMERICAN): >60 ML/MIN/1.73 M^2
FERRITIN SERPL-MCNC: 465 NG/ML (ref 20–300)
GLUCOSE SERPL-MCNC: 187 MG/DL (ref 70–110)
HCT VFR BLD AUTO: 32.6 % (ref 37–48.5)
HGB BLD-MCNC: 10.8 G/DL (ref 12–16)
IMM GRANULOCYTES # BLD AUTO: 0.07 K/UL (ref 0–0.04)
IMM GRANULOCYTES NFR BLD AUTO: 0.9 % (ref 0–0.5)
LYMPHOCYTES # BLD AUTO: 1.6 K/UL (ref 1–4.8)
LYMPHOCYTES NFR BLD: 19.4 % (ref 18–48)
MCH RBC QN AUTO: 28.7 PG (ref 27–31)
MCHC RBC AUTO-ENTMCNC: 33.1 G/DL (ref 32–36)
MCV RBC AUTO: 87 FL (ref 82–98)
MONOCYTES # BLD AUTO: 0.6 K/UL (ref 0.3–1)
MONOCYTES NFR BLD: 7.3 % (ref 4–15)
NEUTROPHILS # BLD AUTO: 5.7 K/UL (ref 1.8–7.7)
NEUTROPHILS NFR BLD: 70.2 % (ref 38–73)
NRBC BLD-RTO: 0 /100 WBC
PLATELET # BLD AUTO: 200 K/UL (ref 150–350)
PMV BLD AUTO: 10.7 FL (ref 9.2–12.9)
POCT GLUCOSE: 180 MG/DL (ref 70–110)
POCT GLUCOSE: 254 MG/DL (ref 70–110)
POCT GLUCOSE: 272 MG/DL (ref 70–110)
POCT GLUCOSE: 291 MG/DL (ref 70–110)
POTASSIUM SERPL-SCNC: 4 MMOL/L (ref 3.5–5.1)
PROT SERPL-MCNC: 6.4 G/DL (ref 6–8.4)
RBC # BLD AUTO: 3.76 M/UL (ref 4–5.4)
SODIUM SERPL-SCNC: 138 MMOL/L (ref 136–145)
VANCOMYCIN TROUGH SERPL-MCNC: 5.8 UG/ML (ref 10–22)
WBC # BLD AUTO: 8.1 K/UL (ref 3.9–12.7)

## 2021-03-19 PROCEDURE — 80053 COMPREHEN METABOLIC PANEL: CPT | Performed by: STUDENT IN AN ORGANIZED HEALTH CARE EDUCATION/TRAINING PROGRAM

## 2021-03-19 PROCEDURE — 80202 ASSAY OF VANCOMYCIN: CPT | Performed by: INTERNAL MEDICINE

## 2021-03-19 PROCEDURE — 82728 ASSAY OF FERRITIN: CPT | Performed by: INTERNAL MEDICINE

## 2021-03-19 PROCEDURE — 85379 FIBRIN DEGRADATION QUANT: CPT | Performed by: STUDENT IN AN ORGANIZED HEALTH CARE EDUCATION/TRAINING PROGRAM

## 2021-03-19 PROCEDURE — 99233 PR SUBSEQUENT HOSPITAL CARE,LEVL III: ICD-10-PCS | Mod: 95,,, | Performed by: INTERNAL MEDICINE

## 2021-03-19 PROCEDURE — 85025 COMPLETE CBC W/AUTO DIFF WBC: CPT | Performed by: STUDENT IN AN ORGANIZED HEALTH CARE EDUCATION/TRAINING PROGRAM

## 2021-03-19 PROCEDURE — 63600175 PHARM REV CODE 636 W HCPCS: Performed by: FAMILY MEDICINE

## 2021-03-19 PROCEDURE — 36415 COLL VENOUS BLD VENIPUNCTURE: CPT | Performed by: STUDENT IN AN ORGANIZED HEALTH CARE EDUCATION/TRAINING PROGRAM

## 2021-03-19 PROCEDURE — 99233 SBSQ HOSP IP/OBS HIGH 50: CPT | Mod: 95,,, | Performed by: INTERNAL MEDICINE

## 2021-03-19 PROCEDURE — 86140 C-REACTIVE PROTEIN: CPT | Performed by: INTERNAL MEDICINE

## 2021-03-19 PROCEDURE — 63600175 PHARM REV CODE 636 W HCPCS: Performed by: INTERNAL MEDICINE

## 2021-03-19 PROCEDURE — 11000001 HC ACUTE MED/SURG PRIVATE ROOM

## 2021-03-19 PROCEDURE — 36415 COLL VENOUS BLD VENIPUNCTURE: CPT | Performed by: INTERNAL MEDICINE

## 2021-03-19 PROCEDURE — 25000003 PHARM REV CODE 250: Performed by: INTERNAL MEDICINE

## 2021-03-19 PROCEDURE — 25000003 PHARM REV CODE 250: Performed by: FAMILY MEDICINE

## 2021-03-19 RX ORDER — HUMAN INSULIN 100 [IU]/ML
INJECTION, SUSPENSION SUBCUTANEOUS
Qty: 50 ML | Refills: 4 | Status: SHIPPED | OUTPATIENT
Start: 2021-03-19 | End: 2021-09-29 | Stop reason: SDUPTHER

## 2021-03-19 RX ADMIN — INSULIN ASPART 3 UNITS: 100 INJECTION, SOLUTION INTRAVENOUS; SUBCUTANEOUS at 12:03

## 2021-03-19 RX ADMIN — DOCUSATE SODIUM AND SENNOSIDES 1 TABLET: 8.6; 5 TABLET, FILM COATED ORAL at 08:03

## 2021-03-19 RX ADMIN — GABAPENTIN 100 MG: 100 CAPSULE ORAL at 04:03

## 2021-03-19 RX ADMIN — VANCOMYCIN HYDROCHLORIDE 1250 MG: 1.25 INJECTION, POWDER, LYOPHILIZED, FOR SOLUTION INTRAVENOUS at 04:03

## 2021-03-19 RX ADMIN — BIMATOPROST 1 DROP: 0.1 SOLUTION/ DROPS OPHTHALMIC at 08:03

## 2021-03-19 RX ADMIN — ATORVASTATIN CALCIUM 80 MG: 20 TABLET, FILM COATED ORAL at 08:03

## 2021-03-19 RX ADMIN — BENZONATATE 100 MG: 100 CAPSULE ORAL at 08:03

## 2021-03-19 RX ADMIN — INSULIN ASPART 3 UNITS: 100 INJECTION, SOLUTION INTRAVENOUS; SUBCUTANEOUS at 05:03

## 2021-03-19 RX ADMIN — GUAIFENESIN AND DEXTROMETHORPHAN HYDROBROMIDE 1 TABLET: 600; 30 TABLET, EXTENDED RELEASE ORAL at 08:03

## 2021-03-19 RX ADMIN — GABAPENTIN 100 MG: 100 CAPSULE ORAL at 08:03

## 2021-03-19 RX ADMIN — INSULIN ASPART 7 UNITS: 100 INJECTION, SOLUTION INTRAVENOUS; SUBCUTANEOUS at 12:03

## 2021-03-19 RX ADMIN — CEFEPIME 1 G: 1 INJECTION, POWDER, FOR SOLUTION INTRAMUSCULAR; INTRAVENOUS at 01:03

## 2021-03-19 RX ADMIN — INSULIN DETEMIR 12 UNITS: 100 INJECTION, SOLUTION SUBCUTANEOUS at 08:03

## 2021-03-19 RX ADMIN — THERA TABS 1 TABLET: TAB at 08:03

## 2021-03-19 RX ADMIN — HEPARIN SODIUM 5000 UNITS: 5000 INJECTION INTRAVENOUS; SUBCUTANEOUS at 05:03

## 2021-03-19 RX ADMIN — INSULIN ASPART 7 UNITS: 100 INJECTION, SOLUTION INTRAVENOUS; SUBCUTANEOUS at 08:03

## 2021-03-19 RX ADMIN — OXYCODONE HYDROCHLORIDE AND ACETAMINOPHEN 500 MG: 500 TABLET ORAL at 08:03

## 2021-03-19 RX ADMIN — INSULIN ASPART 7 UNITS: 100 INJECTION, SOLUTION INTRAVENOUS; SUBCUTANEOUS at 05:03

## 2021-03-19 RX ADMIN — CETIRIZINE HYDROCHLORIDE 10 MG: 10 TABLET, FILM COATED ORAL at 08:03

## 2021-03-19 RX ADMIN — DICLOFENAC SODIUM 2 G: 10 GEL TOPICAL at 08:03

## 2021-03-19 RX ADMIN — HEPARIN SODIUM 5000 UNITS: 5000 INJECTION INTRAVENOUS; SUBCUTANEOUS at 08:03

## 2021-03-19 RX ADMIN — INSULIN ASPART 1 UNITS: 100 INJECTION, SOLUTION INTRAVENOUS; SUBCUTANEOUS at 08:03

## 2021-03-19 RX ADMIN — HEPARIN SODIUM 5000 UNITS: 5000 INJECTION INTRAVENOUS; SUBCUTANEOUS at 01:03

## 2021-03-19 RX ADMIN — PANTOPRAZOLE SODIUM 40 MG: 40 TABLET, DELAYED RELEASE ORAL at 08:03

## 2021-03-20 LAB
POCT GLUCOSE: 145 MG/DL (ref 70–110)
POCT GLUCOSE: 162 MG/DL (ref 70–110)
POCT GLUCOSE: 242 MG/DL (ref 70–110)
POCT GLUCOSE: 317 MG/DL (ref 70–110)

## 2021-03-20 PROCEDURE — 99233 SBSQ HOSP IP/OBS HIGH 50: CPT | Mod: 95,,, | Performed by: INTERNAL MEDICINE

## 2021-03-20 PROCEDURE — 63600175 PHARM REV CODE 636 W HCPCS: Performed by: FAMILY MEDICINE

## 2021-03-20 PROCEDURE — 25000003 PHARM REV CODE 250: Performed by: INTERNAL MEDICINE

## 2021-03-20 PROCEDURE — 63600175 PHARM REV CODE 636 W HCPCS: Performed by: INTERNAL MEDICINE

## 2021-03-20 PROCEDURE — 99233 PR SUBSEQUENT HOSPITAL CARE,LEVL III: ICD-10-PCS | Mod: 95,,, | Performed by: INTERNAL MEDICINE

## 2021-03-20 PROCEDURE — 25000003 PHARM REV CODE 250: Performed by: FAMILY MEDICINE

## 2021-03-20 PROCEDURE — 11000001 HC ACUTE MED/SURG PRIVATE ROOM

## 2021-03-20 RX ADMIN — GUAIFENESIN AND DEXTROMETHORPHAN HYDROBROMIDE 1 TABLET: 600; 30 TABLET, EXTENDED RELEASE ORAL at 08:03

## 2021-03-20 RX ADMIN — DOCUSATE SODIUM AND SENNOSIDES 1 TABLET: 8.6; 5 TABLET, FILM COATED ORAL at 09:03

## 2021-03-20 RX ADMIN — GABAPENTIN 100 MG: 100 CAPSULE ORAL at 08:03

## 2021-03-20 RX ADMIN — DICLOFENAC SODIUM 2 G: 10 GEL TOPICAL at 02:03

## 2021-03-20 RX ADMIN — PANTOPRAZOLE SODIUM 40 MG: 40 TABLET, DELAYED RELEASE ORAL at 08:03

## 2021-03-20 RX ADMIN — OXYCODONE HYDROCHLORIDE AND ACETAMINOPHEN 500 MG: 500 TABLET ORAL at 09:03

## 2021-03-20 RX ADMIN — HEPARIN SODIUM 5000 UNITS: 5000 INJECTION INTRAVENOUS; SUBCUTANEOUS at 09:03

## 2021-03-20 RX ADMIN — VANCOMYCIN HYDROCHLORIDE 1250 MG: 1.25 INJECTION, POWDER, LYOPHILIZED, FOR SOLUTION INTRAVENOUS at 07:03

## 2021-03-20 RX ADMIN — MELATONIN TAB 3 MG 6 MG: 3 TAB at 09:03

## 2021-03-20 RX ADMIN — HEPARIN SODIUM 5000 UNITS: 5000 INJECTION INTRAVENOUS; SUBCUTANEOUS at 02:03

## 2021-03-20 RX ADMIN — ATORVASTATIN CALCIUM 80 MG: 20 TABLET, FILM COATED ORAL at 08:03

## 2021-03-20 RX ADMIN — GABAPENTIN 100 MG: 100 CAPSULE ORAL at 02:03

## 2021-03-20 RX ADMIN — INSULIN ASPART 7 UNITS: 100 INJECTION, SOLUTION INTRAVENOUS; SUBCUTANEOUS at 04:03

## 2021-03-20 RX ADMIN — BENZONATATE 100 MG: 100 CAPSULE ORAL at 08:03

## 2021-03-20 RX ADMIN — INSULIN ASPART 2 UNITS: 100 INJECTION, SOLUTION INTRAVENOUS; SUBCUTANEOUS at 09:03

## 2021-03-20 RX ADMIN — CEFEPIME 1 G: 1 INJECTION, POWDER, FOR SOLUTION INTRAMUSCULAR; INTRAVENOUS at 12:03

## 2021-03-20 RX ADMIN — VANCOMYCIN HYDROCHLORIDE 1250 MG: 1.25 INJECTION, POWDER, LYOPHILIZED, FOR SOLUTION INTRAVENOUS at 05:03

## 2021-03-20 RX ADMIN — INSULIN ASPART 7 UNITS: 100 INJECTION, SOLUTION INTRAVENOUS; SUBCUTANEOUS at 11:03

## 2021-03-20 RX ADMIN — ACETAMINOPHEN 650 MG: 325 TABLET ORAL at 12:03

## 2021-03-20 RX ADMIN — DICLOFENAC SODIUM 2 G: 10 GEL TOPICAL at 09:03

## 2021-03-20 RX ADMIN — ACETAMINOPHEN 650 MG: 325 TABLET ORAL at 09:03

## 2021-03-20 RX ADMIN — BENZONATATE 100 MG: 100 CAPSULE ORAL at 09:03

## 2021-03-20 RX ADMIN — BIMATOPROST 1 DROP: 0.1 SOLUTION/ DROPS OPHTHALMIC at 09:03

## 2021-03-20 RX ADMIN — CETIRIZINE HYDROCHLORIDE 10 MG: 10 TABLET, FILM COATED ORAL at 09:03

## 2021-03-20 RX ADMIN — INSULIN DETEMIR 12 UNITS: 100 INJECTION, SOLUTION SUBCUTANEOUS at 08:03

## 2021-03-20 RX ADMIN — GUAIFENESIN AND DEXTROMETHORPHAN HYDROBROMIDE 1 TABLET: 600; 30 TABLET, EXTENDED RELEASE ORAL at 09:03

## 2021-03-20 RX ADMIN — THERA TABS 1 TABLET: TAB at 08:03

## 2021-03-20 RX ADMIN — Medication 1 CAPSULE: at 09:03

## 2021-03-20 RX ADMIN — INSULIN ASPART 7 UNITS: 100 INJECTION, SOLUTION INTRAVENOUS; SUBCUTANEOUS at 08:03

## 2021-03-20 RX ADMIN — GABAPENTIN 100 MG: 100 CAPSULE ORAL at 09:03

## 2021-03-20 RX ADMIN — DICLOFENAC SODIUM 2 G: 10 GEL TOPICAL at 08:03

## 2021-03-20 RX ADMIN — OXYCODONE HYDROCHLORIDE AND ACETAMINOPHEN 500 MG: 500 TABLET ORAL at 08:03

## 2021-03-20 RX ADMIN — DOCUSATE SODIUM AND SENNOSIDES 1 TABLET: 8.6; 5 TABLET, FILM COATED ORAL at 08:03

## 2021-03-20 RX ADMIN — HEPARIN SODIUM 5000 UNITS: 5000 INJECTION INTRAVENOUS; SUBCUTANEOUS at 06:03

## 2021-03-20 RX ADMIN — CEFEPIME 1 G: 1 INJECTION, POWDER, FOR SOLUTION INTRAMUSCULAR; INTRAVENOUS at 02:03

## 2021-03-21 VITALS
HEIGHT: 59 IN | BODY MASS INDEX: 25.15 KG/M2 | DIASTOLIC BLOOD PRESSURE: 53 MMHG | SYSTOLIC BLOOD PRESSURE: 109 MMHG | HEART RATE: 74 BPM | TEMPERATURE: 98 F | RESPIRATION RATE: 22 BRPM | OXYGEN SATURATION: 96 % | WEIGHT: 124.75 LBS

## 2021-03-21 PROBLEM — R53.81 DEBILITY: Status: RESOLVED | Noted: 2020-05-19 | Resolved: 2021-03-21

## 2021-03-21 LAB
ALBUMIN SERPL BCP-MCNC: 2.2 G/DL (ref 3.5–5.2)
ALP SERPL-CCNC: 99 U/L (ref 55–135)
ALT SERPL W/O P-5'-P-CCNC: 64 U/L (ref 10–44)
ANION GAP SERPL CALC-SCNC: 10 MMOL/L (ref 8–16)
AST SERPL-CCNC: 53 U/L (ref 10–40)
BASOPHILS # BLD AUTO: 0.04 K/UL (ref 0–0.2)
BASOPHILS NFR BLD: 0.6 % (ref 0–1.9)
BILIRUB SERPL-MCNC: 0.5 MG/DL (ref 0.1–1)
BUN SERPL-MCNC: 11 MG/DL (ref 8–23)
CALCIUM SERPL-MCNC: 8.5 MG/DL (ref 8.7–10.5)
CHLORIDE SERPL-SCNC: 105 MMOL/L (ref 95–110)
CO2 SERPL-SCNC: 25 MMOL/L (ref 23–29)
CREAT SERPL-MCNC: 0.7 MG/DL (ref 0.5–1.4)
CRP SERPL-MCNC: 120.6 MG/L (ref 0–8.2)
D DIMER PPP IA.FEU-MCNC: 0.8 MG/L FEU
DIFFERENTIAL METHOD: ABNORMAL
EOSINOPHIL # BLD AUTO: 0.3 K/UL (ref 0–0.5)
EOSINOPHIL NFR BLD: 4.2 % (ref 0–8)
ERYTHROCYTE [DISTWIDTH] IN BLOOD BY AUTOMATED COUNT: 13.3 % (ref 11.5–14.5)
EST. GFR  (AFRICAN AMERICAN): >60 ML/MIN/1.73 M^2
EST. GFR  (NON AFRICAN AMERICAN): >60 ML/MIN/1.73 M^2
FERRITIN SERPL-MCNC: 486 NG/ML (ref 20–300)
GLUCOSE SERPL-MCNC: 137 MG/DL (ref 70–110)
HCT VFR BLD AUTO: 35.2 % (ref 37–48.5)
HGB BLD-MCNC: 11.5 G/DL (ref 12–16)
IMM GRANULOCYTES # BLD AUTO: 0.05 K/UL (ref 0–0.04)
IMM GRANULOCYTES NFR BLD AUTO: 0.7 % (ref 0–0.5)
LYMPHOCYTES # BLD AUTO: 1.7 K/UL (ref 1–4.8)
LYMPHOCYTES NFR BLD: 23.9 % (ref 18–48)
MCH RBC QN AUTO: 28.5 PG (ref 27–31)
MCHC RBC AUTO-ENTMCNC: 32.7 G/DL (ref 32–36)
MCV RBC AUTO: 87 FL (ref 82–98)
MONOCYTES # BLD AUTO: 0.5 K/UL (ref 0.3–1)
MONOCYTES NFR BLD: 6.7 % (ref 4–15)
NEUTROPHILS # BLD AUTO: 4.6 K/UL (ref 1.8–7.7)
NEUTROPHILS NFR BLD: 63.9 % (ref 38–73)
NRBC BLD-RTO: 0 /100 WBC
PLATELET # BLD AUTO: 217 K/UL (ref 150–350)
PMV BLD AUTO: 9.9 FL (ref 9.2–12.9)
POCT GLUCOSE: 180 MG/DL (ref 70–110)
POCT GLUCOSE: 310 MG/DL (ref 70–110)
POTASSIUM SERPL-SCNC: 4 MMOL/L (ref 3.5–5.1)
PROT SERPL-MCNC: 7 G/DL (ref 6–8.4)
RBC # BLD AUTO: 4.04 M/UL (ref 4–5.4)
SODIUM SERPL-SCNC: 140 MMOL/L (ref 136–145)
VANCOMYCIN TROUGH SERPL-MCNC: 22.4 UG/ML (ref 10–22)
WBC # BLD AUTO: 7.2 K/UL (ref 3.9–12.7)

## 2021-03-21 PROCEDURE — 85379 FIBRIN DEGRADATION QUANT: CPT | Performed by: STUDENT IN AN ORGANIZED HEALTH CARE EDUCATION/TRAINING PROGRAM

## 2021-03-21 PROCEDURE — 63600175 PHARM REV CODE 636 W HCPCS: Performed by: INTERNAL MEDICINE

## 2021-03-21 PROCEDURE — 80202 ASSAY OF VANCOMYCIN: CPT | Performed by: INTERNAL MEDICINE

## 2021-03-21 PROCEDURE — 1111F PR DISCHARGE MEDS RECONCILED W/ CURRENT OUTPATIENT MED LIST: ICD-10-PCS | Mod: 95,CPTII,, | Performed by: INTERNAL MEDICINE

## 2021-03-21 PROCEDURE — 1111F DSCHRG MED/CURRENT MED MERGE: CPT | Mod: 95,CPTII,, | Performed by: INTERNAL MEDICINE

## 2021-03-21 PROCEDURE — 80053 COMPREHEN METABOLIC PANEL: CPT | Performed by: STUDENT IN AN ORGANIZED HEALTH CARE EDUCATION/TRAINING PROGRAM

## 2021-03-21 PROCEDURE — 97116 GAIT TRAINING THERAPY: CPT

## 2021-03-21 PROCEDURE — 25000003 PHARM REV CODE 250: Performed by: INTERNAL MEDICINE

## 2021-03-21 PROCEDURE — 85025 COMPLETE CBC W/AUTO DIFF WBC: CPT | Performed by: STUDENT IN AN ORGANIZED HEALTH CARE EDUCATION/TRAINING PROGRAM

## 2021-03-21 PROCEDURE — 36415 COLL VENOUS BLD VENIPUNCTURE: CPT | Performed by: INTERNAL MEDICINE

## 2021-03-21 PROCEDURE — 63600175 PHARM REV CODE 636 W HCPCS: Performed by: FAMILY MEDICINE

## 2021-03-21 PROCEDURE — 82728 ASSAY OF FERRITIN: CPT | Performed by: INTERNAL MEDICINE

## 2021-03-21 PROCEDURE — 25000003 PHARM REV CODE 250: Performed by: FAMILY MEDICINE

## 2021-03-21 PROCEDURE — 86140 C-REACTIVE PROTEIN: CPT | Performed by: INTERNAL MEDICINE

## 2021-03-21 PROCEDURE — 99239 PR HOSPITAL DISCHARGE DAY,>30 MIN: ICD-10-PCS | Mod: 95,,, | Performed by: INTERNAL MEDICINE

## 2021-03-21 PROCEDURE — 99239 HOSP IP/OBS DSCHRG MGMT >30: CPT | Mod: 95,,, | Performed by: INTERNAL MEDICINE

## 2021-03-21 RX ORDER — GUAIFENESIN/DEXTROMETHORPHAN 100-10MG/5
5 SYRUP ORAL EVERY 6 HOURS PRN
Refills: 0 | COMMUNITY
Start: 2021-03-21 | End: 2021-03-31

## 2021-03-21 RX ORDER — LEVOFLOXACIN 750 MG/1
750 TABLET ORAL DAILY
Qty: 3 TABLET | Refills: 0 | Status: SHIPPED | OUTPATIENT
Start: 2021-03-22 | End: 2021-03-25

## 2021-03-21 RX ADMIN — DICLOFENAC SODIUM 2 G: 10 GEL TOPICAL at 08:03

## 2021-03-21 RX ADMIN — INSULIN ASPART 7 UNITS: 100 INJECTION, SOLUTION INTRAVENOUS; SUBCUTANEOUS at 01:03

## 2021-03-21 RX ADMIN — INSULIN ASPART 7 UNITS: 100 INJECTION, SOLUTION INTRAVENOUS; SUBCUTANEOUS at 08:03

## 2021-03-21 RX ADMIN — THERA TABS 1 TABLET: TAB at 08:03

## 2021-03-21 RX ADMIN — VANCOMYCIN HYDROCHLORIDE 1250 MG: 1.25 INJECTION, POWDER, LYOPHILIZED, FOR SOLUTION INTRAVENOUS at 08:03

## 2021-03-21 RX ADMIN — GUAIFENESIN AND DEXTROMETHORPHAN HYDROBROMIDE 1 TABLET: 600; 30 TABLET, EXTENDED RELEASE ORAL at 08:03

## 2021-03-21 RX ADMIN — DOCUSATE SODIUM AND SENNOSIDES 1 TABLET: 8.6; 5 TABLET, FILM COATED ORAL at 08:03

## 2021-03-21 RX ADMIN — CEFEPIME 1 G: 1 INJECTION, POWDER, FOR SOLUTION INTRAMUSCULAR; INTRAVENOUS at 01:03

## 2021-03-21 RX ADMIN — BENZONATATE 100 MG: 100 CAPSULE ORAL at 08:03

## 2021-03-21 RX ADMIN — GABAPENTIN 100 MG: 100 CAPSULE ORAL at 08:03

## 2021-03-21 RX ADMIN — INSULIN DETEMIR 12 UNITS: 100 INJECTION, SOLUTION SUBCUTANEOUS at 08:03

## 2021-03-21 RX ADMIN — Medication 1 CAPSULE: at 08:03

## 2021-03-21 RX ADMIN — HEPARIN SODIUM 5000 UNITS: 5000 INJECTION INTRAVENOUS; SUBCUTANEOUS at 06:03

## 2021-03-21 RX ADMIN — PANTOPRAZOLE SODIUM 40 MG: 40 TABLET, DELAYED RELEASE ORAL at 08:03

## 2021-03-21 RX ADMIN — ATORVASTATIN CALCIUM 80 MG: 20 TABLET, FILM COATED ORAL at 08:03

## 2021-03-22 LAB — BACTERIA BLD CULT: NORMAL

## 2021-03-24 ENCOUNTER — PATIENT MESSAGE (OUTPATIENT)
Dept: INTERNAL MEDICINE | Facility: CLINIC | Age: 76
End: 2021-03-24

## 2021-03-26 ENCOUNTER — OFFICE VISIT (OUTPATIENT)
Dept: FAMILY MEDICINE | Facility: CLINIC | Age: 76
End: 2021-03-26
Payer: MEDICARE

## 2021-03-26 VITALS
TEMPERATURE: 98 F | BODY MASS INDEX: 22.87 KG/M2 | HEART RATE: 92 BPM | SYSTOLIC BLOOD PRESSURE: 116 MMHG | HEIGHT: 59 IN | WEIGHT: 113.44 LBS | OXYGEN SATURATION: 95 % | DIASTOLIC BLOOD PRESSURE: 76 MMHG

## 2021-03-26 DIAGNOSIS — M25.561 RECURRENT PAIN OF RIGHT KNEE: ICD-10-CM

## 2021-03-26 DIAGNOSIS — E11.42 TYPE 2 DIABETES MELLITUS WITH DIABETIC POLYNEUROPATHY: ICD-10-CM

## 2021-03-26 DIAGNOSIS — K21.9 GASTROESOPHAGEAL REFLUX DISEASE WITHOUT ESOPHAGITIS: ICD-10-CM

## 2021-03-26 DIAGNOSIS — E11.8 TYPE 2 DIABETES MELLITUS WITH COMPLICATION, WITH LONG-TERM CURRENT USE OF INSULIN: ICD-10-CM

## 2021-03-26 DIAGNOSIS — R05.9 COUGH: ICD-10-CM

## 2021-03-26 DIAGNOSIS — Z79.4 TYPE 2 DIABETES MELLITUS WITH COMPLICATION, WITH LONG-TERM CURRENT USE OF INSULIN: ICD-10-CM

## 2021-03-26 DIAGNOSIS — L29.9 PRURITUS: ICD-10-CM

## 2021-03-26 DIAGNOSIS — E78.00 PURE HYPERCHOLESTEROLEMIA: ICD-10-CM

## 2021-03-26 DIAGNOSIS — Z09 HOSPITAL DISCHARGE FOLLOW-UP: Primary | ICD-10-CM

## 2021-03-26 DIAGNOSIS — M54.31 SCIATICA OF RIGHT SIDE: ICD-10-CM

## 2021-03-26 PROCEDURE — 99214 OFFICE O/P EST MOD 30 MIN: CPT | Mod: S$GLB,,, | Performed by: STUDENT IN AN ORGANIZED HEALTH CARE EDUCATION/TRAINING PROGRAM

## 2021-03-26 PROCEDURE — 1126F PR PAIN SEVERITY QUANTIFIED, NO PAIN PRESENT: ICD-10-PCS | Mod: S$GLB,,, | Performed by: STUDENT IN AN ORGANIZED HEALTH CARE EDUCATION/TRAINING PROGRAM

## 2021-03-26 PROCEDURE — 1101F PR PT FALLS ASSESS DOC 0-1 FALLS W/OUT INJ PAST YR: ICD-10-PCS | Mod: CPTII,S$GLB,, | Performed by: STUDENT IN AN ORGANIZED HEALTH CARE EDUCATION/TRAINING PROGRAM

## 2021-03-26 PROCEDURE — 1101F PT FALLS ASSESS-DOCD LE1/YR: CPT | Mod: CPTII,S$GLB,, | Performed by: STUDENT IN AN ORGANIZED HEALTH CARE EDUCATION/TRAINING PROGRAM

## 2021-03-26 PROCEDURE — 3288F PR FALLS RISK ASSESSMENT DOCUMENTED: ICD-10-PCS | Mod: CPTII,S$GLB,, | Performed by: STUDENT IN AN ORGANIZED HEALTH CARE EDUCATION/TRAINING PROGRAM

## 2021-03-26 PROCEDURE — 99214 PR OFFICE/OUTPT VISIT, EST, LEVL IV, 30-39 MIN: ICD-10-PCS | Mod: S$GLB,,, | Performed by: STUDENT IN AN ORGANIZED HEALTH CARE EDUCATION/TRAINING PROGRAM

## 2021-03-26 PROCEDURE — 3288F FALL RISK ASSESSMENT DOCD: CPT | Mod: CPTII,S$GLB,, | Performed by: STUDENT IN AN ORGANIZED HEALTH CARE EDUCATION/TRAINING PROGRAM

## 2021-03-26 PROCEDURE — 1126F AMNT PAIN NOTED NONE PRSNT: CPT | Mod: S$GLB,,, | Performed by: STUDENT IN AN ORGANIZED HEALTH CARE EDUCATION/TRAINING PROGRAM

## 2021-03-26 RX ORDER — ALBUTEROL SULFATE 90 UG/1
2 AEROSOL, METERED RESPIRATORY (INHALATION) EVERY 4 HOURS PRN
Qty: 18 G | Refills: 3 | Status: SHIPPED | OUTPATIENT
Start: 2021-03-26 | End: 2021-06-23

## 2021-03-26 RX ORDER — ATORVASTATIN CALCIUM 80 MG/1
80 TABLET, FILM COATED ORAL DAILY
Qty: 90 TABLET | Refills: 3 | Status: SHIPPED | OUTPATIENT
Start: 2021-03-26 | End: 2021-11-18 | Stop reason: SDUPTHER

## 2021-03-26 RX ORDER — LEVOCETIRIZINE DIHYDROCHLORIDE 5 MG/1
5 TABLET, FILM COATED ORAL NIGHTLY
Qty: 90 TABLET | Refills: 3 | Status: SHIPPED | OUTPATIENT
Start: 2021-03-26 | End: 2021-09-29 | Stop reason: SDUPTHER

## 2021-03-26 RX ORDER — GABAPENTIN 100 MG/1
100 CAPSULE ORAL 3 TIMES DAILY
Qty: 90 CAPSULE | Refills: 11 | Status: SHIPPED | OUTPATIENT
Start: 2021-03-26 | End: 2021-04-19 | Stop reason: SDUPTHER

## 2021-03-26 RX ORDER — BLOOD SUGAR DIAGNOSTIC
STRIP MISCELLANEOUS
Qty: 100 STRIP | Refills: 4 | Status: SHIPPED | OUTPATIENT
Start: 2021-03-26 | End: 2021-09-29 | Stop reason: SDUPTHER

## 2021-03-26 RX ORDER — LANCETS
EACH MISCELLANEOUS
Qty: 100 EACH | Refills: 4 | Status: SHIPPED | OUTPATIENT
Start: 2021-03-26 | End: 2021-09-29 | Stop reason: SDUPTHER

## 2021-03-26 RX ORDER — OMEPRAZOLE 40 MG/1
40 CAPSULE, DELAYED RELEASE ORAL EVERY MORNING
Qty: 90 CAPSULE | Refills: 4 | Status: SHIPPED | OUTPATIENT
Start: 2021-03-26 | End: 2021-11-18 | Stop reason: SDUPTHER

## 2021-03-26 RX ORDER — GLIMEPIRIDE 2 MG/1
TABLET ORAL
Qty: 90 TABLET | Refills: 0 | Status: SHIPPED | OUTPATIENT
Start: 2021-03-26 | End: 2021-06-17

## 2021-03-26 RX ORDER — MELOXICAM 7.5 MG/1
7.5 TABLET ORAL DAILY
Qty: 90 TABLET | Refills: 3 | Status: SHIPPED | OUTPATIENT
Start: 2021-03-26 | End: 2021-05-14

## 2021-04-01 ENCOUNTER — TELEPHONE (OUTPATIENT)
Dept: FAMILY MEDICINE | Facility: CLINIC | Age: 76
End: 2021-04-01

## 2021-04-19 ENCOUNTER — TELEPHONE (OUTPATIENT)
Dept: FAMILY MEDICINE | Facility: CLINIC | Age: 76
End: 2021-04-19

## 2021-04-19 DIAGNOSIS — M54.31 SCIATICA OF RIGHT SIDE: ICD-10-CM

## 2021-04-19 RX ORDER — GABAPENTIN 100 MG/1
100 CAPSULE ORAL 3 TIMES DAILY PRN
Qty: 90 CAPSULE | Refills: 0 | Status: SHIPPED | OUTPATIENT
Start: 2021-04-19 | End: 2021-04-21 | Stop reason: SDUPTHER

## 2021-04-21 DIAGNOSIS — M54.31 SCIATICA OF RIGHT SIDE: ICD-10-CM

## 2021-04-21 RX ORDER — GABAPENTIN 100 MG/1
100 CAPSULE ORAL 3 TIMES DAILY PRN
Qty: 270 CAPSULE | Refills: 4 | Status: SHIPPED | OUTPATIENT
Start: 2021-04-21 | End: 2022-01-21 | Stop reason: SDUPTHER

## 2021-05-03 ENCOUNTER — TELEPHONE (OUTPATIENT)
Dept: FAMILY MEDICINE | Facility: CLINIC | Age: 76
End: 2021-05-03

## 2021-05-07 ENCOUNTER — TELEPHONE (OUTPATIENT)
Dept: INTERNAL MEDICINE | Facility: CLINIC | Age: 76
End: 2021-05-07

## 2021-05-14 ENCOUNTER — TELEPHONE (OUTPATIENT)
Dept: FAMILY MEDICINE | Facility: CLINIC | Age: 76
End: 2021-05-14

## 2021-05-14 ENCOUNTER — OFFICE VISIT (OUTPATIENT)
Dept: ORTHOPEDICS | Facility: CLINIC | Age: 76
End: 2021-05-14
Payer: MEDICARE

## 2021-05-14 ENCOUNTER — OFFICE VISIT (OUTPATIENT)
Dept: FAMILY MEDICINE | Facility: CLINIC | Age: 76
End: 2021-05-14
Payer: MEDICARE

## 2021-05-14 ENCOUNTER — TELEPHONE (OUTPATIENT)
Dept: GASTROENTEROLOGY | Facility: CLINIC | Age: 76
End: 2021-05-14

## 2021-05-14 ENCOUNTER — OFFICE VISIT (OUTPATIENT)
Dept: GASTROENTEROLOGY | Facility: CLINIC | Age: 76
End: 2021-05-14
Payer: MEDICARE

## 2021-05-14 VITALS
DIASTOLIC BLOOD PRESSURE: 70 MMHG | SYSTOLIC BLOOD PRESSURE: 138 MMHG | HEART RATE: 97 BPM | OXYGEN SATURATION: 98 % | HEIGHT: 58 IN | BODY MASS INDEX: 24.24 KG/M2 | RESPIRATION RATE: 16 BRPM | WEIGHT: 115.5 LBS

## 2021-05-14 VITALS
DIASTOLIC BLOOD PRESSURE: 70 MMHG | WEIGHT: 114 LBS | HEART RATE: 95 BPM | SYSTOLIC BLOOD PRESSURE: 115 MMHG | HEIGHT: 58 IN | BODY MASS INDEX: 23.93 KG/M2

## 2021-05-14 VITALS
SYSTOLIC BLOOD PRESSURE: 130 MMHG | OXYGEN SATURATION: 95 % | HEIGHT: 58 IN | DIASTOLIC BLOOD PRESSURE: 70 MMHG | RESPIRATION RATE: 18 BRPM | WEIGHT: 114.88 LBS | BODY MASS INDEX: 24.11 KG/M2 | HEART RATE: 100 BPM

## 2021-05-14 DIAGNOSIS — M25.561 CHRONIC PAIN OF RIGHT KNEE: ICD-10-CM

## 2021-05-14 DIAGNOSIS — N20.0 RENAL CALCULUS, LEFT: Primary | ICD-10-CM

## 2021-05-14 DIAGNOSIS — K21.9 GASTROESOPHAGEAL REFLUX DISEASE, UNSPECIFIED WHETHER ESOPHAGITIS PRESENT: Primary | ICD-10-CM

## 2021-05-14 DIAGNOSIS — M16.11 PRIMARY OSTEOARTHRITIS OF RIGHT HIP: Primary | ICD-10-CM

## 2021-05-14 DIAGNOSIS — Z86.010 PERSONAL HISTORY OF COLONIC POLYPS: ICD-10-CM

## 2021-05-14 DIAGNOSIS — E11.22 TYPE 2 DIABETES MELLITUS WITH STAGE 1 CHRONIC KIDNEY DISEASE, WITH LONG-TERM CURRENT USE OF INSULIN: Primary | ICD-10-CM

## 2021-05-14 DIAGNOSIS — G89.29 CHRONIC PAIN OF RIGHT KNEE: ICD-10-CM

## 2021-05-14 DIAGNOSIS — R10.13 EPIGASTRIC PAIN: ICD-10-CM

## 2021-05-14 DIAGNOSIS — Z79.4 TYPE 2 DIABETES MELLITUS WITH STAGE 1 CHRONIC KIDNEY DISEASE, WITH LONG-TERM CURRENT USE OF INSULIN: Primary | ICD-10-CM

## 2021-05-14 DIAGNOSIS — N18.1 TYPE 2 DIABETES MELLITUS WITH STAGE 1 CHRONIC KIDNEY DISEASE, WITH LONG-TERM CURRENT USE OF INSULIN: Primary | ICD-10-CM

## 2021-05-14 PROBLEM — Z86.0100 PERSONAL HISTORY OF COLONIC POLYPS: Status: ACTIVE | Noted: 2021-05-14

## 2021-05-14 PROCEDURE — 1159F MED LIST DOCD IN RCRD: CPT | Mod: S$GLB,,, | Performed by: NURSE PRACTITIONER

## 2021-05-14 PROCEDURE — 3288F FALL RISK ASSESSMENT DOCD: CPT | Mod: CPTII,S$GLB,, | Performed by: ORTHOPAEDIC SURGERY

## 2021-05-14 PROCEDURE — 99499 UNLISTED E&M SERVICE: CPT | Mod: S$PBB,HCNC,, | Performed by: NURSE PRACTITIONER

## 2021-05-14 PROCEDURE — 1125F PR PAIN SEVERITY QUANTIFIED, PAIN PRESENT: ICD-10-PCS | Mod: S$GLB,,, | Performed by: FAMILY MEDICINE

## 2021-05-14 PROCEDURE — 99213 OFFICE O/P EST LOW 20 MIN: CPT | Mod: S$GLB,,, | Performed by: ORTHOPAEDIC SURGERY

## 2021-05-14 PROCEDURE — 99999 PR PBB SHADOW E&M-EST. PATIENT-LVL III: ICD-10-PCS | Mod: PBBFAC,,, | Performed by: FAMILY MEDICINE

## 2021-05-14 PROCEDURE — 1125F AMNT PAIN NOTED PAIN PRSNT: CPT | Mod: S$GLB,,, | Performed by: FAMILY MEDICINE

## 2021-05-14 PROCEDURE — 3075F PR MOST RECENT SYSTOLIC BLOOD PRESS GE 130-139MM HG: ICD-10-PCS | Mod: CPTII,S$GLB,, | Performed by: FAMILY MEDICINE

## 2021-05-14 PROCEDURE — 99214 OFFICE O/P EST MOD 30 MIN: CPT | Mod: S$GLB,,, | Performed by: FAMILY MEDICINE

## 2021-05-14 PROCEDURE — 3075F SYST BP GE 130 - 139MM HG: CPT | Mod: CPTII,S$GLB,, | Performed by: FAMILY MEDICINE

## 2021-05-14 PROCEDURE — 3078F DIAST BP <80 MM HG: CPT | Mod: CPTII,S$GLB,, | Performed by: FAMILY MEDICINE

## 2021-05-14 PROCEDURE — 1159F MED LIST DOCD IN RCRD: CPT | Mod: S$GLB,,, | Performed by: ORTHOPAEDIC SURGERY

## 2021-05-14 PROCEDURE — 1101F PR PT FALLS ASSESS DOC 0-1 FALLS W/OUT INJ PAST YR: ICD-10-PCS | Mod: CPTII,S$GLB,, | Performed by: NURSE PRACTITIONER

## 2021-05-14 PROCEDURE — 1125F AMNT PAIN NOTED PAIN PRSNT: CPT | Mod: S$GLB,,, | Performed by: NURSE PRACTITIONER

## 2021-05-14 PROCEDURE — 99999 PR PBB SHADOW E&M-EST. PATIENT-LVL V: CPT | Mod: PBBFAC,,, | Performed by: NURSE PRACTITIONER

## 2021-05-14 PROCEDURE — 99203 PR OFFICE/OUTPT VISIT, NEW, LEVL III, 30-44 MIN: ICD-10-PCS | Mod: S$GLB,,, | Performed by: NURSE PRACTITIONER

## 2021-05-14 PROCEDURE — 99213 PR OFFICE/OUTPT VISIT, EST, LEVL III, 20-29 MIN: ICD-10-PCS | Mod: S$GLB,,, | Performed by: ORTHOPAEDIC SURGERY

## 2021-05-14 PROCEDURE — 1125F PR PAIN SEVERITY QUANTIFIED, PAIN PRESENT: ICD-10-PCS | Mod: S$GLB,,, | Performed by: ORTHOPAEDIC SURGERY

## 2021-05-14 PROCEDURE — 3288F FALL RISK ASSESSMENT DOCD: CPT | Mod: CPTII,S$GLB,, | Performed by: FAMILY MEDICINE

## 2021-05-14 PROCEDURE — 99499 RISK ADDL DX/OHS AUDIT: ICD-10-PCS | Mod: S$PBB,HCNC,, | Performed by: FAMILY MEDICINE

## 2021-05-14 PROCEDURE — 99499 RISK ADDL DX/OHS AUDIT: ICD-10-PCS | Mod: S$PBB,HCNC,, | Performed by: NURSE PRACTITIONER

## 2021-05-14 PROCEDURE — 1159F PR MEDICATION LIST DOCUMENTED IN MEDICAL RECORD: ICD-10-PCS | Mod: S$GLB,,, | Performed by: ORTHOPAEDIC SURGERY

## 2021-05-14 PROCEDURE — 1101F PT FALLS ASSESS-DOCD LE1/YR: CPT | Mod: CPTII,S$GLB,, | Performed by: FAMILY MEDICINE

## 2021-05-14 PROCEDURE — 1101F PT FALLS ASSESS-DOCD LE1/YR: CPT | Mod: CPTII,S$GLB,, | Performed by: NURSE PRACTITIONER

## 2021-05-14 PROCEDURE — 3288F PR FALLS RISK ASSESSMENT DOCUMENTED: ICD-10-PCS | Mod: CPTII,S$GLB,, | Performed by: FAMILY MEDICINE

## 2021-05-14 PROCEDURE — 3288F PR FALLS RISK ASSESSMENT DOCUMENTED: ICD-10-PCS | Mod: CPTII,S$GLB,, | Performed by: ORTHOPAEDIC SURGERY

## 2021-05-14 PROCEDURE — 99999 PR PBB SHADOW E&M-EST. PATIENT-LVL V: ICD-10-PCS | Mod: PBBFAC,,, | Performed by: NURSE PRACTITIONER

## 2021-05-14 PROCEDURE — 99999 PR PBB SHADOW E&M-EST. PATIENT-LVL III: CPT | Mod: PBBFAC,,, | Performed by: ORTHOPAEDIC SURGERY

## 2021-05-14 PROCEDURE — 1101F PR PT FALLS ASSESS DOC 0-1 FALLS W/OUT INJ PAST YR: ICD-10-PCS | Mod: CPTII,S$GLB,, | Performed by: ORTHOPAEDIC SURGERY

## 2021-05-14 PROCEDURE — 3051F HG A1C>EQUAL 7.0%<8.0%: CPT | Mod: CPTII,S$GLB,, | Performed by: FAMILY MEDICINE

## 2021-05-14 PROCEDURE — 1101F PR PT FALLS ASSESS DOC 0-1 FALLS W/OUT INJ PAST YR: ICD-10-PCS | Mod: CPTII,S$GLB,, | Performed by: FAMILY MEDICINE

## 2021-05-14 PROCEDURE — 3078F PR MOST RECENT DIASTOLIC BLOOD PRESSURE < 80 MM HG: ICD-10-PCS | Mod: CPTII,S$GLB,, | Performed by: FAMILY MEDICINE

## 2021-05-14 PROCEDURE — 99999 PR PBB SHADOW E&M-EST. PATIENT-LVL III: CPT | Mod: PBBFAC,,, | Performed by: FAMILY MEDICINE

## 2021-05-14 PROCEDURE — 1159F PR MEDICATION LIST DOCUMENTED IN MEDICAL RECORD: ICD-10-PCS | Mod: S$GLB,,, | Performed by: FAMILY MEDICINE

## 2021-05-14 PROCEDURE — 99214 PR OFFICE/OUTPT VISIT, EST, LEVL IV, 30-39 MIN: ICD-10-PCS | Mod: S$GLB,,, | Performed by: FAMILY MEDICINE

## 2021-05-14 PROCEDURE — 3051F PR MOST RECENT HEMOGLOBIN A1C LEVEL 7.0 - < 8.0%: ICD-10-PCS | Mod: CPTII,S$GLB,, | Performed by: FAMILY MEDICINE

## 2021-05-14 PROCEDURE — 1159F PR MEDICATION LIST DOCUMENTED IN MEDICAL RECORD: ICD-10-PCS | Mod: S$GLB,,, | Performed by: NURSE PRACTITIONER

## 2021-05-14 PROCEDURE — 1159F MED LIST DOCD IN RCRD: CPT | Mod: S$GLB,,, | Performed by: FAMILY MEDICINE

## 2021-05-14 PROCEDURE — 3288F PR FALLS RISK ASSESSMENT DOCUMENTED: ICD-10-PCS | Mod: CPTII,S$GLB,, | Performed by: NURSE PRACTITIONER

## 2021-05-14 PROCEDURE — 3288F FALL RISK ASSESSMENT DOCD: CPT | Mod: CPTII,S$GLB,, | Performed by: NURSE PRACTITIONER

## 2021-05-14 PROCEDURE — 1101F PT FALLS ASSESS-DOCD LE1/YR: CPT | Mod: CPTII,S$GLB,, | Performed by: ORTHOPAEDIC SURGERY

## 2021-05-14 PROCEDURE — 99203 OFFICE O/P NEW LOW 30 MIN: CPT | Mod: S$GLB,,, | Performed by: NURSE PRACTITIONER

## 2021-05-14 PROCEDURE — 1125F PR PAIN SEVERITY QUANTIFIED, PAIN PRESENT: ICD-10-PCS | Mod: S$GLB,,, | Performed by: NURSE PRACTITIONER

## 2021-05-14 PROCEDURE — 99999 PR PBB SHADOW E&M-EST. PATIENT-LVL III: ICD-10-PCS | Mod: PBBFAC,,, | Performed by: ORTHOPAEDIC SURGERY

## 2021-05-14 PROCEDURE — 99499 UNLISTED E&M SERVICE: CPT | Mod: S$PBB,HCNC,, | Performed by: FAMILY MEDICINE

## 2021-05-14 PROCEDURE — 1125F AMNT PAIN NOTED PAIN PRSNT: CPT | Mod: S$GLB,,, | Performed by: ORTHOPAEDIC SURGERY

## 2021-05-14 RX ORDER — DULAGLUTIDE 0.75 MG/.5ML
0.75 INJECTION, SOLUTION SUBCUTANEOUS WEEKLY
Qty: 4 PEN | Refills: 11 | Status: SHIPPED | OUTPATIENT
Start: 2021-05-14 | End: 2021-05-14

## 2021-05-14 RX ORDER — DULAGLUTIDE 0.75 MG/.5ML
0.75 INJECTION, SOLUTION SUBCUTANEOUS WEEKLY
Qty: 4 PEN | Refills: 11 | Status: SHIPPED | OUTPATIENT
Start: 2021-05-14 | End: 2021-06-13

## 2021-05-14 RX ORDER — SODIUM, POTASSIUM,MAG SULFATES 17.5-3.13G
1 SOLUTION, RECONSTITUTED, ORAL ORAL DAILY
Qty: 1 KIT | Refills: 0 | Status: SHIPPED | OUTPATIENT
Start: 2021-05-14 | End: 2021-05-16

## 2021-05-14 RX ORDER — NAPROXEN 500 MG/1
500 TABLET ORAL 2 TIMES DAILY WITH MEALS
Qty: 60 TABLET | Refills: 1 | Status: SHIPPED | OUTPATIENT
Start: 2021-05-14 | End: 2021-07-06

## 2021-05-14 RX ORDER — FLASH GLUCOSE SENSOR
1 KIT MISCELLANEOUS 3 TIMES DAILY
Qty: 1 KIT | Refills: 11 | Status: SHIPPED | OUTPATIENT
Start: 2021-05-14 | End: 2021-05-17 | Stop reason: SDUPTHER

## 2021-05-17 ENCOUNTER — TELEPHONE (OUTPATIENT)
Dept: GASTROENTEROLOGY | Facility: CLINIC | Age: 76
End: 2021-05-17

## 2021-05-17 ENCOUNTER — PATIENT MESSAGE (OUTPATIENT)
Dept: FAMILY MEDICINE | Facility: CLINIC | Age: 76
End: 2021-05-17

## 2021-05-17 DIAGNOSIS — N18.1 TYPE 2 DIABETES MELLITUS WITH STAGE 1 CHRONIC KIDNEY DISEASE, WITH LONG-TERM CURRENT USE OF INSULIN: ICD-10-CM

## 2021-05-17 DIAGNOSIS — N20.0 KIDNEY STONE ON LEFT SIDE: Primary | ICD-10-CM

## 2021-05-17 DIAGNOSIS — E11.22 TYPE 2 DIABETES MELLITUS WITH STAGE 1 CHRONIC KIDNEY DISEASE, WITH LONG-TERM CURRENT USE OF INSULIN: ICD-10-CM

## 2021-05-17 DIAGNOSIS — Z79.4 TYPE 2 DIABETES MELLITUS WITH STAGE 1 CHRONIC KIDNEY DISEASE, WITH LONG-TERM CURRENT USE OF INSULIN: ICD-10-CM

## 2021-05-17 RX ORDER — FLASH GLUCOSE SENSOR
1 KIT MISCELLANEOUS 3 TIMES DAILY
Qty: 1 KIT | Refills: 11 | Status: ON HOLD | OUTPATIENT
Start: 2021-05-17 | End: 2023-06-17 | Stop reason: CLARIF

## 2021-05-17 RX ORDER — TAMSULOSIN HYDROCHLORIDE 0.4 MG/1
0.4 CAPSULE ORAL DAILY
Qty: 30 CAPSULE | Refills: 0 | Status: SHIPPED | OUTPATIENT
Start: 2021-05-17 | End: 2021-06-04

## 2021-05-18 ENCOUNTER — PATIENT MESSAGE (OUTPATIENT)
Dept: FAMILY MEDICINE | Facility: CLINIC | Age: 76
End: 2021-05-18

## 2021-05-19 DIAGNOSIS — E11.9 TYPE 2 DIABETES MELLITUS WITHOUT COMPLICATION: ICD-10-CM

## 2021-05-21 ENCOUNTER — TELEPHONE (OUTPATIENT)
Dept: GASTROENTEROLOGY | Facility: CLINIC | Age: 76
End: 2021-05-21

## 2021-05-25 ENCOUNTER — TELEPHONE (OUTPATIENT)
Dept: GASTROENTEROLOGY | Facility: CLINIC | Age: 76
End: 2021-05-25

## 2021-05-26 ENCOUNTER — OFFICE VISIT (OUTPATIENT)
Dept: UROLOGY | Facility: CLINIC | Age: 76
End: 2021-05-26
Payer: MEDICARE

## 2021-05-26 ENCOUNTER — TELEPHONE (OUTPATIENT)
Dept: GASTROENTEROLOGY | Facility: CLINIC | Age: 76
End: 2021-05-26

## 2021-05-26 VITALS
TEMPERATURE: 98 F | BODY MASS INDEX: 24.56 KG/M2 | HEIGHT: 58 IN | HEART RATE: 78 BPM | SYSTOLIC BLOOD PRESSURE: 126 MMHG | DIASTOLIC BLOOD PRESSURE: 61 MMHG | WEIGHT: 117 LBS | OXYGEN SATURATION: 96 %

## 2021-05-26 DIAGNOSIS — N20.0 KIDNEY STONE ON LEFT SIDE: Primary | ICD-10-CM

## 2021-05-26 DIAGNOSIS — M54.50 CHRONIC RIGHT-SIDED LOW BACK PAIN WITHOUT SCIATICA: ICD-10-CM

## 2021-05-26 DIAGNOSIS — R39.15 URINARY URGENCY: ICD-10-CM

## 2021-05-26 DIAGNOSIS — G89.29 CHRONIC RIGHT-SIDED LOW BACK PAIN WITHOUT SCIATICA: ICD-10-CM

## 2021-05-26 DIAGNOSIS — N39.41 URINARY INCONTINENCE, URGE: ICD-10-CM

## 2021-05-26 LAB
BILIRUB SERPL-MCNC: NORMAL MG/DL
BLOOD URINE, POC: NORMAL
CLARITY, POC UA: CLEAR
COLOR, POC UA: YELLOW
GLUCOSE UR QL STRIP: NORMAL
KETONES UR QL STRIP: NORMAL
LEUKOCYTE ESTERASE URINE, POC: NORMAL
NITRITE, POC UA: NORMAL
PH, POC UA: 6.5
PROTEIN, POC: NORMAL
SPECIFIC GRAVITY, POC UA: 1.01
UROBILINOGEN, POC UA: 0.2

## 2021-05-26 PROCEDURE — 1159F MED LIST DOCD IN RCRD: CPT | Mod: S$GLB,,, | Performed by: NURSE PRACTITIONER

## 2021-05-26 PROCEDURE — 1101F PR PT FALLS ASSESS DOC 0-1 FALLS W/OUT INJ PAST YR: ICD-10-PCS | Mod: CPTII,S$GLB,, | Performed by: NURSE PRACTITIONER

## 2021-05-26 PROCEDURE — 99204 PR OFFICE/OUTPT VISIT, NEW, LEVL IV, 45-59 MIN: ICD-10-PCS | Mod: 25,S$GLB,, | Performed by: NURSE PRACTITIONER

## 2021-05-26 PROCEDURE — 99204 OFFICE O/P NEW MOD 45 MIN: CPT | Mod: 25,S$GLB,, | Performed by: NURSE PRACTITIONER

## 2021-05-26 PROCEDURE — 1159F PR MEDICATION LIST DOCUMENTED IN MEDICAL RECORD: ICD-10-PCS | Mod: S$GLB,,, | Performed by: NURSE PRACTITIONER

## 2021-05-26 PROCEDURE — 81002 POCT URINE DIPSTICK WITHOUT MICROSCOPE: ICD-10-PCS | Mod: S$GLB,,, | Performed by: NURSE PRACTITIONER

## 2021-05-26 PROCEDURE — 1126F AMNT PAIN NOTED NONE PRSNT: CPT | Mod: S$GLB,,, | Performed by: NURSE PRACTITIONER

## 2021-05-26 PROCEDURE — 3288F PR FALLS RISK ASSESSMENT DOCUMENTED: ICD-10-PCS | Mod: CPTII,S$GLB,, | Performed by: NURSE PRACTITIONER

## 2021-05-26 PROCEDURE — 81002 URINALYSIS NONAUTO W/O SCOPE: CPT | Mod: S$GLB,,, | Performed by: NURSE PRACTITIONER

## 2021-05-26 PROCEDURE — 3288F FALL RISK ASSESSMENT DOCD: CPT | Mod: CPTII,S$GLB,, | Performed by: NURSE PRACTITIONER

## 2021-05-26 PROCEDURE — 87086 URINE CULTURE/COLONY COUNT: CPT | Performed by: NURSE PRACTITIONER

## 2021-05-26 PROCEDURE — 99999 PR PBB SHADOW E&M-EST. PATIENT-LVL IV: CPT | Mod: PBBFAC,,, | Performed by: NURSE PRACTITIONER

## 2021-05-26 PROCEDURE — 1126F PR PAIN SEVERITY QUANTIFIED, NO PAIN PRESENT: ICD-10-PCS | Mod: S$GLB,,, | Performed by: NURSE PRACTITIONER

## 2021-05-26 PROCEDURE — 99999 PR PBB SHADOW E&M-EST. PATIENT-LVL IV: ICD-10-PCS | Mod: PBBFAC,,, | Performed by: NURSE PRACTITIONER

## 2021-05-26 PROCEDURE — 1101F PT FALLS ASSESS-DOCD LE1/YR: CPT | Mod: CPTII,S$GLB,, | Performed by: NURSE PRACTITIONER

## 2021-05-28 LAB
BACTERIA UR CULT: NORMAL
BACTERIA UR CULT: NORMAL

## 2021-06-08 ENCOUNTER — NURSE TRIAGE (OUTPATIENT)
Dept: ADMINISTRATIVE | Facility: CLINIC | Age: 76
End: 2021-06-08

## 2021-06-08 PROBLEM — K21.9 GERD (GASTROESOPHAGEAL REFLUX DISEASE): Status: ACTIVE | Noted: 2021-06-08

## 2021-06-09 ENCOUNTER — TELEPHONE (OUTPATIENT)
Dept: NEUROLOGY | Facility: HOSPITAL | Age: 76
End: 2021-06-09

## 2021-06-14 ENCOUNTER — TELEPHONE (OUTPATIENT)
Dept: ORTHOPEDICS | Facility: CLINIC | Age: 76
End: 2021-06-14

## 2021-06-14 DIAGNOSIS — M25.569 KNEE PAIN, UNSPECIFIED CHRONICITY, UNSPECIFIED LATERALITY: Primary | ICD-10-CM

## 2021-06-15 ENCOUNTER — TELEPHONE (OUTPATIENT)
Dept: GASTROENTEROLOGY | Facility: CLINIC | Age: 76
End: 2021-06-15

## 2021-06-25 ENCOUNTER — HOSPITAL ENCOUNTER (OUTPATIENT)
Dept: RADIOLOGY | Facility: HOSPITAL | Age: 76
Discharge: HOME | End: 2021-06-25
Attending: ORTHOPAEDIC SURGERY
Payer: MEDICARE

## 2021-06-25 ENCOUNTER — OFFICE VISIT (OUTPATIENT)
Dept: ORTHOPEDICS | Facility: CLINIC | Age: 76
End: 2021-06-25
Payer: MEDICARE

## 2021-06-25 VITALS — BODY MASS INDEX: 22.67 KG/M2 | HEIGHT: 59 IN | WEIGHT: 112.44 LBS

## 2021-06-25 DIAGNOSIS — G89.29 CHRONIC PAIN OF RIGHT KNEE: ICD-10-CM

## 2021-06-25 DIAGNOSIS — M11.261 PSEUDOGOUT OF RIGHT KNEE: ICD-10-CM

## 2021-06-25 DIAGNOSIS — M25.552 HIP PAIN, BILATERAL: ICD-10-CM

## 2021-06-25 DIAGNOSIS — M16.11 PRIMARY OSTEOARTHRITIS OF RIGHT HIP: ICD-10-CM

## 2021-06-25 DIAGNOSIS — M15.9 PRIMARY OSTEOARTHRITIS INVOLVING MULTIPLE JOINTS: Primary | ICD-10-CM

## 2021-06-25 DIAGNOSIS — M25.561 CHRONIC PAIN OF RIGHT KNEE: ICD-10-CM

## 2021-06-25 DIAGNOSIS — M25.551 HIP PAIN, BILATERAL: ICD-10-CM

## 2021-06-25 PROCEDURE — 99999 PR PBB SHADOW E&M-EST. PATIENT-LVL IV: ICD-10-PCS | Mod: PBBFAC,,, | Performed by: ORTHOPAEDIC SURGERY

## 2021-06-25 PROCEDURE — 3288F FALL RISK ASSESSMENT DOCD: CPT | Mod: CPTII,S$GLB,, | Performed by: ORTHOPAEDIC SURGERY

## 2021-06-25 PROCEDURE — 1159F MED LIST DOCD IN RCRD: CPT | Mod: S$GLB,,, | Performed by: ORTHOPAEDIC SURGERY

## 2021-06-25 PROCEDURE — 73521 XR HIPS BILATERAL 2 VIEW INCL AP PELVIS: ICD-10-PCS | Mod: 26,,, | Performed by: RADIOLOGY

## 2021-06-25 PROCEDURE — 1125F PR PAIN SEVERITY QUANTIFIED, PAIN PRESENT: ICD-10-PCS | Mod: S$GLB,,, | Performed by: ORTHOPAEDIC SURGERY

## 2021-06-25 PROCEDURE — 73521 X-RAY EXAM HIPS BI 2 VIEWS: CPT | Mod: 26,,, | Performed by: RADIOLOGY

## 2021-06-25 PROCEDURE — 99213 OFFICE O/P EST LOW 20 MIN: CPT | Mod: S$GLB,,, | Performed by: ORTHOPAEDIC SURGERY

## 2021-06-25 PROCEDURE — 73560 X-RAY EXAM OF KNEE 1 OR 2: CPT | Mod: TC,PN,LT

## 2021-06-25 PROCEDURE — 99999 PR PBB SHADOW E&M-EST. PATIENT-LVL IV: CPT | Mod: PBBFAC,,, | Performed by: ORTHOPAEDIC SURGERY

## 2021-06-25 PROCEDURE — 1101F PT FALLS ASSESS-DOCD LE1/YR: CPT | Mod: CPTII,S$GLB,, | Performed by: ORTHOPAEDIC SURGERY

## 2021-06-25 PROCEDURE — 1159F PR MEDICATION LIST DOCUMENTED IN MEDICAL RECORD: ICD-10-PCS | Mod: S$GLB,,, | Performed by: ORTHOPAEDIC SURGERY

## 2021-06-25 PROCEDURE — 1101F PR PT FALLS ASSESS DOC 0-1 FALLS W/OUT INJ PAST YR: ICD-10-PCS | Mod: CPTII,S$GLB,, | Performed by: ORTHOPAEDIC SURGERY

## 2021-06-25 PROCEDURE — 1125F AMNT PAIN NOTED PAIN PRSNT: CPT | Mod: S$GLB,,, | Performed by: ORTHOPAEDIC SURGERY

## 2021-06-25 PROCEDURE — 73560 X-RAY EXAM OF KNEE 1 OR 2: CPT | Mod: 26,LT,, | Performed by: RADIOLOGY

## 2021-06-25 PROCEDURE — 73560 XR KNEE ORTHO RIGHT: ICD-10-PCS | Mod: 26,LT,, | Performed by: RADIOLOGY

## 2021-06-25 PROCEDURE — 3288F PR FALLS RISK ASSESSMENT DOCUMENTED: ICD-10-PCS | Mod: CPTII,S$GLB,, | Performed by: ORTHOPAEDIC SURGERY

## 2021-06-25 PROCEDURE — 99213 PR OFFICE/OUTPT VISIT, EST, LEVL III, 20-29 MIN: ICD-10-PCS | Mod: S$GLB,,, | Performed by: ORTHOPAEDIC SURGERY

## 2021-06-25 PROCEDURE — 73562 XR KNEE ORTHO RIGHT: ICD-10-PCS | Mod: 26,RT,, | Performed by: RADIOLOGY

## 2021-06-25 PROCEDURE — 73521 X-RAY EXAM HIPS BI 2 VIEWS: CPT | Mod: TC,PN

## 2021-06-25 PROCEDURE — 73562 X-RAY EXAM OF KNEE 3: CPT | Mod: 26,RT,, | Performed by: RADIOLOGY

## 2021-06-30 ENCOUNTER — OFFICE VISIT (OUTPATIENT)
Dept: NEUROLOGY | Facility: HOSPITAL | Age: 76
End: 2021-06-30
Attending: INTERNAL MEDICINE
Payer: MEDICARE

## 2021-06-30 VITALS
WEIGHT: 115.94 LBS | HEIGHT: 59 IN | DIASTOLIC BLOOD PRESSURE: 73 MMHG | HEART RATE: 94 BPM | TEMPERATURE: 99 F | BODY MASS INDEX: 23.37 KG/M2 | SYSTOLIC BLOOD PRESSURE: 138 MMHG

## 2021-06-30 DIAGNOSIS — Z86.010 PERSONAL HISTORY OF COLONIC POLYPS: ICD-10-CM

## 2021-06-30 PROCEDURE — 99215 OFFICE O/P EST HI 40 MIN: CPT | Performed by: INTERNAL MEDICINE

## 2021-06-30 RX ORDER — SODIUM, POTASSIUM,MAG SULFATES 17.5-3.13G
2 SOLUTION, RECONSTITUTED, ORAL ORAL ONCE
Qty: 1 KIT | Refills: 0 | Status: SHIPPED | OUTPATIENT
Start: 2021-06-30 | End: 2021-06-30

## 2021-07-06 ENCOUNTER — TELEPHONE (OUTPATIENT)
Dept: ENDOSCOPY | Facility: HOSPITAL | Age: 76
End: 2021-07-06

## 2021-07-07 ENCOUNTER — PATIENT MESSAGE (OUTPATIENT)
Dept: ADMINISTRATIVE | Facility: HOSPITAL | Age: 76
End: 2021-07-07

## 2021-07-07 ENCOUNTER — TELEPHONE (OUTPATIENT)
Dept: ENDOSCOPY | Facility: HOSPITAL | Age: 76
End: 2021-07-07

## 2021-07-08 ENCOUNTER — ANESTHESIA (OUTPATIENT)
Dept: ENDOSCOPY | Facility: HOSPITAL | Age: 76
End: 2021-07-08
Payer: MEDICARE

## 2021-07-08 ENCOUNTER — ANESTHESIA EVENT (OUTPATIENT)
Dept: ENDOSCOPY | Facility: HOSPITAL | Age: 76
End: 2021-07-08
Payer: MEDICARE

## 2021-07-08 ENCOUNTER — HOSPITAL ENCOUNTER (OUTPATIENT)
Facility: HOSPITAL | Age: 76
Discharge: HOME | End: 2021-07-08
Attending: INTERNAL MEDICINE | Admitting: INTERNAL MEDICINE
Payer: MEDICARE

## 2021-07-08 VITALS
HEIGHT: 58 IN | OXYGEN SATURATION: 100 % | SYSTOLIC BLOOD PRESSURE: 141 MMHG | RESPIRATION RATE: 17 BRPM | TEMPERATURE: 98 F | WEIGHT: 112 LBS | BODY MASS INDEX: 23.51 KG/M2 | HEART RATE: 77 BPM | DIASTOLIC BLOOD PRESSURE: 82 MMHG

## 2021-07-08 DIAGNOSIS — Z85.038 HISTORY OF COLON CANCER: ICD-10-CM

## 2021-07-08 DIAGNOSIS — Z86.010 PERSONAL HISTORY OF COLONIC POLYPS: Primary | ICD-10-CM

## 2021-07-08 DIAGNOSIS — C18.9 MALIGNANT NEOPLASM OF COLON, UNSPECIFIED PART OF COLON: ICD-10-CM

## 2021-07-08 DIAGNOSIS — K63.5 POLYP OF COLON, UNSPECIFIED PART OF COLON, UNSPECIFIED TYPE: ICD-10-CM

## 2021-07-08 LAB
GLUCOSE SERPL-MCNC: 169 MG/DL (ref 70–110)
POCT GLUCOSE: 169 MG/DL (ref 70–110)

## 2021-07-08 PROCEDURE — 88305 TISSUE EXAM BY PATHOLOGIST: CPT | Performed by: PATHOLOGY

## 2021-07-08 PROCEDURE — 45385 COLONOSCOPY W/LESION REMOVAL: CPT | Performed by: INTERNAL MEDICINE

## 2021-07-08 PROCEDURE — 88341 IMHCHEM/IMCYTCHM EA ADD ANTB: CPT | Performed by: PATHOLOGY

## 2021-07-08 PROCEDURE — 63600175 PHARM REV CODE 636 W HCPCS: Performed by: NURSE ANESTHETIST, CERTIFIED REGISTERED

## 2021-07-08 PROCEDURE — 44799 UNLISTED PX SMALL INTESTINE: CPT | Performed by: INTERNAL MEDICINE

## 2021-07-08 PROCEDURE — 27201238 HC BALLOON, OVERTUBE (ANY): Performed by: INTERNAL MEDICINE

## 2021-07-08 PROCEDURE — 27201089 HC SNARE, DISP (ANY): Performed by: INTERNAL MEDICINE

## 2021-07-08 PROCEDURE — 88342 IMHCHEM/IMCYTCHM 1ST ANTB: CPT | Mod: 26,,, | Performed by: PATHOLOGY

## 2021-07-08 PROCEDURE — 25000003 PHARM REV CODE 250: Performed by: INTERNAL MEDICINE

## 2021-07-08 PROCEDURE — 88341 PR IHC OR ICC EACH ADD'L SINGLE ANTIBODY  STAINPR: ICD-10-PCS | Mod: 26,,, | Performed by: PATHOLOGY

## 2021-07-08 PROCEDURE — 88305 TISSUE EXAM BY PATHOLOGIST: CPT | Mod: 26,,, | Performed by: PATHOLOGY

## 2021-07-08 PROCEDURE — 88342 IMHCHEM/IMCYTCHM 1ST ANTB: CPT | Mod: 91 | Performed by: PATHOLOGY

## 2021-07-08 PROCEDURE — 88342 IMHCHEM/IMCYTCHM 1ST ANTB: CPT | Performed by: PATHOLOGY

## 2021-07-08 PROCEDURE — 25000003 PHARM REV CODE 250: Performed by: NURSE ANESTHETIST, CERTIFIED REGISTERED

## 2021-07-08 PROCEDURE — 88341 IMHCHEM/IMCYTCHM EA ADD ANTB: CPT | Mod: 26,,, | Performed by: PATHOLOGY

## 2021-07-08 PROCEDURE — 37000008 HC ANESTHESIA 1ST 15 MINUTES: Performed by: INTERNAL MEDICINE

## 2021-07-08 PROCEDURE — 88342 CHG IMMUNOCYTOCHEMISTRY: ICD-10-PCS | Mod: 26,,, | Performed by: PATHOLOGY

## 2021-07-08 PROCEDURE — 88305 TISSUE EXAM BY PATHOLOGIST: ICD-10-PCS | Mod: 26,,, | Performed by: PATHOLOGY

## 2021-07-08 PROCEDURE — 37000009 HC ANESTHESIA EA ADD 15 MINS: Performed by: INTERNAL MEDICINE

## 2021-07-08 RX ORDER — LIDOCAINE HCL/PF 100 MG/5ML
SYRINGE (ML) INTRAVENOUS
Status: DISCONTINUED | OUTPATIENT
Start: 2021-07-08 | End: 2021-07-08

## 2021-07-08 RX ORDER — PROPOFOL 10 MG/ML
INJECTION, EMULSION INTRAVENOUS CONTINUOUS PRN
Status: DISCONTINUED | OUTPATIENT
Start: 2021-07-08 | End: 2021-07-08

## 2021-07-08 RX ORDER — SODIUM CHLORIDE, SODIUM LACTATE, POTASSIUM CHLORIDE, CALCIUM CHLORIDE 600; 310; 30; 20 MG/100ML; MG/100ML; MG/100ML; MG/100ML
INJECTION, SOLUTION INTRAVENOUS CONTINUOUS PRN
Status: DISCONTINUED | OUTPATIENT
Start: 2021-07-08 | End: 2021-07-08

## 2021-07-08 RX ORDER — SODIUM CHLORIDE 9 MG/ML
INJECTION, SOLUTION INTRAVENOUS CONTINUOUS
Status: DISCONTINUED | OUTPATIENT
Start: 2021-07-08 | End: 2021-07-08 | Stop reason: HOSPADM

## 2021-07-08 RX ORDER — DEXTROMETHORPHAN/PSEUDOEPHED 2.5-7.5/.8
DROPS ORAL
Status: COMPLETED | OUTPATIENT
Start: 2021-07-08 | End: 2021-07-08

## 2021-07-08 RX ORDER — SODIUM CHLORIDE 0.9 % (FLUSH) 0.9 %
10 SYRINGE (ML) INJECTION
Status: DISCONTINUED | OUTPATIENT
Start: 2021-07-08 | End: 2021-07-08 | Stop reason: HOSPADM

## 2021-07-08 RX ORDER — PROPOFOL 10 MG/ML
INJECTION, EMULSION INTRAVENOUS
Status: DISCONTINUED | OUTPATIENT
Start: 2021-07-08 | End: 2021-07-08

## 2021-07-08 RX ADMIN — SODIUM CHLORIDE, SODIUM LACTATE, POTASSIUM CHLORIDE, AND CALCIUM CHLORIDE: .6; .31; .03; .02 INJECTION, SOLUTION INTRAVENOUS at 02:07

## 2021-07-08 RX ADMIN — LIDOCAINE HYDROCHLORIDE 50 MG: 20 INJECTION, SOLUTION INTRAVENOUS at 03:07

## 2021-07-08 RX ADMIN — PROPOFOL 50 MG: 10 INJECTION, EMULSION INTRAVENOUS at 03:07

## 2021-07-08 RX ADMIN — SODIUM CHLORIDE: 0.9 INJECTION, SOLUTION INTRAVENOUS at 12:07

## 2021-07-08 RX ADMIN — SIMETHICONE 40 MG: 20 SUSPENSION/ DROPS ORAL at 03:07

## 2021-07-08 RX ADMIN — PROPOFOL 150 MCG/KG/MIN: 10 INJECTION, EMULSION INTRAVENOUS at 03:07

## 2021-07-13 ENCOUNTER — OFFICE VISIT (OUTPATIENT)
Dept: ORTHOPEDICS | Facility: CLINIC | Age: 76
End: 2021-07-13
Payer: MEDICARE

## 2021-07-13 VITALS — WEIGHT: 112 LBS | BODY MASS INDEX: 23.41 KG/M2

## 2021-07-13 DIAGNOSIS — M65.341 TRIGGER FINGER, RIGHT RING FINGER: ICD-10-CM

## 2021-07-13 DIAGNOSIS — M65.331 TRIGGER FINGER, RIGHT MIDDLE FINGER: ICD-10-CM

## 2021-07-13 DIAGNOSIS — M65.321 TRIGGER FINGER, RIGHT INDEX FINGER: Primary | ICD-10-CM

## 2021-07-13 PROCEDURE — 99999 PR PBB SHADOW E&M-EST. PATIENT-LVL IV: ICD-10-PCS | Mod: PBBFAC,,, | Performed by: PHYSICIAN ASSISTANT

## 2021-07-13 PROCEDURE — 1159F MED LIST DOCD IN RCRD: CPT | Mod: S$GLB,,, | Performed by: PHYSICIAN ASSISTANT

## 2021-07-13 PROCEDURE — 3288F PR FALLS RISK ASSESSMENT DOCUMENTED: ICD-10-PCS | Mod: CPTII,S$GLB,, | Performed by: PHYSICIAN ASSISTANT

## 2021-07-13 PROCEDURE — 3288F FALL RISK ASSESSMENT DOCD: CPT | Mod: CPTII,S$GLB,, | Performed by: PHYSICIAN ASSISTANT

## 2021-07-13 PROCEDURE — 1101F PR PT FALLS ASSESS DOC 0-1 FALLS W/OUT INJ PAST YR: ICD-10-PCS | Mod: CPTII,S$GLB,, | Performed by: PHYSICIAN ASSISTANT

## 2021-07-13 PROCEDURE — 20550 NJX 1 TENDON SHEATH/LIGAMENT: CPT | Mod: RT,S$GLB,, | Performed by: PHYSICIAN ASSISTANT

## 2021-07-13 PROCEDURE — 1101F PT FALLS ASSESS-DOCD LE1/YR: CPT | Mod: CPTII,S$GLB,, | Performed by: PHYSICIAN ASSISTANT

## 2021-07-13 PROCEDURE — 99213 OFFICE O/P EST LOW 20 MIN: CPT | Mod: 25,S$GLB,, | Performed by: PHYSICIAN ASSISTANT

## 2021-07-13 PROCEDURE — 1125F AMNT PAIN NOTED PAIN PRSNT: CPT | Mod: S$GLB,,, | Performed by: PHYSICIAN ASSISTANT

## 2021-07-13 PROCEDURE — 20550 PR INJECT TENDON SHEATH/LIGAMENT: ICD-10-PCS | Mod: RT,S$GLB,, | Performed by: PHYSICIAN ASSISTANT

## 2021-07-13 PROCEDURE — 99213 PR OFFICE/OUTPT VISIT, EST, LEVL III, 20-29 MIN: ICD-10-PCS | Mod: 25,S$GLB,, | Performed by: PHYSICIAN ASSISTANT

## 2021-07-13 PROCEDURE — 1159F PR MEDICATION LIST DOCUMENTED IN MEDICAL RECORD: ICD-10-PCS | Mod: S$GLB,,, | Performed by: PHYSICIAN ASSISTANT

## 2021-07-13 PROCEDURE — 99999 PR PBB SHADOW E&M-EST. PATIENT-LVL IV: CPT | Mod: PBBFAC,,, | Performed by: PHYSICIAN ASSISTANT

## 2021-07-13 PROCEDURE — 1125F PR PAIN SEVERITY QUANTIFIED, PAIN PRESENT: ICD-10-PCS | Mod: S$GLB,,, | Performed by: PHYSICIAN ASSISTANT

## 2021-07-13 RX ORDER — TRIAMCINOLONE ACETONIDE 40 MG/ML
40 INJECTION, SUSPENSION INTRA-ARTICULAR; INTRAMUSCULAR
Status: COMPLETED | OUTPATIENT
Start: 2021-07-13 | End: 2021-07-13

## 2021-07-13 RX ADMIN — TRIAMCINOLONE ACETONIDE 40 MG: 40 INJECTION, SUSPENSION INTRA-ARTICULAR; INTRAMUSCULAR at 08:07

## 2021-07-19 ENCOUNTER — PATIENT MESSAGE (OUTPATIENT)
Dept: ADMINISTRATIVE | Facility: HOSPITAL | Age: 76
End: 2021-07-19

## 2021-07-26 LAB
FINAL PATHOLOGIC DIAGNOSIS: NORMAL
GROSS: NORMAL
Lab: NORMAL
SUPPLEMENTAL DIAGNOSIS: NORMAL

## 2021-07-29 ENCOUNTER — TELEPHONE (OUTPATIENT)
Dept: NEUROLOGY | Facility: HOSPITAL | Age: 76
End: 2021-07-29

## 2021-08-19 ENCOUNTER — PATIENT OUTREACH (OUTPATIENT)
Dept: ADMINISTRATIVE | Facility: OTHER | Age: 76
End: 2021-08-19

## 2021-08-19 DIAGNOSIS — E11.8 TYPE 2 DIABETES MELLITUS WITH COMPLICATION, WITH LONG-TERM CURRENT USE OF INSULIN: Primary | ICD-10-CM

## 2021-08-19 DIAGNOSIS — Z79.4 TYPE 2 DIABETES MELLITUS WITH COMPLICATION, WITH LONG-TERM CURRENT USE OF INSULIN: Primary | ICD-10-CM

## 2021-09-21 ENCOUNTER — PATIENT MESSAGE (OUTPATIENT)
Dept: FAMILY MEDICINE | Facility: CLINIC | Age: 76
End: 2021-09-21

## 2021-09-29 ENCOUNTER — PATIENT MESSAGE (OUTPATIENT)
Dept: FAMILY MEDICINE | Facility: CLINIC | Age: 76
End: 2021-09-29

## 2021-09-29 DIAGNOSIS — Z79.4 TYPE 2 DIABETES MELLITUS WITH DIABETIC POLYNEUROPATHY, WITH LONG-TERM CURRENT USE OF INSULIN: ICD-10-CM

## 2021-09-29 DIAGNOSIS — L29.9 PRURITUS: ICD-10-CM

## 2021-09-29 DIAGNOSIS — Z79.4 TYPE 2 DIABETES MELLITUS WITH COMPLICATION, WITH LONG-TERM CURRENT USE OF INSULIN: ICD-10-CM

## 2021-09-29 DIAGNOSIS — E11.42 TYPE 2 DIABETES MELLITUS WITH DIABETIC POLYNEUROPATHY, WITH LONG-TERM CURRENT USE OF INSULIN: ICD-10-CM

## 2021-09-29 DIAGNOSIS — E11.8 TYPE 2 DIABETES MELLITUS WITH COMPLICATION, WITH LONG-TERM CURRENT USE OF INSULIN: ICD-10-CM

## 2021-09-29 RX ORDER — LANCETS
EACH MISCELLANEOUS
Qty: 100 EACH | Refills: 4 | Status: SHIPPED | OUTPATIENT
Start: 2021-09-29 | End: 2021-12-27 | Stop reason: SDUPTHER

## 2021-09-29 RX ORDER — LEVOCETIRIZINE DIHYDROCHLORIDE 5 MG/1
5 TABLET, FILM COATED ORAL NIGHTLY
Qty: 90 TABLET | Refills: 3 | Status: SHIPPED | OUTPATIENT
Start: 2021-09-29 | End: 2021-12-06 | Stop reason: SDUPTHER

## 2021-09-29 RX ORDER — HUMAN INSULIN 100 [IU]/ML
INJECTION, SUSPENSION SUBCUTANEOUS
Qty: 50 ML | Refills: 4 | Status: ON HOLD | OUTPATIENT
Start: 2021-09-29 | End: 2023-06-17 | Stop reason: CLARIF

## 2021-09-29 RX ORDER — NAPROXEN SODIUM 220 MG
TABLET ORAL
Qty: 180 EACH | Refills: 4 | Status: SHIPPED | OUTPATIENT
Start: 2021-09-29 | End: 2021-12-06 | Stop reason: SDUPTHER

## 2021-09-29 RX ORDER — SUCRALFATE 1 G/10ML
1 SUSPENSION ORAL
Qty: 414 ML | Refills: 0 | Status: SHIPPED | OUTPATIENT
Start: 2021-09-29 | End: 2023-01-10

## 2021-11-04 ENCOUNTER — PATIENT MESSAGE (OUTPATIENT)
Dept: ADMINISTRATIVE | Facility: HOSPITAL | Age: 76
End: 2021-11-04
Payer: MEDICARE

## 2021-11-18 ENCOUNTER — PATIENT MESSAGE (OUTPATIENT)
Dept: FAMILY MEDICINE | Facility: CLINIC | Age: 76
End: 2021-11-18
Payer: MEDICARE

## 2021-11-18 DIAGNOSIS — E11.9 TYPE 2 DIABETES MELLITUS WITHOUT COMPLICATION, UNSPECIFIED WHETHER LONG TERM INSULIN USE: ICD-10-CM

## 2021-11-18 DIAGNOSIS — R20.2 NOTALGIA PARESTHETICA: ICD-10-CM

## 2021-11-18 DIAGNOSIS — E11.42 TYPE 2 DIABETES MELLITUS WITH DIABETIC POLYNEUROPATHY: ICD-10-CM

## 2021-11-19 ENCOUNTER — TELEPHONE (OUTPATIENT)
Dept: FAMILY MEDICINE | Facility: CLINIC | Age: 76
End: 2021-11-19
Payer: MEDICARE

## 2021-11-19 RX ORDER — HYDROXYZINE HYDROCHLORIDE 25 MG/1
25 TABLET, FILM COATED ORAL 3 TIMES DAILY PRN
Qty: 100 TABLET | Refills: 1 | Status: SHIPPED | OUTPATIENT
Start: 2021-11-19 | End: 2023-01-10

## 2021-11-19 RX ORDER — GLIMEPIRIDE 2 MG/1
TABLET ORAL
Qty: 90 TABLET | Refills: 4 | Status: SHIPPED | OUTPATIENT
Start: 2021-11-19 | End: 2023-01-10

## 2021-12-02 ENCOUNTER — OFFICE VISIT (OUTPATIENT)
Dept: FAMILY MEDICINE | Facility: CLINIC | Age: 76
End: 2021-12-02
Payer: MEDICARE

## 2021-12-02 VITALS
SYSTOLIC BLOOD PRESSURE: 150 MMHG | BODY MASS INDEX: 24.87 KG/M2 | DIASTOLIC BLOOD PRESSURE: 70 MMHG | OXYGEN SATURATION: 98 % | WEIGHT: 118.5 LBS | HEART RATE: 94 BPM | HEIGHT: 58 IN

## 2021-12-02 DIAGNOSIS — M25.552 LEFT HIP PAIN: Primary | ICD-10-CM

## 2021-12-02 PROCEDURE — 99999 PR PBB SHADOW E&M-EST. PATIENT-LVL III: ICD-10-PCS | Mod: PBBFAC,,, | Performed by: FAMILY MEDICINE

## 2021-12-02 PROCEDURE — 96372 THER/PROPH/DIAG INJ SC/IM: CPT | Mod: PBBFAC,PN

## 2021-12-02 PROCEDURE — 99213 OFFICE O/P EST LOW 20 MIN: CPT | Mod: PBBFAC,PN | Performed by: FAMILY MEDICINE

## 2021-12-02 PROCEDURE — 99214 OFFICE O/P EST MOD 30 MIN: CPT | Mod: S$GLB,,, | Performed by: FAMILY MEDICINE

## 2021-12-02 PROCEDURE — 99214 PR OFFICE/OUTPT VISIT, EST, LEVL IV, 30-39 MIN: ICD-10-PCS | Mod: S$GLB,,, | Performed by: FAMILY MEDICINE

## 2021-12-02 PROCEDURE — 99999 PR PBB SHADOW E&M-EST. PATIENT-LVL III: CPT | Mod: PBBFAC,,, | Performed by: FAMILY MEDICINE

## 2021-12-02 RX ORDER — BETAMETHASONE SODIUM PHOSPHATE AND BETAMETHASONE ACETATE 3; 3 MG/ML; MG/ML
6 INJECTION, SUSPENSION INTRA-ARTICULAR; INTRALESIONAL; INTRAMUSCULAR; SOFT TISSUE ONCE
Status: COMPLETED | OUTPATIENT
Start: 2021-12-02 | End: 2021-12-02

## 2021-12-02 RX ADMIN — BETAMETHASONE SODIUM PHOSPHATE AND BETAMETHASONE ACETATE 6 MG: 3; 3 INJECTION, SUSPENSION INTRA-ARTICULAR; INTRALESIONAL; INTRAMUSCULAR at 11:12

## 2021-12-27 ENCOUNTER — PATIENT MESSAGE (OUTPATIENT)
Dept: FAMILY MEDICINE | Facility: CLINIC | Age: 76
End: 2021-12-27
Payer: MEDICARE

## 2022-01-11 ENCOUNTER — PATIENT MESSAGE (OUTPATIENT)
Dept: FAMILY MEDICINE | Facility: CLINIC | Age: 77
End: 2022-01-11
Payer: MEDICARE

## 2022-01-11 LAB
COMMENTS: ABNORMAL
EST. AVERAGE GLUCOSE BLD GHB EST-MCNC: 229 MG/DL
HBA1C MFR BLD: 9.6 % (ref 4.8–5.6)

## 2022-01-12 ENCOUNTER — TELEPHONE (OUTPATIENT)
Dept: FAMILY MEDICINE | Facility: CLINIC | Age: 77
End: 2022-01-12
Payer: MEDICARE

## 2022-01-12 ENCOUNTER — HOSPITAL ENCOUNTER (EMERGENCY)
Facility: HOSPITAL | Age: 77
Discharge: HOME | End: 2022-01-12
Attending: EMERGENCY MEDICINE
Payer: MEDICARE

## 2022-01-12 VITALS
DIASTOLIC BLOOD PRESSURE: 78 MMHG | TEMPERATURE: 98 F | SYSTOLIC BLOOD PRESSURE: 177 MMHG | RESPIRATION RATE: 19 BRPM | OXYGEN SATURATION: 100 % | HEART RATE: 100 BPM

## 2022-01-12 DIAGNOSIS — G89.29 CHRONIC RIGHT SHOULDER PAIN: ICD-10-CM

## 2022-01-12 DIAGNOSIS — M25.511 CHRONIC RIGHT SHOULDER PAIN: ICD-10-CM

## 2022-01-12 DIAGNOSIS — R52 PAIN: ICD-10-CM

## 2022-01-12 DIAGNOSIS — B02.29 POST HERPETIC NEURALGIA: Primary | ICD-10-CM

## 2022-01-12 PROCEDURE — 63600175 PHARM REV CODE 636 W HCPCS: Performed by: EMERGENCY MEDICINE

## 2022-01-12 PROCEDURE — 99284 EMERGENCY DEPT VISIT MOD MDM: CPT | Mod: 25,HCNC

## 2022-01-12 PROCEDURE — 25000003 PHARM REV CODE 250: Performed by: EMERGENCY MEDICINE

## 2022-01-12 PROCEDURE — 96372 THER/PROPH/DIAG INJ SC/IM: CPT | Mod: HCNC

## 2022-01-12 RX ORDER — GABAPENTIN 100 MG/1
100 CAPSULE ORAL ONCE
Status: COMPLETED | OUTPATIENT
Start: 2022-01-12 | End: 2022-01-12

## 2022-01-12 RX ORDER — DICLOFENAC SODIUM 10 MG/G
2 GEL TOPICAL 4 TIMES DAILY
Qty: 100 G | Refills: 0 | Status: ON HOLD | OUTPATIENT
Start: 2022-01-12 | End: 2022-06-03 | Stop reason: HOSPADM

## 2022-01-12 RX ORDER — KETOROLAC TROMETHAMINE 30 MG/ML
15 INJECTION, SOLUTION INTRAMUSCULAR; INTRAVENOUS
Status: COMPLETED | OUTPATIENT
Start: 2022-01-12 | End: 2022-01-12

## 2022-01-12 RX ORDER — GABAPENTIN 100 MG/1
100 CAPSULE ORAL 3 TIMES DAILY
Qty: 90 CAPSULE | Refills: 11 | Status: SHIPPED | OUTPATIENT
Start: 2022-01-12 | End: 2022-02-18 | Stop reason: ALTCHOICE

## 2022-01-12 RX ADMIN — GABAPENTIN 100 MG: 100 CAPSULE ORAL at 03:01

## 2022-01-12 RX ADMIN — KETOROLAC TROMETHAMINE 15 MG: 30 INJECTION, SOLUTION INTRAMUSCULAR; INTRAVENOUS at 03:01

## 2022-01-12 NOTE — TELEPHONE ENCOUNTER
----- Message from Jimmy Jones Jr., MD sent at 1/12/2022  7:00 AM CST -----  Can we set patient up with a visit to discuss her A1c?  thanks

## 2022-01-12 NOTE — ED PROVIDER NOTES
Encounter Date: 1/12/2022       History     Chief Complaint   Patient presents with    Shoulder Pain     Right shoulder pain x 2 week, pt states she fell on 12/25,  pt also complains of lower back pain  after fall as well, increased pain with movement     Rash     Noted to pannus x 1 mth, hx of DM      77 yo woman with a pmh of shingles in the past as well as hypertension and diabetes that presents for evaluation of a sensation of skin itching, pain in the shoulder and pain in the side.  She notes that she has had a surgery in this shoulder in the past and had been doing okay but the pain has returned in addition she gets intermittent burning in the skin along the shoulder and back which feels similar to when she had shingles in the past.  Nothing in particular has seemed to help with the symptoms, she was seen at another emergency department 2 weeks prior and they told things looked okay however afterwards she has continued to have some symptoms.  She has been unable to see her regular doctor since that happened.        Review of patient's allergies indicates:   Allergen Reactions    Actos [pioglitazone] Nausea Only    Darvocet a500 [propoxyphene n-acetaminophen] Nausea And Vomiting    Dilaudid [hydromorphone (bulk)] Nausea And Vomiting     Past Medical History:   Diagnosis Date    Allergy     Anxiety     Arthritis     osteo    Asthma     GERD (gastroesophageal reflux disease)     High cholesterol     Hypertension     Kidney stone     Type 2 diabetes mellitus, uncontrolled, with neuropathy     Urinary tract infection     Vaginal infection      Past Surgical History:   Procedure Laterality Date    CHOLECYSTECTOMY      COLONOSCOPY N/A 6/8/2021    Procedure: COLONOSCOPY;  Surgeon: Rain Pop MD;  Location: [facility];  Service: Endoscopy;  Laterality: N/A;    COLONOSCOPY N/A 7/8/2021    Procedure: colonoscopy with single balloon scope;  Surgeon: Steffen Dueñas MD;  Location: [facility];   Service: Endoscopy;  Laterality: N/A;    ESOPHAGOGASTRODUODENOSCOPY N/A 6/8/2021    Procedure: EGD (ESOPHAGOGASTRODUODENOSCOPY);  Surgeon: Rain Pop MD;  Location: [facility];  Service: Endoscopy;  Laterality: N/A;    EYE SURGERY      HYSTERECTOMY      INCISIONAL HERNIA REPAIR  2003    ROTATOR CUFF REPAIR Right 11/07/2017     Family History   Problem Relation Age of Onset    Stroke Mother     Diabetes Sister     Diabetes Brother     Kidney disease Neg Hx      Social History     Tobacco Use    Smoking status: Former Smoker    Smokeless tobacco: Never Used   Substance Use Topics    Alcohol use: Yes     Comment: social    Drug use: No     Review of Systems  Constitutional-no fever  HEENT-no congestion  Eyes-no redness  Respiratory-no shortness of breath  Cardio-no chest pain  GI-no abdominal pain  Endocrine-no cold intolerance  -no difficulty urinating  MSK-positive shoulder pain  Skin-positive skin burning  Allergy-no environmental allergy  Neurologic-, no headache  Hematology-no swollen nodes  Behavioral-no confusion  Physical Exam     Initial Vitals [01/12/22 1425]   BP Pulse Resp Temp SpO2   (!) 170/73 (!) 113 19 98.3 °F (36.8 °C) 98 %      MAP       --         Physical Exam  Constitutional:  Uncomfortable appearing 76-year-old female in mild distress  Eyes: Conjunctivae normal.  ENT       Head: Normocephalic, atraumatic.       Nose: Normal external appearance        Mouth/Throat: no strigulous respirations   Hematological/Lymphatic/Immunilogical: no visible lymphadenopathy   Cardiovascular: Normal rate,   Respiratory: Normal respiratory effort.   Gastrointestinal: non distended   Musculoskeletal: Normal range of motion in all extremities.  tenderness to palpation over the right deltoid, normal range of motion of the shoulder.  Neurologic: Alert, oriented. Normal speech and language. No gross focal neurologic deficits are appreciated.  Skin: Skin is warm, dry. No rash noted.  Psychiatric:  Mood and affect are normal.   ED Course   Procedures  Labs Reviewed - No data to display       Imaging Results          X-Ray Shoulder Complete 2 View Right (Final result)  Result time 01/12/22 16:15:21    Final result by Kemal Gil MD (01/12/22 16:15:21)                 Impression:      Mild degenerative and postoperative changes of the right shoulder.  No acute radiographic abnormality.  No significant change.      Electronically signed by: Kemal Gil  Date:    01/12/2022  Time:    16:15             Narrative:    EXAMINATION:  XR SHOULDER COMPLETE 2 OR MORE VIEWS RIGHT    CLINICAL HISTORY:  Pain, unspecified    TECHNIQUE:  Three large field of view images of the right shoulder    COMPARISON:  06/02/2020    FINDINGS:  There are 3 surgical anchors in the right humeral head.    Mild osteophytic spurring and degenerative change of the AC joint.  Mild osteophytic spurring of the humeral head.    No acute fracture, subluxation or dislocation.    No significant change.                               X-Ray Lumbar Spine Ap And Lateral (Final result)  Result time 01/12/22 15:51:08    Final result by Nathanael Kelly MD (01/12/22 15:51:08)                 Impression:      No fracture subluxation.      Electronically signed by: Nathanael Kelly MD  Date:    01/12/2022  Time:    15:51             Narrative:    EXAMINATION:  XR LUMBAR SPINE AP AND LATERAL    CLINICAL HISTORY:  pain;    TECHNIQUE:  AP, lateral and spot images were performed of the lumbar spine.    COMPARISON:  One thousand seventeen    FINDINGS:  Postop cholecystectomy and bilateral linear diagonal calcification both buttocks areas suspicious for previous injection sites.  Elevation left hemipelvis.  Nonobstructive tiny calcific density left mid renal pole also seen on CT exam 06/09/2021.  Degenerative disc spondylosis most prominent L1-L2.  Limited levoscoliosis thoracic lumbar junction, prompt facet joint arthropathy lumbosacral junction.  Mild  DJD included both hip joints.                               X-Ray Chest AP Portable (Final result)  Result time 01/12/22 15:56:05    Final result by Bart Castaneda IV, MD (01/12/22 15:56:05)                 Impression:      No acute intrathoracic abnormality.      Electronically signed by: Bart Castaneda  Date:    01/12/2022  Time:    15:56             Narrative:    EXAMINATION:  XR CHEST AP PORTABLE    CLINICAL HISTORY:  Pain, unspecified    TECHNIQUE:  Single frontal view of the chest was performed.    COMPARISON:  Portal chest radiograph from 01/06/2022.    FINDINGS:  Mediastinal structures are midline.  Stable mediastinal and hilar contours.  Stable cardiac silhouette.    Lungs are symmetrically expanded.  Stable chronic interstitial opacities throughout both lungs.  No new focal consolidation or mass.  No pneumothorax.  No significant pleural fluid.    Osseous structures are intact.  Postoperative changes in the right humeral head.                                 Medications   ketorolac injection 15 mg (15 mg Intramuscular Given 1/12/22 1546)   gabapentin capsule 100 mg (100 mg Oral Given 1/12/22 1546)     Medical Decision Making:   History:   Old Medical Records: I decided to obtain old medical records.  Old Records Summarized: records from clinic visits and records from previous admission(s).  Differential Diagnosis:   Post herpetic neuralgia, osteoarthritis, shoulder dislocation, shoulder fracture  Clinical Tests:   Radiological Study: Reviewed and Ordered  ED Management:  This is a longstanding issue for this woman, she likely has an element of post herpetic neuralgia, she is scheduled to be seen by her orthopedist as well as her primary physician moving forward.  We will plan for symptom care, encourage close follow-up.                      Clinical Impression:   Final diagnoses:  [R52] Pain  [B02.29] Post herpetic neuralgia (Primary)  [M25.511, G89.29] Chronic right shoulder pain          ED  Disposition Condition    Discharge Stable        ED Prescriptions     Medication Sig Dispense Start Date End Date Auth. Provider    gabapentin (NEURONTIN) 100 MG capsule Take 1 capsule (100 mg total) by mouth 3 (three) times daily. 90 capsule 1/12/2022 1/12/2023 Bart Mccollum MD    diclofenac sodium (VOLTAREN) 1 % Gel Apply 2 g topically 4 (four) times daily. 100 g 1/12/2022  Bart Mccollum MD        Follow-up Information     Follow up With Specialties Details Why Contact Info    Jimmy Jones Jr., MD Family Medicine Call today If symptoms worsen, For a follow up visit about today [address] [place] [address] [address] [place] [id number]  [phone]      Sarmad Wesley Jr., MD Hand Surgery, Orthopedic Surgery Schedule an appointment as soon as possible for a visit in 3 days For a follow up visit about today [address]  [facility] [address]  [phone]             Bart Mccollum MD  01/12/22 0576

## 2022-01-18 ENCOUNTER — OFFICE VISIT (OUTPATIENT)
Dept: FAMILY MEDICINE | Facility: CLINIC | Age: 77
End: 2022-01-18
Payer: MEDICARE

## 2022-01-18 VITALS
BODY MASS INDEX: 25.12 KG/M2 | WEIGHT: 119.69 LBS | HEIGHT: 58 IN | OXYGEN SATURATION: 98 % | DIASTOLIC BLOOD PRESSURE: 70 MMHG | HEART RATE: 91 BPM | SYSTOLIC BLOOD PRESSURE: 162 MMHG

## 2022-01-18 DIAGNOSIS — B02.9 HERPES ZOSTER WITHOUT COMPLICATION: ICD-10-CM

## 2022-01-18 DIAGNOSIS — B37.2 CANDIDAL DERMATITIS: ICD-10-CM

## 2022-01-18 DIAGNOSIS — E11.8 TYPE 2 DIABETES MELLITUS WITH COMPLICATION, WITH LONG-TERM CURRENT USE OF INSULIN: ICD-10-CM

## 2022-01-18 DIAGNOSIS — R07.89 RIGHT-SIDED CHEST WALL PAIN: Primary | ICD-10-CM

## 2022-01-18 DIAGNOSIS — Z79.4 TYPE 2 DIABETES MELLITUS WITH COMPLICATION, WITH LONG-TERM CURRENT USE OF INSULIN: ICD-10-CM

## 2022-01-18 PROCEDURE — 3288F FALL RISK ASSESSMENT DOCD: CPT | Mod: HCNC,CPTII,S$GLB, | Performed by: FAMILY MEDICINE

## 2022-01-18 PROCEDURE — 99214 PR OFFICE/OUTPT VISIT, EST, LEVL IV, 30-39 MIN: ICD-10-PCS | Mod: HCNC,S$GLB,, | Performed by: FAMILY MEDICINE

## 2022-01-18 PROCEDURE — 1101F PR PT FALLS ASSESS DOC 0-1 FALLS W/OUT INJ PAST YR: ICD-10-PCS | Mod: HCNC,CPTII,S$GLB, | Performed by: FAMILY MEDICINE

## 2022-01-18 PROCEDURE — 3051F PR MOST RECENT HEMOGLOBIN A1C LEVEL 7.0 - < 8.0%: ICD-10-PCS | Mod: HCNC,CPTII,S$GLB, | Performed by: FAMILY MEDICINE

## 2022-01-18 PROCEDURE — 99214 OFFICE O/P EST MOD 30 MIN: CPT | Mod: HCNC,S$GLB,, | Performed by: FAMILY MEDICINE

## 2022-01-18 PROCEDURE — 3051F HG A1C>EQUAL 7.0%<8.0%: CPT | Mod: HCNC,CPTII,S$GLB, | Performed by: FAMILY MEDICINE

## 2022-01-18 PROCEDURE — 1101F PT FALLS ASSESS-DOCD LE1/YR: CPT | Mod: HCNC,CPTII,S$GLB, | Performed by: FAMILY MEDICINE

## 2022-01-18 PROCEDURE — 1159F PR MEDICATION LIST DOCUMENTED IN MEDICAL RECORD: ICD-10-PCS | Mod: HCNC,CPTII,S$GLB, | Performed by: FAMILY MEDICINE

## 2022-01-18 PROCEDURE — 99499 RISK ADDL DX/OHS AUDIT: ICD-10-PCS | Mod: S$GLB,,, | Performed by: FAMILY MEDICINE

## 2022-01-18 PROCEDURE — 3077F PR MOST RECENT SYSTOLIC BLOOD PRESSURE >= 140 MM HG: ICD-10-PCS | Mod: HCNC,CPTII,S$GLB, | Performed by: FAMILY MEDICINE

## 2022-01-18 PROCEDURE — 3077F SYST BP >= 140 MM HG: CPT | Mod: HCNC,CPTII,S$GLB, | Performed by: FAMILY MEDICINE

## 2022-01-18 PROCEDURE — 1159F MED LIST DOCD IN RCRD: CPT | Mod: HCNC,CPTII,S$GLB, | Performed by: FAMILY MEDICINE

## 2022-01-18 PROCEDURE — 99999 PR PBB SHADOW E&M-EST. PATIENT-LVL V: ICD-10-PCS | Mod: PBBFAC,HCNC,, | Performed by: FAMILY MEDICINE

## 2022-01-18 PROCEDURE — 3078F DIAST BP <80 MM HG: CPT | Mod: HCNC,CPTII,S$GLB, | Performed by: FAMILY MEDICINE

## 2022-01-18 PROCEDURE — 3078F PR MOST RECENT DIASTOLIC BLOOD PRESSURE < 80 MM HG: ICD-10-PCS | Mod: HCNC,CPTII,S$GLB, | Performed by: FAMILY MEDICINE

## 2022-01-18 PROCEDURE — 3288F PR FALLS RISK ASSESSMENT DOCUMENTED: ICD-10-PCS | Mod: HCNC,CPTII,S$GLB, | Performed by: FAMILY MEDICINE

## 2022-01-18 PROCEDURE — 99999 PR PBB SHADOW E&M-EST. PATIENT-LVL V: CPT | Mod: PBBFAC,HCNC,, | Performed by: FAMILY MEDICINE

## 2022-01-18 PROCEDURE — 99499 UNLISTED E&M SERVICE: CPT | Mod: S$GLB,,, | Performed by: FAMILY MEDICINE

## 2022-01-18 RX ORDER — NYSTATIN 100000 [USP'U]/G
POWDER TOPICAL 4 TIMES DAILY
Qty: 60 G | Refills: 1 | Status: SHIPPED | OUTPATIENT
Start: 2022-01-18 | End: 2022-01-19 | Stop reason: SDUPTHER

## 2022-01-18 RX ORDER — VALACYCLOVIR HYDROCHLORIDE 1 G/1
1000 TABLET, FILM COATED ORAL 3 TIMES DAILY
Qty: 21 TABLET | Refills: 0 | Status: SHIPPED | OUTPATIENT
Start: 2022-01-18 | End: 2022-01-18 | Stop reason: SDUPTHER

## 2022-01-18 RX ORDER — NAPROXEN 500 MG/1
500 TABLET ORAL 2 TIMES DAILY PRN
Qty: 20 TABLET | Refills: 0 | Status: SHIPPED | OUTPATIENT
Start: 2022-01-18 | End: 2022-01-19 | Stop reason: SDUPTHER

## 2022-01-18 NOTE — PROGRESS NOTES
"[facility] Note    Chief Complaint      Chief Complaint   Patient presents with    Follow-up     History of Present Illness      Juliette Seo is a 76 y.o. female who presents with:    Patient dx'd with post herpetic neuralgia. She has had shingles before and describes the pain as a sharp electric pain from right mid back to right abdomen.  She has not noticed a rash but is extremely tender to light touch.  She has been to the ER twice for this in the past week.      She also has a "yeast rash" under her abdomen.  Itchy and red. No drainage or signs of infection.      Problem List Items Addressed This Visit        Endocrine    Type 2 diabetes mellitus with complication, with long-term current use of insulin    Relevant Medications    dulaglutide (TRULICITY) 0.75 mg/0.5 mL pen injector      Other Visit Diagnoses     Right-sided chest wall pain    -  Primary    Relevant Medications    naproxen (NAPROSYN) 500 MG tablet    Other Relevant Orders    X-Ray Ribs 2 View Right (Completed)    Herpes zoster without complication        Relevant Medications    valACYclovir (VALTREX) 1000 MG tablet    naproxen (NAPROSYN) 500 MG tablet    Candidal dermatitis        Relevant Medications    nystatin (MYCOSTATIN) powder          Health Maintenance   Topic Date Due    DEXA SCAN  10/08/2021    Foot Exam  11/13/2021    Eye Exam  12/02/2021    Hemoglobin A1c  03/23/2022    Lipid Panel  05/06/2022    Colonoscopy  07/08/2022    TETANUS VACCINE  06/01/2024    Hepatitis C Screening  Completed       Past Medical History:   Diagnosis Date    Allergy     Anxiety     Arthritis     osteo    Asthma     GERD (gastroesophageal reflux disease)     High cholesterol     Hypertension     Kidney stone     Type 2 diabetes mellitus, uncontrolled, with neuropathy     Urinary tract infection     Vaginal infection        Past Surgical History:   Procedure Laterality Date    CHOLECYSTECTOMY      COLONOSCOPY N/A " 6/8/2021    Procedure: COLONOSCOPY;  Surgeon: Rain Pop MD;  [facility] END[facility];  Service: Endoscopy;  Laterality: N/A;    COLONOSCOPY N/A 7/8/2021    Procedure: colonoscopy with single balloon scope;  Surgeon: Steffen Dueñas MD;  Location: [facility] ENDO;  Service: Endoscopy;  Laterality: N/A;    ESOPHAGOGASTRODUODENOSCOPY N/A 6/8/2021    Procedure: EGD (ESOPHAGOGASTRODUODENOSCOPY);  Surgeon: Rain Pop MD;  Location: [facility] ENDO;  Service: Endoscopy;  Laterality: N/A;    EYE SURGERY      HYSTERECTOMY      INCISIONAL HERNIA REPAIR  2003    ROTATOR CUFF REPAIR Right 11/07/2017       family history includes Diabetes in her brother and sister; Stroke in her mother.    Social History     Tobacco Use    Smoking status: Former Smoker    Smokeless tobacco: Never Used   Substance Use Topics    Alcohol use: Yes     Comment: social    Drug use: No       Review of Systems   Constitutional: Negative for chills, fever, malaise/fatigue and weight loss.   HENT: Negative for congestion, ear discharge and ear pain.    Eyes: Negative for blurred vision.   Respiratory: Negative for cough and shortness of breath.    Cardiovascular: Negative for chest pain and leg swelling.   Gastrointestinal: Negative for abdominal pain, constipation, diarrhea and vomiting.   Genitourinary: Negative for dysuria.   Musculoskeletal: Negative for myalgias.   Skin: Positive for itching and rash.   Neurological: Negative for dizziness, tingling, focal weakness, weakness and headaches.   Endo/Heme/Allergies: Does not bruise/bleed easily.   Psychiatric/Behavioral: Negative for depression and suicidal ideas.        Outpatient Encounter Medications as of 1/18/2022   Medication Sig Note Dispense Refill    acetaminophen (TYLENOL) 500 MG tablet Take 1 tablet (500 mg total) by mouth every 4 (four) hours as needed for Pain.  42 tablet 0    albuterol (PROVENTIL/VENTOLIN HFA) 90 mcg/actuation inhaler Inhale 2 puffs into the lungs every 4  "(four) hours as needed for Wheezing (or cough).  18 g 3    atorvastatin (LIPITOR) 80 MG tablet Take 1 tablet (80 mg total) by mouth once daily.  90 tablet 3    blood sugar diagnostic (ACCU-CHEK SMARTVIEW TEST STRIP) Strp USE AS DIRECTED TWICE A DAY  100 strip 4    diclofenac sodium (VOLTAREN) 1 % Gel Apply 4 g topically 4 (four) times daily.  1 Tube 3    diclofenac sodium (VOLTAREN) 1 % Gel Apply 2 g topically as needed.  1 Tube 2    diclofenac sodium (VOLTAREN) 1 % Gel Apply 2 g topically 4 (four) times daily.  100 g 0    flash glucose sensor (FREESTYLE BRYAN 14 DAY SENSOR) Kit 1 Device by Misc.(Non-Drug; Combo Route) route 3 (three) times daily.  1 kit 11    gabapentin (NEURONTIN) 100 MG capsule Take 1 capsule (100 mg total) by mouth 3 (three) times daily as needed (pain).  270 capsule 4    gabapentin (NEURONTIN) 100 MG capsule Take 1 capsule (100 mg total) by mouth 3 (three) times daily.  90 capsule 11    glimepiride (AMARYL) 2 MG tablet 1 tab po qdaily  90 tablet 4    hydrOXYzine HCL (ATARAX) 25 MG tablet Take 1 tablet (25 mg total) by mouth 3 (three) times daily as needed for Itching.  100 tablet 1    insulin NPH (NOVOLIN N NPH U-100 INSULIN) 100 unit/mL injection INJECT SUBCUTANEOUSLY 40 UNITS EVERY MORNING & 20 UNITS EVERY EVENING  50 mL 4    insulin syringe-needle U-100 (BD INSULIN SYRINGE ULTRA-FINE) 0.5 mL 31 gauge x 5/16" Syrg USE AS DIRECTED TWICE A DAY  180 each 4    Lactobacillus acidophilus Cap Take 2 capsules by mouth 2 (two) times daily.  60 each prn    lancets (ACCU-CHEK FASTCLIX LANCET DRUM) [facility] USE AS DIRECTED TWICE A DAY  100 each 4    levocetirizine (XYZAL) 5 MG tablet Take 1 tablet (5 mg total) by mouth every evening.  90 tablet 3    LUMIGAN 0.01 % Drop Place 1 drop into both eyes nightly.   4    multivitamin Tab Take 1 tablet by mouth once daily.       naproxen (NAPROSYN) 500 MG tablet Take 1 tablet (500 mg total) by mouth 2 (two) times daily with meals.  60 tablet 1    " "omeprazole (PRILOSEC) 40 MG capsule Take 1 capsule (40 mg total) by mouth every morning.  90 capsule 4    polyethylene glycol (GLYCOLAX) 17 gram PwPk Take 17 g by mouth once daily.  24 packet 0    RESTASIS 0.05 % ophthalmic emulsion Place 1 drop into both eyes 2 (two) times daily. 4/20/2015: Received from: External Pharmacy  3    sucralfate (CARAFATE) 100 mg/mL suspension Take 10 mLs (1 g total) by mouth 4 (four) times daily before meals and nightly.  414 mL 0    tiZANidine (ZANAFLEX) 4 MG tablet Take 4 mg by mouth every 6 (six) hours as needed.       [DISCONTINUED] dulaglutide (TRULICITY) 0.75 mg/0.5 mL pen injector Inject 0.75 mg into the skin every 7 days.  4 pen 11    dulaglutide (TRULICITY) 0.75 mg/0.5 mL pen injector Inject 1.5 mg into the skin every 7 days.  8 pen 11    naproxen (NAPROSYN) 500 MG tablet Take 1 tablet (500 mg total) by mouth 2 (two) times daily as needed (pain).  20 tablet 0    nystatin (MYCOSTATIN) powder Apply topically 4 (four) times daily. for 14 days  60 g 1    valACYclovir (VALTREX) 1000 MG tablet Take 1 tablet (1,000 mg total) by mouth 3 (three) times daily. for 7 days  21 tablet 0     No facility-administered encounter medications on file as of 1/18/2022.        Review of patient's allergies indicates:   Allergen Reactions    Actos [pioglitazone] Nausea Only    Darvocet a500 [propoxyphene n-acetaminophen] Nausea And Vomiting    Dilaudid [hydromorphone (bulk)] Nausea And Vomiting       Physical Exam      Vital Signs  Pulse: 91  SpO2: 98 %  BP: (!) 162/70  Height and Weight  Height: 4' 10" (147.3 cm)  Weight: 54.3 kg (119 lb 11.2 oz)  BSA (Calculated - sq m): 1.49 sq meters  BMI (Calculated): 25  Weight in (lb) to have BMI = 25: 119.4]    Physical Exam  Vitals reviewed.   Constitutional:       General: She is not in acute distress.     Appearance: Normal appearance.   HENT:      Head: Normocephalic.      Right Ear: External ear normal.      Left Ear: External ear normal.      " Nose: Nose normal.      Mouth/Throat:      Mouth: Mucous membranes are moist.   Eyes:      Extraocular Movements: Extraocular movements intact.      Conjunctiva/sclera: Conjunctivae normal.      Pupils: Pupils are equal, round, and reactive to light.   Pulmonary:      Effort: Pulmonary effort is normal.   Abdominal:      General: There is no distension.   Musculoskeletal:         General: Normal range of motion.      Cervical back: Normal range of motion.   Skin:     General: Skin is warm and dry.      Comments: Erythematous scaly rash on lower abdomen under fold.      No rash noted on back or abdomen but extreme ttp to light touch of skin.     Neurological:      General: No focal deficit present.      Mental Status: She is alert and oriented to person, place, and time. Mental status is at baseline.   Psychiatric:         Mood and Affect: Mood normal.         Behavior: Behavior normal.         Thought Content: Thought content normal.         Judgment: Judgment normal.          Laboratory:  CBC:  Recent Labs   Lab Result Units 01/06/22  1706   WBC K/uL 6.56   RBC M/uL 4.80   Hemoglobin g/dL 13.7   Hematocrit % 41.3   Platelets K/uL 203   MCV fL 86   MCH pg 28.5   MCHC g/dL 33.2     CMP:  Recent Labs   Lab Result Units 01/06/22  1706   Glucose mg/dL 359*   Calcium mg/dL 9.7   Albumin g/dL 4.8   Total Protein g/dL 7.9   Sodium mmol/L 139   Potassium mmol/L 3.8   CO2 mmol/L 28   Chloride mmol/L 98   BUN mg/dL 13   Alkaline Phosphatase U/L 113   ALT U/L 26   AST U/L 26   Total Bilirubin mg/dL 0.5     URINALYSIS:  Recent Labs   Lab Result Units 01/06/22  1751   Color, UA  Yellow   Specific Gravity, UA  1.010   pH, UA  7.0   Protein, UA  Negative   Bacteria /hpf Few*   Nitrite, UA  Negative   Leukocytes, UA  Negative   Urobilinogen, UA EU/dL Negative      LIPIDS:  No results for input(s): TSH, HDL, CHOL, TRIG, LDLCALC, CHOLHDL, NONHDLCHOL, TOTALCHOLEST in the last 2160 hours.  TSH:  No results for input(s): TSH in the last  2160 hours.  A1C:  No results for input(s): HGBA1C in the last 2160 hours.    Radiology:      Assessment/Plan     Juliette Seo is a 76 y.o.female with:    1. Right-sided chest wall pain  - X-Ray Ribs 2 View Right; Future  - naproxen (NAPROSYN) 500 MG tablet; Take 1 tablet (500 mg total) by mouth 2 (two) times daily as needed (pain).  Dispense: 20 tablet; Refill: 0    2. Herpes zoster without complication  - valACYclovir (VALTREX) 1000 MG tablet; Take 1 tablet (1,000 mg total) by mouth 3 (three) times daily. for 7 days  Dispense: 21 tablet; Refill: 0  - naproxen (NAPROSYN) 500 MG tablet; Take 1 tablet (500 mg total) by mouth 2 (two) times daily as needed (pain).  Dispense: 20 tablet; Refill: 0    3. Candidal dermatitis  - nystatin (MYCOSTATIN) powder; Apply topically 4 (four) times daily. for 14 days  Dispense: 60 g; Refill: 1    4. Type 2 diabetes mellitus with complication, with long-term current use of insulin  - dulaglutide (TRULICITY) 0.75 mg/0.5 mL pen injector; Inject 1.5 mg into the skin every 7 days.  Dispense: 8 pen; Refill: 11    Increase neurontine.  Lidocaine patches, nsaids and valtrex.  Chronic conditions stable.  Will continue to monitor.     Follow up as discussed    If symptoms worsen or do not improve return to clinic, go to Urgent Care or ER  Take Medications as directed      -Continue current medications and maintain follow up with specialists.      MD Camila Valadez [facility] - [place]

## 2022-01-19 DIAGNOSIS — R07.89 RIGHT-SIDED CHEST WALL PAIN: ICD-10-CM

## 2022-01-19 DIAGNOSIS — B37.2 CANDIDAL DERMATITIS: ICD-10-CM

## 2022-01-19 DIAGNOSIS — B02.9 HERPES ZOSTER WITHOUT COMPLICATION: ICD-10-CM

## 2022-01-20 ENCOUNTER — PATIENT OUTREACH (OUTPATIENT)
Dept: ADMINISTRATIVE | Facility: OTHER | Age: 77
End: 2022-01-20
Payer: MEDICARE

## 2022-01-20 RX ORDER — NYSTATIN 100000 [USP'U]/G
POWDER TOPICAL 4 TIMES DAILY
Qty: 60 G | Refills: 1 | Status: ON HOLD | OUTPATIENT
Start: 2022-01-20 | End: 2023-06-17 | Stop reason: HOSPADM

## 2022-01-20 RX ORDER — NAPROXEN 500 MG/1
500 TABLET ORAL 2 TIMES DAILY PRN
Qty: 20 TABLET | Refills: 0 | Status: SHIPPED | OUTPATIENT
Start: 2022-01-20 | End: 2022-01-21 | Stop reason: SDUPTHER

## 2022-01-20 NOTE — PROGRESS NOTES
Health Maintenance Due   Topic Date Due    COVID-19 Vaccine (1) Never done    DEXA SCAN  10/08/2021    Foot Exam  11/13/2021    Eye Exam  12/02/2021     Updates were requested from care everywhere.  Chart was reviewed for overdue Proactive [facility] Encounters (EULALIA) topics (CRS, Breast Cancer Screening, Eye exam)  Health Maintenance has been updated.  [place] immunization registry triggered.  Immunizations were reconciled.

## 2022-01-21 ENCOUNTER — OFFICE VISIT (OUTPATIENT)
Dept: ORTHOPEDICS | Facility: CLINIC | Age: 77
End: 2022-01-21
Payer: MEDICARE

## 2022-01-21 VITALS — HEIGHT: 58 IN | WEIGHT: 119.69 LBS | BODY MASS INDEX: 25.12 KG/M2

## 2022-01-21 DIAGNOSIS — M25.551 HIP PAIN, BILATERAL: ICD-10-CM

## 2022-01-21 DIAGNOSIS — M25.552 HIP PAIN, BILATERAL: ICD-10-CM

## 2022-01-21 DIAGNOSIS — M15.9 PRIMARY OSTEOARTHRITIS INVOLVING MULTIPLE JOINTS: Primary | ICD-10-CM

## 2022-01-21 PROCEDURE — 3288F FALL RISK ASSESSMENT DOCD: CPT | Mod: HCNC,CPTII,S$GLB, | Performed by: ORTHOPAEDIC SURGERY

## 2022-01-21 PROCEDURE — 99214 OFFICE O/P EST MOD 30 MIN: CPT | Mod: HCNC,S$GLB,, | Performed by: ORTHOPAEDIC SURGERY

## 2022-01-21 PROCEDURE — 99999 PR PBB SHADOW E&M-EST. PATIENT-LVL IV: CPT | Mod: PBBFAC,HCNC,, | Performed by: ORTHOPAEDIC SURGERY

## 2022-01-21 PROCEDURE — 1159F PR MEDICATION LIST DOCUMENTED IN MEDICAL RECORD: ICD-10-PCS | Mod: HCNC,CPTII,S$GLB, | Performed by: ORTHOPAEDIC SURGERY

## 2022-01-21 PROCEDURE — 1125F PR PAIN SEVERITY QUANTIFIED, PAIN PRESENT: ICD-10-PCS | Mod: HCNC,CPTII,S$GLB, | Performed by: ORTHOPAEDIC SURGERY

## 2022-01-21 PROCEDURE — 99214 PR OFFICE/OUTPT VISIT, EST, LEVL IV, 30-39 MIN: ICD-10-PCS | Mod: HCNC,S$GLB,, | Performed by: ORTHOPAEDIC SURGERY

## 2022-01-21 PROCEDURE — 99999 PR PBB SHADOW E&M-EST. PATIENT-LVL IV: ICD-10-PCS | Mod: PBBFAC,HCNC,, | Performed by: ORTHOPAEDIC SURGERY

## 2022-01-21 PROCEDURE — 1159F MED LIST DOCD IN RCRD: CPT | Mod: HCNC,CPTII,S$GLB, | Performed by: ORTHOPAEDIC SURGERY

## 2022-01-21 PROCEDURE — 1101F PR PT FALLS ASSESS DOC 0-1 FALLS W/OUT INJ PAST YR: ICD-10-PCS | Mod: HCNC,CPTII,S$GLB, | Performed by: ORTHOPAEDIC SURGERY

## 2022-01-21 PROCEDURE — 1160F PR REVIEW ALL MEDS BY PRESCRIBER/CLIN PHARMACIST DOCUMENTED: ICD-10-PCS | Mod: HCNC,CPTII,S$GLB, | Performed by: ORTHOPAEDIC SURGERY

## 2022-01-21 PROCEDURE — 1101F PT FALLS ASSESS-DOCD LE1/YR: CPT | Mod: HCNC,CPTII,S$GLB, | Performed by: ORTHOPAEDIC SURGERY

## 2022-01-21 PROCEDURE — 1160F RVW MEDS BY RX/DR IN RCRD: CPT | Mod: HCNC,CPTII,S$GLB, | Performed by: ORTHOPAEDIC SURGERY

## 2022-01-21 PROCEDURE — 1125F AMNT PAIN NOTED PAIN PRSNT: CPT | Mod: HCNC,CPTII,S$GLB, | Performed by: ORTHOPAEDIC SURGERY

## 2022-01-21 PROCEDURE — 3288F PR FALLS RISK ASSESSMENT DOCUMENTED: ICD-10-PCS | Mod: HCNC,CPTII,S$GLB, | Performed by: ORTHOPAEDIC SURGERY

## 2022-01-21 RX ORDER — INFLUENZA A VIRUS A/VICTORIA/2570/2019 IVR-215 (H1N1) ANTIGEN (FORMALDEHYDE INACTIVATED), INFLUENZA A VIRUS A/TASMANIA/503/2020 IVR-221 (H3N2) ANTIGEN (FORMALDEHYDE INACTIVATED), INFLUENZA B VIRUS B/PHUKET/3073/2013 ANTIGEN (FORMALDEHYDE INACTIVATED), AND INFLUENZA B VIRUS B/WASHINGTON/02/2019 ANTIGEN (FORMALDEHYDE INACTIVATED) 60; 60; 60; 60 UG/.7ML; UG/.7ML; UG/.7ML; UG/.7ML
INJECTION, SUSPENSION INTRAMUSCULAR
COMMUNITY
Start: 2021-11-07 | End: 2022-01-21 | Stop reason: ALTCHOICE

## 2022-01-21 NOTE — PROGRESS NOTES
Subjective:      Patient ID: Juliette Seo is a 76 y.o. female.    Chief Complaint: Hip Pain (bilateral ) and Knee Pain (right )    HPI    The patient is seen along with her daughter.  She complains of pain in both hips and both knees.  The left hip is the worse.  The pain does seem to radiate to the knee area.  She reports a recent injection from primary care was transiently helpful with the left hip.  She also complains about diffuse right-sided torso pain which has recently been diagnosed as shingles despite the absence of her rash.          Review of Systems   Constitutional: Negative for fever and weight loss.   HENT: Negative for congestion.    Eyes: Negative for visual disturbance.   Cardiovascular: Negative for chest pain.   Respiratory: Negative for shortness of breath.    Hematologic/Lymphatic: Negative for bleeding problem. Does not bruise/bleed easily.   Skin: Negative for poor wound healing.   Musculoskeletal: Positive for joint pain.   Gastrointestinal: Negative for abdominal pain.   Genitourinary: Negative for dysuria.   Neurological: Negative for seizures.   Psychiatric/Behavioral: Negative for altered mental status.   Allergic/Immunologic: Negative for persistent infections.         Objective:      Ortho/SPM Exam    Left hip    The patient is not in acute distress.   Body habitus is:normal.   Sclerae normal  The patient walks with a slight limp.   Respiratory distress:  none  The skin over the hip is:intact.   There is:no local tenderness.   Range of motion- Flexion 95, External rotation 35, internal rotation 25.  Resisted SLR negative.  Pain with rotation positive  Sciatic tension findings negative.  Shortening/lengthening compared to the contralateral side absent.  Pulses DP present, PT present.  Motor normal 5/5 strength in all tested muscle groups.   Sensory normal.    Right hip  Full painless range of motion without tenderness    Right knee  Nontender  No effusion  Full range of motion  Trace  valgus laxity    Left knee is identical    I reviewed the relevant imaging for the patient's condition:  Radiographs of both hips show mild joint space narrowing medially with slight protrusio.    Previous knee radiographs show minimal degenerative change          Assessment:       Encounter Diagnoses   Name Primary?    Primary osteoarthritis involving multiple joints Yes    Hip pain, bilateral         It is likely that a significant amount of the patient's overall discomfort is due to left hip osteoarthritis with referred pain to the knee area.    I cannot rule out some degree of arthrosis in both knees.    There may also be some sort of generalized medical condition contributing to her overall level of somatic discomfort.          Plan:       Juliette was seen today for hip pain and knee pain.    Diagnoses and all orders for this visit:    Primary osteoarthritis involving multiple joints    Hip pain, bilateral          I explained my diagnostic impression and the reasoning behind it in detail, using layman's terms.  Models and/or pictures were used to help in the explanation.    After discussing the possibility of hip replacement along with the expected clinical course and potential complications, the patient and her daughter wished to defer any decision about this until the return to their home which is being repaired.    Use of a cane was recommended    Medication per primary care    I also sent a message to the patient's coordinator about addressing her generalized pain syndrome.    X-ray in 6 months

## 2022-01-24 DIAGNOSIS — B37.2 CANDIDAL DERMATITIS: ICD-10-CM

## 2022-02-11 ENCOUNTER — TELEPHONE (OUTPATIENT)
Dept: ORTHOPEDICS | Facility: CLINIC | Age: 77
End: 2022-02-11
Payer: MEDICARE

## 2022-02-11 NOTE — TELEPHONE ENCOUNTER
Spoke with patient. Appointment made with Alla for 2/18 for 2 pm. Patient is aware of date and time.

## 2022-02-11 NOTE — TELEPHONE ENCOUNTER
----- Message from Griselda Lorenzo sent at 2/11/2022  2:55 PM CST -----  Regarding: Ed follow up  Contact: [phone]  Type:  Sooner Apoointment Request    Caller is requesting a sooner appointment.  Caller is requesting a message be sent to doctor.  Name of Caller: self   When is the first available appointment?   Symptoms: ED follow up with shoulder pain.   Would the patient rather a call back or a response via [facility]?  call  Best Call Back Number: [phone]  Additional Information:

## 2022-02-15 DIAGNOSIS — Z79.4 TYPE 2 DIABETES MELLITUS WITH COMPLICATION, WITH LONG-TERM CURRENT USE OF INSULIN: ICD-10-CM

## 2022-02-15 DIAGNOSIS — E11.8 TYPE 2 DIABETES MELLITUS WITH COMPLICATION, WITH LONG-TERM CURRENT USE OF INSULIN: ICD-10-CM

## 2022-02-16 ENCOUNTER — TELEPHONE (OUTPATIENT)
Dept: FAMILY MEDICINE | Facility: CLINIC | Age: 77
End: 2022-02-16
Payer: MEDICARE

## 2022-02-16 DIAGNOSIS — Z79.4 TYPE 2 DIABETES MELLITUS WITH COMPLICATION, WITH LONG-TERM CURRENT USE OF INSULIN: ICD-10-CM

## 2022-02-16 DIAGNOSIS — E11.8 TYPE 2 DIABETES MELLITUS WITH COMPLICATION, WITH LONG-TERM CURRENT USE OF INSULIN: ICD-10-CM

## 2022-02-16 RX ORDER — DULAGLUTIDE 1.5 MG/.5ML
1.5 INJECTION, SOLUTION SUBCUTANEOUS
Qty: 12 PEN | Refills: 0 | Status: SHIPPED | OUTPATIENT
Start: 2022-02-16 | End: 2022-06-08

## 2022-02-16 NOTE — TELEPHONE ENCOUNTER
----- Message from Vaishnavi Verma sent at 2/16/2022  9:32 AM CST -----  Contact: Jane @ [facility]-[phone]  Type:  Pharmacy Calling to Clarify an RX    Name of Caller:Jane  Pharmacy Name: [facility]  Prescription Name:dulaglutide (TRULICITY) 0.75 mg/0.5 mL pen injector  What do they need to clarify?: regarding Verifying the Dosage and the Directions on the Prescription   Best Call Back Number:[phone]

## 2022-02-16 NOTE — TELEPHONE ENCOUNTER
Care Due:                  Date            Visit Type   Department     Provider  --------------------------------------------------------------------------------                                EP -                              PRIMARY      [facility]  Last Visit: 01-      CARE ([facility])   [facility] Rosie Jones  Next Visit: None Scheduled  None         None Found                                                            Last  Test          Frequency    Reason                     Performed    Due Date  --------------------------------------------------------------------------------    Lipid Panel.  12 months..  atorvastatin.............  05- 05-    Powered by [employer]. Reference number: [id number].   2/15/2022 9:43:34 PM CST

## 2022-02-16 NOTE — TELEPHONE ENCOUNTER
----- Message from Haley Haynes sent at 2/16/2022 11:00 AM CST -----  Contact: [phone]/ [facility]  [facility]   Regarding: called stating direction do not match quantity on rx  TRULICITY. Pharamcy states they got the same prescription sent over   Would the patient rather a call back or a response via [facility]? Call back  Best Call Back Number: [phone]  Additional Information:

## 2022-02-18 ENCOUNTER — OFFICE VISIT (OUTPATIENT)
Dept: ORTHOPEDICS | Facility: CLINIC | Age: 77
End: 2022-02-18
Payer: MEDICARE

## 2022-02-18 ENCOUNTER — TELEPHONE (OUTPATIENT)
Dept: FAMILY MEDICINE | Facility: CLINIC | Age: 77
End: 2022-02-18
Payer: MEDICARE

## 2022-02-18 VITALS — WEIGHT: 119 LBS | BODY MASS INDEX: 24.98 KG/M2 | HEIGHT: 58 IN

## 2022-02-18 DIAGNOSIS — M25.511 ACUTE PAIN OF RIGHT SHOULDER: Primary | ICD-10-CM

## 2022-02-18 DIAGNOSIS — B02.29 POST HERPETIC NEURALGIA: ICD-10-CM

## 2022-02-18 PROCEDURE — 20610 DRAIN/INJ JOINT/BURSA W/O US: CPT | Mod: HCNC,RT,S$GLB, | Performed by: ORTHOPAEDIC SURGERY

## 2022-02-18 PROCEDURE — 1125F AMNT PAIN NOTED PAIN PRSNT: CPT | Mod: HCNC,CPTII,S$GLB, | Performed by: ORTHOPAEDIC SURGERY

## 2022-02-18 PROCEDURE — 99999 PR PBB SHADOW E&M-EST. PATIENT-LVL IV: CPT | Mod: PBBFAC,HCNC,, | Performed by: ORTHOPAEDIC SURGERY

## 2022-02-18 PROCEDURE — 20610 LARGE JOINT ASPIRATION/INJECTION: R SUBACROMIAL BURSA: ICD-10-PCS | Mod: HCNC,RT,S$GLB, | Performed by: ORTHOPAEDIC SURGERY

## 2022-02-18 PROCEDURE — 99214 PR OFFICE/OUTPT VISIT, EST, LEVL IV, 30-39 MIN: ICD-10-PCS | Mod: HCNC,25,S$GLB, | Performed by: ORTHOPAEDIC SURGERY

## 2022-02-18 PROCEDURE — 1125F PR PAIN SEVERITY QUANTIFIED, PAIN PRESENT: ICD-10-PCS | Mod: HCNC,CPTII,S$GLB, | Performed by: ORTHOPAEDIC SURGERY

## 2022-02-18 PROCEDURE — 3288F FALL RISK ASSESSMENT DOCD: CPT | Mod: HCNC,CPTII,S$GLB, | Performed by: ORTHOPAEDIC SURGERY

## 2022-02-18 PROCEDURE — 99214 OFFICE O/P EST MOD 30 MIN: CPT | Mod: HCNC,25,S$GLB, | Performed by: ORTHOPAEDIC SURGERY

## 2022-02-18 PROCEDURE — 1159F PR MEDICATION LIST DOCUMENTED IN MEDICAL RECORD: ICD-10-PCS | Mod: HCNC,CPTII,S$GLB, | Performed by: ORTHOPAEDIC SURGERY

## 2022-02-18 PROCEDURE — 1159F MED LIST DOCD IN RCRD: CPT | Mod: HCNC,CPTII,S$GLB, | Performed by: ORTHOPAEDIC SURGERY

## 2022-02-18 PROCEDURE — 3288F PR FALLS RISK ASSESSMENT DOCUMENTED: ICD-10-PCS | Mod: HCNC,CPTII,S$GLB, | Performed by: ORTHOPAEDIC SURGERY

## 2022-02-18 PROCEDURE — 1101F PR PT FALLS ASSESS DOC 0-1 FALLS W/OUT INJ PAST YR: ICD-10-PCS | Mod: HCNC,CPTII,S$GLB, | Performed by: ORTHOPAEDIC SURGERY

## 2022-02-18 PROCEDURE — 99999 PR PBB SHADOW E&M-EST. PATIENT-LVL IV: ICD-10-PCS | Mod: PBBFAC,HCNC,, | Performed by: ORTHOPAEDIC SURGERY

## 2022-02-18 PROCEDURE — 1101F PT FALLS ASSESS-DOCD LE1/YR: CPT | Mod: HCNC,CPTII,S$GLB, | Performed by: ORTHOPAEDIC SURGERY

## 2022-02-18 RX ORDER — TRIAMCINOLONE ACETONIDE 40 MG/ML
40 INJECTION, SUSPENSION INTRA-ARTICULAR; INTRAMUSCULAR
Status: DISCONTINUED | OUTPATIENT
Start: 2022-02-18 | End: 2022-02-18 | Stop reason: HOSPADM

## 2022-02-18 RX ORDER — PREGABALIN 75 MG/1
75 CAPSULE ORAL 2 TIMES DAILY
Qty: 60 CAPSULE | Refills: 0 | Status: ON HOLD | OUTPATIENT
Start: 2022-02-18 | End: 2023-06-17 | Stop reason: HOSPADM

## 2022-02-18 RX ADMIN — TRIAMCINOLONE ACETONIDE 40 MG: 40 INJECTION, SUSPENSION INTRA-ARTICULAR; INTRAMUSCULAR at 02:02

## 2022-02-18 NOTE — PROGRESS NOTES
Subjective:      Patient ID: Juliette Seo is a 76 y.o. female.    Chief Complaint: Pain of the Right Shoulder      HPI: Juliette Seo is here with complaints of right shoulder pain for 2-3 months duration. She reports pain is located from the right sided mid thoracic area and wraps around under her arm and breast, includes her shoulder and anterior arm. Pain is difficult to characterize --- initially she only complained of burning and itching in this area with sensitivity of the skin to light touch and sensitivity to her clothes rubbing. Later, she complained of shoulder pain and axilla pain with movement of her arm that resolved with rest of her arm. ADLs (mopping) are difficult 2/2 pain with use of her arm. She has recently been seen by ED and PCP and dx with herpetic neuralgia. She has been treated with gabapentin, naprosyn, and Voltaren. She was also rx Valtrex but has not been taking this. She reports no improvement with this treatment.     Past Medical History:   Diagnosis Date    Allergy     Anxiety     Arthritis     osteo    Asthma     Diabetic retinopathy     GERD (gastroesophageal reflux disease)     High cholesterol     Hypertension     Kidney stone     Type 2 diabetes mellitus, uncontrolled, with neuropathy     Urinary tract infection     Vaginal infection        Current Outpatient Medications:     albuterol (PROVENTIL/VENTOLIN HFA) 90 mcg/actuation inhaler, Inhale 2 puffs into the lungs every 4 (four) hours as needed for Wheezing (or cough)., Disp: 18 g, Rfl: 3    atorvastatin (LIPITOR) 80 MG tablet, Take 1 tablet (80 mg total) by mouth once daily., Disp: 90 tablet, Rfl: 3    blood sugar diagnostic (ACCU-CHEK SMARTVIEW TEST STRIP) Strp, USE AS DIRECTED TWICE A DAY, Disp: 100 strip, Rfl: 4    diclofenac sodium (VOLTAREN) 1 % Gel, Apply 4 g topically 4 (four) times daily., Disp: 1 Tube, Rfl: 3    diclofenac sodium (VOLTAREN) 1 % Gel, Apply 2 g topically 4 (four) times daily., Disp: 100  "g, Rfl: 0    dulaglutide (TRULICITY) 1.5 mg/0.5 mL pen injector, Inject 1.5 mg into the skin every 7 days., Disp: 12 pen, Rfl: 0    flash glucose sensor (FREESTYLE BRYAN 14 DAY SENSOR) Kit, 1 Device by Misc.(Non-Drug; Combo Route) route 3 (three) times daily., Disp: 1 kit, Rfl: 11    glimepiride (AMARYL) 2 MG tablet, 1 tab po qdaily, Disp: 90 tablet, Rfl: 4    hydrOXYzine HCL (ATARAX) 25 MG tablet, Take 1 tablet (25 mg total) by mouth 3 (three) times daily as needed for Itching., Disp: 100 tablet, Rfl: 1    insulin NPH (NOVOLIN N NPH U-100 INSULIN) 100 unit/mL injection, INJECT SUBCUTANEOUSLY 40 UNITS EVERY MORNING & 20 UNITS EVERY EVENING, Disp: 50 mL, Rfl: 4    insulin syringe-needle U-100 (BD INSULIN SYRINGE ULTRA-FINE) 0.5 mL 31 gauge x 5/16" Syrg, USE AS DIRECTED TWICE A DAY, Disp: 180 each, Rfl: 4    Lactobacillus acidophilus Cap, Take 2 capsules by mouth 2 (two) times daily., Disp: 60 each, Rfl: prn    lancets (ACCU-CHEK FASTCLIX LANCET DRUM) [facility], USE AS DIRECTED TWICE A DAY, Disp: 100 each, Rfl: 4    levocetirizine (XYZAL) 5 MG tablet, Take 1 tablet (5 mg total) by mouth every evening., Disp: 90 tablet, Rfl: 3    LUMIGAN 0.01 % Drop, Place 1 drop into both eyes nightly., Disp: , Rfl: 4    multivitamin Tab, Take 1 tablet by mouth once daily., Disp: , Rfl:     naproxen (NAPROSYN) 500 MG tablet, Take 1 tablet (500 mg total) by mouth 2 (two) times daily with meals., Disp: 60 tablet, Rfl: 1    omeprazole (PRILOSEC) 40 MG capsule, Take 1 capsule (40 mg total) by mouth every morning., Disp: 90 capsule, Rfl: 4    RESTASIS 0.05 % ophthalmic emulsion, Place 1 drop into both eyes 2 (two) times daily., Disp: , Rfl: 3    tiZANidine (ZANAFLEX) 4 MG tablet, Take 4 mg by mouth every 6 (six) hours as needed., Disp: , Rfl:     acetaminophen (TYLENOL) 500 MG tablet, Take 1 tablet (500 mg total) by mouth every 4 (four) hours as needed for Pain. (Patient not taking: No sig reported), Disp: 42 tablet, Rfl: 0    " "nystatin (MYCOSTATIN) powder, Apply topically 4 (four) times daily. for 14 days (Patient not taking: Reported on 1/21/2022), Disp: 60 g, Rfl: 1    polyethylene glycol (GLYCOLAX) 17 gram PwPk, Take 17 g by mouth once daily. (Patient not taking: No sig reported), Disp: 24 packet, Rfl: 0    pregabalin (LYRICA) 75 MG capsule, Take 1 capsule (75 mg total) by mouth 2 (two) times daily., Disp: 60 capsule, Rfl: 0    sucralfate (CARAFATE) 100 mg/mL suspension, Take 10 mLs (1 g total) by mouth 4 (four) times daily before meals and nightly. (Patient not taking: No sig reported), Disp: 414 mL, Rfl: 0    valACYclovir (VALTREX) 1000 MG tablet, Take 1 tablet (1,000 mg total) by mouth 3 (three) times daily. for 7 days, Disp: 21 tablet, Rfl: 0  Review of patient's allergies indicates:   Allergen Reactions    Actos [pioglitazone] Nausea Only    Darvocet a500 [propoxyphene n-acetaminophen] Nausea And Vomiting    Dilaudid [hydromorphone (bulk)] Nausea And Vomiting       Ht 4' 10" (1.473 m)   Wt 54 kg (119 lb)   BMI 24.87 kg/m²     Review of Systems   Constitutional: Negative for chills and fever.   Cardiovascular: Negative for chest pain and palpitations.   Respiratory: Negative for shortness of breath and wheezing.    Skin: Negative for poor wound healing and rash.   Musculoskeletal: Positive for joint pain, myalgias and stiffness.   Gastrointestinal: Negative for nausea and vomiting.   Genitourinary: Negative for dysuria and hematuria.   Neurological: Positive for numbness and paresthesias. Negative for seizures and tremors.   Psychiatric/Behavioral: Negative for altered mental status.   Allergic/Immunologic: Negative for environmental allergies and persistent infections.         Objective:    Ortho Exam       Right SHOULDER:  Skin: No rashes or lesions on exposed areas. Allodynia present from the mid back to the shoulder and proximal arm. Scope incisions noted  Atrophy: none noted.  Tenderness to palpation: global, " axilla.  AROM and PROM painful  AROM (deg): abduction- 45, flexion-145, rotation- restricted, painful rotation- present.  Rotator cuff: limited by pain  Martino Impingement test- positive.  Neer Impingement test- positive.  Cross arm adduction test- positive.  Drop arm - negative.  Instability testing: negative.   Distal neuro: normal, no muscle wasting or atrophy.  Pulses: Positive peripheral pulses..    Assessment:     Imaging:  I have personally reviewed and interpreted the radiographs. Xray of the right shoulder shows PO changes, good fixation of the anchors, no failure. Mild degenerative changes of the AV and GH joint but overall unremarkable.         1. Acute pain of right shoulder    2. Post herpetic neuralgia          Plan:       Presentation is atypical and difficult to decipher  I do feel like she is having nerve pain, possibly related to post herpetic neuralgia  However, she also has pain with ROM of the shoulder  I decided to perform diagnostic and possibly therapeutic CSI of the right shoulder today  Alphabet has not been helpful x1 month; try Lyrica  Considered Medrol dose pack but DM has been slightly uncontrolled. May consider if sx persist  Continue oral and topical NSAID  Recommend pt f/u with PCP and possibly neurology.      Orders Placed This Encounter    Large Joint Aspiration/Injection: R subacromial bursa    pregabalin (LYRICA) 75 MG capsule     Follow up in about 3 weeks (around 3/11/2022).

## 2022-02-18 NOTE — PATIENT INSTRUCTIONS
Stop taking Gabapentin  Start taking Lyrica   Continue taking Naprosyn and tylenol  Monitor your glucose closely, If above 300 for 3 days call your primary care doctor  Follow-up with PCP and consider neurology if sx persist

## 2022-02-18 NOTE — TELEPHONE ENCOUNTER
----- Message from Aminata Pandya sent at 2/18/2022 10:26 AM CST -----  Contact: Jane holbrook/ [facility]   [phone]  2/16/2022 11:00 AM CST -----  Contact: [phone]/ [facility]  [facility]   Regarding: called stating direction do not match quantity on rx  TRULICITY. Pharamcy states they got the same prescription sent over   Would the patient rather a call back or a response via [facility]? Call back  Best Call Back Number: [phone]  Additional Information:

## 2022-02-18 NOTE — PROCEDURES
Large Joint Aspiration/Injection: R subacromial bursa    Date/Time: 2/18/2022 2:00 PM  Performed by: Alla Ballard PA-C  Authorized by: Alla Ballard PA-C     Location:  Shoulder  Site:  R subacromial bursa  Medications:  40 mg triamcinolone acetonide 40 mg/mL    PROCEDURE NOTE:  I have explained the risks, benefits, and alternatives of the procedure in detail.  The patient voices understanding and all questions have been answered.  The patient agrees to proceed as planned, consents to injection. Pause for timeout. After a sterile prep of the skin performed in the normal fashion, the right shoulder is injected from the posterior approach using a 22 gauge needle with a combination of 2cc 1% lidocaine and 40 mg of kenalog. The patient is cautioned and immediate relief of pain is secondary to the local anesthetic and will be temporary.  After the anesthetic wears off there may be a increase in pain that may last for a few hours or a few days and they should use ice to help alleviate this flair up of pain.

## 2022-03-09 NOTE — TELEPHONE ENCOUNTER
----- Message from Shirley James sent at 5/16/2017  2:02 PM CDT -----  Contact: self, [phone]  Patient called in returning your call. Please advise.    
----- Message from Shirley James sent at 5/16/2017  2:02 PM CDT -----  Contact: self, [phone]  Patient called in returning your call. Please advise.    
Spoke with patient regarding time of arrival for procedure that is scheduled on 5/18/17. Patient verbalized instructions and confirmed procedure date and time of arrival on 5/18/17 at 745 AM.   
Previously Declined (within the last year)

## 2022-03-10 ENCOUNTER — OUTPATIENT CASE MANAGEMENT (OUTPATIENT)
Dept: ADMINISTRATIVE | Facility: OTHER | Age: 77
End: 2022-03-10
Payer: MEDICARE

## 2022-03-11 ENCOUNTER — PATIENT MESSAGE (OUTPATIENT)
Dept: ADMINISTRATIVE | Facility: OTHER | Age: 77
End: 2022-03-11
Payer: MEDICARE

## 2022-04-07 ENCOUNTER — PATIENT OUTREACH (OUTPATIENT)
Dept: ADMINISTRATIVE | Facility: OTHER | Age: 77
End: 2022-04-07
Payer: MEDICARE

## 2022-04-07 DIAGNOSIS — Z79.4 TYPE 2 DIABETES MELLITUS WITH COMPLICATION, WITH LONG-TERM CURRENT USE OF INSULIN: Primary | ICD-10-CM

## 2022-04-07 DIAGNOSIS — E11.8 TYPE 2 DIABETES MELLITUS WITH COMPLICATION, WITH LONG-TERM CURRENT USE OF INSULIN: Primary | ICD-10-CM

## 2022-04-07 NOTE — PROGRESS NOTES
Health Maintenance Due   Topic Date Due    COVID-19 Vaccine (1) Never done    DEXA Scan  10/08/2021    Foot Exam  11/13/2021    Eye Exam  12/02/2021    Hemoglobin A1c  03/23/2022     Updates were requested from care everywhere.  Chart was reviewed for overdue [facility]active [facility] Encounters (EULALIA) topics (CRS, Breast Cancer Screening, Eye exam)  Health Maintenance has been updated.  LINKS immunization registry triggered.  Immunizations were reconciled.  Order placed for diabetic eye screening photo.

## 2022-05-09 ENCOUNTER — PATIENT MESSAGE (OUTPATIENT)
Dept: ORTHOPEDICS | Facility: CLINIC | Age: 77
End: 2022-05-09
Payer: MEDICARE

## 2022-05-16 ENCOUNTER — PATIENT OUTREACH (OUTPATIENT)
Dept: ADMINISTRATIVE | Facility: OTHER | Age: 77
End: 2022-05-16
Payer: MEDICARE

## 2022-05-16 NOTE — PROGRESS NOTES
Health Maintenance Due   Topic Date Due    COVID-19 Vaccine (1) Never done    DEXA Scan  10/08/2021    Foot Exam  11/13/2021    Eye Exam  12/02/2021    Hemoglobin A1c  03/23/2022    Lipid Panel  05/06/2022    Colonoscopy  07/08/2022     Updates were requested from care everywhere.  Chart was reviewed for overdue [facility] Encounters (EULALIA) topics (CRS, Breast Cancer Screening, Eye exam)  Health Maintenance has been updated.  LINKS immunization registry triggered.  Immunizations were reconciled.

## 2022-05-17 ENCOUNTER — HOSPITAL ENCOUNTER (OUTPATIENT)
Dept: RADIOLOGY | Facility: HOSPITAL | Age: 77
Discharge: HOME | End: 2022-05-17
Attending: ORTHOPAEDIC SURGERY
Payer: MEDICARE

## 2022-05-17 ENCOUNTER — OFFICE VISIT (OUTPATIENT)
Dept: ORTHOPEDICS | Facility: CLINIC | Age: 77
End: 2022-05-17
Payer: MEDICARE

## 2022-05-17 ENCOUNTER — TELEPHONE (OUTPATIENT)
Dept: ORTHOPEDICS | Facility: CLINIC | Age: 77
End: 2022-05-17
Payer: MEDICARE

## 2022-05-17 VITALS — BODY MASS INDEX: 24.98 KG/M2 | HEIGHT: 58 IN | WEIGHT: 119 LBS

## 2022-05-17 DIAGNOSIS — M65.321 TRIGGER FINGER, RIGHT INDEX FINGER: Primary | ICD-10-CM

## 2022-05-17 DIAGNOSIS — Z41.9 ELECTIVE SURGERY: Primary | ICD-10-CM

## 2022-05-17 DIAGNOSIS — M79.643 PAIN OF HAND, UNSPECIFIED LATERALITY: Primary | ICD-10-CM

## 2022-05-17 DIAGNOSIS — M79.643 PAIN OF HAND, UNSPECIFIED LATERALITY: ICD-10-CM

## 2022-05-17 DIAGNOSIS — M65.331 TRIGGER FINGER, RIGHT MIDDLE FINGER: ICD-10-CM

## 2022-05-17 DIAGNOSIS — M65.332 TRIGGER FINGER, LEFT MIDDLE FINGER: ICD-10-CM

## 2022-05-17 DIAGNOSIS — B02.29 POST HERPETIC NEURALGIA: ICD-10-CM

## 2022-05-17 DIAGNOSIS — M65.341 TRIGGER FINGER, RIGHT RING FINGER: ICD-10-CM

## 2022-05-17 DIAGNOSIS — M13.0 POLYARTHROPATHY: ICD-10-CM

## 2022-05-17 PROCEDURE — 99214 PR OFFICE/OUTPT VISIT, EST, LEVL IV, 30-39 MIN: ICD-10-PCS | Mod: 25,S$GLB,, | Performed by: ORTHOPAEDIC SURGERY

## 2022-05-17 PROCEDURE — 1101F PR PT FALLS ASSESS DOC 0-1 FALLS W/OUT INJ PAST YR: ICD-10-PCS | Mod: CPTII,S$GLB,, | Performed by: ORTHOPAEDIC SURGERY

## 2022-05-17 PROCEDURE — 20550 TENDON SHEATH: ICD-10-PCS | Mod: LT,S$GLB,, | Performed by: ORTHOPAEDIC SURGERY

## 2022-05-17 PROCEDURE — 1125F AMNT PAIN NOTED PAIN PRSNT: CPT | Mod: CPTII,S$GLB,, | Performed by: ORTHOPAEDIC SURGERY

## 2022-05-17 PROCEDURE — 73130 X-RAY EXAM OF HAND: CPT | Mod: TC,50,PN

## 2022-05-17 PROCEDURE — 99214 OFFICE O/P EST MOD 30 MIN: CPT | Mod: 25,S$GLB,, | Performed by: ORTHOPAEDIC SURGERY

## 2022-05-17 PROCEDURE — 73130 XR HAND COMPLETE 3 VIEWS BILATERAL: ICD-10-PCS | Mod: 26,50,, | Performed by: RADIOLOGY

## 2022-05-17 PROCEDURE — 99999 PR PBB SHADOW E&M-EST. PATIENT-LVL III: CPT | Mod: PBBFAC,,, | Performed by: ORTHOPAEDIC SURGERY

## 2022-05-17 PROCEDURE — 1159F PR MEDICATION LIST DOCUMENTED IN MEDICAL RECORD: ICD-10-PCS | Mod: CPTII,S$GLB,, | Performed by: ORTHOPAEDIC SURGERY

## 2022-05-17 PROCEDURE — 3288F PR FALLS RISK ASSESSMENT DOCUMENTED: ICD-10-PCS | Mod: CPTII,S$GLB,, | Performed by: ORTHOPAEDIC SURGERY

## 2022-05-17 PROCEDURE — 20550 NJX 1 TENDON SHEATH/LIGAMENT: CPT | Mod: LT,S$GLB,, | Performed by: ORTHOPAEDIC SURGERY

## 2022-05-17 PROCEDURE — 73130 X-RAY EXAM OF HAND: CPT | Mod: 26,50,, | Performed by: RADIOLOGY

## 2022-05-17 PROCEDURE — 3288F FALL RISK ASSESSMENT DOCD: CPT | Mod: CPTII,S$GLB,, | Performed by: ORTHOPAEDIC SURGERY

## 2022-05-17 PROCEDURE — 99999 PR PBB SHADOW E&M-EST. PATIENT-LVL III: ICD-10-PCS | Mod: PBBFAC,,, | Performed by: ORTHOPAEDIC SURGERY

## 2022-05-17 PROCEDURE — 1101F PT FALLS ASSESS-DOCD LE1/YR: CPT | Mod: CPTII,S$GLB,, | Performed by: ORTHOPAEDIC SURGERY

## 2022-05-17 PROCEDURE — 1159F MED LIST DOCD IN RCRD: CPT | Mod: CPTII,S$GLB,, | Performed by: ORTHOPAEDIC SURGERY

## 2022-05-17 PROCEDURE — 1125F PR PAIN SEVERITY QUANTIFIED, PAIN PRESENT: ICD-10-PCS | Mod: CPTII,S$GLB,, | Performed by: ORTHOPAEDIC SURGERY

## 2022-05-17 RX ORDER — TRIAMCINOLONE ACETONIDE 40 MG/ML
20 INJECTION, SUSPENSION INTRA-ARTICULAR; INTRAMUSCULAR
Status: DISCONTINUED | OUTPATIENT
Start: 2022-05-17 | End: 2022-05-17 | Stop reason: HOSPADM

## 2022-05-17 RX ORDER — CEFAZOLIN SODIUM 2 G/50ML
2 SOLUTION INTRAVENOUS
Status: CANCELLED | OUTPATIENT
Start: 2022-05-17

## 2022-05-17 RX ORDER — MUPIROCIN 20 MG/G
OINTMENT TOPICAL
Status: CANCELLED | OUTPATIENT
Start: 2022-05-17

## 2022-05-17 RX ORDER — SODIUM CHLORIDE 9 MG/ML
INJECTION, SOLUTION INTRAVENOUS CONTINUOUS
Status: CANCELLED | OUTPATIENT
Start: 2022-05-17

## 2022-05-17 RX ADMIN — TRIAMCINOLONE ACETONIDE 20 MG: 40 INJECTION, SUSPENSION INTRA-ARTICULAR; INTRAMUSCULAR at 09:05

## 2022-05-17 NOTE — H&P (VIEW-ONLY)
Subjective:      Patient ID: Juliette Seo is a 76 y.o. female.    Chief Complaint: Follow-up (Hand Pain )      HPI: Juliette Seo is here with complaints of bilateral hand pain. She was last seen for triggering of the right index, middle and ring fingers by Norberto Merrill about 10 months ago and treated with CSI. She reports injections were helpful but painful locking has recurred. Today, she complains of swelling, stiffness, and locking of her right index middle and ring fingers. She is also having painful locking of the left middle finger but to a lesser degree.     She was seen in the ED last week for similar complaints bilateral hand complaints and for other joint pain complaints (right elbow, shoulder, hip, and neck). She was treated with Medrol dose pack and Voltaren. She has only taken 1 steroid pill. The ED recommended work up for RA and pt would like to proceed with this now.     Past Medical History:   Diagnosis Date    Allergy     Anxiety     Arthritis     osteo    Asthma     Diabetic retinopathy     GERD (gastroesophageal reflux disease)     High cholesterol     Hypertension     Kidney stone     Type 2 diabetes mellitus, uncontrolled, with neuropathy     Urinary tract infection     Vaginal infection        Current Outpatient Medications:     acetaminophen (TYLENOL) 500 MG tablet, Take 1 tablet (500 mg total) by mouth every 4 (four) hours as needed for Pain., Disp: 42 tablet, Rfl: 0    albuterol (PROVENTIL/VENTOLIN HFA) 90 mcg/actuation inhaler, Inhale 2 puffs into the lungs every 4 (four) hours as needed for Wheezing (or cough)., Disp: 18 g, Rfl: 3    atorvastatin (LIPITOR) 80 MG tablet, Take 1 tablet (80 mg total) by mouth once daily., Disp: 90 tablet, Rfl: 3    blood sugar diagnostic (ACCU-CHEK SMARTVIEW TEST STRIP) Strp, USE AS DIRECTED TWICE A DAY, Disp: 100 strip, Rfl: 4    diclofenac (VOLTAREN) 75 MG EC tablet, Take 1 tablet (75 mg total) by mouth 2 (two) times daily., Disp: 20  "tablet, Rfl: 0    diclofenac sodium (VOLTAREN) 1 % Gel, Apply 4 g topically 4 (four) times daily., Disp: 1 Tube, Rfl: 3    dulaglutide (TRULICITY) 1.5 mg/0.5 mL pen injector, Inject 1.5 mg into the skin every 7 days., Disp: 12 pen, Rfl: 0    flash glucose sensor (FREESTYLE BRYAN 14 DAY SENSOR) Kit, 1 Device by Misc.(Non-Drug; Combo Route) route 3 (three) times daily., Disp: 1 kit, Rfl: 11    glimepiride (AMARYL) 2 MG tablet, 1 tab po qdaily, Disp: 90 tablet, Rfl: 4    hydrOXYzine HCL (ATARAX) 25 MG tablet, Take 1 tablet (25 mg total) by mouth 3 (three) times daily as needed for Itching., Disp: 100 tablet, Rfl: 1    insulin NPH (NOVOLIN N NPH U-100 INSULIN) 100 unit/mL injection, INJECT SUBCUTANEOUSLY 40 UNITS EVERY MORNING & 20 UNITS EVERY EVENING, Disp: 50 mL, Rfl: 4    insulin syringe-needle U-100 (BD INSULIN SYRINGE ULTRA-FINE) 0.5 mL 31 gauge x 5/16" Syrg, USE AS DIRECTED TWICE A DAY, Disp: 180 each, Rfl: 4    Lactobacillus acidophilus Cap, Take 2 capsules by mouth 2 (two) times daily., Disp: 60 each, Rfl: prn    lancets (ACCU-CHEK FASTCLIX LANCET DRUM) [facility], USE AS DIRECTED TWICE A DAY, Disp: 100 each, Rfl: 4    levocetirizine (XYZAL) 5 MG tablet, Take 1 tablet (5 mg total) by mouth every evening., Disp: 90 tablet, Rfl: 3    LUMIGAN 0.01 % Drop, Place 1 drop into both eyes nightly., Disp: , Rfl: 4    methylPREDNISolone (MEDROL DOSEPACK) 4 mg tablet, Take as directed on package, Disp: 1 each, Rfl: 0    multivitamin Tab, Take 1 tablet by mouth once daily., Disp: , Rfl:     naproxen (NAPROSYN) 500 MG tablet, Take 1 tablet (500 mg total) by mouth 2 (two) times daily with meals., Disp: 60 tablet, Rfl: 1    omeprazole (PRILOSEC) 40 MG capsule, Take 1 capsule (40 mg total) by mouth every morning., Disp: 90 capsule, Rfl: 4    RESTASIS 0.05 % ophthalmic emulsion, Place 1 drop into both eyes 2 (two) times daily., Disp: , Rfl: 3    tiZANidine (ZANAFLEX) 4 MG tablet, Take 4 mg by mouth every 6 (six) hours " "as needed., Disp: , Rfl:     diclofenac sodium (VOLTAREN) 1 % Gel, Apply 2 g topically 4 (four) times daily., Disp: 100 g, Rfl: 0    nystatin (MYCOSTATIN) powder, Apply topically 4 (four) times daily. for 14 days (Patient not taking: Reported on 1/21/2022), Disp: 60 g, Rfl: 1    polyethylene glycol (GLYCOLAX) 17 gram PwPk, Take 17 g by mouth once daily. (Patient not taking: No sig reported), Disp: 24 packet, Rfl: 0    pregabalin (LYRICA) 75 MG capsule, Take 1 capsule (75 mg total) by mouth 2 (two) times daily., Disp: 60 capsule, Rfl: 0    sucralfate (CARAFATE) 100 mg/mL suspension, Take 10 mLs (1 g total) by mouth 4 (four) times daily before meals and nightly. (Patient not taking: No sig reported), Disp: 414 mL, Rfl: 0    valACYclovir (VALTREX) 1000 MG tablet, Take 1 tablet (1,000 mg total) by mouth 3 (three) times daily. for 7 days, Disp: 21 tablet, Rfl: 0  Review of patient's allergies indicates:   Allergen Reactions    Actos [pioglitazone] Nausea Only    Darvocet a500 [propoxyphene n-acetaminophen] Nausea And Vomiting    Dilaudid [hydromorphone (bulk)] Nausea And Vomiting       Ht 4' 10" (1.473 m)   Wt 54 kg (119 lb)   BMI 24.87 kg/m²     Review of Systems   Constitutional: Negative for chills and fever.   Cardiovascular: Negative for chest pain and palpitations.   Respiratory: Negative for shortness of breath and wheezing.    Skin: Negative for poor wound healing and rash.   Musculoskeletal: Positive for arthritis, joint pain, myalgias and stiffness.   Gastrointestinal: Negative for nausea and vomiting.   Genitourinary: Negative for dysuria and hematuria.   Neurological: Negative for seizures and tremors.   Psychiatric/Behavioral: Negative for altered mental status.   Allergic/Immunologic: Negative for environmental allergies and persistent infections.         Objective:    Ortho Exam       Bilateral hand UE: Skin intact. Swelling moderate scattered joints. TTP flexor tendons of the right index, " middle, ring, and left middle finegrs. TTP of most PIP joints. ROM full, locks in felxion. Sensation intact. Tinels na. Pulses present. Cap refill brisk.   GEN: Well developed, well nourished female. AAOX3. No acute distress.   Normocephalic, atraumatic.   YU  Breathing unlabored.  Mood and affect appropriate.     Assessment:     Imaging:  I have personally reviewed and interpreted the radiographs. Xray of the bilateral hand shows djd of the DIP joints bilaterally, also some joint space narrowing of PIP and MCP specially Right index and middle           1. Trigger finger, right index finger    2. Polyarthropathy    3. Post herpetic neuralgia    4. Trigger finger, right middle finger    5. Trigger finger, right ring finger    6. Trigger finger, left middle finger          Plan:       Orders Placed This Encounter    Tendon Sheath    Sedimentation rate    C-reactive protein    ANGELINA    Cyclic citrul peptide antibody, IgG    Rheumatoid factor    HLA B27 Antigen    Ambulatory referral/consult to Family Practice    Case Request Operating Room: RELEASE, TRIGGER FINGER; right index, middle, ring      I explained the nature of trigger fingers, I explained her treatment options to include conservative management vs surgical release.   We had a long discussion that the surgery with fix the problem with the tendons (trigger finger) but will not resolve stiffness or pain related to underlying arthritis. She understands and would like to proceed with trigger finger release for the right index, middle, and ring fingers.     Pre, melissa, and post operative procedures and expectations discussed. All questions were answered. Consent forms were explained and signed by the patient.   Juliette Seo will contact us if there are any questions, concerns, or changes in medical status prior to surgery.    Fort he left hand trigger finger, recommended and performed CSI today. Stop medrol dose pack from ED.  Continue NSAID    She does  have degenerative changes noted on xray at the MP and PIP joints, she also complains of multiple joint pain. Will initiate RA work up.     She was previously seen for herpetic neuralgia and treated with Lyrica. She reports significant improvement with this medication and would like to continue. I recommend she f/u with PCP for chronic use/ refill of this medication. She has request referral for new PCP as Dr. Jones is no longer working at [facility].     Follow up for PO care.

## 2022-05-17 NOTE — PROCEDURES
Tendon Sheath    Date/Time: 5/17/2022 9:00 AM  Performed by: Alla Ballard PA-C  Authorized by: Alla Ballard PA-C     Location:  Long finger  Site:  L long flexor tendon sheath  Medications:  20 mg triamcinolone acetonide 40 mg/mL    PROCEDURE:  I have explained the risks, benefits, and alternatives of the procedure in detail.  The patient voices understanding, gives consent, and all questions have been answered.  Pause for timeout. A sterile prep of the skin performed, then the left middle finger flexor tendon is injected using a 25 gauge needle with a combination of 0.5 cc 1% plain xylocaine and 20 mg of Kenalog.  The remaining 20 mg of Kenolog was properly wasted. The patient is cautioned and immediate relief of pain is secondary to the local anesthetic and will be temporary.  After the anesthetic wears off there may be a increase in pain that may last for a few hours or a few days and they should use ice to help alleviate this flair up of pain. Patient tolerated the procedure well.

## 2022-05-17 NOTE — PROGRESS NOTES
Subjective:      Patient ID: Juliette Seo is a 76 y.o. female.    Chief Complaint: Follow-up (Hand Pain )      HPI: Juliette Seo is here with complaints of bilateral hand pain. She was last seen for triggering of the right index, middle and ring fingers by Norberto Merrill about 10 months ago and treated with CSI. She reports injections were helpful but painful locking has recurred. Today, she complains of swelling, stiffness, and locking of her right index middle and ring fingers. She is also having painful locking of the left middle finger but to a lesser degree.     She was seen in the ED last week for similar complaints bilateral hand complaints and for other joint pain complaints (right elbow, shoulder, hip, and neck). She was treated with Medrol dose pack and Voltaren. She has only taken 1 steroid pill. The ED recommended work up for RA and pt would like to proceed with this now.     Past Medical History:   Diagnosis Date    Allergy     Anxiety     Arthritis     osteo    Asthma     Diabetic retinopathy     GERD (gastroesophageal reflux disease)     High cholesterol     Hypertension     Kidney stone     Type 2 diabetes mellitus, uncontrolled, with neuropathy     Urinary tract infection     Vaginal infection        Current Outpatient Medications:     acetaminophen (TYLENOL) 500 MG tablet, Take 1 tablet (500 mg total) by mouth every 4 (four) hours as needed for Pain., Disp: 42 tablet, Rfl: 0    albuterol (PROVENTIL/VENTOLIN HFA) 90 mcg/actuation inhaler, Inhale 2 puffs into the lungs every 4 (four) hours as needed for Wheezing (or cough)., Disp: 18 g, Rfl: 3    atorvastatin (LIPITOR) 80 MG tablet, Take 1 tablet (80 mg total) by mouth once daily., Disp: 90 tablet, Rfl: 3    blood sugar diagnostic (ACCU-CHEK SMARTVIEW TEST STRIP) Strp, USE AS DIRECTED TWICE A DAY, Disp: 100 strip, Rfl: 4    diclofenac (VOLTAREN) 75 MG EC tablet, Take 1 tablet (75 mg total) by mouth 2 (two) times daily., Disp: 20  "tablet, Rfl: 0    diclofenac sodium (VOLTAREN) 1 % Gel, Apply 4 g topically 4 (four) times daily., Disp: 1 Tube, Rfl: 3    dulaglutide (TRULICITY) 1.5 mg/0.5 mL pen injector, Inject 1.5 mg into the skin every 7 days., Disp: 12 pen, Rfl: 0    flash glucose sensor (FREESTYLE BRYAN 14 DAY SENSOR) Kit, 1 Device by Misc.(Non-Drug; Combo Route) route 3 (three) times daily., Disp: 1 kit, Rfl: 11    glimepiride (AMARYL) 2 MG tablet, 1 tab po qdaily, Disp: 90 tablet, Rfl: 4    hydrOXYzine HCL (ATARAX) 25 MG tablet, Take 1 tablet (25 mg total) by mouth 3 (three) times daily as needed for Itching., Disp: 100 tablet, Rfl: 1    insulin NPH (NOVOLIN N NPH U-100 INSULIN) 100 unit/mL injection, INJECT SUBCUTANEOUSLY 40 UNITS EVERY MORNING & 20 UNITS EVERY EVENING, Disp: 50 mL, Rfl: 4    insulin syringe-needle U-100 (BD INSULIN SYRINGE ULTRA-FINE) 0.5 mL 31 gauge x 5/16" Syrg, USE AS DIRECTED TWICE A DAY, Disp: 180 each, Rfl: 4    Lactobacillus acidophilus Cap, Take 2 capsules by mouth 2 (two) times daily., Disp: 60 each, Rfl: prn    lancets (ACCU-CHEK FASTCLIX LANCET DRUM) [facility], USE AS DIRECTED TWICE A DAY, Disp: 100 each, Rfl: 4    levocetirizine (XYZAL) 5 MG tablet, Take 1 tablet (5 mg total) by mouth every evening., Disp: 90 tablet, Rfl: 3    LUMIGAN 0.01 % Drop, Place 1 drop into both eyes nightly., Disp: , Rfl: 4    methylPREDNISolone (MEDROL DOSEPACK) 4 mg tablet, Take as directed on package, Disp: 1 each, Rfl: 0    multivitamin Tab, Take 1 tablet by mouth once daily., Disp: , Rfl:     naproxen (NAPROSYN) 500 MG tablet, Take 1 tablet (500 mg total) by mouth 2 (two) times daily with meals., Disp: 60 tablet, Rfl: 1    omeprazole (PRILOSEC) 40 MG capsule, Take 1 capsule (40 mg total) by mouth every morning., Disp: 90 capsule, Rfl: 4    RESTASIS 0.05 % ophthalmic emulsion, Place 1 drop into both eyes 2 (two) times daily., Disp: , Rfl: 3    tiZANidine (ZANAFLEX) 4 MG tablet, Take 4 mg by mouth every 6 (six) hours " "as needed., Disp: , Rfl:     diclofenac sodium (VOLTAREN) 1 % Gel, Apply 2 g topically 4 (four) times daily., Disp: 100 g, Rfl: 0    nystatin (MYCOSTATIN) powder, Apply topically 4 (four) times daily. for 14 days (Patient not taking: Reported on 1/21/2022), Disp: 60 g, Rfl: 1    polyethylene glycol (GLYCOLAX) 17 gram PwPk, Take 17 g by mouth once daily. (Patient not taking: No sig reported), Disp: 24 packet, Rfl: 0    pregabalin (LYRICA) 75 MG capsule, Take 1 capsule (75 mg total) by mouth 2 (two) times daily., Disp: 60 capsule, Rfl: 0    sucralfate (CARAFATE) 100 mg/mL suspension, Take 10 mLs (1 g total) by mouth 4 (four) times daily before meals and nightly. (Patient not taking: No sig reported), Disp: 414 mL, Rfl: 0    valACYclovir (VALTREX) 1000 MG tablet, Take 1 tablet (1,000 mg total) by mouth 3 (three) times daily. for 7 days, Disp: 21 tablet, Rfl: 0  Review of patient's allergies indicates:   Allergen Reactions    Actos [pioglitazone] Nausea Only    Darvocet a500 [propoxyphene n-acetaminophen] Nausea And Vomiting    Dilaudid [hydromorphone (bulk)] Nausea And Vomiting       Ht 4' 10" (1.473 m)   Wt 54 kg (119 lb)   BMI 24.87 kg/m²     Review of Systems   Constitutional: Negative for chills and fever.   Cardiovascular: Negative for chest pain and palpitations.   Respiratory: Negative for shortness of breath and wheezing.    Skin: Negative for poor wound healing and rash.   Musculoskeletal: Positive for arthritis, joint pain, myalgias and stiffness.   Gastrointestinal: Negative for nausea and vomiting.   Genitourinary: Negative for dysuria and hematuria.   Neurological: Negative for seizures and tremors.   Psychiatric/Behavioral: Negative for altered mental status.   Allergic/Immunologic: Negative for environmental allergies and persistent infections.         Objective:    Ortho Exam       Bilateral hand UE: Skin intact. Swelling moderate scattered joints. TTP flexor tendons of the right index, " middle, ring, and left middle finegrs. TTP of most PIP joints. ROM full, locks in felxion. Sensation intact. Tinels na. Pulses present. Cap refill brisk.   GEN: Well developed, well nourished female. AAOX3. No acute distress.   Normocephalic, atraumatic.   YU  Breathing unlabored.  Mood and affect appropriate.     Assessment:     Imaging:  I have personally reviewed and interpreted the radiographs. Xray of the bilateral hand shows djd of the DIP joints bilaterally, also some joint space narrowing of PIP and MCP specially Right index and middle           1. Trigger finger, right index finger    2. Polyarthropathy    3. Post herpetic neuralgia    4. Trigger finger, right middle finger    5. Trigger finger, right ring finger    6. Trigger finger, left middle finger          Plan:       Orders Placed This Encounter    Tendon Sheath    Sedimentation rate    C-reactive protein    ANGELINA    Cyclic citrul peptide antibody, IgG    Rheumatoid factor    HLA B27 Antigen    Ambulatory referral/consult to Family Practice    Case Request Operating Room: RELEASE, TRIGGER FINGER; right index, middle, ring      I explained the nature of trigger fingers, I explained her treatment options to include conservative management vs surgical release.   We had a long discussion that the surgery with fix the problem with the tendons (trigger finger) but will not resolve stiffness or pain related to underlying arthritis. She understands and would like to proceed with trigger finger release for the right index, middle, and ring fingers.     Pre, melissa, and post operative procedures and expectations discussed. All questions were answered. Consent forms were explained and signed by the patient.   Juliette Seo will contact us if there are any questions, concerns, or changes in medical status prior to surgery.    [place] he left hand trigger finger, recommended and performed CSI today. Stop medrol dose pack from ED.  Continue NSAID    She does  have degenerative changes noted on xray at the MP and PIP joints, she also complains of multiple joint pain. Will initiate RA work up.     She was previously seen for herpetic neuralgia and treated with Lyrica. She reports significant improvement with this medication and would like to continue. I recommend she f/u with PCP for chronic use/ refill of this medication. She has request referral for new PCP as Dr. Jones is no longer working at [facility].     Follow up for PO care.

## 2022-05-23 ENCOUNTER — PATIENT MESSAGE (OUTPATIENT)
Dept: ADMINISTRATIVE | Facility: HOSPITAL | Age: 77
End: 2022-05-23
Payer: MEDICARE

## 2022-05-31 ENCOUNTER — LAB VISIT (OUTPATIENT)
Dept: FAMILY MEDICINE | Facility: CLINIC | Age: 77
End: 2022-05-31
Payer: MEDICARE

## 2022-05-31 DIAGNOSIS — Z41.9 ELECTIVE SURGERY: ICD-10-CM

## 2022-05-31 PROCEDURE — U0005 INFEC AGEN DETEC AMPLI PROBE: HCPCS | Performed by: ORTHOPAEDIC SURGERY

## 2022-05-31 PROCEDURE — U0003 INFECTIOUS AGENT DETECTION BY NUCLEIC ACID (DNA OR RNA); SEVERE ACUTE RESPIRATORY SYNDROME CORONAVIRUS 2 (SARS-COV-2) (CORONAVIRUS DISEASE [COVID-19]), AMPLIFIED PROBE TECHNIQUE, MAKING USE OF HIGH THROUGHPUT TECHNOLOGIES AS DESCRIBED BY CMS-2020-01-R: HCPCS | Performed by: ORTHOPAEDIC SURGERY

## 2022-06-01 ENCOUNTER — PATIENT OUTREACH (OUTPATIENT)
Dept: ADMINISTRATIVE | Facility: HOSPITAL | Age: 77
End: 2022-06-01
Payer: MEDICARE

## 2022-06-01 LAB
SARS-COV-2 RNA RESP QL NAA+PROBE: NOT DETECTED
SARS-COV-2- CYCLE NUMBER: NORMAL

## 2022-06-03 ENCOUNTER — HOSPITAL ENCOUNTER (OUTPATIENT)
Facility: HOSPITAL | Age: 77
Discharge: HOME | End: 2022-06-03
Attending: ORTHOPAEDIC SURGERY | Admitting: ORTHOPAEDIC SURGERY
Payer: MEDICARE

## 2022-06-03 ENCOUNTER — ANESTHESIA (OUTPATIENT)
Dept: SURGERY | Facility: HOSPITAL | Age: 77
End: 2022-06-03
Payer: MEDICARE

## 2022-06-03 ENCOUNTER — ANESTHESIA EVENT (OUTPATIENT)
Dept: SURGERY | Facility: HOSPITAL | Age: 77
End: 2022-06-03
Payer: MEDICARE

## 2022-06-03 VITALS
HEIGHT: 59 IN | HEART RATE: 86 BPM | WEIGHT: 120 LBS | BODY MASS INDEX: 24.19 KG/M2 | TEMPERATURE: 98 F | DIASTOLIC BLOOD PRESSURE: 72 MMHG | SYSTOLIC BLOOD PRESSURE: 164 MMHG | RESPIRATION RATE: 16 BRPM | OXYGEN SATURATION: 100 %

## 2022-06-03 DIAGNOSIS — M65.321 TRIGGER FINGER, RIGHT INDEX FINGER: ICD-10-CM

## 2022-06-03 DIAGNOSIS — M65.341 TRIGGER FINGER, RIGHT RING FINGER: ICD-10-CM

## 2022-06-03 DIAGNOSIS — M65.331 TRIGGER FINGER, RIGHT MIDDLE FINGER: ICD-10-CM

## 2022-06-03 LAB — POCT GLUCOSE: 265 MG/DL (ref 70–110)

## 2022-06-03 PROCEDURE — 37000008 HC ANESTHESIA 1ST 15 MINUTES: Performed by: ORTHOPAEDIC SURGERY

## 2022-06-03 PROCEDURE — 26055 INCISE FINGER TENDON SHEATH: CPT | Mod: F6,,, | Performed by: ORTHOPAEDIC SURGERY

## 2022-06-03 PROCEDURE — 63600175 PHARM REV CODE 636 W HCPCS: Performed by: ANESTHESIOLOGY

## 2022-06-03 PROCEDURE — 36000706: Performed by: ORTHOPAEDIC SURGERY

## 2022-06-03 PROCEDURE — 71000015 HC POSTOP RECOV 1ST HR: Performed by: ORTHOPAEDIC SURGERY

## 2022-06-03 PROCEDURE — 63600175 PHARM REV CODE 636 W HCPCS: Performed by: ORTHOPAEDIC SURGERY

## 2022-06-03 PROCEDURE — 25000003 PHARM REV CODE 250: Performed by: ORTHOPAEDIC SURGERY

## 2022-06-03 PROCEDURE — 26055 PR INCISE FINGER TENDON SHEATH: ICD-10-PCS | Mod: 51,F7,, | Performed by: ORTHOPAEDIC SURGERY

## 2022-06-03 PROCEDURE — 36000707: Performed by: ORTHOPAEDIC SURGERY

## 2022-06-03 PROCEDURE — 37000009 HC ANESTHESIA EA ADD 15 MINS: Performed by: ORTHOPAEDIC SURGERY

## 2022-06-03 PROCEDURE — 25000003 PHARM REV CODE 250: Performed by: NURSE ANESTHETIST, CERTIFIED REGISTERED

## 2022-06-03 PROCEDURE — 63600175 PHARM REV CODE 636 W HCPCS: Performed by: NURSE ANESTHETIST, CERTIFIED REGISTERED

## 2022-06-03 PROCEDURE — 71000016 HC POSTOP RECOV ADDL HR: Performed by: ORTHOPAEDIC SURGERY

## 2022-06-03 RX ORDER — LIDOCAINE HYDROCHLORIDE 10 MG/ML
1 INJECTION, SOLUTION EPIDURAL; INFILTRATION; INTRACAUDAL; PERINEURAL ONCE
Status: DISCONTINUED | OUTPATIENT
Start: 2022-06-03 | End: 2022-06-03 | Stop reason: HOSPADM

## 2022-06-03 RX ORDER — ACETAMINOPHEN 325 MG/1
650 TABLET ORAL EVERY 4 HOURS PRN
Status: DISCONTINUED | OUTPATIENT
Start: 2022-06-03 | End: 2022-06-03 | Stop reason: HOSPADM

## 2022-06-03 RX ORDER — ACETAMINOPHEN 10 MG/ML
INJECTION, SOLUTION INTRAVENOUS
Status: DISCONTINUED | OUTPATIENT
Start: 2022-06-03 | End: 2022-06-03

## 2022-06-03 RX ORDER — MUPIROCIN 20 MG/G
OINTMENT TOPICAL
Status: DISCONTINUED | OUTPATIENT
Start: 2022-06-03 | End: 2022-06-03 | Stop reason: HOSPADM

## 2022-06-03 RX ORDER — LIDOCAINE HCL/PF 100 MG/5ML
SYRINGE (ML) INTRAVENOUS
Status: DISCONTINUED | OUTPATIENT
Start: 2022-06-03 | End: 2022-06-03

## 2022-06-03 RX ORDER — CEFAZOLIN SODIUM 2 G/50ML
2 SOLUTION INTRAVENOUS
Status: COMPLETED | OUTPATIENT
Start: 2022-06-03 | End: 2022-06-03

## 2022-06-03 RX ORDER — HYDROCODONE BITARTRATE AND ACETAMINOPHEN 5; 325 MG/1; MG/1
1 TABLET ORAL EVERY 4 HOURS PRN
Qty: 12 TABLET | Refills: 0 | Status: SHIPPED | OUTPATIENT
Start: 2022-06-03 | End: 2023-01-10

## 2022-06-03 RX ORDER — PROPOFOL 10 MG/ML
VIAL (ML) INTRAVENOUS CONTINUOUS PRN
Status: DISCONTINUED | OUTPATIENT
Start: 2022-06-03 | End: 2022-06-03

## 2022-06-03 RX ORDER — BUPIVACAINE HYDROCHLORIDE 2.5 MG/ML
INJECTION, SOLUTION EPIDURAL; INFILTRATION; INTRACAUDAL
Status: DISCONTINUED | OUTPATIENT
Start: 2022-06-03 | End: 2022-06-03 | Stop reason: HOSPADM

## 2022-06-03 RX ORDER — OXYCODONE HYDROCHLORIDE 5 MG/1
10 TABLET ORAL EVERY 4 HOURS PRN
Status: DISCONTINUED | OUTPATIENT
Start: 2022-06-03 | End: 2022-06-03 | Stop reason: HOSPADM

## 2022-06-03 RX ORDER — ONDANSETRON 2 MG/ML
INJECTION INTRAMUSCULAR; INTRAVENOUS
Status: DISCONTINUED | OUTPATIENT
Start: 2022-06-03 | End: 2022-06-03

## 2022-06-03 RX ORDER — SODIUM CHLORIDE, SODIUM LACTATE, POTASSIUM CHLORIDE, CALCIUM CHLORIDE 600; 310; 30; 20 MG/100ML; MG/100ML; MG/100ML; MG/100ML
INJECTION, SOLUTION INTRAVENOUS CONTINUOUS
Status: DISCONTINUED | OUTPATIENT
Start: 2022-06-03 | End: 2022-06-03 | Stop reason: HOSPADM

## 2022-06-03 RX ORDER — ONDANSETRON 8 MG/1
8 TABLET, ORALLY DISINTEGRATING ORAL EVERY 8 HOURS PRN
Status: DISCONTINUED | OUTPATIENT
Start: 2022-06-03 | End: 2022-06-03 | Stop reason: HOSPADM

## 2022-06-03 RX ORDER — SODIUM CHLORIDE 9 MG/ML
INJECTION, SOLUTION INTRAVENOUS CONTINUOUS
Status: DISCONTINUED | OUTPATIENT
Start: 2022-06-03 | End: 2022-06-03 | Stop reason: HOSPADM

## 2022-06-03 RX ORDER — KETOROLAC TROMETHAMINE 30 MG/ML
15 INJECTION, SOLUTION INTRAMUSCULAR; INTRAVENOUS ONCE
Status: DISCONTINUED | OUTPATIENT
Start: 2022-06-03 | End: 2022-06-03 | Stop reason: HOSPADM

## 2022-06-03 RX ORDER — LIDOCAINE HYDROCHLORIDE 10 MG/ML
INJECTION, SOLUTION EPIDURAL; INFILTRATION; INTRACAUDAL; PERINEURAL
Status: DISCONTINUED | OUTPATIENT
Start: 2022-06-03 | End: 2022-06-03 | Stop reason: HOSPADM

## 2022-06-03 RX ADMIN — ACETAMINOPHEN 1000 MG: 10 INJECTION, SOLUTION INTRAVENOUS at 09:06

## 2022-06-03 RX ADMIN — CEFAZOLIN SODIUM 2 G: 2 SOLUTION INTRAVENOUS at 09:06

## 2022-06-03 RX ADMIN — PROPOFOL 150 MCG/KG/MIN: 10 INJECTION, EMULSION INTRAVENOUS at 09:06

## 2022-06-03 RX ADMIN — SODIUM CHLORIDE, SODIUM LACTATE, POTASSIUM CHLORIDE, AND CALCIUM CHLORIDE: .6; .31; .03; .02 INJECTION, SOLUTION INTRAVENOUS at 07:06

## 2022-06-03 RX ADMIN — GLYCOPYRROLATE 0.2 MG: 0.2 INJECTION, SOLUTION INTRAMUSCULAR; INTRAVITREAL at 09:06

## 2022-06-03 RX ADMIN — ONDANSETRON 4 MG: 2 INJECTION, SOLUTION INTRAMUSCULAR; INTRAVENOUS at 09:06

## 2022-06-03 RX ADMIN — LIDOCAINE HYDROCHLORIDE 100 MG: 20 INJECTION, SOLUTION INTRAVENOUS at 09:06

## 2022-06-03 NOTE — PLAN OF CARE
Discharge instructions on medications, signs and symptoms when to call the MD, diet, activity, post-op care and follow-up visit are given to the patient and patient's daughter.  Both verbalized complete understanding on the above discharge instructions.  Patient is awake and alert.  Denies pain.  Patient received medications from Outpatient Pharmacy.  Patient is wheeled to the Exit per Outpatient Surgery staff.  Voiced no concerns.  Safety maintained.

## 2022-06-03 NOTE — ANESTHESIA POSTPROCEDURE EVALUATION
Anesthesia Post Evaluation    Patient: Juliette Seo    Procedure(s) Performed: Procedure(s) (LRB):  RELEASE, TRIGGER FINGER; right index, middle, ring (Right)    Final Anesthesia Type: general      Patient location during evaluation: PACU  Patient participation: Yes- Able to Participate  Level of consciousness: awake and alert, oriented and awake  Post-procedure vital signs: reviewed and stable  Pain management: adequate  Airway patency: patent    PONV status at discharge: No PONV  Anesthetic complications: no      Cardiovascular status: blood pressure returned to baseline  Respiratory status: unassisted and room air  Hydration status: euvolemic  Follow-up not needed.          Vitals Value Taken Time   /72 06/03/22 1052   Temp 36.6 °C (97.8 °F) 06/03/22 1052   Pulse 86 06/03/22 1052   Resp 16 06/03/22 1052   SpO2 100 % 06/03/22 1052         No case tracking events are documented in the log.      Pain/Shira Score: No data recorded

## 2022-06-03 NOTE — PLAN OF CARE
Patient transferred to room from Surgery.  Patient is drowsy but arousable.  Daughter is at bedside.  Dressings of Xeroform, gauze and ace wrap are intact to right hand.  Denies pain.  IVF infusing.  Safety is maintained with stretcher at the lowest, wheels locked and side rails up.  Call light within reach.  Will continue to monitor.

## 2022-06-03 NOTE — ANESTHESIA POSTPROCEDURE EVALUATION
Anesthesia Post Evaluation    Patient: Juliette Seo    Procedure(s) Performed: Procedure(s) (LRB):  RELEASE, TRIGGER FINGER; right index, middle, ring (Right)    Final Anesthesia Type: MAC      Patient location during evaluation: OPS  Patient participation: Yes- Able to Participate  Level of consciousness: awake  Post-procedure vital signs: reviewed and stable  Pain management: adequate  Airway patency: patent    PONV status at discharge: No PONV                Vitals Value Taken Time   BP  06/03/22 1022   Temp  06/03/22 1022   Pulse  06/03/22 1022   Resp  06/03/22 1022   SpO2  06/03/22 1022         No case tracking events are documented in the log.      Pain/Shira Score: No data recorded

## 2022-06-03 NOTE — ANESTHESIA PREPROCEDURE EVALUATION
06/03/2022  Juliette Seo is a 76 y.o., female presents for trigger finger release under MAC.    Past Medical History:   Diagnosis Date    Allergy     Anxiety     Arthritis     osteo    Asthma     Diabetic retinopathy     GERD (gastroesophageal reflux disease)     High cholesterol     Hypertension     Kidney stone     Type 2 diabetes mellitus, uncontrolled, with neuropathy     Urinary tract infection     Vaginal infection      Past Surgical History:   Procedure Laterality Date    CHOLECYSTECTOMY      COLONOSCOPY N/A 6/8/2021    Procedure: COLONOSCOPY;  Surgeon: Rain Pop MD;  Location: [facility];  Service: Endoscopy;  Laterality: N/A;    COLONOSCOPY N/A 7/8/2021    Procedure: colonoscopy with single balloon scope;  Surgeon: Steffen Dueñas MD;  [facility];  Service: Endoscopy;  Laterality: N/A;    ESOPHAGOGASTRODUODENOSCOPY N/A 6/8/2021    Procedure: EGD (ESOPHAGOGASTRODUODENOSCOPY);  Surgeon: Rain Pop MD;  Location: [facility];  Service: Endoscopy;  Laterality: N/A;    EYE SURGERY      HYSTERECTOMY      INCISIONAL HERNIA REPAIR  2003    ROTATOR CUFF REPAIR Right 11/07/2017           Pre-op Assessment    I have reviewed the Patient Summary Reports.     I have reviewed the Nursing Notes. I have reviewed the NPO Status.   I have reviewed the Medications.     Review of Systems  Anesthesia Hx:  No problems with previous Anesthesia    Cardiovascular:   Hypertension CAD      Pulmonary:   Asthma    Renal/:   Chronic Renal Disease    Hepatic/GI:   GERD    Musculoskeletal:   Arthritis     Neurological:   Neuromuscular Disease,    Endocrine:   Diabetes    Psych:   Psychiatric History          Physical Exam  General: Well nourished, Cooperative, Oriented and Alert    Airway:  Mallampati: II   Mouth Opening: Normal  TM Distance: Normal  Tongue: Normal  Neck ROM:  Normal ROM    Chest/Lungs:  Clear to auscultation, Normal Respiratory Rate    Heart:  Rate: Normal  Rhythm: Regular Rhythm  Sounds: Normal        Anesthesia Plan  Type of Anesthesia, risks & benefits discussed:    Anesthesia Type: MAC  Intra-op Monitoring Plan: Standard ASA Monitors  Post Op Pain Control Plan: multimodal analgesia  Induction:  IV  Informed Consent: Informed consent signed with the Patient and all parties understand the risks and agree with anesthesia plan.  All questions answered.   ASA Score: 2    Ready For Surgery From Anesthesia Perspective.     .

## 2022-06-03 NOTE — TRANSFER OF CARE
"Anesthesia Transfer of Care Note    Patient: Juliette Seo    Procedure(s) Performed: Procedure(s) (LRB):  RELEASE, TRIGGER FINGER; right index, middle, ring (Right)    Patient location: OPS    Anesthesia Type: MAC    Transport from OR: Transported from OR on room air with adequate spontaneous ventilation    Post pain: adequate analgesia    Post assessment: no apparent anesthetic complications    Post vital signs: stable    Level of consciousness: awake    Nausea/Vomiting: no nausea/vomiting    Complications: none    Transfer of care protocol was followed      Last vitals:   Visit Vitals  BP (!) 191/75 (BP Location: Right arm, Patient Position: Lying)   Pulse 92   Temp 37.2 °C (99 °F) (Skin)   Resp 20   Ht 4' 11" (1.499 m)   Wt 54.4 kg (120 lb)   SpO2 98%   Breastfeeding No   BMI 24.24 kg/m²     "

## 2022-06-03 NOTE — OP NOTE
[facility] - [facility] (Hospital)  Operative Note      Date of Procedure: 6/3/2022     Procedure: Procedure(s) (LRB):  RELEASE, TRIGGER FINGER; right index, middle, ring (Right)     Surgeon(s) and Role:     * Sarmad Wesley Jr., MD - Primary    Assisting Surgeon: None    Pre-Operative Diagnosis: Trigger finger, right index finger [M65.321]  Trigger finger, right middle finger [M65.331]  Trigger finger, right ring finger [M65.341]    Post-Operative Diagnosis: Post-Op Diagnosis Codes:     * Trigger finger, right index finger [M65.321]     * Trigger finger, right middle finger [M65.331]     * Trigger finger, right ring finger [M65.341]    Anesthesia: Local MAC    Operative Findings (including complications, if any):  Triggering of the right index middle and ring fingers     Description of Technical Procedures:     Preop diagnosis:  1. Triggering right index finger.    2. Triggering of the right middle finger    3. Triggering of the right ring finger.    Postop diagnosis:  Same.    Operative procedure:  1. Trigger release right index finger.    2. Trigger release right middle finger.    3. Trigger release right ring finger.      Surgeon:  Luplilo.    Anesthesia:  Mac.    EBL:  Minimal.    Operative procedure in detail as follows:    After operative consent was obtained the patient brought the operating room placed supine operating table.  Anesthesia by IV sedation followed by injection of xylocaine Marcaine combination is the operative site right palm.  Tourniquet applied right arm right upper extremity prepped and draped out in the normal sterile fashion.  Esmarch used to exsanguinate tourniquet inflated 225 mmHg.    The index finger was approached 1st with a volar oblique incision made in the distal crease with a 15 blade.  Careful diss ection used to expose the A1 pulley which was released longitudinally with the [place] blade and scissors.  A 0 also released range of motion check noted to be full without triggering the  wound irrigated and closed with interrupted 5 O nylon suture.    A 2nd incision made then made at the volar base of the right middle finger with 15 blade.  Similar dissection used to expose the A1 pulley which was released longitudinally.  Range of motion check noted to be full without triggering.  The wound irrigated and closed with interrupted 5 O nylon suture.    A 3rd incision made at the base  of the right ring finger.  Similar exposure of the A1 pulley followed by release of the pulley longitudinally with the [place] bl with interrupted hellen and scissors.  Range of motion check noted to be full without triggering the wound irrigated and closed with 5 0 nylon horizontal mattress suture.    Sterile dressings applied all incisions followed by light wrap.  Tourniquet deflated patient brought to recovery room stable condition all sponge needle counts reported as correct no complications           Significant Surgical Tasks Conducted by the Assistant(s), if Applicable: none    Estimated Blood Loss (EBL): * No values recorded between 6/3/2022  9:49 AM and 6/3/2022 10:10 AM *           Implants: * No implants in log *    Specimens:   Specimen (24h ago, onward)            None                  Condition: Good    Disposition: PACU - hemodynamically stable.    Attestation: I was present and scrubbed for the entire procedure.    Discharge Note    OUTCOME: Patient tolerated treatment/procedure well without complication and is now ready for discharge.    DISPOSITION: Home or Self Care    FINAL DIAGNOSIS:  Trigger finger, right index finger    FOLLOWUP: In clinic    DISCHARGE INSTRUCTIONS:    Discharge Procedure Orders   Diet general     Call MD for:  temperature >100.4     Call MD for:  persistent nausea and vomiting     Call MD for:  severe uncontrolled pain     Keep surgical extremity elevated     Remove dressing in 72 hours

## 2022-06-07 NOTE — TELEPHONE ENCOUNTER
Refill Routing Note   Medication(s) are not appropriate for processing by [facility] for the following reason(s):      - Patient has been seen in the ED/Hospital since the last PCP visit    [facility] action(s):  Defer Medication-related problems identified: Requires labs        Medication reconciliation completed: No     Appointments  past 12m or future 3m with PCP    Date Provider   Last Visit   1/18/2022 Jimmy Jones Jr., MD   Next Visit   Visit date not found Jimmy Jones Jr., MD   ED visits in past 90 days: 2        Note composed:6:02 PM 06/07/2022

## 2022-06-07 NOTE — TELEPHONE ENCOUNTER
Care Due:                  Date            Visit Type   Department     Provider  --------------------------------------------------------------------------------                                EP                               PRIMARY      [facility]  Last Visit: 01-      CARE ([facility])   [facility] Rosie Jones  Next Visit: None Scheduled  None         None Found                                                            Last  Test          Frequency    Reason                     Performed    Due Date  --------------------------------------------------------------------------------    HBA1C.......  6 months...  glimepiride, insulin.....  01-   07-    Lipid Panel.  12 months..  atorvastatin.............  05- 05-    [employer] Embedded Care Gaps. Reference number: [id number]. 6/07/2022   12:26:56 PM CDT

## 2022-06-08 RX ORDER — DULAGLUTIDE 1.5 MG/.5ML
1.5 INJECTION, SOLUTION SUBCUTANEOUS
Qty: 12 PEN | Refills: 0 | Status: SHIPPED | OUTPATIENT
Start: 2022-06-08 | End: 2022-09-06

## 2022-06-10 DIAGNOSIS — E11.8 TYPE 2 DIABETES MELLITUS WITH COMPLICATION, WITH LONG-TERM CURRENT USE OF INSULIN: ICD-10-CM

## 2022-06-10 DIAGNOSIS — Z79.4 TYPE 2 DIABETES MELLITUS WITH COMPLICATION, WITH LONG-TERM CURRENT USE OF INSULIN: ICD-10-CM

## 2022-06-10 NOTE — TELEPHONE ENCOUNTER
No new care gaps identified.  [facility] Embedded Care Gaps. Reference number: [id number]. 6/10/2022   2:03:41 PM CDT

## 2022-06-13 RX ORDER — BLOOD SUGAR DIAGNOSTIC
STRIP MISCELLANEOUS
Qty: 200 EACH | Refills: 1 | Status: ON HOLD | OUTPATIENT
Start: 2022-06-13 | End: 2023-06-17 | Stop reason: CLARIF

## 2022-06-13 NOTE — TELEPHONE ENCOUNTER
Please notify patient:   1. Dr. Jones is no longer practicing at this clinic   2. I refilled Juliette Seo medication for 180 days.   3. She will need to establish with a new primary care provider for medical issues as well as refill of medications  Orders Placed This Encounter    ACCU-CHEK SMARTVIEW TEST STRIP Strp       Dr. Nadine White D.O.   Family Medicine

## 2022-06-13 NOTE — TELEPHONE ENCOUNTER
Refill Routing Note   Medication(s) are not appropriate for processing by [facility] for the following reason(s):      - Patient has been seen in the ED/Hospital since the last PCP visit    [facility] action(s):  Defer          Medication reconciliation completed: No     Appointments  past 12m or future 3m with PCP    Date Provider   Last Visit   1/18/2022 Jimmy Joens Jr., MD   Next Visit   Visit date not found Jimmy Jones Jr., MD   ED visits in past 90 days: 2        Note composed:7:28 AM 06/13/2022

## 2022-06-17 ENCOUNTER — OFFICE VISIT (OUTPATIENT)
Dept: ORTHOPEDICS | Facility: CLINIC | Age: 77
End: 2022-06-17
Payer: MEDICARE

## 2022-06-17 VITALS — HEIGHT: 59 IN | BODY MASS INDEX: 24.19 KG/M2 | WEIGHT: 120 LBS

## 2022-06-17 DIAGNOSIS — Z98.890 S/P TRIGGER FINGER RELEASE: Primary | ICD-10-CM

## 2022-06-17 PROCEDURE — 3288F FALL RISK ASSESSMENT DOCD: CPT | Mod: CPTII,S$GLB,, | Performed by: ORTHOPAEDIC SURGERY

## 2022-06-17 PROCEDURE — 1101F PT FALLS ASSESS-DOCD LE1/YR: CPT | Mod: CPTII,S$GLB,, | Performed by: ORTHOPAEDIC SURGERY

## 2022-06-17 PROCEDURE — 1125F PR PAIN SEVERITY QUANTIFIED, PAIN PRESENT: ICD-10-PCS | Mod: CPTII,S$GLB,, | Performed by: ORTHOPAEDIC SURGERY

## 2022-06-17 PROCEDURE — 1159F PR MEDICATION LIST DOCUMENTED IN MEDICAL RECORD: ICD-10-PCS | Mod: CPTII,S$GLB,, | Performed by: ORTHOPAEDIC SURGERY

## 2022-06-17 PROCEDURE — 99024 POSTOP FOLLOW-UP VISIT: CPT | Mod: S$GLB,,, | Performed by: ORTHOPAEDIC SURGERY

## 2022-06-17 PROCEDURE — 1125F AMNT PAIN NOTED PAIN PRSNT: CPT | Mod: CPTII,S$GLB,, | Performed by: ORTHOPAEDIC SURGERY

## 2022-06-17 PROCEDURE — 1159F MED LIST DOCD IN RCRD: CPT | Mod: CPTII,S$GLB,, | Performed by: ORTHOPAEDIC SURGERY

## 2022-06-17 PROCEDURE — 3288F PR FALLS RISK ASSESSMENT DOCUMENTED: ICD-10-PCS | Mod: CPTII,S$GLB,, | Performed by: ORTHOPAEDIC SURGERY

## 2022-06-17 PROCEDURE — 99999 PR PBB SHADOW E&M-EST. PATIENT-LVL II: CPT | Mod: PBBFAC,,, | Performed by: ORTHOPAEDIC SURGERY

## 2022-06-17 PROCEDURE — 99024 PR POST-OP FOLLOW-UP VISIT: ICD-10-PCS | Mod: S$GLB,,, | Performed by: ORTHOPAEDIC SURGERY

## 2022-06-17 PROCEDURE — 1101F PR PT FALLS ASSESS DOC 0-1 FALLS W/OUT INJ PAST YR: ICD-10-PCS | Mod: CPTII,S$GLB,, | Performed by: ORTHOPAEDIC SURGERY

## 2022-06-17 PROCEDURE — 99999 PR PBB SHADOW E&M-EST. PATIENT-LVL II: ICD-10-PCS | Mod: PBBFAC,,, | Performed by: ORTHOPAEDIC SURGERY

## 2022-06-17 NOTE — PROGRESS NOTES
Subjective:      Patient ID: Juliette Seo is a 76 y.o. female.    Chief Complaint: Post-op Evaluation (2 wk post op/ right trigger finger)      HPI: Juliette Seo is here for a postop visit. she is 14 days s/p right index, middle, and ring trigger finger release and is doing well. Pt reports pain ok, difficult at times. Triggering has resolved. Post surgical complaints include: pain, swelling, soreness.     Past Medical History:   Diagnosis Date    Allergy     Anxiety     Arthritis     osteo    Asthma     Diabetic retinopathy     GERD (gastroesophageal reflux disease)     High cholesterol     Hypertension     Kidney stone     Type 2 diabetes mellitus, uncontrolled, with neuropathy     Urinary tract infection     Vaginal infection        Current Outpatient Medications:     ACCU-CHEK SMARTVIEW TEST STRIP Strp, USE  STRIP TO CHECK GLUCOSE TWICE DAILY, Disp: 200 each, Rfl: 1    acetaminophen (TYLENOL) 500 MG tablet, Take 1 tablet (500 mg total) by mouth every 4 (four) hours as needed for Pain., Disp: 42 tablet, Rfl: 0    albuterol (PROVENTIL/VENTOLIN HFA) 90 mcg/actuation inhaler, Inhale 2 puffs into the lungs every 4 (four) hours as needed for Wheezing (or cough)., Disp: 18 g, Rfl: 3    aspirin (ECOTRIN) 81 MG EC tablet, Take 81 mg by mouth once daily., Disp: , Rfl:     atorvastatin (LIPITOR) 80 MG tablet, Take 1 tablet (80 mg total) by mouth once daily., Disp: 90 tablet, Rfl: 3    diclofenac (VOLTAREN) 75 MG EC tablet, Take 1 tablet (75 mg total) by mouth 2 (two) times daily., Disp: 20 tablet, Rfl: 0    diclofenac sodium (VOLTAREN) 1 % Gel, Apply 4 g topically 4 (four) times daily., Disp: 1 Tube, Rfl: 3    flash glucose sensor (FREESTYLE BRYAN 14 DAY SENSOR) Kit, 1 Device by Misc.(Non-Drug; Combo Route) route 3 (three) times daily., Disp: 1 kit, Rfl: 11    glimepiride (AMARYL) 2 MG tablet, 1 tab po qdaily, Disp: 90 tablet, Rfl: 4    HYDROcodone-acetaminophen (NORCO) 5-325 mg per tablet, Take 1  "tablet by mouth every 4 (four) hours as needed for Pain., Disp: 12 tablet, Rfl: 0    hydrOXYzine HCL (ATARAX) 25 MG tablet, Take 1 tablet (25 mg total) by mouth 3 (three) times daily as needed for Itching., Disp: 100 tablet, Rfl: 1    insulin NPH (NOVOLIN N NPH U-100 INSULIN) 100 unit/mL injection, INJECT SUBCUTANEOUSLY 40 UNITS EVERY MORNING & 20 UNITS EVERY EVENING, Disp: 50 mL, Rfl: 4    insulin syringe-needle U-100 (BD INSULIN SYRINGE ULTRA-FINE) 0.5 mL 31 gauge x 5/16" Syrg, USE AS DIRECTED TWICE A DAY, Disp: 180 each, Rfl: 4    Lactobacillus acidophilus Cap, Take 2 capsules by mouth 2 (two) times daily., Disp: 60 each, Rfl: prn    lancets (ACCU-CHEK FASTCLIX LANCET DRUM) Misc, USE AS DIRECTED TWICE A DAY, Disp: 100 each, Rfl: 4    levocetirizine (XYZAL) 5 MG tablet, Take 1 tablet (5 mg total) by mouth every evening., Disp: 90 tablet, Rfl: 3    LUMIGAN 0.01 % Drop, Place 1 drop into both eyes nightly., Disp: , Rfl: 4    methylPREDNISolone (MEDROL DOSEPACK) 4 mg tablet, Take as directed on package, Disp: 1 each, Rfl: 0    multivitamin Tab, Take 1 tablet by mouth once daily., Disp: , Rfl:     naproxen (NAPROSYN) 500 MG tablet, Take 1 tablet (500 mg total) by mouth 2 (two) times daily with meals., Disp: 60 tablet, Rfl: 1    nystatin (MYCOSTATIN) powder, Apply topically 4 (four) times daily. for 14 days, Disp: 60 g, Rfl: 1    omeprazole (PRILOSEC) 40 MG capsule, Take 1 capsule (40 mg total) by mouth every morning., Disp: 90 capsule, Rfl: 4    polyethylene glycol (GLYCOLAX) 17 gram PwPk, Take 17 g by mouth once daily., Disp: 24 packet, Rfl: 0    pregabalin (LYRICA) 75 MG capsule, Take 1 capsule (75 mg total) by mouth 2 (two) times daily., Disp: 60 capsule, Rfl: 0    RESTASIS 0.05 % ophthalmic emulsion, Place 1 drop into both eyes 2 (two) times daily., Disp: , Rfl: 3    sucralfate (CARAFATE) 100 mg/mL suspension, Take 10 mLs (1 g total) by mouth 4 (four) times daily before meals and nightly., Disp: " "414 mL, Rfl: 0    tiZANidine (ZANAFLEX) 4 MG tablet, Take 4 mg by mouth every 6 (six) hours as needed., Disp: , Rfl:     TRULICITY 1.5 mg/0.5 mL pen injector, INJECT 1.5 MG INTO THE SKIN EVERY 7 DAYS., Disp: 12 pen, Rfl: 0    valACYclovir (VALTREX) 1000 MG tablet, Take 1 tablet (1,000 mg total) by mouth 3 (three) times daily. for 7 days, Disp: 21 tablet, Rfl: 0  Review of patient's allergies indicates:   Allergen Reactions    Actos [pioglitazone] Nausea Only    Darvocet a500 [propoxyphene n-acetaminophen] Nausea And Vomiting    Dilaudid [hydromorphone (bulk)] Nausea And Vomiting       Ht 4' 11" (1.499 m)   Wt 54.4 kg (120 lb)   BMI 24.24 kg/m²     ROS      Objective:    Ortho Exam   Right hand:  Small incision over the index, middle and rinf A1 pulley with sutures in place. Wound margins are well approximated and healing nicely. No redness, warmth, drainage, or other signs of infection. mild swelling. ROM wrist and fingers full, but with pain.   strength decreased.  Sensation intact. Pulses present.    GEN: Well developed, well nourished female. AAOX3. No acute distress.   Breathing unlabored.  Mood and affect appropriate.         Assessment:     Imaging:  None        1. S/P trigger finger release          Plan:       Regular wound care explained with soap and water.  Start using hand for light activities.    Neosporin to the incision for the next few days, then patient can start scar massage with cocoa butter or vitamin-E oil.  HEP provided    Considering surgery for the left hand -- discuss at next visit       Follow up in about 3 weeks (around 7/8/2022).          "

## 2022-06-21 ENCOUNTER — PATIENT MESSAGE (OUTPATIENT)
Dept: ADMINISTRATIVE | Facility: HOSPITAL | Age: 77
End: 2022-06-21
Payer: MEDICARE

## 2022-06-23 ENCOUNTER — PATIENT MESSAGE (OUTPATIENT)
Dept: ORTHOPEDICS | Facility: CLINIC | Age: 77
End: 2022-06-23
Payer: MEDICARE

## 2022-06-23 DIAGNOSIS — E11.9 TYPE 2 DIABETES MELLITUS WITHOUT COMPLICATION, UNSPECIFIED WHETHER LONG TERM INSULIN USE: ICD-10-CM

## 2022-07-11 NOTE — PROGRESS NOTES
Subjective:      Patient ID: Juliette Seo is a 76 y.o. female.    Chief Complaint: Right hand Trigger finger release postop, Left Hip/Knee pain    HPI: Juliette Seo is here for a postop visit. she is about 5wks s/p right index, middle, and ring trigger finger release and is doing well. Triggering has resolved. She does note pain and increased sensation to surgical scars.    The patient additionally noted continued pain to bilateral hips and knees (L>R). The patient previously was recommended follow up for RA panel, but has not had that completed. She would like a repeat knee injection today. She is not having any difficulties with knee ROM, and is ambulating unassisted. Pt denies any new trauma.      Past Medical History:   Diagnosis Date    Allergy     Anxiety     Arthritis     osteo    Asthma     Diabetic retinopathy     GERD (gastroesophageal reflux disease)     High cholesterol     Hypertension     Kidney stone     Type 2 diabetes mellitus, uncontrolled, with neuropathy     Urinary tract infection     Vaginal infection        Current Outpatient Medications:     ACCU-CHEK SMARTVIEW TEST STRIP Strp, USE  STRIP TO CHECK GLUCOSE TWICE DAILY, Disp: 200 each, Rfl: 1    acetaminophen (TYLENOL) 500 MG tablet, Take 1 tablet (500 mg total) by mouth every 4 (four) hours as needed for Pain., Disp: 42 tablet, Rfl: 0    albuterol (PROVENTIL/VENTOLIN HFA) 90 mcg/actuation inhaler, Inhale 2 puffs into the lungs every 4 (four) hours as needed for Wheezing (or cough)., Disp: 18 g, Rfl: 3    aspirin (ECOTRIN) 81 MG EC tablet, Take 81 mg by mouth once daily., Disp: , Rfl:     atorvastatin (LIPITOR) 80 MG tablet, Take 1 tablet (80 mg total) by mouth once daily., Disp: 90 tablet, Rfl: 3    diclofenac (VOLTAREN) 75 MG EC tablet, Take 1 tablet (75 mg total) by mouth 2 (two) times daily., Disp: 20 tablet, Rfl: 0    diclofenac sodium (VOLTAREN) 1 % Gel, Apply 4 g topically 4 (four) times daily., Disp: 1 Tube, Rfl:  "3    flash glucose sensor (FREESTYLE BRYAN 14 DAY SENSOR) Kit, 1 Device by Misc.(Non-Drug; Combo Route) route 3 (three) times daily., Disp: 1 kit, Rfl: 11    glimepiride (AMARYL) 2 MG tablet, 1 tab po qdaily, Disp: 90 tablet, Rfl: 4    HYDROcodone-acetaminophen (NORCO) 5-325 mg per tablet, Take 1 tablet by mouth every 4 (four) hours as needed for Pain., Disp: 12 tablet, Rfl: 0    hydrOXYzine HCL (ATARAX) 25 MG tablet, Take 1 tablet (25 mg total) by mouth 3 (three) times daily as needed for Itching., Disp: 100 tablet, Rfl: 1    insulin NPH (NOVOLIN N NPH U-100 INSULIN) 100 unit/mL injection, INJECT SUBCUTANEOUSLY 40 UNITS EVERY MORNING & 20 UNITS EVERY EVENING, Disp: 50 mL, Rfl: 4    insulin syringe-needle U-100 (BD INSULIN SYRINGE ULTRA-FINE) 0.5 mL 31 gauge x 5/16" Syrg, USE AS DIRECTED TWICE A DAY, Disp: 180 each, Rfl: 4    Lactobacillus acidophilus Cap, Take 2 capsules by mouth 2 (two) times daily., Disp: 60 each, Rfl: prn    lancets (ACCU-CHEK FASTCLIX LANCET DRUM) [facility], USE AS DIRECTED TWICE A DAY, Disp: 100 each, Rfl: 4    levocetirizine (XYZAL) 5 MG tablet, Take 1 tablet (5 mg total) by mouth every evening., Disp: 90 tablet, Rfl: 3    LUMIGAN 0.01 % Drop, Place 1 drop into both eyes nightly., Disp: , Rfl: 4    methylPREDNISolone (MEDROL DOSEPACK) 4 mg tablet, Take as directed on package, Disp: 1 each, Rfl: 0    multivitamin Tab, Take 1 tablet by mouth once daily., Disp: , Rfl:     naproxen (NAPROSYN) 500 MG tablet, Take 1 tablet (500 mg total) by mouth 2 (two) times daily with meals., Disp: 60 tablet, Rfl: 1    omeprazole (PRILOSEC) 40 MG capsule, Take 1 capsule (40 mg total) by mouth every morning., Disp: 90 capsule, Rfl: 4    polyethylene glycol (GLYCOLAX) 17 gram PwPk, Take 17 g by mouth once daily., Disp: 24 packet, Rfl: 0    RESTASIS 0.05 % ophthalmic emulsion, Place 1 drop into both eyes 2 (two) times daily., Disp: , Rfl: 3    sucralfate (CARAFATE) 100 mg/mL suspension, Take 10 mLs (1 g " "total) by mouth 4 (four) times daily before meals and nightly., Disp: 414 mL, Rfl: 0    tiZANidine (ZANAFLEX) 4 MG tablet, Take 4 mg by mouth every 6 (six) hours as needed., Disp: , Rfl:     TRULICITY 1.5 mg/0.5 mL pen injector, INJECT 1.5 MG INTO THE SKIN EVERY 7 DAYS., Disp: 12 pen, Rfl: 0    nystatin (MYCOSTATIN) powder, Apply topically 4 (four) times daily. for 14 days, Disp: 60 g, Rfl: 1    pregabalin (LYRICA) 75 MG capsule, Take 1 capsule (75 mg total) by mouth 2 (two) times daily., Disp: 60 capsule, Rfl: 0    valACYclovir (VALTREX) 1000 MG tablet, Take 1 tablet (1,000 mg total) by mouth 3 (three) times daily. for 7 days, Disp: 21 tablet, Rfl: 0  Review of patient's allergies indicates:   Allergen Reactions    Actos [pioglitazone] Nausea Only    Darvocet a500 [propoxyphene n-acetaminophen] Nausea And Vomiting    Dilaudid [hydromorphone (bulk)] Nausea And Vomiting       Ht 4' 11" (1.499 m)   Wt 54.4 kg (120 lb)   BMI 24.24 kg/m²       Review of Systems   Constitutional: Negative for chills, fever and weight loss.   Respiratory: Negative for cough and shortness of breath.    Cardiovascular: Negative for chest pain and leg swelling.   Gastrointestinal: Negative for abdominal pain, nausea and vomiting.   Musculoskeletal: Positive for joint pain.   Skin: Negative for rash.   Neurological: Negative for dizziness and headaches.     Objective:    Ortho Exam   Ht 4' 11" (1.499 m)   Wt 54.4 kg (120 lb)   BMI 24.24 kg/m²   GEN: Well developed, well nourished female. AAOX3. No acute distress.   Breathing unlabored.  Mood and affect appropriate.   Right hand:  Small incision over the index, middle and rinf A1 pulley with appropriate scar formation.mild swelling. (+) moderate TTP to scars. ROM wrist and fingers full, but with mild discomfort.   strength decreased.  Sensation intact. Pulses present.      Left KNEE:  The patient walks without limp.  Resisted SLR negative.  The skin over the knee is " intact .  No knee effusion .  Tendernes is located medial joint line.  Range of motion- full AROM w/o difficulty   Patellar crepitation absent.  Pulses DP present  Sensory intact distally        Assessment:     1. S/P trigger finger release     2. Primary osteoarthritis of left knee  Large Joint Aspiration/Injection: L knee   3. Primary osteoarthritis involving multiple joints     Imaging:    Bilateral Knee XR    Right knee: Mild degenerative changes in the right knee with medial tibiofemoral joint space narrowing and patellofemoral joint space narrowing with small osteophyte formation. Persistent enthesopathic change at distal quadriceps insertion.  Small volume suprapatellar joint fluid.  No acute fracture.  No aggressive osseous lesion.  Scattered vascular calcifications.     Left knee: Mild degenerative change in the left knee with slight medial tibiofemoral joint space narrowing and patellofemoral joint space narrowing with small osteophyte formation.  Persistent these hepatic change at distal quadriceps insertion.  Small volume suprapatellar joint fluid.  No acute fracture.  No aggressive osseous lesion.  Scattered vascular calcification.     I have personally reviewed images and agree with findings.    1. S/P trigger finger release    2. Primary osteoarthritis of left knee    3. Primary osteoarthritis involving multiple joints          Plan:       Trigger finger postop:  Educated patient on scar massage with lotion/vit E to help alleviate discomfort.  Continue exercises per previous HEP instructions.  We also discussed possible formal PT should she continue to have hand discomfort.    Polyjoint OA Pain:  I agree that the patient should move forward with RA panel as previously discussed and ordered. Pt verbalized understanding. We additionally discussed conservative and operative mgmt of ongoing OA pain.    Mild Knee OA changes seen on renewed Bilat knee XR today.  Pain: OTC NSAIDs and CSI L knee today  performed today  Orders Placed This Encounter    Large Joint Aspiration/Injection: L knee   Handicap placard cris provided     Follow Up: 3mos w/ Dr. Pérez to further discuss Hip OA pain and surgical intervention  Follow Up: 6wks for postop trigger finger re-evaluation

## 2022-07-12 ENCOUNTER — OFFICE VISIT (OUTPATIENT)
Dept: ORTHOPEDICS | Facility: CLINIC | Age: 77
End: 2022-07-12
Payer: MEDICARE

## 2022-07-12 ENCOUNTER — TELEPHONE (OUTPATIENT)
Dept: ORTHOPEDICS | Facility: CLINIC | Age: 77
End: 2022-07-12

## 2022-07-12 ENCOUNTER — HOSPITAL ENCOUNTER (OUTPATIENT)
Dept: RADIOLOGY | Facility: HOSPITAL | Age: 77
Discharge: HOME | End: 2022-07-12
Attending: ORTHOPAEDIC SURGERY
Payer: MEDICARE

## 2022-07-12 VITALS — HEIGHT: 59 IN | BODY MASS INDEX: 24.19 KG/M2 | WEIGHT: 120 LBS

## 2022-07-12 DIAGNOSIS — M15.9 PRIMARY OSTEOARTHRITIS INVOLVING MULTIPLE JOINTS: ICD-10-CM

## 2022-07-12 DIAGNOSIS — Z98.890 S/P TRIGGER FINGER RELEASE: Primary | ICD-10-CM

## 2022-07-12 DIAGNOSIS — M17.12 PRIMARY OSTEOARTHRITIS OF LEFT KNEE: ICD-10-CM

## 2022-07-12 PROCEDURE — 1125F PR PAIN SEVERITY QUANTIFIED, PAIN PRESENT: ICD-10-PCS | Mod: CPTII,S$GLB,, | Performed by: PHYSICIAN ASSISTANT

## 2022-07-12 PROCEDURE — 99999 PR PBB SHADOW E&M-EST. PATIENT-LVL III: ICD-10-PCS | Mod: PBBFAC,,, | Performed by: PHYSICIAN ASSISTANT

## 2022-07-12 PROCEDURE — 3288F PR FALLS RISK ASSESSMENT DOCUMENTED: ICD-10-PCS | Mod: CPTII,S$GLB,, | Performed by: PHYSICIAN ASSISTANT

## 2022-07-12 PROCEDURE — 1159F MED LIST DOCD IN RCRD: CPT | Mod: CPTII,S$GLB,, | Performed by: PHYSICIAN ASSISTANT

## 2022-07-12 PROCEDURE — 1160F PR REVIEW ALL MEDS BY PRESCRIBER/CLIN PHARMACIST DOCUMENTED: ICD-10-PCS | Mod: CPTII,S$GLB,, | Performed by: PHYSICIAN ASSISTANT

## 2022-07-12 PROCEDURE — 1159F PR MEDICATION LIST DOCUMENTED IN MEDICAL RECORD: ICD-10-PCS | Mod: CPTII,S$GLB,, | Performed by: PHYSICIAN ASSISTANT

## 2022-07-12 PROCEDURE — 73564 X-RAY EXAM KNEE 4 OR MORE: CPT | Mod: TC,50,PN

## 2022-07-12 PROCEDURE — 1160F RVW MEDS BY RX/DR IN RCRD: CPT | Mod: CPTII,S$GLB,, | Performed by: PHYSICIAN ASSISTANT

## 2022-07-12 PROCEDURE — 1101F PT FALLS ASSESS-DOCD LE1/YR: CPT | Mod: CPTII,S$GLB,, | Performed by: PHYSICIAN ASSISTANT

## 2022-07-12 PROCEDURE — 99213 PR OFFICE/OUTPT VISIT, EST, LEVL III, 20-29 MIN: ICD-10-PCS | Mod: 24,25,S$GLB, | Performed by: PHYSICIAN ASSISTANT

## 2022-07-12 PROCEDURE — 73564 PR  X-RAY KNEE 4+ VIEW: ICD-10-PCS | Mod: 26,LT,, | Performed by: STUDENT IN AN ORGANIZED HEALTH CARE EDUCATION/TRAINING PROGRAM

## 2022-07-12 PROCEDURE — 99024 PR POST-OP FOLLOW-UP VISIT: ICD-10-PCS | Mod: S$GLB,,, | Performed by: PHYSICIAN ASSISTANT

## 2022-07-12 PROCEDURE — 1125F AMNT PAIN NOTED PAIN PRSNT: CPT | Mod: CPTII,S$GLB,, | Performed by: PHYSICIAN ASSISTANT

## 2022-07-12 PROCEDURE — 99999 PR PBB SHADOW E&M-EST. PATIENT-LVL III: CPT | Mod: PBBFAC,,, | Performed by: PHYSICIAN ASSISTANT

## 2022-07-12 PROCEDURE — 20610 DRAIN/INJ JOINT/BURSA W/O US: CPT | Mod: 79,LT,S$GLB, | Performed by: PHYSICIAN ASSISTANT

## 2022-07-12 PROCEDURE — 99024 POSTOP FOLLOW-UP VISIT: CPT | Mod: S$GLB,,, | Performed by: PHYSICIAN ASSISTANT

## 2022-07-12 PROCEDURE — 3288F FALL RISK ASSESSMENT DOCD: CPT | Mod: CPTII,S$GLB,, | Performed by: PHYSICIAN ASSISTANT

## 2022-07-12 PROCEDURE — 1101F PR PT FALLS ASSESS DOC 0-1 FALLS W/OUT INJ PAST YR: ICD-10-PCS | Mod: CPTII,S$GLB,, | Performed by: PHYSICIAN ASSISTANT

## 2022-07-12 PROCEDURE — 73564 X-RAY EXAM KNEE 4 OR MORE: CPT | Mod: 26,LT,, | Performed by: STUDENT IN AN ORGANIZED HEALTH CARE EDUCATION/TRAINING PROGRAM

## 2022-07-12 PROCEDURE — 99213 OFFICE O/P EST LOW 20 MIN: CPT | Mod: 24,25,S$GLB, | Performed by: PHYSICIAN ASSISTANT

## 2022-07-12 PROCEDURE — 20610 LARGE JOINT ASPIRATION/INJECTION: L KNEE: ICD-10-PCS | Mod: 79,LT,S$GLB, | Performed by: PHYSICIAN ASSISTANT

## 2022-07-12 PROCEDURE — 73564 X-RAY EXAM KNEE 4 OR MORE: CPT | Mod: 26,RT,, | Performed by: STUDENT IN AN ORGANIZED HEALTH CARE EDUCATION/TRAINING PROGRAM

## 2022-07-12 RX ADMIN — TRIAMCINOLONE ACETONIDE 40 MG: 40 INJECTION, SUSPENSION INTRA-ARTICULAR; INTRAMUSCULAR at 08:07

## 2022-07-12 NOTE — PROCEDURES
Large Joint Aspiration/Injection: L knee    Date/Time: 7/12/2022 8:30 AM  Performed by: Jayne Cain PA-C  Authorized by: Jayne Cain PA-C     Indications:  Arthritis  Location:  Knee  Site:  L knee    PROCEDURE:  I have explained the risks, benefits, and alternatives of the procedure in detail.  The patient voices understanding and all questions have been answered.  The patient agrees to proceed as planned. So after I performed a sterile prep of the skin in the normal fashion the left knee is injected using a 21 gauge needle with a combination of 1cc 1% plain xylocaine and 40mg triamcinolone.  The patient is cautioned that immediate relief of pain is secondary to the local anesthetic and will be temporary. After the anesthetic wears off there may be a increase in pain that may last for a few hours or a few days and they should use ice to help alleviate this this pain. Patient tolerated the procedure well.

## 2022-07-12 NOTE — TELEPHONE ENCOUNTER
----- Message from Jayne Cain PA-C sent at 7/12/2022  1:49 PM CDT -----  Can we verify the follow up appointments made for Ms. Seo today? I intended to create 2 different follow ups.    1. 6wk follow up for trigger finger release postop  2. 3mos follow up with Dr. Pérez to discuss Hip OA mgmt    Thanks!

## 2022-07-14 ENCOUNTER — PES CALL (OUTPATIENT)
Dept: ADMINISTRATIVE | Facility: CLINIC | Age: 77
End: 2022-07-14
Payer: MEDICARE

## 2022-07-15 ENCOUNTER — TELEPHONE (OUTPATIENT)
Dept: ORTHOPEDICS | Facility: CLINIC | Age: 77
End: 2022-07-15
Payer: MEDICARE

## 2022-07-15 NOTE — TELEPHONE ENCOUNTER
----- Message from Sandra Cantu sent at 7/15/2022  4:50 PM CDT -----  Type:  Needs Medical Advice    Who Called: DARLENE RIVERA TITLE  Symptoms (please be specific):    How long has patient had these symptoms:    Pharmacy name and phone #:    Would the patient rather a call back or a response via [facility]?   Best Call Back Number: [phone]  Additional Information: WANTS A SIGNATURE COPY DATE /WHITE OUT FOR HANDICAP HANGING FAX : [phone]

## 2022-07-20 ENCOUNTER — TELEPHONE (OUTPATIENT)
Dept: ORTHOPEDICS | Facility: CLINIC | Age: 77
End: 2022-07-20
Payer: MEDICARE

## 2022-07-20 NOTE — TELEPHONE ENCOUNTER
----- Message from Carie Daniel sent at 7/20/2022 10:35 AM CDT -----  Type:  Needs Medical Advice    Who Called: Pt daughter  Symptoms (please be specific): pt daughter stated that Pt needs a new form for the [employer] to get an handicap sticker. Form was incomplete  Would the patient rather a call back or a response via [facility]? call  Best Call Back Number:  [phone]  Additional Information: n/a

## 2022-07-22 RX ORDER — TRIAMCINOLONE ACETONIDE 40 MG/ML
40 INJECTION, SUSPENSION INTRA-ARTICULAR; INTRAMUSCULAR
Status: SHIPPED | OUTPATIENT
Start: 2022-07-12

## 2022-07-22 NOTE — PROCEDURES
Large Joint Aspiration/Injection: L knee    Date/Time: 7/12/2022 8:30 AM  Performed by: Jayne Cain PA-C  Authorized by: Jayne Cain PA-C     Indications:  Arthritis and pain  Site marked: the procedure site was marked    Timeout: prior to procedure the correct patient, procedure, and site was verified      Details:  Needle Size:  22 G  Ultrasonic Guidance for needle placement?: No    Approach:  Lateral  Location:  Knee  Site:  L knee  Medications:  40 mg triamcinolone acetonide 40 mg/mL  Patient tolerance:  Patient tolerated the procedure well with no immediate complications

## 2022-08-04 ENCOUNTER — PES CALL (OUTPATIENT)
Dept: ADMINISTRATIVE | Facility: CLINIC | Age: 77
End: 2022-08-04
Payer: MEDICARE

## 2022-08-08 PROBLEM — M25.552 LEFT HIP PAIN: Status: ACTIVE | Noted: 2022-08-08

## 2022-08-23 ENCOUNTER — OFFICE VISIT (OUTPATIENT)
Dept: ORTHOPEDICS | Facility: CLINIC | Age: 77
End: 2022-08-23
Payer: MEDICARE

## 2022-08-23 VITALS — WEIGHT: 120 LBS | HEIGHT: 59 IN | BODY MASS INDEX: 24.19 KG/M2

## 2022-08-23 DIAGNOSIS — M16.12 PRIMARY OSTEOARTHRITIS OF LEFT HIP: ICD-10-CM

## 2022-08-23 DIAGNOSIS — Z98.890 S/P TRIGGER FINGER RELEASE: Primary | ICD-10-CM

## 2022-08-23 PROCEDURE — 1159F PR MEDICATION LIST DOCUMENTED IN MEDICAL RECORD: ICD-10-PCS | Mod: CPTII,S$GLB,, | Performed by: PHYSICIAN ASSISTANT

## 2022-08-23 PROCEDURE — 1160F RVW MEDS BY RX/DR IN RCRD: CPT | Mod: CPTII,S$GLB,, | Performed by: PHYSICIAN ASSISTANT

## 2022-08-23 PROCEDURE — 99999 PR PBB SHADOW E&M-EST. PATIENT-LVL IV: ICD-10-PCS | Mod: PBBFAC,,, | Performed by: PHYSICIAN ASSISTANT

## 2022-08-23 PROCEDURE — 99999 PR PBB SHADOW E&M-EST. PATIENT-LVL IV: CPT | Mod: PBBFAC,,, | Performed by: PHYSICIAN ASSISTANT

## 2022-08-23 PROCEDURE — 99213 PR OFFICE/OUTPT VISIT, EST, LEVL III, 20-29 MIN: ICD-10-PCS | Mod: S$GLB,,, | Performed by: PHYSICIAN ASSISTANT

## 2022-08-23 PROCEDURE — 1126F AMNT PAIN NOTED NONE PRSNT: CPT | Mod: CPTII,S$GLB,, | Performed by: PHYSICIAN ASSISTANT

## 2022-08-23 PROCEDURE — 99213 OFFICE O/P EST LOW 20 MIN: CPT | Mod: S$GLB,,, | Performed by: PHYSICIAN ASSISTANT

## 2022-08-23 PROCEDURE — 1159F MED LIST DOCD IN RCRD: CPT | Mod: CPTII,S$GLB,, | Performed by: PHYSICIAN ASSISTANT

## 2022-08-23 PROCEDURE — 1160F PR REVIEW ALL MEDS BY PRESCRIBER/CLIN PHARMACIST DOCUMENTED: ICD-10-PCS | Mod: CPTII,S$GLB,, | Performed by: PHYSICIAN ASSISTANT

## 2022-08-23 PROCEDURE — 1126F PR PAIN SEVERITY QUANTIFIED, NO PAIN PRESENT: ICD-10-PCS | Mod: CPTII,S$GLB,, | Performed by: PHYSICIAN ASSISTANT

## 2022-08-23 RX ORDER — GABAPENTIN 100 MG/1
100 CAPSULE ORAL 3 TIMES DAILY
Qty: 90 CAPSULE | Refills: 0 | Status: ON HOLD | OUTPATIENT
Start: 2022-08-23 | End: 2023-06-17 | Stop reason: HOSPADM

## 2022-08-23 NOTE — PROGRESS NOTES
"Subjective:      Patient ID: Juliette Seo is a 76 y.o. female.    Chief Complaint: Right hand Trigger finger release postop, Left Hip/Knee pain    HPI: Juliette Seo is here for a postop visit s/p right index, middle, and ring trigger finger release and is doing well. Triggering has resolved and her previously noted scar tenderness has improved greatly.    The patient additionally notes worsening pain to her left hip, back, buttock, and knee. The steroid injection performed to the left knee at her last visit alleviated the pain until she noted a painful "pop" within her knee shortly thereafter. She did not notice any change to knee ROM or ability to ambulate. She ambulated without assistance.    She describes hip pain to the c-shaped groin region, but she also notes a great degree of pain to her buttock that will radiate down to her knee causing pain. She has not previously been evaluated for lumbo/sacral pathology.     The patient has tried multiple rounds of NSAIDs, gabapentin, Tylenol, Ultram, Tylenol #3 and rest for her pain. She has presented to the ED multiple times for a combination of hip/back/knee pain without any sustained relief.    Past Medical History:   Diagnosis Date    Allergy     Anxiety     Arthritis     osteo    Asthma     Diabetic retinopathy     GERD (gastroesophageal reflux disease)     High cholesterol     Hypertension     Kidney stone     Type 2 diabetes mellitus, uncontrolled, with neuropathy     Urinary tract infection     Vaginal infection        Current Outpatient Medications:     ACCU-CHEK SMARTVIEW TEST STRIP Strp, USE  STRIP TO CHECK GLUCOSE TWICE DAILY, Disp: 200 each, Rfl: 1    acetaminophen (TYLENOL) 500 MG tablet, Take 1 tablet (500 mg total) by mouth every 4 (four) hours as needed for Pain., Disp: 42 tablet, Rfl: 0    albuterol (PROVENTIL/VENTOLIN HFA) 90 mcg/actuation inhaler, Inhale 2 puffs into the lungs every 4 (four) hours as needed for Wheezing (or cough)., " "Disp: 18 g, Rfl: 3    aspirin (ECOTRIN) 81 MG EC tablet, Take 81 mg by mouth once daily., Disp: , Rfl:     atorvastatin (LIPITOR) 80 MG tablet, Take 1 tablet (80 mg total) by mouth once daily., Disp: 90 tablet, Rfl: 3    diclofenac (VOLTAREN) 75 MG EC tablet, Take 1 tablet (75 mg total) by mouth 2 (two) times daily., Disp: 20 tablet, Rfl: 0    diclofenac sodium (VOLTAREN) 1 % Gel, Apply 4 g topically 4 (four) times daily., Disp: 1 Tube, Rfl: 3    flash glucose sensor (FREESTYLE BRYAN 14 DAY SENSOR) Kit, 1 Device by Misc.(Non-Drug; Combo Route) route 3 (three) times daily., Disp: 1 kit, Rfl: 11    glimepiride (AMARYL) 2 MG tablet, 1 tab po qdaily, Disp: 90 tablet, Rfl: 4    hydrOXYzine HCL (ATARAX) 25 MG tablet, Take 1 tablet (25 mg total) by mouth 3 (three) times daily as needed for Itching., Disp: 100 tablet, Rfl: 1    insulin NPH (NOVOLIN N NPH U-100 INSULIN) 100 unit/mL injection, INJECT SUBCUTANEOUSLY 40 UNITS EVERY MORNING & 20 UNITS EVERY EVENING, Disp: 50 mL, Rfl: 4    insulin syringe-needle U-100 (BD INSULIN SYRINGE ULTRA-FINE) 0.5 mL 31 gauge x 5/16" Syrg, USE AS DIRECTED TWICE A DAY, Disp: 180 each, Rfl: 4    Lactobacillus acidophilus Cap, Take 2 capsules by mouth 2 (two) times daily., Disp: 60 each, Rfl: prn    lancets (ACCU-CHEK FASTCLIX LANCET DRUM) [facility], USE AS DIRECTED TWICE A DAY, Disp: 100 each, Rfl: 4    levocetirizine (XYZAL) 5 MG tablet, Take 1 tablet (5 mg total) by mouth every evening., Disp: 90 tablet, Rfl: 3    LUMIGAN 0.01 % Drop, Place 1 drop into both eyes nightly., Disp: , Rfl: 4    methylPREDNISolone (MEDROL DOSEPACK) 4 mg tablet, Take as directed on package, Disp: 1 each, Rfl: 0    multivitamin Tab, Take 1 tablet by mouth once daily., Disp: , Rfl:     naproxen (NAPROSYN) 500 MG tablet, Take 1 tablet (500 mg total) by mouth 2 (two) times daily with meals., Disp: 60 tablet, Rfl: 1    omeprazole (PRILOSEC) 40 MG capsule, Take 1 capsule (40 mg total) by mouth every " "morning., Disp: 90 capsule, Rfl: 4    polyethylene glycol (GLYCOLAX) 17 gram PwPk, Take 17 g by mouth once daily., Disp: 24 packet, Rfl: 0    RESTASIS 0.05 % ophthalmic emulsion, Place 1 drop into both eyes 2 (two) times daily., Disp: , Rfl: 3    sucralfate (CARAFATE) 100 mg/mL suspension, Take 10 mLs (1 g total) by mouth 4 (four) times daily before meals and nightly., Disp: 414 mL, Rfl: 0    tiZANidine (ZANAFLEX) 4 MG tablet, Take 4 mg by mouth every 6 (six) hours as needed., Disp: , Rfl:     traMADoL (ULTRAM) 50 mg tablet, Take 1 tablet (50 mg total) by mouth every 6 (six) hours as needed for Pain., Disp: 12 tablet, Rfl: 0    TRULICITY 1.5 mg/0.5 mL pen injector, INJECT 1.5 MG INTO THE SKIN EVERY 7 DAYS., Disp: 12 pen, Rfl: 0    gabapentin (NEURONTIN) 100 MG capsule, Take 1 capsule (100 mg total) by mouth 3 (three) times daily., Disp: 90 capsule, Rfl: 0    HYDROcodone-acetaminophen (NORCO) 5-325 mg per tablet, Take 1 tablet by mouth every 4 (four) hours as needed for Pain. (Patient not taking: Reported on 8/23/2022), Disp: 12 tablet, Rfl: 0    nystatin (MYCOSTATIN) powder, Apply topically 4 (four) times daily. for 14 days, Disp: 60 g, Rfl: 1    pregabalin (LYRICA) 75 MG capsule, Take 1 capsule (75 mg total) by mouth 2 (two) times daily., Disp: 60 capsule, Rfl: 0    valACYclovir (VALTREX) 1000 MG tablet, Take 1 tablet (1,000 mg total) by mouth 3 (three) times daily. for 7 days, Disp: 21 tablet, Rfl: 0  No current facility-administered medications for this visit.    Facility-Administered Medications Ordered in Other Visits:     triamcinolone acetonide injection 40 mg, 40 mg, Intra-articular, , Jayne Cain PA-C, 40 mg at 07/12/22 0830  Review of patient's allergies indicates:   Allergen Reactions    Actos [pioglitazone] Nausea Only    Darvocet a500 [propoxyphene n-acetaminophen] Nausea And Vomiting    Dilaudid [hydromorphone (bulk)] Nausea And Vomiting       Ht 4' 11" (1.499 m)   Wt 54.4 kg (120 " "lb)   BMI 24.24 kg/m²       Review of Systems   Constitutional: Negative for chills, fever and weight loss.   Respiratory: Negative for cough and shortness of breath.    Cardiovascular: Negative for chest pain and leg swelling.   Gastrointestinal: Negative for abdominal pain, nausea and vomiting.   Musculoskeletal: Positive for joint pain.   Skin: Negative for rash.   Neurological: Negative for dizziness and headaches.     Objective:    Ortho Exam   Ht 4' 11" (1.499 m)   Wt 54.4 kg (120 lb)   BMI 24.24 kg/m²   GEN: Well developed, well nourished female. AAOX3. No acute distress.   Breathing unlabored.  Mood and affect appropriate.     Right HAND: Surgical incisions are well healed with appropriate scar formation. ( NTTP to scars. Full AROM to digits without pain, difficulty, or triggering.   strength improved.  Sensation intact. Pulses present.    Left KNEE:  The patient walks with a slight limp.  Resisted SLR negative.  The skin over the knee is intact .  No knee effusion .  Mild tendernes is located medial and lateral joint line.  Range of motion- full AROM w/o difficulty   Patellar crepitation absent.  Pulses DP present  Sensory intact distally  XR knee 7-17-22 reviewed today: Mild joint space narrowing to the medial compartment with subchondral thickening. No acute fractures or dislocations  CSI injection: provided temporary knee pain relief, but pt continued to experience groin and radiating buttock pain    Left HIP:  Body habitus is::normal.   The patient walks with a limp.   The skin over the hip is::intact.   There is::tenderness anteriorlay and tenderness laterally.  Range of motion- Full ROM to flex/ext/IR/ER with only minimal groin pain to IR/ER.  Resisted SLR negative.  Pain with rotation positive (mild)  Sciatic tension findings negative.  Pulses DP present  Motor normal 5/5 strength in all tested muscle groups.   Sensory normal.        Assessment:     1. S/P trigger finger release     2. Primary " osteoarthritis of left hip             Plan:       I agree with Dr. Pérez's previous plan to pursue further addressing the patient's generalized pain syndrome.     Degenerative changes to the left hip can be seen on radiographs. Her groin pain failed to improve with recent knee injection. Plans to follow up with Dr. Pérez at her next appointment to discuss possible surgical intervention.    Knee radiographs and PE again reviewed. They show only mild OA changes with an overall unimpressive physical exam.  Plan to refer to Neurosurgery for further evaluation of low back/buttock radiating pain to rule out lumbosacral source of overall pain profile.    Trigger finger release postop:  Continue scar massage  Advance activities as tolerated  Follow Up: PRN    Left Hip Osteoarthritis:  Orders Placed This Encounter    gabapentin (NEURONTIN) 100 MG capsule   Pain control: OTC Tylenol, NSAIDs, and Rx Gabapentin for radiating low back/knee pain. Discussed pain management referral - pt declined  Activity: as tolerated    Follow Up: keep previously scheduled appt w/ Dr. Pérez (Oct 2022) to further discuss Hip OA pain and surgical intervention

## 2022-09-06 ENCOUNTER — PES CALL (OUTPATIENT)
Dept: ADMINISTRATIVE | Facility: OTHER | Age: 77
End: 2022-09-06
Payer: MEDICARE

## 2022-09-23 ENCOUNTER — PES CALL (OUTPATIENT)
Dept: ADMINISTRATIVE | Facility: CLINIC | Age: 77
End: 2022-09-23
Payer: MEDICARE

## 2022-10-13 ENCOUNTER — OFFICE VISIT (OUTPATIENT)
Dept: ORTHOPEDICS | Facility: CLINIC | Age: 77
End: 2022-10-13
Payer: MEDICARE

## 2022-10-13 VITALS — WEIGHT: 120 LBS | BODY MASS INDEX: 24.19 KG/M2 | HEIGHT: 59 IN

## 2022-10-13 DIAGNOSIS — M16.10 ARTHRITIS OF HIP: Primary | ICD-10-CM

## 2022-10-13 DIAGNOSIS — M17.0 PRIMARY OSTEOARTHRITIS OF BOTH KNEES: ICD-10-CM

## 2022-10-13 PROCEDURE — 1160F RVW MEDS BY RX/DR IN RCRD: CPT | Mod: CPTII,S$GLB,, | Performed by: ORTHOPAEDIC SURGERY

## 2022-10-13 PROCEDURE — 99999 PR PBB SHADOW E&M-EST. PATIENT-LVL V: CPT | Mod: PBBFAC,,, | Performed by: ORTHOPAEDIC SURGERY

## 2022-10-13 PROCEDURE — 1101F PR PT FALLS ASSESS DOC 0-1 FALLS W/OUT INJ PAST YR: ICD-10-PCS | Mod: CPTII,S$GLB,, | Performed by: ORTHOPAEDIC SURGERY

## 2022-10-13 PROCEDURE — 3288F PR FALLS RISK ASSESSMENT DOCUMENTED: ICD-10-PCS | Mod: CPTII,S$GLB,, | Performed by: ORTHOPAEDIC SURGERY

## 2022-10-13 PROCEDURE — 1160F PR REVIEW ALL MEDS BY PRESCRIBER/CLIN PHARMACIST DOCUMENTED: ICD-10-PCS | Mod: CPTII,S$GLB,, | Performed by: ORTHOPAEDIC SURGERY

## 2022-10-13 PROCEDURE — 1125F AMNT PAIN NOTED PAIN PRSNT: CPT | Mod: CPTII,S$GLB,, | Performed by: ORTHOPAEDIC SURGERY

## 2022-10-13 PROCEDURE — 3288F FALL RISK ASSESSMENT DOCD: CPT | Mod: CPTII,S$GLB,, | Performed by: ORTHOPAEDIC SURGERY

## 2022-10-13 PROCEDURE — 99213 OFFICE O/P EST LOW 20 MIN: CPT | Mod: S$GLB,,, | Performed by: ORTHOPAEDIC SURGERY

## 2022-10-13 PROCEDURE — 1101F PT FALLS ASSESS-DOCD LE1/YR: CPT | Mod: CPTII,S$GLB,, | Performed by: ORTHOPAEDIC SURGERY

## 2022-10-13 PROCEDURE — 1125F PR PAIN SEVERITY QUANTIFIED, PAIN PRESENT: ICD-10-PCS | Mod: CPTII,S$GLB,, | Performed by: ORTHOPAEDIC SURGERY

## 2022-10-13 PROCEDURE — 1159F MED LIST DOCD IN RCRD: CPT | Mod: CPTII,S$GLB,, | Performed by: ORTHOPAEDIC SURGERY

## 2022-10-13 PROCEDURE — 1159F PR MEDICATION LIST DOCUMENTED IN MEDICAL RECORD: ICD-10-PCS | Mod: CPTII,S$GLB,, | Performed by: ORTHOPAEDIC SURGERY

## 2022-10-13 PROCEDURE — 99213 PR OFFICE/OUTPT VISIT, EST, LEVL III, 20-29 MIN: ICD-10-PCS | Mod: S$GLB,,, | Performed by: ORTHOPAEDIC SURGERY

## 2022-10-13 PROCEDURE — 99999 PR PBB SHADOW E&M-EST. PATIENT-LVL V: ICD-10-PCS | Mod: PBBFAC,,, | Performed by: ORTHOPAEDIC SURGERY

## 2022-10-13 NOTE — PROGRESS NOTES
Subjective:      Patient ID: Juliette Seo is a 77 y.o. female.    Chief Complaint: Follow-up of the Right Hip and Follow-up of the Left Hip    HPI    The patient actually has multifocal osteoarthritis with symptoms in all major joints.  She really cannot localize her symptoms to anyone particular area.  In the past she was told that she might need hip replacement and is most concerned about this.          Review of Systems   Constitutional: Negative for fever and weight loss.   HENT:  Negative for congestion.    Eyes:  Negative for visual disturbance.   Cardiovascular:  Negative for chest pain.   Respiratory:  Negative for shortness of breath.    Hematologic/Lymphatic: Negative for bleeding problem. Does not bruise/bleed easily.   Skin:  Negative for poor wound healing.   Musculoskeletal:  Positive for arthritis, back pain and joint pain.   Gastrointestinal:  Negative for abdominal pain.   Genitourinary:  Negative for dysuria.   Neurological:  Negative for seizures.   Psychiatric/Behavioral:  Negative for altered mental status.    Allergic/Immunologic: Negative for persistent infections.       Objective:      Ortho/SPM Exam      Right hip    The patient is not in acute distress.   Body habitus is:normal.   Sclerae normal  The patient walks without a limp.   Respiratory distress:  none  The skin over the hip is:intact.   There is no local tenderness.   Range of motion- Flexion full, External rotation full, internal rotation full.  Resisted SLR negative.  Pain with rotation negative  Sciatic tension findings negative.  Shortening/lengthening compared to the contralateral side absent.  Pulses DP present, PT present.  Motor normal 5/5 strength in all tested muscle groups.   Sensory normal.      Left hip is identical    Both knees are nontender with full range of motion and trace valgus laxity              Assessment:       Encounter Diagnoses   Name Primary?    Primary osteoarthritis of both knees     Arthritis of hip  Yes        The condition appears to be radiographically mild on all previous radiographs.  The patient does not have significant, adverse physical findings.          Plan:       Juliette was seen today for follow-up and follow-up.    Diagnoses and all orders for this visit:    Arthritis of hip    Primary osteoarthritis of both knees        I explained my diagnostic impression and the reasoning behind it in detail, using layman's terms.  Models and/or pictures were used to help in the explanation.    Given the objective examination I recommend nonsurgical care to include over-the-counter analgesics and moderation of physical activity which we discussed.    If symptoms and objective findings progress, arthroplasty of any involved joint can be considered    X-ray next visit

## 2022-10-19 ENCOUNTER — HOSPITAL ENCOUNTER (EMERGENCY)
Facility: HOSPITAL | Age: 77
Discharge: HOME | End: 2022-10-19
Attending: EMERGENCY MEDICINE
Payer: MEDICARE

## 2022-10-19 VITALS
OXYGEN SATURATION: 100 % | RESPIRATION RATE: 18 BRPM | DIASTOLIC BLOOD PRESSURE: 76 MMHG | TEMPERATURE: 98 F | BODY MASS INDEX: 24.24 KG/M2 | SYSTOLIC BLOOD PRESSURE: 184 MMHG | HEART RATE: 90 BPM | WEIGHT: 120 LBS

## 2022-10-19 DIAGNOSIS — R05.9 COUGH: ICD-10-CM

## 2022-10-19 DIAGNOSIS — M54.2 NECK PAIN: ICD-10-CM

## 2022-10-19 DIAGNOSIS — M19.90 OSTEOARTHRITIS, UNSPECIFIED OSTEOARTHRITIS TYPE, UNSPECIFIED SITE: ICD-10-CM

## 2022-10-19 DIAGNOSIS — J40 BRONCHITIS: ICD-10-CM

## 2022-10-19 DIAGNOSIS — S46.912A STRAIN OF LEFT SHOULDER, INITIAL ENCOUNTER: ICD-10-CM

## 2022-10-19 DIAGNOSIS — S16.1XXA STRAIN OF NECK MUSCLE, INITIAL ENCOUNTER: ICD-10-CM

## 2022-10-19 DIAGNOSIS — S49.90XA SHOULDER INJURY: ICD-10-CM
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
## 2022-10-19 LAB
CTP QC/QA: YES
INFLUENZA A, MOLECULAR: NEGATIVE
INFLUENZA B, MOLECULAR: NEGATIVE
SARS-COV-2 RDRP RESP QL NAA+PROBE: NEGATIVE
SPECIMEN SOURCE: NORMAL

## 2022-10-19 PROCEDURE — 87635 SARS-COV-2 COVID-19 AMP PRB: CPT | Performed by: NURSE PRACTITIONER

## 2022-10-19 PROCEDURE — 99284 EMERGENCY DEPT VISIT MOD MDM: CPT | Mod: 25

## 2022-10-19 PROCEDURE — 87502 INFLUENZA DNA AMP PROBE: CPT | Performed by: NURSE PRACTITIONER

## 2022-10-19 PROCEDURE — 87502 INFLUENZA DNA AMP PROBE: CPT

## 2022-10-19 RX ORDER — ALBUTEROL SULFATE 90 UG/1
1-2 AEROSOL, METERED RESPIRATORY (INHALATION) EVERY 6 HOURS PRN
Qty: 8 G | Refills: 0 | Status: SHIPPED | OUTPATIENT
Start: 2022-10-19 | End: 2023-08-18

## 2022-10-19 RX ORDER — BENZONATATE 100 MG/1
200 CAPSULE ORAL 3 TIMES DAILY PRN
Qty: 20 CAPSULE | Refills: 0 | Status: SHIPPED | OUTPATIENT
Start: 2022-10-19 | End: 2022-10-29

## 2022-10-19 RX ORDER — DOXYCYCLINE 100 MG/1
100 CAPSULE ORAL 2 TIMES DAILY
Qty: 14 CAPSULE | Refills: 0 | Status: SHIPPED | OUTPATIENT
Start: 2022-10-19 | End: 2022-10-26

## 2022-10-19 NOTE — DISCHARGE INSTRUCTIONS

## 2022-10-19 NOTE — ED PROVIDER NOTES
Encounter Date: 10/19/2022    SCRIBE #1 NOTE: I, Juan Ríos, am scribing for, and in the presence of,  Xuan Ray NP. I have scribed the following portions of the note - Other sections scribed: HPI, ROS, Physical Exam.     History     Chief Complaint   Patient presents with    Cough     Pt c/o nasal congestion, cough and watery eyes. Pt reports she has had the cough for 1 month. She also reports a trip and fall yesterday and has soreness today to right neck.      Juliette Seo is a 77 y.o. female who presents to the ED with cough and congestion, onset one week ago. Patient also reports she tripped and fell yesterday, landing on her left side. She now complains of left sided shoulder and neck pain. She denies any back pain, nausea, vomiting, or diarrhea.    The history is provided by the patient. No  was used.   Review of patient's allergies indicates:   Allergen Reactions    Actos [pioglitazone] Nausea Only    Darvocet a500 [propoxyphene n-acetaminophen] Nausea And Vomiting    Dilaudid [hydromorphone (bulk)] Nausea And Vomiting     Past Medical History:   Diagnosis Date    Allergy     Anxiety     Arthritis     osteo    Asthma     Diabetic retinopathy     GERD (gastroesophageal reflux disease)     High cholesterol     Hypertension     Kidney stone     Type 2 diabetes mellitus, uncontrolled, with neuropathy     Urinary tract infection     Vaginal infection      Past Surgical History:   Procedure Laterality Date    CHOLECYSTECTOMY      COLONOSCOPY N/A 6/8/2021    Procedure: COLONOSCOPY;  Surgeon: Rain Pop MD;  Location: [facility];  Service: Endoscopy;  Laterality: N/A;    COLONOSCOPY N/A 7/8/2021    Procedure: colonoscopy with single balloon scope;  Surgeon: Steffen Dueñas MD;  Location: [facility];  Service: Endoscopy;  Laterality: N/A;    ESOPHAGOGASTRODUODENOSCOPY N/A 6/8/2021    Procedure: EGD (ESOPHAGOGASTRODUODENOSCOPY);  Surgeon: Rain Pop MD;  Location: [facility]  ENDO;  Service: Endoscopy;  Laterality: N/A;    EYE SURGERY      HYSTERECTOMY      INCISIONAL HERNIA REPAIR  2003    ROTATOR CUFF REPAIR Right 11/07/2017    TRIGGER FINGER RELEASE Right 6/3/2022    Procedure: RELEASE, TRIGGER FINGER; right index, middle, ring;  Surgeon: Sarmad Wesley Jr., MD;  Location: [facility] OR;  Service: Orthopedics;  Laterality: Right;     Family History   Problem Relation Age of Onset    Stroke Mother     Diabetes Sister     Diabetes Brother     Kidney disease Neg Hx      Social History     Tobacco Use    Smoking status: Former    Smokeless tobacco: Never   Substance Use Topics    Alcohol use: Yes     Comment: social    Drug use: No     Review of Systems   Constitutional:  Negative for chills and fever.   HENT:  Positive for congestion. Negative for ear pain and sore throat.    Eyes:  Negative for redness.   Respiratory:  Positive for cough. Negative for shortness of breath.    Cardiovascular:  Negative for chest pain.   Gastrointestinal:  Negative for abdominal pain, diarrhea, nausea and vomiting.   Genitourinary:  Negative for dysuria.   Musculoskeletal:  Positive for arthralgias and neck pain. Negative for back pain.        Left shoulder pain.   Skin:  Negative for rash.   Neurological:  Negative for headaches.     Physical Exam     Initial Vitals [10/19/22 0934]   BP Pulse Resp Temp SpO2   (!) 184/76 90 18 98 °F (36.7 °C) 100 %      MAP       --         Physical Exam    Nursing note and vitals reviewed.  Constitutional: She appears well-developed and well-nourished.   HENT:   Head: Normocephalic and atraumatic.   Mouth/Throat: Oropharynx is clear and moist.   Eyes: Conjunctivae and EOM are normal. Pupils are equal, round, and reactive to light.   Neck: Neck supple.   Normal range of motion.   Full passive range of motion without pain.     Cardiovascular:  Normal rate, regular rhythm, normal heart sounds and intact distal pulses.     Exam reveals no gallop and no friction rub.       No  murmur heard.  Pulmonary/Chest: Breath sounds normal.   Abdominal: Abdomen is soft. Bowel sounds are normal. She exhibits no distension. There is no abdominal tenderness. There is no rebound and no guarding.   Musculoskeletal:         General: No tenderness or edema. Normal range of motion.      Cervical back: Full passive range of motion without pain, normal range of motion and neck supple. No spinous process tenderness or muscular tenderness.      Comments: Left shoulder:  Tenderness with palpation.  Full ROM but painful.  Positive radial pulse.     Lymphadenopathy:     She has no cervical adenopathy.   Neurological: She is alert and oriented to person, place, and time. She has normal strength and normal reflexes.   Skin: Skin is warm and dry.   Psychiatric: She has a normal mood and affect. Her behavior is normal. Judgment and thought content normal.       ED Course   Procedures  Labs Reviewed   INFLUENZA A & B BY MOLECULAR   SARS-COV-2 RDRP GENE    Narrative:     This test utilizes isothermal nucleic acid amplification   technology to detect the SARS-CoV-2 RdRp nucleic acid segment.   The analytical sensitivity (limit of detection) is 125 genome   equivalents/mL.   A POSITIVE result implies infection with the SARS-CoV-2 virus;   the patient is presumed to be contagious.     A NEGATIVE result means that SARS-CoV-2 nucleic acids are not   present above the limit of detection. A NEGATIVE result should be   treated as presumptive. It does not rule out the possibility of   COVID-19 and should not be the sole basis for treatment decisions.   If COVID-19 is strongly suspected based on clinical and exposure   history, re-testing using an alternate molecular assay should be   considered.   This test is only for use under the Food and Drug   Administration s Emergency Use Authorization (EUA).   Commercial kits are provided by [employer].   Performance characteristics of the EUA have been independently   verified  by [facility] Department of   Pathology and Laboratory Medicine.   _________________________________________________________________   The authorized Fact Sheet for Healthcare Providers and the authorized Fact   Sheet for Patients of the ID NOW COVID-19 are available on the FDA   website:     https://www.fda.gov/media/307380/download  https://www.fda.gov/media/025557/download                 Imaging Results              X-Ray Chest PA And Lateral (Final result)  Result time 10/19/22 11:20:38      Final result by Dereck Richards MD (10/19/22 11:20:38)                   Impression:      No convincing evidence of acute detrimental change relative prior examination 05/28/2022.      Electronically signed by: Dereck Richards  Date:    10/19/2022  Time:    11:20               Narrative:    EXAMINATION:  XR CHEST PA AND LATERAL    CLINICAL HISTORY:  Cough, unspecified    TECHNIQUE:  PA and lateral views of the chest were performed.    COMPARISON:  Chest radiograph performed 05/20/2022.    FINDINGS:  Grossly unchanged cardiomediastinal contours.  Chronic interstitial coarsening, relatively similar configuration to prior study performed 05/20/2022.  No definite pneumothorax or large volume pleural effusion.  No acute findings in the visualized abdomen.  Postsurgical sequela project in the right upper quadrant.  Osseous and soft tissue structures appear without definite acute change.  Suture anchors again project in the region the proximal right humerus.                                       X-Ray Cervical Spine AP And Lateral (Final result)  Result time 10/19/22 11:19:41      Final result by Dereck Richards MD (10/19/22 11:19:41)                   Impression:      No convincing evidence of acute fracture or traumatic subluxation.      Electronically signed by: Dereck Richards  Date:    10/19/2022  Time:    11:19               Narrative:    EXAMINATION:  XR CERVICAL SPINE AP LATERAL    CLINICAL  HISTORY:  Cervicalgia    TECHNIQUE:  AP, lateral and open mouth views of the cervical spine were performed.    COMPARISON:  Cervical spine radiograph performed 10/18/2016.    FINDINGS:  No convincing evidence of acute fracture or traumatic subluxation.  Degenerative loss of intervertebral disc space height with adjoining endplate sclerosis.  Degenerative findings of the uncinate and facet joints.    No prevertebral soft tissue swelling.  Patent airway.  No radiopaque foreign body.  Odontoid grossly intact.  Lateral masses C1 and C2 appear aligned.    Same-day chest reported separately.                                       X-Ray Shoulder Trauma Left (Final result)  Result time 10/19/22 11:18:37      Final result by Dereck Richards MD (10/19/22 11:18:37)                   Impression:      No convincing evidence of acute fracture or dislocation.      Electronically signed by: Dereck Richards  Date:    10/19/2022  Time:    11:18               Narrative:    EXAMINATION:  XR SHOULDER TRAUMA 3 VIEW LEFT    CLINICAL HISTORY:  Unspecified injury of shoulder and upper arm, unspecified arm, initial encounter    TECHNIQUE:  Three views of the left shoulder were performed.    COMPARISON  None    FINDINGS:  No definite evidence of acute fracture or dislocation.  DJD of the AC joint.  No definite radiopaque foreign body.  Same-day chest reported separately.                                       Medications - No data to display  Medical Decision Making:   Initial Assessment:   Juliette Seo is a 77 y.o. female who presents to the ED with cough and congestion, onset one week ago. Physical exam noted for left shoulder tenderness with palpation.  Clinical Tests:   Lab Tests: Reviewed and Ordered  Radiological Study: Ordered and Reviewed        Scribe Attestation:   Scribe #1: I performed the above scribed service and the documentation accurately describes the services I performed. I attest to the accuracy of the note.      ED  Course as of 10/19/22 1146   Wed Oct 19, 2022   1143 Patient has been notified of the negative COVID and negative influenza screening here in the emergency room.  Her x-rays are negative for any acute fractures also.  She will be referred to her primary care provider for any additional testing showed her discomfort continue from the fall.  She will be treated for acute bronchitis at this time as her symptoms have been greater than 1 week with progressive worsening of her condition.  Sats are 100% here in the emergency room.  She has been given strict return precautions and is stable at this time for discharge. [DT]      ED Course User Index  [DT] Xuan Ray NP                 Clinical Impression:   Final diagnoses:  [S49.90XA] Shoulder injury  [M54.2] Neck pain  [R05.9] Cough  [W19.XXXA] Fall, initial encounter (Primary)  [J40] Bronchitis  [S16.1XXA] Strain of neck muscle, initial encounter  [S46.912A] Strain of left shoulder, initial encounter  [M19.90] Osteoarthritis, unspecified osteoarthritis type, unspecified site        ED Disposition Condition    Discharge Stable        I, Xuan Ray NP, personally performed the services described in this documentation.All medical record entries made by the scribe were at my direction and in my presence.I have reviewed the chart and agree that the record reflects my personal performance and is accurate and complete.          Xuan Ray NP  10/19/22 1146

## 2022-11-19 ENCOUNTER — TELEPHONE (OUTPATIENT)
Dept: FAMILY MEDICINE | Facility: CLINIC | Age: 77
End: 2022-11-19
Payer: MEDICARE

## 2022-12-09 ENCOUNTER — TELEPHONE (OUTPATIENT)
Dept: FAMILY MEDICINE | Facility: CLINIC | Age: 77
End: 2022-12-09
Payer: MEDICARE

## 2022-12-30 DIAGNOSIS — U07.1 COVID-19 VIRUS DETECTED: ICD-10-CM

## 2023-01-09 NOTE — PROGRESS NOTES
Subjective:      Patient ID: Juliette Seo is a 77 y.o. female.    Chief Complaint: Pain of the Left Hand (Patient presents today for a follow up for the pain in her left hand. )      HPI  (French)    She speaks english well. Offered  and she declined.     History of trigger finger release RIGHT index, middle, and ring finger by Dr. Wesley on 6/3/22. Previous hand XRs showed some degenerative changes DIP joints in both hands.     Now with complaints of triggering in LEFT middle finger. Has intermittent triggering in LEFT index finger, but not currently bothering her. She rates her pain as a 6 on a scale of 1-10.     She is taking using voltaren gel and takes prn po voltaren.     Blood sugars were elevated after having 3 trigger finger injections in the past. They are currently well controlled.     She had covid on 12/30/22 and was treated with paxlovid.     History of HTN, DM, and high cholesterol.     PCP: Jesse Mac MD    She is not vaccinated for covid. As above, she tested positive for covid on 12/30/22 and was treated with paxlovid.       Past Medical History:   Diagnosis Date    Allergy     Anxiety     Arthritis     osteo    Asthma     Diabetic retinopathy     GERD (gastroesophageal reflux disease)     High cholesterol     Hypertension     Kidney stone     Type 2 diabetes mellitus, uncontrolled, with neuropathy     Urinary tract infection     Vaginal infection          Current Outpatient Medications:     ACCU-CHEK SMARTVIEW TEST STRIP Strp, USE  STRIP TO CHECK GLUCOSE TWICE DAILY, Disp: 200 each, Rfl: 1    acetaminophen (TYLENOL) 500 MG tablet, Take 1 tablet (500 mg total) by mouth every 4 (four) hours as needed for Pain., Disp: 42 tablet, Rfl: 0    albuterol (PROVENTIL/VENTOLIN HFA) 90 mcg/actuation inhaler, Inhale 1-2 puffs into the lungs every 6 (six) hours as needed for Wheezing. Rescue, Disp: 8 g, Rfl: 0    diclofenac (VOLTAREN) 75 MG EC tablet, Take 1 tablet (75 mg total) by mouth 2 (two)  "times daily., Disp: 20 tablet, Rfl: 0    diclofenac sodium (VOLTAREN) 1 % Gel, Apply 4 g topically 4 (four) times daily., Disp: 1 Tube, Rfl: 3    flash glucose sensor (FREESTYLE BRYAN 14 DAY SENSOR) Kit, 1 Device by Misc.(Non-Drug; Combo Route) route 3 (three) times daily., Disp: 1 kit, Rfl: 11    insulin NPH (NOVOLIN N NPH U-100 INSULIN) 100 unit/mL injection, INJECT SUBCUTANEOUSLY 40 UNITS EVERY MORNING & 20 UNITS EVERY EVENING, Disp: 50 mL, Rfl: 4    insulin syringe-needle U-100 (BD INSULIN SYRINGE ULTRA-FINE) 0.5 mL 31 gauge x 5/16" Syrg, USE AS DIRECTED TWICE A DAY, Disp: 180 each, Rfl: 4    lancets (ACCU-CHEK FASTCLIX LANCET DRUM) [facility] [place], USE AS DIRECTED TWICE A DAY, Disp: 100 each, Rfl: 4    LANTUS SOLOSTAR U-100 INSULIN glargine 100 units/mL SubQ pen, , Disp: , Rfl:     multivitamin Tab, Take 1 tablet by mouth once daily., Disp: , Rfl:     omeprazole (PRILOSEC) 40 MG capsule, Take 1 capsule (40 mg total) by mouth every morning., Disp: 90 capsule, Rfl: 4    TRUE METRIX GLUCOSE METER Misc, , Disp: , Rfl:     TRULICITY 0.75 mg/0.5 mL pen injector, Inject into the skin., Disp: , Rfl:     atorvastatin (LIPITOR) 80 MG tablet, Take 1 tablet (80 mg total) by mouth once daily., Disp: 90 tablet, Rfl: 3    gabapentin (NEURONTIN) 100 MG capsule, Take 1 capsule (100 mg total) by mouth 3 (three) times daily., Disp: 90 capsule, Rfl: 0    LUMIGAN 0.01 % Drop, Place 1 drop into both eyes nightly., Disp: , Rfl: 4    nystatin (MYCOSTATIN) powder, Apply topically 4 (four) times daily. for 14 days, Disp: 60 g, Rfl: 1    pregabalin (LYRICA) 75 MG capsule, Take 1 capsule (75 mg total) by mouth 2 (two) times daily., Disp: 60 capsule, Rfl: 0    valACYclovir (VALTREX) 1000 MG tablet, Take 1 tablet (1,000 mg total) by mouth 3 (three) times daily. for 7 days, Disp: 21 tablet, Rfl: 0  No current facility-administered medications for this visit.    Facility-Administered Medications Ordered in Other Visits:     triamcinolone acetonide " "injection 40 mg, 40 mg, Intra-articular, , Jayne Cain PA-C, 40 mg at 07/12/22 0830    Review of patient's allergies indicates:   Allergen Reactions    Actos [pioglitazone] Nausea Only    Darvocet a500 [propoxyphene n-acetaminophen] Nausea And Vomiting    Dilaudid [hydromorphone (bulk)] Nausea And Vomiting       Review of Systems   Constitutional: Negative for chills, fever, night sweats and weight gain.   Gastrointestinal:  Negative for bowel incontinence, nausea and vomiting.   Genitourinary:  Negative for bladder incontinence.   Neurological:  Negative for disturbances in coordination and loss of balance.         Objective:        Ht 4' 11" (1.499 m)   Wt 51.2 kg (112 lb 12.8 oz)   BMI 22.78 kg/m²     Ortho/SPM Exam    Well developed, well nourished patient in no acute distress  Alert and oriented x 3  HEENT- Normal exam  Lungs- Clear to auscultation  Heart- Regular rate and rhythm  Abdomen- Soft nontender    LEFT Hand/Wrist Examination:    Observation/Inspection:  Swelling  none    Deformity  none  Discoloration  none     Scars   none    Atrophy  none    HAND/WRIST EXAMINATION:  Finkelstein's Test   Neg  WHAT Test    Neg  Snuff box tenderness   Neg  Hook of Hamate Tenderness  Neg  CMC grind    Neg    Neurovascular Exam:  Digits WWP, brisk CR < 3s throughout  NVI motor/LTS to M/R/U nerves, radial pulse 2+  Tinel's Test - Carpal Tunnel  Neg  Tinel's Test - Cubital Tunnel  Neg  Phalen's Test    Neg  Median Nerve Compression Test Neg    She can make a full fist and has full extension of her fingers.     She has triggering of left middle finger with tenderness over A1 pulley. She has no triggering of index finger, but has tenderness over A1 pulley.         Assessment:       Encounter Diagnoses   Name Primary?    Trigger finger, left middle finger Yes    Trigger finger, left index finger           Plan:       Juliette was seen today for pain.    Diagnoses and all orders for this visit:    Trigger finger, left " middle finger  -     Ambulatory referral/consult to Orthopedics  -     Tendon Sheath    Trigger finger, left index finger      She has triggering in LEFT middle finger. Has intermittent triggering in LEFT index finger, but not currently bothering her. Did well with trigger finger release on right.     Previous hand XRs showed some degenerative changes DIP joints in both hands.     Treatment options reviewed with patient and following plan made:     - LEFT MIDDLE trigger finger injection done without complication. See procedure note. She will watch blood sugars.   - Given splint to keep middle and index fingers in extension at night.   - I reviewed all options with the patient and the patient wants to proceed with surgery intervention, specifically trigger finger release LEFT index and middle finger.   - I went over the procedure in detail, the risk and benefits of the procedure and all questions were answered. Informed consent was obtained and an H&P and consent was completed.   - She understands she will need to wait at least 2 months until surgery since she had injection today.   - Continue with voltaren gel prn.     Follow up if symptoms worsen or fail to improve.

## 2023-01-10 ENCOUNTER — OFFICE VISIT (OUTPATIENT)
Dept: ORTHOPEDICS | Facility: CLINIC | Age: 78
End: 2023-01-10
Payer: MEDICARE

## 2023-01-10 VITALS — WEIGHT: 112.81 LBS | HEIGHT: 59 IN | BODY MASS INDEX: 22.74 KG/M2

## 2023-01-10 DIAGNOSIS — M65.332 TRIGGER FINGER, LEFT MIDDLE FINGER: Primary | ICD-10-CM

## 2023-01-10 DIAGNOSIS — M65.322 TRIGGER FINGER, LEFT INDEX FINGER: ICD-10-CM

## 2023-01-10 PROCEDURE — 3288F FALL RISK ASSESSMENT DOCD: CPT | Mod: CPTII,S$GLB,, | Performed by: PHYSICIAN ASSISTANT

## 2023-01-10 PROCEDURE — 3288F PR FALLS RISK ASSESSMENT DOCUMENTED: ICD-10-PCS | Mod: CPTII,S$GLB,, | Performed by: PHYSICIAN ASSISTANT

## 2023-01-10 PROCEDURE — 1125F PR PAIN SEVERITY QUANTIFIED, PAIN PRESENT: ICD-10-PCS | Mod: CPTII,S$GLB,, | Performed by: PHYSICIAN ASSISTANT

## 2023-01-10 PROCEDURE — 1125F AMNT PAIN NOTED PAIN PRSNT: CPT | Mod: CPTII,S$GLB,, | Performed by: PHYSICIAN ASSISTANT

## 2023-01-10 PROCEDURE — 1101F PT FALLS ASSESS-DOCD LE1/YR: CPT | Mod: CPTII,S$GLB,, | Performed by: PHYSICIAN ASSISTANT

## 2023-01-10 PROCEDURE — 1159F MED LIST DOCD IN RCRD: CPT | Mod: CPTII,S$GLB,, | Performed by: PHYSICIAN ASSISTANT

## 2023-01-10 PROCEDURE — 1160F PR REVIEW ALL MEDS BY PRESCRIBER/CLIN PHARMACIST DOCUMENTED: ICD-10-PCS | Mod: CPTII,S$GLB,, | Performed by: PHYSICIAN ASSISTANT

## 2023-01-10 PROCEDURE — 1159F PR MEDICATION LIST DOCUMENTED IN MEDICAL RECORD: ICD-10-PCS | Mod: CPTII,S$GLB,, | Performed by: PHYSICIAN ASSISTANT

## 2023-01-10 PROCEDURE — 20550 NJX 1 TENDON SHEATH/LIGAMENT: CPT | Mod: LT,S$GLB,, | Performed by: PHYSICIAN ASSISTANT

## 2023-01-10 PROCEDURE — 99999 PR PBB SHADOW E&M-EST. PATIENT-LVL V: ICD-10-PCS | Mod: PBBFAC,,, | Performed by: PHYSICIAN ASSISTANT

## 2023-01-10 PROCEDURE — 99214 OFFICE O/P EST MOD 30 MIN: CPT | Mod: 25,S$GLB,, | Performed by: PHYSICIAN ASSISTANT

## 2023-01-10 PROCEDURE — 20550 TENDON SHEATH: ICD-10-PCS | Mod: LT,S$GLB,, | Performed by: PHYSICIAN ASSISTANT

## 2023-01-10 PROCEDURE — 99999 PR PBB SHADOW E&M-EST. PATIENT-LVL V: CPT | Mod: PBBFAC,,, | Performed by: PHYSICIAN ASSISTANT

## 2023-01-10 PROCEDURE — 99214 PR OFFICE/OUTPT VISIT, EST, LEVL IV, 30-39 MIN: ICD-10-PCS | Mod: 25,S$GLB,, | Performed by: PHYSICIAN ASSISTANT

## 2023-01-10 PROCEDURE — 1101F PR PT FALLS ASSESS DOC 0-1 FALLS W/OUT INJ PAST YR: ICD-10-PCS | Mod: CPTII,S$GLB,, | Performed by: PHYSICIAN ASSISTANT

## 2023-01-10 PROCEDURE — 1160F RVW MEDS BY RX/DR IN RCRD: CPT | Mod: CPTII,S$GLB,, | Performed by: PHYSICIAN ASSISTANT

## 2023-01-10 RX ORDER — INSULIN GLARGINE 100 [IU]/ML
INJECTION, SOLUTION SUBCUTANEOUS
Status: ON HOLD | COMMUNITY
Start: 2022-11-29 | End: 2023-06-17 | Stop reason: CLARIF

## 2023-01-10 RX ORDER — DULAGLUTIDE 0.75 MG/.5ML
0.75 INJECTION, SOLUTION SUBCUTANEOUS
COMMUNITY
Start: 2023-01-06 | End: 2023-08-18

## 2023-01-10 RX ORDER — MELOXICAM 15 MG/1
TABLET ORAL
COMMUNITY
Start: 2022-11-29 | End: 2023-01-10

## 2023-01-10 RX ORDER — TRIAMCINOLONE ACETONIDE 40 MG/ML
20 INJECTION, SUSPENSION INTRA-ARTICULAR; INTRAMUSCULAR
Status: DISCONTINUED | OUTPATIENT
Start: 2023-01-10 | End: 2023-01-10 | Stop reason: HOSPADM

## 2023-01-10 RX ORDER — BLOOD-GLUCOSE METER
EACH MISCELLANEOUS
Status: ON HOLD | COMMUNITY
Start: 2022-12-02 | End: 2023-06-17 | Stop reason: CLARIF

## 2023-01-10 RX ORDER — DICLOFENAC SODIUM 10 MG/G
GEL TOPICAL
COMMUNITY
Start: 2022-12-13 | End: 2023-01-10 | Stop reason: SDUPTHER

## 2023-01-10 RX ADMIN — TRIAMCINOLONE ACETONIDE 20 MG: 40 INJECTION, SUSPENSION INTRA-ARTICULAR; INTRAMUSCULAR at 08:01

## 2023-01-10 NOTE — PROCEDURES
Tendon Sheath    Date/Time: 1/10/2023 8:30 AM  Performed by: Bronwyn Kline PA-C  Authorized by: Bronwyn Kline PA-C     Consent Done?:  Yes (Verbal)  Timeout: prior to procedure the correct patient, procedure, and site was verified    Location:  Long finger  Site:  L long flexor tendon sheath  Medications:  20 mg triamcinolone acetonide 40 mg/mL    Additional Comments: PROCEDURE NOTE:  LEFT MIDDLE TRIGGER FINGER INJECTION    I have explained the risks, benefits, and alternatives of the procedure in detail.  The patient voices understanding and all questions have been answered.  The patient agrees to proceed as planned.    After a sterile prep of the skin using chloraprep one step, the area was sprayed with local topical anesthetic and then cleaned with alcohol. The LEFT MIDDLE finger flexor tendon is injected using a 25 gauge needle with a combination of 0.5 cc 1% plain xylocaine and 20 mg of triamcinolone.     The patient is cautioned that immediate relief of pain is secondary to the local anesthetic and will be temporary. After the anesthetic wears off there may be a increase in pain that may last for a few hours or a few days and they should use ice to help alleviate this this pain.     If patient is diabetic, post injection elevation of blood sugar was discussed. Patient is to check blood sugar regularly and call PCP with any issues.     Patient tolerated the procedure well.

## 2023-01-10 NOTE — PATIENT INSTRUCTIONS
It was nice to meet you today!     The injection that I did today should give you some good relief of pain. It is normal to have some increased soreness over the next few days after an injection. Put ice on it and elevate. This will get better.     Remember that injection can make your blood sugar go up for a week or so. Check it regularly and call your PCP with any concerns.     You can wear splints at night on right and middle finger as needed.     Dr. Wesley's staff will call you to schedule surgery on your left ring and middle finger.     Please stay in touch and call me if you need anything. You can also send me a message in [facility].     Bronwyn   [phone]

## 2023-01-23 NOTE — DISCHARGE INSTRUCTIONS
Operative Note      Patient: Genna Blizzard  YOB: 1986  MRN: [id number]    Date of Procedure: 1/23/Your Pain is probably from your old shingles and nerve irritation.  Please use the medication prescribed as needed. Schedule an appointment as soon as possible with your primary.    ,    Thank you for letting me care for you today! It was nice meeting you, and I hope you feel better soon.   If you would like access to your chart and what was done today please utilize the [facility].   Please come back to [facility] for all of your future medical needs.    Our goal in the emergency department is to always give you outstanding care and exceptional service. You may receive a survey by mail or e-mail in the next week regarding your experience in our ED. We would greatly appreciate you completing and returning the survey. Your feedback provides us with a way to recognize our staff who give very good care and it helps us learn how to improve when your experience was below our aspiration of excellence.     Sincerely,    Bart Mccollum MD  Board Certified Emergency Physician    

## 2023-01-31 ENCOUNTER — PES CALL (OUTPATIENT)
Dept: ADMINISTRATIVE | Facility: CLINIC | Age: 78
End: 2023-01-31
Payer: MEDICARE

## 2023-04-03 DIAGNOSIS — M89.9 DISORDER OF BONE, UNSPECIFIED: Primary | ICD-10-CM

## 2023-04-03 DIAGNOSIS — Z12.31 ENCOUNTER FOR SCREENING MAMMOGRAM FOR MALIGNANT NEOPLASM OF BREAST: Primary | ICD-10-CM

## 2023-05-18 ENCOUNTER — OFFICE VISIT (OUTPATIENT)
Dept: FAMILY MEDICINE | Facility: CLINIC | Age: 78
End: 2023-05-18
Payer: MEDICARE

## 2023-05-18 ENCOUNTER — LAB VISIT (OUTPATIENT)
Dept: LAB | Facility: HOSPITAL | Age: 78
End: 2023-05-18
Attending: FAMILY MEDICINE
Payer: MEDICARE

## 2023-05-18 VITALS
OXYGEN SATURATION: 98 % | RESPIRATION RATE: 16 BRPM | HEIGHT: 59 IN | HEART RATE: 109 BPM | DIASTOLIC BLOOD PRESSURE: 86 MMHG | SYSTOLIC BLOOD PRESSURE: 146 MMHG | WEIGHT: 106.06 LBS | BODY MASS INDEX: 21.38 KG/M2

## 2023-05-18 DIAGNOSIS — Z79.4 TYPE 2 DIABETES MELLITUS WITH COMPLICATION, WITH LONG-TERM CURRENT USE OF INSULIN: ICD-10-CM

## 2023-05-18 DIAGNOSIS — I25.10 CORONARY ARTERY DISEASE INVOLVING NATIVE CORONARY ARTERY WITHOUT ANGINA PECTORIS, UNSPECIFIED WHETHER NATIVE OR TRANSPLANTED HEART: ICD-10-CM

## 2023-05-18 DIAGNOSIS — E11.8 TYPE 2 DIABETES MELLITUS WITH COMPLICATION, WITH LONG-TERM CURRENT USE OF INSULIN: ICD-10-CM

## 2023-05-18 DIAGNOSIS — I10 ESSENTIAL HYPERTENSION: ICD-10-CM

## 2023-05-18 DIAGNOSIS — D12.4 ADENOMATOUS POLYP OF DESCENDING COLON: Primary | ICD-10-CM

## 2023-05-18 DIAGNOSIS — I70.0 AORTIC ATHEROSCLEROSIS: ICD-10-CM

## 2023-05-18 PROBLEM — K21.9 GERD (GASTROESOPHAGEAL REFLUX DISEASE): Status: RESOLVED | Noted: 2021-06-08 | Resolved: 2023-05-18

## 2023-05-18 LAB
ALBUMIN SERPL BCP-MCNC: 4.2 G/DL (ref 3.5–5.2)
ALP SERPL-CCNC: 185 U/L (ref 55–135)
ALT SERPL W/O P-5'-P-CCNC: 19 U/L (ref 10–44)
ANION GAP SERPL CALC-SCNC: 12 MMOL/L (ref 8–16)
AST SERPL-CCNC: 17 U/L (ref 10–40)
BASOPHILS # BLD AUTO: 0.04 K/UL (ref 0–0.2)
BASOPHILS NFR BLD: 0.5 % (ref 0–1.9)
BILIRUB SERPL-MCNC: 0.5 MG/DL (ref 0.1–1)
BUN SERPL-MCNC: 17 MG/DL (ref 8–23)
CALCIUM SERPL-MCNC: 9.9 MG/DL (ref 8.7–10.5)
CHLORIDE SERPL-SCNC: 101 MMOL/L (ref 95–110)
CHOLEST SERPL-MCNC: 168 MG/DL (ref 120–199)
CHOLEST/HDLC SERPL: 5.3 {RATIO} (ref 2–5)
CO2 SERPL-SCNC: 24 MMOL/L (ref 23–29)
CREAT SERPL-MCNC: 1.1 MG/DL (ref 0.5–1.4)
DIFFERENTIAL METHOD: NORMAL
EOSINOPHIL # BLD AUTO: 0.1 K/UL (ref 0–0.5)
EOSINOPHIL NFR BLD: 0.7 % (ref 0–8)
ERYTHROCYTE [DISTWIDTH] IN BLOOD BY AUTOMATED COUNT: 12.5 % (ref 11.5–14.5)
EST. GFR  (NO RACE VARIABLE): 52 ML/MIN/1.73 M^2
ESTIMATED AVG GLUCOSE: 283 MG/DL (ref 68–131)
GLUCOSE SERPL-MCNC: 418 MG/DL (ref 70–110)
HBA1C MFR BLD: 11.5 % (ref 4–5.6)
HCT VFR BLD AUTO: 44.2 % (ref 37–48.5)
HDLC SERPL-MCNC: 32 MG/DL (ref 40–75)
HDLC SERPL: 19 % (ref 20–50)
HGB BLD-MCNC: 14.9 G/DL (ref 12–16)
IMM GRANULOCYTES # BLD AUTO: 0.03 K/UL (ref 0–0.04)
IMM GRANULOCYTES NFR BLD AUTO: 0.4 % (ref 0–0.5)
LDLC SERPL CALC-MCNC: 93.8 MG/DL (ref 63–159)
LYMPHOCYTES # BLD AUTO: 2.3 K/UL (ref 1–4.8)
LYMPHOCYTES NFR BLD: 28.9 % (ref 18–48)
MCH RBC QN AUTO: 28.9 PG (ref 27–31)
MCHC RBC AUTO-ENTMCNC: 33.7 G/DL (ref 32–36)
MCV RBC AUTO: 86 FL (ref 82–98)
MONOCYTES # BLD AUTO: 0.5 K/UL (ref 0.3–1)
MONOCYTES NFR BLD: 6.1 % (ref 4–15)
NEUTROPHILS # BLD AUTO: 5.1 K/UL (ref 1.8–7.7)
NEUTROPHILS NFR BLD: 63.4 % (ref 38–73)
NONHDLC SERPL-MCNC: 136 MG/DL
NRBC BLD-RTO: 0 /100 WBC
PLATELET # BLD AUTO: 206 K/UL (ref 150–450)
PMV BLD AUTO: 10.2 FL (ref 9.2–12.9)
POTASSIUM SERPL-SCNC: 5.1 MMOL/L (ref 3.5–5.1)
PROT SERPL-MCNC: 7.5 G/DL (ref 6–8.4)
RBC # BLD AUTO: 5.15 M/UL (ref 4–5.4)
SODIUM SERPL-SCNC: 137 MMOL/L (ref 136–145)
TRIGL SERPL-MCNC: 211 MG/DL (ref 30–150)
TSH SERPL DL<=0.005 MIU/L-ACNC: 2.98 UIU/ML (ref 0.4–4)
WBC # BLD AUTO: 8.04 K/UL (ref 3.9–12.7)

## 2023-05-18 PROCEDURE — 83036 HEMOGLOBIN GLYCOSYLATED A1C: CPT | Performed by: FAMILY MEDICINE

## 2023-05-18 PROCEDURE — 99214 PR OFFICE/OUTPT VISIT, EST, LEVL IV, 30-39 MIN: ICD-10-PCS | Mod: ,,, | Performed by: FAMILY MEDICINE

## 2023-05-18 PROCEDURE — 99999 PR PBB SHADOW E&M-EST. PATIENT-LVL V: ICD-10-PCS | Mod: PBBFAC,,, | Performed by: FAMILY MEDICINE

## 2023-05-18 PROCEDURE — 36415 COLL VENOUS BLD VENIPUNCTURE: CPT | Performed by: FAMILY MEDICINE

## 2023-05-18 PROCEDURE — 85025 COMPLETE CBC W/AUTO DIFF WBC: CPT | Performed by: FAMILY MEDICINE

## 2023-05-18 PROCEDURE — 99215 OFFICE O/P EST HI 40 MIN: CPT | Performed by: FAMILY MEDICINE

## 2023-05-18 PROCEDURE — 99999 PR PBB SHADOW E&M-EST. PATIENT-LVL V: CPT | Mod: PBBFAC,,, | Performed by: FAMILY MEDICINE

## 2023-05-18 PROCEDURE — 3288F PR FALLS RISK ASSESSMENT DOCUMENTED: ICD-10-PCS | Mod: CPTII,,, | Performed by: FAMILY MEDICINE

## 2023-05-18 PROCEDURE — 1126F AMNT PAIN NOTED NONE PRSNT: CPT | Mod: CPTII,,, | Performed by: FAMILY MEDICINE

## 2023-05-18 PROCEDURE — 1126F PR PAIN SEVERITY QUANTIFIED, NO PAIN PRESENT: ICD-10-PCS | Mod: CPTII,,, | Performed by: FAMILY MEDICINE

## 2023-05-18 PROCEDURE — 80061 LIPID PANEL: CPT | Performed by: FAMILY MEDICINE

## 2023-05-18 PROCEDURE — 84443 ASSAY THYROID STIM HORMONE: CPT | Performed by: FAMILY MEDICINE

## 2023-05-18 PROCEDURE — 3079F PR MOST RECENT DIASTOLIC BLOOD PRESSURE 80-89 MM HG: ICD-10-PCS | Mod: CPTII,,, | Performed by: FAMILY MEDICINE

## 2023-05-18 PROCEDURE — 3077F SYST BP >= 140 MM HG: CPT | Mod: CPTII,,, | Performed by: FAMILY MEDICINE

## 2023-05-18 PROCEDURE — 1159F MED LIST DOCD IN RCRD: CPT | Mod: CPTII,,, | Performed by: FAMILY MEDICINE

## 2023-05-18 PROCEDURE — 1159F PR MEDICATION LIST DOCUMENTED IN MEDICAL RECORD: ICD-10-PCS | Mod: CPTII,,, | Performed by: FAMILY MEDICINE

## 2023-05-18 PROCEDURE — 1101F PT FALLS ASSESS-DOCD LE1/YR: CPT | Mod: CPTII,,, | Performed by: FAMILY MEDICINE

## 2023-05-18 PROCEDURE — 3077F PR MOST RECENT SYSTOLIC BLOOD PRESSURE >= 140 MM HG: ICD-10-PCS | Mod: CPTII,,, | Performed by: FAMILY MEDICINE

## 2023-05-18 PROCEDURE — 1101F PR PT FALLS ASSESS DOC 0-1 FALLS W/OUT INJ PAST YR: ICD-10-PCS | Mod: CPTII,,, | Performed by: FAMILY MEDICINE

## 2023-05-18 PROCEDURE — 3288F FALL RISK ASSESSMENT DOCD: CPT | Mod: CPTII,,, | Performed by: FAMILY MEDICINE

## 2023-05-18 PROCEDURE — 80053 COMPREHEN METABOLIC PANEL: CPT | Performed by: FAMILY MEDICINE

## 2023-05-18 PROCEDURE — 99214 OFFICE O/P EST MOD 30 MIN: CPT | Mod: ,,, | Performed by: FAMILY MEDICINE

## 2023-05-18 PROCEDURE — 3079F DIAST BP 80-89 MM HG: CPT | Mod: CPTII,,, | Performed by: FAMILY MEDICINE

## 2023-05-18 RX ORDER — DAPAGLIFLOZIN 10 MG/1
10 TABLET, FILM COATED ORAL
Status: ON HOLD | COMMUNITY
Start: 2023-03-31 | End: 2023-06-17 | Stop reason: CLARIF

## 2023-05-18 RX ORDER — LOSARTAN POTASSIUM AND HYDROCHLOROTHIAZIDE 12.5; 5 MG/1; MG/1
1 TABLET ORAL DAILY
Status: ON HOLD | COMMUNITY
Start: 2023-05-03 | End: 2023-06-17 | Stop reason: CLARIF

## 2023-05-18 RX ORDER — VIT C/E/ZN/COPPR/LUTEIN/ZEAXAN 250MG-90MG
CAPSULE ORAL
Status: ON HOLD | COMMUNITY
Start: 2022-11-30 | End: 2023-06-17 | Stop reason: CLARIF

## 2023-05-18 RX ORDER — MELOXICAM 15 MG/1
TABLET ORAL
Status: ON HOLD | COMMUNITY
End: 2023-06-17 | Stop reason: CLARIF

## 2023-05-18 RX ORDER — CYCLOBENZAPRINE HCL 10 MG
TABLET ORAL
COMMUNITY

## 2023-05-18 RX ORDER — LANCETS 28 GAUGE
EACH MISCELLANEOUS
COMMUNITY
Start: 2023-03-07 | End: 2023-10-23 | Stop reason: SDUPTHER

## 2023-05-18 RX ORDER — INSULIN DETEMIR 100 [IU]/ML
30 INJECTION, SOLUTION SUBCUTANEOUS 2 TIMES DAILY
Status: ON HOLD | COMMUNITY
Start: 2023-05-03 | End: 2023-06-17 | Stop reason: SDUPTHER

## 2023-05-18 RX ORDER — LOSARTAN POTASSIUM AND HYDROCHLOROTHIAZIDE 12.5; 1 MG/1; MG/1
1 TABLET ORAL DAILY
Qty: 30 TABLET | Refills: 0 | Status: SHIPPED | OUTPATIENT
Start: 2023-05-18 | End: 2023-10-02

## 2023-05-18 NOTE — PROGRESS NOTES
Subjective:       Patient ID: Juliette Seo is a 77 y.o. female.    Chief Complaint: Urinary Tract Infection (Pt states she has been having a little pain in abdomen and burning when urinating. )    Pt is a 77 y.o. female who presents for evaluation and management of   Encounter Diagnoses   Name Primary?    Type 2 diabetes mellitus with complication, with long-term current use of insulin     Aortic atherosclerosis     Coronary artery disease involving native coronary artery without angina pectoris, unspecified whether native or transplanted heart     Essential hypertension     Adenomatous polyp of descending colon Yes   .  Doing well on current meds. Denies any side effects. Prevention is up to date.  Review of Systems   Constitutional:  Negative for chills and fever.   Respiratory:  Negative for shortness of breath.    Cardiovascular:  Negative for chest pain and palpitations.   Gastrointestinal:  Negative for abdominal pain, blood in stool, constipation and nausea.   Genitourinary:  Negative for difficulty urinating.   Psychiatric/Behavioral:  Positive for confusion and hallucinations. Negative for dysphoric mood, sleep disturbance and suicidal ideas. The patient is not nervous/anxious.         Some dementia   No longer driving     She is seeing people spying on her recently (last 2 months).        Objective:      Physical Exam  Constitutional:       Appearance: She is well-developed.   HENT:      Head: Normocephalic and atraumatic.      Right Ear: External ear normal.      Left Ear: External ear normal.      Nose: Nose normal.   Eyes:      Pupils: Pupils are equal, round, and reactive to light.   Neck:      Thyroid: No thyromegaly.      Vascular: No JVD.      Trachea: No tracheal deviation.   Cardiovascular:      Rate and Rhythm: Normal rate.      Heart sounds: Normal heart sounds. No murmur heard.  Pulmonary:      Effort: Pulmonary effort is normal. No respiratory distress.      Breath sounds: Normal breath  sounds. No wheezing or rales.   Chest:      Chest wall: No tenderness.   Abdominal:      General: Bowel sounds are normal. There is no distension.      Palpations: Abdomen is soft. There is no mass.      Tenderness: There is no abdominal tenderness. There is no guarding or rebound.   Musculoskeletal:         General: No tenderness. Normal range of motion.      Cervical back: Normal range of motion and neck supple.      Comments: Dystrophic nail    Lymphadenopathy:      Cervical: No cervical adenopathy.   Skin:     General: Skin is warm and dry.      Coloration: Skin is not pale.      Findings: No erythema or rash.   Neurological:      Mental Status: She is alert and oriented to person, place, and time.      Cranial Nerves: No cranial nerve deficit.      Motor: No abnormal muscle tone.      Coordination: Coordination normal.      Deep Tendon Reflexes: Reflexes are normal and symmetric. Reflexes normal.   Psychiatric:         Behavior: Behavior normal.         Thought Content: Thought content normal.         Judgment: Judgment normal.       Assessment:       1. Adenomatous polyp of descending colon    2. Type 2 diabetes mellitus with complication, with long-term current use of insulin    3. Aortic atherosclerosis    4. Coronary artery disease involving native coronary artery without angina pectoris, unspecified whether native or transplanted heart    5. Essential hypertension        Plan:   1. Adenomatous polyp of descending colon  -     Case Request Endoscopy: COLONOSCOPY    2. Type 2 diabetes mellitus with complication, with long-term current use of insulin  Overview:  Lab Results   Component Value Date    HGBA1C 7.8 (H) 05/06/2021   unclear what kind of insulin she is taking and how much....   Trulicity        3. Aortic atherosclerosis  Overview:  Risk modifications per Dr. Beltre  Atorvastatin   asa      4. Coronary artery disease involving native coronary artery without angina pectoris, unspecified whether native  or transplanted heart  Overview:  Seen on CTA 5/2021   lipitor 80   ASA         5. Essential hypertension  Overview:  Losartan HCTZ 50/12.5         Orders:  -     losartan-hydrochlorothiazide 100-12.5 mg (HYZAAR) 100-12.5 mg Tab; Take 1 tablet by mouth once daily.  Dispense: 30 tablet; Refill: 0    Getting labs and increasing her losartan to 100mg     Her hallucinations of last 2 months may be due to her poor glucose control(based on her one and only recent glucose in the ED).  A1c should tell me.     RTC 2 weeks WITH HER MEDS so I can see what she is taking for her DM       No follow-ups on file.

## 2023-05-21 NOTE — PROGRESS NOTES
New patient labs---Her a1c is very high. She was to call and let me know what she is taking for her diabetes. I believe she is on insulin, but I need to know what kind and how much.   Also need her to bring in a blood sugar log to next upcoming appointment in 2 weeks. We need to work on getting dm under control. Thanks!!

## 2023-06-01 ENCOUNTER — OFFICE VISIT (OUTPATIENT)
Dept: FAMILY MEDICINE | Facility: CLINIC | Age: 78
End: 2023-06-01
Payer: MEDICARE

## 2023-06-01 VITALS
SYSTOLIC BLOOD PRESSURE: 136 MMHG | WEIGHT: 104.5 LBS | BODY MASS INDEX: 21.07 KG/M2 | DIASTOLIC BLOOD PRESSURE: 82 MMHG | OXYGEN SATURATION: 91 % | HEART RATE: 81 BPM | RESPIRATION RATE: 16 BRPM | HEIGHT: 59 IN

## 2023-06-01 DIAGNOSIS — Z79.4 TYPE 2 DIABETES MELLITUS WITH HYPERGLYCEMIA, WITH LONG-TERM CURRENT USE OF INSULIN: Primary | ICD-10-CM

## 2023-06-01 DIAGNOSIS — E11.65 TYPE 2 DIABETES MELLITUS WITH HYPERGLYCEMIA, WITH LONG-TERM CURRENT USE OF INSULIN: Primary | ICD-10-CM

## 2023-06-01 DIAGNOSIS — I10 ESSENTIAL HYPERTENSION: ICD-10-CM

## 2023-06-01 PROCEDURE — 99214 OFFICE O/P EST MOD 30 MIN: CPT | Mod: S$GLB,,, | Performed by: FAMILY MEDICINE

## 2023-06-01 PROCEDURE — 1101F PT FALLS ASSESS-DOCD LE1/YR: CPT | Mod: CPTII,S$GLB,, | Performed by: FAMILY MEDICINE

## 2023-06-01 PROCEDURE — 99214 PR OFFICE/OUTPT VISIT, EST, LEVL IV, 30-39 MIN: ICD-10-PCS | Mod: S$GLB,,, | Performed by: FAMILY MEDICINE

## 2023-06-01 PROCEDURE — 99999 PR PBB SHADOW E&M-EST. PATIENT-LVL III: CPT | Mod: PBBFAC,,, | Performed by: FAMILY MEDICINE

## 2023-06-01 PROCEDURE — 3288F PR FALLS RISK ASSESSMENT DOCUMENTED: ICD-10-PCS | Mod: CPTII,S$GLB,, | Performed by: FAMILY MEDICINE

## 2023-06-01 PROCEDURE — 3075F PR MOST RECENT SYSTOLIC BLOOD PRESS GE 130-139MM HG: ICD-10-PCS | Mod: CPTII,S$GLB,, | Performed by: FAMILY MEDICINE

## 2023-06-01 PROCEDURE — 1159F PR MEDICATION LIST DOCUMENTED IN MEDICAL RECORD: ICD-10-PCS | Mod: CPTII,S$GLB,, | Performed by: FAMILY MEDICINE

## 2023-06-01 PROCEDURE — 1125F PR PAIN SEVERITY QUANTIFIED, PAIN PRESENT: ICD-10-PCS | Mod: CPTII,S$GLB,, | Performed by: FAMILY MEDICINE

## 2023-06-01 PROCEDURE — 3079F DIAST BP 80-89 MM HG: CPT | Mod: CPTII,S$GLB,, | Performed by: FAMILY MEDICINE

## 2023-06-01 PROCEDURE — 1125F AMNT PAIN NOTED PAIN PRSNT: CPT | Mod: CPTII,S$GLB,, | Performed by: FAMILY MEDICINE

## 2023-06-01 PROCEDURE — 99999 PR PBB SHADOW E&M-EST. PATIENT-LVL III: ICD-10-PCS | Mod: PBBFAC,,, | Performed by: FAMILY MEDICINE

## 2023-06-01 PROCEDURE — 3079F PR MOST RECENT DIASTOLIC BLOOD PRESSURE 80-89 MM HG: ICD-10-PCS | Mod: CPTII,S$GLB,, | Performed by: FAMILY MEDICINE

## 2023-06-01 PROCEDURE — 3075F SYST BP GE 130 - 139MM HG: CPT | Mod: CPTII,S$GLB,, | Performed by: FAMILY MEDICINE

## 2023-06-01 PROCEDURE — 1159F MED LIST DOCD IN RCRD: CPT | Mod: CPTII,S$GLB,, | Performed by: FAMILY MEDICINE

## 2023-06-01 PROCEDURE — 3288F FALL RISK ASSESSMENT DOCD: CPT | Mod: CPTII,S$GLB,, | Performed by: FAMILY MEDICINE

## 2023-06-01 PROCEDURE — 1101F PR PT FALLS ASSESS DOC 0-1 FALLS W/OUT INJ PAST YR: ICD-10-PCS | Mod: CPTII,S$GLB,, | Performed by: FAMILY MEDICINE

## 2023-06-01 RX ORDER — BENZONATATE 200 MG/1
200 CAPSULE ORAL 3 TIMES DAILY PRN
Qty: 30 CAPSULE | Refills: 1 | Status: SHIPPED | OUTPATIENT
Start: 2023-06-01 | End: 2023-06-11

## 2023-06-01 RX ORDER — GLIMEPIRIDE 2 MG/1
2 TABLET ORAL
Status: ON HOLD | COMMUNITY
Start: 2023-05-03 | End: 2023-06-17 | Stop reason: CLARIF

## 2023-06-01 RX ORDER — BLOOD-GLUCOSE,RECEIVER,CONT
1 EACH MISCELLANEOUS 2 TIMES DAILY
Qty: 1 EACH | Refills: 5 | Status: ON HOLD | OUTPATIENT
Start: 2023-06-01 | End: 2023-06-17 | Stop reason: CLARIF

## 2023-06-01 RX ORDER — BLOOD-GLUCOSE TRANSMITTER
1 EACH MISCELLANEOUS 2 TIMES DAILY
Qty: 1 EACH | Refills: 11 | Status: ON HOLD | OUTPATIENT
Start: 2023-06-01 | End: 2023-06-17 | Stop reason: CLARIF

## 2023-06-01 RX ORDER — LANCETS
EACH MISCELLANEOUS
Qty: 100 EACH | Refills: 11 | Status: SHIPPED | OUTPATIENT
Start: 2023-06-01 | End: 2023-10-02

## 2023-06-01 RX ORDER — BLOOD-GLUCOSE SENSOR
1 EACH MISCELLANEOUS 2 TIMES DAILY
Qty: 1 EACH | Refills: 11 | Status: ON HOLD | OUTPATIENT
Start: 2023-06-01 | End: 2023-06-17 | Stop reason: CLARIF

## 2023-06-01 RX ORDER — INSULIN PUMP SYRINGE, 3 ML
EACH MISCELLANEOUS
Qty: 1 EACH | Refills: 0 | Status: ON HOLD | OUTPATIENT
Start: 2023-06-01 | End: 2023-06-17 | Stop reason: CLARIF

## 2023-06-01 RX ORDER — METFORMIN HYDROCHLORIDE 500 MG/1
500 TABLET ORAL 2 TIMES DAILY WITH MEALS
Qty: 60 TABLET | Refills: 5 | Status: SHIPPED | OUTPATIENT
Start: 2023-06-01 | End: 2024-05-31

## 2023-06-01 NOTE — PROGRESS NOTES
Subjective:       Patient ID: Juliette Seo is a 77 y.o. female.    Chief Complaint: Follow-up (Pt states she has a cough )    Pt is a 77 y.o. female who presents for evaluation and management of   Encounter Diagnoses   Name Primary?    Type 2 diabetes mellitus with hyperglycemia, with long-term current use of insulin Yes    Essential hypertension    .  Doing well on current meds. Denies any side effects. Prevention is up to date.  Review of Systems   Constitutional:  Negative for chills and fever.   Respiratory:  Negative for shortness of breath.    Cardiovascular:  Negative for chest pain and palpitations.   Gastrointestinal:  Negative for abdominal pain, blood in stool, constipation and nausea.   Genitourinary:  Negative for difficulty urinating.   Psychiatric/Behavioral:  Negative for dysphoric mood, sleep disturbance and suicidal ideas. The patient is not nervous/anxious.      Objective:      Physical Exam  Constitutional:       Appearance: She is well-developed.   HENT:      Head: Normocephalic and atraumatic.      Right Ear: External ear normal.      Left Ear: External ear normal.      Nose: Nose normal.   Eyes:      Pupils: Pupils are equal, round, and reactive to light.   Neck:      Thyroid: No thyromegaly.      Vascular: No JVD.      Trachea: No tracheal deviation.   Cardiovascular:      Rate and Rhythm: Normal rate.      Heart sounds: Normal heart sounds. No murmur heard.  Pulmonary:      Effort: Pulmonary effort is normal. No respiratory distress.      Breath sounds: Normal breath sounds. No wheezing or rales.   Chest:      Chest wall: No tenderness.   Abdominal:      General: Bowel sounds are normal. There is no distension.      Palpations: Abdomen is soft. There is no mass.      Tenderness: There is no abdominal tenderness. There is no guarding or rebound.   Musculoskeletal:         General: No tenderness. Normal range of motion.      Cervical back: Normal range of motion and neck supple.    Lymphadenopathy:      Cervical: No cervical adenopathy.   Skin:     General: Skin is warm and dry.      Coloration: Skin is not pale.      Findings: No erythema or rash.   Neurological:      Mental Status: She is alert and oriented to person, place, and time.      Cranial Nerves: No cranial nerve deficit.      Motor: No abnormal muscle tone.      Coordination: Coordination normal.      Deep Tendon Reflexes: Reflexes are normal and symmetric. Reflexes normal.   Psychiatric:         Behavior: Behavior normal.         Thought Content: Thought content normal.         Judgment: Judgment normal.       Assessment:       1. Type 2 diabetes mellitus with hyperglycemia, with long-term current use of insulin    2. Essential hypertension        Plan:   1. Type 2 diabetes mellitus with hyperglycemia, with long-term current use of insulin  Overview:  Lab Results   Component Value Date    HGBA1C 11.5 (H) 05/18/2023   increase lantus to 40Units  Trulicity 1.5   Adding metformin   farxiga   She will need medication adjustments but I need data----BS log for 2 weeks and come back         Orders:  -     blood-glucose meter kit; Check glucose BID  Dispense: 1 each; Refill: 0  -     blood sugar diagnostic (BLOOD GLUCOSE TEST) Strp; Check glucose BID  Dispense: 60 strip; Refill: 11  -     lancets Misc; Check glucose BID  Dispense: 100 each; Refill: 11  -     blood-glucose meter,continuous (DEXCOM G6 ) Misc; 1 Device by Misc.(Non-Drug; Combo Route) route 2 (two) times a day.  Dispense: 1 each; Refill: 5  -     blood-glucose transmitter (DEXCOM G6 TRANSMITTER) Felicitas; 1 Device by Misc.(Non-Drug; Combo Route) route 2 (two) times a day.  Dispense: 1 each; Refill: 11  -     blood-glucose sensor (DEXCOM G6 SENSOR) Felicitas; 1 Device by Misc.(Non-Drug; Combo Route) route 2 (two) times a day.  Dispense: 1 each; Refill: 11  -     blood-glucose sensor (DEXCOM G7 SENSOR) Felicitas; 1 Device by Misc.(Non-Drug; Combo Route) route 2 (two) times a day.   Dispense: 1 each; Refill: 11  -     benzonatate (TESSALON) 200 MG capsule; Take 1 capsule (200 mg total) by mouth 3 (three) times daily as needed for Cough.  Dispense: 30 capsule; Refill: 1  -     metFORMIN (GLUCOPHAGE) 500 MG tablet; Take 1 tablet (500 mg total) by mouth 2 (two) times daily with meals.  Dispense: 60 tablet; Refill: 5    2. Essential hypertension  Overview:  Now controlled Losartan HCTZ 100/12.5               No follow-ups on file.

## 2023-06-16 ENCOUNTER — PATIENT MESSAGE (OUTPATIENT)
Dept: PODIATRY | Facility: CLINIC | Age: 78
End: 2023-06-16
Payer: MEDICARE

## 2023-06-17 PROBLEM — R07.9 CHEST PAIN: Status: ACTIVE | Noted: 2023-06-17

## 2023-06-17 PROBLEM — R93.89 ABNORMAL CT OF THE CHEST: Status: ACTIVE | Noted: 2023-06-17

## 2023-06-26 ENCOUNTER — OFFICE VISIT (OUTPATIENT)
Dept: FAMILY MEDICINE | Facility: CLINIC | Age: 78
End: 2023-06-26
Payer: MEDICARE

## 2023-06-26 VITALS
OXYGEN SATURATION: 98 % | HEART RATE: 82 BPM | DIASTOLIC BLOOD PRESSURE: 84 MMHG | WEIGHT: 102.31 LBS | BODY MASS INDEX: 21.48 KG/M2 | HEIGHT: 58 IN | RESPIRATION RATE: 14 BRPM | SYSTOLIC BLOOD PRESSURE: 134 MMHG

## 2023-06-26 DIAGNOSIS — Z79.4 TYPE 2 DIABETES MELLITUS WITH HYPERGLYCEMIA, WITH LONG-TERM CURRENT USE OF INSULIN: Primary | ICD-10-CM

## 2023-06-26 DIAGNOSIS — R05.9 COUGH, UNSPECIFIED TYPE: ICD-10-CM

## 2023-06-26 DIAGNOSIS — E11.65 TYPE 2 DIABETES MELLITUS WITH HYPERGLYCEMIA, WITH LONG-TERM CURRENT USE OF INSULIN: Primary | ICD-10-CM

## 2023-06-26 DIAGNOSIS — Z91.148 NONCOMPLIANCE WITH MEDICATION REGIMEN: ICD-10-CM

## 2023-06-26 PROCEDURE — 99999 PR PBB SHADOW E&M-EST. PATIENT-LVL IV: CPT | Mod: PBBFAC,,, | Performed by: FAMILY MEDICINE

## 2023-06-26 PROCEDURE — 3288F PR FALLS RISK ASSESSMENT DOCUMENTED: ICD-10-PCS | Mod: CPTII,S$GLB,, | Performed by: FAMILY MEDICINE

## 2023-06-26 PROCEDURE — 1125F AMNT PAIN NOTED PAIN PRSNT: CPT | Mod: CPTII,S$GLB,, | Performed by: FAMILY MEDICINE

## 2023-06-26 PROCEDURE — 3075F SYST BP GE 130 - 139MM HG: CPT | Mod: CPTII,S$GLB,, | Performed by: FAMILY MEDICINE

## 2023-06-26 PROCEDURE — 3079F DIAST BP 80-89 MM HG: CPT | Mod: CPTII,S$GLB,, | Performed by: FAMILY MEDICINE

## 2023-06-26 PROCEDURE — 99214 OFFICE O/P EST MOD 30 MIN: CPT | Mod: PBBFAC | Performed by: FAMILY MEDICINE

## 2023-06-26 PROCEDURE — 99214 OFFICE O/P EST MOD 30 MIN: CPT | Mod: S$GLB,,, | Performed by: FAMILY MEDICINE

## 2023-06-26 PROCEDURE — 1101F PT FALLS ASSESS-DOCD LE1/YR: CPT | Mod: CPTII,S$GLB,, | Performed by: FAMILY MEDICINE

## 2023-06-26 PROCEDURE — 99214 PR OFFICE/OUTPT VISIT, EST, LEVL IV, 30-39 MIN: ICD-10-PCS | Mod: S$GLB,,, | Performed by: FAMILY MEDICINE

## 2023-06-26 PROCEDURE — 1125F PR PAIN SEVERITY QUANTIFIED, PAIN PRESENT: ICD-10-PCS | Mod: CPTII,S$GLB,, | Performed by: FAMILY MEDICINE

## 2023-06-26 PROCEDURE — 1159F PR MEDICATION LIST DOCUMENTED IN MEDICAL RECORD: ICD-10-PCS | Mod: CPTII,S$GLB,, | Performed by: FAMILY MEDICINE

## 2023-06-26 PROCEDURE — 3079F PR MOST RECENT DIASTOLIC BLOOD PRESSURE 80-89 MM HG: ICD-10-PCS | Mod: CPTII,S$GLB,, | Performed by: FAMILY MEDICINE

## 2023-06-26 PROCEDURE — 3288F FALL RISK ASSESSMENT DOCD: CPT | Mod: CPTII,S$GLB,, | Performed by: FAMILY MEDICINE

## 2023-06-26 PROCEDURE — 3075F PR MOST RECENT SYSTOLIC BLOOD PRESS GE 130-139MM HG: ICD-10-PCS | Mod: CPTII,S$GLB,, | Performed by: FAMILY MEDICINE

## 2023-06-26 PROCEDURE — 99999 PR PBB SHADOW E&M-EST. PATIENT-LVL IV: ICD-10-PCS | Mod: PBBFAC,,, | Performed by: FAMILY MEDICINE

## 2023-06-26 PROCEDURE — 1159F MED LIST DOCD IN RCRD: CPT | Mod: CPTII,S$GLB,, | Performed by: FAMILY MEDICINE

## 2023-06-26 PROCEDURE — 1101F PR PT FALLS ASSESS DOC 0-1 FALLS W/OUT INJ PAST YR: ICD-10-PCS | Mod: CPTII,S$GLB,, | Performed by: FAMILY MEDICINE

## 2023-06-26 RX ORDER — DAPAGLIFLOZIN 10 MG/1
10 TABLET, FILM COATED ORAL DAILY
COMMUNITY
Start: 2023-06-21 | End: 2023-08-18

## 2023-06-26 RX ORDER — BENZONATATE 200 MG/1
200 CAPSULE ORAL 3 TIMES DAILY PRN
Qty: 30 CAPSULE | Refills: 1 | Status: SHIPPED | OUTPATIENT
Start: 2023-06-26 | End: 2023-07-05 | Stop reason: SDUPTHER

## 2023-06-26 NOTE — PROGRESS NOTES
Subjective:       Patient ID: Juliette Seo is a 77 y.o. female.    Chief Complaint: Follow-up (Pt states she has been having a persistent cough)    Pt is a 77 y.o. female who presents for evaluation and management of   Encounter Diagnoses   Name Primary?    Type 2 diabetes mellitus with hyperglycemia, with long-term current use of insulin Yes    Noncompliance with medication regimen     Cough, unspecified type      Glucose is 170-300 since out of hospital   We think she is taking levemir 30? She is supposed to be on 40   She is not taking metformin or trulicity       Doing well on current meds. Denies any side effects. Prevention is up to date.  Review of Systems   Constitutional:  Negative for chills and fever.   Respiratory:  Negative for shortness of breath.    Cardiovascular:  Negative for chest pain and palpitations.   Gastrointestinal:  Negative for abdominal pain, blood in stool, constipation and nausea.   Endocrine:        No hypoglycemia      Genitourinary:  Negative for difficulty urinating.   Psychiatric/Behavioral:  Negative for dysphoric mood, sleep disturbance and suicidal ideas. The patient is not nervous/anxious.      Objective:      Physical Exam  Constitutional:       Appearance: She is well-developed.   HENT:      Head: Normocephalic and atraumatic.      Right Ear: External ear normal.      Left Ear: External ear normal.      Nose: Nose normal.   Eyes:      Pupils: Pupils are equal, round, and reactive to light.   Neck:      Thyroid: No thyromegaly.      Vascular: No JVD.      Trachea: No tracheal deviation.   Cardiovascular:      Rate and Rhythm: Normal rate.      Heart sounds: Normal heart sounds. No murmur heard.  Pulmonary:      Effort: Pulmonary effort is normal. No respiratory distress.      Breath sounds: Normal breath sounds. No wheezing or rales.   Chest:      Chest wall: No tenderness.   Abdominal:      General: Bowel sounds are normal. There is no distension.      Palpations: Abdomen  is soft. There is no mass.      Tenderness: There is no abdominal tenderness. There is no guarding or rebound.   Musculoskeletal:         General: No tenderness. Normal range of motion.      Cervical back: Normal range of motion and neck supple.   Lymphadenopathy:      Cervical: No cervical adenopathy.   Skin:     General: Skin is warm and dry.      Coloration: Skin is not pale.      Findings: No erythema or rash.   Neurological:      Mental Status: She is alert and oriented to person, place, and time.      Cranial Nerves: No cranial nerve deficit.      Motor: No abnormal muscle tone.      Coordination: Coordination normal.      Deep Tendon Reflexes: Reflexes are normal and symmetric. Reflexes normal.   Psychiatric:         Behavior: Behavior normal.         Thought Content: Thought content normal.         Judgment: Judgment normal.       Assessment:       1. Type 2 diabetes mellitus with hyperglycemia, with long-term current use of insulin    2. Noncompliance with medication regimen    3. Cough, unspecified type        Plan:   1. Type 2 diabetes mellitus with hyperglycemia, with long-term current use of insulin  Overview:  Lab Results   Component Value Date    HGBA1C 11.5 (H) 05/18/2023   increase levemir to 40Units  Trulicity 1.5   Adding metformin---500mg in am only   She will need medication adjustments but I need data----BS log for 2 weeks and come back         Orders:  -     Ambulatory referral/consult to Endocrinology; Future; Expected date: 07/03/2023    2. Noncompliance with medication regimen  Overview:  Working with her and her daughters are checking up on her, but she does have some mild dementia that is likely contributing to her noncompliance. Wrote all of her instructions jaspal n today. If we can't get her compliant she will have to have sitters or go to a NH         3. Cough, unspecified type  -     benzonatate (TESSALON) 200 MG capsule; Take 1 capsule (200 mg total) by mouth 3 (three) times daily as  needed for Cough.  Dispense: 30 capsule; Refill: 1      No follow-ups on file.

## 2023-07-04 NOTE — PROGRESS NOTES
Subjective:   Patient ID: Juliette Seo is a 77 y.o. female    Chief Complaint:   Chief Complaint   Patient presents with    Cough       Referred by Dr. Pallavi Sunkara    HPI    Juliette Seo is a 77 y.o. female who comes to pulmonary clinic for evaluation of possible interstitial lung disease due to abnormal CT chest. She has PMH of DMII, HTN, anxiety. She presents with her daughter today.She was previously seen by Dr. Edmondson in 2018 for bronchiectasis. CT chest was obtained on 6/17/023 while she was hospitalized for left sided chest pain.    She has a past medical history of Asthma, GERD (gastroesophageal reflux disease), High cholesterol, Hypertension, Kidney stone, Type 2 diabetes mellitus.    The patient reports dyspnea when she is laying down and  with exertion but not always.    The patient does cough. Cough is  productive of clear-yellow sputum. This has been going on for several months.   There is no leg swelling/edema. There is associated GERD. She does not take meds currently as she does not feel that it is affecting her. She has sinus with post nasal drip. She has the need to clear up her throat at times.    No chest pain. no pre/syncopy.     She has an albuterol inhaler. It does help when she uses it.    The patient does have a history of pulmonary illness or disease.   She had COVID-19 Pneumonia on Feb 27, 2021. She had abnormal CXR and CT chest since then. She had experienced shortness of breath, body aches and fevers. She received bamlanivimab followed by Remdesivir, Dexamethasone. She was positive for COVID again Dec 30, 2022     No myalgias/arthralgias.  Does have shoulder pain. No muscular weakness.  No rashes.  No changes in the appearance of hands and/or fingers.    The patient reports history of acid reflux or heartburn symptoms. There is no concern for aspiration. There are  frequent awakenings at night with choking, coughing, or burning chest sensations. There is no current history of  brash taste in throat, throat clearing, raspy voice. Pt does not use/require antacid medication.       Social Hx:  Tobacco: Former smoker, very limited. Quit 20+ years ago.  Born in: [facility] . Lived in [place] since 1960's.  Currently lives in House. The house is being renovated. She has an RV that she is staying in. There is structural damage.    Occupation: retired.   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 Cleaned with bleach and ammonia in the past, 10+ years ago.    Pets: one dog, around for many years.       Family Hx:  The patient reports no family history of pulmonary disease.      Labs reviewed:  ANGELINA,CCP,RF negative 5/17/22    IMAGING:    CT chest 6/17/23:  There are subpleural reticular opacities compatible with fibrotic changes/ILD, stable prior.  There is a small focal consolidation or atelectasis in the medial left upper lobe (series 3, image 226).    CT chest 5/1/21:  There are patchy bilateral peripheral bibasilar ground-glass opacities and peripheral reticulations.  Overall extent of findings appear improved from prior study of 03/04/2021.  No new large confluent airspace consolidation identified.  There is no significant volume of pleural fluid.    CT Chest 3/4/21:  There are bilateral symmetric peripheral and lower lobe predominant patchy ground-glass opacities, likely sequela of the patient's known history of COVID-19 pneumonia.  No pneumothorax or large pleural effusion.    CT of chest performed on 4/24/2018 without contrast revealed No parenchymal lung disease, perhaps mild bronchiectatic airways in the right middle lobe.    Pulmonary Function Test:    Pulmonary function tests: FEV1: 1.48  (100 % predicted), FVC:  1.93 (106 % predicted), FEV1/FVC:  77, DLCO: 13.5 (90 % predicted), NOrmal spirometry and diffusion        Objective:   Vitals reviewed   Physical Exam  Vitals  reviewed.   Constitutional:       Appearance: Normal appearance.   HENT:      Head: Normocephalic.      Mouth/Throat:      Mouth: Mucous membranes are moist.   Cardiovascular:      Rate and Rhythm: Normal rate and regular rhythm.   Pulmonary:      Effort: Pulmonary effort is normal.      Breath sounds: Normal breath sounds.      Comments: Mid exp squeak b/l  Neurological:      General: No focal deficit present.      Mental Status: She is alert and oriented to person, place, and time.       Assessment:     Problem List Items Addressed This Visit          Pulmonary    Bronchiectasis without complication    Relevant Orders    Complete PFT with bronchodilator    Ambulatory referral/consult to Advanced Lung Disease       Other    Abnormal CT of the chest    Relevant Orders    NEBULIZER FOR HOME USE    NEBULIZER KIT (SUPPLIES) FOR HOME USE     Other Visit Diagnoses       ILD (interstitial lung disease)    -  Primary    Relevant Orders    ANGELINA by IFA, w/Rflx    ANTI-SCLERODERMA ANTIBODY    ANTI -SSA ANTIBODY    ANTI-SSB ANTIBODY    ANTI-DNA ANTIBODY, DOUBLE-STRANDED    IGE (Completed)    Six Minute Walk Test to qualify for Home Oxygen    Cough, unspecified type        Relevant Medications    benzonatate (TESSALON) 200 MG capsule              Plan:         1. Cough, unspecified type  Chronic cough. Typically dry. Multifactorial - related to lung disease and post nasal drip  - benzonatate (TESSALON) 200 MG capsule; Take 1 capsule (200 mg total) by mouth 3 (three) times daily as needed for Cough.  Dispense: 30 capsule; Refill: 0  - resume Singulair qhs    2. ILD (interstitial lung disease)  B/l subpleural reticulation with no overt honey combing. Pattern is consistent with acute inflammation imaged during COVID-19 in March 2021. No clear evidence of disease prior to COVID.  - ANGELINA by IFA, w/Rflx; Future  - ANTI-SCLERODERMA ANTIBODY; Future  - ANTI -SSA ANTIBODY; Future  - ANTI-SSB ANTIBODY; Future  - ANTI-DNA ANTIBODY,  DOUBLE-STRANDED; Future  - IGE; Future  - Six Minute Walk Test to qualify for Home Oxygen; Future  - 3 weeks of prednisone ordered to help with cough and congestion. Patient with diabetes. Counseled on watching diet while taking steroids and monitoring glucose levels - she is on insulin  - Ambulatory referral/consult to Advanced Lung Disease; Future    3. Abnormal CT of the chest  - per #2    4. Bronchiectasis without complication  - albuterol solution  - NEBULIZER FOR HOME USE  - NEBULIZER KIT (SUPPLIES) FOR HOME USE  - Complete PFT with bronchodilator; Future    Follow up 3 months

## 2023-07-05 ENCOUNTER — OFFICE VISIT (OUTPATIENT)
Dept: PULMONOLOGY | Facility: CLINIC | Age: 78
End: 2023-07-05
Payer: MEDICARE

## 2023-07-05 ENCOUNTER — LAB VISIT (OUTPATIENT)
Dept: LAB | Facility: HOSPITAL | Age: 78
End: 2023-07-05
Attending: INTERNAL MEDICINE
Payer: MEDICARE

## 2023-07-05 VITALS
HEIGHT: 59 IN | DIASTOLIC BLOOD PRESSURE: 74 MMHG | WEIGHT: 104.5 LBS | HEART RATE: 88 BPM | OXYGEN SATURATION: 96 % | SYSTOLIC BLOOD PRESSURE: 134 MMHG | BODY MASS INDEX: 21.07 KG/M2

## 2023-07-05 DIAGNOSIS — R05.9 COUGH, UNSPECIFIED TYPE: ICD-10-CM

## 2023-07-05 DIAGNOSIS — J84.9 ILD (INTERSTITIAL LUNG DISEASE): Primary | ICD-10-CM

## 2023-07-05 DIAGNOSIS — R93.89 ABNORMAL CT OF THE CHEST: ICD-10-CM

## 2023-07-05 DIAGNOSIS — J47.9 BRONCHIECTASIS WITHOUT COMPLICATION: ICD-10-CM

## 2023-07-05 DIAGNOSIS — J84.9 ILD (INTERSTITIAL LUNG DISEASE): ICD-10-CM

## 2023-07-05 LAB — IGE SERPL-ACNC: 42 IU/ML (ref 0–100)

## 2023-07-05 PROCEDURE — 3078F PR MOST RECENT DIASTOLIC BLOOD PRESSURE < 80 MM HG: ICD-10-PCS | Mod: CPTII,S$GLB,, | Performed by: INTERNAL MEDICINE

## 2023-07-05 PROCEDURE — 3288F PR FALLS RISK ASSESSMENT DOCUMENTED: ICD-10-PCS | Mod: CPTII,S$GLB,, | Performed by: INTERNAL MEDICINE

## 2023-07-05 PROCEDURE — 99999 PR PBB SHADOW E&M-EST. PATIENT-LVL IV: ICD-10-PCS | Mod: PBBFAC,,, | Performed by: INTERNAL MEDICINE

## 2023-07-05 PROCEDURE — 3075F SYST BP GE 130 - 139MM HG: CPT | Mod: CPTII,S$GLB,, | Performed by: INTERNAL MEDICINE

## 2023-07-05 PROCEDURE — 86235 NUCLEAR ANTIGEN ANTIBODY: CPT | Performed by: INTERNAL MEDICINE

## 2023-07-05 PROCEDURE — 1101F PR PT FALLS ASSESS DOC 0-1 FALLS W/OUT INJ PAST YR: ICD-10-PCS | Mod: CPTII,S$GLB,, | Performed by: INTERNAL MEDICINE

## 2023-07-05 PROCEDURE — 1160F RVW MEDS BY RX/DR IN RCRD: CPT | Mod: CPTII,S$GLB,, | Performed by: INTERNAL MEDICINE

## 2023-07-05 PROCEDURE — 99204 OFFICE O/P NEW MOD 45 MIN: CPT | Mod: S$GLB,,, | Performed by: INTERNAL MEDICINE

## 2023-07-05 PROCEDURE — 99999 PR PBB SHADOW E&M-EST. PATIENT-LVL IV: CPT | Mod: PBBFAC,,, | Performed by: INTERNAL MEDICINE

## 2023-07-05 PROCEDURE — 86235 NUCLEAR ANTIGEN ANTIBODY: CPT | Mod: 59 | Performed by: INTERNAL MEDICINE

## 2023-07-05 PROCEDURE — 86225 DNA ANTIBODY NATIVE: CPT | Performed by: INTERNAL MEDICINE

## 2023-07-05 PROCEDURE — 1101F PT FALLS ASSESS-DOCD LE1/YR: CPT | Mod: CPTII,S$GLB,, | Performed by: INTERNAL MEDICINE

## 2023-07-05 PROCEDURE — 3075F PR MOST RECENT SYSTOLIC BLOOD PRESS GE 130-139MM HG: ICD-10-PCS | Mod: CPTII,S$GLB,, | Performed by: INTERNAL MEDICINE

## 2023-07-05 PROCEDURE — 86038 ANTINUCLEAR ANTIBODIES: CPT | Performed by: INTERNAL MEDICINE

## 2023-07-05 PROCEDURE — 1159F MED LIST DOCD IN RCRD: CPT | Mod: CPTII,S$GLB,, | Performed by: INTERNAL MEDICINE

## 2023-07-05 PROCEDURE — 1159F PR MEDICATION LIST DOCUMENTED IN MEDICAL RECORD: ICD-10-PCS | Mod: CPTII,S$GLB,, | Performed by: INTERNAL MEDICINE

## 2023-07-05 PROCEDURE — 82785 ASSAY OF IGE: CPT | Performed by: INTERNAL MEDICINE

## 2023-07-05 PROCEDURE — 3078F DIAST BP <80 MM HG: CPT | Mod: CPTII,S$GLB,, | Performed by: INTERNAL MEDICINE

## 2023-07-05 PROCEDURE — 1125F PR PAIN SEVERITY QUANTIFIED, PAIN PRESENT: ICD-10-PCS | Mod: CPTII,S$GLB,, | Performed by: INTERNAL MEDICINE

## 2023-07-05 PROCEDURE — 99204 PR OFFICE/OUTPT VISIT, NEW, LEVL IV, 45-59 MIN: ICD-10-PCS | Mod: S$GLB,,, | Performed by: INTERNAL MEDICINE

## 2023-07-05 PROCEDURE — 3288F FALL RISK ASSESSMENT DOCD: CPT | Mod: CPTII,S$GLB,, | Performed by: INTERNAL MEDICINE

## 2023-07-05 PROCEDURE — 1125F AMNT PAIN NOTED PAIN PRSNT: CPT | Mod: CPTII,S$GLB,, | Performed by: INTERNAL MEDICINE

## 2023-07-05 PROCEDURE — 1160F PR REVIEW ALL MEDS BY PRESCRIBER/CLIN PHARMACIST DOCUMENTED: ICD-10-PCS | Mod: CPTII,S$GLB,, | Performed by: INTERNAL MEDICINE

## 2023-07-05 RX ORDER — ALBUTEROL SULFATE 0.83 MG/ML
2.5 SOLUTION RESPIRATORY (INHALATION) EVERY 6 HOURS PRN
Qty: 60 ML | Refills: 2 | Status: SHIPPED | OUTPATIENT
Start: 2023-07-05 | End: 2024-07-04

## 2023-07-05 RX ORDER — BENZONATATE 200 MG/1
200 CAPSULE ORAL 3 TIMES DAILY PRN
Qty: 30 CAPSULE | Refills: 0 | Status: SHIPPED | OUTPATIENT
Start: 2023-07-05 | End: 2023-07-15

## 2023-07-05 RX ORDER — MONTELUKAST SODIUM 10 MG/1
10 TABLET ORAL NIGHTLY
Qty: 30 TABLET | Refills: 3 | Status: SHIPPED | OUTPATIENT
Start: 2023-07-05 | End: 2023-08-01

## 2023-07-05 RX ORDER — PREDNISONE 10 MG/1
10 TABLET ORAL DAILY
Qty: 28 TABLET | Refills: 0 | Status: SHIPPED | OUTPATIENT
Start: 2023-07-05 | End: 2023-08-18

## 2023-07-06 ENCOUNTER — TELEPHONE (OUTPATIENT)
Dept: TRANSPLANT | Facility: CLINIC | Age: 78
End: 2023-07-06
Payer: MEDICARE

## 2023-07-06 LAB
ANA SER-ACNC: NORMAL
DSDNA AB SER-ACNC: NORMAL [IU]/ML

## 2023-07-06 NOTE — TELEPHONE ENCOUNTER
"7/5/23: Received the plan of care from Dr. Maldonado. Called patient's daughter to discuss the plan but received no answer. Left a voice message requesting a return call.    7/6/23: Received a return call from the patient's daughter Felicia Seo. She agreed to the ALD referral ordered by Dr. Brush. Offered an appointment on 7/20/23 at 11:00 am since her mother will already be at [facility] for other appointments. Ms. Seo accepted this appointment for her mother. Provided directions to the location of each appointment. Ms. Seo verbalized her understanding all questions at this time were answered to her satisfaction.       7/5/23: DO Eloisa Berman RN  Appropriate for ALD.  Schedule for clinic after PFTs/6MWT.  Thanks           ----- Message -----   From: Eloisa Mathews RN   Sent: 7/5/2023  11:56 AM CDT   To: Yancy Maldonado DO   Subject: ALD Referral                                     [facility] (ALD) Clinic Referral Note     Referral from: Dr. Brush     Lung diagnosis: ILD, Bronchiectasis   H/O COVID - 19 pneumonia 2/27/21 - abnormal imaging noted since that time. Had COVID - 19 again 12/30/22     Age: 77 y.o.     Height/Weight/BMI:  4' 11" / 47.4 kg/ 21.11 kg/m²     Smoking history: Social History     Tobacco Use       Smoking status: Former       Smokeless tobacco: Never     PFT and 6 MWT scheduled for 7/20/23 per Dr. Brush     Chest CT (CTA) date: 06/17/2023   Impression:   1. No pulmonary embolism.   2. Continued chronic subpleural reticular opacities compatible with chronic fibrotic changes or ILD, similar to prior.   3. Nonspecific small focal consolidation or atelectasis in the medial left upper lobe.  Follow-up is recommended.     Echo date: None     Other pertinent medical history: Asthma, GERD (gastroesophageal reflux disease), High cholesterol, Hypertension, Kidney stone, Type 2 diabetes mellitus     Recommendations?        "

## 2023-07-07 LAB
ANTI-SSA ANTIBODY: 0.07 RATIO (ref 0–0.99)
ANTI-SSA INTERPRETATION: NEGATIVE
ANTI-SSB ANTIBODY: 0.05 RATIO (ref 0–0.99)
ANTI-SSB INTERPRETATION: NEGATIVE
ENA SCL70 AB SER-ACNC: <0.6 U/ML

## 2023-07-20 ENCOUNTER — HOSPITAL ENCOUNTER (OUTPATIENT)
Dept: CARDIOLOGY | Facility: HOSPITAL | Age: 78
Discharge: HOME | End: 2023-07-20
Attending: PHYSICIAN ASSISTANT
Payer: MEDICARE

## 2023-07-20 ENCOUNTER — HOSPITAL ENCOUNTER (OUTPATIENT)
Dept: RADIOLOGY | Facility: HOSPITAL | Age: 78
Discharge: HOME | End: 2023-07-20
Attending: PHYSICIAN ASSISTANT
Payer: MEDICARE

## 2023-07-20 ENCOUNTER — HOSPITAL ENCOUNTER (OUTPATIENT)
Dept: PULMONOLOGY | Facility: CLINIC | Age: 78
Discharge: HOME | End: 2023-07-20
Payer: MEDICARE

## 2023-07-20 ENCOUNTER — SOCIAL WORK (OUTPATIENT)
Dept: TRANSPLANT | Facility: CLINIC | Age: 78
End: 2023-07-20
Payer: MEDICARE

## 2023-07-20 ENCOUNTER — OFFICE VISIT (OUTPATIENT)
Dept: TRANSPLANT | Facility: CLINIC | Age: 78
End: 2023-07-20
Payer: MEDICARE

## 2023-07-20 VITALS
HEIGHT: 56 IN | SYSTOLIC BLOOD PRESSURE: 140 MMHG | WEIGHT: 103.38 LBS | DIASTOLIC BLOOD PRESSURE: 74 MMHG | HEART RATE: 75 BPM | BODY MASS INDEX: 23.25 KG/M2

## 2023-07-20 VITALS — HEIGHT: 56 IN | WEIGHT: 103.38 LBS | BODY MASS INDEX: 23.25 KG/M2

## 2023-07-20 VITALS
WEIGHT: 103.38 LBS | DIASTOLIC BLOOD PRESSURE: 70 MMHG | HEART RATE: 85 BPM | BODY MASS INDEX: 23.25 KG/M2 | HEIGHT: 56 IN | OXYGEN SATURATION: 99 % | SYSTOLIC BLOOD PRESSURE: 156 MMHG

## 2023-07-20 DIAGNOSIS — J84.9 ILD (INTERSTITIAL LUNG DISEASE): ICD-10-CM

## 2023-07-20 DIAGNOSIS — J84.9 INTERSTITIAL PULMONARY DISEASE, UNSPECIFIED: ICD-10-CM

## 2023-07-20 DIAGNOSIS — J47.9 BRONCHIECTASIS WITHOUT COMPLICATION: ICD-10-CM

## 2023-07-20 DIAGNOSIS — J30.2 SEASONAL ALLERGIC RHINITIS, UNSPECIFIED TRIGGER: ICD-10-CM

## 2023-07-20 DIAGNOSIS — J84.9 INTERSTITIAL PULMONARY DISEASE, UNSPECIFIED: Primary | ICD-10-CM

## 2023-07-20 DIAGNOSIS — K21.9 GASTROESOPHAGEAL REFLUX DISEASE, UNSPECIFIED WHETHER ESOPHAGITIS PRESENT: ICD-10-CM

## 2023-07-20 LAB
ASCENDING AORTA: 3.11 CM
AV INDEX (PROSTH): 0.77
AV MEAN GRADIENT: 7 MMHG
AV PEAK GRADIENT: 14 MMHG
AV VALVE AREA: 1.77 CM2
AV VELOCITY RATIO: 0.65
BSA FOR ECHO PROCEDURE: 1.36 M2
CV ECHO LV RWT: 0.41 CM
DLCO SINGLE BREATH LLN: 9.56
DLCO SINGLE BREATH PRE REF: 81.5 %
DLCO SINGLE BREATH REF: 15.29
DLCOC SBVA LLN: 2.27
DLCOC SBVA REF: 4.25
DLCOC SINGLE BREATH LLN: 9.56
DLCOC SINGLE BREATH REF: 15.29
DLCOCSBVAULN: 6.23
DLCOCSINGLEBREATHULN: 21.03
DLCOSINGLEBREATHULN: 21.03
DLCOVA LLN: 2.27
DLCOVA PRE REF: 111.7 %
DLCOVA PRE: 4.75 ML/(MIN*MMHG*L) (ref 2.27–6.23)
DLCOVA REF: 4.25
DLCOVAULN: 6.23
DOP CALC AO PEAK VEL: 1.84 M/S
DOP CALC AO VTI: 27.54 CM
DOP CALC LVOT AREA: 2.3 CM2
DOP CALC LVOT DIAMETER: 1.71 CM
DOP CALC LVOT PEAK VEL: 1.19 M/S
DOP CALC LVOT STROKE VOLUME: 48.64 CM3
DOP CALCLVOT PEAK VEL VTI: 21.19 CM
E WAVE DECELERATION TIME: 180 MSEC
E/A RATIO: 0.55
E/E' RATIO: 11 M/S
ECHO LV POSTERIOR WALL: 0.81 CM (ref 0.6–1.1)
EJECTION FRACTION: 60 %
ERV LLN: -16449.54
ERV PRE REF: 213.6 %
ERV REF: 0.46
ERVULN: ABNORMAL
FEF 25 75 LLN: 0.57
FEF 25 75 PRE REF: 150.6 %
FEF 25 75 REF: 1.34
FET100 CHG: 3.4 %
FEV05 LLN: 0.36
FEV05 REF: 1.22
FEV1 CHG: -1.2 %
FEV1 FVC LLN: 64
FEV1 FVC PRE REF: 106.7 %
FEV1 FVC REF: 78
FEV1 LLN: 1.06
FEV1 PRE REF: 106.3 %
FEV1 REF: 1.5
FEV1 VOL CHG: -0.02
FRACTIONAL SHORTENING: 37 % (ref 28–44)
FRCPLETH LLN: 1.44
FRCPLETH PREREF: 86 %
FRCPLETH REF: 2.26
FRCPLETHULN: 3.09
FVC CHG: -3.9 %
FVC LLN: 1.36
FVC PRE REF: 98.9 %
FVC REF: 1.93
FVC VOL CHG: -0.07
INTERVENTRICULAR SEPTUM: 0.8 CM (ref 0.6–1.1)
IVC PRE: 1.77 L (ref 1.36–2.52)
IVC SINGLE BREATH LLN: 1.36
IVC SINGLE BREATH PRE REF: 91.9 %
IVC SINGLE BREATH REF: 1.93
IVCSINGLEBREATHULN: 2.52
LA MAJOR: 3.69 CM
LA MINOR: 4.41 CM
LA WIDTH: 3.09 CM
LEFT ATRIUM SIZE: 3.36 CM
LEFT ATRIUM VOLUME INDEX MOD: 15.5 ML/M2
LEFT ATRIUM VOLUME INDEX: 26.5 ML/M2
LEFT ATRIUM VOLUME MOD: 20.82 CM3
LEFT ATRIUM VOLUME: 35.46 CM3
LEFT INTERNAL DIMENSION IN SYSTOLE: 2.52 CM (ref 2.1–4)
LEFT VENTRICLE DIASTOLIC VOLUME INDEX: 47.66 ML/M2
LEFT VENTRICLE DIASTOLIC VOLUME: 63.87 ML
LEFT VENTRICLE MASS INDEX: 70 G/M2
LEFT VENTRICLE SYSTOLIC VOLUME INDEX: 17 ML/M2
LEFT VENTRICLE SYSTOLIC VOLUME: 22.76 ML
LEFT VENTRICULAR INTERNAL DIMENSION IN DIASTOLE: 4 CM (ref 3.5–6)
LEFT VENTRICULAR MASS: 94.25 G
LLN IC: -16448.96
LV LATERAL E/E' RATIO: 11 M/S
LV SEPTAL E/E' RATIO: 11 M/S
MV A" WAVE DURATION": 7.04 MSEC
MV PEAK A VEL: 1.19 M/S
MV PEAK E VEL: 0.66 M/S
MV STENOSIS PRESSURE HALF TIME: 52.2 MS
MV VALVE AREA P 1/2 METHOD: 4.21 CM2
PEF LLN: 1.88
PEF PRE REF: 177.4 %
PEF REF: 3.42
PHYSICIAN COMMENT: ABNORMAL
POST FEF 25 75: 2.49 L/S (ref 0.57–2.48)
POST FET 100: 6.28 SEC
POST FEV1 FVC: 85.99 % (ref 63.81–90.96)
POST FEV1: 1.57 L (ref 1.06–1.92)
POST FEV5: 1.39 L (ref 0.36–2.07)
POST FVC: 1.83 L (ref 1.36–2.52)
POST PEF: 4.19 L/S (ref 1.88–4.96)
PRE DLCO: 12.47 ML/(MIN*MMHG) (ref 9.56–21.03)
PRE ERV: 0.98 L (ref -16449.54–16450.46)
PRE FEF 25 75: 2.02 L/S (ref 0.57–2.48)
PRE FET 100: 6.07 SEC
PRE FEV05 REF: 115.1 %
PRE FEV1 FVC: 83.64 % (ref 63.81–90.96)
PRE FEV1: 1.59 L (ref 1.06–1.92)
PRE FEV5: 1.4 L (ref 0.36–2.07)
PRE FRC PL: 1.95 L (ref 1.44–3.09)
PRE FVC: 1.91 L (ref 1.36–2.52)
PRE IC: 0.94 L (ref -16448.96–16451.04)
PRE PEF: 6.07 L/S (ref 1.88–4.96)
PRE REF IC: 90.5 %
PRE RV: 0.97 L (ref 1.23–2.38)
PRE TLC: 2.89 L (ref 2.61–4.58)
PULM VEIN S/D RATIO: 1.27
PV PEAK D VEL: 0.48 M/S
PV PEAK S VEL: 0.61 M/S
RA MAJOR: 3.39 CM
RA PRESSURE: 3 MMHG
RA WIDTH: 3.12 CM
RAW PRE REF: 131.6 %
RAW PRE: 4.03 CMH2O*S/L (ref 3.06–3.06)
RAW REF: 3.06
REF IC: 1.04
RIGHT VENTRICULAR END-DIASTOLIC DIMENSION: 3.08 CM
RV LLN: 1.23
RV PRE REF: 53.7 %
RV REF: 1.81
RVTLC LLN: 36
RVTLC PRE REF: 74.4 %
RVTLC PRE: 33.56 % (ref 35.55–54.73)
RVTLC REF: 45
RVTLCULN: 55
RVULN: 2.38
SGAW PRE REF: 109.9 %
SGAW PRE: 0.11 1/(CMH2O*S) (ref 0.1–0.1)
SGAW REF: 0.1
SINUS: 2.58 CM
STJ: 2.15 CM
TDI LATERAL: 0.06 M/S
TDI SEPTAL: 0.06 M/S
TDI: 0.06 M/S
TLC LLN: 2.61
TLC PRE REF: 80.3 %
TLC REF: 3.6
TLC ULN: 4.58
TRICUSPID ANNULAR PLANE SYSTOLIC EXCURSION: 1.76 CM
ULN IC: ABNORMAL
VA PRE: 2.63 L (ref 3.45–3.45)
VA SINGLE BREATH LLN: 3.45
VA SINGLE BREATH PRE REF: 76.2 %
VA SINGLE BREATH REF: 3.45
VASINGLEBREATHULN: 3.45
VC LLN: 1.36
VC PRE REF: 99.6 %
VC PRE: 1.92 L (ref 1.36–2.52)
VC REF: 1.93
VC ULN: 2.52

## 2023-07-20 PROCEDURE — 93306 TTE W/DOPPLER COMPLETE: CPT | Mod: NTX

## 2023-07-20 PROCEDURE — 1160F RVW MEDS BY RX/DR IN RCRD: CPT | Mod: CPTII,NTX,S$GLB, | Performed by: PHYSICIAN ASSISTANT

## 2023-07-20 PROCEDURE — 1126F PR PAIN SEVERITY QUANTIFIED, NO PAIN PRESENT: ICD-10-PCS | Mod: CPTII,NTX,S$GLB, | Performed by: PHYSICIAN ASSISTANT

## 2023-07-20 PROCEDURE — 1101F PR PT FALLS ASSESS DOC 0-1 FALLS W/OUT INJ PAST YR: ICD-10-PCS | Mod: CPTII,NTX,S$GLB, | Performed by: PHYSICIAN ASSISTANT

## 2023-07-20 PROCEDURE — 3288F PR FALLS RISK ASSESSMENT DOCUMENTED: ICD-10-PCS | Mod: CPTII,NTX,S$GLB, | Performed by: PHYSICIAN ASSISTANT

## 2023-07-20 PROCEDURE — 93306 TTE W/DOPPLER COMPLETE: CPT | Mod: 26,NTX,, | Performed by: INTERNAL MEDICINE

## 2023-07-20 PROCEDURE — 99214 OFFICE O/P EST MOD 30 MIN: CPT | Mod: 25,NTX,S$GLB, | Performed by: PHYSICIAN ASSISTANT

## 2023-07-20 PROCEDURE — 3077F PR MOST RECENT SYSTOLIC BLOOD PRESSURE >= 140 MM HG: ICD-10-PCS | Mod: CPTII,NTX,S$GLB, | Performed by: PHYSICIAN ASSISTANT

## 2023-07-20 PROCEDURE — 94726 PLETHYSMOGRAPHY LUNG VOLUMES: CPT | Mod: NTX,S$GLB,, | Performed by: INTERNAL MEDICINE

## 2023-07-20 PROCEDURE — 3078F PR MOST RECENT DIASTOLIC BLOOD PRESSURE < 80 MM HG: ICD-10-PCS | Mod: CPTII,NTX,S$GLB, | Performed by: PHYSICIAN ASSISTANT

## 2023-07-20 PROCEDURE — 93306 ECHO (CUPID ONLY): ICD-10-PCS | Mod: 26,NTX,, | Performed by: INTERNAL MEDICINE

## 2023-07-20 PROCEDURE — 94618 PULMONARY STRESS TESTING: ICD-10-PCS | Mod: NTX,S$GLB,, | Performed by: INTERNAL MEDICINE

## 2023-07-20 PROCEDURE — 1101F PT FALLS ASSESS-DOCD LE1/YR: CPT | Mod: CPTII,NTX,S$GLB, | Performed by: PHYSICIAN ASSISTANT

## 2023-07-20 PROCEDURE — 99999 PR PBB SHADOW E&M-EST. PATIENT-LVL IV: CPT | Mod: PBBFAC,TXP,, | Performed by: PHYSICIAN ASSISTANT

## 2023-07-20 PROCEDURE — 1159F MED LIST DOCD IN RCRD: CPT | Mod: CPTII,NTX,S$GLB, | Performed by: PHYSICIAN ASSISTANT

## 2023-07-20 PROCEDURE — 94060 PR EVAL OF BRONCHOSPASM: ICD-10-PCS | Mod: NTX,S$GLB,, | Performed by: INTERNAL MEDICINE

## 2023-07-20 PROCEDURE — 71250 CT THORAX DX C-: CPT | Mod: 26,NTX,, | Performed by: STUDENT IN AN ORGANIZED HEALTH CARE EDUCATION/TRAINING PROGRAM

## 2023-07-20 PROCEDURE — 94618 PULMONARY STRESS TESTING: CPT | Mod: NTX,S$GLB,, | Performed by: INTERNAL MEDICINE

## 2023-07-20 PROCEDURE — 3078F DIAST BP <80 MM HG: CPT | Mod: CPTII,NTX,S$GLB, | Performed by: PHYSICIAN ASSISTANT

## 2023-07-20 PROCEDURE — 3288F FALL RISK ASSESSMENT DOCD: CPT | Mod: CPTII,NTX,S$GLB, | Performed by: PHYSICIAN ASSISTANT

## 2023-07-20 PROCEDURE — 71250 CT THORAX DX C-: CPT | Mod: TC,TXP

## 2023-07-20 PROCEDURE — 94729 PR C02/MEMBANE DIFFUSE CAPACITY: ICD-10-PCS | Mod: NTX,S$GLB,, | Performed by: INTERNAL MEDICINE

## 2023-07-20 PROCEDURE — 3077F SYST BP >= 140 MM HG: CPT | Mod: CPTII,NTX,S$GLB, | Performed by: PHYSICIAN ASSISTANT

## 2023-07-20 PROCEDURE — 94726 PULM FUNCT TST PLETHYSMOGRAP: ICD-10-PCS | Mod: NTX,S$GLB,, | Performed by: INTERNAL MEDICINE

## 2023-07-20 PROCEDURE — 99999 PR PBB SHADOW E&M-EST. PATIENT-LVL IV: ICD-10-PCS | Mod: PBBFAC,TXP,, | Performed by: PHYSICIAN ASSISTANT

## 2023-07-20 PROCEDURE — 1126F AMNT PAIN NOTED NONE PRSNT: CPT | Mod: CPTII,NTX,S$GLB, | Performed by: PHYSICIAN ASSISTANT

## 2023-07-20 PROCEDURE — 1160F PR REVIEW ALL MEDS BY PRESCRIBER/CLIN PHARMACIST DOCUMENTED: ICD-10-PCS | Mod: CPTII,NTX,S$GLB, | Performed by: PHYSICIAN ASSISTANT

## 2023-07-20 PROCEDURE — 71250 CT CHEST WITHOUT CONTRAST: ICD-10-PCS | Mod: 26,NTX,, | Performed by: STUDENT IN AN ORGANIZED HEALTH CARE EDUCATION/TRAINING PROGRAM

## 2023-07-20 PROCEDURE — 99214 PR OFFICE/OUTPT VISIT, EST, LEVL IV, 30-39 MIN: ICD-10-PCS | Mod: 25,NTX,S$GLB, | Performed by: PHYSICIAN ASSISTANT

## 2023-07-20 PROCEDURE — 94060 EVALUATION OF WHEEZING: CPT | Mod: NTX,S$GLB,, | Performed by: INTERNAL MEDICINE

## 2023-07-20 PROCEDURE — 1159F PR MEDICATION LIST DOCUMENTED IN MEDICAL RECORD: ICD-10-PCS | Mod: CPTII,NTX,S$GLB, | Performed by: PHYSICIAN ASSISTANT

## 2023-07-20 PROCEDURE — 94729 DIFFUSING CAPACITY: CPT | Mod: NTX,S$GLB,, | Performed by: INTERNAL MEDICINE

## 2023-07-20 RX ORDER — PANTOPRAZOLE SODIUM 40 MG/1
40 TABLET, DELAYED RELEASE ORAL DAILY
Qty: 30 TABLET | Refills: 11 | Status: SHIPPED | OUTPATIENT
Start: 2023-07-20 | End: 2024-07-19

## 2023-07-20 RX ORDER — AZELASTINE 1 MG/ML
1 SPRAY, METERED NASAL 2 TIMES DAILY
Qty: 30 ML | Refills: 3 | Status: SHIPPED | OUTPATIENT
Start: 2023-07-20 | End: 2024-07-19

## 2023-07-20 RX ORDER — FLUTICASONE PROPIONATE 50 MCG
1 SPRAY, SUSPENSION (ML) NASAL DAILY
Qty: 16 G | Refills: 3 | Status: SHIPPED | OUTPATIENT
Start: 2023-07-20 | End: 2024-01-06

## 2023-07-20 NOTE — LETTER
July 20, 2023        Clarence Brush  [address]  [facility] [address]  Phone: [phone]  Fax: [phone]             [facility]st [facility]  [address]  [facility] [address]  Phone: [phone]   Patient: Juliette Seo   MR Number: [id number]   YOB: 1945   Date of Visit: 7/20/2023       Dear Dr. Clarence Brush    Thank you for referring Juliette Seo to me for evaluation. Attached you will find relevant portions of my assessment and plan of care.    If you have questions, please do not hesitate to call me. I look forward to following Juliette Seo along with you.    Sincerely,    Rain Wilson PA-C    Enclosure    If you would like to receive this communication electronically, please contact [email or web address] or [phone] to request [employer] access.    [employer] is a tool which provides read-only access to select patient information with whom you have a relationship. Its easy to use and provides real time access to review your patients record including encounter summaries, notes, results, and demographic information.    If you feel you have received this communication in error or would no longer like to receive these types of communications, please e-mail [email or web address]

## 2023-07-20 NOTE — PROGRESS NOTES
[facility] INITIAL EVALUATION                                                                                                                                             Reason for Visit:  Evaluation for ALD    Referring Physician: Clarence Brush MD    History of Present Illness: Juliette Seo is a 77 y.o. female who is on 0L of oxygen.  She is on no assisted ventilation.  Her New York Heart Association Class is I and a Karnofsky score of 90% - Able to carry on normal activity: minor symptoms of disease. She is diabetic insulin dependent    Patient presents today for evaluation of ILD. Previously followed for bronchiectasis in 2018 by Dr. Edmondson, and most recently seen by Dr. Brush two weeks prior to today's visit. Patient states symptoms began to progress about three months ago. She noticed increased dry cough and dyspnea with exertion. At that time, she had discontinued her PPI per her PCP's recommendation. She notes increased reflux and cough since stopping her PPI. She has a history of COVID x 2. Required hospitalization in 2021. She briefly required oxygen during her admission, but was discharged on room air. She is not vaccinated for COVID. Received bamlanivimab, remdesivir, and dexamethasone. She tested positive again in 12/2022. She states she has been prescribed albuterol and is awaiting nebulizer supplies. She states she takes tessalon pearls for her cough, which provides some relief. Cough is intermittent in duration with no alleviating/exacerbation factors.     Patient was started on a prednisone taper after her visit with Dr. Brush on 7/5. She completed one week of prednisone 20 mg daily and is on prednisone 10 mg daily for two weeks. Per patient's daughter, she notes some improvement in patient's dyspnea and cough since starting steroids. Patient's daughter also reports patient has had poor appetite and early satiety for the last few months. She required hospitalization for left sided chest  pain 6/17.     Patient has minimal smoking history and quit over 20 years ago. No history of frequent infections during childhood/adolescence. Notes some increased rhinorrhea/post-nasal drip which occurs with seasonal changes. Grew up in [facility] and has lived in [place] since 1960. She has multiple occupational exposures to chemicals/metal/dust, absbestos, cleaning supplies, construction. 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 No birds for pets.    Patient denies history of rashes, myalgias, arthralgias, muscular weakness. Autoimmune workup thus far unrevealing. No symptoms of dysphagia, throat clearing, globus sensation.        Past Medical History:   Diagnosis Date    Allergy     Anxiety     Arthritis     osteo    Asthma     Diabetic retinopathy     GERD (gastroesophageal reflux disease)     High cholesterol     Hypertension     Kidney stone     Type 2 diabetes mellitus, uncontrolled, with neuropathy     Urinary tract infection     Vaginal infection        Past Surgical History:   Procedure Laterality Date    CHOLECYSTECTOMY      COLONOSCOPY N/A 6/8/2021    Procedure: COLONOSCOPY;  Surgeon: Rain Pop MD;  Location: [facility];  Service: Endoscopy;  Laterality: N/A;    COLONOSCOPY N/A 7/8/2021    Procedure: colonoscopy with single balloon scope;  Surgeon: Steffen Dueñas MD;  Location: [facility];  Service: Endoscopy;  Laterality: N/A;    ESOPHAGOGASTRODUODENOSCOPY N/A 6/8/2021    Procedure: EGD (ESOPHAGOGASTRODUODENOSCOPY);  Surgeon: Rain Pop MD;  Location: [facility];  Service: Endoscopy;  Laterality: N/A;    EYE SURGERY      HYSTERECTOMY      INCISIONAL HERNIA REPAIR  2003    ROTATOR CUFF REPAIR Right 11/07/2017    TRIGGER FINGER RELEASE Right 6/3/2022    Procedure: RELEASE, TRIGGER FINGER; right index, middle, ring;  Surgeon: Sarmad Wesley Jr., MD;  Location: [facility];  Service: Orthopedics;  Laterality: Right;       Allergies: Actos [pioglitazone], Darvocet a500 [propoxyphene  "n-acetaminophen], and Dilaudid [hydromorphone (bulk)]    Current Outpatient Medications   Medication Sig    acetaminophen (TYLENOL) 500 MG tablet Take 1 tablet (500 mg total) by mouth every 4 (four) hours as needed for Pain.    albuterol (PROVENTIL) 2.5 mg /3 mL (0.083 %) nebulizer solution Take 3 mLs (2.5 mg total) by nebulization every 6 (six) hours as needed for Wheezing. Rescue    albuterol (PROVENTIL/VENTOLIN HFA) 90 mcg/actuation inhaler Inhale 1-2 puffs into the lungs every 6 (six) hours as needed for Wheezing. Rescue    aspirin (ECOTRIN) 81 MG EC tablet Take 81 mg by mouth once daily.    atorvastatin (LIPITOR) 40 MG tablet Take 1 tablet (40 mg total) by mouth once daily.    cyclobenzaprine (FLEXERIL) 10 MG tablet Take 1 tablet 3 times a day by oral route as needed.    diclofenac sodium (VOLTAREN) 1 % Gel Apply 4 g topically 4 (four) times daily.    FARXIGA 10 mg tablet Take 10 mg by mouth.    insulin detemir U-100, Levemir, (LEVEMIR FLEXPEN) 100 unit/mL (3 mL) InPn pen Inject 20 Units into the skin 2 (two) times daily. HOLD INSULIN IF BLOOD SUGAR LEVEL <150 MG/DL    insulin syringe-needle U-100 (BD INSULIN SYRINGE ULTRA-FINE) 0.5 mL 31 gauge x 5/16" Syrg USE AS DIRECTED TWICE A DAY    lancets Misc Check glucose BID    losartan-hydrochlorothiazide 100-12.5 mg (HYZAAR) 100-12.5 mg Tab Take 1 tablet by mouth once daily.    metFORMIN (GLUCOPHAGE) 500 MG tablet Take 1 tablet (500 mg total) by mouth 2 (two) times daily with meals.    montelukast (SINGULAIR) 10 mg tablet Take 1 tablet (10 mg total) by mouth every evening.    predniSONE (DELTASONE) 10 MG tablet Take 1 tablet (10 mg total) by mouth once daily. Take 2 tablets (20 mg) daily for 7 days; then 1 tablet for 14 days    TRUEPLUS PEN NEEDLE 32 gauge x 5/32" Ndle TAKING INSULIN DAILY    TRULICITY 0.75 mg/0.5 mL pen injector Inject into the skin.     No current facility-administered medications for this visit.     Facility-Administered Medications Ordered in " Other Visits   Medication    triamcinolone acetonide injection 40 mg       Immunization History   Administered Date(s) Administered    Influenza (FLUAD) - Quadrivalent - Adjuvanted - PF *Preferred* (65+) 11/13/2020    Influenza - High Dose - PF (65 years and older) 09/01/2016, 12/01/2017, 10/04/2018, 10/24/2019    Influenza - Quadrivalent - High Dose - PF (65 years and older) 11/07/2021    Influenza - Quadrivalent - PF *Preferred* (6 months and older) 10/19/2015    Influenza - Trivalent (ADULT) 10/31/2012    Influenza Split 10/08/2014    Pneumococcal Conjugate - 13 Valent 10/19/2015    Pneumococcal Polysaccharide - 23 Valent 04/18/2019    Zoster Recombinant 12/26/2019, 09/12/2020     Family History:    Family History   Problem Relation Age of Onset    Stroke Mother     Diabetes Sister     Diabetes Brother     Kidney disease Neg Hx      Social History     Substance and Sexual Activity   Alcohol Use Yes    Comment: social      Social History     Substance and Sexual Activity   Drug Use No      Social History     Socioeconomic History    Marital status:    Tobacco Use    Smoking status: Former    Smokeless tobacco: Never   Substance and Sexual Activity    Alcohol use: Yes     Comment: social    Drug use: No    Sexual activity: Yes     Partners: Male     Birth control/protection: None     Social Determinants of Health     Financial Resource Strain: Low Risk     Difficulty of Paying Living Expenses: Not hard at all   Food Insecurity: No Food Insecurity    Worried About Running Out of Food in the Last Year: Never true    Ran Out of Food in the Last Year: Never true   Transportation Needs: No Transportation Needs    Lack of Transportation (Medical): No    Lack of Transportation (Non-Medical): No   Physical Activity: Unknown    Days of Exercise per Week: 3 days   Stress: Stress Concern Present    Feeling of Stress : To some extent   Social Connections: Unknown    Frequency of Communication with Friends and Family:  More than three times a week    Frequency of Social Gatherings with Friends and Family: Once a week    Active Member of Clubs or Organizations: No    Marital Status:    Housing Stability: Unknown    Unable to Pay for Housing in the Last Year: No    Unstable Housing in the Last Year: No     ROS  Vitals  There were no vitals taken for this visit.  Physical Exam    Labs:  Hospital Outpatient Visit on 07/20/2023   Component Date Value    Post FVC 07/20/2023 1.83     Post FEV5 07/20/2023 1.39     Post FEV1 07/20/2023 1.57     Post FEV1 FVC 07/20/2023 85.99     Post FEF 25 75 07/20/2023 2.49 (H)     Post PEF 07/20/2023 4.19     Post  07/20/2023 6.28     Pre DLCO 07/20/2023 12.47     DLCOVA PRE 07/20/2023 4.75     VA PRE 07/20/2023 2.63 (L)     IVC PRE 07/20/2023 1.77     Pre TLC 07/20/2023 2.89     VC PRE 07/20/2023 1.92     PRE IC 07/20/2023 0.94     Pre FRC PL 07/20/2023 1.95     Pre ERV 07/20/2023 0.98     Pre RV 07/20/2023 0.97 (L)     RVTLC PRE 07/20/2023 33.56 (L)     Raw PRE 07/20/2023 4.03 (H)     sGaw PRE 07/20/2023 0.11 (H)     Pre FVC 07/20/2023 1.91     PRE FEV5 07/20/2023 1.40     Pre FEV1 07/20/2023 1.59     Pre FEV1 FVC 07/20/2023 83.64     Pre FEF 25 75 07/20/2023 2.02     Pre PEF 07/20/2023 6.07 (H)     Pre  07/20/2023 6.07     FVC Ref 07/20/2023 1.93     FVC LLN 07/20/2023 1.36     FVC Pre Ref 07/20/2023 98.9     FEV05 REF 07/20/2023 1.22     FEV05 LLN 07/20/2023 0.36     PRE FEV05 REF 07/20/2023 115.1     FEV1 Ref 07/20/2023 1.50     FEV1 LLN 07/20/2023 1.06     FEV1 Pre Ref 07/20/2023 106.3     FEV1 FVC Ref 07/20/2023 78     FEV1 FVC LLN 07/20/2023 64     FEV1 FVC Pre Ref 07/20/2023 106.7     FEF 25 75 Ref 07/20/2023 1.34     FEF 25 75 LLN 07/20/2023 0.57     FEF 25 75 Pre Ref 07/20/2023 150.6     PEF Ref 07/20/2023 3.42     PEF LLN 07/20/2023 1.88     PEF Pre Ref 07/20/2023 177.4     FVC Chg 07/20/2023 -3.9     FEV1 Chg 07/20/2023 -1.2     [id number] Chg 07/20/2023 3.4     FVC VOL CHG  07/20/2023 -0.07     FEV1 VOL CHG 07/20/2023 -0.02     TLC Ref 07/20/2023 3.60     TLC LLN 07/20/2023 2.61     TLC ULN 07/20/2023 4.58     TLC Pre Ref 07/20/2023 80.3     VC Ref 07/20/2023 1.93     VC LLN 07/20/2023 1.36     VC ULN 07/20/2023 2.52     VC Pre Ref 07/20/2023 99.6     REF IC 07/20/2023 1.04     LLN IC 07/20/2023 -14394.96     ULN IC 07/20/2023 08442.04     PRE REF IC 07/20/2023 90.5     FRCpleth Ref 07/20/2023 2.26     FRCpleth LLN 07/20/2023 1.44     FRC PLETH ULN 07/20/2023 3.09     FRCpleth PreRef 07/20/2023 86.0     ERV Ref 07/20/2023 0.46     ERV LLN 07/20/2023 -55876.54     ERV ULN 07/20/2023 45317.46     ERV Pre Ref 07/20/2023 213.6     RV Ref 07/20/2023 1.81     RV LLN 07/20/2023 1.23     RV ULN 07/20/2023 2.38     RV Pre Ref 07/20/2023 53.7     RVTLC Ref 07/20/2023 45     RVTLC LLN 07/20/2023 36     RV TLC ULN 07/20/2023 55     RVTLC Pre Ref 07/20/2023 74.4     Raw Ref 07/20/2023 3.06     Raw Pre Ref 07/20/2023 131.6     sGaw Ref 07/20/2023 0.10     sGaw Pre Ref 07/20/2023 109.9     DLCO Single Breath Ref 07/20/2023 15.29     DLCO Single Breath LLN 07/20/2023 9.56     DLCO SINGLEBREATH ULN 07/20/2023 21.03     DLCO Single Breath Pre R* 07/20/2023 81.5     DLCOc Single Breath Ref 07/20/2023 15.29     DLCOc Single Breath LLN 07/20/2023 9.56     DLCOC SINGLEBREATH ULN 07/20/2023 21.03     DLCOVA Ref 07/20/2023 4.25     DLCOVA LLN 07/20/2023 2.27     DLCOVA ULN 07/20/2023 6.23     DLCOVA Pre Ref 07/20/2023 111.7     DLCOc SBVA Ref 07/20/2023 4.25     DLCOc SBVA LLN 07/20/2023 2.27     DLCOCSBVA ULN 07/20/2023 6.23     VA Single Breath Ref 07/20/2023 3.45     VA Single Breath LLN 07/20/2023 3.45     VA SINGLEBREATH ULN 07/20/2023 3.45     VA Single Breath Pre Ref 07/20/2023 76.2     IVC Single Breath Ref 07/20/2023 1.93     IVC Single Breath LLN 07/20/2023 1.36     IVC SINGLEBREATH ULN 07/20/2023 2.52     IVC Single Breath Pre Ref 07/20/2023 91.9        Pulmonary Function Tests 7/20/2023   FVC 1.91    FEV1 1.59   TLC (liters) 2.89   DLCO (ml/mmHg sec) 12.47   FVC% 98.9   FEV1% 106.3   FEF 25-75 2.02   FEF 25-75% 150.6   TLC% 80.3   RV 0.97   RV% 53.7   DLCO% 81.5     6MW 7/20/2023   6MWT Status completed without stopping   Patient Reported Dyspnea;Dizziness;Lightheadedness   Was O2 used? No   6MW Distance walked (feet) 1000   Distance walked (meters) 304.8   Did patient stop? No   Oxygen Saturation 98   Supplemental Oxygen Room Air   Heart Rate 91   Blood Pressure 163/71   Jas Dyspnea Rating  nothing at all   Oxygen Saturation 98   Supplemental Oxygen Room Air   Heart Rate 105   Blood Pressure 148/72   Jas Dyspnea Rating  moderate   Recovery Time (seconds) 75   Oxygen Saturation 98   Supplemental Oxygen Room Air   Heart Rate 93       Imaging:  Results for orders placed during the hospital encounter of 03/31/23    X-Ray Chest PA And Lateral    Narrative  EXAMINATION:  XR CHEST PA AND LATERAL    CLINICAL HISTORY:  Disorder of bone, unspecified    TECHNIQUE:  PA and lateral views of the chest were performed.    COMPARISON:  10/19/2022    FINDINGS:  Scattered interstitial thickening, similar to the prior exam.  No lung consolidation.The cardiomediastinal contour is within normal limits.  No pneumothorax.  No pleural effusions.    Impression  No acute findings.      Electronically signed by: Vince Tavares MD  Date:    03/31/2023  Time:    13:38    Results for orders placed during the hospital encounter of 10/08/18    DXA Bone Density Spine And Hip    Narrative  EXAMINATION:  DEXA BONE DENSITY SPINE HIP    CLINICAL HISTORY:  Other specified personal risk factors, not elsewhere classified    COMPARISON:  07/23/2015    FINDINGS:  The T score associated with the lumbar spine is -1.1 on the current examination.  It was -1.0 on the prior examination.  The T score associated with the left femoral neck is -1.5 on the current examination.  It was -1.2 on the prior examination.  The T score associated with the right femoral  neck is -1.2 on the current examination.  It was -0.9 on the prior examination.    IMPRESSION:    Osteopenia      Electronically signed by: David Santillan MD  Date:    10/08/2018  Time:    09:31    No valid procedures specified.  No results found for this or any previous visit.    No results found for this or any previous visit.    Results for orders placed during the hospital encounter of 10/08/19    CT Abdomen Pelvis With Contrast    Narrative  EXAMINATION:  CT ABDOMEN PELVIS WITH CONTRAST    CLINICAL HISTORY:  LLQ pain, suspect diverticulitis;    TECHNIQUE:  Low dose axial images, sagittal and coronal reformations were obtained from the lung bases to the pubic symphysis following the IV administration of 75 mL of Omnipaque 350    FINDINGS:  The visualized portion of the base of the lungs is significant for bilateral basilar atelectatic versus fibrotic change.  The visualized portion of the heart, stomach, and spleen are unremarkable.  There is fatty atrophy of the pancreas.  The gallbladder is absent.  There is diffuse fatty infiltration of the liver.  The adrenal glands and right kidney have a normal appearance.  There is a 2-3 mm left-sided renal stone.  There is no hydronephrosis or hydroureter.  The bladder is distended but otherwise unremarkable.  The uterus is possibly absent or atrophic.  The bowel is significant for diverticulosis coli.  There is wall thickening of the sigmoid colon adjacent to multiple diverticula with surrounding inflammation consistent with diverticulitis.  There is no significant surrounding inflammatory change.  There is no evidence of perforation or abscess formation.  The appendix has a normal appearance.  The visualized portion of the aorta is significant for moderate scattered plaque.  The osseous structures demonstrate degenerative change.    Impression  Findings consistent with acute diverticulitis of the sigmoid colon without perforation or abscess formation.    Left-sided  renal stone.      Electronically signed by: Florencio Padilla MD  Date:    10/09/2019  Time:    09:08    No results found for this or any previous visit.    No results found for this or any previous visit.  IMAGING:     CT chest 6/17/23:  There are subpleural reticular opacities compatible with fibrotic changes/ILD, stable prior.  There is a small focal consolidation or atelectasis in the medial left upper lobe (series 3, image 226).     CT chest 5/1/21:  There are patchy bilateral peripheral bibasilar ground-glass opacities and peripheral reticulations.  Overall extent of findings appear improved from prior study of 03/04/2021.  No new large confluent airspace consolidation identified.  There is no significant volume of pleural fluid.     CT Chest 3/4/21:  There are bilateral symmetric peripheral and lower lobe predominant patchy ground-glass opacities, likely sequela of the patient's known history of COVID-19 pneumonia.  No pneumothorax or large pleural effusion.     CT of chest performed on 4/24/2018 without contrast revealed No parenchymal lung disease, perhaps mild bronchiectatic airways in the right middle lobe.     Pulmonary Function Test:     Pulmonary function tests: FEV1: 1.48  (100 % predicted), FVC:  1.93 (106 % predicted), FEV1/FVC:  77, DLCO: 13.5 (90 % predicted), NOrmal spirometry and diffusion    Cardiodiagnostics:          Assessment:  1. Interstitial pulmonary disease, unspecified    2. Bronchiectasis without complication    3. Gastroesophageal reflux disease, unspecified whether esophagitis present    4. Seasonal allergic rhinitis, unspecified trigger      Plan:     Spirometry preserved. No ventilatory defect. Her PFTs have remained stable since 2018. CT chest with subpleural reticular opacities, bronchiectasis, and patchy areas of GGOs. Improved from previous. Given new focal consolidation in [place], will repeat CT chest today. Does not have evidence of exertional hypoxemia.     Continue steroid taper.  Will repeat PFTs in 4 weeks. TTE and CT chest ordered.     Restart PPI given worsening reflux.    Provided astelin and flonase for allergic rhinitis.     RTC in 4 weeks to assess for clinical improvement s/p steroids. Repeat PFTs at that time.       Rain Wilson PA-C  [facility]

## 2023-07-20 NOTE — PROGRESS NOTES
ROBERTO faxed nebulizer orders and medical records to [facility] (ph: [phone], fx: [phone]). SW following and remains available.     7/21/2023 - ROBERTO confirmed bere [facility] that neb orders were received and processed. Pt to  equipment today. SW remains available.

## 2023-07-20 NOTE — PROCEDURES
Juliette Seo is a 77 y.o.  female patient, who presents for a 6 minute walk test ordered by MD Gonsalo.  The diagnosis is Bronchiectasis; Interstitial Lung Disease.  The patient's BMI is 23.2 kg/m2.  Predicted distance (lower limit of normal) is 284.32 meters.      Test Results:    The test was completed without stopping. The total time walked was 360 seconds. During walking, the patient reported:  Dyspnea, Dizziness, Lightheadedness. The patient used no assistive devices during testing.     07/20/2023---------Distance: 304.8 meters (1000 feet)     Lap Walk Time O2 Sat % Supplemental Oxygen Heart Rate Blood Pressure Jas Scale   Pre-exercise  (Resting) 0 0 98 % Room Air 91 bpm 163/71 mmHg 0   During Exercise 1 68 sec 98 % Room Air 109 bpm     During Exercise 2 142 sec 96 % Room Air 107 bpm     During Exercise 3 204 sec 99 % Room Air 106 bpm     During Exercise 4 293 sec 98 % Room Air 107 bpm     End of Exercise 5 360 sec 98 % Room Air 105 bpm 148/72 mmHg 3   Post-exercise  (Recovery)   98 % Room Air  93 bpm       Recovery Time: 75 seconds    Performing nurse/tech: Shivam FRAGOSO      PREVIOUS STUDY:   The patient has not had a previous study.      CLINICAL INTERPRETATION:  Six minute walk distance is 304.8 meters (1000 feet) with moderate dyspnea.  During exercise, there was no significant desaturation while breathing room air.  Both blood pressure and heart rate remained stable with walking.  Hypertension was present prior to exercise.  The patient reported non-pulmonary symptoms during exercise.  No previous study performed.  Based upon age and body mass index, exercise capacity is normal.

## 2023-07-24 ENCOUNTER — TELEPHONE (OUTPATIENT)
Dept: FAMILY MEDICINE | Facility: CLINIC | Age: 78
End: 2023-07-24
Payer: MEDICARE

## 2023-07-24 NOTE — TELEPHONE ENCOUNTER
----- Message from Ben Dutta sent at 2023 10:04 AM CDT -----  Contact: Daughter - Ashley Seo  MRN: [id number]  : 1945  PCP: Suman Rubi  Home Phone      [phone]  Work Phone      [phone]  Mobile          [phone]      MESSAGE: uneable to keep appt today --requesting to speak with Dr Rubi' nurse    Call Ashley @ [phone]    PCP: Greyson

## 2023-07-24 NOTE — TELEPHONE ENCOUNTER
Spoke to pt's daughter (Ashley), states due to transportation issues pt was unable to keep 2 week f/u appt for today. Scheduled next available late afternoon.     Pt was to f/u with BS log in 2 weeks for any needed medication adjustment. Advised Ashley to send BS log via [facility] to have provider review. States she will send log this afternoon.

## 2023-08-01 RX ORDER — MONTELUKAST SODIUM 10 MG/1
10 TABLET ORAL NIGHTLY
Qty: 90 TABLET | Refills: 1 | Status: SHIPPED | OUTPATIENT
Start: 2023-08-01

## 2023-08-07 ENCOUNTER — OFFICE VISIT (OUTPATIENT)
Dept: FAMILY MEDICINE | Facility: CLINIC | Age: 78
End: 2023-08-07
Payer: MEDICARE

## 2023-08-07 VITALS
SYSTOLIC BLOOD PRESSURE: 154 MMHG | RESPIRATION RATE: 16 BRPM | OXYGEN SATURATION: 98 % | DIASTOLIC BLOOD PRESSURE: 68 MMHG | BODY MASS INDEX: 23.58 KG/M2 | WEIGHT: 104.81 LBS | HEART RATE: 89 BPM | HEIGHT: 56 IN

## 2023-08-07 DIAGNOSIS — M85.89 OTHER SPECIFIED DISORDERS OF BONE DENSITY AND STRUCTURE, MULTIPLE SITES: Primary | ICD-10-CM

## 2023-08-07 DIAGNOSIS — Z79.4 TYPE 2 DIABETES MELLITUS WITH HYPERGLYCEMIA, WITH LONG-TERM CURRENT USE OF INSULIN: ICD-10-CM

## 2023-08-07 DIAGNOSIS — J84.9 ILD (INTERSTITIAL LUNG DISEASE): ICD-10-CM

## 2023-08-07 DIAGNOSIS — E11.65 TYPE 2 DIABETES MELLITUS WITH HYPERGLYCEMIA, WITH LONG-TERM CURRENT USE OF INSULIN: ICD-10-CM

## 2023-08-07 PROCEDURE — 99214 PR OFFICE/OUTPT VISIT, EST, LEVL IV, 30-39 MIN: ICD-10-PCS | Mod: S$GLB,,, | Performed by: FAMILY MEDICINE

## 2023-08-07 PROCEDURE — 99214 OFFICE O/P EST MOD 30 MIN: CPT | Mod: S$GLB,,, | Performed by: FAMILY MEDICINE

## 2023-08-07 PROCEDURE — 3078F DIAST BP <80 MM HG: CPT | Mod: CPTII,S$GLB,, | Performed by: FAMILY MEDICINE

## 2023-08-07 PROCEDURE — 99999 PR PBB SHADOW E&M-EST. PATIENT-LVL IV: CPT | Mod: PBBFAC,,, | Performed by: FAMILY MEDICINE

## 2023-08-07 PROCEDURE — 1101F PR PT FALLS ASSESS DOC 0-1 FALLS W/OUT INJ PAST YR: ICD-10-PCS | Mod: CPTII,S$GLB,, | Performed by: FAMILY MEDICINE

## 2023-08-07 PROCEDURE — 1159F PR MEDICATION LIST DOCUMENTED IN MEDICAL RECORD: ICD-10-PCS | Mod: CPTII,S$GLB,, | Performed by: FAMILY MEDICINE

## 2023-08-07 PROCEDURE — 1159F MED LIST DOCD IN RCRD: CPT | Mod: CPTII,S$GLB,, | Performed by: FAMILY MEDICINE

## 2023-08-07 PROCEDURE — 1126F AMNT PAIN NOTED NONE PRSNT: CPT | Mod: CPTII,S$GLB,, | Performed by: FAMILY MEDICINE

## 2023-08-07 PROCEDURE — 1101F PT FALLS ASSESS-DOCD LE1/YR: CPT | Mod: CPTII,S$GLB,, | Performed by: FAMILY MEDICINE

## 2023-08-07 PROCEDURE — 3288F FALL RISK ASSESSMENT DOCD: CPT | Mod: CPTII,S$GLB,, | Performed by: FAMILY MEDICINE

## 2023-08-07 PROCEDURE — 3077F SYST BP >= 140 MM HG: CPT | Mod: CPTII,S$GLB,, | Performed by: FAMILY MEDICINE

## 2023-08-07 PROCEDURE — 1126F PR PAIN SEVERITY QUANTIFIED, NO PAIN PRESENT: ICD-10-PCS | Mod: CPTII,S$GLB,, | Performed by: FAMILY MEDICINE

## 2023-08-07 PROCEDURE — 3288F PR FALLS RISK ASSESSMENT DOCUMENTED: ICD-10-PCS | Mod: CPTII,S$GLB,, | Performed by: FAMILY MEDICINE

## 2023-08-07 PROCEDURE — 3077F PR MOST RECENT SYSTOLIC BLOOD PRESSURE >= 140 MM HG: ICD-10-PCS | Mod: CPTII,S$GLB,, | Performed by: FAMILY MEDICINE

## 2023-08-07 PROCEDURE — 3078F PR MOST RECENT DIASTOLIC BLOOD PRESSURE < 80 MM HG: ICD-10-PCS | Mod: CPTII,S$GLB,, | Performed by: FAMILY MEDICINE

## 2023-08-07 PROCEDURE — 99999 PR PBB SHADOW E&M-EST. PATIENT-LVL IV: ICD-10-PCS | Mod: PBBFAC,,, | Performed by: FAMILY MEDICINE

## 2023-08-07 RX ORDER — CALCIUM CITRATE/VITAMIN D3 200MG-6.25
1 TABLET ORAL 2 TIMES DAILY
COMMUNITY
Start: 2023-07-31

## 2023-08-07 NOTE — PROGRESS NOTES
Subjective:       Patient ID: Juliette Seo is a 77 y.o. female.    Chief Complaint: Follow-up (Pt has been having problems eating/Pt has concerns about weight loss /Pt daughter states pt has been feeling like people are following her and having anxiety attacks /Pt states she has persistent cough)    Pt is a 77 y.o. female who presents for evaluation and management of   Encounter Diagnoses   Name Primary?    Other specified disorders of bone density and structure, multiple sites Yes    Type 2 diabetes mellitus with hyperglycemia, with long-term current use of insulin     ILD (interstitial lung disease)      Doing well on current meds. Denies any side effects. Prevention is up to date.    Review of Systems   Respiratory:  Positive for cough.    Endocrine:        No hypoglycemia   Her blood sugar log demonstrates low or controlled (reasonably) am readings with very high evening readings(when she would be due for her once a day levemir)          Objective:      Physical Exam  Constitutional:       Appearance: She is well-developed.   HENT:      Head: Normocephalic and atraumatic.      Right Ear: External ear normal.      Left Ear: External ear normal.      Nose: Nose normal.   Eyes:      Pupils: Pupils are equal, round, and reactive to light.   Neck:      Thyroid: No thyromegaly.      Vascular: No JVD.      Trachea: No tracheal deviation.   Cardiovascular:      Rate and Rhythm: Normal rate.      Heart sounds: Normal heart sounds. No murmur heard.  Pulmonary:      Effort: Pulmonary effort is normal. No respiratory distress.      Breath sounds: Normal breath sounds. No wheezing or rales.   Chest:      Chest wall: No tenderness.   Abdominal:      General: Bowel sounds are normal. There is no distension.      Palpations: Abdomen is soft. There is no mass.      Tenderness: There is no abdominal tenderness. There is no guarding or rebound.   Musculoskeletal:         General: No tenderness. Normal range of motion.       Cervical back: Normal range of motion and neck supple.   Lymphadenopathy:      Cervical: No cervical adenopathy.   Skin:     General: Skin is warm and dry.      Coloration: Skin is not pale.      Findings: No erythema or rash.   Neurological:      Mental Status: She is alert and oriented to person, place, and time.      Cranial Nerves: No cranial nerve deficit.      Motor: No abnormal muscle tone.      Coordination: Coordination normal.      Deep Tendon Reflexes: Reflexes are normal and symmetric. Reflexes normal.   Psychiatric:         Behavior: Behavior normal.         Thought Content: Thought content normal.         Judgment: Judgment normal.         Assessment:       1. Other specified disorders of bone density and structure, multiple sites    2. Type 2 diabetes mellitus with hyperglycemia, with long-term current use of insulin    3. ILD (interstitial lung disease)        Plan:   1. Other specified disorders of bone density and structure, multiple sites  -     DXA Bone Density Axial Skeleton 1 or more sites; Future; Expected date: 08/07/2023    2. Type 2 diabetes mellitus with hyperglycemia, with long-term current use of insulin  Overview:  Lab Results   Component Value Date    HGBA1C 11.5 (H) 05/18/2023   increase levemir 50 Units but divide it into 25U q 12.   Trulicity 1.5   Adding metformin---500mg in am only   Has appt with Dr. Pedraza for 8/18         Orders:  -     Comprehensive Metabolic Panel; Future; Expected date: 11/05/2023  -     Hemoglobin A1C; Future; Expected date: 11/05/2023    3. ILD (interstitial lung disease)  Overview:  She follows with pul [facility]   Will occasionally receive courses of steroids. Told her she needs to notify us when this happens so we can put her on [facility]         RTC 3 months with labs     No follow-ups on file.

## 2023-08-10 ENCOUNTER — PATIENT MESSAGE (OUTPATIENT)
Dept: FAMILY MEDICINE | Facility: CLINIC | Age: 78
End: 2023-08-10
Payer: MEDICARE

## 2023-08-10 RX ORDER — INSULIN DETEMIR 100 [IU]/ML
20 INJECTION, SOLUTION SUBCUTANEOUS 2 TIMES DAILY
Refills: 0
Start: 2023-08-10 | End: 2023-08-11 | Stop reason: SDUPTHER

## 2023-08-10 NOTE — TELEPHONE ENCOUNTER
No care due was identified.  [facility] Embedded Care Due Messages. Reference number: [id number].   8/10/2023 3:59:57 PM CDT

## 2023-08-10 NOTE — TELEPHONE ENCOUNTER
Dr Rubi:  My mom doesn't have any more refills on her insuline LEVEMIR FLEXPEN 100 unit/mL (3 mL) Inpn pen. Also, you changed her in take to 25 twice a day. Can you please send her a new prescription in today? But you need to increase it to 25 u (increase levemir 50 Units but divide it into 25U q 12.)med pended

## 2023-08-11 RX ORDER — INSULIN DETEMIR 100 [IU]/ML
25 INJECTION, SOLUTION SUBCUTANEOUS 2 TIMES DAILY
Qty: 15 ML | Refills: 0 | Status: SHIPPED | OUTPATIENT
Start: 2023-08-11 | End: 2023-09-11

## 2023-08-11 NOTE — TELEPHONE ENCOUNTER
LOV: 8/07/2023    Pt requesting refill of LEVEMIR FLEXPEN. Pt is out of medications for two days waiting on refill.     Medication is pended     Please advise    Message from previous note:     My mom doesn't have any more refills on her insuline LEVEMIR FLEXPEN 100 unit/mL (3 mL) Inpn pen. Also, you changed her in take to 25 twice a day. Can you please send her a new prescription in today? But you need to increase it to 25 u (increase levemir 50 Units but divide it into 25U q 12.)med pended

## 2023-08-11 NOTE — TELEPHONE ENCOUNTER
No care due was identified.  [facility] Embedded Care Due Messages. Reference number: [id number].   8/11/2023 4:23:10 PM CDT

## 2023-08-18 ENCOUNTER — OFFICE VISIT (OUTPATIENT)
Dept: ENDOCRINOLOGY | Facility: CLINIC | Age: 78
End: 2023-08-18
Payer: MEDICARE

## 2023-08-18 VITALS
HEART RATE: 100 BPM | DIASTOLIC BLOOD PRESSURE: 70 MMHG | SYSTOLIC BLOOD PRESSURE: 124 MMHG | HEIGHT: 56 IN | OXYGEN SATURATION: 99 % | RESPIRATION RATE: 12 BRPM | BODY MASS INDEX: 23.32 KG/M2 | WEIGHT: 103.69 LBS

## 2023-08-18 DIAGNOSIS — I25.10 CORONARY ARTERY DISEASE INVOLVING NATIVE CORONARY ARTERY WITHOUT ANGINA PECTORIS, UNSPECIFIED WHETHER NATIVE OR TRANSPLANTED HEART: ICD-10-CM

## 2023-08-18 DIAGNOSIS — E11.65 TYPE 2 DIABETES MELLITUS WITH HYPERGLYCEMIA, WITH LONG-TERM CURRENT USE OF INSULIN: Primary | ICD-10-CM

## 2023-08-18 DIAGNOSIS — Z79.4 TYPE 2 DIABETES MELLITUS WITH HYPERGLYCEMIA, WITH LONG-TERM CURRENT USE OF INSULIN: Primary | ICD-10-CM

## 2023-08-18 PROCEDURE — 1100F PR PT FALLS ASSESS DOC 2+ FALLS/FALL W/INJURY/YR: ICD-10-PCS | Mod: CPTII,NTX,S$GLB, | Performed by: STUDENT IN AN ORGANIZED HEALTH CARE EDUCATION/TRAINING PROGRAM

## 2023-08-18 PROCEDURE — 1126F AMNT PAIN NOTED NONE PRSNT: CPT | Mod: CPTII,NTX,S$GLB, | Performed by: STUDENT IN AN ORGANIZED HEALTH CARE EDUCATION/TRAINING PROGRAM

## 2023-08-18 PROCEDURE — 3078F PR MOST RECENT DIASTOLIC BLOOD PRESSURE < 80 MM HG: ICD-10-PCS | Mod: CPTII,NTX,S$GLB, | Performed by: STUDENT IN AN ORGANIZED HEALTH CARE EDUCATION/TRAINING PROGRAM

## 2023-08-18 PROCEDURE — 3288F FALL RISK ASSESSMENT DOCD: CPT | Mod: CPTII,NTX,S$GLB, | Performed by: STUDENT IN AN ORGANIZED HEALTH CARE EDUCATION/TRAINING PROGRAM

## 2023-08-18 PROCEDURE — 3074F PR MOST RECENT SYSTOLIC BLOOD PRESSURE < 130 MM HG: ICD-10-PCS | Mod: CPTII,NTX,S$GLB, | Performed by: STUDENT IN AN ORGANIZED HEALTH CARE EDUCATION/TRAINING PROGRAM

## 2023-08-18 PROCEDURE — 1159F PR MEDICATION LIST DOCUMENTED IN MEDICAL RECORD: ICD-10-PCS | Mod: CPTII,NTX,S$GLB, | Performed by: STUDENT IN AN ORGANIZED HEALTH CARE EDUCATION/TRAINING PROGRAM

## 2023-08-18 PROCEDURE — 1159F MED LIST DOCD IN RCRD: CPT | Mod: CPTII,NTX,S$GLB, | Performed by: STUDENT IN AN ORGANIZED HEALTH CARE EDUCATION/TRAINING PROGRAM

## 2023-08-18 PROCEDURE — 99214 PR OFFICE/OUTPT VISIT, EST, LEVL IV, 30-39 MIN: ICD-10-PCS | Mod: NTX,S$GLB,, | Performed by: STUDENT IN AN ORGANIZED HEALTH CARE EDUCATION/TRAINING PROGRAM

## 2023-08-18 PROCEDURE — 99214 OFFICE O/P EST MOD 30 MIN: CPT | Mod: NTX,S$GLB,, | Performed by: STUDENT IN AN ORGANIZED HEALTH CARE EDUCATION/TRAINING PROGRAM

## 2023-08-18 PROCEDURE — 1126F PR PAIN SEVERITY QUANTIFIED, NO PAIN PRESENT: ICD-10-PCS | Mod: CPTII,NTX,S$GLB, | Performed by: STUDENT IN AN ORGANIZED HEALTH CARE EDUCATION/TRAINING PROGRAM

## 2023-08-18 PROCEDURE — 99999 PR PBB SHADOW E&M-EST. PATIENT-LVL V: ICD-10-PCS | Mod: PBBFAC,TXP,, | Performed by: STUDENT IN AN ORGANIZED HEALTH CARE EDUCATION/TRAINING PROGRAM

## 2023-08-18 PROCEDURE — 1100F PTFALLS ASSESS-DOCD GE2>/YR: CPT | Mod: CPTII,NTX,S$GLB, | Performed by: STUDENT IN AN ORGANIZED HEALTH CARE EDUCATION/TRAINING PROGRAM

## 2023-08-18 PROCEDURE — 3288F PR FALLS RISK ASSESSMENT DOCUMENTED: ICD-10-PCS | Mod: CPTII,NTX,S$GLB, | Performed by: STUDENT IN AN ORGANIZED HEALTH CARE EDUCATION/TRAINING PROGRAM

## 2023-08-18 PROCEDURE — 99999 PR PBB SHADOW E&M-EST. PATIENT-LVL V: CPT | Mod: PBBFAC,TXP,, | Performed by: STUDENT IN AN ORGANIZED HEALTH CARE EDUCATION/TRAINING PROGRAM

## 2023-08-18 PROCEDURE — 3074F SYST BP LT 130 MM HG: CPT | Mod: CPTII,NTX,S$GLB, | Performed by: STUDENT IN AN ORGANIZED HEALTH CARE EDUCATION/TRAINING PROGRAM

## 2023-08-18 PROCEDURE — 3078F DIAST BP <80 MM HG: CPT | Mod: CPTII,NTX,S$GLB, | Performed by: STUDENT IN AN ORGANIZED HEALTH CARE EDUCATION/TRAINING PROGRAM

## 2023-08-18 RX ORDER — DULAGLUTIDE 0.75 MG/.5ML
0.75 INJECTION, SOLUTION SUBCUTANEOUS
Qty: 4 PEN | Refills: 11 | Status: SHIPPED | OUTPATIENT
Start: 2023-08-18 | End: 2023-10-02

## 2023-08-18 NOTE — ASSESSMENT & PLAN NOTE
Uncontrolled based on A1c and reported POCT glucose with significant glycemic variability.    Patient is on an intensive insulin regimen with 4 daily injections and is testing glucose 4+ times daily. Patient has demonstrated an understanding of technology and is motivated to use the device correctly. Patient is expected to adhere to a diabetes treatment plan and is capable of recognizing alerts and alarms. Patient has recurrent, severe (BG <54) hypoglycemia and impaired awareness of hypoglycemia.    Patient's insulin regimen requires frequent adjustments based on BG results. Patient will receive an in-person visit every 6 months to assess adherence to CGM regimen and diabetes treatment.    Reduce GLP-1 RA due to BMI 23. Continue low dose metformin. Change Levemir to only once daily overnight to avoid confusion.    Plan  - Reduce Trulicity 0.75 mg weekly  - Continue metformin 500 mg daily  - Decrease Levemir to 20 U HS  - Start FreeStyle Shola 3 CGM    Consider adding SGLT-2 inhibitor if persistent hyperglycemia    F/u 3 months with labs

## 2023-08-18 NOTE — PATIENT INSTRUCTIONS
Utilice Levemir 20 unidades con la erasmo    Reduzca Trulicity a 0.75 mg weekly    Continue metformin diaria

## 2023-08-18 NOTE — PROGRESS NOTES
Subjective:      Patient ID: Juliette Seo is a 77 y.o. female.    Chief Complaint:  Type 2 diabetes mellitus    History of Present Illness  This is a 77 y.o. female. with a past medical history of Type 2 diabetes mellitus, HTN, HLD here for evaluation.    Type 2 diabetes mellitus  Diagnosed around age 40    Current diabetes medications:  - Levemir 25 U BID - holds if glucose < 200  - Trulicity 1.5 mg weekly - was out for a few weeks due to back order, has lost a lot of weight  - Metformin 500 mg daily - tolerating okay, but fears side effects    Novolog SSI 4 times daily    INSULIN CORRECTION SCALE    Glucose                 Insulin           181-200               +2 units                     201-250               +4 units                      251-300               +6 units                     301-350               +8 units                      >350                    +10 units                       Past diabetes medications:  - Farxiga? - on list, but unsure    Lab Results   Component Value Date    CREATININE 0.50 06/17/2023    EGFRNORACEVR >60.0 06/17/2023       Known diabetic complications: none    Weight trend:  Wt Readings from Last 8 Encounters:   08/18/23 47 kg (103 lb 11.2 oz)   08/07/23 47.6 kg (104 lb 13.3 oz)   07/20/23 46.9 kg (103 lb 6.4 oz)   07/20/23 46.9 kg (103 lb 6.3 oz)   07/20/23 46.9 kg (103 lb 6.3 oz)   07/05/23 47.4 kg (104 lb 8 oz)   06/26/23 46.4 kg (102 lb 4.7 oz)   06/16/23 47.6 kg (105 lb)       Family history of diabetes:  Yes    Prior visit with diabetes education: Yes    Current diet: 3 meals per day    Blood glucose monitoring at home: 4x/day  Home blood sugar records:       Diabetes Management Status  Statin: Taking  ACE/ARB: Not taking    Screening or Prevention Patient's value   HgA1C Testing and Control   Lab Results   Component Value Date    HGBA1C 11.5 (H) 05/18/2023        LDL control Lab Results   Component Value Date    LDLCALC 93.8 05/18/2023      Nephropathy  "screening Lab Results   Component Value Date    MICALBCREAT 39.7 (H) 07/22/2021        Lab Results   Component Value Date    TSH 2.978 05/18/2023       Last eye exam: : 12/02/2020      Review of Systems  As above    Social and family history reviewed  Current medications and allergies reviewed    Objective:   /70 (BP Location: Left arm, Patient Position: Sitting, BP Method: Medium (Manual))   Pulse 100   Resp 12   Ht 4' 8" (1.422 m)   Wt 47 kg (103 lb 11.2 oz)   SpO2 99%   BMI 23.25 kg/m²   Physical Exam  Alert, oriented    BP Readings from Last 1 Encounters:   08/18/23 124/70      Wt Readings from Last 1 Encounters:   08/18/23 1124 47 kg (103 lb 11.2 oz)     Body mass index is 23.25 kg/m².    Lab Review:   Lab Results   Component Value Date    HGBA1C 11.5 (H) 05/18/2023     Lab Results   Component Value Date    CHOL 168 05/18/2023    HDL 32 (L) 05/18/2023    LDLCALC 93.8 05/18/2023    TRIG 211 (H) 05/18/2023    CHOLHDL 19.0 (L) 05/18/2023     Lab Results   Component Value Date     06/17/2023    K 3.6 06/17/2023     06/17/2023    CO2 23 06/17/2023     (H) 06/17/2023    BUN 11 06/17/2023    CREATININE 0.50 06/17/2023    CALCIUM 8.4 (L) 06/17/2023    PROT 7.8 06/16/2023    ALBUMIN 4.5 06/16/2023    BILITOT 0.5 06/16/2023    ALKPHOS 117 06/16/2023    AST 21 06/16/2023    ALT 21 06/16/2023    ANIONGAP 10 06/17/2023    ESTGFRAFRICA >60.0 07/13/2022    EGFRNONAA >60.0 07/13/2022    TSH 2.978 05/18/2023       All pertinent labs reviewed    Assessment and Plan     Type 2 diabetes mellitus with hyperglycemia, with long-term current use of insulin  Uncontrolled based on A1c and reported POCT glucose with significant glycemic variability.    Patient is on an intensive insulin regimen with 4 daily injections and is testing glucose 4+ times daily. Patient has demonstrated an understanding of technology and is motivated to use the device correctly. Patient is expected to adhere to a diabetes " treatment plan and is capable of recognizing alerts and alarms. Patient has recurrent, severe (BG <54) hypoglycemia and impaired awareness of hypoglycemia.    Patient's insulin regimen requires frequent adjustments based on BG results. Patient will receive an in-person visit every 6 months to assess adherence to CGM regimen and diabetes treatment.    Reduce GLP-1 RA due to BMI 23. Continue low dose metformin. Change Levemir to only once daily overnight to avoid confusion.    Plan  - Reduce Trulicity 0.75 mg weekly  - Continue metformin 500 mg daily  - Decrease Levemir to 20 U HS  - Start FreeStyle Shola 3 CGM    Consider adding SGLT-2 inhibitor if persistent hyperglycemia    F/u 3 months with labs    BMI 23.0-23.9, adult  Reduce GLP-1 RA dose, may need to switch to DPP-4 inhibitor if weight loss continues    Coronary artery disease involving native coronary artery without angina pectoris  Continue GLP-1 RA with CV benefit  Based on CGM add SGLT-2 inhibitor with CV benefit      Follow-up in 3 months    Adis Pedraza MD  Endocrinology

## 2023-08-21 ENCOUNTER — TELEPHONE (OUTPATIENT)
Dept: ENDOCRINOLOGY | Facility: CLINIC | Age: 78
End: 2023-08-21
Payer: MEDICARE

## 2023-08-21 NOTE — TELEPHONE ENCOUNTER
[employer] order form done on Art 08/21/23. Confirmation received.  Supplier-[facility]        ----- Message from Adis Pedraza MD sent at 8/18/2023 11:49 AM CDT -----  Regarding: Shola 3 orders to [facility]

## 2023-08-23 ENCOUNTER — PATIENT OUTREACH (OUTPATIENT)
Dept: ADMINISTRATIVE | Facility: HOSPITAL | Age: 78
End: 2023-08-23
Payer: MEDICARE

## 2023-08-23 DIAGNOSIS — E11.65 TYPE 2 DIABETES MELLITUS WITH HYPERGLYCEMIA, WITH LONG-TERM CURRENT USE OF INSULIN: Primary | ICD-10-CM

## 2023-08-23 DIAGNOSIS — Z79.4 TYPE 2 DIABETES MELLITUS WITH HYPERGLYCEMIA, WITH LONG-TERM CURRENT USE OF INSULIN: Primary | ICD-10-CM

## 2023-08-24 ENCOUNTER — HOSPITAL ENCOUNTER (OUTPATIENT)
Dept: PULMONOLOGY | Facility: CLINIC | Age: 78
Discharge: HOME | End: 2023-08-24
Payer: MEDICARE

## 2023-08-24 ENCOUNTER — OFFICE VISIT (OUTPATIENT)
Dept: TRANSPLANT | Facility: CLINIC | Age: 78
End: 2023-08-24
Payer: MEDICARE

## 2023-08-24 VITALS
DIASTOLIC BLOOD PRESSURE: 74 MMHG | WEIGHT: 103.63 LBS | HEART RATE: 102 BPM | OXYGEN SATURATION: 98 % | BODY MASS INDEX: 23.31 KG/M2 | HEIGHT: 56 IN | SYSTOLIC BLOOD PRESSURE: 165 MMHG

## 2023-08-24 DIAGNOSIS — K21.9 GASTROESOPHAGEAL REFLUX DISEASE, UNSPECIFIED WHETHER ESOPHAGITIS PRESENT: ICD-10-CM

## 2023-08-24 DIAGNOSIS — J84.9 INTERSTITIAL PULMONARY DISEASE, UNSPECIFIED: ICD-10-CM

## 2023-08-24 DIAGNOSIS — J47.9 BRONCHIECTASIS WITHOUT COMPLICATION: Primary | ICD-10-CM

## 2023-08-24 LAB
DLCO SINGLE BREATH LLN: 9.56
DLCO SINGLE BREATH PRE REF: 66.7 %
DLCO SINGLE BREATH REF: 15.29
DLCOC SBVA LLN: 2.27
DLCOC SBVA REF: 4.25
DLCOC SINGLE BREATH LLN: 9.56
DLCOC SINGLE BREATH REF: 15.29
DLCOCSBVAULN: 6.23
DLCOCSINGLEBREATHULN: 21.03
DLCOSINGLEBREATHULN: 21.03
DLCOVA LLN: 2.27
DLCOVA PRE REF: 94.3 %
DLCOVA PRE: 4.01 ML/(MIN*MMHG*L) (ref 2.27–6.23)
DLCOVA REF: 4.25
DLCOVAULN: 6.23
ERV LLN: -16449.54
ERV PRE REF: 98.3 %
ERV REF: 0.46
ERVULN: ABNORMAL
FEF 25 75 LLN: 0.57
FEF 25 75 PRE REF: 165.9 %
FEF 25 75 REF: 1.34
FEV05 LLN: 0.36
FEV05 REF: 1.22
FEV1 FVC LLN: 64
FEV1 FVC PRE REF: 109 %
FEV1 FVC REF: 78
FEV1 LLN: 1.06
FEV1 PRE REF: 104.6 %
FEV1 REF: 1.5
FRCPLETH LLN: 1.44
FRCPLETH PREREF: 77.1 %
FRCPLETH REF: 2.26
FRCPLETHULN: 3.09
FVC LLN: 1.36
FVC PRE REF: 95.2 %
FVC REF: 1.93
IVC PRE: 1.72 L (ref 1.36–2.52)
IVC SINGLE BREATH LLN: 1.36
IVC SINGLE BREATH PRE REF: 89.5 %
IVC SINGLE BREATH REF: 1.93
IVCSINGLEBREATHULN: 2.52
LLN IC: -16448.96
PEF LLN: 1.88
PEF PRE REF: 127.8 %
PEF REF: 3.42
PHYSICIAN COMMENT: ABNORMAL
PRE DLCO: 10.2 ML/(MIN*MMHG) (ref 9.56–21.03)
PRE ERV: 0.45 L (ref -16449.54–16450.46)
PRE FEF 25 75: 2.22 L/S (ref 0.57–2.48)
PRE FET 100: 5.8 SEC
PRE FEV05 REF: 110.8 %
PRE FEV1 FVC: 85.39 % (ref 63.79–90.96)
PRE FEV1: 1.57 L (ref 1.06–1.92)
PRE FEV5: 1.35 L (ref 0.36–2.07)
PRE FRC PL: 1.74 L (ref 1.44–3.09)
PRE FVC: 1.83 L (ref 1.36–2.52)
PRE IC: 1.39 L (ref -16448.96–16451.04)
PRE PEF: 4.37 L/S (ref 1.88–4.96)
PRE REF IC: 132.7 %
PRE RV: 1.3 L (ref 1.23–2.38)
PRE TLC: 3.13 L (ref 2.61–4.58)
RAW PRE REF: 180.6 %
RAW PRE: 5.53 CMH2O*S/L (ref 3.06–3.06)
RAW REF: 3.06
REF IC: 1.04
RV LLN: 1.23
RV PRE REF: 71.7 %
RV REF: 1.81
RVTLC LLN: 36
RVTLC PRE REF: 91.7 %
RVTLC PRE: 41.39 % (ref 35.55–54.73)
RVTLC REF: 45
RVTLCULN: 55
RVULN: 2.38
SGAW PRE REF: 76.8 %
SGAW PRE: 0.08 1/(CMH2O*S) (ref 0.1–0.1)
SGAW REF: 0.1
TLC LLN: 2.61
TLC PRE REF: 87 %
TLC REF: 3.6
TLC ULN: 4.58
ULN IC: ABNORMAL
VA PRE: 2.55 L (ref 3.45–3.45)
VA SINGLE BREATH LLN: 3.45
VA SINGLE BREATH PRE REF: 73.8 %
VA SINGLE BREATH REF: 3.45
VASINGLEBREATHULN: 3.45
VC LLN: 1.36
VC PRE REF: 95.2 %
VC PRE: 1.83 L (ref 1.36–2.52)
VC REF: 1.93
VC ULN: 2.52

## 2023-08-24 PROCEDURE — 94726 PLETHYSMOGRAPHY LUNG VOLUMES: CPT | Mod: NTX,S$GLB,, | Performed by: INTERNAL MEDICINE

## 2023-08-24 PROCEDURE — 1160F RVW MEDS BY RX/DR IN RCRD: CPT | Mod: CPTII,NTX,S$GLB, | Performed by: PHYSICIAN ASSISTANT

## 2023-08-24 PROCEDURE — 1101F PR PT FALLS ASSESS DOC 0-1 FALLS W/OUT INJ PAST YR: ICD-10-PCS | Mod: CPTII,NTX,S$GLB, | Performed by: PHYSICIAN ASSISTANT

## 2023-08-24 PROCEDURE — 3288F PR FALLS RISK ASSESSMENT DOCUMENTED: ICD-10-PCS | Mod: CPTII,NTX,S$GLB, | Performed by: PHYSICIAN ASSISTANT

## 2023-08-24 PROCEDURE — 1125F PR PAIN SEVERITY QUANTIFIED, PAIN PRESENT: ICD-10-PCS | Mod: CPTII,NTX,S$GLB, | Performed by: PHYSICIAN ASSISTANT

## 2023-08-24 PROCEDURE — 3077F PR MOST RECENT SYSTOLIC BLOOD PRESSURE >= 140 MM HG: ICD-10-PCS | Mod: CPTII,NTX,S$GLB, | Performed by: PHYSICIAN ASSISTANT

## 2023-08-24 PROCEDURE — 94729 PR C02/MEMBANE DIFFUSE CAPACITY: ICD-10-PCS | Mod: NTX,S$GLB,, | Performed by: INTERNAL MEDICINE

## 2023-08-24 PROCEDURE — 3078F DIAST BP <80 MM HG: CPT | Mod: CPTII,NTX,S$GLB, | Performed by: PHYSICIAN ASSISTANT

## 2023-08-24 PROCEDURE — 3288F FALL RISK ASSESSMENT DOCD: CPT | Mod: CPTII,NTX,S$GLB, | Performed by: PHYSICIAN ASSISTANT

## 2023-08-24 PROCEDURE — 1101F PT FALLS ASSESS-DOCD LE1/YR: CPT | Mod: CPTII,NTX,S$GLB, | Performed by: PHYSICIAN ASSISTANT

## 2023-08-24 PROCEDURE — 3077F SYST BP >= 140 MM HG: CPT | Mod: CPTII,NTX,S$GLB, | Performed by: PHYSICIAN ASSISTANT

## 2023-08-24 PROCEDURE — 99214 OFFICE O/P EST MOD 30 MIN: CPT | Mod: 25,NTX,S$GLB, | Performed by: PHYSICIAN ASSISTANT

## 2023-08-24 PROCEDURE — 94010 BREATHING CAPACITY TEST: ICD-10-PCS | Mod: NTX,S$GLB,, | Performed by: INTERNAL MEDICINE

## 2023-08-24 PROCEDURE — 1160F PR REVIEW ALL MEDS BY PRESCRIBER/CLIN PHARMACIST DOCUMENTED: ICD-10-PCS | Mod: CPTII,NTX,S$GLB, | Performed by: PHYSICIAN ASSISTANT

## 2023-08-24 PROCEDURE — 1125F AMNT PAIN NOTED PAIN PRSNT: CPT | Mod: CPTII,NTX,S$GLB, | Performed by: PHYSICIAN ASSISTANT

## 2023-08-24 PROCEDURE — 94726 PULM FUNCT TST PLETHYSMOGRAP: ICD-10-PCS | Mod: NTX,S$GLB,, | Performed by: INTERNAL MEDICINE

## 2023-08-24 PROCEDURE — 99214 PR OFFICE/OUTPT VISIT, EST, LEVL IV, 30-39 MIN: ICD-10-PCS | Mod: 25,NTX,S$GLB, | Performed by: PHYSICIAN ASSISTANT

## 2023-08-24 PROCEDURE — 94729 DIFFUSING CAPACITY: CPT | Mod: NTX,S$GLB,, | Performed by: INTERNAL MEDICINE

## 2023-08-24 PROCEDURE — 1159F MED LIST DOCD IN RCRD: CPT | Mod: CPTII,NTX,S$GLB, | Performed by: PHYSICIAN ASSISTANT

## 2023-08-24 PROCEDURE — 3078F PR MOST RECENT DIASTOLIC BLOOD PRESSURE < 80 MM HG: ICD-10-PCS | Mod: CPTII,NTX,S$GLB, | Performed by: PHYSICIAN ASSISTANT

## 2023-08-24 PROCEDURE — 99999 PR PBB SHADOW E&M-EST. PATIENT-LVL IV: ICD-10-PCS | Mod: PBBFAC,TXP,, | Performed by: PHYSICIAN ASSISTANT

## 2023-08-24 PROCEDURE — 99999 PR PBB SHADOW E&M-EST. PATIENT-LVL IV: CPT | Mod: PBBFAC,TXP,, | Performed by: PHYSICIAN ASSISTANT

## 2023-08-24 PROCEDURE — 1159F PR MEDICATION LIST DOCUMENTED IN MEDICAL RECORD: ICD-10-PCS | Mod: CPTII,NTX,S$GLB, | Performed by: PHYSICIAN ASSISTANT

## 2023-08-24 PROCEDURE — 94010 BREATHING CAPACITY TEST: CPT | Mod: NTX,S$GLB,, | Performed by: INTERNAL MEDICINE

## 2023-08-24 NOTE — LETTER
August 24, 2023        Clarence Brush  [address]  [facility] [address]  Phone: [phone]  Fax: [phone]             [facility]  [address]  [facility] [address]  Phone: [phone]   Patient: Juliette Seo   MR Number: [id number]   YOB: 1945   Date of Visit: 8/24/2023       Dear Dr. Clarence Brush    Thank you for referring Juliette Seo to me for evaluation. Attached you will find relevant portions of my assessment and plan of care.    If you have questions, please do not hesitate to call me. I look forward to following Juliette Seo along with you.    Sincerely,    Rain Wilson PA-C    Enclosure    If you would like to receive this communication electronically, please contact [email or web address] or [phone] to request [employer] access.    [employer] is a tool which provides read-only access to select patient information with whom you have a relationship. Its easy to use and provides real time access to review your patients record including encounter summaries, notes, results, and demographic information.    If you feel you have received this communication in error or would no longer like to receive these types of communications, please e-mail [email or web address]

## 2023-08-24 NOTE — PROGRESS NOTES
[facility] INITIAL EVALUATION                                                                                                                                             Reason for Visit:  Evaluation for ALD    Referring Physician: No ref. provider found    History of Present Illness: Juliette Seo is a 77 y.o. female who is on 0L of oxygen.  She is on no assisted ventilation.  Her New York Heart Association Class is I and a Karnofsky score of 90% - Able to carry on normal activity: minor symptoms of disease. She is diabetic insulin dependent    Patient presents today for routine follow up. Reports interval improvement in cough and dyspnea since completing her steroid therapy. Continues to note intermittent dry cough, but much improved since resuming her PPI. No recent exacerbations/hospitalizations. Overall, much improved from initial visit.     PER INITIAL HPI:    Patient presents today for evaluation of ILD. Previously followed for bronchiectasis in 2018 by Dr. Edmondson, and most recently seen by Dr. Brush two weeks prior to today's visit. Patient states symptoms began to progress about three months ago. She noticed increased dry cough and dyspnea with exertion. At that time, she had discontinued her PPI per her PCP's recommendation. She notes increased reflux and cough since stopping her PPI. She has a history of COVID x 2. Required hospitalization in 2021. She briefly required oxygen during her admission, but was discharged on room air. She is not vaccinated for COVID. Received bamlanivimab, remdesivir, and dexamethasone. She tested positive again in 12/2022. She states she has been prescribed albuterol and is awaiting nebulizer supplies. She states she takes tessalon pearls for her cough, which provides some relief. Cough is intermittent in duration with no alleviating/exacerbation factors.     Patient was started on a prednisone taper after her visit with Dr. Brush on 7/5. She completed one week of  prednisone 20 mg daily and is on prednisone 10 mg daily for two weeks. Per patient's daughter, she notes some improvement in patient's dyspnea and cough since starting steroids. Patient's daughter also reports patient has had poor appetite and early satiety for the last few months. She required hospitalization for left sided chest pain 6/17.     Patient has minimal smoking history and quit over 20 years ago. No history of frequent infections during childhood/adolescence. Notes some increased rhinorrhea/post-nasal drip which occurs with seasonal changes. Grew up in [facility] and has lived in [place] since 1960. She has multiple occupational exposures to chemicals/metal/dust, absbestos, cleaning supplies, construction. 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 No birds for pets.    Patient denies history of rashes, myalgias, arthralgias, muscular weakness. Autoimmune workup thus far unrevealing. No symptoms of dysphagia, throat clearing, globus sensation.        Past Medical History:   Diagnosis Date    Allergy     Anxiety     Arthritis     osteo    Asthma     Diabetic retinopathy     GERD (gastroesophageal reflux disease)     High cholesterol     Hypertension     Kidney stone     Type 2 diabetes mellitus, uncontrolled, with neuropathy     Urinary tract infection     Vaginal infection        Past Surgical History:   Procedure Laterality Date    CHOLECYSTECTOMY      COLONOSCOPY N/A 6/8/2021    Procedure: COLONOSCOPY;  Surgeon: Rain Pop MD;  Location: [facility];  Service: Endoscopy;  Laterality: N/A;    COLONOSCOPY N/A 7/8/2021    Procedure: colonoscopy with single balloon scope;  Surgeon: Steffen Dueñas MD;  Location: [facility];  Service: Endoscopy;  Laterality: N/A;    ESOPHAGOGASTRODUODENOSCOPY N/A 6/8/2021    Procedure: EGD (ESOPHAGOGASTRODUODENOSCOPY);  Surgeon: Rain Pop MD;  Location: [facility];  Service: Endoscopy;  Laterality: N/A;    EYE SURGERY      HYSTERECTOMY      INCISIONAL  "HERNIA REPAIR  2003    ROTATOR CUFF REPAIR Right 11/07/2017    TRIGGER FINGER RELEASE Right 6/3/2022    Procedure: RELEASE, TRIGGER FINGER; right index, middle, ring;  Surgeon: Sarmad Wesley Jr., MD;  Location: [facility];  Service: Orthopedics;  Laterality: Right;       Allergies: Actos [pioglitazone], Darvocet a500 [propoxyphene n-acetaminophen], and Dilaudid [hydromorphone (bulk)]    Current Outpatient Medications   Medication Sig    acetaminophen (TYLENOL) 500 MG tablet Take 1 tablet (500 mg total) by mouth every 4 (four) hours as needed for Pain.    albuterol (PROVENTIL) 2.5 mg /3 mL (0.083 %) nebulizer solution Take 3 mLs (2.5 mg total) by nebulization every 6 (six) hours as needed for Wheezing. Rescue    aspirin (ECOTRIN) 81 MG EC tablet Take 81 mg by mouth once daily.    atorvastatin (LIPITOR) 40 MG tablet Take 1 tablet (40 mg total) by mouth once daily.    azelastine (ASTELIN) 137 mcg (0.1 %) nasal spray 1 spray (137 mcg total) by Nasal route 2 (two) times daily.    cyclobenzaprine (FLEXERIL) 10 MG tablet Take 1 tablet 3 times a day by oral route as needed.    diclofenac sodium (VOLTAREN) 1 % Gel Apply 4 g topically 4 (four) times daily. (Patient taking differently: Apply 4 g topically as needed.)    dulaglutide (TRULICITY) 0.75 mg/0.5 mL pen injector Inject 0.75 mg into the skin every 7 days.    fluticasone propionate (FLONASE) 50 mcg/actuation nasal spray 1 spray (50 mcg total) by Each Nostril route once daily.    insulin detemir U-100, Levemir, (LEVEMIR FLEXPEN) 100 unit/mL (3 mL) InPn pen Inject 25 Units into the skin 2 (two) times daily. HOLD INSULIN IF BLOOD SUGAR LEVEL <150 MG/DL (Patient taking differently: Inject 25 Units into the skin 2 (two) times a day. HOLD INSULIN IF BLOOD SUGAR LEVEL <150 MG/DL)    insulin syringe-needle U-100 (BD INSULIN SYRINGE ULTRA-FINE) 0.5 mL 31 gauge x 5/16" Syrg USE AS DIRECTED TWICE A DAY    lancets Misc Check glucose BID    losartan-hydrochlorothiazide 100-12.5 mg " "(HYZAAR) 100-12.5 mg Tab Take 1 tablet by mouth once daily.    metFORMIN (GLUCOPHAGE) 500 MG tablet Take 1 tablet (500 mg total) by mouth 2 (two) times daily with meals. (Patient taking differently: Take 500 mg by mouth daily with breakfast.)    montelukast (SINGULAIR) 10 mg tablet TAKE 1 TABLET BY MOUTH EVERY DAY IN THE EVENING    pantoprazole (PROTONIX) 40 MG tablet Take 1 tablet (40 mg total) by mouth once daily.    TRUE METRIX GLUCOSE TEST STRIP Strp 1 strip 2 (two) times daily.    TRUEPLUS PEN NEEDLE 32 gauge x 5/32" Ndle TAKING INSULIN DAILY     No current facility-administered medications for this visit.     Facility-Administered Medications Ordered in Other Visits   Medication    triamcinolone acetonide injection 40 mg       Immunization History   Administered Date(s) Administered    Influenza (FLUAD) - Quadrivalent - Adjuvanted - PF *Preferred* (65+) 11/13/2020    Influenza - High Dose - PF (65 years and older) 09/01/2016, 12/01/2017, 10/04/2018, 10/24/2019    Influenza - Quadrivalent - High Dose - PF (65 years and older) 11/07/2021    Influenza - Quadrivalent - PF *Preferred* (6 months and older) 10/19/2015    Influenza - Trivalent (ADULT) 10/31/2012    Influenza Split 10/08/2014    Pneumococcal Conjugate - 13 Valent 10/19/2015    Pneumococcal Polysaccharide - 23 Valent 04/18/2019    Zoster Recombinant 12/26/2019, 09/12/2020     Family History:    Family History   Problem Relation Age of Onset    Stroke Mother     Diabetes Sister     Diabetes Brother     Kidney disease Neg Hx      Social History     Substance and Sexual Activity   Alcohol Use Not Currently    Comment: social      Social History     Substance and Sexual Activity   Drug Use No      Social History     Socioeconomic History    Marital status:    Tobacco Use    Smoking status: Former     Current packs/day: 0.00     Passive exposure: Never    Smokeless tobacco: Never   Substance and Sexual Activity    Alcohol use: Not Currently     " Comment: social    Drug use: No    Sexual activity: Yes     Partners: Male     Birth control/protection: None     Social Determinants of Health     Financial Resource Strain: Low Risk  (5/31/2023)    Overall Financial Resource Strain (CARDIA)     Difficulty of Paying Living Expenses: Not hard at all   Food Insecurity: No Food Insecurity (5/31/2023)    Hunger Vital Sign     Worried About Running Out of Food in the Last Year: Never true     Ran Out of Food in the Last Year: Never true   Transportation Needs: No Transportation Needs (5/31/2023)    PRAPARE - Transportation     Lack of Transportation (Medical): No     Lack of Transportation (Non-Medical): No   Physical Activity: Unknown (5/31/2023)    Exercise Vital Sign     Days of Exercise per Week: 3 days   Stress: Stress Concern Present (5/31/2023)    [employer] - Occupational Stress Questionnaire     Feeling of Stress : To some extent   Social Connections: Unknown (5/31/2023)    Social Connection and Isolation Panel [NHANES]     Frequency of Communication with Friends and Family: More than three times a week     Frequency of Social Gatherings with Friends and Family: Once a week     Active Member of Clubs or Organizations: No     Marital Status:    Housing Stability: Unknown (5/31/2023)    Housing Stability Vital Sign     Unable to Pay for Housing in the Last Year: No     Unstable Housing in the Last Year: No     Review of Systems   Constitutional:  Negative for chills, diaphoresis, fever, malaise/fatigue and weight loss.   HENT:  Negative for congestion, ear discharge, ear pain, hearing loss, nosebleeds, sinus pain, sore throat and tinnitus.    Eyes:  Negative for blurred vision, double vision, photophobia, pain, discharge and redness.   Respiratory:  Positive for cough and shortness of breath (improved, minimal with exertion). Negative for hemoptysis, sputum production, wheezing and stridor.    Cardiovascular:  Negative for  "chest pain, palpitations, orthopnea, claudication, leg swelling and PND.   Gastrointestinal:  Negative for abdominal pain, blood in stool, constipation, diarrhea, heartburn, melena, nausea and vomiting.   Genitourinary:  Negative for dysuria, flank pain, frequency, hematuria and urgency.   Musculoskeletal:  Negative for back pain, falls, joint pain, myalgias and neck pain.   Skin:  Negative for itching and rash.   Neurological:  Negative for dizziness, tingling, tremors, sensory change, speech change, focal weakness, seizures, loss of consciousness, weakness and headaches.   Endo/Heme/Allergies:  Negative for environmental allergies and polydipsia. Does not bruise/bleed easily.   Psychiatric/Behavioral:  Negative for depression, hallucinations, memory loss, substance abuse and suicidal ideas. The patient is not nervous/anxious and does not have insomnia.      Vitals  BP (!) 165/74 (BP Location: Left arm, Patient Position: Sitting, BP Method: Medium (Automatic))   Pulse 102   Ht 4' 8" (1.422 m)   Wt 47 kg (103 lb 9.9 oz)   SpO2 98% Comment: room air  BMI 23.23 kg/m²   Physical Exam  Vitals and nursing note reviewed.   Constitutional:       General: She is not in acute distress.     Appearance: Normal appearance.   HENT:      Head: Normocephalic and atraumatic.      Nose: No congestion or rhinorrhea.      Mouth/Throat:      Mouth: Mucous membranes are moist.   Eyes:      General: No scleral icterus.     Conjunctiva/sclera: Conjunctivae normal.   Cardiovascular:      Rate and Rhythm: Normal rate.      Heart sounds: No murmur heard.     No friction rub.   Pulmonary:      Effort: No respiratory distress.      Breath sounds: No wheezing, rhonchi or rales.   Abdominal:      General: Bowel sounds are normal. There is no distension.      Palpations: Abdomen is soft.      Tenderness: There is no abdominal tenderness.   Musculoskeletal:      Right lower leg: No edema.      Left lower leg: No edema.   Skin:     General: " Skin is warm and dry.   Neurological:      General: No focal deficit present.      Mental Status: She is alert and oriented to person, place, and time.   Psychiatric:         Mood and Affect: Mood normal.         Behavior: Behavior normal.     Labs:  Hospital Outpatient Visit on 08/24/2023   Component Date Value    Pre FVC 08/24/2023 1.83     PRE FEV5 08/24/2023 1.35     Pre FEV1 08/24/2023 1.57     Pre FEV1 FVC 08/24/2023 85.39     Pre FEF 25 75 08/24/2023 2.22     Pre PEF 08/24/2023 4.37     Pre  08/24/2023 5.80     Pre DLCO 08/24/2023 10.20     DLCOVA PRE 08/24/2023 4.01     VA PRE 08/24/2023 2.55 (L)     IVC PRE 08/24/2023 1.72     Pre TLC 08/24/2023 3.13     VC PRE 08/24/2023 1.83     PRE IC 08/24/2023 1.39     Pre FRC PL 08/24/2023 1.74     Pre ERV 08/24/2023 0.45     Pre RV 08/24/2023 1.30     RVTLC PRE 08/24/2023 41.39     Raw PRE 08/24/2023 5.53 (H)     sGaw PRE 08/24/2023 0.08 (L)     FVC Ref 08/24/2023 1.93     FVC LLN 08/24/2023 1.36     FVC Pre Ref 08/24/2023 95.2     FEV05 REF 08/24/2023 1.22     FEV05 LLN 08/24/2023 0.36     PRE FEV05 REF 08/24/2023 110.8     FEV1 Ref 08/24/2023 1.50     FEV1 LLN 08/24/2023 1.06     FEV1 Pre Ref 08/24/2023 104.6     FEV1 FVC Ref 08/24/2023 78     FEV1 FVC LLN 08/24/2023 64     FEV1 FVC Pre Ref 08/24/2023 109.0     FEF 25 75 Ref 08/24/2023 1.34     FEF 25 75 LLN 08/24/2023 0.57     FEF 25 75 Pre Ref 08/24/2023 165.9     PEF Ref 08/24/2023 3.42     PEF LLN 08/24/2023 1.88     PEF Pre Ref 08/24/2023 127.8     TLC Ref 08/24/2023 3.60     TLC LLN 08/24/2023 2.61     TLC ULN 08/24/2023 4.58     TLC Pre Ref 08/24/2023 87.0     VC Ref 08/24/2023 1.93     VC LLN 08/24/2023 1.36     VC ULN 08/24/2023 2.52     VC Pre Ref 08/24/2023 95.2     REF IC 08/24/2023 1.04     LLN IC 08/24/2023 -84250.96     ULN IC 08/24/2023 05383.04     PRE REF IC 08/24/2023 132.7     FRCpleth Ref 08/24/2023 2.26     FRCpleth LLN 08/24/2023 1.44     FRC PLETH ULN 08/24/2023 3.09     FRCpleth  PreRef 08/24/2023 77.1     ERV Ref 08/24/2023 0.46     ERV LLN 08/24/2023 -69326.54     ERV ULN 08/24/2023 71747.46     ERV Pre Ref 08/24/2023 98.3     RV Ref 08/24/2023 1.81     RV LLN 08/24/2023 1.23     RV ULN 08/24/2023 2.38     RV Pre Ref 08/24/2023 71.7     RVTLC Ref 08/24/2023 45     RVTLC LLN 08/24/2023 36     RV TLC ULN 08/24/2023 55     RVTLC Pre Ref 08/24/2023 91.7     Raw Ref 08/24/2023 3.06     Raw Pre Ref 08/24/2023 180.6     sGaw Ref 08/24/2023 0.10     sGaw Pre Ref 08/24/2023 76.8     DLCO Single Breath Ref 08/24/2023 15.29     DLCO Single Breath LLN 08/24/2023 9.56     DLCO SINGLEBREATH ULN 08/24/2023 21.03     DLCO Single Breath Pre R* 08/24/2023 66.7     DLCOc Single Breath Ref 08/24/2023 15.29     DLCOc Single Breath LLN 08/24/2023 9.56     DLCOC SINGLEBREATH ULN 08/24/2023 21.03     DLCOVA Ref 08/24/2023 4.25     DLCOVA LLN 08/24/2023 2.27     DLCOVA ULN 08/24/2023 6.23     DLCOVA Pre Ref 08/24/2023 94.3     DLCOc SBVA Ref 08/24/2023 4.25     DLCOc SBVA LLN 08/24/2023 2.27     DLCOCSBVA ULN 08/24/2023 6.23     VA Single Breath Ref 08/24/2023 3.45     VA Single Breath LLN 08/24/2023 3.45     VA SINGLEBREATH ULN 08/24/2023 3.45     VA Single Breath Pre Ref 08/24/2023 73.8     IVC Single Breath Ref 08/24/2023 1.93     IVC Single Breath LLN 08/24/2023 1.36     IVC SINGLEBREATH ULN 08/24/2023 2.52     IVC Single Breath Pre Ref 08/24/2023 89.5            7/20/2023     9:51 AM   Pulmonary Function Tests   FVC 1.91 liters   FEV1 1.59 liters   TLC (liters) 2.89 liters   DLCO (ml/mmHg sec) 12.47 ml/mmHg sec   FVC% 98.9   FEV1% 106.3   FEF 25-75 2.02   FEF 25-75% 150.6   TLC% 80.3   RV 0.97   RV% 53.7   DLCO% 81.5         7/20/2023     9:28 AM   6MW   6MWT Status completed without stopping   Patient Reported Dyspnea;Dizziness;Lightheadedness   Was O2 used? No   6MW Distance walked (feet) 1000 feet   Distance walked (meters) 304.8 meters   Did patient stop? No   Oxygen Saturation 98 %   Supplemental Oxygen  Room Air   Heart Rate 91 bpm   Blood Pressure 163/71   Jas Dyspnea Rating  nothing at all   Oxygen Saturation 98 %   Supplemental Oxygen Room Air   Heart Rate 105 bpm   Blood Pressure 148/72   Jas Dyspnea Rating  moderate   Recovery Time (seconds) 75 seconds   Oxygen Saturation 98 %   Supplemental Oxygen Room Air   Heart Rate 93 bpm       Imaging:  Results for orders placed during the hospital encounter of 03/31/23    X-Ray Chest PA And Lateral    Narrative  EXAMINATION:  XR CHEST PA AND LATERAL    CLINICAL HISTORY:  Disorder of bone, unspecified    TECHNIQUE:  PA and lateral views of the chest were performed.    COMPARISON:  10/19/2022    FINDINGS:  Scattered interstitial thickening, similar to the prior exam.  No lung consolidation.The cardiomediastinal contour is within normal limits.  No pneumothorax.  No pleural effusions.    Impression  No acute findings.      Electronically signed by: Vince Tavares MD  Date:    03/31/2023  Time:    13:38    Results for orders placed during the hospital encounter of 10/08/18    DXA Bone Density Spine And Hip    Narrative  EXAMINATION:  DEXA BONE DENSITY SPINE HIP    CLINICAL HISTORY:  Other specified personal risk factors, not elsewhere classified    COMPARISON:  07/23/2015    FINDINGS:  The T score associated with the lumbar spine is -1.1 on the current examination.  It was -1.0 on the prior examination.  The T score associated with the left femoral neck is -1.5 on the current examination.  It was -1.2 on the prior examination.  The T score associated with the right femoral neck is -1.2 on the current examination.  It was -0.9 on the prior examination.    IMPRESSION:    Osteopenia      Electronically signed by: David Santillan MD  Date:    10/08/2018  Time:    09:31    No valid procedures specified.  No results found for this or any previous visit.    No results found for this or any previous visit.    Results for orders placed during the hospital encounter of 10/08/19    CT  Abdomen Pelvis With Contrast    Narrative  EXAMINATION:  CT ABDOMEN PELVIS WITH CONTRAST    CLINICAL HISTORY:  LLQ pain, suspect diverticulitis;    TECHNIQUE:  Low dose axial images, sagittal and coronal reformations were obtained from the lung bases to the pubic symphysis following the IV administration of 75 mL of Omnipaque 350    FINDINGS:  The visualized portion of the base of the lungs is significant for bilateral basilar atelectatic versus fibrotic change.  The visualized portion of the heart, stomach, and spleen are unremarkable.  There is fatty atrophy of the pancreas.  The gallbladder is absent.  There is diffuse fatty infiltration of the liver.  The adrenal glands and right kidney have a normal appearance.  There is a 2-3 mm left-sided renal stone.  There is no hydronephrosis or hydroureter.  The bladder is distended but otherwise unremarkable.  The uterus is possibly absent or atrophic.  The bowel is significant for diverticulosis coli.  There is wall thickening of the sigmoid colon adjacent to multiple diverticula with surrounding inflammation consistent with diverticulitis.  There is no significant surrounding inflammatory change.  There is no evidence of perforation or abscess formation.  The appendix has a normal appearance.  The visualized portion of the aorta is significant for moderate scattered plaque.  The osseous structures demonstrate degenerative change.    Impression  Findings consistent with acute diverticulitis of the sigmoid colon without perforation or abscess formation.    Left-sided renal stone.      Electronically signed by: Florencio Padilla MD  Date:    10/09/2019  Time:    09:08    No results found for this or any previous visit.    No results found for this or any previous visit.  IMAGING:     CT chest 6/17/23:  There are subpleural reticular opacities compatible with fibrotic changes/ILD, stable prior.  There is a small focal consolidation or atelectasis in the medial left upper lobe  (series 3, image 226).     CT chest 5/1/21:  There are patchy bilateral peripheral bibasilar ground-glass opacities and peripheral reticulations.  Overall extent of findings appear improved from prior study of 03/04/2021.  No new large confluent airspace consolidation identified.  There is no significant volume of pleural fluid.     CT Chest 3/4/21:  There are bilateral symmetric peripheral and lower lobe predominant patchy ground-glass opacities, likely sequela of the patient's known history of COVID-19 pneumonia.  No pneumothorax or large pleural effusion.     CT of chest performed on 4/24/2018 without contrast revealed No parenchymal lung disease, perhaps mild bronchiectatic airways in the right middle lobe.     Pulmonary Function Test:     Pulmonary function tests: FEV1: 1.48  (100 % predicted), FVC:  1.93 (106 % predicted), FEV1/FVC:  77, DLCO: 13.5 (90 % predicted), NOrmal spirometry and diffusion    Cardiodiagnostics:          Assessment:  1. Bronchiectasis without complication    2. Gastroesophageal reflux disease, unspecified whether esophagitis present      Plan:     Spirometry preserved. No ventilatory defect. Her PFTs have remained stable since 2018. Prior CT chest with subpleural reticular opacities, bronchiectasis, and patchy areas of GGOs, now improved. Does not have evidence of exertional hypoxemia. Will defer further therapy for now.     Improved s/p steroid taper. Will continue to monitor PFTs at routine visits. Repeat 6MWT annually.    Continue PPI. Symptoms better controlled.     Provided astelin and flonase for allergic rhinitis.     RTC in 3 months or sooner if needed. Repeat PFTs at that time.       Rain Wilson PA-C  [facility]

## 2023-08-29 ENCOUNTER — TELEPHONE (OUTPATIENT)
Dept: ENDOCRINOLOGY | Facility: CLINIC | Age: 78
End: 2023-08-29
Payer: MEDICARE

## 2023-08-29 NOTE — TELEPHONE ENCOUNTER
Called daughter to contact [facility] so pt can get supplies. They will contact [facility].    [[facility]_MEDICAL] Gerri Bailey: Ericka, we have been trying to reach patient for consent we have left messages. Patient can also contact us at [phone].  Thank you!

## 2023-09-26 PROBLEM — R07.89 ATYPICAL CHEST PAIN: Status: ACTIVE | Noted: 2023-06-17

## 2023-09-26 PROBLEM — S62.102A LEFT WRIST FRACTURE, CLOSED, INITIAL ENCOUNTER: Status: ACTIVE | Noted: 2023-09-26

## 2023-09-27 PROBLEM — R07.89 ATYPICAL CHEST PAIN: Status: RESOLVED | Noted: 2023-06-17 | Resolved: 2023-09-27

## 2023-10-02 ENCOUNTER — OFFICE VISIT (OUTPATIENT)
Dept: FAMILY MEDICINE | Facility: CLINIC | Age: 78
End: 2023-10-02
Payer: MEDICARE

## 2023-10-02 VITALS
SYSTOLIC BLOOD PRESSURE: 150 MMHG | HEART RATE: 100 BPM | BODY MASS INDEX: 23.9 KG/M2 | DIASTOLIC BLOOD PRESSURE: 68 MMHG | OXYGEN SATURATION: 96 % | WEIGHT: 106.25 LBS | RESPIRATION RATE: 16 BRPM | HEIGHT: 56 IN

## 2023-10-02 DIAGNOSIS — R44.3 HALLUCINATIONS: ICD-10-CM

## 2023-10-02 DIAGNOSIS — M25.532 LEFT WRIST PAIN: Primary | ICD-10-CM

## 2023-10-02 DIAGNOSIS — S52.572A OTHER CLOSED INTRA-ARTICULAR FRACTURE OF DISTAL END OF LEFT RADIUS, INITIAL ENCOUNTER: ICD-10-CM

## 2023-10-02 PROCEDURE — 1159F PR MEDICATION LIST DOCUMENTED IN MEDICAL RECORD: ICD-10-PCS | Mod: CPTII,S$GLB,, | Performed by: FAMILY MEDICINE

## 2023-10-02 PROCEDURE — 1125F AMNT PAIN NOTED PAIN PRSNT: CPT | Mod: CPTII,S$GLB,, | Performed by: FAMILY MEDICINE

## 2023-10-02 PROCEDURE — 3077F PR MOST RECENT SYSTOLIC BLOOD PRESSURE >= 140 MM HG: ICD-10-PCS | Mod: CPTII,S$GLB,, | Performed by: FAMILY MEDICINE

## 2023-10-02 PROCEDURE — G0008 ADMIN INFLUENZA VIRUS VAC: HCPCS | Mod: S$GLB,,, | Performed by: FAMILY MEDICINE

## 2023-10-02 PROCEDURE — 1101F PT FALLS ASSESS-DOCD LE1/YR: CPT | Mod: CPTII,S$GLB,, | Performed by: FAMILY MEDICINE

## 2023-10-02 PROCEDURE — 90694 FLU VACCINE - QUADRIVALENT - ADJUVANTED: ICD-10-PCS | Mod: S$GLB,,, | Performed by: FAMILY MEDICINE

## 2023-10-02 PROCEDURE — 3077F SYST BP >= 140 MM HG: CPT | Mod: CPTII,S$GLB,, | Performed by: FAMILY MEDICINE

## 2023-10-02 PROCEDURE — 99999 PR PBB SHADOW E&M-EST. PATIENT-LVL IV: ICD-10-PCS | Mod: PBBFAC,,, | Performed by: FAMILY MEDICINE

## 2023-10-02 PROCEDURE — G0008 FLU VACCINE - QUADRIVALENT - ADJUVANTED: ICD-10-PCS | Mod: S$GLB,,, | Performed by: FAMILY MEDICINE

## 2023-10-02 PROCEDURE — 1125F PR PAIN SEVERITY QUANTIFIED, PAIN PRESENT: ICD-10-PCS | Mod: CPTII,S$GLB,, | Performed by: FAMILY MEDICINE

## 2023-10-02 PROCEDURE — 90694 VACC AIIV4 NO PRSRV 0.5ML IM: CPT | Mod: S$GLB,,, | Performed by: FAMILY MEDICINE

## 2023-10-02 PROCEDURE — 1101F PR PT FALLS ASSESS DOC 0-1 FALLS W/OUT INJ PAST YR: ICD-10-PCS | Mod: CPTII,S$GLB,, | Performed by: FAMILY MEDICINE

## 2023-10-02 PROCEDURE — 99999 PR PBB SHADOW E&M-EST. PATIENT-LVL IV: CPT | Mod: PBBFAC,,, | Performed by: FAMILY MEDICINE

## 2023-10-02 PROCEDURE — 99214 PR OFFICE/OUTPT VISIT, EST, LEVL IV, 30-39 MIN: ICD-10-PCS | Mod: 25,S$GLB,, | Performed by: FAMILY MEDICINE

## 2023-10-02 PROCEDURE — 3288F PR FALLS RISK ASSESSMENT DOCUMENTED: ICD-10-PCS | Mod: CPTII,S$GLB,, | Performed by: FAMILY MEDICINE

## 2023-10-02 PROCEDURE — 99214 OFFICE O/P EST MOD 30 MIN: CPT | Mod: 25,S$GLB,, | Performed by: FAMILY MEDICINE

## 2023-10-02 PROCEDURE — 1159F MED LIST DOCD IN RCRD: CPT | Mod: CPTII,S$GLB,, | Performed by: FAMILY MEDICINE

## 2023-10-02 PROCEDURE — 3078F PR MOST RECENT DIASTOLIC BLOOD PRESSURE < 80 MM HG: ICD-10-PCS | Mod: CPTII,S$GLB,, | Performed by: FAMILY MEDICINE

## 2023-10-02 PROCEDURE — 3078F DIAST BP <80 MM HG: CPT | Mod: CPTII,S$GLB,, | Performed by: FAMILY MEDICINE

## 2023-10-02 PROCEDURE — 3288F FALL RISK ASSESSMENT DOCD: CPT | Mod: CPTII,S$GLB,, | Performed by: FAMILY MEDICINE

## 2023-10-02 RX ORDER — HYDROCODONE BITARTRATE AND ACETAMINOPHEN 5; 325 MG/1; MG/1
1 TABLET ORAL EVERY 8 HOURS PRN
Qty: 20 TABLET | Refills: 0 | Status: SHIPPED | OUTPATIENT
Start: 2023-10-02

## 2023-10-02 RX ORDER — QUETIAPINE FUMARATE 25 MG/1
25 TABLET, FILM COATED ORAL NIGHTLY
Qty: 30 TABLET | Refills: 5 | Status: SHIPPED | OUTPATIENT
Start: 2023-10-02 | End: 2024-10-01

## 2023-10-02 RX ORDER — DULAGLUTIDE 1.5 MG/.5ML
INJECTION, SOLUTION SUBCUTANEOUS
COMMUNITY
Start: 2023-09-28

## 2023-10-02 RX ORDER — LANCETS 33 GAUGE
EACH MISCELLANEOUS 2 TIMES DAILY
COMMUNITY
Start: 2023-09-24

## 2023-10-02 NOTE — PROGRESS NOTES
Subjective:       Patient ID: Juliette Seo is a 77 y.o. female.    Chief Complaint: Hand Injury (Pt fractured left hand /she states she has been having pain travel to elbow)    Pt is a 77 y.o. female who presents for evaluation and management of   Encounter Diagnoses   Name Primary?    Left wrist pain Yes    Other closed intra-articular fracture of distal end of left radius, initial encounter     Hallucinations    .she thinks this happened from a fall in April. She never complained of pain until recently.   Went to ED over the weekend and found to have distal radial fracture nondisplaced.     Doing well on current meds. Denies any side effects. Prevention is up to date.    Review of Systems   Constitutional:  Negative for chills and fever.   Respiratory:  Negative for shortness of breath.    Cardiovascular:  Negative for chest pain and palpitations.   Gastrointestinal:  Negative for abdominal pain, blood in stool, constipation and nausea.   Genitourinary:  Negative for difficulty urinating.   Musculoskeletal:  Positive for arthralgias and joint swelling.   Psychiatric/Behavioral:  Positive for hallucinations and sleep disturbance. Negative for dysphoric mood and suicidal ideas. The patient is not nervous/anxious.         Has some hallucinations that cause her some anxiety        Objective:      Physical Exam  Constitutional:       Appearance: She is well-developed.   HENT:      Head: Normocephalic and atraumatic.      Right Ear: External ear normal.      Left Ear: External ear normal.      Nose: Nose normal.   Eyes:      Pupils: Pupils are equal, round, and reactive to light.   Neck:      Thyroid: No thyromegaly.      Vascular: No JVD.      Trachea: No tracheal deviation.   Cardiovascular:      Rate and Rhythm: Normal rate.      Heart sounds: Normal heart sounds. No murmur heard.  Pulmonary:      Effort: Pulmonary effort is normal. No respiratory distress.      Breath sounds: Normal breath sounds. No wheezing or  rales.   Chest:      Chest wall: No tenderness.   Abdominal:      General: Bowel sounds are normal. There is no distension.      Palpations: Abdomen is soft. There is no mass.      Tenderness: There is no abdominal tenderness. There is no guarding or rebound.   Musculoskeletal:         General: Tenderness present. Normal range of motion.      Cervical back: Normal range of motion and neck supple.   Lymphadenopathy:      Cervical: No cervical adenopathy.   Skin:     General: Skin is warm and dry.      Coloration: Skin is not pale.      Findings: No erythema or rash.   Neurological:      Mental Status: She is alert and oriented to person, place, and time.      Cranial Nerves: No cranial nerve deficit.      Motor: No abnormal muscle tone.      Coordination: Coordination normal.      Deep Tendon Reflexes: Reflexes are normal and symmetric. Reflexes normal.   Psychiatric:         Behavior: Behavior normal.         Thought Content: Thought content normal.         Judgment: Judgment normal.         Assessment:       1. Left wrist pain    2. Other closed intra-articular fracture of distal end of left radius, initial encounter    3. Hallucinations        Plan:   1. Left wrist pain    2. Other closed intra-articular fracture of distal end of left radius, initial encounter  -     HYDROcodone-acetaminophen (NORCO) 5-325 mg per tablet; Take 1 tablet by mouth every 8 (eight) hours as needed for Pain.  Dispense: 20 tablet; Refill: 0    3. Hallucinations  -     QUEtiapine (SEROQUEL) 25 MG Tab; Take 1 tablet (25 mg total) by mouth every evening.  Dispense: 30 tablet; Refill: 5    Other orders  -     Influenza (FLUAD) - Quadrivalent (Adjuvanted) *Preferred* (65+) (PF)    Keep ortho appt   See her back as scheduled in 1 month---need to repeat BP as it is high today, likely from pain     No follow-ups on file.

## 2023-10-19 ENCOUNTER — HOSPITAL ENCOUNTER (OUTPATIENT)
Dept: RADIOLOGY | Facility: HOSPITAL | Age: 78
Discharge: HOME | End: 2023-10-19
Attending: ORTHOPAEDIC SURGERY
Payer: MEDICARE

## 2023-10-19 ENCOUNTER — OFFICE VISIT (OUTPATIENT)
Dept: ORTHOPEDICS | Facility: CLINIC | Age: 78
End: 2023-10-19
Payer: MEDICARE

## 2023-10-19 VITALS — WEIGHT: 106 LBS | HEIGHT: 56 IN | BODY MASS INDEX: 23.84 KG/M2

## 2023-10-19 DIAGNOSIS — M65.4 DE QUERVAIN'S TENOSYNOVITIS, LEFT: Primary | ICD-10-CM

## 2023-10-19 DIAGNOSIS — M25.532 LEFT WRIST PAIN: ICD-10-CM

## 2023-10-19 DIAGNOSIS — S52.572A OTHER CLOSED INTRA-ARTICULAR FRACTURE OF DISTAL END OF LEFT RADIUS, INITIAL ENCOUNTER: ICD-10-CM

## 2023-10-19 DIAGNOSIS — M25.532 LEFT WRIST PAIN: Primary | ICD-10-CM

## 2023-10-19 PROCEDURE — 99213 PR OFFICE/OUTPT VISIT, EST, LEVL III, 20-29 MIN: ICD-10-PCS | Mod: HCNC,S$GLB,, | Performed by: ORTHOPAEDIC SURGERY

## 2023-10-19 PROCEDURE — 1101F PT FALLS ASSESS-DOCD LE1/YR: CPT | Mod: HCNC,CPTII,S$GLB, | Performed by: ORTHOPAEDIC SURGERY

## 2023-10-19 PROCEDURE — 99999 PR PBB SHADOW E&M-EST. PATIENT-LVL IV: ICD-10-PCS | Mod: PBBFAC,,, | Performed by: ORTHOPAEDIC SURGERY

## 2023-10-19 PROCEDURE — 1125F PR PAIN SEVERITY QUANTIFIED, PAIN PRESENT: ICD-10-PCS | Mod: HCNC,CPTII,S$GLB, | Performed by: ORTHOPAEDIC SURGERY

## 2023-10-19 PROCEDURE — 73110 XR WRIST COMPLETE 3 VIEWS LEFT: ICD-10-PCS | Mod: 26,LT,, | Performed by: RADIOLOGY

## 2023-10-19 PROCEDURE — 1101F PR PT FALLS ASSESS DOC 0-1 FALLS W/OUT INJ PAST YR: ICD-10-PCS | Mod: HCNC,CPTII,S$GLB, | Performed by: ORTHOPAEDIC SURGERY

## 2023-10-19 PROCEDURE — 99999 PR PBB SHADOW E&M-EST. PATIENT-LVL IV: CPT | Mod: PBBFAC,,, | Performed by: ORTHOPAEDIC SURGERY

## 2023-10-19 PROCEDURE — 3288F FALL RISK ASSESSMENT DOCD: CPT | Mod: HCNC,CPTII,S$GLB, | Performed by: ORTHOPAEDIC SURGERY

## 2023-10-19 PROCEDURE — 3288F PR FALLS RISK ASSESSMENT DOCUMENTED: ICD-10-PCS | Mod: HCNC,CPTII,S$GLB, | Performed by: ORTHOPAEDIC SURGERY

## 2023-10-19 PROCEDURE — 1159F MED LIST DOCD IN RCRD: CPT | Mod: HCNC,CPTII,S$GLB, | Performed by: ORTHOPAEDIC SURGERY

## 2023-10-19 PROCEDURE — 73110 X-RAY EXAM OF WRIST: CPT | Mod: 26,LT,, | Performed by: RADIOLOGY

## 2023-10-19 PROCEDURE — 73110 X-RAY EXAM OF WRIST: CPT | Mod: TC,LT,TXP

## 2023-10-19 PROCEDURE — 1125F AMNT PAIN NOTED PAIN PRSNT: CPT | Mod: HCNC,CPTII,S$GLB, | Performed by: ORTHOPAEDIC SURGERY

## 2023-10-19 PROCEDURE — 99213 OFFICE O/P EST LOW 20 MIN: CPT | Mod: HCNC,S$GLB,, | Performed by: ORTHOPAEDIC SURGERY

## 2023-10-19 PROCEDURE — 1159F PR MEDICATION LIST DOCUMENTED IN MEDICAL RECORD: ICD-10-PCS | Mod: HCNC,CPTII,S$GLB, | Performed by: ORTHOPAEDIC SURGERY

## 2023-10-19 NOTE — PROGRESS NOTES
[facility]  History & Physical  Orthopedics    SUBJECTIVE:      COVID-19 attestation:  This patient was treated during the COVID-19 pandemic.  This was discussed with the patient, they are aware of our current policies and procedures, were given the option of delaying their visit and or switching to a virtual visit, delaying their surgery when applicable, and they elect to proceed.    Chief Complaint:  Left wrist pain    Referring Provider: Taylor Disla PA-C     History of Present Illness:  Patient is a 78 y.o. right hand dominant female who presents today with complaints of left wrist pain.  The patient notes atraumatic onset of left wrist pain that began about 1 month ago.  This has improved very slightly but it has persisted and remains significant.  Pain is rather global.  Dormify  was utilized for the duration of today's visit.  She denies other complaints and presents for initial evaluation.     The patient is a/an retired.    Onset of symptoms/DOI was 1 month ago.    Symptoms are aggravated by activity and movement.    Symptoms are alleviated by rest.    Symptoms consist of pain.    The patient rates their pain as a 8/10.    Attempted treatment(s) and/or interventions include activity modifications, rest, immobilization.     The patient denies any fevers, chills, N/V, D/C and presents for evaluation.       Past Medical History:   Diagnosis Date    Allergy     Anxiety     Arthritis     osteo    Asthma     Diabetic retinopathy     GERD (gastroesophageal reflux disease)     High cholesterol     Hypertension     Kidney stone     Type 2 diabetes mellitus, uncontrolled, with neuropathy     Urinary tract infection     Vaginal infection      Past Surgical History:   Procedure Laterality Date    CHOLECYSTECTOMY      COLONOSCOPY N/A 6/8/2021    Procedure: COLONOSCOPY;  Surgeon: Rain Pop MD;  Location: [facility];  Service: Endoscopy;  Laterality: N/A;    COLONOSCOPY N/A 7/8/2021     Procedure: colonoscopy with single balloon scope;  Surgeon: Steffen Dueñas MD;  Location: [facility] ENDO;  Service: Endoscopy;  Laterality: N/A;    ESOPHAGOGASTRODUODENOSCOPY N/A 6/8/2021    Procedure: EGD (ESOPHAGOGASTRODUODENOSCOPY);  Surgeon: Rain Pop MD;  Location: [facility] ENDO;  Service: Endoscopy;  Laterality: N/A;    EYE SURGERY      HYSTERECTOMY      INCISIONAL HERNIA REPAIR  2003    ROTATOR CUFF REPAIR Right 11/07/2017    TRIGGER FINGER RELEASE Right 6/3/2022    Procedure: RELEASE, TRIGGER FINGER; right index, middle, ring;  Surgeon: Sarmad Wesley Jr., MD;  Location: [facility] OR;  Service: Orthopedics;  Laterality: Right;     Review of patient's allergies indicates:   Allergen Reactions    Actos [pioglitazone] Nausea Only    Darvocet a500 [propoxyphene n-acetaminophen] Nausea And Vomiting    Dilaudid [hydromorphone (bulk)] Nausea And Vomiting     Social History     Social History Narrative    Not on file     Family History   Problem Relation Age of Onset    Stroke Mother     Diabetes Sister     Diabetes Brother     Kidney disease Neg Hx          Current Outpatient Medications:     acetaminophen (TYLENOL) 500 MG tablet, Take 1 tablet (500 mg total) by mouth every 4 (four) hours as needed for Pain., Disp: 42 tablet, Rfl: 0    albuterol (PROVENTIL) 2.5 mg /3 mL (0.083 %) nebulizer solution, Take 3 mLs (2.5 mg total) by nebulization every 6 (six) hours as needed for Wheezing. Rescue, Disp: 60 mL, Rfl: 2    aspirin (ECOTRIN) 81 MG EC tablet, Take 81 mg by mouth once daily., Disp: , Rfl:     atorvastatin (LIPITOR) 40 MG tablet, Take 1 tablet (40 mg total) by mouth once daily., Disp: 90 tablet, Rfl: 0    azelastine (ASTELIN) 137 mcg (0.1 %) nasal spray, 1 spray (137 mcg total) by Nasal route 2 (two) times daily., Disp: 30 mL, Rfl: 3    cyclobenzaprine (FLEXERIL) 10 MG tablet, Take 1 tablet 3 times a day by oral route as needed., Disp: , Rfl:     diclofenac sodium (VOLTAREN) 1 % Gel, Apply 4 g topically 4  "(four) times daily., Disp: 1 Tube, Rfl: 3    fluticasone propionate (FLONASE) 50 mcg/actuation nasal spray, 1 spray (50 mcg total) by Each Nostril route once daily., Disp: 16 g, Rfl: 3    HYDROcodone-acetaminophen (NORCO) 5-325 mg per tablet, Take 1 tablet by mouth every 8 (eight) hours as needed for Pain., Disp: 20 tablet, Rfl: 0    insulin detemir U-100, Levemir, (LEVEMIR FLEXPEN) 100 unit/mL (3 mL) InPn pen, Inject 25 Units into the skin every evening. HOLD INSULIN IF BLOOD SUGAR LEVEL <150 MG/DL, Disp: 7.5 mL, Rfl: 11    insulin syringe-needle U-100 (BD INSULIN SYRINGE ULTRA-FINE) 0.5 mL 31 gauge x 5/16" Syrg, USE AS DIRECTED TWICE A DAY, Disp: 180 each, Rfl: 4    losartan-hydrochlorothiazide 100-12.5 mg (HYZAAR) 100-12.5 mg Tab, Take 1 tablet by mouth once daily., Disp: 30 tablet, Rfl: 0    meclizine (ANTIVERT) 25 mg tablet, Take 1 tablet (25 mg total) by mouth 3 (three) times daily as needed for Dizziness., Disp: 30 tablet, Rfl: 0    metFORMIN (GLUCOPHAGE) 500 MG tablet, Take 1 tablet (500 mg total) by mouth 2 (two) times daily with meals. (Patient taking differently: Take 500 mg by mouth daily with breakfast.), Disp: 60 tablet, Rfl: 5    metoprolol tartrate (LOPRESSOR) 25 MG tablet, Take 0.5 tablets (12.5 mg total) by mouth 2 (two) times daily., Disp: 30 tablet, Rfl: 2    montelukast (SINGULAIR) 10 mg tablet, TAKE 1 TABLET BY MOUTH EVERY DAY IN THE EVENING, Disp: 90 tablet, Rfl: 1    ondansetron (ZOFRAN-ODT) 4 MG TbDL, Take 1 tablet (4 mg total) by mouth every 6 (six) hours as needed (nausea)., Disp: 90 tablet, Rfl: 0    pantoprazole (PROTONIX) 40 MG tablet, Take 1 tablet (40 mg total) by mouth once daily., Disp: 30 tablet, Rfl: 11    QUEtiapine (SEROQUEL) 25 MG Tab, Take 1 tablet (25 mg total) by mouth every evening., Disp: 30 tablet, Rfl: 5    TRUE METRIX GLUCOSE TEST STRIP Strp, 1 strip 2 (two) times daily., Disp: , Rfl:     TRUEPLUS LANCETS 33 gauge Misc, Apply topically 2 (two) times daily., Disp: , Rfl: " "    TRUEPLUS PEN NEEDLE 32 gauge x 5/32" Ndle, TAKING INSULIN DAILY, Disp: , Rfl:     TRULICITY 1.5 mg/0.5 mL pen injector, Inject into the skin., Disp: , Rfl:   No current facility-administered medications for this visit.    Facility-Administered Medications Ordered in Other Visits:     triamcinolone acetonide injection 40 mg, 40 mg, Intra-articular, , Jayne Cain PA-C, 40 mg at 07/12/22 0830      Review of Systems:  As per HPI otherwise noncontributory    OBJECTIVE:      Vital Signs (Most Recent):  Vitals:    10/19/23 0900   Weight: 48.1 kg (106 lb)   Height: 4' 8" (1.422 m)     Body mass index is 23.76 kg/m².      Physical Exam:  Constitutional: The patient appears well-developed and well-nourished. No distress.   Skin: No lesions appreciated  Head: Normocephalic and atraumatic.   Nose: Nose normal.   Ears: No deformities seen  Eyes: Conjunctivae and EOM are normal.   Neck: No tracheal deviation present.   Cardiovascular: Normal rate and intact distal pulses.    Pulmonary/Chest: Effort normal. No respiratory distress.   Abdominal: There is no guarding.   Neurological: The patient is alert.   Psychiatric: The patient has a normal mood and affect.     Left Hand/Wrist Examination:    Observation/Inspection:  Swelling  none    Deformity  none  Discoloration  none     Scars   none    Atrophy  none  Global tenderness palpation about the left wrist, worse to the radial styloid       HAND/WRIST EXAMINATION:  Finkelstein's Test   positive  WHAT Test    positive  Snuff box tenderness   Neg  Ahmadi's Test    Neg  Hook of Hamate Tenderness  Neg  CMC grind    Neg  Circumduction test   Neg    Neurovascular Exam:  Digits WWP, brisk CR < 3s throughout  NVI motor/LTS to M/R/U nerves, radial pulse 2+    ROM hand full, painless    ROM wrist nearly full though somewhat painful today    ROM elbow full, painless    Abdomen not guarded  Respirations nonlabored  Perfusion intact    Diagnostic Results:     Imaging - I " independently viewed the patient's imaging as well as the radiology report.  Xrays of the patient's left wrist  demonstrate no evidence of any acute fractures or dislocations or significant degenerative changes.    EMG - none    ASSESSMENT/PLAN:      78 y.o. yo female with atraumatic left wrist pain  Plan: The patient and I had a thorough discussion today.  We discussed the working diagnosis as well as several other potential alternative diagnoses.  Treatment options were discussed, both conservative and surgical.  Conservative treatment options would include things such as activity modifications, workplace modifications, a period of rest, oral vs topical OTC and prescription anti-inflammatory medications, occupational therapy, splinting/bracing, immobilization, corticosteroid injections, and others.  Surgical options were discussed as well.     At this time, the patient would like to proceed with a trial of occupational therapy which will be ordered today.  I would recommend avoiding further immobilization as I believe this has contributed to some stiffness.  Her biggest issue seems to be possibly some de Quervain tenosynovitis but she does have more global tenderness today.  I do not appreciate any definitive fractures on her x-rays.  Follow-up in 6 weeks for re-evaluation or sooner for any problems.    Should the patient's symptoms worsen, persist, or fail to improve they should return for reevaluation and I would be happy to see them back anytime.        Jamel Yeager M.D.    Please be aware that this note has been generated with the assistance of VeriShow voice-to-text.  Please excuse any spelling or grammatical errors.    Thank you for choosing Dr. Jamel Yeager for your orthopedic hand and upper extremity care. It is our goal to provide you with exceptional care that will help keep you healthy, active, and get you back in the game.     If you felt that you received exemplary care today, please consider leaving  feedback for Dr. Yeager on [email or web address] at [email or web address].    Please do not hesitate to reach out to us via email, phone, or MyChart with any questions, concerns, or feedback.

## 2023-10-21 ENCOUNTER — PATIENT MESSAGE (OUTPATIENT)
Dept: ENDOCRINOLOGY | Facility: CLINIC | Age: 78
End: 2023-10-21
Payer: MEDICARE

## 2023-10-23 RX ORDER — LANCETS 28 GAUGE
EACH MISCELLANEOUS
Qty: 100 EACH | Refills: 3 | Status: SHIPPED | OUTPATIENT
Start: 2023-10-23

## 2023-10-24 PROBLEM — Z74.09 IMPAIRED MOBILITY AND ADLS: Status: ACTIVE | Noted: 2023-10-24

## 2023-10-24 PROBLEM — Z78.9 IMPAIRED MOBILITY AND ADLS: Status: ACTIVE | Noted: 2023-10-24

## 2023-10-30 ENCOUNTER — OFFICE VISIT (OUTPATIENT)
Dept: ENDOCRINOLOGY | Facility: CLINIC | Age: 78
End: 2023-10-30
Payer: MEDICARE

## 2023-10-30 VITALS
RESPIRATION RATE: 16 BRPM | WEIGHT: 106 LBS | SYSTOLIC BLOOD PRESSURE: 136 MMHG | HEART RATE: 74 BPM | DIASTOLIC BLOOD PRESSURE: 74 MMHG | BODY MASS INDEX: 23.84 KG/M2 | HEIGHT: 56 IN

## 2023-10-30 DIAGNOSIS — E78.2 MIXED HYPERLIPIDEMIA: ICD-10-CM

## 2023-10-30 DIAGNOSIS — E11.65 TYPE 2 DIABETES MELLITUS WITH HYPERGLYCEMIA, WITH LONG-TERM CURRENT USE OF INSULIN: Primary | ICD-10-CM

## 2023-10-30 DIAGNOSIS — Z79.4 TYPE 2 DIABETES MELLITUS WITH HYPERGLYCEMIA, WITH LONG-TERM CURRENT USE OF INSULIN: Primary | ICD-10-CM

## 2023-10-30 DIAGNOSIS — E78.00 PURE HYPERCHOLESTEROLEMIA: ICD-10-CM

## 2023-10-30 PROCEDURE — 3075F PR MOST RECENT SYSTOLIC BLOOD PRESS GE 130-139MM HG: ICD-10-PCS | Mod: HCNC,CPTII,NTX,S$GLB | Performed by: STUDENT IN AN ORGANIZED HEALTH CARE EDUCATION/TRAINING PROGRAM

## 2023-10-30 PROCEDURE — 1101F PR PT FALLS ASSESS DOC 0-1 FALLS W/OUT INJ PAST YR: ICD-10-PCS | Mod: HCNC,CPTII,NTX,S$GLB | Performed by: STUDENT IN AN ORGANIZED HEALTH CARE EDUCATION/TRAINING PROGRAM

## 2023-10-30 PROCEDURE — 95251 PR GLUCOSE MONITOR, 72 HOUR, PHYS INTERP: ICD-10-PCS | Mod: HCNC,NTX,S$GLB, | Performed by: STUDENT IN AN ORGANIZED HEALTH CARE EDUCATION/TRAINING PROGRAM

## 2023-10-30 PROCEDURE — 1101F PT FALLS ASSESS-DOCD LE1/YR: CPT | Mod: HCNC,CPTII,NTX,S$GLB | Performed by: STUDENT IN AN ORGANIZED HEALTH CARE EDUCATION/TRAINING PROGRAM

## 2023-10-30 PROCEDURE — 1159F MED LIST DOCD IN RCRD: CPT | Mod: HCNC,CPTII,NTX,S$GLB | Performed by: STUDENT IN AN ORGANIZED HEALTH CARE EDUCATION/TRAINING PROGRAM

## 2023-10-30 PROCEDURE — 3288F FALL RISK ASSESSMENT DOCD: CPT | Mod: HCNC,CPTII,NTX,S$GLB | Performed by: STUDENT IN AN ORGANIZED HEALTH CARE EDUCATION/TRAINING PROGRAM

## 2023-10-30 PROCEDURE — 99999 PR PBB SHADOW E&M-EST. PATIENT-LVL IV: ICD-10-PCS | Mod: PBBFAC,HCNC,TXP, | Performed by: STUDENT IN AN ORGANIZED HEALTH CARE EDUCATION/TRAINING PROGRAM

## 2023-10-30 PROCEDURE — 3288F PR FALLS RISK ASSESSMENT DOCUMENTED: ICD-10-PCS | Mod: HCNC,CPTII,NTX,S$GLB | Performed by: STUDENT IN AN ORGANIZED HEALTH CARE EDUCATION/TRAINING PROGRAM

## 2023-10-30 PROCEDURE — 1126F AMNT PAIN NOTED NONE PRSNT: CPT | Mod: HCNC,CPTII,NTX,S$GLB | Performed by: STUDENT IN AN ORGANIZED HEALTH CARE EDUCATION/TRAINING PROGRAM

## 2023-10-30 PROCEDURE — 95251 CONT GLUC MNTR ANALYSIS I&R: CPT | Mod: HCNC,NTX,S$GLB, | Performed by: STUDENT IN AN ORGANIZED HEALTH CARE EDUCATION/TRAINING PROGRAM

## 2023-10-30 PROCEDURE — 99214 PR OFFICE/OUTPT VISIT, EST, LEVL IV, 30-39 MIN: ICD-10-PCS | Mod: 25,HCNC,NTX,S$GLB | Performed by: STUDENT IN AN ORGANIZED HEALTH CARE EDUCATION/TRAINING PROGRAM

## 2023-10-30 PROCEDURE — 3075F SYST BP GE 130 - 139MM HG: CPT | Mod: HCNC,CPTII,NTX,S$GLB | Performed by: STUDENT IN AN ORGANIZED HEALTH CARE EDUCATION/TRAINING PROGRAM

## 2023-10-30 PROCEDURE — 3078F DIAST BP <80 MM HG: CPT | Mod: HCNC,CPTII,NTX,S$GLB | Performed by: STUDENT IN AN ORGANIZED HEALTH CARE EDUCATION/TRAINING PROGRAM

## 2023-10-30 PROCEDURE — 1126F PR PAIN SEVERITY QUANTIFIED, NO PAIN PRESENT: ICD-10-PCS | Mod: HCNC,CPTII,NTX,S$GLB | Performed by: STUDENT IN AN ORGANIZED HEALTH CARE EDUCATION/TRAINING PROGRAM

## 2023-10-30 PROCEDURE — 3078F PR MOST RECENT DIASTOLIC BLOOD PRESSURE < 80 MM HG: ICD-10-PCS | Mod: HCNC,CPTII,NTX,S$GLB | Performed by: STUDENT IN AN ORGANIZED HEALTH CARE EDUCATION/TRAINING PROGRAM

## 2023-10-30 PROCEDURE — 1159F PR MEDICATION LIST DOCUMENTED IN MEDICAL RECORD: ICD-10-PCS | Mod: HCNC,CPTII,NTX,S$GLB | Performed by: STUDENT IN AN ORGANIZED HEALTH CARE EDUCATION/TRAINING PROGRAM

## 2023-10-30 PROCEDURE — 99999 PR PBB SHADOW E&M-EST. PATIENT-LVL IV: CPT | Mod: PBBFAC,HCNC,TXP, | Performed by: STUDENT IN AN ORGANIZED HEALTH CARE EDUCATION/TRAINING PROGRAM

## 2023-10-30 PROCEDURE — 99214 OFFICE O/P EST MOD 30 MIN: CPT | Mod: 25,HCNC,NTX,S$GLB | Performed by: STUDENT IN AN ORGANIZED HEALTH CARE EDUCATION/TRAINING PROGRAM

## 2023-10-30 RX ORDER — NYSTATIN 100000 U/G
CREAM TOPICAL 2 TIMES DAILY
Qty: 30 G | Refills: 0 | Status: SHIPPED | OUTPATIENT
Start: 2023-10-30

## 2023-10-30 RX ORDER — FLUCONAZOLE 150 MG/1
TABLET ORAL
Qty: 2 TABLET | Refills: 0 | Status: SHIPPED | OUTPATIENT
Start: 2023-10-30

## 2023-10-30 NOTE — PATIENT INSTRUCTIONS
Start Jardiance 25 mg daily    Continue Levemir 40 units daily    Continue metformin 500 mg daily    Continu Trulicity 1.5 mg weekly    Go to [email or web address], login with your account and upload data from the monitor

## 2023-10-30 NOTE — ASSESSMENT & PLAN NOTE
Improved with A1c 11.5 -> 9.8% but still uncontrolled with average glucose around 250s. Appears to have fasting glucose at goal so will keep same basal insulin dose.    Will add SGLT-2 inhibitor for prandial coverage    For next labs will check C-peptide, may need to consider prandial insulin.    Continue low dose metformin and GLP-1 RA.    Plan  - Continue Trulicity 1.5 mg weekly  - Continue metformin 500 mg daily  - Continue Levemir 40 U HS  - Start Jardiance 25 mg daily - side effects discussed, prescribed PRN Diflucan and nystatin  - Continue FreeStyle Shola 3 CGM      F/u 3 months with labs

## 2023-10-30 NOTE — PROGRESS NOTES
Subjective:      Patient ID: Juliette Seo is a 78 y.o. female.    Chief Complaint:  Type 2 diabetes mellitus    History of Present Illness  This is a 78 y.o. female. with a past medical history of Type 2 diabetes mellitus, HTN, HLD here for evaluation.    Type 2 diabetes mellitus  Diagnosed around age 40    Current diabetes medications:  - Levemir 40 U PM  - Trulicity 1.5 mg weekly  - Metformin 500 mg daily - tolerating okay, but fears side effects    Novolog SSI 4 times daily    INSULIN CORRECTION SCALE    Glucose                 Insulin           181-200               +2 units                     201-250               +4 units                      251-300               +6 units                     301-350               +8 units                      >350                    +10 units                       Past diabetes medications:  - Farxiga       Lab Results   Component Value Date    CREATININE 0.58 09/27/2023    EGFRNORACEVR >60.0 09/27/2023       Known diabetic complications: none    Weight trend:  Wt Readings from Last 8 Encounters:   10/30/23 48.1 kg (106 lb)   10/19/23 48.1 kg (106 lb)   10/02/23 48.2 kg (106 lb 4.2 oz)   09/27/23 48.2 kg (106 lb 4.2 oz)   08/24/23 47 kg (103 lb 9.9 oz)   08/18/23 47 kg (103 lb 11.2 oz)   08/07/23 47.6 kg (104 lb 13.3 oz)   07/20/23 46.9 kg (103 lb 6.4 oz)       Family history of diabetes:  Yes    Prior visit with diabetes education: Yes    Current diet: 3 meals per day    Blood glucose monitoring at home: 4x/day  Home blood sugar records:         Diabetes Management Status  Statin: Taking  ACE/ARB: Not taking    Screening or Prevention Patient's value   HgA1C Testing and Control   Lab Results   Component Value Date    HGBA1C 9.8 (H) 09/27/2023        LDL control Lab Results   Component Value Date    LDLCALC 93.8 05/18/2023      Nephropathy screening Lab Results   Component Value Date    MICALBCREAT 39.7 (H) 07/22/2021        Lab Results   Component Value Date    TSH 2.978  "05/18/2023       Last eye exam: : 12/02/2020      Review of Systems  As above    Social and family history reviewed  Current medications and allergies reviewed    Objective:   /74 (BP Location: Left arm, Patient Position: Sitting, BP Method: Medium (Manual))   Pulse 74   Resp 16   Ht 4' 8" (1.422 m)   Wt 48.1 kg (106 lb)   BMI 23.76 kg/m²   Physical Exam  Alert, oriented    BP Readings from Last 1 Encounters:   10/30/23 136/74      Wt Readings from Last 1 Encounters:   10/30/23 1010 48.1 kg (106 lb)     Body mass index is 23.76 kg/m².    Lab Review:   Lab Results   Component Value Date    HGBA1C 9.8 (H) 09/27/2023     Lab Results   Component Value Date    CHOL 168 05/18/2023    HDL 32 (L) 05/18/2023    LDLCALC 93.8 05/18/2023    TRIG 211 (H) 05/18/2023    CHOLHDL 19.0 (L) 05/18/2023     Lab Results   Component Value Date     09/27/2023    K 3.8 09/27/2023     09/27/2023    CO2 26 09/27/2023     (H) 09/27/2023    BUN 11 09/27/2023    CREATININE 0.58 09/27/2023    CALCIUM 7.9 (L) 09/27/2023    PROT 7.9 09/26/2023    ALBUMIN 4.5 09/26/2023    BILITOT 0.4 09/26/2023    ALKPHOS 94 09/26/2023    AST 27 09/26/2023    ALT 23 09/26/2023    ANIONGAP 10 09/27/2023    ESTGFRAFRICA >60.0 07/13/2022    EGFRNONAA >60.0 07/13/2022    TSH 2.978 05/18/2023       All pertinent labs reviewed    Assessment and Plan     Type 2 diabetes mellitus with hyperglycemia, with long-term current use of insulin  Improved with A1c 11.5 -> 9.8% but still uncontrolled with average glucose around 250s. Appears to have fasting glucose at goal so will keep same basal insulin dose.    Will add SGLT-2 inhibitor for prandial coverage    For next labs will check C-peptide, may need to consider prandial insulin.    Continue low dose metformin and GLP-1 RA.    Plan  - Continue Trulicity 1.5 mg weekly  - Continue metformin 500 mg daily  - Continue Levemir 40 U HS  - Start Jardiance 25 mg daily - side effects discussed, prescribed " PRN Diflucan and nystatin  - Continue FreeStyle Shola 3 CGM      F/u 3 months with labs    BMI 23.0-23.9, adult  Monitor while on GLP-1 RA    Mixed hyperlipidemia  Continue antihypertensive regimen including ARB/ACEi    Pure hypercholesterolemia  Continue statin          Adis Pedraza MD  Endocrinology

## 2023-11-11 ENCOUNTER — PATIENT MESSAGE (OUTPATIENT)
Dept: ENDOCRINOLOGY | Facility: CLINIC | Age: 78
End: 2023-11-11
Payer: MEDICARE

## 2023-11-13 DIAGNOSIS — E11.65 TYPE 2 DIABETES MELLITUS WITH HYPERGLYCEMIA, WITH LONG-TERM CURRENT USE OF INSULIN: Primary | ICD-10-CM

## 2023-11-13 DIAGNOSIS — Z79.4 TYPE 2 DIABETES MELLITUS WITH HYPERGLYCEMIA, WITH LONG-TERM CURRENT USE OF INSULIN: Primary | ICD-10-CM

## 2023-11-13 RX ORDER — INSULIN DETEMIR 100 [IU]/ML
40 INJECTION, SOLUTION SUBCUTANEOUS NIGHTLY
Qty: 12 ML | Refills: 11 | Status: SHIPPED | OUTPATIENT
Start: 2023-11-13 | End: 2024-02-28

## 2023-11-14 ENCOUNTER — HOSPITAL ENCOUNTER (OUTPATIENT)
Dept: PULMONOLOGY | Facility: CLINIC | Age: 78
Discharge: HOME | End: 2023-11-14
Payer: MEDICARE

## 2023-11-14 ENCOUNTER — OFFICE VISIT (OUTPATIENT)
Dept: TRANSPLANT | Facility: CLINIC | Age: 78
End: 2023-11-14
Payer: MEDICARE

## 2023-11-14 VITALS
BODY MASS INDEX: 23.96 KG/M2 | OXYGEN SATURATION: 97 % | SYSTOLIC BLOOD PRESSURE: 166 MMHG | HEART RATE: 100 BPM | HEIGHT: 56 IN | DIASTOLIC BLOOD PRESSURE: 72 MMHG | WEIGHT: 106.5 LBS

## 2023-11-14 DIAGNOSIS — K21.9 GASTROESOPHAGEAL REFLUX DISEASE, UNSPECIFIED WHETHER ESOPHAGITIS PRESENT: ICD-10-CM

## 2023-11-14 DIAGNOSIS — J47.9 BRONCHIECTASIS WITHOUT COMPLICATION: ICD-10-CM

## 2023-11-14 DIAGNOSIS — J47.9 BRONCHIECTASIS WITHOUT COMPLICATION: Primary | ICD-10-CM

## 2023-11-14 DIAGNOSIS — J30.2 SEASONAL ALLERGIC RHINITIS, UNSPECIFIED TRIGGER: ICD-10-CM

## 2023-11-14 PROCEDURE — 99214 PR OFFICE/OUTPT VISIT, EST, LEVL IV, 30-39 MIN: ICD-10-PCS | Mod: 25,HCNC,NTX,S$GLB | Performed by: NURSE PRACTITIONER

## 2023-11-14 PROCEDURE — 99214 OFFICE O/P EST MOD 30 MIN: CPT | Mod: 25,HCNC,NTX,S$GLB | Performed by: NURSE PRACTITIONER

## 2023-11-14 PROCEDURE — 99999 PR PBB SHADOW E&M-EST. PATIENT-LVL IV: CPT | Mod: PBBFAC,HCNC,TXP, | Performed by: NURSE PRACTITIONER

## 2023-11-14 PROCEDURE — 3077F SYST BP >= 140 MM HG: CPT | Mod: HCNC,CPTII,NTX,S$GLB | Performed by: NURSE PRACTITIONER

## 2023-11-14 PROCEDURE — 3288F PR FALLS RISK ASSESSMENT DOCUMENTED: ICD-10-PCS | Mod: HCNC,CPTII,NTX,S$GLB | Performed by: NURSE PRACTITIONER

## 2023-11-14 PROCEDURE — 94010 BREATHING CAPACITY TEST: ICD-10-PCS | Mod: HCNC,NTX,S$GLB, | Performed by: INTERNAL MEDICINE

## 2023-11-14 PROCEDURE — 3288F FALL RISK ASSESSMENT DOCD: CPT | Mod: HCNC,CPTII,NTX,S$GLB | Performed by: NURSE PRACTITIONER

## 2023-11-14 PROCEDURE — 1159F PR MEDICATION LIST DOCUMENTED IN MEDICAL RECORD: ICD-10-PCS | Mod: HCNC,CPTII,NTX,S$GLB | Performed by: NURSE PRACTITIONER

## 2023-11-14 PROCEDURE — 94727 PR PULM FUNCTION TEST BY GAS: ICD-10-PCS | Mod: HCNC,NTX,S$GLB, | Performed by: INTERNAL MEDICINE

## 2023-11-14 PROCEDURE — 1101F PT FALLS ASSESS-DOCD LE1/YR: CPT | Mod: HCNC,CPTII,NTX,S$GLB | Performed by: NURSE PRACTITIONER

## 2023-11-14 PROCEDURE — 3078F PR MOST RECENT DIASTOLIC BLOOD PRESSURE < 80 MM HG: ICD-10-PCS | Mod: HCNC,CPTII,NTX,S$GLB | Performed by: NURSE PRACTITIONER

## 2023-11-14 PROCEDURE — 1160F PR REVIEW ALL MEDS BY PRESCRIBER/CLIN PHARMACIST DOCUMENTED: ICD-10-PCS | Mod: HCNC,CPTII,NTX,S$GLB | Performed by: NURSE PRACTITIONER

## 2023-11-14 PROCEDURE — 1160F RVW MEDS BY RX/DR IN RCRD: CPT | Mod: HCNC,CPTII,NTX,S$GLB | Performed by: NURSE PRACTITIONER

## 2023-11-14 PROCEDURE — 1126F PR PAIN SEVERITY QUANTIFIED, NO PAIN PRESENT: ICD-10-PCS | Mod: HCNC,CPTII,NTX,S$GLB | Performed by: NURSE PRACTITIONER

## 2023-11-14 PROCEDURE — 3077F PR MOST RECENT SYSTOLIC BLOOD PRESSURE >= 140 MM HG: ICD-10-PCS | Mod: HCNC,CPTII,NTX,S$GLB | Performed by: NURSE PRACTITIONER

## 2023-11-14 PROCEDURE — 94727 GAS DIL/WSHOT DETER LNG VOL: CPT | Mod: HCNC,NTX,S$GLB, | Performed by: INTERNAL MEDICINE

## 2023-11-14 PROCEDURE — 1126F AMNT PAIN NOTED NONE PRSNT: CPT | Mod: HCNC,CPTII,NTX,S$GLB | Performed by: NURSE PRACTITIONER

## 2023-11-14 PROCEDURE — 3078F DIAST BP <80 MM HG: CPT | Mod: HCNC,CPTII,NTX,S$GLB | Performed by: NURSE PRACTITIONER

## 2023-11-14 PROCEDURE — 1101F PR PT FALLS ASSESS DOC 0-1 FALLS W/OUT INJ PAST YR: ICD-10-PCS | Mod: HCNC,CPTII,NTX,S$GLB | Performed by: NURSE PRACTITIONER

## 2023-11-14 PROCEDURE — 94010 BREATHING CAPACITY TEST: CPT | Mod: HCNC,NTX,S$GLB, | Performed by: INTERNAL MEDICINE

## 2023-11-14 PROCEDURE — 99999 PR PBB SHADOW E&M-EST. PATIENT-LVL IV: ICD-10-PCS | Mod: PBBFAC,HCNC,TXP, | Performed by: NURSE PRACTITIONER

## 2023-11-14 PROCEDURE — 1159F MED LIST DOCD IN RCRD: CPT | Mod: HCNC,CPTII,NTX,S$GLB | Performed by: NURSE PRACTITIONER

## 2023-11-14 PROCEDURE — 94729 DIFFUSING CAPACITY: CPT | Mod: HCNC,NTX,S$GLB, | Performed by: INTERNAL MEDICINE

## 2023-11-14 PROCEDURE — 94729 PR C02/MEMBANE DIFFUSE CAPACITY: ICD-10-PCS | Mod: HCNC,NTX,S$GLB, | Performed by: INTERNAL MEDICINE

## 2023-11-14 NOTE — PROGRESS NOTES
ADVANCED LUNG DISEASE CLINIC FOLLOW UP VISIT                                                                                                                                             Reason for Visit:  ALD    Referring Physician: Rain Wilson P*    History of Present Illness: Juliette Seo is a 78 y.o. female who is on 0L of oxygen.  She is on no assisted ventilation.  Her New York Heart Association Class is I and a Karnofsky score of 90% - Able to carry on normal activity: minor symptoms of disease. She is diabetic insulin dependent    Patient presents today for routine follow up. Reports improvement in cough and dyspnea since previous visit.  Denies GERD or sinus symptoms. No recent exacerbations/hospitalizations. Overall, stable.     PER INITIAL HPI:    Patient presents today for evaluation of ILD. Previously followed for bronchiectasis in 2018 by Dr. Edmondson, and most recently seen by Dr. Brush two weeks prior to today's visit. Patient states symptoms began to progress about three months ago. She noticed increased dry cough and dyspnea with exertion. At that time, she had discontinued her PPI per her PCP's recommendation. She notes increased reflux and cough since stopping her PPI. She has a history of COVID x 2. Required hospitalization in 2021. She briefly required oxygen during her admission, but was discharged on room air. She is not vaccinated for COVID. Received bamlanivimab, remdesivir, and dexamethasone. She tested positive again in 12/2022. She states she has been prescribed albuterol and is awaiting nebulizer supplies. She states she takes tessalon pearls for her cough, which provides some relief. Cough is intermittent in duration with no alleviating/exacerbation factors.     Patient was started on a prednisone taper after her visit with Dr. Brush on 7/5. She completed one week of prednisone 20 mg daily and is on prednisone 10 mg daily for two weeks. Per patient's daughter, she notes some  improvement in patient's dyspnea and cough since starting steroids. Patient's daughter also reports patient has had poor appetite and early satiety for the last few months. She required hospitalization for left sided chest pain 6/17.     Patient has minimal smoking history and quit over 20 years ago. No history of frequent infections during childhood/adolescence. Notes some increased rhinorrhea/post-nasal drip which occurs with seasonal changes. Grew up in [facility] and has lived in [place] since 1960. She has multiple occupational exposures to chemicals/metal/dust, absbestos, cleaning supplies, construction. 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 No birds for pets.    Patient denies history of rashes, myalgias, arthralgias, muscular weakness. Autoimmune workup thus far unrevealing. No symptoms of dysphagia, throat clearing, globus sensation.        Past Medical History:   Diagnosis Date    Allergy     Anxiety     Arthritis     osteo    Asthma     Diabetic retinopathy     GERD (gastroesophageal reflux disease)     High cholesterol     Hypertension     Kidney stone     Type 2 diabetes mellitus, uncontrolled, with neuropathy     Urinary tract infection     Vaginal infection        Past Surgical History:   Procedure Laterality Date    CHOLECYSTECTOMY      COLONOSCOPY N/A 6/8/2021    Procedure: COLONOSCOPY;  Surgeon: Rain Pop MD;  Location: [facility];  Service: Endoscopy;  Laterality: N/A;    COLONOSCOPY N/A 7/8/2021    Procedure: colonoscopy with single balloon scope;  Surgeon: Steffen Dueñas MD;  Location: [facility];  Service: Endoscopy;  Laterality: N/A;    ESOPHAGOGASTRODUODENOSCOPY N/A 6/8/2021    Procedure: EGD (ESOPHAGOGASTRODUODENOSCOPY);  Surgeon: Rain Pop MD;  Location: [facility];  Service: Endoscopy;  Laterality: N/A;    EYE SURGERY      HYSTERECTOMY      INCISIONAL HERNIA REPAIR  2003    ROTATOR CUFF REPAIR Right 11/07/2017    TRIGGER FINGER RELEASE Right 6/3/2022     "Procedure: RELEASE, TRIGGER FINGER; right index, middle, ring;  Surgeon: Sarmad Wesley Jr., MD;  Location: [facility] [place];  Service: Orthopedics;  Laterality: Right;       Allergies: Actos [pioglitazone], Darvocet a500 [propoxyphene n-acetaminophen], and Dilaudid [hydromorphone (bulk)]    Current Outpatient Medications   Medication Sig    acetaminophen (TYLENOL) 500 MG tablet Take 1 tablet (500 mg total) by mouth every 4 (four) hours as needed for Pain.    albuterol (PROVENTIL) 2.5 mg /3 mL (0.083 %) nebulizer solution Take 3 mLs (2.5 mg total) by nebulization every 6 (six) hours as needed for Wheezing. Rescue    aspirin (ECOTRIN) 81 MG EC tablet Take 81 mg by mouth once daily.    atorvastatin (LIPITOR) 40 MG tablet Take 1 tablet (40 mg total) by mouth once daily.    azelastine (ASTELIN) 137 mcg (0.1 %) nasal spray 1 spray (137 mcg total) by Nasal route 2 (two) times daily.    cyclobenzaprine (FLEXERIL) 10 MG tablet Take 1 tablet 3 times a day by oral route as needed.    diclofenac sodium (VOLTAREN) 1 % Gel Apply 4 g topically 4 (four) times daily.    empagliflozin (JARDIANCE) 25 mg tablet Take 1 tablet (25 mg total) by mouth once daily.    fluconazole (DIFLUCAN) 150 MG Tab Take once, repeat dose at 72 hours if symptoms not improved    fluticasone propionate (FLONASE) 50 mcg/actuation nasal spray 1 spray (50 mcg total) by Each Nostril route once daily.    HYDROcodone-acetaminophen (NORCO) 5-325 mg per tablet Take 1 tablet by mouth every 8 (eight) hours as needed for Pain.    insulin detemir U-100, Levemir, (LEVEMIR FLEXPEN) 100 unit/mL (3 mL) InPn pen Inject 40 Units into the skin every evening.    insulin syringe-needle U-100 (BD INSULIN SYRINGE ULTRA-FINE) 0.5 mL 31 gauge x 5/16" Syrg USE AS DIRECTED TWICE A DAY    losartan-hydrochlorothiazide 100-12.5 mg (HYZAAR) 100-12.5 mg Tab Take 1 tablet by mouth once daily.    meclizine (ANTIVERT) 25 mg tablet Take 1 tablet (25 mg total) by mouth 3 (three) times daily as needed " "for Dizziness.    metFORMIN (GLUCOPHAGE) 500 MG tablet Take 1 tablet (500 mg total) by mouth 2 (two) times daily with meals. (Patient taking differently: Take 500 mg by mouth daily with breakfast.)    metoprolol tartrate (LOPRESSOR) 25 MG tablet Take 0.5 tablets (12.5 mg total) by mouth 2 (two) times daily.    montelukast (SINGULAIR) 10 mg tablet TAKE 1 TABLET BY MOUTH EVERY DAY IN THE EVENING    nystatin (MYCOSTATIN) cream Apply topically 2 (two) times daily.    ondansetron (ZOFRAN-ODT) 4 MG TbDL Take 1 tablet (4 mg total) by mouth every 6 (six) hours as needed (nausea).    pantoprazole (PROTONIX) 40 MG tablet Take 1 tablet (40 mg total) by mouth once daily.    QUEtiapine (SEROQUEL) 25 MG Tab Take 1 tablet (25 mg total) by mouth every evening.    TRUE METRIX GLUCOSE TEST STRIP Strp 1 strip 2 (two) times daily.    TRUEPLUS LANCETS 33 gauge Misc Apply topically 2 (two) times daily.    TRUEPLUS PEN NEEDLE 32 gauge x 5/32" Ndle To inject insulin twice daily    TRULICITY 1.5 mg/0.5 mL pen injector Inject into the skin.     No current facility-administered medications for this visit.     Facility-Administered Medications Ordered in Other Visits   Medication    triamcinolone acetonide injection 40 mg       Immunization History   Administered Date(s) Administered    Influenza 10/31/2012    Influenza (FLUAD) - Quadrivalent - Adjuvanted - PF *Preferred* (65+) 11/13/2020, 10/02/2023    Influenza - High Dose - PF (65 years and older) 09/01/2016, 12/01/2017, 10/04/2018, 10/24/2019    Influenza - Quadrivalent - High Dose - PF (65 years and older) 11/07/2021    Influenza - Quadrivalent - PF *Preferred* (6 months and older) 10/19/2015    Influenza - Trivalent (ADULT) 10/31/2012    Influenza Split 10/08/2014    Pneumococcal Conjugate - 13 Valent 10/19/2015    Pneumococcal Polysaccharide - 23 Valent 04/18/2019    Zoster Recombinant 12/26/2019, 09/12/2020     Family History:    Family History   Problem Relation Age of Onset    Stroke " Mother     Diabetes Sister     Diabetes Brother     Kidney disease Neg Hx      Social History     Substance and Sexual Activity   Alcohol Use Not Currently    Comment: social      Social History     Substance and Sexual Activity   Drug Use No      Social History     Socioeconomic History    Marital status:    Tobacco Use    Smoking status: Former     Passive exposure: Never    Smokeless tobacco: Never   Substance and Sexual Activity    Alcohol use: Not Currently     Comment: social    Drug use: No    Sexual activity: Yes     Partners: Male     Birth control/protection: None     Social Determinants of Health     Financial Resource Strain: Low Risk  (5/31/2023)    Overall Financial Resource Strain (CARDIA)     Difficulty of Paying Living Expenses: Not hard at all   Food Insecurity: No Food Insecurity (5/31/2023)    Hunger Vital Sign     Worried About Running Out of Food in the Last Year: Never true     Ran Out of Food in the Last Year: Never true   Transportation Needs: No Transportation Needs (5/31/2023)    PRAPARE - Transportation     Lack of Transportation (Medical): No     Lack of Transportation (Non-Medical): No   Physical Activity: Unknown (5/31/2023)    Exercise Vital Sign     Days of Exercise per Week: 3 days   Stress: Stress Concern Present (5/31/2023)    [place] [employer] - Occupational Stress Questionnaire     Feeling of Stress : To some extent   Social Connections: Unknown (5/31/2023)    Social Connection and Isolation Panel [NHANES]     Frequency of Communication with Friends and Family: More than three times a week     Frequency of Social Gatherings with Friends and Family: Once a week     Active Member of Clubs or Organizations: No     Marital Status:    Housing Stability: Unknown (5/31/2023)    Housing Stability Vital Sign     Unable to Pay for Housing in the Last Year: No     Unstable Housing in the Last Year: No     Review of Systems   Constitutional:  Negative  "for chills, diaphoresis, fever, malaise/fatigue and weight loss.   HENT:  Negative for congestion, ear discharge, ear pain, hearing loss, nosebleeds, sinus pain, sore throat and tinnitus.    Eyes:  Negative for blurred vision, double vision, photophobia, pain, discharge and redness.   Respiratory:  Positive for cough (chronic, stable) and shortness of breath (stable). Negative for hemoptysis, sputum production, wheezing and stridor.    Cardiovascular:  Negative for chest pain, palpitations, orthopnea, claudication, leg swelling and PND.   Gastrointestinal:  Negative for abdominal pain, blood in stool, constipation, diarrhea, heartburn, melena, nausea and vomiting.   Genitourinary:  Negative for dysuria, flank pain, frequency, hematuria and urgency.   Musculoskeletal:  Negative for back pain, falls, joint pain, myalgias and neck pain.   Skin:  Negative for itching and rash.   Neurological:  Negative for dizziness, tingling, tremors, sensory change, speech change, focal weakness, seizures, loss of consciousness, weakness and headaches.   Endo/Heme/Allergies:  Negative for environmental allergies and polydipsia. Does not bruise/bleed easily.   Psychiatric/Behavioral:  Negative for depression, hallucinations, memory loss, substance abuse and suicidal ideas. The patient is not nervous/anxious and does not have insomnia.      Vitals  BP (!) 166/72 (BP Location: Left arm, Patient Position: Sitting, BP Method: Medium (Automatic))   Pulse 100   Ht 4' 8" (1.422 m)   Wt 48.3 kg (106 lb 7.7 oz)   SpO2 97% Comment: room air  BMI 23.87 kg/m²   Physical Exam  Vitals and nursing note reviewed.   Constitutional:       General: She is not in acute distress.     Appearance: Normal appearance.   HENT:      Head: Normocephalic and atraumatic.   Eyes:      General: No scleral icterus.     Conjunctiva/sclera: Conjunctivae normal.   Cardiovascular:      Rate and Rhythm: Normal rate.      Heart sounds: No murmur heard.     No friction " rub.   Pulmonary:      Effort: No respiratory distress.      Breath sounds: No wheezing, rhonchi or rales.   Abdominal:      General: Bowel sounds are normal. There is no distension.      Palpations: Abdomen is soft.      Tenderness: There is no abdominal tenderness.   Musculoskeletal:      Right lower leg: No edema.      Left lower leg: No edema.   Skin:     General: Skin is warm and dry.   Neurological:      General: No focal deficit present.      Mental Status: She is alert and oriented to person, place, and time.   Psychiatric:         Mood and Affect: Mood normal.         Behavior: Behavior normal.       Labs:  No visits with results within 7 Day(s) from this visit.   Latest known visit with results is:   Admission on 09/26/2023, Discharged on 09/27/2023   Component Date Value    WBC 09/26/2023 8.11     RBC 09/26/2023 4.83     Hemoglobin 09/26/2023 14.3     Hematocrit 09/26/2023 42.3     MCV 09/26/2023 88     MCH 09/26/2023 29.6     MCHC 09/26/2023 33.8     RDW 09/26/2023 12.5     Platelets 09/26/2023 202     MPV 09/26/2023 11.0     Immature Granulocytes 09/26/2023 0.4     Gran # (ANC) 09/26/2023 5.2     Immature Grans (Abs) 09/26/2023 0.03     Lymph # 09/26/2023 2.3     Mono # 09/26/2023 0.5     Eos # 09/26/2023 0.1     Baso # 09/26/2023 0.03     nRBC 09/26/2023 0     Gran % 09/26/2023 63.8     Lymph % 09/26/2023 28.0     Mono % 09/26/2023 6.5     Eosinophil % 09/26/2023 0.9     Basophil % 09/26/2023 0.4     Differential Method 09/26/2023 Automated     Sed Rate 09/26/2023 33 (H)     Sodium 09/26/2023 137     Potassium 09/26/2023 4.8     Chloride 09/26/2023 99     CO2 09/26/2023 27     Glucose 09/26/2023 341 (H)     BUN 09/26/2023 13     Creatinine 09/26/2023 0.50     Calcium 09/26/2023 8.7     Total Protein 09/26/2023 7.9     Albumin 09/26/2023 4.5     Total Bilirubin 09/26/2023 0.4     Alkaline Phosphatase 09/26/2023 94     AST 09/26/2023 27     ALT 09/26/2023 23     Anion Gap 09/26/2023 11     eGFR  09/26/2023 >60.0     Troponin I 09/26/2023 <0.006     POCT Glucose 09/26/2023 300 (H)     Beta-Hydroxybutyrate 09/26/2023 0.2     POC PH 09/26/2023 7.374     POC PCO2 09/26/2023 45.4 (H)     POC PO2 09/26/2023 21 (LL)     POC HCO3 09/26/2023 26.5     POC BE 09/26/2023 1     POC SATURATED O2 09/26/2023 32 (LL)     POC TCO2 09/26/2023 28     Sample 09/26/2023 VENOUS     Site 09/26/2023 [facility]     Allens Test 09/26/2023 N/A     DelSys 09/26/2023 Room Air     POCT Glucose 09/26/2023 218 (H)     Troponin I 09/26/2023 0.022     Hemoglobin A1C 09/27/2023 9.8 (H)     Estimated Avg Glucose 09/27/2023 235 (H)     Specimen UA 09/27/2023 Urine, Clean Catch     Color, UA 09/27/2023 Yellow     Appearance, UA 09/27/2023 Clear     pH, UA 09/27/2023 7.0     Specific Gravity, UA 09/27/2023 1.015     Protein, UA 09/27/2023 Negative     Glucose, UA 09/27/2023 3+ (A)     Ketones, UA 09/27/2023 Negative     Bilirubin (UA) 09/27/2023 Negative     Occult Blood UA 09/27/2023 Negative     Nitrite, UA 09/27/2023 Negative     Urobilinogen, UA 09/27/2023 Negative     Leukocytes, UA 09/27/2023 Negative     POCT Glucose 09/26/2023 261 (H)     Troponin I 09/27/2023 0.011     Sodium 09/27/2023 138     Potassium 09/27/2023 3.8     Chloride 09/27/2023 102     CO2 09/27/2023 26     Glucose 09/27/2023 351 (H)     BUN 09/27/2023 11     Creatinine 09/27/2023 0.58     Calcium 09/27/2023 7.9 (L)     Anion Gap 09/27/2023 10     eGFR 09/27/2023 >60.0     Magnesium 09/27/2023 2.2     Phosphorus 09/27/2023 2.9     WBC 09/27/2023 6.75     RBC 09/27/2023 4.54     Hemoglobin 09/27/2023 13.2     Hematocrit 09/27/2023 39.3     MCV 09/27/2023 87     MCH 09/27/2023 29.1     MCHC 09/27/2023 33.6     RDW 09/27/2023 12.5     Platelets 09/27/2023 190     MPV 09/27/2023 10.9     Immature Granulocytes 09/27/2023 0.3     Gran # (ANC) 09/27/2023 3.8     Immature Grans (Abs) 09/27/2023 0.02     Lymph # 09/27/2023 2.3     Mono # 09/27/2023 0.5     Eos # 09/27/2023 0.1      Baso # 09/27/2023 0.04     nRBC 09/27/2023 0     Gran % 09/27/2023 56.6     Lymph % 09/27/2023 34.4     Mono % 09/27/2023 7.1     Eosinophil % 09/27/2023 1.0     Basophil % 09/27/2023 0.6     Differential Method 09/27/2023 Automated     Troponin I 09/27/2023 0.022     RBC, UA 09/27/2023 1     WBC, UA 09/27/2023 1     Bacteria 09/27/2023 Occasional     Yeast, UA 09/27/2023 None     Squam Epithel, UA 09/27/2023 1     Microscopic Comment 09/27/2023 SEE COMMENT     85% Max Predicted HR 09/27/2023 118     Max Predicted HR 09/27/2023 138     OHS CV CPX PATIENT IS MA* 09/27/2023 0.0     OHS CV CPX PATIENT IS FE* 09/27/2023 1.0     Systolic blood pressure 09/27/2023 137     Diastolic blood pressure 09/27/2023 70     HR at rest 09/27/2023 85     Peak Systolic BP 09/27/2023 138     Peak Diatolic BP 09/27/2023 75     Peak HR 09/27/2023 103     % Max HR Achieved 09/27/2023 75     RPP 09/27/2023 11,645     Peak RPP 09/27/2023 14,214            11/14/2023    11:36 AM 8/24/2023    11:22 AM 7/20/2023     9:51 AM   Pulmonary Function Tests   FVC 1.78 liters 1.83 liters 1.91 liters   FEV1 1.48 liters 1.57 liters 1.59 liters   TLC (liters) 2.84 liters 3.13 liters 2.89 liters   DLCO (ml/mmHg sec) 10.84 ml/mmHg sec 10.2 ml/mmHg sec 12.47 ml/mmHg sec   FVC% 92.5 95.2 98.9   FEV1% 99.1 104.6 106.3   FEF 25-75 1.95 2.22 2.02   FEF 25-75% 146.1 165.9 150.6   TLC% 78.9 87 80.3   RV 1.06 1.3 0.97   RV% 58.3 71.7 53.7   DLCO% 71.6 66.7 81.5         7/20/2023     9:28 AM   6MW   6MWT Status completed without stopping   Patient Reported Dyspnea;Dizziness;Lightheadedness   Was O2 used? No   6MW Distance walked (feet) 1000 feet   Distance walked (meters) 304.8 meters   Did patient stop? No   Oxygen Saturation 98 %   Supplemental Oxygen Room Air   Heart Rate 91 bpm   Blood Pressure 163/71   Jas Dyspnea Rating  nothing at all   Oxygen Saturation 98 %   Supplemental Oxygen Room Air   Heart Rate 105 bpm   Blood Pressure 148/72   Jas Dyspnea Rating   moderate   Recovery Time (seconds) 75 seconds   Oxygen Saturation 98 %   Supplemental Oxygen Room Air   Heart Rate 93 bpm       Imaging:  Results for orders placed during the hospital encounter of 03/31/23    X-Ray Chest PA And Lateral    Narrative  EXAMINATION:  XR CHEST PA AND LATERAL    CLINICAL HISTORY:  Disorder of bone, unspecified    TECHNIQUE:  PA and lateral views of the chest were performed.    COMPARISON:  10/19/2022    FINDINGS:  Scattered interstitial thickening, similar to the prior exam.  No lung consolidation.The cardiomediastinal contour is within normal limits.  No pneumothorax.  No pleural effusions.    Impression  No acute findings.      Electronically signed by: Vince Tavares MD  Date:    03/31/2023  Time:    13:38    Results for orders placed during the hospital encounter of 10/08/18    DXA Bone Density Spine And Hip    Narrative  EXAMINATION:  DEXA BONE DENSITY SPINE HIP    CLINICAL HISTORY:  Other specified personal risk factors, not elsewhere classified    COMPARISON:  07/23/2015    FINDINGS:  The T score associated with the lumbar spine is -1.1 on the current examination.  It was -1.0 on the prior examination.  The T score associated with the left femoral neck is -1.5 on the current examination.  It was -1.2 on the prior examination.  The T score associated with the right femoral neck is -1.2 on the current examination.  It was -0.9 on the prior examination.    IMPRESSION:    Osteopenia      Electronically signed by: David Santillan MD  Date:    10/08/2018  Time:    09:31    No valid procedures specified.  No results found for this or any previous visit.    No results found for this or any previous visit.    Results for orders placed during the hospital encounter of 10/08/19    CT Abdomen Pelvis With Contrast    Narrative  EXAMINATION:  CT ABDOMEN PELVIS WITH CONTRAST    CLINICAL HISTORY:  LLQ pain, suspect diverticulitis;    TECHNIQUE:  Low dose axial images, sagittal and coronal  reformations were obtained from the lung bases to the pubic symphysis following the IV administration of 75 mL of Omnipaque 350    FINDINGS:  The visualized portion of the base of the lungs is significant for bilateral basilar atelectatic versus fibrotic change.  The visualized portion of the heart, stomach, and spleen are unremarkable.  There is fatty atrophy of the pancreas.  The gallbladder is absent.  There is diffuse fatty infiltration of the liver.  The adrenal glands and right kidney have a normal appearance.  There is a 2-3 mm left-sided renal stone.  There is no hydronephrosis or hydroureter.  The bladder is distended but otherwise unremarkable.  The uterus is possibly absent or atrophic.  The bowel is significant for diverticulosis coli.  There is wall thickening of the sigmoid colon adjacent to multiple diverticula with surrounding inflammation consistent with diverticulitis.  There is no significant surrounding inflammatory change.  There is no evidence of perforation or abscess formation.  The appendix has a normal appearance.  The visualized portion of the aorta is significant for moderate scattered plaque.  The osseous structures demonstrate degenerative change.    Impression  Findings consistent with acute diverticulitis of the sigmoid colon without perforation or abscess formation.    Left-sided renal stone.      Electronically signed by: Florencio Padilla MD  Date:    10/09/2019  Time:    09:08    No results found for this or any previous visit.    No results found for this or any previous visit.  IMAGING:     CT chest 6/17/23:  There are subpleural reticular opacities compatible with fibrotic changes/ILD, stable prior.  There is a small focal consolidation or atelectasis in the medial left upper lobe (series 3, image 226).     CT chest 5/1/21:  There are patchy bilateral peripheral bibasilar ground-glass opacities and peripheral reticulations.  Overall extent of findings appear improved from prior study  of 03/04/2021.  No new large confluent airspace consolidation identified.  There is no significant volume of pleural fluid.     CT Chest 3/4/21:  There are bilateral symmetric peripheral and lower lobe predominant patchy ground-glass opacities, likely sequela of the patient's known history of COVID-19 pneumonia.  No pneumothorax or large pleural effusion.     CT of chest performed on 4/24/2018 without contrast revealed No parenchymal lung disease, perhaps mild bronchiectatic airways in the right middle lobe.       Cardiodiagnostics:  Results for orders placed during the hospital encounter of 07/20/23    Echo    Interpretation Summary  · The estimated ejection fraction is 60%.  · Normal right ventricular size with normal right ventricular systolic function.  · Normal left ventricular diastolic function.  · The left ventricle is normal in size with normal systolic function.  · Normal central venous pressure (3 mmHg).          Assessment:  1. Bronchiectasis without complication    2. Gastroesophageal reflux disease, unspecified whether esophagitis present    3. Seasonal allergic rhinitis, unspecified trigger      Plan:     Spirometry preserved and essentially at baseline. No ventilatory defect. Her PFTs have remained stable since 2018. Prior CT chest with subpleural reticular opacities, bronchiectasis, and patchy areas of GGOs, now improved. Does not have evidence of exertional hypoxemia on previous 6MWT July 2023. Will defer further therapy for now. Will continue to monitor PFTs at routine visits. Repeat 6MWT annually    Continue PPI. Symptoms better controlled.     Continue astelin and flonase for allergic rhinitis.     RTC in 6 months or sooner if needed. Repeat PFTs at that time.       Shaggy Collado NP  [facility]

## 2023-11-14 NOTE — LETTER
November 14, 2023        Clarence Brush  [address]  [facility] [address]  Phone: [phone]  Fax: [phone]             [facility] 1st Fl  [address]  [facility] [address]  Phone: [phone]   Patient: Juliette Seo   MR Number: [id number]   YOB: 1945   Date of Visit: 11/14/2023       Dear Dr. Clarence Brush    Thank you for referring Juliette Seo to me for evaluation. Attached you will find relevant portions of my assessment and plan of care.    If you have questions, please do not hesitate to call me. I look forward to following Juliette Seo along with you.    Sincerely,    Shaggy Collado NP    Enclosure    If you would like to receive this communication electronically, please contact [email or web address] or [phone] to request [employer] Link access.    [employer] Link is a tool which provides read-only access to select patient information with whom you have a relationship. Its easy to use and provides real time access to review your patients record including encounter summaries, notes, results, and demographic information.    If you feel you have received this communication in error or would no longer like to receive these types of communications, please e-mail [email or web address]

## 2023-11-16 DIAGNOSIS — J47.9 BRONCHIECTASIS WITHOUT COMPLICATION: Primary | ICD-10-CM

## 2023-11-28 DIAGNOSIS — M79.632 LEFT FOREARM PAIN: ICD-10-CM

## 2023-11-28 DIAGNOSIS — M79.642 LEFT HAND PAIN: Primary | ICD-10-CM

## 2023-11-28 DIAGNOSIS — M25.532 LEFT WRIST PAIN: ICD-10-CM

## 2023-11-30 ENCOUNTER — HOSPITAL ENCOUNTER (OUTPATIENT)
Dept: RADIOLOGY | Facility: HOSPITAL | Age: 78
Discharge: HOME | End: 2023-11-30
Attending: ORTHOPAEDIC SURGERY
Payer: MEDICARE

## 2023-11-30 ENCOUNTER — OFFICE VISIT (OUTPATIENT)
Dept: ORTHOPEDICS | Facility: CLINIC | Age: 78
End: 2023-11-30
Payer: MEDICARE

## 2023-11-30 VITALS — WEIGHT: 106 LBS | HEIGHT: 56 IN | BODY MASS INDEX: 23.84 KG/M2

## 2023-11-30 DIAGNOSIS — M65.322 TRIGGER FINGER, LEFT INDEX FINGER: ICD-10-CM

## 2023-11-30 DIAGNOSIS — M79.642 LEFT HAND PAIN: ICD-10-CM

## 2023-11-30 DIAGNOSIS — M79.632 LEFT FOREARM PAIN: ICD-10-CM

## 2023-11-30 DIAGNOSIS — M25.532 LEFT WRIST PAIN: ICD-10-CM

## 2023-11-30 DIAGNOSIS — M65.332 TRIGGER FINGER, LEFT MIDDLE FINGER: Primary | ICD-10-CM

## 2023-11-30 PROCEDURE — 1125F PR PAIN SEVERITY QUANTIFIED, PAIN PRESENT: ICD-10-PCS | Mod: HCNC,CPTII,S$GLB, | Performed by: ORTHOPAEDIC SURGERY

## 2023-11-30 PROCEDURE — 1101F PT FALLS ASSESS-DOCD LE1/YR: CPT | Mod: HCNC,CPTII,S$GLB, | Performed by: ORTHOPAEDIC SURGERY

## 2023-11-30 PROCEDURE — 99213 PR OFFICE/OUTPT VISIT, EST, LEVL III, 20-29 MIN: ICD-10-PCS | Mod: HCNC,S$GLB,, | Performed by: ORTHOPAEDIC SURGERY

## 2023-11-30 PROCEDURE — 3288F FALL RISK ASSESSMENT DOCD: CPT | Mod: HCNC,CPTII,S$GLB, | Performed by: ORTHOPAEDIC SURGERY

## 2023-11-30 PROCEDURE — 73090 XR FOREARM LEFT: ICD-10-PCS | Mod: 26,HCNC,LT, | Performed by: RADIOLOGY

## 2023-11-30 PROCEDURE — 1159F MED LIST DOCD IN RCRD: CPT | Mod: HCNC,CPTII,S$GLB, | Performed by: ORTHOPAEDIC SURGERY

## 2023-11-30 PROCEDURE — 73130 X-RAY EXAM OF HAND: CPT | Mod: TC,HCNC,LT,NTX

## 2023-11-30 PROCEDURE — 73130 X-RAY EXAM OF HAND: CPT | Mod: 26,HCNC,LT, | Performed by: RADIOLOGY

## 2023-11-30 PROCEDURE — 1159F PR MEDICATION LIST DOCUMENTED IN MEDICAL RECORD: ICD-10-PCS | Mod: HCNC,CPTII,S$GLB, | Performed by: ORTHOPAEDIC SURGERY

## 2023-11-30 PROCEDURE — 73130 XR HAND COMPLETE 3 VIEW LEFT: ICD-10-PCS | Mod: 26,HCNC,LT, | Performed by: RADIOLOGY

## 2023-11-30 PROCEDURE — 73090 X-RAY EXAM OF FOREARM: CPT | Mod: TC,HCNC,LT,TXP

## 2023-11-30 PROCEDURE — 73110 XR WRIST COMPLETE 3 VIEWS LEFT: ICD-10-PCS | Mod: 26,HCNC,LT, | Performed by: RADIOLOGY

## 2023-11-30 PROCEDURE — 1101F PR PT FALLS ASSESS DOC 0-1 FALLS W/OUT INJ PAST YR: ICD-10-PCS | Mod: HCNC,CPTII,S$GLB, | Performed by: ORTHOPAEDIC SURGERY

## 2023-11-30 PROCEDURE — 3288F PR FALLS RISK ASSESSMENT DOCUMENTED: ICD-10-PCS | Mod: HCNC,CPTII,S$GLB, | Performed by: ORTHOPAEDIC SURGERY

## 2023-11-30 PROCEDURE — 73090 X-RAY EXAM OF FOREARM: CPT | Mod: 26,HCNC,LT, | Performed by: RADIOLOGY

## 2023-11-30 PROCEDURE — 99999 PR PBB SHADOW E&M-EST. PATIENT-LVL III: ICD-10-PCS | Mod: PBBFAC,HCNC,, | Performed by: ORTHOPAEDIC SURGERY

## 2023-11-30 PROCEDURE — 99213 OFFICE O/P EST LOW 20 MIN: CPT | Mod: HCNC,S$GLB,, | Performed by: ORTHOPAEDIC SURGERY

## 2023-11-30 PROCEDURE — 73110 X-RAY EXAM OF WRIST: CPT | Mod: TC,HCNC,LT,TXP

## 2023-11-30 PROCEDURE — 99999 PR PBB SHADOW E&M-EST. PATIENT-LVL III: CPT | Mod: PBBFAC,HCNC,, | Performed by: ORTHOPAEDIC SURGERY

## 2023-11-30 PROCEDURE — 1125F AMNT PAIN NOTED PAIN PRSNT: CPT | Mod: HCNC,CPTII,S$GLB, | Performed by: ORTHOPAEDIC SURGERY

## 2023-11-30 PROCEDURE — 73110 X-RAY EXAM OF WRIST: CPT | Mod: 26,HCNC,LT, | Performed by: RADIOLOGY

## 2023-11-30 NOTE — PROGRESS NOTES
[facility]  History & Physical  Orthopedics    SUBJECTIVE:      COVID-19 attestation:  This patient was treated during the COVID-19 pandemic.  This was discussed with the patient, they are aware of our current policies and procedures, were given the option of delaying their visit and or switching to a virtual visit, delaying their surgery when applicable, and they elect to proceed.    Chief Complaint:  Left wrist pain    Referring Provider: No ref. provider found     History of Present Illness:  Patient is a 78 y.o. right hand dominant female who presents today with complaints of left wrist pain.  The patient notes atraumatic onset of left wrist pain that began about 1 month ago.  This has improved very slightly but it has persisted and remains significant.  Pain is rather global.  [employer]  was utilized for the duration of today's visit.  She denies other complaints and presents for initial evaluation.     Interval history November 30, 2023: The patient returns today for re-evaluation.  She notes that she is doing much better than at her last visit.  [employer]  was utilized for today's visit.  She really has no complaints in regards to her hand or wrist except for some persistent pain and intermittent stiffness to the left index and long fingers.  She denies other complaints and returns for re-evaluation.  She went to an OT eval but has cleared this and is doing well and reports regaining full painless motion range of motion except for intermittent pain in the index and long fingers.    The patient is a/an retired.    Onset of symptoms/DOI was 1 month ago.    Symptoms are aggravated by activity and movement.    Symptoms are alleviated by rest.    Symptoms consist of pain.    The patient rates their pain as a 8/10.    Attempted treatment(s) and/or interventions include activity modifications, rest, immobilization.     The patient denies any fevers, chills, N/V, D/C and presents  for evaluation.       Past Medical History:   Diagnosis Date    Allergy     Anxiety     Arthritis     osteo    Asthma     Diabetic retinopathy     GERD (gastroesophageal reflux disease)     High cholesterol     Hypertension     Kidney stone     Type 2 diabetes mellitus, uncontrolled, with neuropathy     Urinary tract infection     Vaginal infection      Past Surgical History:   Procedure Laterality Date    CHOLECYSTECTOMY      COLONOSCOPY N/A 6/8/2021    Procedure: COLONOSCOPY;  Surgeon: Rain Pop MD;  Location: [facility] ENDO;  Service: Endoscopy;  Laterality: N/A;    COLONOSCOPY N/A 7/8/2021    Procedure: colonoscopy with single balloon scope;  Surgeon: Steffen Dueñas MD;  Location: [facility] ENDO;  Service: Endoscopy;  Laterality: N/A;    ESOPHAGOGASTRODUODENOSCOPY N/A 6/8/2021    Procedure: EGD (ESOPHAGOGASTRODUODENOSCOPY);  Surgeon: Rain Pop MD;  Location: [facility];  Service: Endoscopy;  Laterality: N/A;    EYE SURGERY      HYSTERECTOMY      INCISIONAL HERNIA REPAIR  2003    ROTATOR CUFF REPAIR Right 11/07/2017    TRIGGER FINGER RELEASE Right 6/3/2022    Procedure: RELEASE, TRIGGER FINGER; right index, middle, ring;  Surgeon: Sarmad Wesley Jr., MD;  Location: [facility] OR;  Service: Orthopedics;  Laterality: Right;     Review of patient's allergies indicates:   Allergen Reactions    Actos [pioglitazone] Nausea Only    Darvocet a500 [propoxyphene n-acetaminophen] Nausea And Vomiting    Dilaudid [hydromorphone (bulk)] Nausea And Vomiting     Social History     Social History Narrative    Not on file     Family History   Problem Relation Age of Onset    Stroke Mother     Diabetes Sister     Diabetes Brother     Kidney disease Neg Hx          Current Outpatient Medications:     acetaminophen (TYLENOL) 500 MG tablet, Take 1 tablet (500 mg total) by mouth every 4 (four) hours as needed for Pain., Disp: 42 tablet, Rfl: 0    albuterol (PROVENTIL) 2.5 mg /3 mL (0.083 %) nebulizer solution, Take 3 mLs (2.5  "mg total) by nebulization every 6 (six) hours as needed for Wheezing. Rescue, Disp: 60 mL, Rfl: 2    aspirin (ECOTRIN) 81 MG EC tablet, Take 81 mg by mouth once daily., Disp: , Rfl:     atorvastatin (LIPITOR) 40 MG tablet, Take 1 tablet (40 mg total) by mouth once daily., Disp: 90 tablet, Rfl: 0    azelastine (ASTELIN) 137 mcg (0.1 %) nasal spray, 1 spray (137 mcg total) by Nasal route 2 (two) times daily., Disp: 30 mL, Rfl: 3    cyclobenzaprine (FLEXERIL) 10 MG tablet, Take 1 tablet 3 times a day by oral route as needed., Disp: , Rfl:     diclofenac sodium (VOLTAREN) 1 % Gel, Apply 4 g topically 4 (four) times daily., Disp: 1 Tube, Rfl: 3    empagliflozin (JARDIANCE) 25 mg tablet, Take 1 tablet (25 mg total) by mouth once daily., Disp: 30 tablet, Rfl: 11    fluconazole (DIFLUCAN) 150 MG Tab, Take once, repeat dose at 72 hours if symptoms not improved, Disp: 2 tablet, Rfl: 0    fluticasone propionate (FLONASE) 50 mcg/actuation nasal spray, 1 spray (50 mcg total) by Each Nostril route once daily., Disp: 16 g, Rfl: 3    HYDROcodone-acetaminophen (NORCO) 5-325 mg per tablet, Take 1 tablet by mouth every 8 (eight) hours as needed for Pain., Disp: 20 tablet, Rfl: 0    insulin detemir U-100, Levemir, (LEVEMIR FLEXPEN) 100 unit/mL (3 mL) InPn pen, Inject 40 Units into the skin every evening., Disp: 12 mL, Rfl: 11    insulin syringe-needle U-100 (BD INSULIN SYRINGE ULTRA-FINE) 0.5 mL 31 gauge x 5/16" Syrg, USE AS DIRECTED TWICE A DAY, Disp: 180 each, Rfl: 4    losartan-hydrochlorothiazide 100-12.5 mg (HYZAAR) 100-12.5 mg Tab, Take 1 tablet by mouth once daily., Disp: 30 tablet, Rfl: 0    meclizine (ANTIVERT) 25 mg tablet, Take 1 tablet (25 mg total) by mouth 3 (three) times daily as needed for Dizziness., Disp: 30 tablet, Rfl: 0    metFORMIN (GLUCOPHAGE) 500 MG tablet, Take 1 tablet (500 mg total) by mouth 2 (two) times daily with meals. (Patient taking differently: Take 500 mg by mouth daily with breakfast.), Disp: 60 " "tablet, Rfl: 5    metoprolol tartrate (LOPRESSOR) 25 MG tablet, Take 0.5 tablets (12.5 mg total) by mouth 2 (two) times daily., Disp: 30 tablet, Rfl: 2    montelukast (SINGULAIR) 10 mg tablet, TAKE 1 TABLET BY MOUTH EVERY DAY IN THE EVENING, Disp: 90 tablet, Rfl: 1    nystatin (MYCOSTATIN) cream, Apply topically 2 (two) times daily., Disp: 30 g, Rfl: 0    ondansetron (ZOFRAN-ODT) 4 MG TbDL, Take 1 tablet (4 mg total) by mouth every 6 (six) hours as needed (nausea)., Disp: 90 tablet, Rfl: 0    pantoprazole (PROTONIX) 40 MG tablet, Take 1 tablet (40 mg total) by mouth once daily., Disp: 30 tablet, Rfl: 11    QUEtiapine (SEROQUEL) 25 MG Tab, Take 1 tablet (25 mg total) by mouth every evening., Disp: 30 tablet, Rfl: 5    TRUE METRIX GLUCOSE TEST STRIP Strp, 1 strip 2 (two) times daily., Disp: , Rfl:     TRUEPLUS LANCETS 33 gauge Misc, Apply topically 2 (two) times daily., Disp: , Rfl:     TRUEPLUS PEN NEEDLE 32 gauge x 5/32" Ndle, To inject insulin twice daily, Disp: 100 each, Rfl: 3    TRULICITY 1.5 mg/0.5 mL pen injector, Inject into the skin., Disp: , Rfl:   No current facility-administered medications for this visit.    Facility-Administered Medications Ordered in Other Visits:     triamcinolone acetonide injection 40 mg, 40 mg, Intra-articular, , Jayne Cain PA-C, 40 mg at 07/12/22 0830      Review of Systems:  As per HPI otherwise noncontributory    OBJECTIVE:      Vital Signs (Most Recent):  Vitals:    11/30/23 0835   Weight: 48.1 kg (106 lb)   Height: 4' 8" (1.422 m)     Body mass index is 23.76 kg/m².      Physical Exam:  Constitutional: The patient appears well-developed and well-nourished. No distress.   Skin: No lesions appreciated  Head: Normocephalic and atraumatic.   Nose: Nose normal.   Ears: No deformities seen  Eyes: Conjunctivae and EOM are normal.   Neck: No tracheal deviation present.   Cardiovascular: Normal rate and intact distal pulses.    Pulmonary/Chest: Effort normal. No respiratory " distress.   Abdominal: There is no guarding.   Neurological: The patient is alert.   Psychiatric: The patient has a normal mood and affect.     Left Hand/Wrist Examination:    Observation/Inspection:  Swelling  none    Deformity  none  Discoloration  none     Scars   none    Atrophy  none  No wrist tenderness today; patient does have tenderness at the A1 pulleys of left index and long fingers with no triggering seen today      HAND/WRIST EXAMINATION:  Finkelstein's Test   negative  WHAT Test    negative  Snuff box tenderness   Neg  Ahmadi's Test    Neg  Hook of Hamate Tenderness  Neg  CMC grind    Neg  Circumduction test   Neg    Neurovascular Exam:  Digits WWP, brisk CR < 3s throughout  NVI motor/LTS to M/R/U nerves, radial pulse 2+    ROM hand full, painless    ROM wrist is full and painless    ROM elbow full, painless    Abdomen not guarded  Respirations nonlabored  Perfusion intact    Diagnostic Results:     Imaging - I independently viewed the patient's imaging as well as the radiology report.  Xrays of the patient's left wrist  demonstrate no evidence of any acute fractures or dislocations with scattered degenerative changes       EMG - none    ASSESSMENT/PLAN:      78 y.o. yo female with resolved left wrist pain, left index and long finger trigger fingers      Plan: The patient and I had a thorough discussion today.  We discussed the working diagnosis as well as several other potential alternative diagnoses.  Treatment options were discussed, both conservative and surgical.  Conservative treatment options would include things such as activity modifications, workplace modifications, a period of rest, oral vs topical OTC and prescription anti-inflammatory medications, occupational therapy, splinting/bracing, immobilization, corticosteroid injections, and others.  Surgical options were discussed as well.     At this time, the patient is doing much better.  Her only residual issues are likely some stenosing  tenosynovitis to the left index and long fingers.  We discussed injections surgery splinting and NSAIDs for that.  I provided her a trigger finger handout.  He declines injections and surgery.  Follow-up on as needed/if needed basis.    Should the patient's symptoms worsen, persist, or fail to improve they should return for reevaluation and I would be happy to see them back anytime.        Jamel Yeager M.D.    Please be aware that this note has been generated with the assistance of [employer] voice-to-text.  Please excuse any spelling or grammatical errors.    Thank you for choosing Dr. Jamel Yeager for your orthopedic hand and upper extremity care. It is our goal to provide you with exceptional care that will help keep you healthy, active, and get you back in the game.     If you felt that you received exemplary care today, please consider leaving feedback for Dr. Yeager on [employer] at [email or web address].    Please do not hesitate to reach out to us via email, phone, or MyChart with any questions, concerns, or feedback.

## 2024-01-06 ENCOUNTER — HOSPITAL ENCOUNTER (EMERGENCY)
Facility: HOSPITAL | Age: 79
Discharge: HOME | End: 2024-01-06
Attending: EMERGENCY MEDICINE
Payer: MEDICARE

## 2024-01-06 VITALS
HEART RATE: 86 BPM | WEIGHT: 105 LBS | SYSTOLIC BLOOD PRESSURE: 180 MMHG | TEMPERATURE: 98 F | DIASTOLIC BLOOD PRESSURE: 79 MMHG | HEIGHT: 56 IN | OXYGEN SATURATION: 96 % | BODY MASS INDEX: 23.62 KG/M2 | RESPIRATION RATE: 18 BRPM

## 2024-01-06 DIAGNOSIS — H66.91 RIGHT OTITIS MEDIA, UNSPECIFIED OTITIS MEDIA TYPE: Primary | ICD-10-CM

## 2024-01-06 LAB
INFLUENZA A, MOLECULAR: NEGATIVE
INFLUENZA B, MOLECULAR: NEGATIVE
SARS-COV-2 RDRP RESP QL NAA+PROBE: NEGATIVE
SPECIMEN SOURCE: NORMAL

## 2024-01-06 PROCEDURE — U0002 COVID-19 LAB TEST NON-CDC: HCPCS | Mod: HCNC,NTX

## 2024-01-06 PROCEDURE — 87502 INFLUENZA DNA AMP PROBE: CPT | Mod: HCNC,NTX

## 2024-01-06 PROCEDURE — 99283 EMERGENCY DEPT VISIT LOW MDM: CPT | Mod: HCNC,NTX

## 2024-01-06 RX ORDER — CETIRIZINE HYDROCHLORIDE 10 MG/1
10 TABLET ORAL DAILY
Qty: 30 TABLET | Refills: 0 | Status: SHIPPED | OUTPATIENT
Start: 2024-01-06 | End: 2024-02-06

## 2024-01-06 RX ORDER — AMOXICILLIN 875 MG/1
875 TABLET, FILM COATED ORAL 2 TIMES DAILY
Qty: 10 TABLET | Refills: 0 | Status: SHIPPED | OUTPATIENT
Start: 2024-01-06

## 2024-01-06 RX ORDER — FLUTICASONE PROPIONATE 50 MCG
1 SPRAY, SUSPENSION (ML) NASAL DAILY
Qty: 9.9 ML | Refills: 0 | Status: SHIPPED | OUTPATIENT
Start: 2024-01-06 | End: 2024-02-06

## 2024-01-06 NOTE — ED PROVIDER NOTES
Encounter Date: 1/6/2024       History     Chief Complaint   Patient presents with    Otalgia     Right ear pain and pressure x 2 weeks. Also complaining of sinus pressure, sore throat. + sick contact.     78 year old female with past medical history of asthma, GERD, hypertension, type 2 diabetes presents to the ED with worsening otalgia x 2 weeks.  States she has been using Q-tips to clean her ears very often. Reports pain, ear discharge and pressure in her right inner ear.  Denies hearing loss or tinnitus.  Also reports congestion and sore throat.  Patient had a recent sick contact.  Fever, nausea, vomiting, chest pain, shortness of breath, abdominal pain, diarrhea.  No other acute complaints today.    The history is provided by the patient. No  was used.     Review of patient's allergies indicates:   Allergen Reactions    Actos [pioglitazone] Nausea Only    Darvocet a500 [propoxyphene n-acetaminophen] Nausea And Vomiting    Dilaudid [hydromorphone (bulk)] Nausea And Vomiting     Past Medical History:   Diagnosis Date    Allergy     Anxiety     Arthritis     osteo    Asthma     Diabetic retinopathy     GERD (gastroesophageal reflux disease)     High cholesterol     Hypertension     Kidney stone     Type 2 diabetes mellitus, uncontrolled, with neuropathy     Urinary tract infection     Vaginal infection      Past Surgical History:   Procedure Laterality Date    CHOLECYSTECTOMY      COLONOSCOPY N/A 6/8/2021    Procedure: COLONOSCOPY;  Surgeon: Rain Pop MD;  Location: [facility];  Service: Endoscopy;  Laterality: N/A;    COLONOSCOPY N/A 7/8/2021    Procedure: colonoscopy with single balloon scope;  Surgeon: Steffen Dueñas MD;  Location: [facility];  Service: Endoscopy;  Laterality: N/A;    ESOPHAGOGASTRODUODENOSCOPY N/A 6/8/2021    Procedure: EGD (ESOPHAGOGASTRODUODENOSCOPY);  Surgeon: Rain Pop MD;  Location: [facility];  Service: Endoscopy;  Laterality: N/A;    EYE SURGERY       HYSTERECTOMY      INCISIONAL HERNIA REPAIR  2003    ROTATOR CUFF REPAIR Right 11/07/2017    TRIGGER FINGER RELEASE Right 6/3/2022    Procedure: RELEASE, TRIGGER FINGER; right index, middle, ring;  Surgeon: Sarmad Wesley Jr., MD;  Location: [facility] OR;  Service: Orthopedics;  Laterality: Right;     Family History   Problem Relation Age of Onset    Stroke Mother     Diabetes Sister     Diabetes Brother     Kidney disease Neg Hx      Social History     Tobacco Use    Smoking status: Former     Passive exposure: Never    Smokeless tobacco: Never   Substance Use Topics    Alcohol use: Not Currently     Comment: social    Drug use: No     Review of Systems   Constitutional:  Negative for chills, fatigue and fever.   HENT:  Positive for ear discharge and ear pain. Negative for congestion, rhinorrhea and sore throat.    Respiratory:  Negative for chest tightness and shortness of breath.    Cardiovascular:  Negative for chest pain.   Gastrointestinal:  Negative for abdominal pain, diarrhea and vomiting.   Genitourinary:  Negative for dysuria, flank pain, frequency and hematuria.   Musculoskeletal:  Negative for back pain, neck pain and neck stiffness.   Neurological:  Negative for facial asymmetry and headaches.       Physical Exam     Initial Vitals [01/06/24 1403]   BP Pulse Resp Temp SpO2   (!) 199/84 108 20 97.6 °F (36.4 °C) 98 %      MAP       --         Physical Exam    Vitals reviewed.  Constitutional: She appears well-developed and well-nourished. She is not diaphoretic. No distress.   HENT:   Head: Normocephalic and atraumatic.   Right Ear: External ear normal.   Left Ear: External ear normal.   Mouth/Throat: Oropharynx is clear and moist.   No erythema or overlying skin changes to the external right ear.  Middle ear yellow discharge noted. Non-bulging TM. No tinnitus or hearing loss. No evidence of mastoiditis.   Eyes: EOM are normal. Pupils are equal, round, and reactive to light.   Neck: Neck supple.   Normal  range of motion.  Cardiovascular:  Normal rate and normal heart sounds.           Pulmonary/Chest: Breath sounds normal. No respiratory distress.   Abdominal: Abdomen is soft. Bowel sounds are normal. She exhibits no distension. There is no abdominal tenderness.   Musculoskeletal:         General: Normal range of motion.      Cervical back: Normal range of motion and neck supple.     Neurological: She is alert and oriented to person, place, and time. GCS score is 15. GCS eye subscore is 4. GCS verbal subscore is 5. GCS motor subscore is 6.   Skin: Skin is warm. Capillary refill takes less than 2 seconds.   Psychiatric: She has a normal mood and affect. Her behavior is normal. Judgment and thought content normal.         ED Course   Procedures  Labs Reviewed   INFLUENZA A & B BY MOLECULAR   SARS-COV-2 RNA AMPLIFICATION, QUAL          Imaging Results    None          Medications - No data to display  Medical Decision Making  Differential Diagnosis includes, but is not limited to:  Malignant otitis externa, mastoiditis, cellulitis, abscess, TM rupture, foreign body, cerumen impaction, otitis media, otitis externa    ED management     78 year-old female with past medical history y of asthma, GERD, hypertension, type 2 diabetes presents to the ED with worsening otalgia x 2 weeks. Patient is not toxic appearing, hemodynamically stable and resting comfortably on bed. Afebrile with vitals WNL. No distress on exam.  Physical exam as stated above.  Lab results discussed in ED course.  Based on clinical presentation and physical exam, I do not suspect mastoiditis or malignant otitis media. My overall impression is uncomplicated otitis media.  Will prescribe amoxicillin, Flonase and Zyrtec as needed.  Pt advised to take ibuprofen/Tylenol as needed.  Patient stable at discharge.    I have discussed the specifics of the workup with the patient and the patient has verbalized understanding of the details of the workup, the  diagnosis, the treatment plan, and the need for outpatient follow-up with PCP. ED precautions given. Discussed with pt about returning to the ED, if symptoms fail to improve or worsen.     RESULTS:  Documented in ED course.  Labs/ekg interpreted by myself              Amount and/or Complexity of Data Reviewed  Labs:  Decision-making details documented in ED Course.    Risk  OTC drugs.  Prescription drug management.               ED Course as of 01/07/24 0941   Sat Jan 06, 2024   1701 SARS-CoV-2 RNA, Amplification, Qual: Negative  negative [NW]   1720 Influenza A & B by Molecular  negative [NW]      ED Course User Index  [NW] Sylvia Hernandez PA-C                           Clinical Impression:  Final diagnoses:  [H66.91] Right otitis media, unspecified otitis media type (Primary)          ED Disposition Condition    Discharge Stable          ED Prescriptions       Medication Sig Dispense Start Date End Date Auth. Provider    cetirizine (ZYRTEC) 10 MG tablet Take 1 tablet (10 mg total) by mouth once daily. 30 tablet 1/6/2024 1/5/2025 Sylvia Hernandez PA-C    fluticasone propionate (FLONASE) 50 mcg/actuation nasal spray 1 spray (50 mcg total) by Each Nostril route once daily. 9.9 mL 1/6/2024 -- Sylvia Hernandez PA-C    amoxicillin (AMOXIL) 875 MG tablet Take 1 tablet (875 mg total) by mouth 2 (two) times daily. 10 tablet 1/6/2024 -- Sylvia Hernandez PA-C          Follow-up Information       Follow up With Specialties Details Why Contact Info    [facility] ENT Otolaryngology Schedule an appointment as soon as possible for a visit in 2 days As needed, If symptoms worsen [address] [facility] [address]  [phone]    [facility]  Schedule an appointment as soon as possible for a visit in 2 days As needed, If symptoms worsen              Sylvia Hernandez PA-C  01/07/24 0938       Sylvia Hernandez PA-C  01/07/24 0941

## 2024-01-06 NOTE — DISCHARGE INSTRUCTIONS
Katelyn Seo    Rachel por dejarme cuidar de ti en el damien de hoy! Fue un placer conocerte, y espero que te sientas mejor pronto. Tambien aqui le dejo informaciones/ instrucciones sobre aragon plan/ tratamiento médico:  -   -  -  -    Si desea acceder aragon expediente médico y lo que se hizo fady, utilice la aplicación de [facility]. No dude en regresar si mariela síntomas empeoran o si desarrolla cualquier otro síntoma preocupante.     Nuestro objetivo en el departamento de emergencias es siempre brindarle shahid atención y un servicio excepcional. Es posible que reciba shahid encuesta por correo postal o electrónico en la  próxima semana con respecto a aragon experiencia en nuestra caity de emergencias. Le agradeceria mucho si completara la encuesta compartiendo aragon experiencia con nosotros. Mariela comentarios nos proporcionan shahid manera de reconocer a nuestro personal que rudy muy buena atencion y ayuda a nuestros queridos pacientes, al igual nos ayuda aprender stephen mejorar el cuidado y servicio que ofrecemos debajo de nuestra aspiración de excelencia.    Atentamente,    Sylvia Hernandez PA-C  [employer]ociado [facility] del Departamento de Emergencias  [facility] [place] [facility]

## 2024-01-09 ENCOUNTER — PATIENT OUTREACH (OUTPATIENT)
Dept: EMERGENCY MEDICINE | Facility: HOSPITAL | Age: 79
End: 2024-01-09
Payer: MEDICARE

## 2024-01-10 ENCOUNTER — PATIENT OUTREACH (OUTPATIENT)
Dept: ADMINISTRATIVE | Facility: HOSPITAL | Age: 79
End: 2024-01-10
Payer: MEDICARE

## 2024-01-10 ENCOUNTER — PATIENT MESSAGE (OUTPATIENT)
Dept: ADMINISTRATIVE | Facility: HOSPITAL | Age: 79
End: 2024-01-10
Payer: MEDICARE

## 2024-01-10 NOTE — LETTER
January 23, 2024    Juliette Seo  [address] [address] [address]             [facility]  [address]  [facility] [address]  Phone: [phone] Dear Maria, [facility] is committed to your overall health and would like to ensure that you are up to date on your recommended test and/or procedures.  Suman Rubi MD has found that your chart shows you are due for a DEXA SCAN (BONE DENSITY OSTEOPOROSIS SCREENING).      If you have had a DEXA SCAN at another facility, please let us know so that we may obtain copies from that facility.      If you have an upcoming scheduled Dexa Scan appointment , please disregard this letter.  Otherwise, Please schedule your appointment through the link in your [facility] portal, provided on the Appointment Center home page by choosing the Care Team member of choice.  Should you need assistance with scheduling, you can call the main line at [phone]. Our scheduling specialists will be able to help you coordinate your appointment.     Thank you for letting us care for you,     Sincerely,     Suman Rubi MD and your [facility] Team

## 2024-01-10 NOTE — PROGRESS NOTES
Unable to reach pt after 2 attempts. Encounter will be closed.    Carlyn Kingsley  ED Navigator  [phone]

## 2024-01-10 NOTE — PROGRESS NOTES
OSTEOPOROSIS SCREENING   The Patient had a recent fracture and due for an Osteoporosis screening.  A DEXA is to be completed at least 6 months after the fracture date IMPACT date 3/24/2024

## 2024-01-30 ENCOUNTER — PATIENT OUTREACH (OUTPATIENT)
Dept: ADMINISTRATIVE | Facility: HOSPITAL | Age: 79
End: 2024-01-30
Payer: MEDICARE

## 2024-01-30 NOTE — PROGRESS NOTES
Chart reviewed, immunization record updated.  No new results noted on [facility] or [facility] web site.  Care Everywhere updated.   Patient care coordination note  LOV with PCP 10/02/2023.  Left detailed message for patient to return call to obtain remote B/P,  discuss Eye exam,  Colorectal Cancer Screening and schedule DEXA and eAWV.

## 2024-02-01 ENCOUNTER — PATIENT MESSAGE (OUTPATIENT)
Dept: OTOLARYNGOLOGY | Facility: CLINIC | Age: 79
End: 2024-02-01
Payer: MEDICARE

## 2024-02-06 ENCOUNTER — OFFICE VISIT (OUTPATIENT)
Dept: OTOLARYNGOLOGY | Facility: CLINIC | Age: 79
End: 2024-02-06
Payer: MEDICARE

## 2024-02-06 ENCOUNTER — CLINICAL SUPPORT (OUTPATIENT)
Dept: OTOLARYNGOLOGY | Facility: CLINIC | Age: 79
End: 2024-02-06
Payer: MEDICARE

## 2024-02-06 VITALS
BODY MASS INDEX: 23.55 KG/M2 | HEART RATE: 100 BPM | SYSTOLIC BLOOD PRESSURE: 183 MMHG | DIASTOLIC BLOOD PRESSURE: 79 MMHG | WEIGHT: 105.06 LBS

## 2024-02-06 DIAGNOSIS — H90.3 SENSORINEURAL HEARING LOSS (SNHL) OF BOTH EARS: ICD-10-CM

## 2024-02-06 DIAGNOSIS — H61.21 IMPACTED CERUMEN OF RIGHT EAR: Primary | ICD-10-CM

## 2024-02-06 DIAGNOSIS — H92.01 OTALGIA, RIGHT: ICD-10-CM

## 2024-02-06 DIAGNOSIS — M26.621 ARTHRALGIA OF RIGHT TEMPOROMANDIBULAR JOINT: ICD-10-CM

## 2024-02-06 DIAGNOSIS — J30.89 NON-SEASONAL ALLERGIC RHINITIS, UNSPECIFIED TRIGGER: ICD-10-CM

## 2024-02-06 PROCEDURE — 3288F FALL RISK ASSESSMENT DOCD: CPT | Mod: HCNC,CPTII,S$GLB,TXP | Performed by: NURSE PRACTITIONER

## 2024-02-06 PROCEDURE — 1159F MED LIST DOCD IN RCRD: CPT | Mod: HCNC,CPTII,S$GLB,TXP | Performed by: NURSE PRACTITIONER

## 2024-02-06 PROCEDURE — 99204 OFFICE O/P NEW MOD 45 MIN: CPT | Mod: HCNC,S$GLB,TXP, | Performed by: NURSE PRACTITIONER

## 2024-02-06 PROCEDURE — 92567 TYMPANOMETRY: CPT | Mod: HCNC,S$GLB,TXP,

## 2024-02-06 PROCEDURE — 3078F DIAST BP <80 MM HG: CPT | Mod: HCNC,CPTII,S$GLB,TXP | Performed by: NURSE PRACTITIONER

## 2024-02-06 PROCEDURE — 69210 REMOVE IMPACTED EAR WAX UNI: CPT | Mod: HCNC,S$GLB,TXP, | Performed by: NURSE PRACTITIONER

## 2024-02-06 PROCEDURE — 3077F SYST BP >= 140 MM HG: CPT | Mod: HCNC,CPTII,S$GLB,TXP | Performed by: NURSE PRACTITIONER

## 2024-02-06 PROCEDURE — 99999 PR PBB SHADOW E&M-EST. PATIENT-LVL II: CPT | Mod: PBBFAC,HCNC,TXP, | Performed by: NURSE PRACTITIONER

## 2024-02-06 PROCEDURE — 1160F RVW MEDS BY RX/DR IN RCRD: CPT | Mod: HCNC,CPTII,S$GLB,TXP | Performed by: NURSE PRACTITIONER

## 2024-02-06 PROCEDURE — 1101F PT FALLS ASSESS-DOCD LE1/YR: CPT | Mod: HCNC,CPTII,S$GLB,TXP | Performed by: NURSE PRACTITIONER

## 2024-02-06 PROCEDURE — 92557 COMPREHENSIVE HEARING TEST: CPT | Mod: HCNC,S$GLB,TXP,

## 2024-02-06 RX ORDER — FLUTICASONE PROPIONATE 50 MCG
2 SPRAY, SUSPENSION (ML) NASAL DAILY
Qty: 9.9 ML | Refills: 11 | Status: SHIPPED | OUTPATIENT
Start: 2024-02-06

## 2024-02-06 RX ORDER — CETIRIZINE HYDROCHLORIDE 10 MG/1
10 TABLET ORAL DAILY
Qty: 30 TABLET | Refills: 11 | Status: SHIPPED | OUTPATIENT
Start: 2024-02-06 | End: 2025-02-05

## 2024-02-06 NOTE — PROGRESS NOTES
Chief Complaint   Patient presents with    Cerumen Impaction     Right ear   .     HPI: Juliette Seo is 78 y.o. female who is self-referred for evaluation of right ear pain.  Symptoms include right ear pain, congestion, plugged sensation in the right ear, and watery eye . Symptoms began 1 month ago and are unchanged since that time.  She admits to to hearing loss.   She went to the emergency room on 1/6/2024 and was rx'ed Amoxil, Flonase and Zyrtec.  She reports no improvement with her ear pain. She has not been using Flonase or taking Zyrtec.       Past Medical History:   Diagnosis Date    Allergy     Anxiety     Arthritis     osteo    Asthma     Diabetic retinopathy     GERD (gastroesophageal reflux disease)     High cholesterol     Hypertension     Kidney stone     Type 2 diabetes mellitus, uncontrolled, with neuropathy     Urinary tract infection     Vaginal infection      Social History     Socioeconomic History    Marital status:    Tobacco Use    Smoking status: Former     Passive exposure: Never    Smokeless tobacco: Never   Substance and Sexual Activity    Alcohol use: Not Currently     Comment: social    Drug use: No    Sexual activity: Yes     Partners: Male     Birth control/protection: None     Social Determinants of Health     Financial Resource Strain: Low Risk  (5/31/2023)    Overall Financial Resource Strain (CARDIA)     Difficulty of Paying Living Expenses: Not hard at all   Food Insecurity: No Food Insecurity (5/31/2023)    Hunger Vital Sign     Worried About Running Out of Food in the Last Year: Never true     Ran Out of Food in the Last Year: Never true   Transportation Needs: No Transportation Needs (5/31/2023)    PRAPARE - Transportation     Lack of Transportation (Medical): No     Lack of Transportation (Non-Medical): No   Physical Activity: Unknown (5/31/2023)    Exercise Vital Sign     Days of Exercise per Week: 3 days   Stress: Stress Concern Present (5/31/2023)    [place]  [employer] - Occupational Stress Questionnaire     Feeling of Stress : To some extent   Social Connections: Unknown (5/31/2023)    Social Connection and Isolation Panel [NHANES]     Frequency of Communication with Friends and Family: More than three times a week     Frequency of Social Gatherings with Friends and Family: Once a week     Active Member of Clubs or Organizations: No     Marital Status:    Housing Stability: Unknown (5/31/2023)    Housing Stability Vital Sign     Unable to Pay for Housing in the Last Year: No     Unstable Housing in the Last Year: No     Past Surgical History:   Procedure Laterality Date    CHOLECYSTECTOMY      COLONOSCOPY N/A 6/8/2021    Procedure: COLONOSCOPY;  Surgeon: Rain Pop MD;  Location: [facility] ENDO;  Service: Endoscopy;  Laterality: N/A;    COLONOSCOPY N/A 7/8/2021    Procedure: colonoscopy with single balloon scope;  Surgeon: Steffen Dueñas MD;  Location: [facility];  Service: Endoscopy;  Laterality: N/A;    ESOPHAGOGASTRODUODENOSCOPY N/A 6/8/2021    Procedure: EGD (ESOPHAGOGASTRODUODENOSCOPY);  Surgeon: Rain Pop MD;  Location: [facility];  Service: Endoscopy;  Laterality: N/A;    EYE SURGERY      HYSTERECTOMY      INCISIONAL HERNIA REPAIR  2003    ROTATOR CUFF REPAIR Right 11/07/2017    TRIGGER FINGER RELEASE Right 6/3/2022    Procedure: RELEASE, TRIGGER FINGER; right index, middle, ring;  Surgeon: Sarmad Wesley Jr., MD;  Location: [facility];  Service: Orthopedics;  Laterality: Right;     Family History   Problem Relation Age of Onset    Stroke Mother     Diabetes Sister     Diabetes Brother     Kidney disease Neg Hx            Review of Systems  General: negative for chills, fever or weight loss  Psychological: negative for mood changes or depression  Ophthalmic: negative for blurry vision, photophobia or eye pain  ENT: see HPI  Respiratory: no cough, shortness of breath, or wheezing  Cardiovascular: no chest pain or dyspnea  on exertion  Gastrointestinal: no abdominal pain, change in bowel habits, or black/ bloody stools  Musculoskeletal: negative for gait disturbance or muscular weakness  Neurological: no syncope or seizures; no ataxia  Dermatological: negative for puritis,  rash and jaundice  Hematologic/lymphatic: no easy bruising, no new lumps or bumps      Physical Exam:    Vitals:    02/06/24 1030   BP: (!) 183/79   Pulse: 100       Constitutional: Well appearing / communicating without difficutly.  NAD.  Eyes: EOM I Bilaterally  Head/Face: Normocephalic.  Negative paranasal sinus pressure/tenderness.  Salivary glands WNL.  House Brackmann I Bilaterally.  + TMJ  PTP  Right Ear: Auricle normal appearance. External Auditory Canal with cerumen impaction. EAC within normal limits no lesions or masses,TM w/o masses/lesions/perforations. TM mobility noted.   Left Ear: Auricle normal appearance. External Auditory Canal within normal limits no lesions or masses,TM w/o masses/lesions/perforations. TM mobility noted.  Nose: No gross nasal septal deviation. Inferior Turbinates 3+ bilaterally. No septal perforation. No masses/lesions. External nasal skin appears normal without masses/lesions.  Oral Cavity: Gingiva/lips within normal limits.  Dentition/gingiva healthy appearing. Mucus membranes moist. Floor of mouth soft, no masses palpated. Oral Tongue mobile. Hard Palate appears normal.    Oropharynx: Base of tongue appears normal. No masses/lesions noted. Tonsillar fossa/pharyngeal wall without lesions. Posterior oropharynx WNL.  Soft palate without masses. Midline uvula.   Neck/Lymphatic: No LAD I-VI bilaterally.  No thyromegaly.  No masses noted on exam.    Ear Cerumen Removal    Date/Time: 2/6/2024 10:40 AM    Performed by: Gabriela Leger NP  Authorized by: Gabriela Leger NP    Consent Done?:  Yes (Verbal)    Local anesthetic:  None  Location details:  Right ear  Procedure type: curette    Procedure type comment:  Suction  Cerumen   Removal Results:  Cerumen completely removed  Patient tolerance:  Patient tolerated the procedure well with no immediate complications    Diagnostic testing reviewed:    Audiogram interpreted personally by me and discussed in detail with the patient today.   Pure tone testing revealed borderline normal sloping to mild/moderate primarily sensorineural hearing loss in both ears. Speech reception thresholds were obtained at 20 dBHL in the right ear and 25 dBHL in the left ear. Speech discrimination scores were 80% in the right ear and 76% in the left ear. Tympanometry revealed Type A tympanograms in both ears.         Assessment:    ICD-10-CM ICD-9-CM    1. Impacted cerumen of right ear  H61.21 380.4 Ear Cerumen Removal      2. Otalgia, right  H92.01 388.70       3. Non-seasonal allergic rhinitis, unspecified trigger  J30.89 477.8       4. Arthralgia of right temporomandibular joint  M26.621 524.62       5. Sensorineural hearing loss (SNHL) of both ears  H90.3 389.18         The primary encounter diagnosis was Impacted cerumen of right ear. Diagnoses of Otalgia, right, Non-seasonal allergic rhinitis, unspecified trigger, Arthralgia of right temporomandibular joint, and Sensorineural hearing loss (SNHL) of both ears were also pertinent to this visit.      Plan:  Orders Placed This Encounter   Procedures    Ear Cerumen Removal     -Cerumen impaction:  Removed under microscopy today without difficulty.  I would recommend the use of a wax softening drop, either over the counter Debrox or mineral oil, on a weekly basis.  I also instructed the patient to avoid Qtips.    We had a long discussion regarding the underlying pathology of temporomandibular joint dysfunction (TMD) as the cause of ear pain.  We further discussed conservative measures to treat TMD including avoiding gum and other foods that require lots of chewing, warm compresses, and scheduled antinflammatories (such as Motrin, Ibuprofen, or Aleve).  The patient  should also wear a  (purchased OTC or see dentist for custom), which prevents additional pressure on the TM joint.  Stress can also exacerbate TMJ symptoms.    If the pain persists, the patient will then schedule an appointment with a dentist for further evaluation.    We reviewed the patient's recent audiogram and hearing loss in detail.  We also discussed that she is a good candidate for hearing aids, if and when she the patient is motivated.  She was given handouts with information and pricing of hearing aids, and will contact audiology when ready to proceed.  We also discussed the use hearing protection when exposed to loud noise, including lawn equipment.      -start on Flonase 2 sprays in each nostril daily  -start on Zyrtec 10 mg daily  -f/u 1 year or sooner as needed        Gabriela Leger, NP

## 2024-02-06 NOTE — PROCEDURES
Ear Cerumen Removal    Date/Time: 2/6/2024 10:40 AM    Performed by: Gabriela Leger NP  Authorized by: Gabriela Leger NP    Consent Done?:  Yes (Verbal)    Local anesthetic:  None  Location details:  Right ear  Procedure type: curette    Procedure type comment:  Suction  Cerumen  Removal Results:  Cerumen completely removed  Patient tolerance:  Patient tolerated the procedure well with no immediate complications

## 2024-02-06 NOTE — PROGRESS NOTES
"Juliette Seo, a 78 y.o. female was seen today in the clinic for an audiologic evaluation. The patient's primary complaint was intermittent ear pain and reduced hearing perception in the right ear.  She reports recently being treated for ear infection of the right ear. Ms. Seo reports sensation of "water" in her ear and intermittent ear pain. Additionally, she indicated having occasional dizziness when waking up in the morning. No history of noise exposure was reported.    Otoscopy revealed impacted cerumen in the right ear and was unremarkable in the left ear. Patient was seen for ear cleaning and returned to audiology for testing. Pure tone testing revealed borderline normal sloping to mild/moderate primarily sensorineural hearing loss in both ears. Speech reception thresholds were obtained at 20 dBHL in the right ear and 25 dBHL in the left ear. Speech discrimination scores were 80% in the right ear and 76% in the left ear. Tympanometry revealed Type A tympanograms in both ears.    Results were reviewed with the patient and the recommendation of a hearing aid consultation was discussed.    Recommendations:  Otologic evaluation  Annual audiologic evaluation  Hearing protection in noise  Hearing aid consultation          "

## 2024-02-07 ENCOUNTER — PATIENT OUTREACH (OUTPATIENT)
Dept: ADMINISTRATIVE | Facility: HOSPITAL | Age: 79
End: 2024-02-07
Payer: MEDICARE

## 2024-02-07 NOTE — PROGRESS NOTES
OSTEOPOROSIS SCREENING   The Patient had a recent fracture and due for an Osteoporosis screening.  A DEXA is to be completed at least 180 days after the fracture date IMPACT date 3/24/2024    Outreach to patient in reference to an OSTEOPOROSIS SCREENING.        Left Message for patient to return call.

## 2024-02-15 ENCOUNTER — PATIENT OUTREACH (OUTPATIENT)
Dept: ADMINISTRATIVE | Facility: HOSPITAL | Age: 79
End: 2024-02-15
Payer: MEDICARE

## 2024-02-15 NOTE — PROGRESS NOTES
Population Health Chart Review & Patient Outreach Details    Outreach Performed: YES Telephone Not Successful    Additional Pop Health Notes:    OSTEOPOROSIS SCREENING   The Patient had a recent fracture and due for an Osteoporosis screening.  A DEXA is to be completed at least 180 days after the fracture date IMPACT date   3/24/2024  Outreach to patient in reference to an OSTEOPOROSIS SCREENING.         LEFT MESSAGE TO RETURN CALL 2nd attempt 2/15/24\            Updates Requested / Reviewed:        Health Maintenance Topics Overdue:    Health Maintenance Due   Topic Date Due    COVID-19 Vaccine (1) Never done    RSV Vaccine (Age 60+ and Pregnant patients) (1 - 1-dose 60+ series) Never done    DEXA Scan  10/08/2021    Eye Exam  12/02/2021    Colonoscopy  07/08/2022         Health Maintenance Topic(s) Outreach Outcomes & Actions Taken:       oz/week     2 Cans of beer per week      Comment: occasionally                                Counseling given: Not Answered      Vital Signs (Current):   Vitals:    10/08/18 1519 10/08/18 1655 10/08/18 2352 10/09/18 0756   BP: 116/73  (!) 99/58 113/65   Pulse: 80 80 100 84   Resp: 18 16 18   Temp: 97.9 °F (36.6 °C)  98.2 °F (36.8 °C) 97.4 °F (36.3 °C)   TempSrc: Oral  Axillary Oral   SpO2:   97%                                               BP Readings from Last 3 Encounters:   11/20/18 108/72   11/11/18 (!) 111/56   10/09/18 113/65       NPO Status:                                                                                 BMI:   Wt Readings from Last 3 Encounters:   11/20/18 182 lb (82.6 kg)   11/11/18 185 lb 6.5 oz (84.1 kg)   08/17/17 191 lb 9.3 oz (86.9 kg)     There is no height or weight on file to calculate BMI.    CBC:   Lab Results   Component Value Date    WBC 8.7 11/11/2018    RBC 3.70 11/11/2018    HGB 9.6 11/11/2018    HCT 29.9 11/11/2018    MCV 80.9 11/11/2018    RDW 18.2 11/11/2018     11/11/2018       CMP:   Lab Results   Component Value Date     11/11/2018    K 3.7 11/11/2018     11/11/2018    CO2 23 11/11/2018    BUN 9 11/11/2018    CREATININE 0.6 11/11/2018    GFRAA >60 11/11/2018    GFRAA >60 06/02/2011    AGRATIO 1.4 11/11/2018    LABGLOM >60 11/11/2018    GLUCOSE 95 11/11/2018    PROT 6.8 11/11/2018    PROT 7.5 06/02/2011    CALCIUM 9.1 11/11/2018    BILITOT 0.4 11/11/2018    ALKPHOS 84 11/11/2018    AST 29 11/11/2018    ALT 37 11/11/2018       POC Tests: No results for input(s): POCGLU, POCNA, POCK, POCCL, POCBUN, POCHEMO, POCHCT in the last 72 hours.     Coags: No results found for: PROTIME, INR, APTT    HCG (If Applicable):   Lab Results   Component Value Date    PREGTESTUR POSITIVE 08/17/2017        ABGs: No results found for: PHART, PO2ART, WDR5QPI, DYH6QHH, BEART, H3RSNDPJ     Type & Screen (If Applicable):  No results found for: LABABO, [address]    Anesthesia Evaluation  Patient summary reviewed no history of anesthetic complications:   Airway: Mallampati: III  TM distance: >3 FB     Mouth opening: > = 3 FB Dental:          Pulmonary:Negative Pulmonary ROS breath sounds clear to auscultation                             Cardiovascular:Negative CV ROS  Exercise tolerance: good (>4 METS),           Rhythm: regular  Rate: normal                    Neuro/Psych:   Negative Neuro/Psych ROS              GI/Hepatic/Renal:   (+) PUD,           Endo/Other:                     Abdominal:           Vascular: negative vascular ROS. Anesthesia Plan      spinal     ASA 2       Induction: intravenous. Anesthetic plan and risks discussed with patient. Plan discussed with CRNA. DOS STAFF ADDENDUM:    Pt seen and examined, chart reviewed (including anesthesia, drug and allergy history). No interval changes to history and physical examination. Anesthetic plan, risks, benefits, alternatives, and personnel involved discussed with patient. Patient verbalized an understanding and agrees to proceed.       Bunny Lo MD  November 24, 2018  10:29 AM        Bunny Lo MD   11/24/2018

## 2024-02-28 DIAGNOSIS — Z79.4 TYPE 2 DIABETES MELLITUS WITH HYPERGLYCEMIA, WITH LONG-TERM CURRENT USE OF INSULIN: ICD-10-CM

## 2024-02-28 DIAGNOSIS — E11.65 TYPE 2 DIABETES MELLITUS WITH HYPERGLYCEMIA, WITH LONG-TERM CURRENT USE OF INSULIN: ICD-10-CM

## 2024-02-28 RX ORDER — INSULIN GLARGINE 100 [IU]/ML
40 INJECTION, SOLUTION SUBCUTANEOUS DAILY
Qty: 15 ML | Refills: 9 | Status: SHIPPED | OUTPATIENT
Start: 2024-02-28

## 2024-05-15 ENCOUNTER — TELEPHONE (OUTPATIENT)
Dept: TRANSPLANT | Facility: CLINIC | Age: 79
End: 2024-05-15
Payer: MEDICARE

## 2024-05-15 NOTE — TELEPHONE ENCOUNTER
----- Message from Delores Mason Juan Luis sent at 5/15/2024  4:14 PM CDT -----  Type:  Patient Returning Call    Who Called:pt  Who Left Message for Patient:  Does the patient know what this is regarding?:  Would the patient rather a call back or a response via [facility]? call  Best Call Back Number: [phone]  Additional Information: pt would like a call back in regards to rescheduling tomorrow's appts, please call    Attempted to contact patient regarding rescheduling her appointments for tomorrow. No answer. Left a message requesting a return call.

## 2024-05-16 ENCOUNTER — TELEPHONE (OUTPATIENT)
Dept: TRANSPLANT | Facility: CLINIC | Age: 79
End: 2024-05-16
Payer: MEDICARE

## 2024-05-27 ENCOUNTER — TELEPHONE (OUTPATIENT)
Dept: TRANSPLANT | Facility: CLINIC | Age: 79
End: 2024-05-27
Payer: MEDICARE

## 2024-06-06 RX ORDER — LANCETS 28 GAUGE
EACH MISCELLANEOUS
Qty: 100 EACH | Refills: 3 | Status: SHIPPED | OUTPATIENT
Start: 2024-06-06

## 2024-06-06 NOTE — TELEPHONE ENCOUNTER
----- Message from Yesenia Palmer sent at 2024  8:06 AM CDT -----  Contact: [facility]  Juliette Seo  MRN: [id number]  : 1945  PCP: Suman Rubi  Home Phone      [phone]  Work Phone      [phone]  Mobile          [phone]      MESSAGE: Patient needs a refill on BD Lauren 2 pen needles, to inject insulin BID sent to [facility].        Phone: [phone]

## 2024-06-07 ENCOUNTER — PATIENT MESSAGE (OUTPATIENT)
Dept: ENDOCRINOLOGY | Facility: CLINIC | Age: 79
End: 2024-06-07
Payer: MEDICARE

## 2024-06-11 NOTE — ASSESSMENT & PLAN NOTE
Worsening A1c of 11.2% in January. Unsure of POCT due to patient not checking her glucose. No recent Libre3 data. She reports stopping her Trulicity after the increased dose to 1.5 mg due to nausea and appetite suppression. She is not interested in restarting this medication.     She only took Jardiance for a few weeks in October due to fears of side effects. She reports recent yeast infection. Will hold off on SGLT-2 inhibitor for now due to recent yeast infection with uncontrolled glucose.     I discussed with patient trying Ozempic at a low dose due to uncontrolled glucose. She agreed to try for now.     Shola 3 placed in clinic. I discussed with patient that I will review her glucose in 1 week and make adjustments.     Plan  - Start Ozempic 0.25 mg once weekly   - Continue metformin 500 mg daily  - Continue Levemir 40 U HS  - Restart FreeStyle Shola 3 CGM  - CMP, A1c, TSH, microalbumin creatinine ratio      F/u 1 month

## 2024-06-11 NOTE — PROGRESS NOTES
Subjective:      Patient ID: Juliette Seo is a 78 y.o. female.    Chief Complaint:  Type 2 diabetes mellitus    History of Present Illness  This is a 78 y.o. female. with a past medical history of Type 2 diabetes mellitus, HTN, HLD here for evaluation.    Patient has been off of Trulicity and Jardiance. Last took Trulicity 2 months ago and Jardiance 8 months ago.     Type 2 diabetes mellitus  Diagnosed around age 40    Current diabetes medications:  - Levemir 40 U PM  - Trulicity 1.5 mg weekly- has not been taking for 2 months due to decreased appetite and nausea after increasing her dose to 1.5 mg.  - Metformin 500 mg daily - tolerating okay, but fears side effects      Past diabetes medications:  - Farxiga       Lab Results   Component Value Date    CREATININE 0.48 (L) 01/27/2024    EGFRNORACEVR >60.0 01/27/2024       Known diabetic complications: none    Weight trend:  Wt Readings from Last 8 Encounters:   06/12/24 48.6 kg (107 lb 1.6 oz)   02/06/24 47.7 kg (105 lb 0.8 oz)   01/06/24 47.6 kg (105 lb)   11/30/23 48.1 kg (106 lb)   11/14/23 48.3 kg (106 lb 7.7 oz)   10/30/23 48.1 kg (106 lb)   10/19/23 48.1 kg (106 lb)   10/02/23 48.2 kg (106 lb 4.2 oz)       Family history of diabetes:  Yes    Prior visit with diabetes education: Yes    Current diet: 3 meals per day    Blood glucose monitoring at home: Patient has not been able to get [facility]ola from [facility] due to not seeing us in over 6 months   Home blood sugar records: She has not been monitoring her glucose at home for 2-3 months         Diabetes Management Status  Statin: Taking  ACE/ARB: Not taking    Screening or Prevention Patient's value   HgA1C Testing and Control   Lab Results   Component Value Date    HGBA1C 11.2 (H) 01/27/2024        LDL control Lab Results   Component Value Date    LDLCALC 153.4 01/27/2024      Nephropathy screening Lab Results   Component Value Date    MICALBCREAT 313.6 (H) 01/27/2024        Lab Results   Component Value Date    TSH 5.260  "(H) 01/27/2024       Last eye exam: : 12/02/2020      Review of Systems  As above    Social and family history reviewed  Current medications and allergies reviewed    Objective:   BP (!) 158/82   Pulse 99   Ht 4' 8" (1.422 m)   Wt 48.6 kg (107 lb 1.6 oz)   BMI 24.01 kg/m²   Physical Exam  Alert, oriented    BP Readings from Last 1 Encounters:   06/12/24 (!) 158/82      Wt Readings from Last 1 Encounters:   06/12/24 0939 48.6 kg (107 lb 1.6 oz)     Body mass index is 24.01 kg/m².    Lab Review:   Lab Results   Component Value Date    HGBA1C 11.2 (H) 01/27/2024     Lab Results   Component Value Date    CHOL 222 (H) 01/27/2024    HDL 40 01/27/2024    LDLCALC 153.4 01/27/2024    TRIG 143 01/27/2024    CHOLHDL 18.0 (L) 01/27/2024     Lab Results   Component Value Date     01/27/2024    K 3.9 01/27/2024     01/27/2024    CO2 23 01/27/2024     (H) 01/27/2024    BUN 14 01/27/2024    CREATININE 0.48 (L) 01/27/2024    CALCIUM 8.6 (L) 01/27/2024    PROT 7.3 01/27/2024    ALBUMIN 4.2 01/27/2024    BILITOT 0.9 01/27/2024    ALKPHOS 106 01/27/2024    AST 24 01/27/2024    ALT 20 01/27/2024    ANIONGAP 12 01/27/2024    ESTGFRAFRICA >60.0 07/13/2022    EGFRNONAA >60.0 07/13/2022    TSH 5.260 (H) 01/27/2024       All pertinent labs reviewed    Assessment and Plan     Type 2 diabetes mellitus with hyperglycemia, with long-term current use of insulin  Worsening A1c of 11.2% in January. Unsure of POCT due to patient not checking her glucose. No recent Libre3 data. She reports stopping her Trulicity after the increased dose to 1.5 mg due to nausea and appetite suppression. She is not interested in restarting this medication.     She only took Jardiance for a few weeks in October due to fears of side effects. She reports recent yeast infection. Will hold off on SGLT-2 inhibitor for now due to recent yeast infection with uncontrolled glucose.     I discussed with patient trying Ozempic at a low dose due to " uncontrolled glucose. She agreed to try for now.     Shola 3 placed in clinic. I discussed with patient that I will review her glucose in 1 week and make adjustments.     Plan  - Start Ozempic 0.25 mg once weekly   - Continue metformin 500 mg daily  - Continue Levemir 40 U HS  - Restart FreeStyle Shola 3 CGM  - CMP, A1c, TSH, microalbumin creatinine ratio      F/u 1 month    BMI 23.0-23.9, adult  Monitor while on GLP-1 RA    Pure hypercholesterolemia  Continue statin          Follow up 1 month  Alexandria Funes PA-C  Endocrinology

## 2024-06-12 ENCOUNTER — LAB VISIT (OUTPATIENT)
Dept: LAB | Facility: HOSPITAL | Age: 79
End: 2024-06-12
Attending: PHYSICIAN ASSISTANT
Payer: MEDICARE

## 2024-06-12 ENCOUNTER — OFFICE VISIT (OUTPATIENT)
Dept: ENDOCRINOLOGY | Facility: CLINIC | Age: 79
End: 2024-06-12
Payer: MEDICARE

## 2024-06-12 ENCOUNTER — PATIENT MESSAGE (OUTPATIENT)
Dept: ENDOCRINOLOGY | Facility: CLINIC | Age: 79
End: 2024-06-12

## 2024-06-12 VITALS
BODY MASS INDEX: 24.1 KG/M2 | SYSTOLIC BLOOD PRESSURE: 158 MMHG | WEIGHT: 107.13 LBS | DIASTOLIC BLOOD PRESSURE: 82 MMHG | HEART RATE: 99 BPM | HEIGHT: 56 IN

## 2024-06-12 DIAGNOSIS — E11.65 TYPE 2 DIABETES MELLITUS WITH HYPERGLYCEMIA, WITH LONG-TERM CURRENT USE OF INSULIN: ICD-10-CM

## 2024-06-12 DIAGNOSIS — E78.00 PURE HYPERCHOLESTEROLEMIA: ICD-10-CM

## 2024-06-12 DIAGNOSIS — Z79.4 TYPE 2 DIABETES MELLITUS WITH HYPERGLYCEMIA, WITH LONG-TERM CURRENT USE OF INSULIN: Primary | ICD-10-CM

## 2024-06-12 DIAGNOSIS — Z79.4 TYPE 2 DIABETES MELLITUS WITH HYPERGLYCEMIA, WITH LONG-TERM CURRENT USE OF INSULIN: ICD-10-CM

## 2024-06-12 DIAGNOSIS — E11.65 TYPE 2 DIABETES MELLITUS WITH HYPERGLYCEMIA, WITH LONG-TERM CURRENT USE OF INSULIN: Primary | ICD-10-CM

## 2024-06-12 LAB
ALBUMIN SERPL BCP-MCNC: 3.9 G/DL (ref 3.5–5.2)
ALBUMIN/CREAT UR: 649.4 UG/MG (ref 0–30)
ALP SERPL-CCNC: 118 U/L (ref 55–135)
ALT SERPL W/O P-5'-P-CCNC: 16 U/L (ref 10–44)
ANION GAP SERPL CALC-SCNC: 12 MMOL/L (ref 8–16)
AST SERPL-CCNC: 16 U/L (ref 10–40)
BILIRUB SERPL-MCNC: 0.5 MG/DL (ref 0.1–1)
BUN SERPL-MCNC: 10 MG/DL (ref 8–23)
CALCIUM SERPL-MCNC: 9.3 MG/DL (ref 8.7–10.5)
CHLORIDE SERPL-SCNC: 103 MMOL/L (ref 95–110)
CO2 SERPL-SCNC: 23 MMOL/L (ref 23–29)
CREAT SERPL-MCNC: 0.8 MG/DL (ref 0.5–1.4)
CREAT UR-MCNC: 15.4 MG/DL (ref 15–325)
EST. GFR  (NO RACE VARIABLE): >60 ML/MIN/1.73 M^2
ESTIMATED AVG GLUCOSE: 301 MG/DL (ref 68–131)
GLUCOSE SERPL-MCNC: 358 MG/DL (ref 70–110)
HBA1C MFR BLD: 12.1 % (ref 4–5.6)
MICROALBUMIN UR DL<=1MG/L-MCNC: 100 UG/ML
POTASSIUM SERPL-SCNC: 4.2 MMOL/L (ref 3.5–5.1)
PROT SERPL-MCNC: 7.3 G/DL (ref 6–8.4)
SODIUM SERPL-SCNC: 138 MMOL/L (ref 136–145)
T4 FREE SERPL-MCNC: 0.9 NG/DL (ref 0.71–1.51)
TSH SERPL DL<=0.005 MIU/L-ACNC: 4.7 UIU/ML (ref 0.4–4)

## 2024-06-12 PROCEDURE — 99999 PR PBB SHADOW E&M-EST. PATIENT-LVL IV: CPT | Mod: PBBFAC,HCNC,TXP, | Performed by: PHYSICIAN ASSISTANT

## 2024-06-12 PROCEDURE — 99214 OFFICE O/P EST MOD 30 MIN: CPT | Mod: HCNC,NTX,S$GLB, | Performed by: PHYSICIAN ASSISTANT

## 2024-06-12 PROCEDURE — 3077F SYST BP >= 140 MM HG: CPT | Mod: HCNC,CPTII,NTX,S$GLB | Performed by: PHYSICIAN ASSISTANT

## 2024-06-12 PROCEDURE — 84443 ASSAY THYROID STIM HORMONE: CPT | Mod: HCNC,TXP | Performed by: PHYSICIAN ASSISTANT

## 2024-06-12 PROCEDURE — 3079F DIAST BP 80-89 MM HG: CPT | Mod: HCNC,CPTII,NTX,S$GLB | Performed by: PHYSICIAN ASSISTANT

## 2024-06-12 PROCEDURE — 80053 COMPREHEN METABOLIC PANEL: CPT | Mod: HCNC,TXP | Performed by: PHYSICIAN ASSISTANT

## 2024-06-12 PROCEDURE — 82570 ASSAY OF URINE CREATININE: CPT | Mod: HCNC,TXP | Performed by: PHYSICIAN ASSISTANT

## 2024-06-12 PROCEDURE — 84439 ASSAY OF FREE THYROXINE: CPT | Mod: HCNC,TXP | Performed by: PHYSICIAN ASSISTANT

## 2024-06-12 PROCEDURE — 83036 HEMOGLOBIN GLYCOSYLATED A1C: CPT | Mod: HCNC,TXP | Performed by: PHYSICIAN ASSISTANT

## 2024-06-12 PROCEDURE — 1159F MED LIST DOCD IN RCRD: CPT | Mod: HCNC,CPTII,NTX,S$GLB | Performed by: PHYSICIAN ASSISTANT

## 2024-06-12 PROCEDURE — 36415 COLL VENOUS BLD VENIPUNCTURE: CPT | Mod: HCNC,TXP | Performed by: PHYSICIAN ASSISTANT

## 2024-06-12 NOTE — PATIENT INSTRUCTIONS
Continue Levemir 40 units once daily    Continue metformin 500 mg once daily     Start Ozempic 0.25 mg once a week (On Wednesdays)

## 2024-06-17 ENCOUNTER — HOSPITAL ENCOUNTER (OUTPATIENT)
Dept: PULMONOLOGY | Facility: CLINIC | Age: 79
Discharge: HOME | End: 2024-06-17
Payer: MEDICARE

## 2024-06-17 ENCOUNTER — OFFICE VISIT (OUTPATIENT)
Dept: TRANSPLANT | Facility: CLINIC | Age: 79
End: 2024-06-17
Payer: MEDICARE

## 2024-06-17 VITALS
HEIGHT: 56 IN | BODY MASS INDEX: 24.5 KG/M2 | DIASTOLIC BLOOD PRESSURE: 80 MMHG | HEIGHT: 56 IN | RESPIRATION RATE: 16 BRPM | WEIGHT: 108.94 LBS | TEMPERATURE: 99 F | BODY MASS INDEX: 24.3 KG/M2 | WEIGHT: 108 LBS | SYSTOLIC BLOOD PRESSURE: 177 MMHG | HEART RATE: 91 BPM | OXYGEN SATURATION: 98 %

## 2024-06-17 DIAGNOSIS — J47.9 BRONCHIECTASIS WITHOUT COMPLICATION: ICD-10-CM

## 2024-06-17 DIAGNOSIS — J84.9 INTERSTITIAL PULMONARY DISEASE, UNSPECIFIED: Primary | ICD-10-CM

## 2024-06-17 DIAGNOSIS — J30.2 SEASONAL ALLERGIC RHINITIS, UNSPECIFIED TRIGGER: ICD-10-CM

## 2024-06-17 DIAGNOSIS — K21.9 GASTROESOPHAGEAL REFLUX DISEASE, UNSPECIFIED WHETHER ESOPHAGITIS PRESENT: ICD-10-CM

## 2024-06-17 PROCEDURE — 99999 PR PBB SHADOW E&M-EST. PATIENT-LVL V: CPT | Mod: PBBFAC,HCNC,TXP, | Performed by: PHYSICIAN ASSISTANT

## 2024-06-17 NOTE — PROGRESS NOTES
ADVANCED [facility]G DISEASE CLINIC FOLLOW UP VISIT                                                                                                                                             Reason for Visit:  ALD    Referring Physician: Yancy Maldonado, *    History of Present Illness: Juliette Seo is a 78 y.o. female who is on 0L of oxygen.  She is on no assisted ventilation.  Her New York Heart Association Class is I and a Karnofsky score of 90% - Able to carry on normal activity: minor symptoms of disease. She is diabetic insulin dependent    Patient presents today for routine follow up. Reports continued improvement in cough and dyspnea since previous visit. Reflux very well controlled on PPI therapy. She had bronchitis about two months ago and completed a course of antibiotics. Since then, she has been feeling well. No limitations in her ADLs. No recent hospitalizations.     PER INITIAL HPI:    Patient presents today for evaluation of ILD. Previously followed for bronchiectasis in 2018 by Dr. Edmondson, and most recently seen by Dr. Brush two weeks prior to today's visit. Patient states symptoms began to progress about three months ago. She noticed increased dry cough and dyspnea with exertion. At that time, she had discontinued her PPI per her PCP's recommendation. She notes increased reflux and cough since stopping her PPI. She has a history of COVID x 2. Required hospitalization in 2021. She briefly required oxygen during her admission, but was discharged on room air. She is not vaccinated for COVID. Received bamlanivimab, remdesivir, and dexamethasone. She tested positive again in 12/2022. She states she has been prescribed albuterol and is awaiting nebulizer supplies. She states she takes tessalon pearls for her cough, which provides some relief. Cough is intermittent in duration with no alleviating/exacerbation factors.     Patient was started on a prednisone taper after her visit with Dr. Brush on 7/5. She  completed one week of prednisone 20 mg daily and is on prednisone 10 mg daily for two weeks. Per patient's daughter, she notes some improvement in patient's dyspnea and cough since starting steroids. Patient's daughter also reports patient has had poor appetite and early satiety for the last few months. She required hospitalization for left sided chest pain 6/17.     Patient has minimal smoking history and quit over 20 years ago. No history of frequent infections during childhood/adolescence. Notes some increased rhinorrhea/post-nasal drip which occurs with seasonal changes. Grew up in [place] [facility] and has lived in [place] since 1960. She has multiple occupational exposures to chemicals/metal/dust, absbestos, cleaning supplies, construction. 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 No birds for pets.    Patient denies history of rashes, myalgias, arthralgias, muscular weakness. Autoimmune workup thus far unrevealing. No symptoms of dysphagia, throat clearing, globus sensation.        Past Medical History:   Diagnosis Date    Allergy     Anxiety     Arthritis     osteo    Asthma     Diabetic retinopathy     GERD (gastroesophageal reflux disease)     High cholesterol     Hypertension     Kidney stone     Type 2 diabetes mellitus, uncontrolled, with neuropathy     Urinary tract infection     Vaginal infection        Past Surgical History:   Procedure Laterality Date    CHOLECYSTECTOMY      COLONOSCOPY N/A 6/8/2021    Procedure: COLONOSCOPY;  Surgeon: Rain Pop MD;  Location: [facility];  Service: Endoscopy;  Laterality: N/A;    COLONOSCOPY N/A 7/8/2021    Procedure: colonoscopy with single balloon scope;  Surgeon: Steffen Dueñas MD;  Location: [facility];  Service: Endoscopy;  Laterality: N/A;    ESOPHAGOGASTRODUODENOSCOPY N/A 6/8/2021    Procedure: EGD (ESOPHAGOGASTRODUODENOSCOPY);  Surgeon: Rain Pop MD;  Location: [facility];  Service: Endoscopy;  Laterality: N/A;    EYE SURGERY       HYSTERECTOMY      INCISIONAL HERNIA REPAIR  2003    ROTATOR CUFF REPAIR Right 11/07/2017    TRIGGER FINGER RELEASE Right 6/3/2022    Procedure: RELEASE, TRIGGER FINGER; right index, middle, ring;  Surgeon: Sarmad Wesley Jr., MD;  Location: [facility];  Service: Orthopedics;  Laterality: Right;       Allergies: Actos [pioglitazone], Darvocet a500 [propoxyphene n-acetaminophen], and Dilaudid [hydromorphone (bulk)]    Current Outpatient Medications   Medication Sig    acetaminophen (TYLENOL) 500 MG tablet Take 1 tablet (500 mg total) by mouth every 4 (four) hours as needed for Pain.    albuterol (PROVENTIL) 2.5 mg /3 mL (0.083 %) nebulizer solution Take 3 mLs (2.5 mg total) by nebulization every 6 (six) hours as needed for Wheezing. Rescue    amoxicillin (AMOXIL) 875 MG tablet Take 1 tablet (875 mg total) by mouth 2 (two) times daily. (Patient not taking: Reported on 6/12/2024)    aspirin (ECOTRIN) 81 MG EC tablet Take 81 mg by mouth once daily.    atorvastatin (LIPITOR) 40 MG tablet Take 1 tablet (40 mg total) by mouth once daily.    azelastine (ASTELIN) 137 mcg (0.1 %) nasal spray 1 spray (137 mcg total) by Nasal route 2 (two) times daily.    cetirizine (ZYRTEC) 10 MG tablet Take 1 tablet (10 mg total) by mouth once daily. (Patient not taking: Reported on 6/12/2024)    cyclobenzaprine (FLEXERIL) 10 MG tablet Take 1 tablet 3 times a day by oral route as needed. (Patient not taking: Reported on 6/12/2024)    empagliflozin (JARDIANCE) 25 mg tablet Take 1 tablet (25 mg total) by mouth once daily.    fluconazole (DIFLUCAN) 150 MG Tab Take once, repeat dose at 72 hours if symptoms not improved (Patient not taking: Reported on 6/12/2024)    fluticasone propionate (FLONASE) 50 mcg/actuation nasal spray 2 sprays (100 mcg total) by Each Nostril route once daily.    HYDROcodone-acetaminophen (NORCO) 5-325 mg per tablet Take 1 tablet by mouth every 8 (eight) hours as needed for Pain. (Patient not taking: Reported on 6/12/2024)  "   insulin (LANTUS SOLOSTAR U-100 INSULIN) glargine 100 units/mL SubQ pen Inject 40 Units into the skin once daily.    losartan-hydrochlorothiazide 100-12.5 mg (HYZAAR) 100-12.5 mg Tab Take 1 tablet by mouth once daily.    meclizine (ANTIVERT) 25 mg tablet Take 1 tablet (25 mg total) by mouth 3 (three) times daily as needed for Dizziness.    metFORMIN (GLUCOPHAGE) 500 MG tablet Take 1 tablet (500 mg total) by mouth 2 (two) times daily with meals. (Patient taking differently: Take 500 mg by mouth daily with breakfast.)    metoprolol tartrate (LOPRESSOR) 25 MG tablet Take 0.5 tablets (12.5 mg total) by mouth 2 (two) times daily.    montelukast (SINGULAIR) 10 mg tablet TAKE 1 TABLET BY MOUTH EVERY DAY IN THE EVENING (Patient not taking: Reported on 6/12/2024)    nystatin (MYCOSTATIN) cream Apply topically 2 (two) times daily.    pantoprazole (PROTONIX) 40 MG tablet Take 1 tablet (40 mg total) by mouth once daily.    QUEtiapine (SEROQUEL) 25 MG Tab Take 1 tablet (25 mg total) by mouth every evening. (Patient not taking: Reported on 6/12/2024)    TRUE METRIX GLUCOSE TEST STRIP Strp 1 strip 2 (two) times daily.    TRUEPLUS PEN NEEDLE 32 gauge x 5/32" Ndle To inject insulin twice daily    TRULICITY 1.5 mg/0.5 mL pen injector Inject into the skin. (Patient not taking: Reported on 6/12/2024)     No current facility-administered medications for this visit.     Facility-Administered Medications Ordered in Other Visits   Medication    triamcinolone acetonide injection 40 mg       Immunization History   Administered Date(s) Administered    Influenza 10/31/2012    Influenza (FLUAD) - Quadrivalent - Adjuvanted - PF *Preferred* (65+) 11/13/2020, 10/02/2023    Influenza - High Dose - PF (65 years and older) 09/01/2016, 12/01/2017, 10/04/2018, 10/24/2019    Influenza - Quadrivalent - High Dose - PF (65 years and older) 11/07/2021    Influenza - Quadrivalent - PF *Preferred* (6 months and older) 10/19/2015    Influenza - Trivalent " (ADULT) 10/31/2012    Influenza Split 10/08/2014    Pneumococcal Conjugate - 13 Valent 10/19/2015    Pneumococcal Polysaccharide - 23 Valent 04/18/2019    Zoster Recombinant 12/26/2019, 09/12/2020     Family History:    Family History   Problem Relation Name Age of Onset    Stroke Mother      Diabetes Sister      Diabetes Brother      Kidney disease Neg Hx       Social History     Substance and Sexual Activity   Alcohol Use Not Currently    Comment: social      Social History     Substance and Sexual Activity   Drug Use No      Social History     Socioeconomic History    Marital status:    Tobacco Use    Smoking status: Former     Passive exposure: Never    Smokeless tobacco: Never   Substance and Sexual Activity    Alcohol use: Not Currently     Comment: social    Drug use: No    Sexual activity: Yes     Partners: Male     Birth control/protection: None     Social Determinants of Health     Financial Resource Strain: Patient Declined (6/16/2024)    Overall Financial Resource Strain (CARDIA)     Difficulty of Paying Living Expenses: Patient declined   Food Insecurity: Patient Declined (6/16/2024)    Hunger Vital Sign     Worried About Running Out of Food in the Last Year: Patient declined     Ran Out of Food in the Last Year: Patient declined   Transportation Needs: No Transportation Needs (5/31/2023)    PRAPARE - Transportation     Lack of Transportation (Medical): No     Lack of Transportation (Non-Medical): No   Physical Activity: Inactive (6/16/2024)    Exercise Vital Sign     Days of Exercise per Week: 0 days     Minutes of Exercise per Session: 0 min   Stress: No Stress Concern Present (6/16/2024)    [employer] - Occupational Stress Questionnaire     Feeling of Stress : Only a little   Housing Stability: Unknown (6/16/2024)    Housing Stability Vital Sign     Unable to Pay for Housing in the Last Year: Patient declined     Review of Systems   Constitutional:  Negative for  "chills, diaphoresis, fever, malaise/fatigue and weight loss.   HENT:  Negative for congestion, ear discharge, ear pain, hearing loss, nosebleeds, sinus pain, sore throat and tinnitus.    Eyes:  Negative for blurred vision, double vision, photophobia, pain, discharge and redness.   Respiratory:  Positive for cough (chronic, stable) and shortness of breath (stable). Negative for hemoptysis, sputum production, wheezing and stridor.    Cardiovascular:  Negative for chest pain, palpitations, orthopnea, claudication, leg swelling and PND.   Gastrointestinal:  Negative for abdominal pain, blood in stool, constipation, diarrhea, heartburn, melena, nausea and vomiting.   Genitourinary:  Negative for dysuria, flank pain, frequency, hematuria and urgency.   Musculoskeletal:  Negative for back pain, falls, joint pain, myalgias and neck pain.   Skin:  Negative for itching and rash.   Neurological:  Negative for dizziness, tingling, tremors, sensory change, speech change, focal weakness, seizures, loss of consciousness, weakness and headaches.   Endo/Heme/Allergies:  Negative for environmental allergies and polydipsia. Does not bruise/bleed easily.   Psychiatric/Behavioral:  Negative for depression, hallucinations, memory loss, substance abuse and suicidal ideas. The patient is not nervous/anxious and does not have insomnia.      Vitals  BP (!) 177/80 (BP Location: Right arm, Patient Position: Sitting, BP Method: Medium (Automatic))   Pulse 91   Temp 98.5 °F (36.9 °C) (Oral)   Resp 16   Ht 4' 8" (1.422 m)   Wt 49.4 kg (108 lb 14.5 oz)   SpO2 98%   BMI 24.42 kg/m²   Physical Exam  Vitals and nursing note reviewed.   Constitutional:       General: She is not in acute distress.     Appearance: Normal appearance.   HENT:      Head: Normocephalic and atraumatic.   Eyes:      General: No scleral icterus.     Conjunctiva/sclera: Conjunctivae normal.   Cardiovascular:      Rate and Rhythm: Normal rate.      Heart sounds: No murmur " heard.     No friction rub.   Pulmonary:      Effort: No respiratory distress.      Breath sounds: No wheezing, rhonchi or rales.   Abdominal:      General: Bowel sounds are normal. There is no distension.      Palpations: Abdomen is soft.      Tenderness: There is no abdominal tenderness.   Musculoskeletal:      Right lower leg: No edema.      Left lower leg: No edema.   Skin:     General: Skin is warm and dry.   Neurological:      General: No focal deficit present.      Mental Status: She is alert and oriented to person, place, and time.   Psychiatric:         Mood and Affect: Mood normal.         Behavior: Behavior normal.     Labs:  Hospital Outpatient Visit on 06/17/2024   Component Date Value    Pre FVC 06/17/2024 1.79     PRE FEV5 06/17/2024 1.26     Pre FEV1 06/17/2024 1.49     Pre FEV1 FVC 06/17/2024 83.27     Pre FEF 25 75 06/17/2024 1.88     Pre PEF 06/17/2024 3.09     Pre  06/17/2024 5.56     Pre DLCO 06/17/2024 11.26     DLCO ADJ PRE 06/17/2024 11.26     DLCOVA PRE 06/17/2024 4.34     DLVAAdj PRE 06/17/2024 4.34     VA PRE 06/17/2024 2.60 (L)     IVC PRE 06/17/2024 1.78     Pre TLC 06/17/2024 3.04     VC PRE 06/17/2024 1.80     PRE IC 06/17/2024 0.98     Pre FRC PL 06/17/2024 2.05     Pre ERV 06/17/2024 0.81     Pre RV 06/17/2024 1.24 (L)     RVTLC PRE 06/17/2024 40.87     Raw PRE 06/17/2024 6.15 (H)     sGaw PRE 06/17/2024 0.07 (L)     FVC Ref 06/17/2024 1.91     FVC LLN 06/17/2024 1.34     FVC Pre Ref 06/17/2024 93.7     FEV05 REF 06/17/2024 1.20     FEV05 LLN 06/17/2024 0.35     PRE FEV05 REF 06/17/2024 104.6     FEV1 Ref 06/17/2024 1.48     FEV1 LLN 06/17/2024 1.04     FEV1 Pre Ref 06/17/2024 100.5     FEV1 FVC Ref 06/17/2024 78     FEV1 FVC LLN 06/17/2024 64     FEV1 FVC Pre Ref 06/17/2024 106.4     FEF 25 75 Ref 06/17/2024 1.32     FEF 25 75 LLN 06/17/2024 0.56     FEF 25 75 Pre Ref 06/17/2024 142.4     PEF Ref 06/17/2024 3.32     PEF LLN 06/17/2024 1.79     PEF Pre Ref 06/17/2024 93.0      TLC Ref 06/17/2024 3.60     TLC LLN 06/17/2024 2.61     TLC ULN 06/17/2024 4.58     TLC Pre Ref 06/17/2024 84.5     VC Ref 06/17/2024 1.91     VC LLN 06/17/2024 1.34     VC ULN 06/17/2024 2.50     VC Pre Ref 06/17/2024 94.2     REF IC 06/17/2024 1.03     LLN IC 06/17/2024 -58452.97     ULN IC 06/17/2024 14062.03     PRE REF IC 06/17/2024 95.2     FRCpleth Ref 06/17/2024 2.26     FRCpleth LLN 06/17/2024 1.44     FRC PLETH ULN 06/17/2024 3.09     FRCpleth PreRef 06/17/2024 90.7     ERV Ref 06/17/2024 0.44     ERV LLN 06/17/2024 -62205.56     ERV ULN 06/17/2024 62522.44     ERV Pre Ref 06/17/2024 183.9     RV Ref 06/17/2024 1.82     RV LLN 06/17/2024 1.25     RV ULN 06/17/2024 2.40     RV Pre Ref 06/17/2024 68.1     RVTLC Ref 06/17/2024 45     RVTLC LLN 06/17/2024 36     RV TLC ULN 06/17/2024 55     RVTLC Pre Ref 06/17/2024 89.9     Raw Ref 06/17/2024 3.06     Raw Pre Ref 06/17/2024 201.0     sGaw Ref 06/17/2024 0.10     sGaw Pre Ref 06/17/2024 67.4     DLCO Single Breath Ref 06/17/2024 15.15     DLCO Single Breath LLN 06/17/2024 9.42     DLCO SINGLEBREATH ULN 06/17/2024 20.88     DLCO Single Breath Pre R* 06/17/2024 74.3     DLCOc Single Breath Ref 06/17/2024 15.15     DLCOc Single Breath LLN 06/17/2024 9.42     DLCOC SINGLEBREATH ULN 06/17/2024 20.88     DLCOc Single Breath Pre * 06/17/2024 74.3     DLCOVA Ref 06/17/2024 4.21     DLCOVA LLN 06/17/2024 2.24     DLCOVA ULN 06/17/2024 6.18     DLCOVA Pre Ref 06/17/2024 103.0     DLCOc SBVA Ref 06/17/2024 4.21     DLCOc SBVA LLN 06/17/2024 2.24     DLCOCSBVA ULN 06/17/2024 6.18     DLCOc SBVA Pre Ref 06/17/2024 103.0     VA Single Breath Ref 06/17/2024 3.45     VA Single Breath LLN 06/17/2024 3.45     VA SINGLEBREATH ULN 06/17/2024 3.45     VA Single Breath Pre Ref 06/17/2024 75.3     IVC Single Breath Ref 06/17/2024 1.91     IVC Single Breath LLN 06/17/2024 1.34     IVC SINGLEBREATH ULN 06/17/2024 2.50     IVC Single Breath Pre Ref 06/17/2024 93.5    Lab Visit on  06/12/2024   Component Date Value    Sodium 06/12/2024 138     Potassium 06/12/2024 4.2     Chloride 06/12/2024 103     CO2 06/12/2024 23     Glucose 06/12/2024 358 (H)     BUN 06/12/2024 10     Creatinine 06/12/2024 0.8     Calcium 06/12/2024 9.3     Total Protein 06/12/2024 7.3     Albumin 06/12/2024 3.9     Total Bilirubin 06/12/2024 0.5     Alkaline Phosphatase 06/12/2024 118     AST 06/12/2024 16     ALT 06/12/2024 16     eGFR 06/12/2024 >60     Anion Gap 06/12/2024 12     Hemoglobin A1C 06/12/2024 12.1 (H)     Estimated Avg Glucose 06/12/2024 301 (H)     TSH 06/12/2024 4.698 (H)     Free T4 06/12/2024 0.90     Microalbumin, Urine 06/12/2024 100.0     Creatinine, Urine 06/12/2024 15.4     Microalb/Creat Ratio 06/12/2024 649.4 (H)            6/17/2024    12:09 PM 11/14/2023    11:36 AM 8/24/2023    11:22 AM 7/20/2023     9:51 AM   Pulmonary Function Tests   FVC 1.79 liters 1.78 liters 1.83 liters 1.91 liters   FEV1 1.49 liters 1.48 liters 1.57 liters 1.59 liters   TLC (liters) 3.04 liters 2.84 liters 3.13 liters 2.89 liters   DLCO (ml/mmHg sec) 11.26 ml/mmHg sec 10.84 ml/mmHg sec 10.2 ml/mmHg sec 12.47 ml/mmHg sec   FVC% 93.7 92.5 95.2 98.9   FEV1% 100.5 99.1 104.6 106.3   FEF 25-75 1.88 1.95 2.22 2.02   FEF 25-75% 142.4 146.1 165.9 150.6   TLC% 84.5 78.9 87 80.3   RV 1.24 1.06 1.3 0.97   RV% 68.1 58.3 71.7 53.7   DLCO% 74.3 71.6 66.7 81.5         6/17/2024    11:08 AM 7/20/2023     9:28 AM   6MW   6MWT Status completed without stopping completed without stopping   Patient Reported No complaints Dyspnea;Dizziness;Lightheadedness   Was O2 used? No No   6MW Distance walked (feet) 1100 feet 1000 feet   Distance walked (meters) 335.28 meters 304.8 meters   Did patient stop? No No   Oxygen Saturation 99 % 98 %   Supplemental Oxygen Room Air Room Air   Heart Rate 104 bpm 91 bpm   Blood Pressure 159/88 163/71   Jas Dyspnea Rating  very, very light (just noticeable) nothing at all   Oxygen Saturation 98 % 98 %    Supplemental Oxygen Room Air Room Air   Heart Rate 124 bpm 105 bpm   Blood Pressure 190/76 148/72   Jas Dyspnea Rating  light moderate   Recovery Time (seconds) 170 seconds 75 seconds   Oxygen Saturation 98 % 98 %   Supplemental Oxygen Room Air Room Air   Heart Rate 100 bpm 93 bpm       Imaging:  Results for orders placed during the hospital encounter of 03/31/23    X-Ray Chest PA And Lateral    Narrative  EXAMINATION:  XR CHEST PA AND LATERAL    CLINICAL HISTORY:  Disorder of bone, unspecified    TECHNIQUE:  PA and lateral views of the chest were performed.    COMPARISON:  10/19/2022    FINDINGS:  Scattered interstitial thickening, similar to the prior exam.  No lung consolidation.The cardiomediastinal contour is within normal limits.  No pneumothorax.  No pleural effusions.    Impression  No acute findings.      Electronically signed by: Vince Tavares MD  Date:    03/31/2023  Time:    13:38    Results for orders placed during the hospital encounter of 10/08/18    DXA Bone Density Spine And Hip    Narrative  EXAMINATION:  DEXA BONE DENSITY SPINE HIP    CLINICAL HISTORY:  Other specified personal risk factors, not elsewhere classified    COMPARISON:  07/23/2015    FINDINGS:  The T score associated with the lumbar spine is -1.1 on the current examination.  It was -1.0 on the prior examination.  The T score associated with the left femoral neck is -1.5 on the current examination.  It was -1.2 on the prior examination.  The T score associated with the right femoral neck is -1.2 on the current examination.  It was -0.9 on the prior examination.    IMPRESSION:    Osteopenia      Electronically signed by: David Santillan MD  Date:    10/08/2018  Time:    09:31    No valid procedures specified.  No results found for this or any previous visit.    No results found for this or any previous visit.    Results for orders placed during the hospital encounter of 10/08/19    CT Abdomen Pelvis With  Contrast    Narrative  EXAMINATION:  CT ABDOMEN PELVIS WITH CONTRAST    CLINICAL HISTORY:  LLQ pain, suspect diverticulitis;    TECHNIQUE:  Low dose axial images, sagittal and coronal reformations were obtained from the lung bases to the pubic symphysis following the IV administration of 75 mL of Omnipaque 350    FINDINGS:  The visualized portion of the base of the lungs is significant for bilateral basilar atelectatic versus fibrotic change.  The visualized portion of the heart, stomach, and spleen are unremarkable.  There is fatty atrophy of the pancreas.  The gallbladder is absent.  There is diffuse fatty infiltration of the liver.  The adrenal glands and right kidney have a normal appearance.  There is a 2-3 mm left-sided renal stone.  There is no hydronephrosis or hydroureter.  The bladder is distended but otherwise unremarkable.  The uterus is possibly absent or atrophic.  The bowel is significant for diverticulosis coli.  There is wall thickening of the sigmoid colon adjacent to multiple diverticula with surrounding inflammation consistent with diverticulitis.  There is no significant surrounding inflammatory change.  There is no evidence of perforation or abscess formation.  The appendix has a normal appearance.  The visualized portion of the aorta is significant for moderate scattered plaque.  The osseous structures demonstrate degenerative change.    Impression  Findings consistent with acute diverticulitis of the sigmoid colon without perforation or abscess formation.    Left-sided renal stone.      Electronically signed by: Florencio Padilla MD  Date:    10/09/2019  Time:    09:08    No results found for this or any previous visit.    No results found for this or any previous visit.  IMAGING:     CT chest 6/17/23:  There are subpleural reticular opacities compatible with fibrotic changes/ILD, stable prior.  There is a small focal consolidation or atelectasis in the medial left upper lobe (series 3, image  226).     CT chest 5/1/21:  There are patchy bilateral peripheral bibasilar ground-glass opacities and peripheral reticulations.  Overall extent of findings appear improved from prior study of 03/04/2021.  No new large confluent airspace consolidation identified.  There is no significant volume of pleural fluid.     CT Chest 3/4/21:  There are bilateral symmetric peripheral and lower lobe predominant patchy ground-glass opacities, likely sequela of the patient's known history of COVID-19 pneumonia.  No pneumothorax or large pleural effusion.     CT of chest performed on 4/24/2018 without contrast revealed No parenchymal lung disease, perhaps mild bronchiectatic airways in the right middle lobe.       Cardiodiagnostics:  Results for orders placed during the hospital encounter of 07/20/23    Echo    Interpretation Summary  · The estimated ejection fraction is 60%.  · Normal right ventricular size with normal right ventricular systolic function.  · Normal left ventricular diastolic function.  · The left ventricle is normal in size with normal systolic function.  · Normal central venous pressure (3 mmHg).          Assessment:  1. Interstitial pulmonary disease, unspecified    2. Gastroesophageal reflux disease, unspecified whether esophagitis present    3. Seasonal allergic rhinitis, unspecified trigger        Plan:     FVC/DLCO stable. Her PFTs have remained stable since 2018. Prior CT chest with subpleural reticular opacities, bronchiectasis, and patchy areas of GGOs, improved from previous. Does not have evidence of exertional hypoxemia. Will defer further therapy for now. Will continue to monitor PFTs at routine visits. Repeat 6MWT annually.    Continue PPI. Symptoms better controlled.     Continue astelin and flonase for allergic rhinitis.     RTC in 6 months or sooner if needed. Repeat PFTs at that time.       Rain Wilson PA-C  [facility]

## 2024-06-17 NOTE — LETTER
June 17, 2024        Clarence Brush  [address]  [facility] [address]  Phone: [phone]  Fax: [phone]             [facility] 1st Fl  [address]  [facility] [address]  Phone: [phone]   Patient: Juliette Seo   MR Number: [id number]   YOB: 1945   Date of Visit: 6/17/2024       Dear Dr. Clarence Brush    Thank you for referring Juliette Seo to me for evaluation. Attached you will find relevant portions of my assessment and plan of care.    If you have questions, please do not hesitate to call me. I look forward to following Juliette Seo along with you.    Sincerely,    Rain Wilson PA-C    Enclosure    If you would like to receive this communication electronically, please contact [email or web address] or [phone] to request Everyday Health Link access.    Everyday Health Link is a tool which provides read-only access to select patient information with whom you have a relationship. Its easy to use and provides real time access to review your patients record including encounter summaries, notes, results, and demographic information.    If you feel you have received this communication in error or would no longer like to receive these types of communications, please e-mail [email or web address]

## 2024-06-18 DIAGNOSIS — J84.9 ILD (INTERSTITIAL LUNG DISEASE): Primary | ICD-10-CM

## 2024-06-18 LAB
DLCO ADJ PRE: 11.26 ML/(MIN*MMHG) (ref 9.42–20.88)
DLCO SINGLE BREATH LLN: 9.42
DLCO SINGLE BREATH PRE REF: 74.3 %
DLCO SINGLE BREATH REF: 15.15
DLCOC SBVA LLN: 2.24
DLCOC SBVA PRE REF: 103 %
DLCOC SBVA REF: 4.21
DLCOC SINGLE BREATH LLN: 9.42
DLCOC SINGLE BREATH PRE REF: 74.3 %
DLCOC SINGLE BREATH REF: 15.15
DLCOCSBVAULN: 6.18
DLCOCSINGLEBREATHULN: 20.88
DLCOSINGLEBREATHULN: 20.88
DLCOVA LLN: 2.24
DLCOVA PRE REF: 103 %
DLCOVA PRE: 4.34 ML/(MIN*MMHG*L) (ref 2.24–6.18)
DLCOVA REF: 4.21
DLCOVAULN: 6.18
DLVAADJ PRE: 4.34 ML/(MIN*MMHG*L) (ref 2.24–6.18)
ERV LLN: -16449.56
ERV PRE REF: 183.9 %
ERV REF: 0.44
ERVULN: ABNORMAL
FEF 25 75 LLN: 0.56
FEF 25 75 PRE REF: 142.4 %
FEF 25 75 REF: 1.32
FEV05 LLN: 0.35
FEV05 REF: 1.2
FEV1 FVC LLN: 64
FEV1 FVC PRE REF: 106.4 %
FEV1 FVC REF: 78
FEV1 LLN: 1.04
FEV1 PRE REF: 100.5 %
FEV1 REF: 1.48
FRCPLETH LLN: 1.44
FRCPLETH PREREF: 90.7 %
FRCPLETH REF: 2.26
FRCPLETHULN: 3.09
FVC LLN: 1.34
FVC PRE REF: 93.7 %
FVC REF: 1.91
IVC PRE: 1.78 L (ref 1.34–2.5)
IVC SINGLE BREATH LLN: 1.34
IVC SINGLE BREATH PRE REF: 93.5 %
IVC SINGLE BREATH REF: 1.91
IVCSINGLEBREATHULN: 2.5
LLN IC: -16448.97
PEF LLN: 1.79
PEF PRE REF: 93 %
PEF REF: 3.32
PHYSICIAN COMMENT: ABNORMAL
PRE DLCO: 11.26 ML/(MIN*MMHG) (ref 9.42–20.88)
PRE ERV: 0.81 L (ref -16449.56–16450.44)
PRE FEF 25 75: 1.88 L/S (ref 0.56–2.46)
PRE FET 100: 5.56 SEC
PRE FEV05 REF: 104.6 %
PRE FEV1 FVC: 83.27 % (ref 63.58–90.98)
PRE FEV1: 1.49 L (ref 1.04–1.9)
PRE FEV5: 1.26 L (ref 0.35–2.06)
PRE FRC PL: 2.05 L (ref 1.44–3.09)
PRE FVC: 1.79 L (ref 1.34–2.5)
PRE IC: 0.98 L (ref -16448.97–16451.03)
PRE PEF: 3.09 L/S (ref 1.79–4.86)
PRE REF IC: 95.2 %
PRE RV: 1.24 L (ref 1.25–2.4)
PRE TLC: 3.04 L (ref 2.61–4.58)
RAW PRE REF: 201 %
RAW PRE: 6.15 CMH2O*S/L (ref 3.06–3.06)
RAW REF: 3.06
REF IC: 1.03
RV LLN: 1.25
RV PRE REF: 68.1 %
RV REF: 1.82
RVTLC LLN: 36
RVTLC PRE REF: 89.9 %
RVTLC PRE: 40.87 % (ref 35.89–55.07)
RVTLC REF: 45
RVTLCULN: 55
RVULN: 2.4
SGAW PRE REF: 67.4 %
SGAW PRE: 0.07 1/(CMH2O*S) (ref 0.1–0.1)
SGAW REF: 0.1
TLC LLN: 2.61
TLC PRE REF: 84.5 %
TLC REF: 3.6
TLC ULN: 4.58
ULN IC: ABNORMAL
VA PRE: 2.6 L (ref 3.45–3.45)
VA SINGLE BREATH LLN: 3.45
VA SINGLE BREATH PRE REF: 75.3 %
VA SINGLE BREATH REF: 3.45
VASINGLEBREATHULN: 3.45
VC LLN: 1.34
VC PRE REF: 94.2 %
VC PRE: 1.8 L (ref 1.34–2.5)
VC REF: 1.91
VC ULN: 2.5

## 2024-06-20 ENCOUNTER — TELEPHONE (OUTPATIENT)
Dept: ENDOCRINOLOGY | Facility: CLINIC | Age: 79
End: 2024-06-20

## 2024-06-20 DIAGNOSIS — Z79.4 TYPE 2 DIABETES MELLITUS WITH HYPERGLYCEMIA, WITH LONG-TERM CURRENT USE OF INSULIN: Primary | ICD-10-CM

## 2024-06-20 DIAGNOSIS — B37.9 YEAST INFECTION: ICD-10-CM

## 2024-06-20 DIAGNOSIS — E11.65 TYPE 2 DIABETES MELLITUS WITH HYPERGLYCEMIA, WITH LONG-TERM CURRENT USE OF INSULIN: Primary | ICD-10-CM

## 2024-06-20 RX ORDER — FLUCONAZOLE 150 MG/1
TABLET ORAL
Qty: 2 TABLET | Refills: 0 | Status: SHIPPED | OUTPATIENT
Start: 2024-06-20

## 2024-06-20 NOTE — TELEPHONE ENCOUNTER
Pt's daughter states that the patient is experiencing itching like a yeast infection. Pt uses [facility] in [place].

## 2024-06-20 NOTE — PROCEDURES
Juliette Seo is a 78 y.o.  female patient, who presents for a 6 minute walk test ordered by DO Joel.  The diagnosis is Interstitial Lung Disease; Bronchiectasis.  The patient's BMI is 24.2 kg/m2.  Predicted distance (lower limit of normal) is 272.25 meters.      Test Results:    The test was completed without stopping.  The total time walked was 360 seconds.  During walking, the patient reported:  No complaints.  The patient used no assistive devices during testing.     06/17/2024---------Distance: 335.28 meters (1100 feet)     Lap Walk Time O2 Sat % Supplemental Oxygen Heart Rate Blood Pressure Jas Scale Comment   Pre-exercise  (Resting) 0 0 99 % Room Air 104 bpm 159/88 mmHg 0.5    During Exercise 1 62 sec 98 % Room Air 115 bpm      During Exercise 2 137 sec 96 % Room Air 125 bpm      During Exercise 3 192 sec 98 % Room Air 124 bpm      During Exercise 4 250 sec 98 % Room Air 126 bpm      During Exercise 5 307 sec 98 % Room Air 124 bpm      End of Exercise  360 sec 98 % Room Air 124 bpm 190/76 mmHg 2    Post-exercise  (Recovery)   98 % Room Air  100 bpm 177/83 mmHg       Recovery Time: 170 seconds    Performing nurse/tech: Lori FRAGOSO      PREVIOUS STUDY:   07/20/2023---------Distance: 304.8 meters (1000 feet)       Lap Walk Time O2 Sat % Supplemental Oxygen Heart Rate Blood Pressure Jas Scale   Pre-exercise  (Resting) 0 0 98 % Room Air 91 bpm 163/71 mmHg 0   During Exercise 1 68 sec 98 % Room Air 109 bpm       During Exercise 2 142 sec 96 % Room Air 107 bpm       During Exercise 3 204 sec 99 % Room Air 106 bpm       During Exercise 4 293 sec 98 % Room Air 107 bpm       End of Exercise 5 360 sec 98 % Room Air 105 bpm 148/72 mmHg 3   Post-exercise  (Recovery)     98 % Room Air  93 bpm           CLINICAL INTERPRETATION:  Six minute walk distance is 335.28 meters (1100 feet) with light dyspnea.  During exercise, there was no significant desaturation while breathing room air.  Both blood pressure and  heart rate increased significantly with walking.  Hypertension and Tachycardia were present prior to exercise.  The patient did not report non-pulmonary symptoms during exercise.  Since the previous study in July 2023, exercise capacity may be somewhat improved.  Based upon age and body mass index, exercise capacity is normal.

## 2024-06-20 NOTE — TELEPHONE ENCOUNTER
"----- Message from Alexandria Funes PA-C sent at 6/19/2024  8:36 AM CDT -----  Can you contact patient and ask if she is still wearing the Shola and if she started taking all of her medications? I sent her a message 1 week ago on Mychart and noticed she did not read it. This is the message I sent her. Can you also ask her how she is doing with the Ozempic?    "Good afternoon Mrs. Segovia!     I reviewed your labs. Your A1c is 12.1% which is above goal. We would ideally like you to be around 7-7.5% so we have some improving to do! Your microalbumin creatinine ratio is 649 which is elevated. We typically want that number less than 30. That number shows that you have protein in your urine which means your kidneys are at risk to be damaged. Your actual kidney level (GFR) was normal, but the protein in your urine can be the first sign to kidney damage. In order for this to improve we have to make some improvements to your sugars.      Otherwise your labs look great, Thyroid level is appropriate for your age and electrolytes and liver enzymes are normal.      The first step to improving your numbers is to make sure you are taking all your medications. Remember, I will review your Shola data in 1 week and we will start making some adjustments. I believe we can get your sugars where they need to be as long as you trust that we have your best interest at heart! Look out for a message from me in about a week or two in regards to your sugars. Have a great week!"  "

## 2024-07-25 DIAGNOSIS — K21.9 GASTROESOPHAGEAL REFLUX DISEASE, UNSPECIFIED WHETHER ESOPHAGITIS PRESENT: ICD-10-CM

## 2024-07-26 RX ORDER — PANTOPRAZOLE SODIUM 40 MG/1
40 TABLET, DELAYED RELEASE ORAL DAILY
Qty: 30 TABLET | Refills: 11 | Status: SHIPPED | OUTPATIENT
Start: 2024-07-26 | End: 2025-07-26

## 2024-08-08 DIAGNOSIS — E11.65 TYPE 2 DIABETES MELLITUS WITH HYPERGLYCEMIA, WITH LONG-TERM CURRENT USE OF INSULIN: ICD-10-CM

## 2024-08-08 DIAGNOSIS — J30.2 SEASONAL ALLERGIC RHINITIS, UNSPECIFIED TRIGGER: ICD-10-CM

## 2024-08-08 DIAGNOSIS — Z79.4 TYPE 2 DIABETES MELLITUS WITH HYPERGLYCEMIA, WITH LONG-TERM CURRENT USE OF INSULIN: ICD-10-CM

## 2024-08-08 RX ORDER — NYSTATIN 100000 U/G
CREAM TOPICAL 2 TIMES DAILY
Qty: 30 G | Refills: 0 | OUTPATIENT
Start: 2024-08-08

## 2024-08-08 RX ORDER — DULAGLUTIDE 1.5 MG/.5ML
INJECTION, SOLUTION SUBCUTANEOUS
OUTPATIENT
Start: 2024-08-08

## 2024-08-09 RX ORDER — MONTELUKAST SODIUM 10 MG/1
10 TABLET ORAL NIGHTLY
Qty: 90 TABLET | Refills: 1 | Status: SHIPPED | OUTPATIENT
Start: 2024-08-09

## 2024-08-12 RX ORDER — AZELASTINE 1 MG/ML
1 SPRAY, METERED NASAL 2 TIMES DAILY
Qty: 30 ML | Refills: 3 | Status: SHIPPED | OUTPATIENT
Start: 2024-08-12 | End: 2025-08-12

## 2024-08-13 DIAGNOSIS — Z79.4 TYPE 2 DIABETES MELLITUS WITH HYPERGLYCEMIA, WITH LONG-TERM CURRENT USE OF INSULIN: ICD-10-CM

## 2024-08-13 DIAGNOSIS — E11.65 TYPE 2 DIABETES MELLITUS WITH HYPERGLYCEMIA, WITH LONG-TERM CURRENT USE OF INSULIN: ICD-10-CM

## 2024-08-13 RX ORDER — NYSTATIN 100000 U/G
CREAM TOPICAL 2 TIMES DAILY
Qty: 30 G | Refills: 0 | Status: SHIPPED | OUTPATIENT
Start: 2024-08-13

## 2024-08-14 ENCOUNTER — OFFICE VISIT (OUTPATIENT)
Dept: ORTHOPEDICS | Facility: CLINIC | Age: 79
End: 2024-08-14
Payer: MEDICARE

## 2024-08-14 DIAGNOSIS — M65.322 TRIGGER FINGER, LEFT INDEX FINGER: ICD-10-CM

## 2024-08-14 DIAGNOSIS — M65.4 DE QUERVAIN'S TENOSYNOVITIS, LEFT: ICD-10-CM

## 2024-08-14 DIAGNOSIS — M65.332 TRIGGER FINGER, LEFT MIDDLE FINGER: Primary | ICD-10-CM

## 2024-08-14 PROCEDURE — 99999 PR PBB SHADOW E&M-EST. PATIENT-LVL III: CPT | Mod: PBBFAC,HCNC,,

## 2024-08-14 PROCEDURE — 20550 NJX 1 TENDON SHEATH/LIGAMENT: CPT | Mod: HCNC,LT,S$GLB,

## 2024-08-14 PROCEDURE — 99214 OFFICE O/P EST MOD 30 MIN: CPT | Mod: HCNC,25,S$GLB,

## 2024-08-14 RX ADMIN — TRIAMCINOLONE ACETONIDE 20 MG: 40 INJECTION, SUSPENSION INTRA-ARTICULAR; INTRAMUSCULAR at 01:08

## 2024-08-14 NOTE — PROGRESS NOTES
Subjective:      Patient ID: Juliette Seo is a 78 y.o. female.    Chief Complaint:  Type 2 diabetes mellitus    History of Present Illness  This is a 78 y.o. female. with a past medical history of Type 2 diabetes mellitus, HTN, HLD here for evaluation.    Patient has been off of Trulicity and Jardiance. Last took Trulicity 2 months ago and Jardiance 8 months ago.     Type 2 diabetes mellitus  Diagnosed around age 40    Current diabetes medications:  - Lantus 40 U PM  - Ozempic 0.25 mg once weekly- doing well   - Metformin 500 mg daily - last took 2 weeks ago due to needing refill     -Shola 3 phone - has not used due to not getting shipment from [facility]    Past diabetes medications:  - Farxiga   - Trulicity - intolerance       Lab Results   Component Value Date    CREATININE 0.8 06/12/2024    EGFRNORACEVR >60 06/12/2024       Known diabetic complications: none    Weight trend:  Wt Readings from Last 8 Encounters:   08/16/24 48.4 kg (106 lb 12.8 oz)   06/17/24 49.4 kg (108 lb 14.5 oz)   06/17/24 49 kg (108 lb 0.4 oz)   06/12/24 48.6 kg (107 lb 1.6 oz)   02/06/24 47.7 kg (105 lb 0.8 oz)   01/06/24 47.6 kg (105 lb)   11/30/23 48.1 kg (106 lb)   11/14/23 48.3 kg (106 lb 7.7 oz)       Family history of diabetes:  Yes    Prior visit with diabetes education: Yes    Current diet: 3 meals per day    Blood glucose monitoring at home: Patient has not been able to get Shola from [facility]   Home blood sugar records: She has not been monitoring her glucose         Diabetes Management Status  Statin: Taking  ACE/ARB: Not taking    Screening or Prevention Patient's value   HgA1C Testing and Control   Lab Results   Component Value Date    HGBA1C 12.1 (H) 06/12/2024        LDL control Lab Results   Component Value Date    LDLCALC 153.4 01/27/2024      Nephropathy screening Lab Results   Component Value Date    MICALBCREAT 649.4 (H) 06/12/2024        Lab Results   Component Value Date    TSH 4.698 (H) 06/12/2024       Last eye exam: :  12/02/2020      Review of Systems  As above    Social and family history reviewed  Current medications and allergies reviewed    Objective:   Wt 48.4 kg (106 lb 12.8 oz)   BMI 23.94 kg/m²   Physical Exam  Alert, oriented    BP Readings from Last 1 Encounters:   06/17/24 (!) 177/80      Wt Readings from Last 1 Encounters:   08/16/24 0905 48.4 kg (106 lb 12.8 oz)     Body mass index is 23.94 kg/m².    Lab Review:   Lab Results   Component Value Date    HGBA1C 12.1 (H) 06/12/2024     Lab Results   Component Value Date    CHOL 222 (H) 01/27/2024    HDL 40 01/27/2024    LDLCALC 153.4 01/27/2024    TRIG 143 01/27/2024    CHOLHDL 18.0 (L) 01/27/2024     Lab Results   Component Value Date     06/12/2024    K 4.2 06/12/2024     06/12/2024    CO2 23 06/12/2024     (H) 06/12/2024    BUN 10 06/12/2024    CREATININE 0.8 06/12/2024    CALCIUM 9.3 06/12/2024    PROT 7.3 06/12/2024    ALBUMIN 3.9 06/12/2024    BILITOT 0.5 06/12/2024    ALKPHOS 118 06/12/2024    AST 16 06/12/2024    ALT 16 06/12/2024    ANIONGAP 12 06/12/2024    ESTGFRAFRICA >60.0 07/13/2022    EGFRNONAA >60.0 07/13/2022    TSH 4.698 (H) 06/12/2024       All pertinent labs reviewed    Assessment and Plan     Type 2 diabetes mellitus with hyperglycemia, with long-term current use of insulin  Worsening A1c of 12.1% in January. Unsure of POCT due to patient not checking her glucose and did not continue using Shola due to not getting shipment from [facility]. No recent Libre3 data.     She has been compliant with Ozempic 0.25 mg and is tolerating well. Will increase     She reports recent yeast infection. Will hold off on SGLT-2 inhibitor for now due to recent yeast infection with uncontrolled glucose.     Shola 3 placed in clinic. I discussed with patient that I will review her glucose in 1 week and make adjustments.     Plan  - Increase Ozempic to 0.5 mg once weekly   - Continue metformin 500 mg daily  - Continue Levemir 40 U HS  - Restart FreeStyle Shola  3 CGM    F/u 1 month with Dr. Pedraza     BMI 23.0-23.9, adult  Monitor while on GLP-1 RA    Mixed hyperlipidemia  Continue antihypertensive regimen including ARB/ACEi    Uncontrolled hypertension  She has been off her HTN meds for months and has not followed up with her PCP. Discussed with patient that she needs to follow up with PCP and restart antihypertensive regimen (ARB/ACEi)      Alexandria Funes PA-C  Endocrinology

## 2024-08-14 NOTE — PROGRESS NOTES
Subjective:      Patient ID: Juliette Seo is a 78 y.o. female.    Chief Complaint: Pain of the Left Hand      HPI: Juliette Seo is a 78 y.o. right hand dominant female who presents to clinic for left wrist and hand pain. Patient denies known DEMI. The pain started approximately 1 year ago and is becoming progressively worse.  Pain is located over (points to) radial wrist and left index and long fingers. She reports that the pain is a 8 /10 pain today and at its worst. The pain is aggravated by use of the hand.  Associated symptoms include stiffness of the left index and long fingers, symptoms worse at night. Denies numbness, tingling, radiation and inability to bear weight. The pain is affecting ADLs and limiting desired level of activity.  Patient was previously being seen by Dr. Yeager for this.    Previous treatments include occupational therapy, OTC medications, and activity modification which have provided minimal relief.     Patient declined translation service, patient at bedside to assist with translating.        PAST MEDICAL HISTORY:    Past Medical History:   Diagnosis Date    Allergy     Anxiety     Arthritis     osteo    Asthma     Diabetic retinopathy     GERD (gastroesophageal reflux disease)     High cholesterol     Hypertension     Kidney stone     Type 2 diabetes mellitus, uncontrolled, with neuropathy     Urinary tract infection     Vaginal infection      PAST SURGICAL HISTORY:    Past Surgical History:   Procedure Laterality Date    CHOLECYSTECTOMY      COLONOSCOPY N/A 6/8/2021    Procedure: COLONOSCOPY;  Surgeon: Rain Pop MD;  Location: [facility];  Service: Endoscopy;  Laterality: N/A;    COLONOSCOPY N/A 7/8/2021    Procedure: colonoscopy with single balloon scope;  Surgeon: Steffen Dueñas MD;  Location: [facility];  Service: Endoscopy;  Laterality: N/A;    ESOPHAGOGASTRODUODENOSCOPY N/A 6/8/2021    Procedure: EGD (ESOPHAGOGASTRODUODENOSCOPY);  Surgeon: Rain Pop MD;   Location: [facility];  Service: Endoscopy;  Laterality: N/A;    EYE SURGERY      HYSTERECTOMY      INCISIONAL HERNIA REPAIR  2003    ROTATOR CUFF REPAIR Right 11/07/2017    TRIGGER FINGER RELEASE Right 6/3/2022    Procedure: RELEASE, TRIGGER FINGER; right index, middle, ring;  Surgeon: Sarmad Wesley Jr., MD;  Location: [facility] OR;  Service: Orthopedics;  Laterality: Right;     FAMILY HISTORY:    Family History   Problem Relation Name Age of Onset    Stroke Mother      Diabetes Sister      Diabetes Brother      Kidney disease Neg Hx       SOCIAL HISTORY:    Social History     Occupational History    Not on file   Tobacco Use    Smoking status: Former     Passive exposure: Never    Smokeless tobacco: Never   Substance and Sexual Activity    Alcohol use: Not Currently     Comment: social    Drug use: No    Sexual activity: Yes     Partners: Male     Birth control/protection: None      MEDICATIONS:   Current Outpatient Medications:     acetaminophen (TYLENOL) 500 MG tablet, Take 1 tablet (500 mg total) by mouth every 4 (four) hours as needed for Pain., Disp: 42 tablet, Rfl: 0    albuterol (PROVENTIL) 2.5 mg /3 mL (0.083 %) nebulizer solution, Take 3 mLs (2.5 mg total) by nebulization every 6 (six) hours as needed for Wheezing. Rescue, Disp: 60 mL, Rfl: 2    amoxicillin (AMOXIL) 875 MG tablet, Take 1 tablet (875 mg total) by mouth 2 (two) times daily. (Patient not taking: Reported on 6/12/2024), Disp: 10 tablet, Rfl: 0    aspirin (ECOTRIN) 81 MG EC tablet, Take 81 mg by mouth once daily., Disp: , Rfl:     atorvastatin (LIPITOR) 40 MG tablet, Take 1 tablet (40 mg total) by mouth once daily., Disp: 90 tablet, Rfl: 0    azelastine (ASTELIN) 137 mcg (0.1 %) nasal spray, 1 spray (137 mcg total) by Nasal route 2 (two) times daily., Disp: 30 mL, Rfl: 3    cetirizine (ZYRTEC) 10 MG tablet, Take 1 tablet (10 mg total) by mouth once daily. (Patient not taking: Reported on 6/12/2024), Disp: 30 tablet, Rfl: 11    cyclobenzaprine  (FLEXERIL) 10 MG tablet, Take 1 tablet 3 times a day by oral route as needed. (Patient not taking: Reported on 6/12/2024), Disp: , Rfl:     empagliflozin (JARDIANCE) 25 mg tablet, Take 1 tablet (25 mg total) by mouth once daily., Disp: 30 tablet, Rfl: 11    fluconazole (DIFLUCAN) 150 MG Tab, Take once, repeat dose at 72 hours if symptoms not improved (Patient not taking: Reported on 6/12/2024), Disp: 2 tablet, Rfl: 0    fluconazole (DIFLUCAN) 150 MG Tab, Take once, repeat dose at 72 hours if symptoms not improved, Disp: 2 tablet, Rfl: 0    fluticasone propionate (FLONASE) 50 mcg/actuation nasal spray, 2 sprays (100 mcg total) by Each Nostril route once daily., Disp: 9.9 mL, Rfl: 11    HYDROcodone-acetaminophen (NORCO) 5-325 mg per tablet, Take 1 tablet by mouth every 8 (eight) hours as needed for Pain. (Patient not taking: Reported on 6/12/2024), Disp: 20 tablet, Rfl: 0    insulin glargine U-100, Lantus, (LANTUS SOLOSTAR U-100 INSULIN) 100 unit/mL (3 mL) InPn pen, Inject 40 Units into the skin once daily., Disp: 4 each, Rfl: 11    losartan-hydrochlorothiazide 100-12.5 mg (HYZAAR) 100-12.5 mg Tab, Take 1 tablet by mouth once daily., Disp: 30 tablet, Rfl: 0    meclizine (ANTIVERT) 25 mg tablet, Take 1 tablet (25 mg total) by mouth 3 (three) times daily as needed for Dizziness., Disp: 30 tablet, Rfl: 0    metFORMIN (GLUCOPHAGE) 500 MG tablet, Take 1 tablet (500 mg total) by mouth 2 (two) times daily with meals., Disp: 180 tablet, Rfl: 3    metoprolol tartrate (LOPRESSOR) 25 MG tablet, Take 0.5 tablets (12.5 mg total) by mouth 2 (two) times daily., Disp: 30 tablet, Rfl: 2    montelukast (SINGULAIR) 10 mg tablet, TAKE 1 TABLET BY MOUTH EVERY DAY IN THE EVENING, Disp: 90 tablet, Rfl: 1    nystatin (MYCOSTATIN) cream, Apply topically 2 (two) times daily., Disp: 30 g, Rfl: 0    OZEMPIC 0.25 mg or 0.5 mg (2 mg/3 mL) pen injector, Inject 0.5 mg into the skin every 7 days., Disp: 3 mL, Rfl: 11    pantoprazole (PROTONIX) 40 MG  "tablet, Take 1 tablet (40 mg total) by mouth once daily., Disp: 30 tablet, Rfl: 11    QUEtiapine (SEROQUEL) 25 MG Tab, Take 1 tablet (25 mg total) by mouth every evening. (Patient not taking: Reported on 6/12/2024), Disp: 30 tablet, Rfl: 5    TRUE METRIX GLUCOSE TEST STRIP Strp, To use to inject insulin twice daily., Disp: 100 each, Rfl: 9    TRUEPLUS PEN NEEDLE 32 gauge x 5/32" Ndle, To inject insulin twice daily, Disp: 100 each, Rfl: 3    TRULICITY 1.5 mg/0.5 mL pen injector, Inject into the skin. (Patient not taking: Reported on 6/12/2024), Disp: , Rfl:   No current facility-administered medications for this visit.    Facility-Administered Medications Ordered in Other Visits:     triamcinolone acetonide injection 40 mg, 40 mg, Intra-articular, , Debevec, Jayne, PA-C, 40 mg at 07/12/22 0830    ALLERGIES:   Review of patient's allergies indicates:   Allergen Reactions    Actos [pioglitazone] Nausea Only    Darvocet a500 [propoxyphene n-acetaminophen] Nausea And Vomiting    Dilaudid [hydromorphone (bulk)] Nausea And Vomiting       Review of Systems:  Constitution: Negative for chills, fever and night sweats.   HENT: Negative for congestion and headaches.    Eyes: Negative for blurred vision or vision loss.  Cardiovascular: Negative for chest pain and syncope.   Respiratory: Negative for cough and shortness of breath.    Endocrine: Negative for polydipsia, polyphagia and polyuria.   Hematologic/Lymphatic: Negative for bleeding problem. Does not bruise/bleed easily.   Skin: Negative for dry skin, itching and rash.   Musculoskeletal: See HPI.   Gastrointestinal: Negative for abdominal pain and bowel incontinence.   Genitourinary: Negative for bladder incontinence and nocturia.   Neurological: Negative for disturbances in coordination, loss of balance and seizures.   Psychiatric/Behavioral: Negative for depression. The patient does not have insomnia.    Allergic/Immunologic: Negative for hives and persistent " infections.          Objective:        There were no vitals filed for this visit.    PHYSICAL EXAM:  General: Alert & oriented x3, well-developed and well-nourished, in no acute distress, sitting comfortably in the exam room.  Skin: Warm and dry. Capillary refill less than 2 seconds.   Head: Normocephalic and atraumatic.   Eyes: Sclera appear normal.   Nose: No deformities seen.   Ears: No deformities seen.   Neck: No tracheal deviation present.   Pulmonary/Chest: Breathing unlabored.   Neurological: Alert and oriented to person, place, and time.   Psychiatric: Mood is pleasant and affect appropriate.     LEFT HAND/WRIST:        Observation/Inspection:    Mild swelling to left index and long fingers.    Otherwise, no evidence of visible deformity, discoloration, scars, or atrophy.         Palpation:   Tenderness to palpation to the A1 pulley of index and long fingers.  Otherwise, negative tenderness to palpation to bony prominences and soft tissues throughout.        Range of Motion:  Wrist: Full to wrist flexion, extension, radial, and ulnar deviation.  Digits: Full to digit MCP, PIP, and DIP flexion and extension without pain or difficulty.   No triggering.         Strength:    strength decreased compared to right.         Special Tests:  Finkelstein's Test  Positive  WHAT Test   Positive  Tinel's Test - Carpal Tunnel Negative  Tinel's Test - Cubital Tunnel Negative  CMC Grind   Negative        Neurovascular Exam:  Digits warm and well perfused, brisk capillary refill <3 seconds throughout  NVI motor/LTS to median, radial, and ulnar nerves, radial pulse 2+    Imaging:   X-Rays: 3 views of left hand dated 11/30/2024 , and independently reviewed, show:  Degenerative changes.  No acute fractures or dislocations.         Assessment:       1. Trigger finger, left middle finger    2. Trigger finger, left index finger    3. De Quervain's tenosynovitis, left        Plan:       Orders Placed This Encounter    Tendon  Sheath     I explained the nature of the problem to the patient.     I discussed at length with the patient all the different treatment options available for her left hand including anti-inflammatories, acetaminophen, bracing, rest, ice, heat, occupational therapy, and corticosteroid injections. I explained the potential role of surgery in the treatment of this condition. The patient understands that if non-surgical measures do not adequately control symptoms, surgery will be considered in the future.     We did discuss corticosteroid injection in depth.  Patient does have a history of diabetes mellitus (most recent A1c:  12.1).  Due to the patient's elevated A1c, we will only inject 1 finger today.  Patient is requesting left long finger be injected.      Medications:  Continue Voltaren Gel TID to affected area as needed.   Procedures:  Corticosteroid injection requested and performed today to the left 3rd trigger finger (flexor tendon sheath). See procedure note. Standard precautions provided.  Patient was advised to monitor her blood sugars closely over the next several days. The steroid may cause a rise in them. If her glucose levels rise to a point she is uncomfortable or she is unable to control them she is to contact her PCP or go immediately to the emergency department.   Pain Management: Ice compress to the affected area 2-3x a day for 15-20 minutes as needed for pain management.      Follow-Up:  4 weeks with Martha Liz PA-C for follow-up and possible injection for DeQuervain's or left index finger.    All of the patient's questions were answered and the patient will contact us if they have any questions or concerns in the interim.      Martha Liz PA-C  [facility]  Orthopedic Surgery    Medical Dictation software was used during the dictation of portions or the entirety of this medical record.  Phonetic or grammatic errors may exist due to the use of this software. For clarification,  refer to the author of the document.

## 2024-08-14 NOTE — ASSESSMENT & PLAN NOTE
Worsening A1c of 12.1% in January. Unsure of POCT due to patient not checking her glucose and did not continue using Shola due to not getting shipment from [facility]. No recent Libre3 data.     She has been compliant with Ozempic 0.25 mg and is tolerating well. Will increase     She reports recent yeast infection. Will hold off on SGLT-2 inhibitor for now due to recent yeast infection with uncontrolled glucose.     Shola 3 placed in clinic. I discussed with patient that I will review her glucose in 1 week and make adjustments.     Plan  - Increase Ozempic to 0.5 mg once weekly   - Continue metformin 500 mg daily  - Continue Levemir 40 U HS  - Restart FreeStyle Shola 3 CGM    F/u 1 month with Dr. Pedraza

## 2024-08-16 ENCOUNTER — OFFICE VISIT (OUTPATIENT)
Dept: ENDOCRINOLOGY | Facility: CLINIC | Age: 79
End: 2024-08-16
Payer: MEDICARE

## 2024-08-16 VITALS — WEIGHT: 106.81 LBS | BODY MASS INDEX: 23.94 KG/M2

## 2024-08-16 DIAGNOSIS — E11.65 TYPE 2 DIABETES MELLITUS WITH HYPERGLYCEMIA, WITH LONG-TERM CURRENT USE OF INSULIN: Primary | ICD-10-CM

## 2024-08-16 DIAGNOSIS — Z79.4 TYPE 2 DIABETES MELLITUS WITH HYPERGLYCEMIA, WITH LONG-TERM CURRENT USE OF INSULIN: Primary | ICD-10-CM

## 2024-08-16 DIAGNOSIS — I10 UNCONTROLLED HYPERTENSION: ICD-10-CM

## 2024-08-16 PROCEDURE — 99999 PR PBB SHADOW E&M-EST. PATIENT-LVL III: CPT | Mod: PBBFAC,HCNC,TXP, | Performed by: PHYSICIAN ASSISTANT

## 2024-08-16 RX ORDER — CALCIUM CITRATE/VITAMIN D3 200MG-6.25
TABLET ORAL
Qty: 100 EACH | Refills: 9 | Status: SHIPPED | OUTPATIENT
Start: 2024-08-16

## 2024-08-16 RX ORDER — INSULIN GLARGINE 100 [IU]/ML
40 INJECTION, SOLUTION SUBCUTANEOUS DAILY
Qty: 4 EACH | Refills: 11 | Status: SHIPPED | OUTPATIENT
Start: 2024-08-16

## 2024-08-16 RX ORDER — METFORMIN HYDROCHLORIDE 500 MG/1
500 TABLET ORAL 2 TIMES DAILY WITH MEALS
Qty: 180 TABLET | Refills: 3 | Status: SHIPPED | OUTPATIENT
Start: 2024-08-16 | End: 2025-08-16

## 2024-08-16 RX ORDER — SEMAGLUTIDE 0.68 MG/ML
0.5 INJECTION, SOLUTION SUBCUTANEOUS
Qty: 3 ML | Refills: 11 | Status: SHIPPED | OUTPATIENT
Start: 2024-08-16 | End: 2025-08-16

## 2024-08-16 NOTE — ASSESSMENT & PLAN NOTE
She has been off her HTN meds for months and has not followed up with her PCP. Discussed with patient that she needs to follow up with PCP and restart antihypertensive regimen (ARB/ACEi)

## 2024-08-16 NOTE — PATIENT INSTRUCTIONS
Follow up with primary care doctor for elevated blood pressure     Start taking Ozempic 0.5 mg dose once weekly (start tomorrow)       Continue lantus 40 units once daily     Continue the metformin 500 mg

## 2024-08-19 RX ORDER — TRIAMCINOLONE ACETONIDE 40 MG/ML
20 INJECTION, SUSPENSION INTRA-ARTICULAR; INTRAMUSCULAR
Status: DISCONTINUED | OUTPATIENT
Start: 2024-08-14 | End: 2024-08-19 | Stop reason: HOSPADM

## 2024-08-19 NOTE — PROCEDURES
Tendon Sheath    Date/Time: 8/14/2024 1:00 PM    Performed by: Martha Liz PA-C  Authorized by: Martha Liz PA-C    Consent Done?:  Yes (Verbal)  Indications:  Pain and joint swelling  Site marked: the procedure site was marked    Timeout: prior to procedure the correct patient, procedure, and site was verified    Local anesthesia used?: Yes    Local anesthetic:  Topical anesthetic  Location:  Long finger  Site:  L long flexor tendon sheath  Ultrasonic guidance for needle placement?: No    Needle size:  25 G  Approach:  Volar  Medications:  20 mg triamcinolone acetonide 40 mg/mL  Patient tolerance:  Patient tolerated the procedure well with no immediate complications    Injection Procedure Note   Prior to procedure the correct patient, procedure, and site was verified. Allergies were reviewed and verbal consent was obtained. The procedure site was marked.    After time out was performed, the patient was prepped aseptically with betadine, the area was sprayed with local topical anesthetic, and then cleaned with alcohol. A therapeutic injection of combination of 0.5cc 1% Xylocaine and 20mg Triamcinolone was given under sterile technique using a 25-gauge x 5/8 needle in the left middle finger flexor tendon sheath. Sterile dressing applied.     Patient tolerated the procedure well. Pain decreased. She had no adverse reactions to the medication.     The patient is cautioned that immediate relief of pain is secondary to the local anesthetic and will be temporary. After the anesthetic wears off there may be a increase in pain that may last for a few hours or a few days. Advised patient to avoid strenuous activities for the next 24-48 hours and to apply ice for 20 minutes to help alleviate this this pain. She was warned of possible blood sugar and/or blood pressure changes following injection. She was reminded to call the clinic immediately for any adverse side effects as explained in clinic today.      Patient was advised to monitor her blood sugars closely over the next several days. The steroid may cause a rise in them. If her glucose levels rise to a point she is uncomfortable or she is unable to control them she is to contact her PCP or go immediately to the emergency department.

## 2024-08-22 ENCOUNTER — TELEPHONE (OUTPATIENT)
Dept: ENDOCRINOLOGY | Facility: CLINIC | Age: 79
End: 2024-08-22
Payer: MEDICARE

## 2024-08-22 NOTE — TELEPHONE ENCOUNTER
She will starting taking her ozempic today since they just picked her prescription yesterday, and she will be letting us know how it's going.

## 2024-08-22 NOTE — TELEPHONE ENCOUNTER
----- Message from Alexandria Funes PA-C sent at 8/22/2024  9:41 AM CDT -----  Regarding: FW: Shola; lantus 40 ozempic 0.5 (just started) and metformin 500 mcg once daily  Can you call her daughter (in her chart) and let her know that I reviewed her Shola today. It looks like glucose is looking much better today when compared to previous days. Has she started the Ozempic 0.5 mg dose (she was just increased from 0.25 mg)? If so, has she tolerated it?  ----- Message -----  From: Alexandria Funes PA-C  Sent: 8/22/2024  12:00 AM CDT  To: Alexandria Funes PA-C  Subject: Shola; lantus 40 ozempic 0.5 (just started) #

## 2024-09-12 ENCOUNTER — OFFICE VISIT (OUTPATIENT)
Dept: ORTHOPEDICS | Facility: CLINIC | Age: 79
End: 2024-09-12
Payer: MEDICARE

## 2024-09-12 DIAGNOSIS — M65.332 TRIGGER FINGER, LEFT MIDDLE FINGER: Primary | ICD-10-CM

## 2024-09-12 DIAGNOSIS — M65.4 DE QUERVAIN'S TENOSYNOVITIS, LEFT: ICD-10-CM

## 2024-09-12 DIAGNOSIS — M65.322 TRIGGER FINGER, LEFT INDEX FINGER: ICD-10-CM

## 2024-09-12 PROCEDURE — 99999 PR PBB SHADOW E&M-EST. PATIENT-LVL III: CPT | Mod: PBBFAC,HCNC,,

## 2024-09-12 PROCEDURE — 1126F AMNT PAIN NOTED NONE PRSNT: CPT | Mod: HCNC,CPTII,S$GLB,

## 2024-09-12 PROCEDURE — 1101F PT FALLS ASSESS-DOCD LE1/YR: CPT | Mod: HCNC,CPTII,S$GLB,

## 2024-09-12 PROCEDURE — 1160F RVW MEDS BY RX/DR IN RCRD: CPT | Mod: HCNC,CPTII,S$GLB,

## 2024-09-12 PROCEDURE — 99213 OFFICE O/P EST LOW 20 MIN: CPT | Mod: HCNC,S$GLB,,

## 2024-09-12 PROCEDURE — 1159F MED LIST DOCD IN RCRD: CPT | Mod: HCNC,CPTII,S$GLB,

## 2024-09-12 PROCEDURE — 3288F FALL RISK ASSESSMENT DOCD: CPT | Mod: HCNC,CPTII,S$GLB,

## 2024-09-12 NOTE — PROGRESS NOTES
Subjective:      Patient ID: Juliette Seo is a 78 y.o. female.    Chief Complaint: Pain of the Left Hand      HPI: Juliette Seo is a 78 y.o. right hand dominant female who presents to clinic for follow up of left wrist and hand pain. Patient was last seen by myself on 08/14/2024 for left DeQuervain's and trigger finger of left index and left middle finger. Last visit, left middle finger was injected. We discussed only proceeding with one corticosteroid injection due to patient's elevated A1C. Patient reports great improvement with previous corticosteroid injection. Patient denies any pain today. Patient is pleased with improvement from previous corticosteroid injection. Patient presents today for follow-up.     Patient declined translation service, patient at bedside to assist with translating.        PAST MEDICAL HISTORY:    Past Medical History:   Diagnosis Date    Allergy     Anxiety     Arthritis     osteo    Asthma     Diabetic retinopathy     GERD (gastroesophageal reflux disease)     High cholesterol     Hypertension     Kidney stone     Type 2 diabetes mellitus, uncontrolled, with neuropathy     Urinary tract infection     Vaginal infection      MEDICATIONS:   Current Outpatient Medications:     acetaminophen (TYLENOL) 500 MG tablet, Take 1 tablet (500 mg total) by mouth every 4 (four) hours as needed for Pain., Disp: 42 tablet, Rfl: 0    amoxicillin (AMOXIL) 875 MG tablet, Take 1 tablet (875 mg total) by mouth 2 (two) times daily., Disp: 10 tablet, Rfl: 0    aspirin (ECOTRIN) 81 MG EC tablet, Take 81 mg by mouth once daily., Disp: , Rfl:     azelastine (ASTELIN) 137 mcg (0.1 %) nasal spray, 1 spray (137 mcg total) by Nasal route 2 (two) times daily., Disp: 30 mL, Rfl: 3    cetirizine (ZYRTEC) 10 MG tablet, Take 1 tablet (10 mg total) by mouth once daily., Disp: 30 tablet, Rfl: 11    cyclobenzaprine (FLEXERIL) 10 MG tablet, , Disp: , Rfl:     empagliflozin (JARDIANCE) 25 mg tablet, Take 1 tablet (25 mg  "total) by mouth once daily., Disp: 30 tablet, Rfl: 11    fluconazole (DIFLUCAN) 150 MG Tab, Take once, repeat dose at 72 hours if symptoms not improved, Disp: 2 tablet, Rfl: 0    fluticasone propionate (FLONASE) 50 mcg/actuation nasal spray, 2 sprays (100 mcg total) by Each Nostril route once daily., Disp: 9.9 mL, Rfl: 11    insulin glargine U-100, Lantus, (LANTUS SOLOSTAR U-100 INSULIN) 100 unit/mL (3 mL) InPn pen, Inject 40 Units into the skin once daily., Disp: 4 each, Rfl: 11    metFORMIN (GLUCOPHAGE) 500 MG tablet, Take 1 tablet (500 mg total) by mouth 2 (two) times daily with meals., Disp: 180 tablet, Rfl: 3    montelukast (SINGULAIR) 10 mg tablet, TAKE 1 TABLET BY MOUTH EVERY DAY IN THE EVENING, Disp: 90 tablet, Rfl: 1    nystatin (MYCOSTATIN) cream, Apply topically 2 (two) times daily., Disp: 30 g, Rfl: 0    OZEMPIC 0.25 mg or 0.5 mg (2 mg/3 mL) pen injector, Inject 0.5 mg into the skin every 7 days., Disp: 3 mL, Rfl: 11    pantoprazole (PROTONIX) 40 MG tablet, Take 1 tablet (40 mg total) by mouth once daily., Disp: 30 tablet, Rfl: 11    QUEtiapine (SEROQUEL) 25 MG Tab, Take 1 tablet (25 mg total) by mouth every evening., Disp: 30 tablet, Rfl: 5    TRUE METRIX GLUCOSE TEST STRIP Strp, To use to inject insulin twice daily., Disp: 100 each, Rfl: 9    TRUEPLUS PEN NEEDLE 32 gauge x 5/32" Ndle, To inject insulin twice daily, Disp: 100 each, Rfl: 3    TRULICITY 1.5 mg/0.5 mL pen injector, Inject into the skin., Disp: , Rfl:     albuterol (PROVENTIL) 2.5 mg /3 mL (0.083 %) nebulizer solution, Take 3 mLs (2.5 mg total) by nebulization every 6 (six) hours as needed for Wheezing. Rescue, Disp: 60 mL, Rfl: 2    atorvastatin (LIPITOR) 40 MG tablet, Take 1 tablet (40 mg total) by mouth once daily., Disp: 90 tablet, Rfl: 0    fluconazole (DIFLUCAN) 150 MG Tab, Take once, repeat dose at 72 hours if symptoms not improved, Disp: 2 tablet, Rfl: 0    HYDROcodone-acetaminophen (NORCO) 5-325 mg per tablet, Take 1 tablet by " mouth every 8 (eight) hours as needed for Pain. (Patient not taking: Reported on 9/12/2024), Disp: 20 tablet, Rfl: 0    losartan-hydrochlorothiazide 100-12.5 mg (HYZAAR) 100-12.5 mg Tab, Take 1 tablet by mouth once daily., Disp: 30 tablet, Rfl: 0    meclizine (ANTIVERT) 25 mg tablet, Take 1 tablet (25 mg total) by mouth 3 (three) times daily as needed for Dizziness., Disp: 30 tablet, Rfl: 0    metoprolol tartrate (LOPRESSOR) 25 MG tablet, Take 0.5 tablets (12.5 mg total) by mouth 2 (two) times daily., Disp: 30 tablet, Rfl: 2  No current facility-administered medications for this visit.    Facility-Administered Medications Ordered in Other Visits:     triamcinolone acetonide injection 40 mg, 40 mg, Intra-articular, , Debevec, Jayne, PA-C, 40 mg at 07/12/22 0830    ALLERGIES:   Review of patient's allergies indicates:   Allergen Reactions    Actos [pioglitazone] Nausea Only    Darvocet a500 [propoxyphene n-acetaminophen] Nausea And Vomiting    Dilaudid [hydromorphone (bulk)] Nausea And Vomiting       Review of Systems:  Constitution: Negative for chills, fever and night sweats.   HENT: Negative for congestion and headaches.    Eyes: Negative for blurred vision or vision loss.  Cardiovascular: Negative for chest pain and syncope.   Respiratory: Negative for cough and shortness of breath.    Endocrine: Negative for polydipsia, polyphagia and polyuria.   Hematologic/Lymphatic: Negative for bleeding problem. Does not bruise/bleed easily.   Skin: Negative for dry skin, itching and rash.   Musculoskeletal: See HPI.   Gastrointestinal: Negative for abdominal pain and bowel incontinence.   Genitourinary: Negative for bladder incontinence and nocturia.   Neurological: Negative for disturbances in coordination, loss of balance and seizures.   Psychiatric/Behavioral: Negative for depression. The patient does not have insomnia.    Allergic/Immunologic: Negative for hives and persistent infections.          Objective:       There were no vitals filed for this visit.    PHYSICAL EXAM:  General: Alert & oriented x3, well-developed and well-nourished, in no acute distress, sitting comfortably in the exam room.  Skin: Warm and dry. Capillary refill less than 2 seconds.   Head: Normocephalic and atraumatic.   Eyes: Sclera appear normal.   Nose: No deformities seen.   Ears: No deformities seen.   Neck: No tracheal deviation present.   Pulmonary/Chest: Breathing unlabored.   Neurological: Alert and oriented to person, place, and time.   Psychiatric: Mood is pleasant and affect appropriate.     LEFT HAND/WRIST:        Observation/Inspection:    Mild swelling to left index and long fingers.    Otherwise, no evidence of visible deformity, discoloration, scars, or atrophy.         Palpation:   No tenderness to palpation to the A1 pulleys of index and long fingers.   Otherwise, negative tenderness to palpation to bony prominences and soft tissues throughout.        Range of Motion:  Wrist: Full to wrist flexion, extension, radial, and ulnar deviation.  Digits: Full to digit MCP, PIP, and DIP flexion and extension without pain or difficulty.   No triggering.         Strength:    strength decreased compared to right.         Special Tests:  Finkelstein's Test  Mildly Positive  Tinel's Test - Carpal Tunnel Negative  Tinel's Test - Cubital Tunnel Negative  CMC Grind   Negative        Neurovascular Exam:  Digits warm and well perfused, brisk capillary refill <3 seconds throughout  NVI motor/LTS to median, radial, and ulnar nerves, radial pulse 2+    Imaging:   X-Rays: 3 views of left hand dated 11/30/2024, and independently reviewed, show:  Degenerative changes.  No acute fractures or dislocations.         Assessment:       1. Trigger finger, left middle finger    2. De Quervain's tenosynovitis, left    3. Trigger finger, left index finger        Plan:          I explained the nature of the problem to the patient.     I discussed at length with  the patient all the different treatment options available for her left hand including anti-inflammatories, acetaminophen, bracing, rest, ice, heat, occupational therapy, and corticosteroid injections. I explained the potential role of surgery in the treatment of this condition. The patient understands that if non-surgical measures do not adequately control symptoms, surgery will be considered in the future.     Medications:  Continue Voltaren Gel TID to affected area as needed.   Procedures:  None today. Consider CSI if symptoms worsen/return.  Pain Management: Ice compress to the affected area 2-3x a day for 15-20 minutes as needed for pain management.    Follow-Up: As needed.     All of the patient's questions were answered and the patient will contact us if they have any questions or concerns in the interim.    Martha Liz PA-C  [facility]  Orthopedic Surgery    Medical Dictation software was used during the dictation of portions or the entirety of this medical record.  Phonetic or grammatic errors may exist due to the use of this software. For clarification, refer to the author of the document.

## 2024-09-17 ENCOUNTER — TELEPHONE (OUTPATIENT)
Dept: ENDOCRINOLOGY | Facility: CLINIC | Age: 79
End: 2024-09-17
Payer: MEDICARE

## 2024-10-04 ENCOUNTER — OFFICE VISIT (OUTPATIENT)
Dept: ENDOCRINOLOGY | Facility: CLINIC | Age: 79
End: 2024-10-04
Payer: MEDICARE

## 2024-10-04 VITALS
HEART RATE: 84 BPM | DIASTOLIC BLOOD PRESSURE: 62 MMHG | HEIGHT: 56 IN | BODY MASS INDEX: 23.96 KG/M2 | RESPIRATION RATE: 17 BRPM | SYSTOLIC BLOOD PRESSURE: 140 MMHG | OXYGEN SATURATION: 99 % | WEIGHT: 106.5 LBS

## 2024-10-04 DIAGNOSIS — E11.65 TYPE 2 DIABETES MELLITUS WITH HYPERGLYCEMIA, WITH LONG-TERM CURRENT USE OF INSULIN: Primary | ICD-10-CM

## 2024-10-04 DIAGNOSIS — I10 UNCONTROLLED HYPERTENSION: ICD-10-CM

## 2024-10-04 DIAGNOSIS — Z79.4 TYPE 2 DIABETES MELLITUS WITH HYPERGLYCEMIA, WITH LONG-TERM CURRENT USE OF INSULIN: Primary | ICD-10-CM

## 2024-10-04 PROCEDURE — 99999 PR PBB SHADOW E&M-EST. PATIENT-LVL V: CPT | Mod: PBBFAC,HCNC,TXP, | Performed by: STUDENT IN AN ORGANIZED HEALTH CARE EDUCATION/TRAINING PROGRAM

## 2024-10-04 RX ORDER — INSULIN PMP CART,AUT,G6/7,CNTR
1 EACH SUBCUTANEOUS ONCE
Qty: 1 EACH | Refills: 0 | Status: SHIPPED | OUTPATIENT
Start: 2024-10-04 | End: 2024-10-04

## 2024-10-04 RX ORDER — INSULIN PMP CART,AUT,G6/7,CNTR
1 EACH SUBCUTANEOUS
Qty: 10 EACH | Refills: 3 | Status: SHIPPED | OUTPATIENT
Start: 2024-10-04

## 2024-10-04 RX ORDER — BLOOD-GLUCOSE,RECEIVER,CONT
EACH MISCELLANEOUS
Qty: 1 EACH | Refills: 0 | Status: SHIPPED | OUTPATIENT
Start: 2024-10-04

## 2024-10-04 RX ORDER — BLOOD-GLUCOSE SENSOR
1 EACH MISCELLANEOUS
Qty: 3 EACH | Refills: 11 | Status: SHIPPED | OUTPATIENT
Start: 2024-10-04 | End: 2025-10-04

## 2024-10-04 RX ORDER — INSULIN ASPART 100 [IU]/ML
INJECTION, SOLUTION INTRAVENOUS; SUBCUTANEOUS
Qty: 40 ML | Refills: 11 | Status: SHIPPED | OUTPATIENT
Start: 2024-10-04

## 2024-10-04 NOTE — PROGRESS NOTES
"Subjective:      Patient ID: Juliette Seo is a 78 y.o. female.    Chief Complaint:  Type 2 diabetes mellitus    History of Present Illness  This is a 78 y.o. female. with a past medical history of Type 2 diabetes mellitus, HTN, HLD here for evaluation.      Type 2 diabetes mellitus  Diagnosed around age 40    Current diabetes medications:  - Lantus 40 U PM  - Ozempic 0.5 mg once weekly - tolerating  - Jardiance 25 mg daily  - Metformin 500 mg daily    Past diabetes medications:  - Farxiga   - Trulicity - intolerance       Lab Results   Component Value Date    CREATININE 0.8 06/12/2024    EGFRNORACEVR >60 06/12/2024       Known diabetic complications: none    Weight trend:  Wt Readings from Last 8 Encounters:   10/04/24 48.3 kg (106 lb 8 oz)   08/16/24 48.4 kg (106 lb 12.8 oz)   06/17/24 49.4 kg (108 lb 14.5 oz)   06/17/24 49 kg (108 lb 0.4 oz)   06/12/24 48.6 kg (107 lb 1.6 oz)   02/06/24 47.7 kg (105 lb 0.8 oz)   01/06/24 47.6 kg (105 lb)   11/30/23 48.1 kg (106 lb)       Family history of diabetes:  Yes    Prior visit with diabetes education: Yes    Current diet: 3 meals per day    Blood glucose monitoring at home:         Diabetes Management Status  Statin: Taking  ACE/ARB: Not taking    Screening or Prevention Patient's value   HgA1C Testing and Control   Lab Results   Component Value Date    HGBA1C 12.1 (H) 06/12/2024        LDL control Lab Results   Component Value Date    LDLCALC 153.4 01/27/2024      Nephropathy screening Lab Results   Component Value Date    MICALBCREAT 649.4 (H) 06/12/2024        Lab Results   Component Value Date    TSH 4.698 (H) 06/12/2024       Last eye exam: : 12/02/2020      Review of Systems  As above    Social and family history reviewed  Current medications and allergies reviewed    Objective:   BP (!) 140/62   Pulse 84   Resp 17   Ht 4' 8" (1.422 m)   Wt 48.3 kg (106 lb 8 oz)   SpO2 99%   BMI 23.88 kg/m²   Physical Exam  Alert, oriented    BP Readings from Last 1 " Encounters:   10/04/24 (!) 140/62      Wt Readings from Last 1 Encounters:   10/04/24 1051 48.3 kg (106 lb 8 oz)     Body mass index is 23.88 kg/m².    Lab Review:   Lab Results   Component Value Date    HGBA1C 12.1 (H) 06/12/2024     Lab Results   Component Value Date    CHOL 222 (H) 01/27/2024    HDL 40 01/27/2024    LDLCALC 153.4 01/27/2024    TRIG 143 01/27/2024    CHOLHDL 18.0 (L) 01/27/2024     Lab Results   Component Value Date     06/12/2024    K 4.2 06/12/2024     06/12/2024    CO2 23 06/12/2024     (H) 06/12/2024    BUN 10 06/12/2024    CREATININE 0.8 06/12/2024    CALCIUM 9.3 06/12/2024    PROT 7.3 06/12/2024    ALBUMIN 3.9 06/12/2024    BILITOT 0.5 06/12/2024    ALKPHOS 118 06/12/2024    AST 16 06/12/2024    ALT 16 06/12/2024    ANIONGAP 12 06/12/2024    ESTGFRAFRICA >60.0 07/13/2022    EGFRNONAA >60.0 07/13/2022    TSH 4.698 (H) 06/12/2024       All pertinent labs reviewed    Assessment and Plan     Type 2 diabetes mellitus with hyperglycemia, with long-term current use of insulin  Uncontrolled despite use of basal insulin, GLP-1 RA, SGLT-2 inhibitor and metformin.    Patient requires CSII with 0.05 incremental and variable basal/bolus dosing; every 5 minute automated insulin delivery decisions based on CGM value, 1 hour BG trend and set glucose target(s) combined with historical adaptivity/algorithmic decision making to achieve optimum glycemic control.    Plan  - Start Omnipod 5 - will send orders and calculate settings prior to training  - Continue Trulicity 1.5 mg weekly  - Continue metformin 500 mg daily  - Continue Lantus 40 U HS - stop once Omnipod started  - Continue Jardiance 25 mg daily  - [facility] FreeStyle Shola 3 CGM to Dexcom G7      F/u 3 months with labs    BMI 23.0-23.9, adult  Monitor weight while on GLP-1 RA    Uncontrolled hypertension  Continue antihypertensive regimen including ARB/ACEi        Adis Pedraza MD  Endocrinology

## 2024-10-04 NOTE — ASSESSMENT & PLAN NOTE
Uncontrolled despite use of basal insulin, GLP-1 RA, SGLT-2 inhibitor and metformin.    Patient requires CSII with 0.05 incremental and variable basal/bolus dosing; every 5 minute automated insulin delivery decisions based on CGM value, 1 hour BG trend and set glucose target(s) combined with historical adaptivity/algorithmic decision making to achieve optimum glycemic control.    Plan  - Start Omnipod 5 - will send orders and calculate settings prior to training  - Continue Trulicity 1.5 mg weekly  - Continue metformin 500 mg daily  - Continue Lantus 40 U HS - stop once Omnipod started  - Continue Jardiance 25 mg daily  - [facility] FreeStyle Shola 3 CGM to Dexcom G7      F/u 3 months with labs

## 2024-10-14 ENCOUNTER — PATIENT MESSAGE (OUTPATIENT)
Dept: ADMINISTRATIVE | Facility: HOSPITAL | Age: 79
End: 2024-10-14
Payer: MEDICARE

## 2024-10-16 ENCOUNTER — PATIENT MESSAGE (OUTPATIENT)
Dept: ENDOCRINOLOGY | Facility: CLINIC | Age: 79
End: 2024-10-16
Payer: MEDICARE

## 2024-10-17 ENCOUNTER — DOCUMENTATION ONLY (OUTPATIENT)
Dept: ENDOCRINOLOGY | Facility: CLINIC | Age: 79
End: 2024-10-17
Payer: MEDICARE

## 2024-10-17 ENCOUNTER — TELEPHONE (OUTPATIENT)
Dept: TRANSPLANT | Facility: CLINIC | Age: 79
End: 2024-10-17
Payer: MEDICARE

## 2024-10-17 DIAGNOSIS — Z79.4 TYPE 2 DIABETES MELLITUS WITH HYPERGLYCEMIA, WITH LONG-TERM CURRENT USE OF INSULIN: Primary | ICD-10-CM

## 2024-10-17 DIAGNOSIS — E11.65 TYPE 2 DIABETES MELLITUS WITH HYPERGLYCEMIA, WITH LONG-TERM CURRENT USE OF INSULIN: Primary | ICD-10-CM

## 2024-10-17 NOTE — PROGRESS NOTES
Calculated initial insulin pump settings  Current TDD: 40 U x 0.75 = 30 U  Weight based TDD: 24 U  Will use average: 27 U as TDD    Basal rate  12A: 0.55 U/h    [facility]  12A: 16    ISF  12A: 62    Target: 110-110  IAT: 3h    Max basal rate: 2 U/h  Max bolus: 15 U

## 2024-10-21 ENCOUNTER — PATIENT MESSAGE (OUTPATIENT)
Dept: ADMINISTRATIVE | Facility: HOSPITAL | Age: 79
End: 2024-10-21
Payer: MEDICARE

## 2024-10-30 ENCOUNTER — PATIENT OUTREACH (OUTPATIENT)
Dept: ADMINISTRATIVE | Facility: HOSPITAL | Age: 79
End: 2024-10-30
Payer: MEDICARE

## 2024-11-07 DIAGNOSIS — Z79.4 TYPE 2 DIABETES MELLITUS WITH HYPERGLYCEMIA, WITH LONG-TERM CURRENT USE OF INSULIN: Primary | ICD-10-CM

## 2024-11-07 DIAGNOSIS — E11.65 TYPE 2 DIABETES MELLITUS WITH HYPERGLYCEMIA, WITH LONG-TERM CURRENT USE OF INSULIN: Primary | ICD-10-CM

## 2024-11-20 ENCOUNTER — CLINICAL SUPPORT (OUTPATIENT)
Dept: DIABETES | Facility: CLINIC | Age: 79
End: 2024-11-20
Payer: MEDICARE

## 2024-11-20 DIAGNOSIS — E11.65 TYPE 2 DIABETES MELLITUS WITH HYPERGLYCEMIA, WITH LONG-TERM CURRENT USE OF INSULIN: ICD-10-CM

## 2024-11-20 DIAGNOSIS — Z79.4 TYPE 2 DIABETES MELLITUS WITH HYPERGLYCEMIA, WITH LONG-TERM CURRENT USE OF INSULIN: ICD-10-CM

## 2024-11-20 PROCEDURE — 99999 PR PBB SHADOW E&M-EST. PATIENT-LVL I: CPT | Mod: PBBFAC,HCNC,TXP,

## 2024-11-20 NOTE — PROGRESS NOTES
Diabetes Care Specialist Progress Note  Author: Sneha Bravo RN  Date: 11/20/2024    Intake    Program Intake  Reason for Diabetes Program Visit:: Intervention  Type of Intervention:: Individual  Individual: Device Training  Device Training: Insulin Pump Start  Current diabetes risk level:: moderate    Current Diabetes Treatment: Insulin, Oral Medications  Oral Medication Type/Dose: metformin,  Method of insulin delivery?: Injections  Injection Type: Pens  Pen Type/Dose: lantus, Novolog    Continuous Glucose Monitoring  Patient has CGM: Yes  Personal CGM type:: freestyle Shola 2    Lab Results   Component Value Date    HGBA1C 12.1 (H) 06/12/2024                female DM2 with uncontrolled blood sugars. Speaks some English.  Freestlye Shola 2 on blood sugar 354. Dtr states blood sugars are always high    Lifestyle Coping Support & Clinical     Dtr came with pt and will help to support her         Diabetes Self-Management Skills Assessment    Medication Skills Assessment  Patient is able to identify current diabetes medications, dosages, and appropriate timing of medications.: yes  Patient reports problems or concerns with current medication regimen.: no  Patient is  aware that some diabetes medications can cause low blood sugar?: Yes  Medication Skills Assessment Completed:: Yes  Assessment indicates:: Adequate understanding  Area of need?: No                   Home Blood Glucose Monitoring  Personal CGM type:: freestyle Shola 2                Assessment Summary and Plan    Based on today's diabetes care assessment, the following areas of need were identified:      Identified Areas of Need      Medication/Current Diabetes Treatment: No   Lifestyle Coping/Support:     Diabetes Disease Process/Treatment Options:     Nutrition/Healthy Eating:      Physical Activity/Exercise:      Home Blood Glucose Monitoring:   Freestyle Shola 2 on, To be changed to Dexcom G7   Acute Complications:      Chronic Complications:          Today's interventions were provided through individual discussion, instruction, and written materials were provided.      Patient verbalized understanding of instruction and written materials.  Pt was able to return back demonstration of instructions today. Patient understood key points, needs reinforcement and further instruction.     Diabetes Self-Management Care Plan:    Today's Diabetes Self-Management Care Plan was developed with Juliette's input. Juliette has agreed to work toward the following goal(s) to improve his/her overall diabetes control.      Care Plan: Diabetes Management   Updates made since 11/21/2023 12:00 AM        Problem: Medications         Long-Range Goal: Patient Agrees to use the Omnipod5 pump as prescribed.    Start Date: 11/20/2024   Priority: High   Barriers: Knowledge deficit   Note:    Pt is here today with her dtr to start the Omnipod 5 pump. Pt is wearing a Freestyle Shola 2 which is not compatible with the pump. Dtr states she will fix the cris and put her on a Dexcom G7.  OMNI POD 5 INSULIN PUMP START    Pump training was provided per Omni Pod protocol.  Patient has used an insulin pump in the past.   Settings for pump ordered by Dr Pedraza.  Patient is new to insulin pump therapy but this will be first Automated system she/he will be using.  Pt instructed to stop all other insulins.     Basal:  12a-12a= 0.55 units/hr     Max basal rate: 3 u/hr     Bolus:  CHO ratio: 1:16  ISF: 1:62  Glucose target : 110 mg/dL  Correct  over: 130mg/dl  Active insulin time: 3.5 hours  Max bolus: 15 units     Low reservoir insulin alarm: 20 units  Change pod alarm: every 3 days       Details of pump therapy were covered included following controller features and programming, pod activation, pod site selection and rotation, automatic pod priming and insertion, setting & editing basal rates in manual mode, giving bolus and other features in the set-up menu.  The following regarding the Omnipod 5 was  covered:  During your first Pod wear, since no recent history is available, the Omnipod 5 System uses only your active Basal Program from Manual Mode as a starting point to adjust your insulin. After 48 hours of history is collected, which usually happens with the first Pod wear, SmartTourRadar technology stops adjusting insulin against your active Basal Program and starts using the Adaptive Basal Rate for your automated insulin delivery with your next Pod change. With each Pod change, insulin delivery information is sent and saved in the Omnipod 5 Tono so that the next Pod that is started is updated with the new Adaptive Basal Rate. With each new Pod activation, the system adapts insulin delivery based on physiological needs and total daily insulin (TDI) delivered. After 2-3 Pod changes, adaptation generally stabilizes and automated insulin delivery is based on this adaptation.  Patient demonstrated ability to program controller, activate and insert pod using aseptic technique.  Patient demonstrated ability to program Dexcom transmitter into controller and start automated limited mode.    Instructed pt on use of basic pump features ie...give a bolus, pause insulin, switch from manual to automated mode.  Reviewed features available in manual mode verses automated mode.   Reviewed when and how to use activity function in automated mode.  Reviewed site selection of pods, rotation of sites and hard stop to change pod every 72 hrs.   Instructed that insulin vial is good out of refrigeration for up to 28-30 days.   Reviewed treatment of hypoglycemia, hyperglycemia; sick day care, DKA, and troubleshooting of pump.  Omni Pod 24-hour support line provided.       Podder username: [email or web address]  Podder password: [email or web address]     Pita username: [email or web address]  Glocatracho password: [email or web address]!   Pt is in Francisco Javier mode until she starts the Dexcom G7. Practiced with pt how to give a bolus several times and stressed the importance before  meals. Dtr will call when dexcom is set up       Task: Discussed guidelines for preventing, detecting and treating hypoglycemia and hyperglycemia and reviewed the importance of meal and medication timing with diabetes mediations for prevention of hypoglycemia and maximum drug benefit. Completed 11/20/2024          Follow Up Plan     1 week    Today's care plan and follow up schedule was discussed with patient.  Juliette verbalized understanding of the care plan, goals, and agrees to follow up plan.        The patient was encouraged to communicate with his/her health care provider/physician and care team regarding his/her condition(s) and treatment.  I provided the patient with my contact information today and encouraged to contact me via phone or [facility] Patient Portal as needed.     Length of Visit   Total Time: 60 Minutes

## 2024-11-25 ENCOUNTER — PATIENT MESSAGE (OUTPATIENT)
Dept: TRANSPLANT | Facility: CLINIC | Age: 79
End: 2024-11-25
Payer: MEDICARE

## 2024-11-27 ENCOUNTER — HOSPITAL ENCOUNTER (EMERGENCY)
Facility: HOSPITAL | Age: 79
Discharge: HOME | End: 2024-11-27
Attending: SURGERY
Payer: MEDICARE

## 2024-11-27 ENCOUNTER — PATIENT OUTREACH (OUTPATIENT)
Dept: ADMINISTRATIVE | Facility: HOSPITAL | Age: 79
End: 2024-11-27
Payer: MEDICARE

## 2024-11-27 ENCOUNTER — CLINICAL SUPPORT (OUTPATIENT)
Dept: DIABETES | Facility: CLINIC | Age: 79
End: 2024-11-27
Payer: MEDICARE

## 2024-11-27 VITALS
TEMPERATURE: 98 F | DIASTOLIC BLOOD PRESSURE: 60 MMHG | BODY MASS INDEX: 24.5 KG/M2 | WEIGHT: 108.94 LBS | OXYGEN SATURATION: 99 % | HEART RATE: 87 BPM | SYSTOLIC BLOOD PRESSURE: 132 MMHG | HEIGHT: 56 IN | RESPIRATION RATE: 17 BRPM

## 2024-11-27 DIAGNOSIS — I10 HYPERTENSION, UNSPECIFIED TYPE: ICD-10-CM

## 2024-11-27 DIAGNOSIS — E11.65 TYPE 2 DIABETES MELLITUS WITH HYPERGLYCEMIA, WITH LONG-TERM CURRENT USE OF INSULIN: Primary | ICD-10-CM

## 2024-11-27 DIAGNOSIS — Z79.4 TYPE 2 DIABETES MELLITUS WITH HYPERGLYCEMIA, WITH LONG-TERM CURRENT USE OF INSULIN: Primary | ICD-10-CM

## 2024-11-27 DIAGNOSIS — R07.89 ATYPICAL CHEST PAIN: Primary | ICD-10-CM

## 2024-11-27 LAB
ALBUMIN SERPL BCP-MCNC: 4.1 G/DL (ref 3.5–5.2)
ALP SERPL-CCNC: 98 U/L (ref 40–150)
ALT SERPL W/O P-5'-P-CCNC: 17 U/L (ref 10–44)
ANION GAP SERPL CALC-SCNC: 11 MMOL/L (ref 8–16)
AST SERPL-CCNC: 14 U/L (ref 10–40)
BASOPHILS # BLD AUTO: 0.04 K/UL (ref 0–0.2)
BASOPHILS NFR BLD: 0.6 % (ref 0–1.9)
BILIRUB SERPL-MCNC: 0.6 MG/DL (ref 0.1–1)
BNP SERPL-MCNC: 42 PG/ML (ref 0–99)
BUN SERPL-MCNC: 14 MG/DL (ref 8–23)
CALCIUM SERPL-MCNC: 9.3 MG/DL (ref 8.7–10.5)
CHLORIDE SERPL-SCNC: 107 MMOL/L (ref 95–110)
CK SERPL-CCNC: 58 U/L (ref 20–180)
CO2 SERPL-SCNC: 23 MMOL/L (ref 23–29)
CREAT SERPL-MCNC: 0.8 MG/DL (ref 0.5–1.4)
D DIMER PPP IA.FEU-MCNC: 0.39 MG/L FEU
DIFFERENTIAL METHOD BLD: ABNORMAL
EOSINOPHIL # BLD AUTO: 0.1 K/UL (ref 0–0.5)
EOSINOPHIL NFR BLD: 1 % (ref 0–8)
ERYTHROCYTE [DISTWIDTH] IN BLOOD BY AUTOMATED COUNT: 12.4 % (ref 11.5–14.5)
EST. GFR  (NO RACE VARIABLE): >60 ML/MIN/1.73 M^2
GLUCOSE SERPL-MCNC: 253 MG/DL (ref 70–110)
HCT VFR BLD AUTO: 44.5 % (ref 37–48.5)
HGB BLD-MCNC: 15.2 G/DL (ref 12–16)
IMM GRANULOCYTES # BLD AUTO: 0.06 K/UL (ref 0–0.04)
IMM GRANULOCYTES NFR BLD AUTO: 0.9 % (ref 0–0.5)
LYMPHOCYTES # BLD AUTO: 2.4 K/UL (ref 1–4.8)
LYMPHOCYTES NFR BLD: 35.2 % (ref 18–48)
MCH RBC QN AUTO: 29.1 PG (ref 27–31)
MCHC RBC AUTO-ENTMCNC: 34.2 G/DL (ref 32–36)
MCV RBC AUTO: 85 FL (ref 82–98)
MONOCYTES # BLD AUTO: 0.4 K/UL (ref 0.3–1)
MONOCYTES NFR BLD: 5.2 % (ref 4–15)
NEUTROPHILS # BLD AUTO: 3.9 K/UL (ref 1.8–7.7)
NEUTROPHILS NFR BLD: 57.1 % (ref 38–73)
NRBC BLD-RTO: 0 /100 WBC
OHS QRS DURATION: 72 MS
OHS QTC CALCULATION: 471 MS
PLATELET # BLD AUTO: 195 K/UL (ref 150–450)
PMV BLD AUTO: 10.1 FL (ref 9.2–12.9)
POTASSIUM SERPL-SCNC: 4 MMOL/L (ref 3.5–5.1)
PROT SERPL-MCNC: 7.7 G/DL (ref 6–8.4)
RBC # BLD AUTO: 5.22 M/UL (ref 4–5.4)
SODIUM SERPL-SCNC: 141 MMOL/L (ref 136–145)
TROPONIN I SERPL DL<=0.01 NG/ML-MCNC: <0.006 NG/ML (ref 0–0.03)
TROPONIN I SERPL DL<=0.01 NG/ML-MCNC: <0.006 NG/ML (ref 0–0.03)
WBC # BLD AUTO: 6.88 K/UL (ref 3.9–12.7)

## 2024-11-27 PROCEDURE — 84484 ASSAY OF TROPONIN QUANT: CPT | Mod: 91,HCNC,NTX | Performed by: SURGERY

## 2024-11-27 PROCEDURE — 82550 ASSAY OF CK (CPK): CPT | Mod: HCNC,NTX | Performed by: SURGERY

## 2024-11-27 PROCEDURE — 99285 EMERGENCY DEPT VISIT HI MDM: CPT | Mod: 25,HCNC,NTX

## 2024-11-27 PROCEDURE — 80053 COMPREHEN METABOLIC PANEL: CPT | Mod: HCNC,NTX | Performed by: SURGERY

## 2024-11-27 PROCEDURE — 93005 ELECTROCARDIOGRAM TRACING: CPT | Mod: HCNC,NTX

## 2024-11-27 PROCEDURE — 63600175 PHARM REV CODE 636 W HCPCS: Mod: HCNC,NTX | Performed by: SURGERY

## 2024-11-27 PROCEDURE — 83880 ASSAY OF NATRIURETIC PEPTIDE: CPT | Mod: HCNC,NTX | Performed by: SURGERY

## 2024-11-27 PROCEDURE — 25000003 PHARM REV CODE 250: Mod: HCNC,NTX | Performed by: SURGERY

## 2024-11-27 PROCEDURE — 85379 FIBRIN DEGRADATION QUANT: CPT | Mod: HCNC,NTX | Performed by: SURGERY

## 2024-11-27 PROCEDURE — 96375 TX/PRO/DX INJ NEW DRUG ADDON: CPT | Mod: HCNC,NTX

## 2024-11-27 PROCEDURE — 85025 COMPLETE CBC W/AUTO DIFF WBC: CPT | Mod: HCNC,NTX | Performed by: SURGERY

## 2024-11-27 PROCEDURE — 96374 THER/PROPH/DIAG INJ IV PUSH: CPT | Mod: HCNC,NTX

## 2024-11-27 RX ORDER — HYDRALAZINE HYDROCHLORIDE 20 MG/ML
20 INJECTION INTRAMUSCULAR; INTRAVENOUS
Status: COMPLETED | OUTPATIENT
Start: 2024-11-27 | End: 2024-11-27

## 2024-11-27 RX ORDER — VALSARTAN 40 MG/1
80 TABLET ORAL
Status: COMPLETED | OUTPATIENT
Start: 2024-11-27 | End: 2024-11-27

## 2024-11-27 RX ORDER — VALSARTAN 40 MG/1
80 TABLET ORAL DAILY
Status: DISCONTINUED | OUTPATIENT
Start: 2024-11-27 | End: 2024-11-27

## 2024-11-27 RX ORDER — ASPIRIN 81 MG/1
81 TABLET ORAL DAILY
Status: DISCONTINUED | OUTPATIENT
Start: 2024-11-28 | End: 2024-11-27

## 2024-11-27 RX ORDER — VALSARTAN 80 MG/1
80 TABLET ORAL DAILY
Qty: 90 TABLET | Refills: 3 | Status: SHIPPED | OUTPATIENT
Start: 2024-11-27 | End: 2025-11-27

## 2024-11-27 RX ORDER — METOPROLOL TARTRATE 1 MG/ML
5 INJECTION, SOLUTION INTRAVENOUS
Status: COMPLETED | OUTPATIENT
Start: 2024-11-27 | End: 2024-11-27

## 2024-11-27 RX ORDER — ASPIRIN 325 MG
325 TABLET ORAL
Status: COMPLETED | OUTPATIENT
Start: 2024-11-27 | End: 2024-11-27

## 2024-11-27 RX ORDER — METOPROLOL TARTRATE 50 MG/1
50 TABLET ORAL
Status: COMPLETED | OUTPATIENT
Start: 2024-11-27 | End: 2024-11-27

## 2024-11-27 RX ADMIN — ASPIRIN 325 MG ORAL TABLET 325 MG: 325 PILL ORAL at 10:11

## 2024-11-27 RX ADMIN — METOPROLOL TARTRATE 50 MG: 50 TABLET, FILM COATED ORAL at 11:11

## 2024-11-27 RX ADMIN — METOPROLOL TARTRATE 5 MG: 1 INJECTION, SOLUTION INTRAVENOUS at 12:11

## 2024-11-27 RX ADMIN — VALSARTAN 80 MG: 40 TABLET, FILM COATED ORAL at 12:11

## 2024-11-27 RX ADMIN — HYDRALAZINE HYDROCHLORIDE 20 MG: 20 INJECTION INTRAMUSCULAR; INTRAVENOUS at 11:11

## 2024-11-27 NOTE — PROGRESS NOTES
Attempted to contact pt through S ID#[id number] in reference to the HTN-gap report. Unable to contact. Message left

## 2024-11-27 NOTE — CONSULTS
[facility] - Emergency Dept  Cardiology  Consult Note    Patient Name: Juliette Seo  MRN: [id number]  Admission Date: 11/27/2024  Hospital Length of Stay: 0 days  Code Status: Prior   Attending Provider: Anthony Felipe MD   Consulting Provider: Namrata Cho NP  Primary Care Physician: Suman Rubi MD  Principal Problem; HA, BP and CP    Patient information was obtained from ER records, pt and granddaughter.     Inpatient consult to [facility]  Consult performed by: Namrata Cho NP  Consult ordered by: Anthony Felipe MD        Subjective:     Chief Complaint:  Chest pain     HPI: Juliette Seo is a 79 year old female with history of HTN, high cholesterol, DM II, asthma, GERD. She is unknown to [facility]. She presented to the ED with complaints of chest pain and elevated BP. [facility] asked to evaluate. Initial troponin and BNP WNL.    Past Medical History:   Diagnosis Date    Allergy     Anxiety     Arthritis     osteo    Asthma     Diabetic retinopathy     GERD (gastroesophageal reflux disease)     High cholesterol     Hypertension     Kidney stone     Type 2 diabetes mellitus, uncontrolled, with neuropathy     Urinary tract infection     Vaginal infection        Past Surgical History:   Procedure Laterality Date    CHOLECYSTECTOMY      COLONOSCOPY N/A 6/8/2021    Procedure: COLONOSCOPY;  Surgeon: Rain Pop MD;  Location: [facility];  Service: Endoscopy;  Laterality: N/A;    COLONOSCOPY N/A 7/8/2021    Procedure: colonoscopy with single balloon scope;  Surgeon: Steffen Dueñas MD;  Location: [facility];  Service: Endoscopy;  Laterality: N/A;    ESOPHAGOGASTRODUODENOSCOPY N/A 6/8/2021    Procedure: EGD (ESOPHAGOGASTRODUODENOSCOPY);  Surgeon: Rain Pop MD;  Location: [facility];  Service: Endoscopy;  Laterality: N/A;    EYE SURGERY      HYSTERECTOMY      INCISIONAL HERNIA REPAIR  2003    ROTATOR CUFF REPAIR Right 11/07/2017    TRIGGER FINGER RELEASE Right 6/3/2022    Procedure:  RELEASE, TRIGGER FINGER; right index, middle, ring;  Surgeon: Sarmad Wesley Jr., MD;  Location: [facility] OR;  Service: Orthopedics;  Laterality: Right;       Review of patient's allergies indicates:   Allergen Reactions    Actos [pioglitazone] Nausea Only    Darvocet a500 [propoxyphene n-acetaminophen] Nausea And Vomiting    Dilaudid [hydromorphone (bulk)] Nausea And Vomiting       Current Facility-Administered Medications on File Prior to Encounter   Medication    triamcinolone acetonide injection 40 mg     Current Outpatient Medications on File Prior to Encounter   Medication Sig    acetaminophen (TYLENOL) 500 MG tablet Take 1 tablet (500 mg total) by mouth every 4 (four) hours as needed for Pain.    albuterol (PROVENTIL) 2.5 mg /3 mL (0.083 %) nebulizer solution Take 3 mLs (2.5 mg total) by nebulization every 6 (six) hours as needed for Wheezing. Rescue    amoxicillin (AMOXIL) 875 MG tablet Take 1 tablet (875 mg total) by mouth 2 (two) times daily.    aspirin (ECOTRIN) 81 MG EC tablet Take 81 mg by mouth once daily.    atorvastatin (LIPITOR) 40 MG tablet Take 1 tablet (40 mg total) by mouth once daily.    azelastine (ASTELIN) 137 mcg (0.1 %) nasal spray 1 spray (137 mcg total) by Nasal route 2 (two) times daily.    cetirizine (ZYRTEC) 10 MG tablet Take 1 tablet (10 mg total) by mouth once daily.    cyclobenzaprine (FLEXERIL) 10 MG tablet     DEXCOM G7  Misc Use to check glucose with sensors    DEXCOM G7 SENSOR Felicitas 1 Device by Misc.(Non-Drug; Combo Route) route every 10 days.    empagliflozin (JARDIANCE) 25 mg tablet Take 1 tablet (25 mg total) by mouth once daily.    fluconazole (DIFLUCAN) 150 MG Tab Take once, repeat dose at 72 hours if symptoms not improved    fluticasone propionate (FLONASE) 50 mcg/actuation nasal spray 2 sprays (100 mcg total) by Each Nostril route once daily.    HYDROcodone-acetaminophen (NORCO) 5-325 mg per tablet Take 1 tablet by mouth every 8 (eight) hours as needed for Pain.     "insulin glargine U-100, Lantus, (LANTUS SOLOSTAR U-100 INSULIN) 100 unit/mL (3 mL) InPn pen Inject 40 Units into the skin once daily.    losartan-hydrochlorothiazide 100-12.5 mg (HYZAAR) 100-12.5 mg Tab Take 1 tablet by mouth once daily.    meclizine (ANTIVERT) 25 mg tablet Take 1 tablet (25 mg total) by mouth 3 (three) times daily as needed for Dizziness.    metFORMIN (GLUCOPHAGE) 500 MG tablet Take 1 tablet (500 mg total) by mouth 2 (two) times daily with meals.    metoprolol tartrate (LOPRESSOR) 25 MG tablet Take 0.5 tablets (12.5 mg total) by mouth 2 (two) times daily.    montelukast (SINGULAIR) 10 mg tablet TAKE 1 TABLET BY MOUTH EVERY DAY IN THE EVENING    NOVOLOG U-100 INSULIN ASPART 100 unit/mL injection To use with insulin pump. Max daily dose 130 units.    nystatin (MYCOSTATIN) cream Apply topically 2 (two) times daily.    OMNIPOD 5 G6-G7 PODS, GEN 5, Crtg Inject 1 each into the skin every 72 hours.    OZEMPIC 0.25 mg or 0.5 mg (2 mg/3 mL) pen injector Inject 0.5 mg into the skin every 7 days.    pantoprazole (PROTONIX) 40 MG tablet Take 1 tablet (40 mg total) by mouth once daily.    QUEtiapine (SEROQUEL) 25 MG Tab Take 1 tablet (25 mg total) by mouth every evening.    TRUE METRIX GLUCOSE TEST STRIP Strp To use to inject insulin twice daily.    TRUEPLUS PEN NEEDLE 32 gauge x 5/32" Ndle To inject insulin twice daily     Family History       Problem Relation (Age of Onset)    Diabetes Sister, Brother    Stroke Mother          Tobacco Use    Smoking status: Former     Passive exposure: Never    Smokeless tobacco: Never   Substance and Sexual Activity    Alcohol use: Not Currently     Comment: social    Drug use: No    Sexual activity: Yes     Partners: Male     Birth control/protection: None     Review of Systems   Constitutional: Negative.   HENT: Negative.     Eyes: Negative.    Cardiovascular:  Positive for chest pain.   Respiratory: Negative.     Endocrine: Negative.    Hematologic/Lymphatic: Negative.  " "  Skin: Negative.    Musculoskeletal: Negative.    Gastrointestinal: Negative.    Genitourinary: Negative.    Neurological: Negative.    Psychiatric/Behavioral: Negative.       Objective:     Vital Signs (Most Recent):  Temp: 97.9 °F (36.6 °C) (11/27/24 1034)  Pulse: 102 (11/27/24 1141)  Resp: 16 (11/27/24 1142)  BP: (!) 153/66 (11/27/24 1141)  SpO2: 99 % (11/27/24 1141) Vital Signs (24h Range):  Temp:  [97.9 °F (36.6 °C)] 97.9 °F (36.6 °C)  Pulse:  [102-106] 102  Resp:  [16] 16  SpO2:  [99 %] 99 %  BP: (153-227)/(66-97) 153/66     Weight: 49.4 kg (108 lb 14.5 oz)  Body mass index is 24.42 kg/m².    SpO2: 99 %       No intake or output data in the 24 hours ending 11/27/24 1159    Lines/Drains/Airways       Peripheral Intravenous Line  Duration                  Peripheral IV - Single Lumen 11/27/24 1100 20 G Anterior;Right Forearm <1 day                    Physical Exam  Constitutional:       Appearance: Normal appearance.   HENT:      Head: Normocephalic and atraumatic.   Eyes:      Extraocular Movements: Extraocular movements intact.   Cardiovascular:      Rate and Rhythm: Tachycardia present.   Pulmonary:      Effort: Pulmonary effort is normal.      Breath sounds: Normal breath sounds.   Skin:     General: Skin is warm.      Capillary Refill: Capillary refill takes less than 2 seconds.   Neurological:      Mental Status: She is alert. Mental status is at baseline.   Psychiatric:         Mood and Affect: Mood normal.         Behavior: Behavior normal.         Significant Labs: BMP: No results for input(s): "GLU", "NA", "K", "CL", "CO2", "BUN", "CREATININE", "CALCIUM", "MG" in the last 48 hours., CMP No results for input(s): "NA", "K", "CL", "CO2", "GLU", "BUN", "CREATININE", "CALCIUM", "PROT", "ALBUMIN", "BILITOT", "ALKPHOS", "AST", "ALT", "ANIONGAP", "ESTGFRAFRICA", "EGFRNONAA" in the last 48 hours., CBC   Recent Labs   Lab 11/27/24  1106   WBC 6.88   HGB 15.2   HCT 44.5      , Lipid Panel No results for " "input(s): "CHOL", "HDL", "LDLCALC", "TRIG", "CHOLHDL" in the last 48 hours., Troponin   Recent Labs   Lab 11/27/24  1106   TROPONINI <0.006   , All pertinent lab results from the last 24 hours have been reviewed., and   Recent Lab Results         11/27/24  1106        Baso # 0.04       Basophil % 0.6       BNP 42  Comment: Values of less than 100 pg/ml are consistent with non-CHF populations.       D-Dimer 0.39  Comment: The quantitative D-dimer assay should be used as an aid in   the diagnosis of deep vein thrombosis and pulmonary embolism  in patients with the appropriate presentation and clinical  history. The upper limit of the reference interval and the clinical   cut off   point are identical. Causes of a positive (>0.50 mg/L FEU) D-Dimer   test  include, but are not limited to: DVT, PE, DIC, thrombolytic   therapy, anticoagulant therapy, recent surgery, trauma, or   pregnancy, disseminated malignancy, aortic aneurysm, cirrhosis,  and severe infection. False negative results may occur in   patients with distal DVT.  NATHALIE^612^^24^  LOT^610^[id number]         Differential Method Automated       Eos # 0.1       Eos % 1.0       Gran # (ANC) 3.9       Gran % 57.1       Hematocrit 44.5       Hemoglobin 15.2       Immature Grans (Abs) 0.06  Comment: Mild elevation in immature granulocytes is non specific and   can be seen in a variety of conditions including stress response,   acute inflammation, trauma and pregnancy. Correlation with other   laboratory and clinical findings is essential.         Immature Granulocytes 0.9       Lymph # 2.4       Lymph % 35.2       MCH 29.1       MCHC 34.2       MCV 85       Mono # 0.4       Mono % 5.2       MPV 10.1       nRBC 0       Platelet Count 195       RBC 5.22       RDW 12.4       Troponin I <0.006  Comment: The reference interval for Troponin I represents the 99th percentile   cutoff   for our facility and is consistent with 3rd generation " "assay   performance.         WBC 6.88               Significant Imaging:   Transthoracic echo (TTE) complete (Cupid Only):   Results for orders placed or performed during the hospital encounter of 07/20/23   Echo   Result Value Ref Range    Ascending aorta 3.11 cm    STJ 2.15 cm    AV mean gradient 7 mmHg    Ao peak endy 1.84 m/s    Ao VTI 27.54 cm    IVS 0.80 0.6 - 1.1 cm    LA size 3.36 cm    Left Atrium Major Axis 3.69 cm    Left Atrium Minor Axis 4.41 cm    LVIDd 4.00 3.5 - 6.0 cm    LVIDs 2.52 2.1 - 4.0 cm    LVOT diameter 1.71 cm    LVOT peak VTI 21.19 cm    PW 0.81 0.6 - 1.1 cm    MV Peak A Endy 1.19 m/s    E wave deceleration time 180.00 msec    MV Peak E Endy 0.66 m/s    PV Peak D Endy 0.48 m/s    PV Peak S Endy 0.61 m/s    RA Major Axis 3.39 cm    RA Width 3.12 cm    RVDD 3.08 cm    Sinus 2.58 cm    TAPSE 1.76 cm    LA WIDTH 3.09 cm    MV stenosis pressure 1/2 time 52.20 ms    LV Diastolic Volume 63.87 mL    LV Systolic Volume 22.76 mL    LVOT peak endy 1.19 m/s    TDI LATERAL 0.06 m/s    TDI SEPTAL 0.06 m/s    LA Vol (MOD) 20.82 cm3    MV "A" wave duration 7.04 msec    LV LATERAL E/E' RATIO 11.00 m/s    LV SEPTAL E/E' RATIO 11.00 m/s    FS 37 %    LA Vol 35.46 cm3    LV mass 94.25 g    Left Ventricle Relative Wall Thickness 0.41 cm    AV valve area 1.77 cm2    AV Velocity Ratio 0.65     AV index (prosthetic) 0.77     MV valve area p 1/2 method 4.21 cm2    E/A ratio 0.55     Mean e' 0.06 m/s    Pulm vein S/D ratio 1.27     LVOT area 2.3 cm2    LVOT stroke volume 48.64 cm3    AV peak gradient 14 mmHg    E/E' ratio 11.00 m/s    LV Systolic Volume Index 17.0 mL/m2    LV Diastolic Volume Index 47.66 mL/m2    SABA 26.5 mL/m2    LV Mass Index 70 g/m2    SABA (MOD) 15.5 mL/m2    BSA 1.36 m2    Right Atrial Pressure (from IVC) 3 mmHg    EF 60 %    Narrative    · The estimated ejection fraction is 60%.  · Normal right ventricular size with normal right ventricular systolic   function.  · Normal left ventricular diastolic " function.  · The left ventricle is normal in size with normal systolic function.  · Normal central venous pressure (3 mmHg).       and X-Ray: CXR: X-Ray Chest 1 View (CXR):   Results for orders placed or performed during the hospital encounter of 11/27/24   X-Ray Chest 1 View    Narrative    EXAMINATION:  XR CHEST 1 VIEW    CLINICAL HISTORY:  CP;    TECHNIQUE:  Single frontal view of the chest was performed.    COMPARISON:  09/26/2023    FINDINGS:  Chronic lung changes are noted.  No consolidation or pleural effusions.  Heart is normal in size.  Calcified atheromatous disease affects the aorta.  Age-appropriate degenerative changes affect the skeleton.  Postoperative changes of right shoulder surgery.      Impression    As above.      Electronically signed by: Nadine Harper MD  Date:    11/27/2024  Time:    10:57     Assessment and Plan:   HA today with uncontrolled BP and HR, very anxious  Chest Pain mild transient, resolved without evidence of MI/ACS, EKG and Troponin OK  Normal stress perfusion study 9/23, LVEF 60% 7/23  HTN  DM    Plan  Ecasa 81 mg  Metoprolol 50 mg bid,  Start Valsartan 80 mg  Clonidine PRN  May go home when BP and HR come down and feels OK  F/u with me next week in clinic    There are no hospital problems to display for this patient.      VTE Risk Mitigation (From admission, onward)      None          I have personally interviewed and examined this patient face-to-face, and as the physician. Documented the above plan and rendered all medical decision making for this encounter. I have read and agree with the above documentation unless otherwise noted.     Namrata Cho NP scribed for Dr. GIOVANY Hernandez  Cardiology   [facility] [place] - Emergency Dept

## 2024-11-27 NOTE — ED PROVIDER NOTES
Encounter Date: 11/27/2024       History     Chief Complaint   Patient presents with    Chest Pain     Pt c/o intermittent, mid sternal chest pain that started pta.     History of Present Illness  Juliette Seo is a 79 y.o. female that presents with chest pain this morning  Normal sinus rhythm on EKG with no ST elevation on ER evaluation tonight  Patient was longstanding issues with high blood pressure & anxiety & GERD  Patient was states that her blood pressure has been elevated at home today  Diabetes doctor recently stopped her losartan due to kidney function & DM  Patient was systolic blood pressures over 200, immediate IV BP meds given    The history is provided by the patient.     Review of patient's allergies indicates:   Allergen Reactions    Actos [pioglitazone] Nausea Only    Darvocet a500 [propoxyphene n-acetaminophen] Nausea And Vomiting    Dilaudid [hydromorphone (bulk)] Nausea And Vomiting     Past Medical History:   Diagnosis Date    Allergy     Anxiety     Arthritis     osteo    Asthma     Diabetic retinopathy     GERD (gastroesophageal reflux disease)     High cholesterol     Hypertension     Kidney stone     Type 2 diabetes mellitus, uncontrolled, with neuropathy     Urinary tract infection     Vaginal infection      Past Surgical History:   Procedure Laterality Date    CHOLECYSTECTOMY      COLONOSCOPY N/A 6/8/2021    Procedure: COLONOSCOPY;  Surgeon: Rain Pop MD;  Location: [facility];  Service: Endoscopy;  Laterality: N/A;    COLONOSCOPY N/A 7/8/2021    Procedure: colonoscopy with single balloon scope;  Surgeon: Steffen Dueñas MD;  Location: [facility];  Service: Endoscopy;  Laterality: N/A;    ESOPHAGOGASTRODUODENOSCOPY N/A 6/8/2021    Procedure: EGD (ESOPHAGOGASTRODUODENOSCOPY);  Surgeon: Rain Pop MD;  Location: [facility];  Service: Endoscopy;  Laterality: N/A;    EYE SURGERY      HYSTERECTOMY      INCISIONAL HERNIA REPAIR  2003    ROTATOR CUFF REPAIR Right 11/07/2017     TRIGGER FINGER RELEASE Right 6/3/2022    Procedure: RELEASE, TRIGGER FINGER; right index, middle, ring;  Surgeon: Sarmad Wesley Jr., MD;  Location: [facility];  Service: Orthopedics;  Laterality: Right;     Family History   Problem Relation Name Age of Onset    Stroke Mother      Diabetes Sister      Diabetes Brother      Kidney disease Neg Hx       Social History     Tobacco Use    Smoking status: Former     Passive exposure: Never    Smokeless tobacco: Never   Substance Use Topics    Alcohol use: Not Currently     Comment: social    Drug use: No     Review of Systems   Constitutional: Negative.    HENT: Negative.     Eyes: Negative.    Respiratory: Negative.     Cardiovascular:  Positive for chest pain.   Gastrointestinal: Negative.    Genitourinary:  Negative for dysuria, urgency and vaginal discharge.   Skin: Negative.    Neurological: Negative.    Psychiatric/Behavioral: Negative.     All other systems reviewed and are negative.      Physical Exam     Initial Vitals [11/27/24 1034]   BP Pulse Resp Temp SpO2   (!) 227/97 106 16 97.9 °F (36.6 °C) 99 %      MAP       --         Physical Exam    Nursing note and vitals reviewed.  Constitutional: She appears well-developed.   HENT:   Head: Normocephalic and atraumatic.   Right Ear: External ear normal.   Left Ear: External ear normal.   Nose: Nose normal. Mouth/Throat: Oropharynx is clear and moist.   Eyes: Conjunctivae and EOM are normal. Pupils are equal, round, and reactive to light.   Neck: Neck supple. No JVD present.   Normal range of motion.  Cardiovascular:  Normal rate and regular rhythm.           Pulmonary/Chest: No respiratory distress. She has no wheezes. She has no rhonchi. She has no rales. She exhibits no tenderness.   Abdominal: Abdomen is soft. Bowel sounds are normal. She exhibits no distension. There is no abdominal tenderness. There is no rebound.   Musculoskeletal:         General: Normal range of motion.      Cervical back: Normal range of  motion and neck supple.     Neurological: She is alert and oriented to person, place, and time. She has normal strength and normal reflexes.   Skin: Skin is warm and dry.         ED Course   Critical Care    Date/Time: 11/27/2024 2:52 PM    Performed by: Anthony Felipe MD  Authorized by: Anthony Felipe MD  Direct patient critical care time: 30 minutes  Additional history critical care time: 5 minutes  Ordering / reviewing critical care time: 5 minutes  Documentation critical care time: 5 minutes  Consulting other physicians critical care time: 5 minutes  Consult with family critical care time: 5 minutes  Other critical care time: 5 minutes  Total critical care time (exclusive of procedural time) : 60 minutes  Critical care was necessary to treat or prevent imminent or life-threatening deterioration of the following conditions: cardiac failure.  Critical care was time spent personally by me on the following activities: blood draw for specimens, development of treatment plan with patient or surrogate, discussions with consultants, interpretation of cardiac output measurements, evaluation of patient's response to treatment, obtaining history from patient or surrogate, examination of patient, ordering and performing treatments and interventions, ordering and review of radiographic studies, ordering and review of laboratory studies, re-evaluation of patient's condition and review of old charts.        Labs Reviewed   COMPREHENSIVE METABOLIC PANEL - Abnormal       Result Value    Sodium 141      Potassium 4.0      Chloride 107      CO2 23      Glucose 253 (*)     BUN 14      Creatinine 0.8      Calcium 9.3      Total Protein 7.7      Albumin 4.1      Total Bilirubin 0.6      Alkaline Phosphatase 98      AST 14      ALT 17      eGFR >60      Anion Gap 11     CBC W/ AUTO DIFFERENTIAL - Abnormal    WBC 6.88      RBC 5.22      Hemoglobin 15.2      Hematocrit 44.5      MCV 85      MCH 29.1      MCHC 34.2      RDW 12.4       Platelets 195      MPV 10.1      Immature Granulocytes 0.9 (*)     Gran # (ANC) 3.9      Immature Grans (Abs) 0.06 (*)     Lymph # 2.4      Mono # 0.4      Eos # 0.1      Baso # 0.04      nRBC 0      Gran % 57.1      Lymph % 35.2      Mono % 5.2      Eosinophil % 1.0      Basophil % 0.6      Differential Method Automated     TROPONIN I    Troponin I <0.006     CK    CPK 58     B-TYPE NATRIURETIC PEPTIDE    BNP 42     D DIMER, QUANTITATIVE    D-Dimer 0.39     TROPONIN I    Troponin I <0.006       EKG Readings: (Independently Interpreted)   EKG performed on November 27, 2024  Sinus tachycardia at 103 beats per minute  Left atrial enlargement  No ST elevation observed  Normal QRS & LA interval  Improved when compared to previous September EKG  Anthony Felipe M.D. 2:51 PM 11/27/2024      ECG Results              EKG 12-lead (Final result)        Collection Time Result Time QRS Duration [facility] QTC Calculation    11/27/24 10:38:02 11/27/24 12:09:19 72 471                     Final result by Interface, Lab In [facility] (11/27/24 12:09:25)                   Narrative:    Test Reason : R07.9    Vent. Rate : 103 BPM     Atrial Rate : 103 BPM     P-R Int : 178 ms          QRS Dur :  72 ms      QT Int : 360 ms       P-R-T Axes :  56  -1  30 degrees    QTcB Int : 471 ms    Sinus tachycardia  Possible Left atrial enlargement  Borderline Abnormal ECG  When compared with ECG of 26-Sep-2023 17:25,  Criteria for Inferior infarct are no longer Present  Confirmed by Ozzy Hernandez ([id number]) on 11/27/2024 12:09:15 PM    Referred By: [email or web address] SELF           Confirmed By: Ozzy Hernandez                                  Imaging Results              X-Ray Chest 1 View (Final result)  Result time 11/27/24 10:57:49      Final result by Nadine Harper MD (11/27/24 10:57:49)                   Impression:      As above.      Electronically signed by: Nadine Harper MD  Date:    11/27/2024  Time:    10:57               Narrative:    EXAMINATION:  XR  CHEST 1 VIEW    CLINICAL HISTORY:  CP;    TECHNIQUE:  Single frontal view of the chest was performed.    COMPARISON:  09/26/2023    FINDINGS:  Chronic lung changes are noted.  No consolidation or pleural effusions.  Heart is normal in size.  Calcified atheromatous disease affects the aorta.  Age-appropriate degenerative changes affect the skeleton.  Postoperative changes of right shoulder surgery.                                       Medications   aspirin tablet 325 mg (325 mg Oral Given 11/27/24 1052)   hydrALAZINE injection 20 mg (20 mg Intravenous Given 11/27/24 1109)   metoprolol tartrate (LOPRESSOR) tablet 50 mg (50 mg Oral Given 11/27/24 1159)   metoprolol injection 5 mg (5 mg Intravenous Given 11/27/24 1256)   valsartan tablet 80 mg (80 mg Oral Given 11/27/24 1256)     Medical Decision Making  Differential includes angina, STEMI, non-STEMI, GERD, muscle pain, pleurisy    Problems Addressed:  Atypical chest pain: complicated acute illness or injury  Hypertension, unspecified type: complicated acute illness or injury    Amount and/or Complexity of Data Reviewed  Independent Historian: caregiver  External Data Reviewed: ECG and notes.  Labs: ordered. Decision-making details documented in ED Course.  Radiology: ordered and independent interpretation performed.  ECG/medicine tests: ordered and independent interpretation performed.    ED Management & Risks of Complication, Morbidity, & Mortality:  Normal sinus rhythm on EKG with no ST elevation today  IV hydralazine given for blood pressure in the emergency room tonight  Stable lab work, (-) troponin, (-) D-dimer emergency room this afternoon  Patient was feeling better with all symptoms dissipated, Cardiology consult  [facility] cardiology consultation with Dr. Ozzy Hernandez this early afternoon  Dr. Hernandez recommended additional blood pressure control & starting valsartan  Patient was recently taken off a losartan by her endocrinology physician  Dr. Hernandez feels starting  valsartan will control her blood pressure much better  Second troponin (-), systolic blood pressure on discharge is 140 on my exam  Completely asymptomatic, chest pain-free, blood pressure much better controlled  Dr. Hernnadez would like to see the patient in clinic outpatient on DC for further eval  Patient carefully counseled return to the ER immediately with any chest pain    Clinical Impression:  Final diagnoses:  [R07.89] Atypical chest pain (Primary)  [I10] Hypertension, unspecified type          ED Disposition Condition    Discharge Stable          ED Prescriptions       Medication Sig Dispense Start Date End Date Auth. Provider    valsartan (DIOVAN) 80 MG tablet Take 1 tablet (80 mg total) by mouth once daily. 90 tablet 11/27/2024 11/27/2025 Anthony Felipe MD          Follow-up Information       Follow up With Specialties Details Why Contact Info    Suman Rubi MD Family Medicine   [address] [facility]   [facility] [place] [id number]  [phone]      Ozzy Hernandez MD Cardiology Go in 2 days  [address] [place] [address]  [phone]               Anthony Felipe MD  11/27/24 8601

## 2024-11-27 NOTE — PROGRESS NOTES
Diabetes Care Specialist Progress Note  Author: Sneha Bravo RN  Date: 11/27/2024    Intake    Program Intake  Reason for Diabetes Program Visit:: Intervention  Type of Intervention:: Individual  Individual: Device Training              Lab Results   Component Value Date    HGBA1C 12.1 (H) 06/12/2024           Pt wheeled to the ED. See notes .    Lifestyle Coping Support & Clinical     Granddaughter is present         Diabetes Self-Management Skills Assessment                                         Assessment Summary and Plan    Based on today's diabetes care assessment, the following areas of need were identified:      Identified Areas of Need      Medication/Current Diabetes Treatment:     Lifestyle Coping/Support:     Diabetes Disease Process/Treatment Options:     Nutrition/Healthy Eating:      Physical Activity/Exercise:      Home Blood Glucose Monitoring:      Acute Complications:      Chronic Complications:         Today's interventions were provided through individual discussion, instruction, and written materials were provided.      Patient verbalized understanding of instruction and written materials.  Pt was able to return back demonstration of instructions today. Patient understood key points, needs reinforcement and further instruction.     Diabetes Self-Management Care Plan:    Today's Diabetes Self-Management Care Plan was developed with Juliette's input. Juliette has agreed to work toward the following goal(s) to improve his/her overall diabetes control.      Care Plan: Diabetes Management   Updates made since 11/28/2023 12:00 AM        Problem: Medications         Long-Range Goal: Patient Agrees to use the Omnipod5 pump as prescribed.    Start Date: 11/20/2024   Priority: High   Barriers: Knowledge deficit   Note:    Pt is here today with her dtr to start the Omnipod 5 pump. Pt is wearing a Freestyle Shola 2 which is not compatible with the pump. Dtr states she will fix the cris and put her on a Dexcom  G7.  OMNI POD 5 INSULIN PUMP START    Pump training was provided per Omni Pod protocol.  Patient has used an insulin pump in the past.   Settings for pump ordered by Dr Pedraza.  Patient is new to insulin pump therapy but this will be first Automated system she/he will be using.  Pt instructed to stop all other insulins.     Basal:  12a-12a= 0.55 units/hr     Max basal rate: 3 u/hr     Bolus:  CHO ratio: 1:16  ISF: 1:62  Glucose target : 110 mg/dL  Correct  over: 130mg/dl  Active insulin time: 3.5 hours  Max bolus: 15 units     Low reservoir insulin alarm: 20 units  Change pod alarm: every 3 days       Details of pump therapy were covered included following controller features and programming, pod activation, pod site selection and rotation, automatic pod priming and insertion, setting & editing basal rates in manual mode, giving bolus and other features in the set-up menu.  The following regarding the Omnipod 5 was covered:  During your first Pod wear, since no recent history is available, the Omnipod 5 System uses only your active Basal Program from Manual Mode as a starting point to adjust your insulin. After 48 hours of history is collected, which usually happens with the first Pod wear, SmartPureHistory technology stops adjusting insulin against your active Basal Program and starts using the Adaptive Basal Rate for your automated insulin delivery with your next Pod change. With each Pod change, insulin delivery information is sent and saved in the Omnipod 5 Tono so that the next Pod that is started is updated with the new Adaptive Basal Rate. With each new Pod activation, the system adapts insulin delivery based on physiological needs and total daily insulin (TDI) delivered. After 2-3 Pod changes, adaptation generally stabilizes and automated insulin delivery is based on this adaptation.  Patient demonstrated ability to program controller, activate and insert pod using aseptic technique.  Patient demonstrated  ability to program Dexcom transmitter into controller and start automated limited mode.    Instructed pt on use of basic pump features ie...give a bolus, pause insulin, switch from manual to automated mode.  Reviewed features available in manual mode verses automated mode.   Reviewed when and how to use activity function in automated mode.  Reviewed site selection of pods, rotation of sites and hard stop to change pod every 72 hrs.   Instructed that insulin vial is good out of refrigeration for up to 28-30 days.   Reviewed treatment of hypoglycemia, hyperglycemia; sick day care, DKA, and troubleshooting of pump.  Omni Pod 24-hour support line provided.       Podder username: [email or web address]  Podder password: [email or web address]     Glooko username: [email or web address]  Glooko password: [email or web address]   Pt is in Francisco Javier mode until she starts the Dexcom G7. Practiced with pt how to give a bolus several times and stressed the importance before meals. Dtr will call when dexcom is set up    11/27/24 I received a call from the dtr this morning that the Omnipod5/ Dexcom G7 was not working.  I gave her appointment to come into the clinic.Wilmer is present. As I am changing the pt's Dexcom G7 sensor, pt states she doesn't feel good. States she has a headache on the left side and she has a pressure that is intermittant in the middle of her chest. Francisco Javier blood pressure taken, 180/90. I wheeled the pt to the ED for evaluation.       Task: Discussed guidelines for preventing, detecting and treating hypoglycemia and hyperglycemia and reviewed the importance of meal and medication timing with diabetes mediations for prevention of hypoglycemia and maximum drug benefit. Completed 11/20/2024          Follow Up Plan     No follow-ups on file.    Today's care plan and follow up schedule was discussed with patient.  Juliette verbalized understanding of the care plan, goals, and agrees to follow up plan.        The patient was encouraged to communicate with  his/her health care provider/physician and care team regarding his/her condition(s) and treatment.  I provided the patient with my contact information today and encouraged to contact me via phone or [facility]'s Patient Portal as needed.     Length of Visit   Total Time: 30 Minutes

## 2024-11-30 ENCOUNTER — PATIENT OUTREACH (OUTPATIENT)
Dept: EMERGENCY MEDICINE | Facility: HOSPITAL | Age: 79
End: 2024-11-30
Payer: MEDICARE

## 2024-11-30 NOTE — PROGRESS NOTES
Carlyn Kingsley Patient Care Assistant  ED Navigator  Emergency Department    Project: [facility] ED Navigator  Role: Community Health Worker    Date: 11/30/2024  Patient Name: Juliette Seo  MRN: [id number]  PCP: Suman Rubi MD    Assessment:     Juliette Seo is a 79 y.o. female who has presented to ED for chest pains. Patient has visited the ED 1 times in the past 3 months. Patient did not contact PCP.     ED Navigator Initial Assessment    ED Navigator Enrollment Documentation  Consent to Services  Does patient consent to completing the assessment?: Yes  Contact  Method of Initial Contact: Phone  Transportation  Does the patient have issues with Transportation?: No  Does the patient have transportation to and from healthcare appointments?: Yes  Insurance Coverage  Do you have coverage/adequate coverage?: Yes  Type/kind of coverage: [employer] H[employer] PPO SPECIAL NEEDS / [place]  Is patient able to afford co-pays/deductibles?: Yes  Is patient able to afford HME or supplies?: Yes  Does patient have an established [facility] PCP?: Yes  Able to access?: Yes  Does the patient have a lack of adequate coverage?: No  Specialist Appointment  Did the patient come to the ED to see a specialist?: No  Does the patient have a pending specialist referral?: No  Does the patient have a specialist appointment made?: Yes  Is the patient able to access a timely specialist appointment?: Yes  PCP Follow Up Appointment  Has the patient had an appointment with a primary care provider in the past year?: Yes  Approximate date: 10/2/23  Provider: Suman Rubi MD  Does the patient have a follow up appontment with a PCP?: No  When was the last time you saw your PCP?: 10/2/23  Why does the patient not have a follow up scheduled?: Other (see comments) (Comment: waiting on PCP staff to schedule appt for pt.)  Medications  Is patient able to afford medication?: Yes  Is patient unable to get medication due to lack of transportation?:  No  Psychological  Does the patient have psycho-social concerns?: Yes  What concerns does the patient have?: Anxiety and/or Depression (Comment: Declined resources.)  Food  Does the patient have concerns about food?: No  Communication/Education  Does the patient have limited English proficiency/English not primary language?: Yes  Does the patient have access to an ?: Yes  Does patient have low literacy and/or low health literacy?: Yes  Does patient have concerns with care?: No  Does patient have dissatisfaction with care?: No  Other Financial Concerns  Does the patient have immediate financial distress?: No  Does the patient have general financial concerns?: No  Other Social Barriers/Concerns  Does the patient have any additional barriers or concerns?: None  Primary Barrier  Barriers identified: Financial barrier (health insurance, employment, etc.), Structural barrier (service availability, waiting times, etc.), Cognitive barrier (health literacy, language and communication, etc.), Psychological barrier (mistrust, anxiety, etc.)  Root Cause of ED Utilization: Chronic Conditions  Plan to address Chronic Conditions: Schedule appointment for patient with their PCP/specialist per ED discharge instructions, Remind patient of upcoming PCP/specialist appointment within 24 to 48 hours before scheduled appt (Comment: Sent message to cardiology and PCP for appts.)  Next steps: Provided Education, Scheduled Appointment/Referral  Was education/educational materials provided surrounding PCP services/creating a medical home?: Yes Was education verbal or written?: Verbal, Written     Was education/educational materials provided surrounding low cost, healthy foods?: Yes Was education verbal or written?: Written     Was education/educational materials provided surrounding other items? If so, use comment to explain.: Yes Was education verbal or written?: Written   Plan: Provided information for [facility] 24/7 Nurse  triage line, [phone] or [facility] ([phone])  Additional Documentation: Per Ashley, pt is doing okay. Ashley agreed to assistance with appts. Ashley expressed pt has not seen a cardiologist in years, and would prefer her to see one with [facility] so everything can be in one place. Ashley also agreed to assistance with PCP appt. Ashley understands messages will be have to sent, and they will be contacted on Monday. No concerns with medications, transportation, or insurance for pt.     ED navigator sent the following to Dr. Hugo's staff: Good afternoon, this pt was discharged from the ED on 11/27 and has orders to follow up with cardiologist in 2 days. Pt's family is wanting to stick with [facility] so would prefer pt see Dr. Hugo. In compliance with Medicare 2+ Chronic Condition patient measure mandates, this patient requires a follow up appt within 7 days of ED visit d/c date, which would be by 12/4. I am kindly requesting scheduling assistance as [facility] was not allowing me to, and I am unable to schedule pt an appt within 7 days. Thank you in advance for your assistance. Please advise of appt date and time so that I can set an appt reminder and confirm with patient. ED navigator sent the following to Dr. Rubi' staff: Good afternoon, this pt was discharged from the ED on 11/27 and has orders to follow up with PCP. In compliance with Medicare 2+ Chronic Condition patient measure mandates, this patient requires a follow up appt within 7 days of ED visit d/c date, which would be by 12/4. I am kindly requesting scheduling assistance as you are booked, and I am unable to schedule pt an appt within 7 days. Thank you in advance for your assistance. Please advise of appt date and time so that I can set an appt reminder and confirm with patient. ED navigator ensured patient had no other needs at this time. ED navigator provided patient with the following via e-mail: the [facility] 24/7 Nurse  triage line, [phone] or 1-866-[facility] ([phone]) contact information and education on (The Right Care at the Right Level information, [facility] Virtual Visit information, and Heart healthy diet tips). ED navigator reminded patient/daughter to reach out if there is ever anything she can assist with. ED navigator will follow-up with patient/daughter as necessary.    Carlyn Kingsley  ED Navigator  [phone]          Social History     Socioeconomic History    Marital status:    Tobacco Use    Smoking status: Former     Passive exposure: Never    Smokeless tobacco: Never   Substance and Sexual Activity    Alcohol use: Not Currently     Comment: social    Drug use: No    Sexual activity: Yes     Partners: Male     Birth control/protection: None     Social Drivers of Health     Financial Resource Strain: Low Risk  (11/30/2024)    Overall Financial Resource Strain (CARDIA)     Difficulty of Paying Living Expenses: Not hard at all   Food Insecurity: No Food Insecurity (11/30/2024)    Hunger Vital Sign     Worried About Running Out of Food in the Last Year: Never true     Ran Out of Food in the Last Year: Never true   Transportation Needs: No Transportation Needs (11/30/2024)    PRAPARE - Transportation     Lack of Transportation (Medical): No     Lack of Transportation (Non-Medical): No   Physical Activity: Inactive (11/30/2024)    Exercise Vital Sign     Days of Exercise per Week: 0 days     Minutes of Exercise per Session: 0 min   Stress: Stress Concern Present (11/30/2024)    [employer] [place] [employer] - Occupational Stress Questionnaire     Feeling of Stress : To some extent   Housing Stability: Low Risk  (11/30/2024)    Housing Stability Vital Sign     Unable to Pay for Housing in the Last Year: No     Homeless in the Last Year: No       Plan:     Per Ashley, pt is doing okay. Ashley agreed to assistance with appts. Ashlye expressed pt has not seen a cardiologist in years, and  would prefer her to see one with [facility] so everything can be in one place. Ashley also agreed to assistance with PCP appt. Ashley understands messages will be have to sent, and they will be contacted on Monday. No concerns with medications, transportation, or insurance for pt.     ED navigator sent the following to Dr. Hugo's staff: Good afternoon, this pt was discharged from the ED on 11/27 and has orders to follow up with cardiologist in 2 days. Pt's family is wanting to stick with [facility] so would prefer pt see Dr. Hugo. In compliance with Medicare 2+ Chronic Condition patient measure mandates, this patient requires a follow up appt within 7 days of ED visit d/c date, which would be by 12/4. I am kindly requesting scheduling assistance as [facility] was not allowing me to, and I am unable to schedule pt an appt within 7 days. Thank you in advance for your assistance. Please advise of appt date and time so that I can set an appt reminder and confirm with patient. ED navigator sent the following to Dr. Rubi' staff: Good afternoon, this pt was discharged from the ED on 11/27 and has orders to follow up with PCP. In compliance with Medicare 2+ Chronic Condition patient measure mandates, this patient requires a follow up appt within 7 days of ED visit d/c date, which would be by 12/4. I am kindly requesting scheduling assistance as you are booked, and I am unable to schedule pt an appt within 7 days. Thank you in advance for your assistance. Please advise of appt date and time so that I can set an appt reminder and confirm with patient. ED navigator ensured patient had no other needs at this time. ED navigator provided patient with the following via e-mail: the [facility] 24/7 Nurse triage line, [phone] or 1-[facility] ([phone]) contact information and education on (The Right Care at the Right Level information, [facility] Virtual Visit information, and Heart healthy diet tips). ED navigator  reminded patient/daughter to reach out if there is ever anything she can assist with. ED navigator will follow-up with patient/daughter as necessary.    Carlyn Kingsley  ED Navigator  [phone]

## 2024-12-02 ENCOUNTER — TELEPHONE (OUTPATIENT)
Dept: RHEUMATOLOGY | Facility: CLINIC | Age: 79
End: 2024-12-02
Payer: MEDICARE

## 2024-12-02 ENCOUNTER — TELEPHONE (OUTPATIENT)
Dept: ENDOCRINOLOGY | Facility: CLINIC | Age: 79
End: 2024-12-02
Payer: MEDICARE

## 2024-12-02 NOTE — TELEPHONE ENCOUNTER
----- Message from Med Assistant Abbott sent at 12/2/2024  7:36 AM CST -----  Regarding: FW: ER F/U within 7 days.    ----- Message -----  From: Carlyn Kingsley Patient Care Assistant  Sent: 11/30/2024   2:41 PM CST  To: Bethel Schmitt Staff  Subject: ER F/U within 7 days.                            Good afternoon, this pt was discharged from the ED on 11/27 and has orders to follow up with cardiologist in 2 days. Pt's family is wanting to stick with [facility] so would prefer pt see Dr. Hugo. In compliance with Medicare 2+ Chronic Condition patient measure mandates, this patient requires a follow up appt within 7 days of ED visit d/c date, which would be by 12/4. I am kindly requesting scheduling assistance as [email or web address] was not allowing me to, and I am unable to schedule pt an appt within 7 days. Thank you in advance for your assistance. Please advise of appt date and time so that I can set an appt reminder and confirm with patient.

## 2024-12-02 NOTE — TELEPHONE ENCOUNTER
----- Message from Med Assistant Maureen sent at 11/27/2024 10:15 AM CST -----  Can you call her she speaks [place]. She needs a follow up with Alexandria in 3-4 weeks  ----- Message -----  From: Maureen David MA  Sent: 11/26/2024   3:24 PM CST  To: Maureen David MA      ----- Message -----  From: Alexandria Funes PA-C  Sent: 11/25/2024   3:54 PM CST  To: Wanda Ibrahim Staff    Please schedule her a follow up with me in 3-4 weeks  ----- Message -----  From: Sneha Bravo RN  Sent: 11/25/2024   3:26 PM CST  To: Alexandria Funes PA-C    Sorry this is 5 days later. We finally got her connected to [employer]  ----- Message -----  From: Alexandria Funes PA-C  Sent: 11/22/2024   7:50 AM CST  To: Sneha Bravo RN    Good morning!    I tried to pull up Juliette's data and her name does not come up for me on [employer].  ----- Message -----  From: Alexandria Funes PA-C  Sent: 11/22/2024  12:00 AM CST  To: Alexandria Funes PA-C      ----- Message -----  From: Sneha Bravo RN  Sent: 11/20/2024   2:28 PM CST  To: Adis Pedraza MD; Alexandria Funes PA-C    Omnipod 5 started in Francisco Javier mode until dtr can start/connect Dexcom G7

## 2024-12-04 ENCOUNTER — CLINICAL SUPPORT (OUTPATIENT)
Dept: DIABETES | Facility: CLINIC | Age: 79
End: 2024-12-04
Payer: MEDICARE

## 2024-12-04 DIAGNOSIS — E11.8 TYPE II DIABETES MELLITUS WITH COMPLICATION: Primary | ICD-10-CM

## 2024-12-04 PROCEDURE — G0108 DIAB MANAGE TRN  PER INDIV: HCPCS | Mod: HCNC,NTX,S$GLB, | Performed by: STUDENT IN AN ORGANIZED HEALTH CARE EDUCATION/TRAINING PROGRAM

## 2024-12-04 NOTE — PROGRESS NOTES
Diabetes Care Specialist Progress Note  Author: Sneha Bravo RN  Date: 12/4/2024    Intake    Program Intake  Reason for Diabetes Program Visit:: Intervention  Type of Intervention:: Individual  Individual: Device Training  In the last month, have you used the ER or been admitted to the hospital: Yes  Was the ER or hospital admission related to diabetes?: No         Continuous Glucose Monitoring  Patient has CGM: Yes  Personal CGM type:: Dexcom G7    Lab Results   Component Value Date    HGBA1C 12.1 (H) 06/12/2024               Pt came to the clinic for a f/u    Lifestyle Coping Support & Clinical     Dtr is present         Diabetes Self-Management Skills Assessment                        Home Blood Glucose Monitoring  Personal CGM type:: Dexcom G7                Assessment Summary and Plan    Based on today's diabetes care assessment, the following areas of need were identified:      Identified Areas of Need      Medication/Current Diabetes Treatment:  Continues to wear the Omnipod 5   Lifestyle Coping/Support:     Diabetes Disease Process/Treatment Options:     Nutrition/Healthy Eating:      Physical Activity/Exercise:      Home Blood Glucose Monitoring:      Acute Complications:      Chronic Complications:         Today's interventions were provided through individual discussion, instruction, and written materials were provided.      Patient verbalized understanding of instruction and written materials.  Pt was able to return back demonstration of instructions today. Patient understood key points, needs reinforcement and further instruction.     Diabetes Self-Management Care Plan:    Today's Diabetes Self-Management Care Plan was developed with Juliette's input. Juliette has agreed to work toward the following goal(s) to improve his/her overall diabetes control.      Care Plan: Diabetes Management   Updates made since 12/5/2023 12:00 AM        Problem: Medications         Long-Range Goal: Patient Agrees to use the  Omnipod5 pump as prescribed.    Start Date: 11/20/2024   This Visit's Progress: On track   Priority: High   Barriers: Knowledge deficit   Note:    Pt is here today with her dtr to start the Omnipod 5 pump. Pt is wearing a Freestyle Shola 2 which is not compatible with the pump. Dtr states she will fix the tono and put her on a Dexcom G7.  OMNI POD 5 INSULIN PUMP START    Pump training was provided per Omni Pod protocol.  Patient has used an insulin pump in the past.   Settings for pump ordered by Dr Pedraza.  Patient is new to insulin pump therapy but this will be first Automated system she/he will be using.  Pt instructed to stop all other insulins.     Basal:  12a-12a= 0.55 units/hr     Max basal rate: 3 u/hr     Bolus:  CHO ratio: 1:16  ISF: 1:62  Glucose target : 110 mg/dL  Correct  over: 130mg/dl  Active insulin time: 3.5 hours  Max bolus: 15 units     Low reservoir insulin alarm: 20 units  Change pod alarm: every 3 days       Details of pump therapy were covered included following controller features and programming, pod activation, pod site selection and rotation, automatic pod priming and insertion, setting & editing basal rates in manual mode, giving bolus and other features in the set-up menu.  The following regarding the Omnipod 5 was covered:  During your first Pod wear, since no recent history is available, the Omnipod 5 System uses only your active Basal Program from Manual Mode as a starting point to adjust your insulin. After 48 hours of history is collected, which usually happens with the first Pod wear, Snipd technology stops adjusting insulin against your active Basal Program and starts using the Adaptive Basal Rate for your automated insulin delivery with your next Pod change. With each Pod change, insulin delivery information is sent and saved in the Omnipod 5 Tono so that the next Pod that is started is updated with the new Adaptive Basal Rate. With each new Pod activation, the system  adapts insulin delivery based on physiological needs and total daily insulin (TDI) delivered. After 2-3 Pod changes, adaptation generally stabilizes and automated insulin delivery is based on this adaptation.  Patient demonstrated ability to program controller, activate and insert pod using aseptic technique.  Patient demonstrated ability to program Dexcom transmitter into controller and start automated limited mode.    Instructed pt on use of basic pump features ie...give a bolus, pause insulin, switch from manual to automated mode.  Reviewed features available in manual mode verses automated mode.   Reviewed when and how to use activity function in automated mode.  Reviewed site selection of pods, rotation of sites and hard stop to change pod every 72 hrs.   Instructed that insulin vial is good out of refrigeration for up to 28-30 days.   Reviewed treatment of hypoglycemia, hyperglycemia; sick day care, DKA, and troubleshooting of pump.  Omni Pod 24-hour support line provided.       Podder username: [email or web address]  Podder password: [email or web address]     [employer] username: [email or web address]  [employer] password: [email or web address]   Pt is in Francisco Javier mode until she starts the Dexcom G7. Practiced with pt how to give a bolus several times and stressed the importance before meals. Dtr will call when dexcom is set up    11/27/24 I received a call from the dtr this morning that the Omnipod5/ Dexcom G7 was not working.  I gave her appointment to come into the clinic.Michaelugher is present. As I am changing the pt's Dexcom G7 sensor, pt states she doesn't feel good. States she has a headache on the left side and she has a pressure that is intermittant in the middle of her chest. [facility] blood pressure taken, 180/90. I wheeled the pt to the ED for evaluation.  12/4/24 Pt came to the clinic for a f/u since starting the Omnipod 5. Pt states no further problems since going to the ED. Pt's blood sugars are doing better while on the insulin pump but it is  taking some time to get used to it. Consulted DR Pedraza, change Correction factor from 55 to 45, and I:C from 12 to 8.           Task: Discussed guidelines for preventing, detecting and treating hypoglycemia and hyperglycemia and reviewed the importance of meal and medication timing with diabetes mediations for prevention of hypoglycemia and maximum drug benefit. Completed 11/20/2024          Follow Up Plan     No follow-ups on file.    Today's care plan and follow up schedule was discussed with patient.  Juliette verbalized understanding of the care plan, goals, and agrees to follow up plan.        The patient was encouraged to communicate with his/her health care provider/physician and care team regarding his/her condition(s) and treatment.  I provided the patient with my contact information today and encouraged to contact me via phone or [facility] Patient Portal as needed.     Length of Visit   Total Time: 60 Minutes

## 2024-12-05 ENCOUNTER — PATIENT OUTREACH (OUTPATIENT)
Dept: ADMINISTRATIVE | Facility: HOSPITAL | Age: 79
End: 2024-12-05
Payer: MEDICARE

## 2024-12-05 DIAGNOSIS — Z78.0 POST-MENOPAUSAL: Primary | ICD-10-CM

## 2024-12-05 NOTE — PROGRESS NOTES
Left detailed message for patient to return call to complete VBC outreach using  services. Danie Greer ID # [id number]  Topics to discuss:  Eye Exam  Schedule DEXA  Admission or ED Risk: 81.7%  Schedule DM lab visit  Schedule annual PCP visit  Schedule Enhanced Annual Wellness Visit  Obtain remote B/P  Home B/P cuff  Discuss Digital Medicine Program Eligibility: Eligible    SDOH reviewed 11/30/2024

## 2024-12-09 ENCOUNTER — OFFICE VISIT (OUTPATIENT)
Dept: TRANSPLANT | Facility: CLINIC | Age: 79
End: 2024-12-09
Payer: MEDICARE

## 2024-12-09 ENCOUNTER — HOSPITAL ENCOUNTER (OUTPATIENT)
Dept: PULMONOLOGY | Facility: CLINIC | Age: 79
Discharge: HOME | End: 2024-12-09
Payer: MEDICARE

## 2024-12-09 VITALS
OXYGEN SATURATION: 98 % | DIASTOLIC BLOOD PRESSURE: 76 MMHG | HEIGHT: 56 IN | SYSTOLIC BLOOD PRESSURE: 188 MMHG | WEIGHT: 110 LBS | HEART RATE: 86 BPM | BODY MASS INDEX: 24.75 KG/M2 | RESPIRATION RATE: 16 BRPM | TEMPERATURE: 98 F

## 2024-12-09 DIAGNOSIS — J30.2 SEASONAL ALLERGIC RHINITIS, UNSPECIFIED TRIGGER: ICD-10-CM

## 2024-12-09 DIAGNOSIS — K21.9 GASTROESOPHAGEAL REFLUX DISEASE, UNSPECIFIED WHETHER ESOPHAGITIS PRESENT: ICD-10-CM

## 2024-12-09 DIAGNOSIS — J84.9 ILD (INTERSTITIAL LUNG DISEASE): ICD-10-CM

## 2024-12-09 DIAGNOSIS — J84.9 ILD (INTERSTITIAL LUNG DISEASE): Primary | ICD-10-CM

## 2024-12-09 PROCEDURE — 1101F PT FALLS ASSESS-DOCD LE1/YR: CPT | Mod: HCNC,CPTII,NTX,S$GLB | Performed by: INTERNAL MEDICINE

## 2024-12-09 PROCEDURE — 94729 DIFFUSING CAPACITY: CPT | Mod: HCNC,NTX,S$GLB, | Performed by: INTERNAL MEDICINE

## 2024-12-09 PROCEDURE — 94010 BREATHING CAPACITY TEST: CPT | Mod: HCNC,NTX,S$GLB, | Performed by: INTERNAL MEDICINE

## 2024-12-09 PROCEDURE — 3288F FALL RISK ASSESSMENT DOCD: CPT | Mod: HCNC,CPTII,NTX,S$GLB | Performed by: INTERNAL MEDICINE

## 2024-12-09 PROCEDURE — 1126F AMNT PAIN NOTED NONE PRSNT: CPT | Mod: HCNC,CPTII,NTX,S$GLB | Performed by: INTERNAL MEDICINE

## 2024-12-09 PROCEDURE — 1159F MED LIST DOCD IN RCRD: CPT | Mod: HCNC,CPTII,NTX,S$GLB | Performed by: INTERNAL MEDICINE

## 2024-12-09 PROCEDURE — 3077F SYST BP >= 140 MM HG: CPT | Mod: HCNC,CPTII,NTX,S$GLB | Performed by: INTERNAL MEDICINE

## 2024-12-09 PROCEDURE — 99999 PR PBB SHADOW E&M-EST. PATIENT-LVL III: CPT | Mod: PBBFAC,HCNC,TXP, | Performed by: INTERNAL MEDICINE

## 2024-12-09 PROCEDURE — 1160F RVW MEDS BY RX/DR IN RCRD: CPT | Mod: HCNC,CPTII,NTX,S$GLB | Performed by: INTERNAL MEDICINE

## 2024-12-09 PROCEDURE — 94726 PLETHYSMOGRAPHY LUNG VOLUMES: CPT | Mod: HCNC,NTX,S$GLB, | Performed by: INTERNAL MEDICINE

## 2024-12-09 PROCEDURE — 3078F DIAST BP <80 MM HG: CPT | Mod: HCNC,CPTII,NTX,S$GLB | Performed by: INTERNAL MEDICINE

## 2024-12-09 PROCEDURE — 99214 OFFICE O/P EST MOD 30 MIN: CPT | Mod: 25,HCNC,NTX,S$GLB | Performed by: INTERNAL MEDICINE

## 2024-12-09 NOTE — LETTER
December 9, 2024        Clarence Brush  [address]  [facility] [address]  Phone: [phone]  Fax: [phone]             [facility]st [facility]  [address]  [facility] [address]  Phone: [phone]   Patient: Juliette Seo   MR Number: [id number]   YOB: 1945   Date of Visit: 12/9/2024       Dear Dr. Clarence Brush    Thank you for referring Juliette Seo to me for evaluation. Attached you will find relevant portions of my assessment and plan of care.    If you have questions, please do not hesitate to call me. I look forward to following Juliette Seo along with you.    Sincerely,    Yancy Maldonado, DO    Enclosure    If you would like to receive this communication electronically, please contact [email or web address] or [phone] to request [employer] Link access.    [employer] Link is a tool which provides read-only access to select patient information with whom you have a relationship. Its easy to use and provides real time access to review your patients record including encounter summaries, notes, results, and demographic information.    If you feel you have received this communication in error or would no longer like to receive these types of communications, please e-mail [email or web address]

## 2024-12-10 LAB
DLCO ADJ PRE: 11.79 ML/(MIN*MMHG) (ref 9.27–20.74)
DLCO SINGLE BREATH LLN: 9.27
DLCO SINGLE BREATH PRE REF: 82.6 %
DLCO SINGLE BREATH REF: 15
DLCOC SBVA LLN: 2.21
DLCOC SBVA PRE REF: 98.2 %
DLCOC SBVA REF: 4.17
DLCOC SINGLE BREATH LLN: 9.27
DLCOC SINGLE BREATH PRE REF: 78.6 %
DLCOC SINGLE BREATH REF: 15
DLCOCSBVAULN: 6.13
DLCOCSINGLEBREATHULN: 20.74
DLCOCSINGLEBREATHZSCORE: -0.92
DLCOSINGLEBREATHULN: 20.74
DLCOSINGLEBREATHZSCORE: -0.75
DLCOVA LLN: 2.21
DLCOVA PRE REF: 103.3 %
DLCOVA PRE: 4.31 ML/(MIN*MMHG*L) (ref 2.21–6.13)
DLCOVA REF: 4.17
DLCOVAULN: 6.13
DLVAADJ PRE: 4.1 ML/(MIN*MMHG*L) (ref 2.21–6.13)
ERV LLN: -16449.57
ERV PRE REF: 160.7 %
ERV REF: 0.43
ERVULN: ABNORMAL
FEF 25 75 LLN: 0.61
FEF 25 75 PRE REF: 105.9 %
FEF 25 75 REF: 2.01
FEV05 LLN: 0.34
FEV05 REF: 1.19
FEV1 FVC LLN: 63
FEV1 FVC PRE REF: 106 %
FEV1 FVC REF: 78
FEV1 LLN: 1.03
FEV1 PRE REF: 105.6 %
FEV1 REF: 1.47
FEV1FVCZSCORE: 0.58
FEV1ZSCORE: 0.32
FRCPLETH LLN: 1.44
FRCPLETH PREREF: 82 %
FRCPLETH REF: 2.27
FRCPLETHULN: 3.09
FVC LLN: 1.33
FVC PRE REF: 98.8 %
FVC REF: 1.9
FVCZSCORE: -0.06
IVC PRE: 1.87 L (ref 1.33–2.49)
IVC SINGLE BREATH LLN: 1.33
IVC SINGLE BREATH PRE REF: 98.4 %
IVC SINGLE BREATH REF: 1.9
IVCSINGLEBREATHULN: 2.49
LLN IC: -16448.97
PEF LLN: 1.69
PEF PRE REF: 158.1 %
PEF REF: 3.23
PHYSICIAN COMMENT: ABNORMAL
PRE DLCO: 12.39 ML/(MIN*MMHG) (ref 9.27–20.74)
PRE ERV: 0.69 L (ref -16449.57–16450.43)
PRE FEF 25 75: 2.13 L/S (ref 0.61–3.41)
PRE FET 100: 6.53 SEC
PRE FEV05 REF: 116.8 %
PRE FEV1 FVC: 82.9 % (ref 63.46–90.99)
PRE FEV1: 1.55 L (ref 1.03–1.89)
PRE FEV5: 1.39 L (ref 0.34–2.05)
PRE FRC PL: 1.86 L (ref 1.44–3.09)
PRE FVC: 1.88 L (ref 1.33–2.49)
PRE IC: 1.19 L (ref -16448.97–16451.03)
PRE PEF: 5.1 L/S (ref 1.69–4.76)
PRE REF IC: 115.9 %
PRE RV: 1.17 L (ref 1.26–2.41)
PRE TLC: 3.05 L (ref 2.61–4.58)
RAW PRE REF: 120.5 %
RAW PRE: 3.69 CMH2O*S/L (ref 3.06–3.06)
RAW REF: 3.06
REF IC: 1.03
RV LLN: 1.26
RV PRE REF: 63.7 %
RV REF: 1.84
RVTLC LLN: 36
RVTLC PRE REF: 83.9 %
RVTLC PRE: 38.44 % (ref 36.23–55.41)
RVTLC REF: 46
RVTLCULN: 55
RVULN: 2.41
SGAW PRE REF: 121.1 %
SGAW PRE: 0.12 1/(CMH2O*S) (ref 0.1–0.1)
SGAW REF: 0.1
TLC LLN: 2.61
TLC PRE REF: 84.7 %
TLC REF: 3.6
TLC ULN: 4.58
TLCZSCORE: -0.92
ULN IC: ABNORMAL
VA PRE: 2.88 L (ref 3.45–3.45)
VA SINGLE BREATH LLN: 3.45
VA SINGLE BREATH PRE REF: 83.5 %
VA SINGLE BREATH REF: 3.45
VASINGLEBREATHULN: 3.45
VC LLN: 1.33
VC PRE REF: 98.8 %
VC PRE: 1.88 L (ref 1.33–2.49)
VC REF: 1.9
VC ULN: 2.49

## 2024-12-10 NOTE — PROGRESS NOTES
ADVANCED LUNG DISEASE CLINIC FOLLOW UP VISIT                                                                                                                                             Reason for Visit:  ALD    Referring Physician: Yancy Maldonado, *    History of Present Illness: Juliette Seo is a 79 y.o. female who is on 0L of oxygen.  She is on no assisted ventilation.  Her New York Heart Association Class is I and a Karnofsky score of 90% - Able to carry on normal activity: minor symptoms of disease. She is diabetic insulin dependent    Patient presents today for routine follow up. Reports continued improvement in cough and dyspnea since previous visit.  Symptoms fluctuate and she notices a difference with seasonal changes; takes antihistamine and nasal sprays. Reflux very well controlled on PPI therapy. No recent illnesses or hospitalizations. Since then, she has been feeling well. No limitations in her ADLs. No recent hospitalizations.     PER INITIAL HPI:    Patient presents today for evaluation of ILD. Previously followed for bronchiectasis in 2018 by Dr. Edmondson, and most recently seen by Dr. Brush two weeks prior to today's visit. Patient states symptoms began to progress about three months ago. She noticed increased dry cough and dyspnea with exertion. At that time, she had discontinued her PPI per her PCP's recommendation. She notes increased reflux and cough since stopping her PPI. She has a history of COVID x 2. Required hospitalization in 2021. She briefly required oxygen during her admission, but was discharged on room air. She is not vaccinated for COVID. Received bamlanivimab, remdesivir, and dexamethasone. She tested positive again in 12/2022. She states she has been prescribed albuterol and is awaiting nebulizer supplies. She states she takes tessalon pearls for her cough, which provides some relief. Cough is intermittent in duration with no alleviating/exacerbation factors.     Patient was  started on a prednisone taper after her visit with Dr. Brush on 7/5. She completed one week of prednisone 20 mg daily and is on prednisone 10 mg daily for two weeks. Per patient's daughter, she notes some improvement in patient's dyspnea and cough since starting steroids. Patient's daughter also reports patient has had poor appetite and early satiety for the last few months. She required hospitalization for left sided chest pain 6/17.     Patient has minimal smoking history and quit over 20 years ago. No history of frequent infections during childhood/adolescence. Notes some increased rhinorrhea/post-nasal drip which occurs with seasonal changes. Grew up in [place] and has lived in [place] since 1960. She has multiple occupational exposures to chemicals/metal/dust, absbestos, cleaning supplies, construction. 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 No birds for pets.    Patient denies history of rashes, myalgias, arthralgias, muscular weakness. Autoimmune workup thus far unrevealing. No symptoms of dysphagia, throat clearing, globus sensation.        Past Medical History:   Diagnosis Date    Allergy     Anxiety     Arthritis     osteo    Asthma     Diabetic retinopathy     GERD (gastroesophageal reflux disease)     High cholesterol     Hypertension     Kidney stone     Type 2 diabetes mellitus, uncontrolled, with neuropathy     Urinary tract infection     Vaginal infection        Past Surgical History:   Procedure Laterality Date    CHOLECYSTECTOMY      COLONOSCOPY N/A 6/8/2021    Procedure: COLONOSCOPY;  Surgeon: Rain Pop MD;  Location: [facility];  Service: Endoscopy;  Laterality: N/A;    COLONOSCOPY N/A 7/8/2021    Procedure: colonoscopy with single balloon scope;  Surgeon: Steffen Dueñas MD;  Location: [facility];  Service: Endoscopy;  Laterality: N/A;    ESOPHAGOGASTRODUODENOSCOPY N/A 6/8/2021    Procedure: EGD (ESOPHAGOGASTRODUODENOSCOPY);  Surgeon: Rain Pop MD;  Location: [facility]  ENDO;  Service: Endoscopy;  Laterality: N/A;    EYE SURGERY      HYSTERECTOMY      INCISIONAL HERNIA REPAIR  2003    ROTATOR CUFF REPAIR Right 11/07/2017    TRIGGER FINGER RELEASE Right 6/3/2022    Procedure: RELEASE, TRIGGER FINGER; right index, middle, ring;  Surgeon: Sarmad Wesley Jr., MD;  Location: [facility];  Service: Orthopedics;  Laterality: Right;       Allergies: Actos [pioglitazone], Darvocet a500 [propoxyphene n-acetaminophen], and Dilaudid [hydromorphone (bulk)]    Current Outpatient Medications   Medication Sig    aspirin (ECOTRIN) 81 MG EC tablet Take 81 mg by mouth once daily.    azelastine (ASTELIN) 137 mcg (0.1 %) nasal spray 1 spray (137 mcg total) by Nasal route 2 (two) times daily.    cetirizine (ZYRTEC) 10 MG tablet Take 1 tablet (10 mg total) by mouth once daily.    DEXCOM G7  Misc Use to check glucose with sensors    empagliflozin (JARDIANCE) 25 mg tablet Take 1 tablet (25 mg total) by mouth once daily.    fluticasone propionate (FLONASE) 50 mcg/actuation nasal spray 2 sprays (100 mcg total) by Each Nostril route once daily.    HYDROcodone-acetaminophen (NORCO) 5-325 mg per tablet Take 1 tablet by mouth every 8 (eight) hours as needed for Pain.    insulin glargine U-100, Lantus, (LANTUS SOLOSTAR U-100 INSULIN) 100 unit/mL (3 mL) InPn pen Inject 40 Units into the skin once daily.    montelukast (SINGULAIR) 10 mg tablet TAKE 1 TABLET BY MOUTH EVERY DAY IN THE EVENING    NOVOLOG U-100 INSULIN ASPART 100 unit/mL injection To use with insulin pump. Max daily dose 130 units.    nystatin (MYCOSTATIN) cream Apply topically 2 (two) times daily.    OMNIPOD 5 G6-G7 PODS, GEN 5, Crtg Inject 1 each into the skin every 72 hours.    OZEMPIC 0.25 mg or 0.5 mg (2 mg/3 mL) pen injector Inject 0.5 mg into the skin every 7 days.    pantoprazole (PROTONIX) 40 MG tablet Take 1 tablet (40 mg total) by mouth once daily.    TRUE METRIX GLUCOSE TEST STRIP Strp To use to inject insulin twice daily.     "valsartan (DIOVAN) 80 MG tablet Take 1 tablet (80 mg total) by mouth once daily.    albuterol (PROVENTIL) 2.5 mg /3 mL (0.083 %) nebulizer solution Take 3 mLs (2.5 mg total) by nebulization every 6 (six) hours as needed for Wheezing. Rescue    metFORMIN (GLUCOPHAGE) 500 MG tablet Take 1 tablet (500 mg total) by mouth 2 (two) times daily with meals.    QUEtiapine (SEROQUEL) 25 MG Tab Take 1 tablet (25 mg total) by mouth every evening.    TRUEPLUS PEN NEEDLE 32 gauge x 5/32" Ndle To inject insulin twice daily     No current facility-administered medications for this visit.     Facility-Administered Medications Ordered in Other Visits   Medication    triamcinolone acetonide injection 40 mg       Immunization History   Administered Date(s) Administered    Influenza 10/31/2012    Influenza (FLUAD) - Quadrivalent - Adjuvanted - PF *Preferred* (65+) 11/13/2020, 10/02/2023    Influenza - Quadrivalent - High Dose - PF (65 years and older) 11/07/2021    Influenza - Quadrivalent - PF *Preferred* (6 months and older) 10/19/2015    Influenza - Trivalent - Afluria, Fluzone MDV 10/31/2012    Influenza - Trivalent - Fluzone High Dose - PF (65 years and older) 09/01/2016, 12/01/2017, 10/04/2018, 10/24/2019    Influenza Split 10/08/2014    Pneumococcal Conjugate - 13 Valent 10/19/2015    Pneumococcal Polysaccharide - 23 Valent 04/18/2019    RSVpreF (Arexvy) 04/30/2024    Zoster Recombinant 12/26/2019, 09/12/2020     Family History:    Family History   Problem Relation Name Age of Onset    Stroke Mother      Diabetes Sister      Diabetes Brother      Kidney disease Neg Hx       Social History     Substance and Sexual Activity   Alcohol Use Not Currently    Comment: social      Social History     Substance and Sexual Activity   Drug Use No      Social History     Socioeconomic History    Marital status:    Tobacco Use    Smoking status: Former     Passive exposure: Never    Smokeless tobacco: Never   Substance and Sexual " Activity    Alcohol use: Not Currently     Comment: social    Drug use: No    Sexual activity: Yes     Partners: Male     Birth control/protection: None     Social Drivers of Health     Financial Resource Strain: Low Risk  (11/30/2024)    Overall Financial Resource Strain (CARDIA)     Difficulty of Paying Living Expenses: Not hard at all   Food Insecurity: No Food Insecurity (11/30/2024)    Hunger Vital Sign     Worried About Running Out of Food in the Last Year: Never true     Ran Out of Food in the Last Year: Never true   Transportation Needs: No Transportation Needs (11/30/2024)    PRAPARE - Transportation     Lack of Transportation (Medical): No     Lack of Transportation (Non-Medical): No   Physical Activity: Inactive (11/30/2024)    Exercise Vital Sign     Days of Exercise per Week: 0 days     Minutes of Exercise per Session: 0 min   Stress: Stress Concern Present (11/30/2024)    [employer] - Occupational Stress Questionnaire     Feeling of Stress : To some extent   Housing Stability: Low Risk  (11/30/2024)    Housing Stability Vital Sign     Unable to Pay for Housing in the Last Year: No     Homeless in the Last Year: No     Review of Systems   Constitutional:  Negative for chills, diaphoresis, fever, malaise/fatigue and weight loss.   HENT:  Negative for congestion, ear discharge, ear pain, hearing loss, nosebleeds, sinus pain, sore throat and tinnitus.    Eyes:  Negative for blurred vision, double vision, photophobia, pain, discharge and redness.   Respiratory:  Positive for cough (chronic, stable) and shortness of breath (stable). Negative for hemoptysis, sputum production, wheezing and stridor.    Cardiovascular:  Negative for chest pain, palpitations, orthopnea, claudication, leg swelling and PND.   Gastrointestinal:  Negative for abdominal pain, blood in stool, constipation, diarrhea, heartburn, melena, nausea and vomiting.   Genitourinary:  Negative for dysuria, flank  "pain, frequency, hematuria and urgency.   Musculoskeletal:  Negative for back pain, falls, joint pain, myalgias and neck pain.   Skin:  Negative for itching and rash.   Neurological:  Negative for dizziness, tingling, tremors, sensory change, speech change, focal weakness, seizures, loss of consciousness, weakness and headaches.   Endo/Heme/Allergies:  Negative for environmental allergies and polydipsia. Does not bruise/bleed easily.   Psychiatric/Behavioral:  Negative for depression, hallucinations, memory loss, substance abuse and suicidal ideas. The patient is not nervous/anxious and does not have insomnia.      Vitals  BP (!) 188/76 (BP Location: Right arm, Patient Position: Sitting)   Pulse 86   Temp 98 °F (36.7 °C) (Oral)   Resp 16   Ht 4' 8" (1.422 m)   Wt 49.9 kg (110 lb)   SpO2 98%   BMI 24.66 kg/m²   Physical Exam  Vitals and nursing note reviewed.   Constitutional:       General: She is not in acute distress.     Appearance: Normal appearance.   HENT:      Head: Normocephalic and atraumatic.   Eyes:      General: No scleral icterus.     Conjunctiva/sclera: Conjunctivae normal.   Cardiovascular:      Rate and Rhythm: Normal rate.      Heart sounds: No murmur heard.     No friction rub.   Pulmonary:      Effort: No respiratory distress.      Breath sounds: No wheezing, rhonchi or rales.   Abdominal:      General: Bowel sounds are normal. There is no distension.      Palpations: Abdomen is soft.      Tenderness: There is no abdominal tenderness.   Musculoskeletal:      Right lower leg: No edema.      Left lower leg: No edema.   Skin:     General: Skin is warm and dry.   Neurological:      General: No focal deficit present.      Mental Status: She is alert and oriented to person, place, and time.   Psychiatric:         Mood and Affect: Mood normal.         Behavior: Behavior normal.       Labs:  Hospital Outpatient Visit on 12/09/2024   Component Date Value    Pre FVC 12/09/2024 1.88     PRE FEV5 " 12/09/2024 1.39     Pre FEV1 12/09/2024 1.55     Pre FEV1 FVC 12/09/2024 82.90     Pre FEF 25 75 12/09/2024 2.13     Pre PEF 12/09/2024 5.10 (H)     Pre  12/09/2024 6.53     Pre DLCO 12/09/2024 12.39     DLCO ADJ PRE 12/09/2024 11.79     DLCOVA PRE 12/09/2024 4.31     DLVAAdj PRE 12/09/2024 4.10     VA PRE 12/09/2024 2.88 (L)     IVC PRE 12/09/2024 1.87     Pre TLC 12/09/2024 3.05     VC PRE 12/09/2024 1.88     PRE IC 12/09/2024 1.19     Pre FRC PL 12/09/2024 1.86     Pre ERV 12/09/2024 0.69     Pre RV 12/09/2024 1.17 (L)     RVTLC PRE 12/09/2024 38.44     Raw PRE 12/09/2024 3.69 (H)     sGaw PRE 12/09/2024 0.12 (H)     FVC Ref 12/09/2024 1.90     FVC LLN 12/09/2024 1.33     FVC Pre Ref 12/09/2024 98.8     FEV05 REF 12/09/2024 1.19     FEV05 LLN 12/09/2024 0.34     PRE FEV05 REF 12/09/2024 116.8     FEV1 Ref 12/09/2024 1.47     FEV1 LLN 12/09/2024 1.03     FEV1 Pre Ref 12/09/2024 105.6     FEV1 FVC Ref 12/09/2024 78     FEV1 FVC LLN 12/09/2024 63     FEV1 FVC Pre Ref 12/09/2024 106.0     FEF 25 75 Ref 12/09/2024 2.01     FEF 25 75 LLN 12/09/2024 0.61     FEF 25 75 Pre Ref 12/09/2024 105.9     PEF Ref 12/09/2024 3.23     PEF LLN 12/09/2024 1.69     PEF Pre Ref 12/09/2024 158.1     TLC Ref 12/09/2024 3.60     TLC LLN 12/09/2024 2.61     TLC ULN 12/09/2024 4.58     TLC Pre Ref 12/09/2024 84.7     VC Ref 12/09/2024 1.90     VC LLN 12/09/2024 1.33     VC ULN 12/09/2024 2.49     VC Pre Ref 12/09/2024 98.8     REF IC 12/09/2024 1.03     LLN IC 12/09/2024 -61827.97     ULN IC 12/09/2024 84126.03     PRE REF IC 12/09/2024 115.9     FRCpleth Ref 12/09/2024 2.27     FRCpleth LLN 12/09/2024 1.44     FRC PLETH ULN 12/09/2024 3.09     FRCpleth PreRef 12/09/2024 82.0     ERV Ref 12/09/2024 0.43     ERV LLN 12/09/2024 -51136.57     ERV ULN 12/09/2024 59979.43     ERV Pre Ref 12/09/2024 160.7     RV Ref 12/09/2024 1.84     RV LLN 12/09/2024 1.26     RV ULN 12/09/2024 2.41     RV Pre Ref 12/09/2024 63.7     RVTLC Ref  12/09/2024 46     RVTLC LLN 12/09/2024 36     RV TLC ULN 12/09/2024 55     RVTLC Pre Ref 12/09/2024 83.9     Raw Ref 12/09/2024 3.06     Raw Pre Ref 12/09/2024 120.5     sGaw Ref 12/09/2024 0.10     sGaw Pre Ref 12/09/2024 121.1     DLCO Single Breath Ref 12/09/2024 15.00     DLCO Single Breath LLN 12/09/2024 9.27     DLCO SINGLEBREATH ULN 12/09/2024 20.74     DLCO Single Breath Pre R* 12/09/2024 82.6     DLCOc Single Breath Ref 12/09/2024 15.00     DLCOc Single Breath LLN 12/09/2024 9.27     DLCOC SINGLEBREATH ULN 12/09/2024 20.74     DLCOc Single Breath Pre * 12/09/2024 78.6     DLCOVA Ref 12/09/2024 4.17     DLCOVA LLN 12/09/2024 2.21     DLCOVA ULN 12/09/2024 6.13     DLCOVA Pre Ref 12/09/2024 103.3     DLCOc SBVA Ref 12/09/2024 4.17     DLCOc SBVA LLN 12/09/2024 2.21     DLCOCSBVA ULN 12/09/2024 6.13     DLCOc SBVA Pre Ref 12/09/2024 98.2     VA Single Breath Ref 12/09/2024 3.45     VA Single Breath LLN 12/09/2024 3.45     VA SINGLEBREATH ULN 12/09/2024 3.45     VA Single Breath Pre Ref 12/09/2024 83.5     IVC Single Breath Ref 12/09/2024 1.90     IVC Single Breath LLN 12/09/2024 1.33     IVC SINGLEBREATH ULN 12/09/2024 2.49     IVC Single Breath Pre Ref 12/09/2024 98.4     FVCZSCORE 12/09/2024 -0.06     RRI2NACFPS 12/09/2024 0.32     MSI6QOTPYSQZL 12/09/2024 0.58     TLCZSCORE 12/09/2024 -0.92     DLCOSINGLEBREATHZSCORE 12/09/2024 -0.75     DLCOcSingleBreathZSCORE 12/09/2024 -0.92            12/9/2024    11:29 AM 6/17/2024    12:09 PM 11/14/2023    11:36 AM 8/24/2023    11:22 AM 7/20/2023     9:51 AM   Pulmonary Function Tests   FVC 1.88 liters 1.79 liters 1.78 liters 1.83 liters 1.91 liters   FEV1 1.55 liters 1.49 liters 1.48 liters 1.57 liters 1.59 liters   TLC (liters) 3.05 liters 3.04 liters 2.84 liters 3.13 liters 2.89 liters   DLCO (ml/mmHg sec) 12.39 ml/mmHg sec 11.26 ml/mmHg sec 10.84 ml/mmHg sec 10.2 ml/mmHg sec 12.47 ml/mmHg sec   FVC% 98 93.7 92.5 95.2 98.9   FEV1% 105 100.5 99.1 104.6 106.3   FEF  25-75 2.13 1.88 1.95 2.22 2.02   FEF 25-75% 105 142.4 146.1 165.9 150.6   TLC% 84 84.5 78.9 87 80.3   RV 1.17 1.24 1.06 1.3 0.97   RV% 64 68.1 58.3 71.7 53.7   DLCO% 82 74.3 71.6 66.7 81.5         6/17/2024    11:08 AM 7/20/2023     9:28 AM   6MW   6MWT Status completed without stopping completed without stopping   Patient Reported No complaints Dyspnea;Dizziness;Lightheadedness   Was O2 used? No No   6MW Distance walked (feet) 1100 feet 1000 feet   Distance walked (meters) 335.28 meters 304.8 meters   Did patient stop? No No   Oxygen Saturation 99 % 98 %   Supplemental Oxygen Room Air Room Air   Heart Rate 104 bpm 91 bpm   Blood Pressure 159/88 163/71   Jas Dyspnea Rating  very, very light (just noticeable) nothing at all   Oxygen Saturation 98 % 98 %   Supplemental Oxygen Room Air Room Air   Heart Rate 124 bpm 105 bpm   Blood Pressure 190/76 148/72   Jas Dyspnea Rating  light moderate   Recovery Time (seconds) 170 seconds 75 seconds   Oxygen Saturation 98 % 98 %   Supplemental Oxygen Room Air Room Air   Heart Rate 100 bpm 93 bpm       Imaging:  Results for orders placed during the hospital encounter of 03/31/23    X-Ray Chest PA And Lateral    Narrative  EXAMINATION:  XR CHEST PA AND LATERAL    CLINICAL HISTORY:  Disorder of bone, unspecified    TECHNIQUE:  PA and lateral views of the chest were performed.    COMPARISON:  10/19/2022    FINDINGS:  Scattered interstitial thickening, similar to the prior exam.  No lung consolidation.The cardiomediastinal contour is within normal limits.  No pneumothorax.  No pleural effusions.    Impression  No acute findings.      Electronically signed by: Vince Tavares MD  Date:    03/31/2023  Time:    13:38    Results for orders placed during the hospital encounter of 10/08/18    DXA Bone Density Spine And Hip    Narrative  EXAMINATION:  DEXA BONE DENSITY SPINE HIP    CLINICAL HISTORY:  Other specified personal risk factors, not elsewhere  classified    COMPARISON:  07/23/2015    FINDINGS:  The T score associated with the lumbar spine is -1.1 on the current examination.  It was -1.0 on the prior examination.  The T score associated with the left femoral neck is -1.5 on the current examination.  It was -1.2 on the prior examination.  The T score associated with the right femoral neck is -1.2 on the current examination.  It was -0.9 on the prior examination.    IMPRESSION:    Osteopenia      Electronically signed by: David Santillan MD  Date:    10/08/2018  Time:    09:31    No valid procedures specified.  No results found for this or any previous visit.    No results found for this or any previous visit.    Results for orders placed during the hospital encounter of 10/08/19    CT Abdomen Pelvis With Contrast    Narrative  EXAMINATION:  CT ABDOMEN PELVIS WITH CONTRAST    CLINICAL HISTORY:  LLQ pain, suspect diverticulitis;    TECHNIQUE:  Low dose axial images, sagittal and coronal reformations were obtained from the lung bases to the pubic symphysis following the IV administration of 75 mL of Omnipaque 350    FINDINGS:  The visualized portion of the base of the lungs is significant for bilateral basilar atelectatic versus fibrotic change.  The visualized portion of the heart, stomach, and spleen are unremarkable.  There is fatty atrophy of the pancreas.  The gallbladder is absent.  There is diffuse fatty infiltration of the liver.  The adrenal glands and right kidney have a normal appearance.  There is a 2-3 mm left-sided renal stone.  There is no hydronephrosis or hydroureter.  The bladder is distended but otherwise unremarkable.  The uterus is possibly absent or atrophic.  The bowel is significant for diverticulosis coli.  There is wall thickening of the sigmoid colon adjacent to multiple diverticula with surrounding inflammation consistent with diverticulitis.  There is no significant surrounding inflammatory change.  There is no evidence of  perforation or abscess formation.  The appendix has a normal appearance.  The visualized portion of the aorta is significant for moderate scattered plaque.  The osseous structures demonstrate degenerative change.    Impression  Findings consistent with acute diverticulitis of the sigmoid colon without perforation or abscess formation.    Left-sided renal stone.      Electronically signed by: Florencio Padilla MD  Date:    10/09/2019  Time:    09:08    No results found for this or any previous visit.    No results found for this or any previous visit.  IMAGING:     CT chest 6/17/23:  There are subpleural reticular opacities compatible with fibrotic changes/ILD, stable prior.  There is a small focal consolidation or atelectasis in the medial left upper lobe (series 3, image 226).     CT chest 5/1/21:  There are patchy bilateral peripheral bibasilar ground-glass opacities and peripheral reticulations.  Overall extent of findings appear improved from prior study of 03/04/2021.  No new large confluent airspace consolidation identified.  There is no significant volume of pleural fluid.     CT Chest 3/4/21:  There are bilateral symmetric peripheral and lower lobe predominant patchy ground-glass opacities, likely sequela of the patient's known history of COVID-19 pneumonia.  No pneumothorax or large pleural effusion.     CT of chest performed on 4/24/2018 without contrast revealed No parenchymal lung disease, perhaps mild bronchiectatic airways in the right middle lobe.       Cardiodiagnostics:  Results for orders placed during the hospital encounter of 07/20/23    Echo    Interpretation Summary  · The estimated ejection fraction is 60%.  · Normal right ventricular size with normal right ventricular systolic function.  · Normal left ventricular diastolic function.  · The left ventricle is normal in size with normal systolic function.  · Normal central venous pressure (3 mmHg).          Assessment:  1. ILD (interstitial lung  disease)    2. Seasonal allergic rhinitis, unspecified trigger    3. Gastroesophageal reflux disease, unspecified whether esophagitis present      Plan:     FVC/DLCO stable. Her PFTs have remained stable since 2018. Prior CT chest with subpleural reticular opacities, bronchiectasis, and patchy areas of GGOs, improved from previous. Does not have evidence of exertional hypoxemia. Autoimmune serologies negative.  Differential includes likely post COVID fibrosis following multiple COVID infections and prolonged hospitalization for hypoxemic RF vs occupational/environmental exposures.  Discussed that more definitive diagnosis would require bronchoscopy and biopsy but given her history and stability, would recommend to continue to monitor lung function over time.  She is agreeable.  Will defer further therapy for now and can consider further diagnostics and antifibrotics should she progress.    Continue PPI. Symptoms better controlled.     Continue astelin and flonase for allergic rhinitis.     RTC in 6 months or sooner if needed. Repeat PFTs at that time.  6MWT annually.  TTE annually.  CT chest every 2 years.        Yancy Maldonado D.O.  Pulmonary/Critical Care and Lung Transplantation  [facility]

## 2024-12-13 DIAGNOSIS — J84.9 ILD (INTERSTITIAL LUNG DISEASE): ICD-10-CM

## 2024-12-13 DIAGNOSIS — J84.9 INTERSTITIAL PULMONARY DISEASE, UNSPECIFIED: Primary | ICD-10-CM

## 2024-12-17 ENCOUNTER — TELEPHONE (OUTPATIENT)
Dept: TRANSPLANT | Facility: CLINIC | Age: 79
End: 2024-12-17
Payer: MEDICARE

## 2024-12-18 NOTE — TELEPHONE ENCOUNTER
Discussed patient with Dr. Maldonado during the ALD / Pre-Lung Transplant Patient Review Meeting today. Instructions received for a follow-up appointment in 6 months with CT of the chest, echo, PFTs, and a 6 minute walk test.

## 2024-12-30 DIAGNOSIS — E11.65 TYPE 2 DIABETES MELLITUS WITH HYPERGLYCEMIA, WITH LONG-TERM CURRENT USE OF INSULIN: ICD-10-CM

## 2024-12-30 DIAGNOSIS — Z79.4 TYPE 2 DIABETES MELLITUS WITH HYPERGLYCEMIA, WITH LONG-TERM CURRENT USE OF INSULIN: ICD-10-CM

## 2024-12-30 RX ORDER — INSULIN ASPART 100 [IU]/ML
INJECTION, SOLUTION INTRAVENOUS; SUBCUTANEOUS
Qty: 40 ML | Refills: 11 | Status: SHIPPED | OUTPATIENT
Start: 2024-12-30

## 2025-01-07 ENCOUNTER — PATIENT MESSAGE (OUTPATIENT)
Dept: ENDOCRINOLOGY | Facility: CLINIC | Age: 80
End: 2025-01-07
Payer: MEDICARE

## 2025-01-13 DIAGNOSIS — Z00.00 ENCOUNTER FOR MEDICARE ANNUAL WELLNESS EXAM: ICD-10-CM

## 2025-01-27 ENCOUNTER — TELEPHONE (OUTPATIENT)
Dept: ENDOCRINOLOGY | Facility: CLINIC | Age: 80
End: 2025-01-27
Payer: MEDICARE

## 2025-01-27 ENCOUNTER — HOSPITAL ENCOUNTER (EMERGENCY)
Facility: HOSPITAL | Age: 80
Discharge: HOME | End: 2025-01-27
Attending: SURGERY
Payer: MEDICARE

## 2025-01-27 VITALS
WEIGHT: 113.56 LBS | SYSTOLIC BLOOD PRESSURE: 145 MMHG | HEART RATE: 93 BPM | TEMPERATURE: 98 F | BODY MASS INDEX: 23.84 KG/M2 | HEIGHT: 58 IN | OXYGEN SATURATION: 99 % | RESPIRATION RATE: 18 BRPM | DIASTOLIC BLOOD PRESSURE: 66 MMHG

## 2025-01-27 DIAGNOSIS — K52.9 GASTROENTERITIS: Primary | ICD-10-CM

## 2025-01-27 LAB
ALBUMIN SERPL BCP-MCNC: 4.4 G/DL (ref 3.5–5.2)
ALP SERPL-CCNC: 75 U/L (ref 40–150)
ALT SERPL W/O P-5'-P-CCNC: 31 U/L (ref 10–44)
ANION GAP SERPL CALC-SCNC: 12 MMOL/L (ref 8–16)
AST SERPL-CCNC: 41 U/L (ref 10–40)
BASOPHILS # BLD AUTO: 0.02 K/UL (ref 0–0.2)
BASOPHILS NFR BLD: 0.3 % (ref 0–1.9)
BILIRUB SERPL-MCNC: 0.5 MG/DL (ref 0.1–1)
BILIRUB UR QL STRIP: NEGATIVE
BUN SERPL-MCNC: 10 MG/DL (ref 8–23)
CALCIUM SERPL-MCNC: 8.6 MG/DL (ref 8.7–10.5)
CHLORIDE SERPL-SCNC: 108 MMOL/L (ref 95–110)
CLARITY UR: CLEAR
CO2 SERPL-SCNC: 20 MMOL/L (ref 23–29)
COLOR UR: YELLOW
CREAT SERPL-MCNC: 0.7 MG/DL (ref 0.5–1.4)
DIFFERENTIAL METHOD BLD: NORMAL
EOSINOPHIL # BLD AUTO: 0 K/UL (ref 0–0.5)
EOSINOPHIL NFR BLD: 0.5 % (ref 0–8)
ERYTHROCYTE [DISTWIDTH] IN BLOOD BY AUTOMATED COUNT: 12.8 % (ref 11.5–14.5)
EST. GFR  (NO RACE VARIABLE): >60 ML/MIN/1.73 M^2
GLUCOSE SERPL-MCNC: 83 MG/DL (ref 70–110)
GLUCOSE UR QL STRIP: NEGATIVE
HCT VFR BLD AUTO: 42.2 % (ref 37–48.5)
HGB BLD-MCNC: 14 G/DL (ref 12–16)
HGB UR QL STRIP: NEGATIVE
IMM GRANULOCYTES # BLD AUTO: 0.03 K/UL (ref 0–0.04)
IMM GRANULOCYTES NFR BLD AUTO: 0.5 % (ref 0–0.5)
KETONES UR QL STRIP: NEGATIVE
LACTATE SERPL-SCNC: 1.2 MMOL/L (ref 0.5–2.2)
LEUKOCYTE ESTERASE UR QL STRIP: NEGATIVE
LIPASE SERPL-CCNC: 19 U/L (ref 4–60)
LYMPHOCYTES # BLD AUTO: 1.3 K/UL (ref 1–4.8)
LYMPHOCYTES NFR BLD: 21.4 % (ref 18–48)
MCH RBC QN AUTO: 28.7 PG (ref 27–31)
MCHC RBC AUTO-ENTMCNC: 33.2 G/DL (ref 32–36)
MCV RBC AUTO: 87 FL (ref 82–98)
MONOCYTES # BLD AUTO: 0.5 K/UL (ref 0.3–1)
MONOCYTES NFR BLD: 8.6 % (ref 4–15)
NEUTROPHILS # BLD AUTO: 4.2 K/UL (ref 1.8–7.7)
NEUTROPHILS NFR BLD: 68.7 % (ref 38–73)
NITRITE UR QL STRIP: NEGATIVE
NRBC BLD-RTO: 0 /100 WBC
PH UR STRIP: 7 [PH] (ref 5–8)
PLATELET # BLD AUTO: 203 K/UL (ref 150–450)
PMV BLD AUTO: 10.1 FL (ref 9.2–12.9)
POCT GLUCOSE: 85 MG/DL (ref 70–110)
POTASSIUM SERPL-SCNC: 4 MMOL/L (ref 3.5–5.1)
PROT SERPL-MCNC: 8.4 G/DL (ref 6–8.4)
PROT UR QL STRIP: ABNORMAL
RBC # BLD AUTO: 4.88 M/UL (ref 4–5.4)
SODIUM SERPL-SCNC: 140 MMOL/L (ref 136–145)
SP GR UR STRIP: 1.01 (ref 1–1.03)
URN SPEC COLLECT METH UR: ABNORMAL
UROBILINOGEN UR STRIP-ACNC: NEGATIVE EU/DL
WBC # BLD AUTO: 6.07 K/UL (ref 3.9–12.7)

## 2025-01-27 PROCEDURE — 81003 URINALYSIS AUTO W/O SCOPE: CPT | Mod: HCNC,NTX | Performed by: NURSE PRACTITIONER

## 2025-01-27 PROCEDURE — 82962 GLUCOSE BLOOD TEST: CPT | Mod: HCNC,NTX

## 2025-01-27 PROCEDURE — 99285 EMERGENCY DEPT VISIT HI MDM: CPT | Mod: 25,HCNC,NTX

## 2025-01-27 PROCEDURE — 85025 COMPLETE CBC W/AUTO DIFF WBC: CPT | Mod: HCNC,NTX | Performed by: NURSE PRACTITIONER

## 2025-01-27 PROCEDURE — 80053 COMPREHEN METABOLIC PANEL: CPT | Mod: HCNC,NTX | Performed by: NURSE PRACTITIONER

## 2025-01-27 PROCEDURE — 25500020 PHARM REV CODE 255: Mod: HCNC,NTX | Performed by: SURGERY

## 2025-01-27 PROCEDURE — 83690 ASSAY OF LIPASE: CPT | Mod: HCNC,NTX | Performed by: NURSE PRACTITIONER

## 2025-01-27 PROCEDURE — 83605 ASSAY OF LACTIC ACID: CPT | Mod: HCNC,NTX | Performed by: NURSE PRACTITIONER

## 2025-01-27 PROCEDURE — 87040 BLOOD CULTURE FOR BACTERIA: CPT | Mod: HCNC,NTX | Performed by: NURSE PRACTITIONER

## 2025-01-27 RX ORDER — LOPERAMIDE HYDROCHLORIDE 2 MG/1
2 CAPSULE ORAL 4 TIMES DAILY PRN
Qty: 12 CAPSULE | Refills: 0 | Status: SHIPPED | OUTPATIENT
Start: 2025-01-27 | End: 2025-02-06

## 2025-01-27 RX ORDER — DICYCLOMINE HYDROCHLORIDE 20 MG/1
20 TABLET ORAL 2 TIMES DAILY
Qty: 20 TABLET | Refills: 0 | Status: SHIPPED | OUTPATIENT
Start: 2025-01-27 | End: 2025-02-26

## 2025-01-27 RX ADMIN — IOHEXOL 75 ML: 350 INJECTION, SOLUTION INTRAVENOUS at 02:01

## 2025-01-27 NOTE — ED PROVIDER NOTES
Encounter Date: 1/27/2025       History     Chief Complaint   Patient presents with    Abdominal Pain     Pt with right sided abd pain and diarrhea x 2 weeks. States symptoms are getting worse, c/o nausea, with decrease in appetite.      Chief complaint:  Diarrhea, abdominal pain   79-year-old female with a history of arthritis asthma diabetes GERD presents to be evaluated for diarrhea that she has had for the last 2 weeks.  She denies any blood or mucus in her stool.  She reports generalized lower abdominal pain greater on the right.  Currently states she has 7/10 deep aching pain.  Denies any nausea.  Does report diminished appetite.  Denies any fever body aches or chills.  She has been taking Tylenol. No recent antibiotic usage      Review of patient's allergies indicates:   Allergen Reactions    Actos [pioglitazone] Nausea Only    Darvocet a500 [propoxyphene n-acetaminophen] Nausea And Vomiting    Dilaudid [hydromorphone (bulk)] Nausea And Vomiting     Past Medical History:   Diagnosis Date    Allergy     Anxiety     Arthritis     osteo    Asthma     Diabetic retinopathy     GERD (gastroesophageal reflux disease)     High cholesterol     Hypertension     Kidney stone     Type 2 diabetes mellitus, uncontrolled, with neuropathy     Urinary tract infection     Vaginal infection      Past Surgical History:   Procedure Laterality Date    CHOLECYSTECTOMY      COLONOSCOPY N/A 6/8/2021    Procedure: COLONOSCOPY;  Surgeon: Rain Pop MD;  Location: [facility];  Service: Endoscopy;  Laterality: N/A;    COLONOSCOPY N/A 7/8/2021    Procedure: colonoscopy with single balloon scope;  Surgeon: Steffen Dueñas MD;  Location: [facility];  Service: Endoscopy;  Laterality: N/A;    ESOPHAGOGASTRODUODENOSCOPY N/A 6/8/2021    Procedure: EGD (ESOPHAGOGASTRODUODENOSCOPY);  Surgeon: Rain Pop MD;  Location: [facility];  Service: Endoscopy;  Laterality: N/A;    EYE SURGERY      HYSTERECTOMY      INCISIONAL HERNIA REPAIR   2003    ROTATOR CUFF REPAIR Right 11/07/2017    TRIGGER FINGER RELEASE Right 6/3/2022    Procedure: RELEASE, TRIGGER FINGER; right index, middle, ring;  Surgeon: Sarmad Wesley Jr., MD;  Location: [facility] OR;  Service: Orthopedics;  Laterality: Right;     Family History   Problem Relation Name Age of Onset    Stroke Mother      Diabetes Sister      Diabetes Brother      Kidney disease Neg Hx       Social History     Tobacco Use    Smoking status: Former     Passive exposure: Never    Smokeless tobacco: Never   Substance Use Topics    Alcohol use: Not Currently     Comment: social    Drug use: No     Review of Systems   Constitutional:  Positive for appetite change. Negative for fatigue and fever.   Respiratory:  Negative for shortness of breath.    Cardiovascular:  Negative for chest pain.   Gastrointestinal:  Positive for abdominal pain and diarrhea. Negative for nausea and vomiting.   Neurological:  Positive for weakness.       Physical Exam     Initial Vitals [01/27/25 1244]   BP Pulse Resp Temp SpO2   (!) 191/90 97 20 98.5 °F (36.9 °C) 96 %      MAP       --         Physical Exam    Nursing note and vitals reviewed.  Constitutional: She appears well-developed and well-nourished.   HENT:   Head: Normocephalic and atraumatic.   Eyes: EOM are normal. Pupils are equal, round, and reactive to light.   Cardiovascular:  Normal rate and regular rhythm.           Pulmonary/Chest: Breath sounds normal. She has no wheezes. She has no rhonchi. She has no rales. She exhibits no tenderness.   Abdominal: Abdomen is soft. She exhibits no distension and no mass. There is abdominal tenderness. There is no rebound and no guarding.     Neurological: She is oriented to person, place, and time.   Skin: Skin is warm.   Psychiatric: She has a normal mood and affect. Thought content normal.         ED Course   Procedures  Labs Reviewed   COMPREHENSIVE METABOLIC PANEL - Abnormal       Result Value    Sodium 140      Potassium 4.0       Chloride 108      CO2 20 (*)     Glucose 83      BUN 10      Creatinine 0.7      Calcium 8.6 (*)     Total Protein 8.4      Albumin 4.4      Total Bilirubin 0.5      Alkaline Phosphatase 75      AST 41 (*)     ALT 31      eGFR >60      Anion Gap 12     URINALYSIS, REFLEX TO URINE CULTURE - Abnormal    Specimen UA Urine, Clean Catch      Color, UA Yellow      Appearance, UA Clear      pH, UA 7.0      Specific Gravity, UA 1.010      Protein, UA Trace (*)     Glucose, UA Negative      Ketones, UA Negative      Bilirubin (UA) Negative      Occult Blood UA Negative      Nitrite, UA Negative      Urobilinogen, UA Negative      Leukocytes, UA Negative      Narrative:     Specimen Source->Urine   CULTURE, BLOOD   CULTURE, BLOOD   CBC W/ AUTO DIFFERENTIAL    WBC 6.07      RBC 4.88      Hemoglobin 14.0      Hematocrit 42.2      MCV 87      MCH 28.7      MCHC 33.2      RDW 12.8      Platelets 203      MPV 10.1      Immature Granulocytes 0.5      Gran # (ANC) 4.2      Immature Grans (Abs) 0.03      Lymph # 1.3      Mono # 0.5      Eos # 0.0      Baso # 0.02      nRBC 0      Gran % 68.7      Lymph % 21.4      Mono % 8.6      Eosinophil % 0.5      Basophil % 0.3      Differential Method Automated     LACTIC ACID, PLASMA    Lactate (Lactic Acid) 1.2     LIPASE    Lipase 19     POCT GLUCOSE    POCT Glucose 85            Imaging Results              CT Abdomen Pelvis With IV Contrast NO Oral Contrast (Final result)  Result time 01/27/25 14:45:01      Final result by Nadine Harper MD (01/27/25 14:45:01)                   Impression:      Normal appendix.    Diverticulosis without diverticulitis.    Cholecystectomy.    Bilateral nonobstructing nephrolithiasis.      Electronically signed by: Nadine Harper MD  Date:    01/27/2025  Time:    14:45               Narrative:    EXAMINATION:  CT ABDOMEN PELVIS WITH IV CONTRAST    CLINICAL HISTORY:  RLQ abdominal pain (Age >= 14y);    TECHNIQUE:  Low dose axial images, sagittal and  coronal reformations were obtained from the lung bases to the pubic symphysis following the IV administration of 75 mL of Omnipaque 350 .  Oral contrast was not given.    COMPARISON:  06/09/2021    FINDINGS:  Reticulonodular densities in the lung bases, indeterminate and possibly related to an acute inflammatory/infectious process.  The heart is enlarged.  Calcified coronary artery disease.  Calcified atheromatous disease affects the aorta and its branch vessels.    The gallbladder has been removed.  No intrahepatic or extrahepatic biliary ductal dilatation is seen.  The liver and spleen have a normal appearance.  Focal hypodensity in the main pancreatic body measuring 1.2 cm, indeterminate.  The adrenal glands appear normal.  Both kidneys are normal in size.  There is some cortical scarring along the lateral margin of the left kidney.  Punctate nonobstructing nephrolithiasis bilaterally.  No obstructive uropathy is seen.  No hydronephrosis or hydroureter.  The urinary bladder is well distended and appears normal.  Adnexal regions are unremarkable.    There are scattered colonic diverticula without evidence for diverticulitis.  The appendix is normal.  No free air, free fluid or obstruction.  No pathologically enlarged abdominal or pelvic nodes are seen.    Age-appropriate degenerative changes affect skeleton.                                       Medications   iohexoL (OMNIPAQUE 350) injection 75 mL (75 mLs Intravenous Given 1/27/25 1429)     Medical Decision Making  79-year-old female presents to be evaluated for 2 weeks of abdominal pain and diarrhea no blood no mucus no fever   Differential diagnoses include infectious diarrhea, gastroenteritis, diverticulitis appendicitis    Amount and/or Complexity of Data Reviewed  Labs: ordered.  Radiology: ordered.    Risk  Prescription drug management.  Risk Details: Patient with very mild right lower quad tenderness   No leukocytosis   No gross electrolyte derangement    Normal UA   CT of the abdomen and pelvis unremarkable for acute process   Will treat for diarrhea with supportive care   Stable for DC at this time                                      Clinical Impression:  Final diagnoses:  [K52.9] Gastroenteritis (Primary)          ED Disposition Condition    Discharge Stable          ED Prescriptions       Medication Sig Dispense Start Date End Date Auth. Provider    loperamide (IMODIUM) 2 mg capsule Take 1 capsule (2 mg total) by mouth 4 (four) times daily as needed for Diarrhea. 12 capsule 1/27/2025 2/6/2025 Marina Musa, NP    dicyclomine (BENTYL) 20 mg tablet Take 1 tablet (20 mg total) by mouth 2 (two) times daily. 20 tablet 1/27/2025 2/26/2025 Marina Musa NP          Follow-up Information    None          Marina Musa NP  01/27/25 7999

## 2025-01-27 NOTE — TELEPHONE ENCOUNTER
Patient came in to get sensors, she stated that she only received 3 pods for her ominpod from the pharmacy, she also wants to see the status of her dexcom.

## 2025-01-29 ENCOUNTER — PATIENT OUTREACH (OUTPATIENT)
Facility: OTHER | Age: 80
End: 2025-01-29
Payer: MEDICARE

## 2025-01-30 NOTE — PROGRESS NOTES
Pt visited the ED on 1/27/25. I made 2 attempts to reach patient to assist with scheduling a post ED 7-day follow up with PCP. Unable to reach.  Closing Encounter.    Martha Funes

## 2025-02-01 LAB
BACTERIA BLD CULT: NORMAL
BACTERIA BLD CULT: NORMAL

## 2025-02-07 ENCOUNTER — TELEPHONE (OUTPATIENT)
Dept: ENDOCRINOLOGY | Facility: CLINIC | Age: 80
End: 2025-02-07
Payer: MEDICARE

## 2025-02-07 NOTE — TELEPHONE ENCOUNTER
----- Message from Yesenia sent at 2025 11:41 AM CST -----  Contact: [facility]  Juliette Seo  MRN: [id number]  : 1945  PCP: Suman Rubi  Home Phone      [phone]  Work Phone      [phone]  Mobile          [phone]      MESSAGE: The pharmacy states that they received a transfer prescription for the patients test strips due to her pharmacy being closed.  They are needing a dx code.        Phone: [phone]

## 2025-03-12 DIAGNOSIS — Z79.4 TYPE 2 DIABETES MELLITUS WITH HYPERGLYCEMIA, WITH LONG-TERM CURRENT USE OF INSULIN: ICD-10-CM

## 2025-03-12 DIAGNOSIS — K21.9 GASTROESOPHAGEAL REFLUX DISEASE, UNSPECIFIED WHETHER ESOPHAGITIS PRESENT: ICD-10-CM

## 2025-03-12 DIAGNOSIS — E11.65 TYPE 2 DIABETES MELLITUS WITH HYPERGLYCEMIA, WITH LONG-TERM CURRENT USE OF INSULIN: ICD-10-CM

## 2025-03-13 RX ORDER — PANTOPRAZOLE SODIUM 40 MG/1
40 TABLET, DELAYED RELEASE ORAL DAILY
Qty: 30 TABLET | Refills: 11 | Status: SHIPPED | OUTPATIENT
Start: 2025-03-13 | End: 2026-03-13

## 2025-03-13 RX ORDER — SEMAGLUTIDE 0.68 MG/ML
0.5 INJECTION, SOLUTION SUBCUTANEOUS
Qty: 3 ML | Refills: 11 | Status: SHIPPED | OUTPATIENT
Start: 2025-03-13 | End: 2026-03-13

## 2025-03-13 RX ORDER — MONTELUKAST SODIUM 10 MG/1
10 TABLET ORAL NIGHTLY
Qty: 90 TABLET | Refills: 1 | Status: SHIPPED | OUTPATIENT
Start: 2025-03-13

## 2025-03-20 DIAGNOSIS — E11.65 TYPE 2 DIABETES MELLITUS WITH HYPERGLYCEMIA, WITH LONG-TERM CURRENT USE OF INSULIN: ICD-10-CM

## 2025-03-20 DIAGNOSIS — Z79.4 TYPE 2 DIABETES MELLITUS WITH HYPERGLYCEMIA, WITH LONG-TERM CURRENT USE OF INSULIN: ICD-10-CM

## 2025-03-20 RX ORDER — INSULIN PMP CART,AUT,G6/7,CNTR
1 EACH SUBCUTANEOUS
Qty: 10 EACH | Refills: 11 | Status: SHIPPED | OUTPATIENT
Start: 2025-03-20

## 2025-03-28 ENCOUNTER — TELEPHONE (OUTPATIENT)
Dept: TRANSPLANT | Facility: CLINIC | Age: 80
End: 2025-03-28
Payer: MEDICARE

## 2025-03-31 ENCOUNTER — PATIENT MESSAGE (OUTPATIENT)
Dept: ADMINISTRATIVE | Facility: HOSPITAL | Age: 80
End: 2025-03-31
Payer: MEDICARE

## 2025-04-07 ENCOUNTER — PATIENT MESSAGE (OUTPATIENT)
Dept: ENDOCRINOLOGY | Facility: CLINIC | Age: 80
End: 2025-04-07
Payer: MEDICARE

## 2025-04-07 DIAGNOSIS — E11.65 TYPE 2 DIABETES MELLITUS WITH HYPERGLYCEMIA, WITH LONG-TERM CURRENT USE OF INSULIN: ICD-10-CM

## 2025-04-07 DIAGNOSIS — Z79.4 TYPE 2 DIABETES MELLITUS WITH HYPERGLYCEMIA, WITH LONG-TERM CURRENT USE OF INSULIN: ICD-10-CM

## 2025-04-08 ENCOUNTER — CLINICAL SUPPORT (OUTPATIENT)
Dept: FAMILY MEDICINE | Facility: CLINIC | Age: 80
End: 2025-04-08
Payer: MEDICARE

## 2025-04-08 ENCOUNTER — OFFICE VISIT (OUTPATIENT)
Dept: FAMILY MEDICINE | Facility: CLINIC | Age: 80
End: 2025-04-08
Payer: MEDICARE

## 2025-04-08 VITALS
RESPIRATION RATE: 17 BRPM | DIASTOLIC BLOOD PRESSURE: 84 MMHG | OXYGEN SATURATION: 98 % | BODY MASS INDEX: 24.82 KG/M2 | WEIGHT: 118.25 LBS | HEART RATE: 115 BPM | SYSTOLIC BLOOD PRESSURE: 164 MMHG | HEIGHT: 58 IN

## 2025-04-08 DIAGNOSIS — Z79.4 TYPE 2 DIABETES MELLITUS WITH HYPERGLYCEMIA, WITH LONG-TERM CURRENT USE OF INSULIN: Primary | ICD-10-CM

## 2025-04-08 DIAGNOSIS — N39.0 URINARY TRACT INFECTION WITHOUT HEMATURIA, SITE UNSPECIFIED: Primary | ICD-10-CM

## 2025-04-08 DIAGNOSIS — I10 UNCONTROLLED HYPERTENSION: ICD-10-CM

## 2025-04-08 DIAGNOSIS — N39.0 URINARY TRACT INFECTION WITHOUT HEMATURIA, SITE UNSPECIFIED: ICD-10-CM

## 2025-04-08 DIAGNOSIS — E11.65 TYPE 2 DIABETES MELLITUS WITH HYPERGLYCEMIA, WITH LONG-TERM CURRENT USE OF INSULIN: Primary | ICD-10-CM

## 2025-04-08 DIAGNOSIS — J84.9 ILD (INTERSTITIAL LUNG DISEASE): ICD-10-CM

## 2025-04-08 DIAGNOSIS — R10.9 ABDOMINAL PAIN, UNSPECIFIED ABDOMINAL LOCATION: ICD-10-CM

## 2025-04-08 LAB
BACTERIA SPEC CULT: NORMAL /HPF
BILIRUB SERPL-MCNC: NEGATIVE MG/DL
BLOOD URINE, POC: NORMAL
CASTS: NORMAL
COLOR, POC UA: NORMAL
CRYSTALS: NORMAL
GLUCOSE UR QL STRIP: NEGATIVE
KETONES UR QL STRIP: NEGATIVE
LEUKOCYTE ESTERASE URINE, POC: NORMAL
NITRITE, POC UA: NEGATIVE
PH, POC UA: 6
PROTEIN, POC: NEGATIVE
RBC CELLS COUNTED: NORMAL
SPECIFIC GRAVITY, POC UA: <=1.005
UROBILINOGEN, POC UA: 0.2
WHITE BLOOD CELLS: NORMAL

## 2025-04-08 PROCEDURE — 99999 PR PBB SHADOW E&M-EST. PATIENT-LVL IV: CPT | Mod: PBBFAC,HCNC,, | Performed by: FAMILY MEDICINE

## 2025-04-08 PROCEDURE — 87086 URINE CULTURE/COLONY COUNT: CPT | Mod: HCNC

## 2025-04-08 RX ORDER — VALSARTAN AND HYDROCHLOROTHIAZIDE 160; 12.5 MG/1; MG/1
1 TABLET, FILM COATED ORAL DAILY
Qty: 30 TABLET | Refills: 1 | Status: SHIPPED | OUTPATIENT
Start: 2025-04-08 | End: 2026-04-08

## 2025-04-08 RX ORDER — CIPROFLOXACIN 500 MG/1
500 TABLET ORAL 2 TIMES DAILY
Qty: 10 TABLET | Refills: 0 | Status: SHIPPED | OUTPATIENT
Start: 2025-04-08 | End: 2025-04-13

## 2025-04-08 RX ORDER — INSULIN ASPART 100 [IU]/ML
INJECTION, SOLUTION INTRAVENOUS; SUBCUTANEOUS
Qty: 40 ML | Refills: 11 | Status: SHIPPED | OUTPATIENT
Start: 2025-04-08

## 2025-04-08 NOTE — PROGRESS NOTES
Subjective:       Patient ID: Juliette Seo is a 79 y.o. female.    Chief Complaint: Abdominal Pain (Pt here for stomach pain, states its sensitive to touch. Pt granddaughter states pt eyes has been very yellow, painful and draining. )    Pt is a 79 y.o. female who presents for evaluation and management of   Encounter Diagnoses   Name Primary?    Type 2 diabetes mellitus with hyperglycemia, with long-term current use of insulin Yes    Uncontrolled hypertension     Abdominal pain, unspecified abdominal location     ILD (interstitial lung disease)     Urinary tract infection without hematuria, site unspecified    2 weeks   .random but seems worse after meals   Poor historian. No quality of pain illicited.   Points to epigastrium and down to navel   No GB   H/o abd hernia surgery per GD     Doing well on current meds. Denies any side effects. Prevention is up to date.    Review of Systems   Constitutional:  Negative for fever.   Gastrointestinal:  Positive for abdominal pain, diarrhea, nausea and vomiting. Negative for blood in stool and constipation.   Genitourinary:  Negative for dysuria, frequency and hematuria.   Musculoskeletal:  Positive for arthralgias. Negative for myalgias.   Neurological:  Negative for headaches.       Objective:      Physical Exam  Constitutional:       Appearance: She is well-developed.   HENT:      Head: Normocephalic and atraumatic.      Right Ear: External ear normal.      Left Ear: External ear normal.      Nose: Nose normal.   Eyes:      Pupils: Pupils are equal, round, and reactive to light.   Neck:      Thyroid: No thyromegaly.      Vascular: No JVD.      Trachea: No tracheal deviation.   Cardiovascular:      Rate and Rhythm: Normal rate.      Heart sounds: Normal heart sounds. No murmur heard.  Pulmonary:      Effort: Pulmonary effort is normal. No respiratory distress.      Breath sounds: Normal breath sounds. No wheezing or rales.   Chest:      Chest wall: No tenderness.    Abdominal:      General: Bowel sounds are normal. There is no distension.      Palpations: Abdomen is soft. There is no mass.      Tenderness: There is abdominal tenderness. There is no guarding or rebound.      Comments: Vague diffuse TTP      Musculoskeletal:         General: No tenderness. Normal range of motion.      Cervical back: Normal range of motion and neck supple.   Lymphadenopathy:      Cervical: No cervical adenopathy.   Skin:     General: Skin is warm and dry.      Coloration: Skin is not pale.      Findings: No erythema or rash.   Neurological:      Mental Status: She is alert and oriented to person, place, and time.      Cranial Nerves: No cranial nerve deficit.      Motor: No abnormal muscle tone.      Coordination: Coordination normal.      Deep Tendon Reflexes: Reflexes are normal and symmetric. Reflexes normal.   Psychiatric:         Behavior: Behavior normal.         Thought Content: Thought content normal.         Judgment: Judgment normal.         Assessment:       1. Type 2 diabetes mellitus with hyperglycemia, with long-term current use of insulin    2. Uncontrolled hypertension    3. Abdominal pain, unspecified abdominal location    4. ILD (interstitial lung disease)    5. Urinary tract infection without hematuria, site unspecified        Plan:   1. Type 2 diabetes mellitus with hyperglycemia, with long-term current use of insulin  Overview:  Lab Results   Component Value Date    HGBA1C 12.1 (H) 06/12/2024   On omnipod now   Dexcom CGM   Ozempic---stopped on her own. Not sure why.   metformin---500mg   Sees Dr. Pedraza           2. Uncontrolled hypertension  Overview:  Used to be on  Losartan HCTZ 100/12.5   She has not seen me in over a year. IS now on valsartan 80. Not controlled             Orders:  -     valsartan-hydrochlorothiazide (DIOVAN-HCT) 160-12.5 mg per tablet; Take 1 tablet by mouth once daily.  Dispense: 30 tablet; Refill: 1    3. Abdominal pain, unspecified abdominal  location  -     POCT urinalysis, dipstick or tablet reag; Future; Expected date: 04/08/2025  -     POCT URINE SEDIMENT EXAM; Future; Expected date: 04/08/2025  -     ciprofloxacin HCl (CIPRO) 500 MG tablet; Take 1 tablet (500 mg total) by mouth 2 (two) times daily. for 5 days  Dispense: 10 tablet; Refill: 0    4. ILD (interstitial lung disease)  Overview:  She follows with [facility]   Will occasionally receive courses of steroids. Told her she needs to notify us when this happens so we can put her on SSI         5. Urinary tract infection without hematuria, site unspecified  -     ciprofloxacin HCl (CIPRO) 500 MG tablet; Take 1 tablet (500 mg total) by mouth 2 (two) times daily. for 5 days  Dispense: 10 tablet; Refill: 0      No follow-ups on file.      Answers submitted by the patient for this visit:  Abdominal Pain Questionnaire (Submitted on 4/8/2025)  Chief Complaint: Abdominal pain  Chronicity: recurrent  Onset: 1 to 4 weeks ago  Onset quality: gradual  Frequency: constantly  Progression since onset: waxing and waning  Pain location: generalized abdominal region  Pain - numeric: 8/10  Pain quality: burning, cramping, sharp  anorexia: No  belching: No  flatus: Yes  hematochezia: No  melena: No  weight loss: No  Aggravated by: eating  Relieved by: nothing  Pain severity: moderate  Treatments tried: acetaminophen, antacids  Improvement on treatment: mild  abdominal surgery: Yes  colon cancer: No  Crohn's disease: No  gallstones: Yes  GERD: Yes  irritable bowel syndrome: No  kidney stones: No  pancreatitis: No  PUD: No  ulcerative colitis: No  UTI: Yes      RTC 2 weeks to recheck BP (changed diovann 80 to above) and recheck abd pain. If no improvemetn with Rx UTI, will stop her metformin

## 2025-04-11 LAB — BACTERIA UR CULT: NORMAL

## 2025-04-22 ENCOUNTER — OFFICE VISIT (OUTPATIENT)
Dept: FAMILY MEDICINE | Facility: CLINIC | Age: 80
End: 2025-04-22
Payer: MEDICARE

## 2025-04-22 VITALS
WEIGHT: 119.25 LBS | BODY MASS INDEX: 25.03 KG/M2 | HEIGHT: 58 IN | DIASTOLIC BLOOD PRESSURE: 66 MMHG | RESPIRATION RATE: 14 BRPM | OXYGEN SATURATION: 100 % | SYSTOLIC BLOOD PRESSURE: 130 MMHG | HEART RATE: 89 BPM

## 2025-04-22 DIAGNOSIS — R10.84 GENERALIZED ABDOMINAL PAIN: Primary | ICD-10-CM

## 2025-04-22 PROCEDURE — 3075F SYST BP GE 130 - 139MM HG: CPT | Mod: HCNC,CPTII,S$GLB, | Performed by: FAMILY MEDICINE

## 2025-04-22 PROCEDURE — 99214 OFFICE O/P EST MOD 30 MIN: CPT | Mod: HCNC,S$GLB,, | Performed by: FAMILY MEDICINE

## 2025-04-22 PROCEDURE — 1101F PT FALLS ASSESS-DOCD LE1/YR: CPT | Mod: HCNC,CPTII,S$GLB, | Performed by: FAMILY MEDICINE

## 2025-04-22 PROCEDURE — G2211 COMPLEX E/M VISIT ADD ON: HCPCS | Mod: HCNC,S$GLB,, | Performed by: FAMILY MEDICINE

## 2025-04-22 PROCEDURE — 3288F FALL RISK ASSESSMENT DOCD: CPT | Mod: HCNC,CPTII,S$GLB, | Performed by: FAMILY MEDICINE

## 2025-04-22 PROCEDURE — 1125F AMNT PAIN NOTED PAIN PRSNT: CPT | Mod: HCNC,CPTII,S$GLB, | Performed by: FAMILY MEDICINE

## 2025-04-22 PROCEDURE — 99999 PR PBB SHADOW E&M-EST. PATIENT-LVL III: CPT | Mod: PBBFAC,HCNC,, | Performed by: FAMILY MEDICINE

## 2025-04-22 PROCEDURE — 3078F DIAST BP <80 MM HG: CPT | Mod: HCNC,CPTII,S$GLB, | Performed by: FAMILY MEDICINE

## 2025-04-22 RX ORDER — PANTOPRAZOLE SODIUM 40 MG/1
40 TABLET, DELAYED RELEASE ORAL DAILY
Qty: 30 TABLET | Refills: 5 | Status: SHIPPED | OUTPATIENT
Start: 2025-04-22 | End: 2025-05-22

## 2025-04-22 NOTE — PROGRESS NOTES
"Subjective:       Patient ID: Juliette Seo is a 79 y.o. female.    Chief Complaint: Follow-up (Patient is here today for a follow up visit. Patient denied  services for today's visit. )    Pt is a 79 y.o. female who presents for evaluation and management of   Encounter Diagnosis   Name Primary?    Generalized abdominal pain Yes     Abd pain off an on for about a month now   Difficult to get a history but pain seems to be slightly worse after meals.   Points to her epigastrium and RLQ   She has had a hernia repair, cholecystectomy, and hysterectomy in the past.   She went to the ED on 4/16 for the pain. Had a complete work up and was sent home on cipro for a "UTI"     Doing well on current meds. Denies any side effects. Prevention is up to date.  Review of Systems   Constitutional:  Negative for chills and fever.   Respiratory:  Negative for shortness of breath.    Cardiovascular:  Negative for chest pain and palpitations.   Gastrointestinal:  Positive for abdominal pain. Negative for blood in stool, constipation, diarrhea, nausea and vomiting.   Genitourinary:  Negative for difficulty urinating.   Psychiatric/Behavioral:  Negative for dysphoric mood, sleep disturbance and suicidal ideas. The patient is not nervous/anxious.        Objective:      Physical Exam  Constitutional:       Appearance: She is well-developed.   HENT:      Head: Normocephalic and atraumatic.      Right Ear: External ear normal.      Left Ear: External ear normal.      Nose: Nose normal.   Eyes:      Pupils: Pupils are equal, round, and reactive to light.   Neck:      Thyroid: No thyromegaly.      Vascular: No JVD.      Trachea: No tracheal deviation.   Cardiovascular:      Rate and Rhythm: Normal rate.      Heart sounds: Normal heart sounds. No murmur heard.  Pulmonary:      Effort: Pulmonary effort is normal. No respiratory distress.      Breath sounds: Normal breath sounds. No wheezing or rales.   Chest:      Chest wall: No " tenderness.   Abdominal:      General: Bowel sounds are normal. There is no distension.      Palpations: Abdomen is soft. There is no mass.      Tenderness: There is abdominal tenderness. There is no guarding or rebound.      Comments: Generalized TTP    Musculoskeletal:         General: No tenderness. Normal range of motion.      Cervical back: Normal range of motion and neck supple.   Lymphadenopathy:      Cervical: No cervical adenopathy.   Skin:     General: Skin is warm and dry.      Coloration: Skin is not pale.      Findings: No erythema or rash.   Neurological:      Mental Status: She is alert and oriented to person, place, and time.      Cranial Nerves: No cranial nerve deficit.      Motor: No abnormal muscle tone.      Coordination: Coordination normal.      Deep Tendon Reflexes: Reflexes are normal and symmetric. Reflexes normal.   Psychiatric:         Behavior: Behavior normal.         Thought Content: Thought content normal.         Judgment: Judgment normal.         Assessment:       1. Generalized abdominal pain        Plan:   1. Generalized abdominal pain  -     Ambulatory referral/consult to Gastroenterology; Future; Expected date: 04/29/2025  -     pantoprazole (PROTONIX) 40 MG tablet; Take 1 tablet (40 mg total) by mouth once daily.  Dispense: 30 tablet; Refill: 5    CT abd pelvis and recent labs reviewed.   She does not have a UTI.   No blood in her U/A which essentially rules out a renal stone as well as her CT which does not show any obstructive urinary stones.   Referring to GI for good work up. She will need to be scoped   D/c aleve and all NSAIDs     No follow-ups on file.

## 2025-04-27 ENCOUNTER — HOSPITAL ENCOUNTER (INPATIENT)
Facility: HOSPITAL | Age: 80
LOS: 2 days | Discharge: HOME | DRG: 394 | End: 2025-04-30
Attending: EMERGENCY MEDICINE | Admitting: STUDENT IN AN ORGANIZED HEALTH CARE EDUCATION/TRAINING PROGRAM
Payer: MEDICARE

## 2025-04-27 DIAGNOSIS — Z79.4 TYPE 2 DIABETES MELLITUS WITH HYPERGLYCEMIA, WITH LONG-TERM CURRENT USE OF INSULIN: ICD-10-CM

## 2025-04-27 DIAGNOSIS — R07.9 CHEST PAIN: ICD-10-CM

## 2025-04-27 DIAGNOSIS — K86.2 PANCREATIC CYST: ICD-10-CM

## 2025-04-27 DIAGNOSIS — E11.65 TYPE 2 DIABETES MELLITUS WITH HYPERGLYCEMIA, WITH LONG-TERM CURRENT USE OF INSULIN: ICD-10-CM

## 2025-04-27 DIAGNOSIS — E87.6 HYPOKALEMIA: ICD-10-CM

## 2025-04-27 DIAGNOSIS — N12 PYELONEPHRITIS: ICD-10-CM

## 2025-04-27 DIAGNOSIS — R11.0 NAUSEA: ICD-10-CM

## 2025-04-27 DIAGNOSIS — R10.9 ABDOMINAL PAIN: Primary | ICD-10-CM

## 2025-04-27 LAB
ABSOLUTE EOSINOPHIL (OHS): 0.1 K/UL
ABSOLUTE MONOCYTE (OHS): 0.59 K/UL (ref 0.3–1)
ABSOLUTE NEUTROPHIL COUNT (OHS): 5.4 K/UL (ref 1.8–7.7)
ALBUMIN SERPL BCP-MCNC: 3 G/DL (ref 3.5–5.2)
ALP SERPL-CCNC: 63 UNIT/L (ref 40–150)
ALT SERPL W/O P-5'-P-CCNC: 15 UNIT/L (ref 10–44)
ANION GAP (OHS): 13 MMOL/L (ref 8–16)
AST SERPL-CCNC: 15 UNIT/L (ref 11–45)
BACTERIA #/AREA URNS AUTO: ABNORMAL /HPF
BASOPHILS # BLD AUTO: 0.04 K/UL
BASOPHILS NFR BLD AUTO: 0.5 %
BILIRUB SERPL-MCNC: 0.2 MG/DL (ref 0.1–1)
BILIRUB UR QL STRIP.AUTO: NEGATIVE
BIPAP: 0
BUN SERPL-MCNC: 15 MG/DL (ref 8–23)
BUN SERPL-MCNC: 19 MG/DL (ref 6–30)
CALCIUM SERPL-MCNC: 7.3 MG/DL (ref 8.7–10.5)
CHLORIDE SERPL-SCNC: 103 MMOL/L (ref 95–110)
CHLORIDE SERPL-SCNC: 108 MMOL/L (ref 95–110)
CLARITY UR: CLEAR
CO2 SERPL-SCNC: 16 MMOL/L (ref 23–29)
COLOR UR AUTO: COLORLESS
CORRECTED TEMPERATURE (PCO2): 41.8 MMHG
CORRECTED TEMPERATURE (PH): 7.4
CORRECTED TEMPERATURE (PO2): 54.8 MMHG
CREAT SERPL-MCNC: 0.6 MG/DL (ref 0.5–1.4)
CREAT SERPL-MCNC: 0.7 MG/DL (ref 0.5–1.4)
CRP SERPL-MCNC: 2.9 MG/L
EAG (OHS): 160 MG/DL (ref 68–131)
ERYTHROCYTE [DISTWIDTH] IN BLOOD BY AUTOMATED COUNT: 13.1 % (ref 11.5–14.5)
FIO2: 21 %
GFR SERPLBLD CREATININE-BSD FMLA CKD-EPI: >60 ML/MIN/1.73/M2
GLUCOSE SERPL-MCNC: 208 MG/DL (ref 70–110)
GLUCOSE SERPL-MCNC: 266 MG/DL (ref 70–110)
GLUCOSE UR QL STRIP: ABNORMAL
HBA1C MFR BLD: 7.2 % (ref 4–5.6)
HCT VFR BLD AUTO: 40.1 % (ref 37–48.5)
HCT VFR BLD CALC: 40 %PCV (ref 36–54)
HCT VFR BLD CALC: 41.1 %
HCV AB SERPL QL IA: NORMAL
HGB BLD-MCNC: 13.4 GM/DL (ref 12–16)
HGB UR QL STRIP: NEGATIVE
HIV 1+2 AB+HIV1 P24 AG SERPL QL IA: NORMAL
HOLD SPECIMEN: NORMAL
HYALINE CASTS UR QL AUTO: 0 /LPF (ref 0–1)
IMM GRANULOCYTES # BLD AUTO: 0.04 K/UL (ref 0–0.04)
IMM GRANULOCYTES NFR BLD AUTO: 0.5 % (ref 0–0.5)
KETONES UR QL STRIP: NEGATIVE
LACTATE SERPL-SCNC: 2.3 MMOL/L (ref 0.5–2.2)
LDH SERPL L TO P-CCNC: 2.4 MMOL/L (ref 0.5–2.2)
LDH SERPL L TO P-CCNC: 2.5 MMOL/L (ref 0.5–2.2)
LDH SERPL L TO P-CCNC: 3.2 MMOL/L
LEUKOCYTE ESTERASE UR QL STRIP: ABNORMAL
LIPASE SERPL-CCNC: 23 U/L (ref 4–60)
LYMPHOCYTES # BLD AUTO: 2.16 K/UL (ref 1–4.8)
MAGNESIUM SERPL-MCNC: 1.7 MG/DL (ref 1.6–2.6)
MCH RBC QN AUTO: 29.6 PG (ref 27–31)
MCHC RBC AUTO-ENTMCNC: 33.4 G/DL (ref 32–36)
MCV RBC AUTO: 89 FL (ref 82–98)
MICROSCOPIC COMMENT: ABNORMAL
NITRITE UR QL STRIP: NEGATIVE
NUCLEATED RBC (/100WBC) (OHS): 0 /100 WBC
PCO2 BLDA: 41.8 MMHG
PH SMN: 7.4 [PH]
PH UR STRIP: 6 [PH]
PLATELET # BLD AUTO: 345 K/UL (ref 150–450)
PMV BLD AUTO: 11.6 FL (ref 9.2–12.9)
PO2 BLDA: 54.8 MMHG
POC BASE DEFICIT: 0.9 MMOL/L
POC HCO3: 25 MMOL/L
POC IONIZED CALCIUM: 1.08 MMOL/L (ref 1.06–1.42)
POC PERFORMED BY: ABNORMAL
POC PERFORMED BY: ABNORMAL
POC PERFORMED BY: NORMAL
POC TCO2 (MEASURED): 23 MMOL/L (ref 23–29)
POC TEMPERATURE: 37 C
POTASSIUM BLD-SCNC: 4.2 MMOL/L (ref 3.5–5.1)
POTASSIUM SERPL-SCNC: 3.4 MMOL/L (ref 3.5–5.1)
PROCALCITONIN SERPL-MCNC: 0.02 NG/ML
PROT SERPL-MCNC: 5.9 GM/DL (ref 6–8.4)
PROT UR QL STRIP: ABNORMAL
RBC # BLD AUTO: 4.53 M/UL (ref 4–5.4)
RBC #/AREA URNS AUTO: 1 /HPF (ref 0–4)
RELATIVE EOSINOPHIL (OHS): 1.2 %
RELATIVE LYMPHOCYTE (OHS): 25.9 % (ref 18–48)
RELATIVE MONOCYTE (OHS): 7.1 % (ref 4–15)
RELATIVE NEUTROPHIL (OHS): 64.8 % (ref 38–73)
SAMPLE: ABNORMAL
SODIUM BLD-SCNC: 138 MMOL/L (ref 136–145)
SODIUM SERPL-SCNC: 137 MMOL/L (ref 136–145)
SP GR UR STRIP: 1.01
SPECIMEN SOURCE: ABNORMAL
SPECIMEN SOURCE: ABNORMAL
SPECIMEN SOURCE: NORMAL
SQUAMOUS #/AREA URNS AUTO: 3 /HPF
TROPONIN I SERPL HS-MCNC: <3 NG/L
TSH SERPL-ACNC: 1.96 UIU/ML (ref 0.4–4)
UROBILINOGEN UR STRIP-ACNC: NEGATIVE EU/DL
WBC # BLD AUTO: 8.33 K/UL (ref 3.9–12.7)
WBC #/AREA URNS AUTO: 19 /HPF (ref 0–5)
YEAST UR QL AUTO: ABNORMAL /HPF

## 2025-04-27 PROCEDURE — 96361 HYDRATE IV INFUSION ADD-ON: CPT | Mod: HCNC,NTX

## 2025-04-27 PROCEDURE — 84145 PROCALCITONIN (PCT): CPT | Mod: HCNC,NTX | Performed by: STUDENT IN AN ORGANIZED HEALTH CARE EDUCATION/TRAINING PROGRAM

## 2025-04-27 PROCEDURE — 86803 HEPATITIS C AB TEST: CPT | Mod: HCNC,NTX | Performed by: PHYSICIAN ASSISTANT

## 2025-04-27 PROCEDURE — 83036 HEMOGLOBIN GLYCOSYLATED A1C: CPT | Mod: HCNC,NTX | Performed by: NURSE PRACTITIONER

## 2025-04-27 PROCEDURE — 87040 BLOOD CULTURE FOR BACTERIA: CPT | Mod: HCNC,NTX | Performed by: STUDENT IN AN ORGANIZED HEALTH CARE EDUCATION/TRAINING PROGRAM

## 2025-04-27 PROCEDURE — 83690 ASSAY OF LIPASE: CPT | Mod: HCNC,NTX | Performed by: STUDENT IN AN ORGANIZED HEALTH CARE EDUCATION/TRAINING PROGRAM

## 2025-04-27 PROCEDURE — 87086 URINE CULTURE/COLONY COUNT: CPT | Mod: HCNC,NTX | Performed by: STUDENT IN AN ORGANIZED HEALTH CARE EDUCATION/TRAINING PROGRAM

## 2025-04-27 PROCEDURE — 85025 COMPLETE CBC W/AUTO DIFF WBC: CPT | Mod: HCNC,NTX | Performed by: STUDENT IN AN ORGANIZED HEALTH CARE EDUCATION/TRAINING PROGRAM

## 2025-04-27 PROCEDURE — 86140 C-REACTIVE PROTEIN: CPT | Mod: HCNC,NTX | Performed by: NURSE PRACTITIONER

## 2025-04-27 PROCEDURE — 83605 ASSAY OF LACTIC ACID: CPT | Mod: HCNC,NTX | Performed by: NURSE PRACTITIONER

## 2025-04-27 PROCEDURE — G0378 HOSPITAL OBSERVATION PER HR: HCPCS | Mod: HCNC,NTX

## 2025-04-27 PROCEDURE — 83605 ASSAY OF LACTIC ACID: CPT | Mod: HCNC,NTX

## 2025-04-27 PROCEDURE — 80047 BASIC METABLC PNL IONIZED CA: CPT | Mod: HCNC,NTX

## 2025-04-27 PROCEDURE — 25500020 PHARM REV CODE 255: Mod: HCNC,NTX | Performed by: EMERGENCY MEDICINE

## 2025-04-27 PROCEDURE — 63600175 PHARM REV CODE 636 W HCPCS: Mod: HCNC,NTX | Performed by: NURSE PRACTITIONER

## 2025-04-27 PROCEDURE — 93005 ELECTROCARDIOGRAM TRACING: CPT | Mod: HCNC,NTX

## 2025-04-27 PROCEDURE — 99900035 HC TECH TIME PER 15 MIN (STAT): Mod: HCNC,NTX

## 2025-04-27 PROCEDURE — 82803 BLOOD GASES ANY COMBINATION: CPT | Mod: HCNC,NTX

## 2025-04-27 PROCEDURE — 84484 ASSAY OF TROPONIN QUANT: CPT | Mod: HCNC,NTX | Performed by: EMERGENCY MEDICINE

## 2025-04-27 PROCEDURE — 93010 ELECTROCARDIOGRAM REPORT: CPT | Mod: HCNC,NTX,, | Performed by: INTERNAL MEDICINE

## 2025-04-27 PROCEDURE — 82962 GLUCOSE BLOOD TEST: CPT | Mod: HCNC,NTX

## 2025-04-27 PROCEDURE — 81003 URINALYSIS AUTO W/O SCOPE: CPT | Mod: HCNC,NTX | Performed by: STUDENT IN AN ORGANIZED HEALTH CARE EDUCATION/TRAINING PROGRAM

## 2025-04-27 PROCEDURE — 99285 EMERGENCY DEPT VISIT HI MDM: CPT | Mod: 25,HCNC,NTX

## 2025-04-27 PROCEDURE — 96375 TX/PRO/DX INJ NEW DRUG ADDON: CPT | Mod: HCNC,NTX

## 2025-04-27 PROCEDURE — 80053 COMPREHEN METABOLIC PANEL: CPT | Mod: HCNC,NTX | Performed by: STUDENT IN AN ORGANIZED HEALTH CARE EDUCATION/TRAINING PROGRAM

## 2025-04-27 PROCEDURE — 83735 ASSAY OF MAGNESIUM: CPT | Mod: HCNC,NTX | Performed by: STUDENT IN AN ORGANIZED HEALTH CARE EDUCATION/TRAINING PROGRAM

## 2025-04-27 PROCEDURE — 63600175 PHARM REV CODE 636 W HCPCS: Mod: HCNC,NTX | Performed by: STUDENT IN AN ORGANIZED HEALTH CARE EDUCATION/TRAINING PROGRAM

## 2025-04-27 PROCEDURE — 25000003 PHARM REV CODE 250: Mod: HCNC,NTX | Performed by: STUDENT IN AN ORGANIZED HEALTH CARE EDUCATION/TRAINING PROGRAM

## 2025-04-27 PROCEDURE — 25000003 PHARM REV CODE 250: Mod: HCNC,NTX | Performed by: NURSE PRACTITIONER

## 2025-04-27 PROCEDURE — 87389 HIV-1 AG W/HIV-1&-2 AB AG IA: CPT | Mod: HCNC,NTX | Performed by: PHYSICIAN ASSISTANT

## 2025-04-27 PROCEDURE — 96365 THER/PROPH/DIAG IV INF INIT: CPT | Mod: 59,HCNC,NTX

## 2025-04-27 PROCEDURE — 84443 ASSAY THYROID STIM HORMONE: CPT | Mod: HCNC,NTX | Performed by: NURSE PRACTITIONER

## 2025-04-27 RX ORDER — LIDOCAINE HYDROCHLORIDE 20 MG/ML
15 SOLUTION OROPHARYNGEAL ONCE
Status: DISCONTINUED | OUTPATIENT
Start: 2025-04-27 | End: 2025-04-27

## 2025-04-27 RX ORDER — IBUPROFEN 200 MG
24 TABLET ORAL
Status: DISCONTINUED | OUTPATIENT
Start: 2025-04-27 | End: 2025-04-30 | Stop reason: HOSPADM

## 2025-04-27 RX ORDER — ONDANSETRON HYDROCHLORIDE 2 MG/ML
4 INJECTION, SOLUTION INTRAVENOUS EVERY 8 HOURS PRN
Status: DISCONTINUED | OUTPATIENT
Start: 2025-04-27 | End: 2025-04-30 | Stop reason: HOSPADM

## 2025-04-27 RX ORDER — ASPIRIN 81 MG/1
81 TABLET ORAL DAILY
Status: DISCONTINUED | OUTPATIENT
Start: 2025-04-28 | End: 2025-04-30 | Stop reason: HOSPADM

## 2025-04-27 RX ORDER — CETIRIZINE HYDROCHLORIDE 10 MG/1
10 TABLET ORAL DAILY
Status: DISCONTINUED | OUTPATIENT
Start: 2025-04-28 | End: 2025-04-30 | Stop reason: HOSPADM

## 2025-04-27 RX ORDER — FLUTICASONE PROPIONATE 50 MCG
2 SPRAY, SUSPENSION (ML) NASAL DAILY
Status: DISCONTINUED | OUTPATIENT
Start: 2025-04-28 | End: 2025-04-30 | Stop reason: HOSPADM

## 2025-04-27 RX ORDER — MAGNESIUM SULFATE HEPTAHYDRATE 40 MG/ML
2 INJECTION, SOLUTION INTRAVENOUS ONCE
Status: COMPLETED | OUTPATIENT
Start: 2025-04-27 | End: 2025-04-27

## 2025-04-27 RX ORDER — SODIUM CHLORIDE 0.9 % (FLUSH) 0.9 %
10 SYRINGE (ML) INJECTION EVERY 12 HOURS PRN
Status: DISCONTINUED | OUTPATIENT
Start: 2025-04-27 | End: 2025-04-30 | Stop reason: HOSPADM

## 2025-04-27 RX ORDER — MORPHINE SULFATE 2 MG/ML
5 INJECTION, SOLUTION INTRAMUSCULAR; INTRAVENOUS
Status: COMPLETED | OUTPATIENT
Start: 2025-04-27 | End: 2025-04-27

## 2025-04-27 RX ORDER — FAMOTIDINE 10 MG/ML
40 INJECTION, SOLUTION INTRAVENOUS ONCE
Status: COMPLETED | OUTPATIENT
Start: 2025-04-27 | End: 2025-04-27

## 2025-04-27 RX ORDER — IPRATROPIUM BROMIDE AND ALBUTEROL SULFATE 2.5; .5 MG/3ML; MG/3ML
3 SOLUTION RESPIRATORY (INHALATION) EVERY 4 HOURS PRN
Status: DISCONTINUED | OUTPATIENT
Start: 2025-04-27 | End: 2025-04-30 | Stop reason: HOSPADM

## 2025-04-27 RX ORDER — ONDANSETRON HYDROCHLORIDE 2 MG/ML
4 INJECTION, SOLUTION INTRAVENOUS
Status: COMPLETED | OUTPATIENT
Start: 2025-04-27 | End: 2025-04-27

## 2025-04-27 RX ORDER — AZELASTINE 1 MG/ML
1 SPRAY, METERED NASAL 2 TIMES DAILY
Status: DISCONTINUED | OUTPATIENT
Start: 2025-04-27 | End: 2025-04-30 | Stop reason: HOSPADM

## 2025-04-27 RX ORDER — INSULIN ASPART 100 [IU]/ML
0-5 INJECTION, SOLUTION INTRAVENOUS; SUBCUTANEOUS
Status: DISCONTINUED | OUTPATIENT
Start: 2025-04-27 | End: 2025-04-30 | Stop reason: HOSPADM

## 2025-04-27 RX ORDER — METOCLOPRAMIDE HYDROCHLORIDE 5 MG/ML
10 INJECTION INTRAMUSCULAR; INTRAVENOUS ONCE
Status: COMPLETED | OUTPATIENT
Start: 2025-04-27 | End: 2025-04-27

## 2025-04-27 RX ORDER — ALUMINUM HYDROXIDE, MAGNESIUM HYDROXIDE, AND SIMETHICONE 1200; 120; 1200 MG/30ML; MG/30ML; MG/30ML
30 SUSPENSION ORAL ONCE
Status: DISCONTINUED | OUTPATIENT
Start: 2025-04-27 | End: 2025-04-27

## 2025-04-27 RX ORDER — MORPHINE SULFATE 4 MG/ML
4 INJECTION, SOLUTION INTRAMUSCULAR; INTRAVENOUS
Refills: 0 | Status: COMPLETED | OUTPATIENT
Start: 2025-04-27 | End: 2025-04-27

## 2025-04-27 RX ORDER — SODIUM CHLORIDE, SODIUM LACTATE, POTASSIUM CHLORIDE, CALCIUM CHLORIDE 600; 310; 30; 20 MG/100ML; MG/100ML; MG/100ML; MG/100ML
INJECTION, SOLUTION INTRAVENOUS CONTINUOUS
Status: ACTIVE | OUTPATIENT
Start: 2025-04-27 | End: 2025-04-28

## 2025-04-27 RX ORDER — IBUPROFEN 200 MG
16 TABLET ORAL
Status: DISCONTINUED | OUTPATIENT
Start: 2025-04-27 | End: 2025-04-30 | Stop reason: HOSPADM

## 2025-04-27 RX ORDER — GLUCAGON 1 MG
1 KIT INJECTION
Status: DISCONTINUED | OUTPATIENT
Start: 2025-04-27 | End: 2025-04-30 | Stop reason: HOSPADM

## 2025-04-27 RX ORDER — NALOXONE HCL 0.4 MG/ML
0.02 VIAL (ML) INJECTION
Status: DISCONTINUED | OUTPATIENT
Start: 2025-04-27 | End: 2025-04-30 | Stop reason: HOSPADM

## 2025-04-27 RX ORDER — PROCHLORPERAZINE EDISYLATE 5 MG/ML
5 INJECTION INTRAMUSCULAR; INTRAVENOUS EVERY 6 HOURS PRN
Status: DISCONTINUED | OUTPATIENT
Start: 2025-04-27 | End: 2025-04-30 | Stop reason: HOSPADM

## 2025-04-27 RX ORDER — TALC
6 POWDER (GRAM) TOPICAL NIGHTLY PRN
Status: DISCONTINUED | OUTPATIENT
Start: 2025-04-27 | End: 2025-04-30 | Stop reason: HOSPADM

## 2025-04-27 RX ORDER — MONTELUKAST SODIUM 10 MG/1
10 TABLET ORAL NIGHTLY
Status: DISCONTINUED | OUTPATIENT
Start: 2025-04-27 | End: 2025-04-30 | Stop reason: HOSPADM

## 2025-04-27 RX ORDER — PANTOPRAZOLE SODIUM 40 MG/1
40 TABLET, DELAYED RELEASE ORAL DAILY
Status: DISCONTINUED | OUTPATIENT
Start: 2025-04-28 | End: 2025-04-30 | Stop reason: HOSPADM

## 2025-04-27 RX ORDER — HYOSCYAMINE SULFATE 0.12 MG/1
0.12 TABLET SUBLINGUAL ONCE
Status: COMPLETED | OUTPATIENT
Start: 2025-04-27 | End: 2025-04-27

## 2025-04-27 RX ADMIN — PROCHLORPERAZINE EDISYLATE 5 MG: 5 INJECTION INTRAMUSCULAR; INTRAVENOUS at 11:04

## 2025-04-27 RX ADMIN — PIPERACILLIN SODIUM AND TAZOBACTAM SODIUM 4.5 G: 4; .5 INJECTION, POWDER, FOR SOLUTION INTRAVENOUS at 05:04

## 2025-04-27 RX ADMIN — MORPHINE SULFATE 5 MG: 2 INJECTION, SOLUTION INTRAMUSCULAR; INTRAVENOUS at 05:04

## 2025-04-27 RX ADMIN — ONDANSETRON 4 MG: 2 INJECTION INTRAMUSCULAR; INTRAVENOUS at 05:04

## 2025-04-27 RX ADMIN — SODIUM CHLORIDE, POTASSIUM CHLORIDE, SODIUM LACTATE AND CALCIUM CHLORIDE 1000 ML: 600; 310; 30; 20 INJECTION, SOLUTION INTRAVENOUS at 05:04

## 2025-04-27 RX ADMIN — METOCLOPRAMIDE 10 MG: 5 INJECTION, SOLUTION INTRAMUSCULAR; INTRAVENOUS at 08:04

## 2025-04-27 RX ADMIN — ONDANSETRON 4 MG: 2 INJECTION INTRAMUSCULAR; INTRAVENOUS at 07:04

## 2025-04-27 RX ADMIN — POTASSIUM BICARBONATE 50 MEQ: 978 TABLET, EFFERVESCENT ORAL at 06:04

## 2025-04-27 RX ADMIN — IOHEXOL 100 ML: 350 INJECTION, SOLUTION INTRAVENOUS at 05:04

## 2025-04-27 RX ADMIN — MORPHINE SULFATE 4 MG: 4 INJECTION INTRAVENOUS at 05:04

## 2025-04-27 RX ADMIN — FAMOTIDINE 40 MG: 10 INJECTION, SOLUTION INTRAVENOUS at 08:04

## 2025-04-27 RX ADMIN — HYOSCYAMINE SULFATE 0.12 MG: 0.12 TABLET SUBLINGUAL at 09:04

## 2025-04-27 RX ADMIN — MAGNESIUM SULFATE HEPTAHYDRATE 2 G: 40 INJECTION, SOLUTION INTRAVENOUS at 07:04

## 2025-04-27 RX ADMIN — MONTELUKAST 10 MG: 10 TABLET, FILM COATED ORAL at 09:04

## 2025-04-27 RX ADMIN — AZELASTINE 137 MCG: 1 SPRAY, METERED NASAL at 09:04

## 2025-04-27 RX ADMIN — SODIUM CHLORIDE, POTASSIUM CHLORIDE, SODIUM LACTATE AND CALCIUM CHLORIDE: 600; 310; 30; 20 INJECTION, SOLUTION INTRAVENOUS at 08:04

## 2025-04-27 NOTE — ED NOTES
I-STAT Chem-8+ Results:   Value Reference Range   Sodium 138 136-145 mmol/L   Potassium  4.2 3.5-5.1 mmol/L   Chloride 103  mmol/L   Ionized Calcium 1.08 1.06-1.42 mmol/L   CO2 (measured) 23 23-29 mmol/L   Glucose 266  mg/dL   BUN 19 6-30 mg/dL   Creatinine 0.7 0.5-1.4 mg/dL   Hematocrit 40 36-54%

## 2025-04-27 NOTE — ED TRIAGE NOTES
"Dx'd w/ a UTI and took antibiotics . Pain is still in rt  lower abdomen.  Seen in ER a week later  for weakness and  not feeling well. Referred to gastro but appointment Is in May. C/O severe pain  despite taking  all prescribed meds. Today she saw "sand" in her stool.  And feels bloated after eating.   "

## 2025-04-27 NOTE — ED PROVIDER NOTES
"Encounter Date: 4/27/2025       History     Chief Complaint   Patient presents with    Abdominal Pain     Abd pain x 2 weeks; saw pcp; "told to take Tylenol and see gi'. Reports sand or 'granules' when she wiped with bm today. Pain radiates from her back around to abd     The history is provided by the patient and medical records. A  was used.       79-year-old female with history of vertigo, type 2 diabetes, coronary artery disease, hypertension, urinary urgency, nephrolithiasis, GERD, interstitial lung disease, presenting for abdominal pain.      Patient reports 1 week of severe worsening of a overall 1 month of abdominal pain.  She reports 10/10 pain in her epigastrium and right upper quadrant that radiates around to her back.  Pain is significant despite interval dosing of Tylenol.  She endorses generalized malaise, and return of dysuria mildly starting today.    She denies fever, chills, vomiting, diarrhea, constipation.  She denies hematuria, melena, hematochezia.  She denies chest pain, shortness of breath, cough.  She was previously seen in the emergency department on 04/16/2025, CT abdomen and pelvis without evidence of acute intra-abdominal findings.  She was discharged on Bentyl and Keflex for UTI and abdominal cramping.  She has scheduled a follow up with Gastroenterology, however appointment is not until May.    Review of patient's allergies indicates:   Allergen Reactions    Actos [pioglitazone] Nausea Only    Darvocet a500 [propoxyphene n-acetaminophen] Nausea And Vomiting    Dilaudid [hydromorphone (bulk)] Nausea And Vomiting     Past Medical History:   Diagnosis Date    Allergy     Anxiety     Arthritis     osteo    Asthma     Diabetic retinopathy     GERD (gastroesophageal reflux disease)     High cholesterol     Hypertension     Kidney stone     Type 2 diabetes mellitus, uncontrolled, with neuropathy     Urinary tract infection     Vaginal infection      Past Surgical History: "   Procedure Laterality Date    CHOLECYSTECTOMY      COLONOSCOPY N/A 6/8/2021    Procedure: COLONOSCOPY;  Surgeon: Rain Pop MD;  Location: [facility] ENDO;  Service: Endoscopy;  Laterality: N/A;    COLONOSCOPY N/A 7/8/2021    Procedure: colonoscopy with single balloon scope;  Surgeon: Steffen Dueñas MD;  Location: [facility] ENDO;  Service: Endoscopy;  Laterality: N/A;    ESOPHAGOGASTRODUODENOSCOPY N/A 6/8/2021    Procedure: EGD (ESOPHAGOGASTRODUODENOSCOPY);  Surgeon: Rain Pop MD;  Location: [facility] ENDO;  Service: Endoscopy;  Laterality: N/A;    EYE SURGERY      HYSTERECTOMY      INCISIONAL HERNIA REPAIR  2003    ROTATOR CUFF REPAIR Right 11/07/2017    TRIGGER FINGER RELEASE Right 6/3/2022    Procedure: RELEASE, TRIGGER FINGER; right index, middle, ring;  Surgeon: Sarmad Wesley Jr., MD;  Location: [facility] OR;  Service: Orthopedics;  Laterality: Right;     Family History   Problem Relation Name Age of Onset    Stroke Mother      Diabetes Sister      Diabetes Brother      Kidney disease Neg Hx       Social History[1]  Review of Systems  See HPI for pertinent ROS.   Physical Exam     Initial Vitals [04/27/25 1509]   BP Pulse Resp Temp SpO2   (!) 182/81 110 20 98.5 °F (36.9 °C) 97 %      MAP       --         Physical Exam    Constitutional: She appears well-developed and well-nourished.   Sitting upright, appears to be uncomfortable and avoiding motion or movement.   HENT:   Head: Normocephalic and atraumatic.   Eyes: Conjunctivae are normal. No scleral icterus.   Neck: No JVD present.   Cardiovascular:  Normal rate, regular rhythm and normal heart sounds.     Exam reveals no gallop and no friction rub.       No murmur heard.  Pulmonary/Chest: Breath sounds normal. No stridor. She has no wheezes. She has no rhonchi. She has no rales.   Abdominal: Abdomen is soft. There is abdominal tenderness (Moderate in epigastrium, and RUQ.  Drake negative).   No CVAT There is no rebound and no guarding.   Musculoskeletal:          General: No tenderness or edema.     Neurological: She is alert.   Skin: Skin is warm. Capillary refill takes less than 2 seconds.   Psychiatric: She has a normal mood and affect.         ED Course   Procedures  Labs Reviewed   COMPREHENSIVE METABOLIC PANEL - Abnormal       Result Value    Sodium 137      Potassium 3.4 (*)     Chloride 108      CO2 16 (*)     Glucose 208 (*)     BUN 15      Creatinine 0.6      Calcium 7.3 (*)     Protein Total 5.9 (*)     Albumin 3.0 (*)     Bilirubin Total 0.2      ALP 63      AST 15      ALT 15      Anion Gap 13      eGFR >60     URINALYSIS, REFLEX TO URINE CULTURE - Abnormal    Color, UA Colorless (*)     Appearance, UA Clear      pH, UA 6.0      Spec Grav UA 1.015      Protein, UA Trace (*)     Glucose, UA 3+ (*)     Ketones, UA Negative      Bilirubin, UA Negative      Blood, UA Negative      Nitrites, UA Negative      Urobilinogen, UA Negative      Leukocyte Esterase, UA 1+ (*)    URINALYSIS MICROSCOPIC - Abnormal    RBC, UA 1      WBC, UA 19 (*)     Bacteria, UA Rare      Yeast, UA None      Squamous Epithelial Cells, UA 3      Hyaline Casts, UA 0      Microscopic Comment       ISTAT PROCEDURE - Abnormal    POC Glucose 266 (*)     POC BUN 19      POC Creatinine 0.7      POC Sodium 138      POC Potassium 4.2      POC Chloride 103      POC TCO2 (MEASURED) 23      POC Ionized Calcium 1.08      POC Hematocrit 40      Sample SEVERINO     HEPATITIS C ANTIBODY - Normal    Hep C Ab Interp Non-Reactive     HIV 1 / 2 ANTIBODY - Normal    HIV 1/2 Ag/Ab Non-Reactive     LIPASE - Normal    Lipase Level 23     CBC WITH DIFFERENTIAL - Normal    WBC 8.33      RBC 4.53      HGB 13.4      HCT 40.1      MCV 89      MCH 29.6      MCHC 33.4      RDW 13.1      Platelet Count 345      MPV 11.6      Nucleated RBC 0      Neut % 64.8      Lymph % 25.9      Mono % 7.1      Eos % 1.2      Basophil % 0.5      Imm Grans % 0.5      Neut # 5.40      Lymph # 2.16      Mono # 0.59      Eos # 0.10      Baso  # 0.04      Imm Grans # 0.04     TROPONIN I HIGH SENSITIVITY - Normal    Troponin High Sensitive <3     MAGNESIUM - Normal    Magnesium  1.7     CULTURE, BLOOD   CULTURE, BLOOD   CULTURE, URINE   CBC W/ AUTO DIFFERENTIAL    Narrative:     The following orders were created for panel order CBC W/ AUTO DIFFERENTIAL.  Procedure                               Abnormality         Status                     ---------                               -----------         ------                     CBC with Differential[[id number]]       Normal              Final result                 Please view results for these tests on the individual orders.   GREY TOP URINE HOLD    Extra Tube Hold for add-ons.     HEP C VIRUS HOLD SPECIMEN   ISTAT CHEM8     EKG Readings: (Independently Interpreted)   Initial Reading: No STEMI. Previous EKG: Compared with most recent EKG Previous EKG Date: 4/16/2025. Rhythm: Normal Sinus Rhythm. Heart Rate: 97. Clinical Impression: Normal Sinus Rhythm       Imaging Results              X-Ray Chest AP Portable (Final result)  Result time 04/27/25 18:00:38      Final result by Zachariah Brooks MD (04/27/25 18:00:38)                   Impression:      1. Interstitial findings are somewhat more conspicuous on current exam, may reflect accentuation from overlying habitus.  Interval edema or other pneumonitis are considerations.  No large focal consolidation.      Electronically signed by: Zachariah Brooks MD  Date:    04/27/2025  Time:    18:00               Narrative:    EXAMINATION:  XR CHEST AP PORTABLE    CLINICAL HISTORY:  Sepsis;    TECHNIQUE:  Single frontal view of the chest was performed.    COMPARISON:  04/16/2025    FINDINGS:  The cardiomediastinal silhouette is not enlarged..  There is no pleural effusion.  The trachea is midline.  The lungs are symmetrically expanded bilaterally with coarse interstitial attenuation bilaterally, similar to the previous examination to somewhat more prominent..  No  large focal consolidation seen.  There is no pneumothorax.  The osseous structures are remarkable for degenerative change and osteopenia..                                       CTA Abdomen and Pelvis (Final result)  Result time 04/27/25 18:29:41      Final result by Tylor Salinas MD (04/27/25 18:29:41)                   Impression:      No acute abdominopelvic abnormality identified.    Moderate atherosclerotic disease of the abdominal aorta.  Moderate narrowing at the origin of the ARPITA, which is otherwise patent.  No evidence of acute abdominal vascular occlusion.    Pancreatic cystic lesions measuring up to 1.3 cm.  Follow-up in 2 years time with contrast enhanced MRI or pancreas protocol CT recommended by the ACR.    Additional findings as described.    Electronically signed by resident: Dinah Cagle  Date:    04/27/2025  Time:    17:34    Electronically signed by: Tylor Salinas MD  Date:    04/27/2025  Time:    18:29               Narrative:    EXAMINATION:  CTA ABDOMEN AND PELVIS    CLINICAL HISTORY:  Mesenteric ischemia, acute;    TECHNIQUE:  Low dose axial images, sagittal and coronal reformations were obtained from the lung bases to the pubic symphysis following the IV administration of 100 mL of Omnipaque 350 in the arterial and venous phases .  Oral contrast was not administered.    COMPARISON:  CT abdomen pelvis 04/16/2025 and CT dated 06/09/2021.    FINDINGS:  Heart: Normal in size. No pericardial effusion.    Lung Bases: Stable 5 mm nodule in the right lower lobe.  Chronic changes in the lungs involving particularly nodular subpleural opacities.  No confluent consolidation.  No pleural effusion.    Liver: Peripheral area of hyperattenuation at the inferior most aspect of the right hepatic lobe (series 601, image 24) the common isointense on venous phase likely reflecting transient hepatic intensity differences.  Hepatic and portal veins are patent.    Gallbladder: Surgically absent.    Bile Ducts: No  evidence of dilated ducts.    Pancreas: 1.3 cm low-density lesion in the pancreatic body (series 2, image 212).  Additional smaller hypodensity in the pancreatic tail (series 2, image 23).  No pancreatic ductal dilatation.    Spleen: Normal size and attenuation.    Adrenals: No focal lesions.    Kidneys/ Ureters: Focal areas of cortical thinning in the superior pole the right kidney, superior pole of the left kidney, in interpolar region of the left kidney likely reflecting areas of scarring.  6 mm nonobstructing stone in the inferior pole of the left kidney.  No enhancing mass.  Kidneys concentrate contrast normally.  No hydroureteronephrosis.    Bladder: Not fully distended which limits evaluation.    Reproductive organs: Unremarkable.    GI Tract/Mesentery: Colonic diverticulosis involving the ascending, descending, and sigmoid colon.  Evidence of diverticulitis.  No bowel obstruction.  The appendix is within normal limits.    Peritoneal Space: No ascites. No free air.    Retroperitoneum: No significant adenopathy.    Abdominal wall: Probable injection granulomas in the bilateral gluteal soft tissues.    Vasculature: Moderate atherosclerotic disease of the abdominal aorta.  Noncalcified plaque in the infrarenal abdominal aorta distal to the origin of the ARPITA contributes to moderate stenosis.  The celiac artery has minimal calcifications at its origin without significant narrowing.  The celiac artery branches are patent.  Mild calcification at the origin of the SMA without significant stenosis.  Minimal calcifications at origin of left renal artery.  Renal arteries are otherwise patent.  Moderate calcification of the origin of the ARPITA with associated moderate narrowing.  The ARPITA is otherwise patent.  The portal vein, SMV, and splenic veins are patent.    Bones: Degenerative changes lumbar spine.  No acute fracture.  No osseous destructive lesions.                                       Medications   magnesium  sulfate 2g in water 50mL IVPB (premix) (2 g Intravenous New Bag 4/27/25 1934)   morphine injection 5 mg (5 mg Intravenous Given 4/27/25 1715)   ondansetron injection 4 mg (4 mg Intravenous Given 4/27/25 1715)   lactated ringers bolus 1,000 mL (0 mLs Intravenous Stopped 4/27/25 1901)   piperacillin-tazobactam (ZOSYN) 4.5 g in D5W 100 mL IVPB (MB+) (0 g Intravenous Stopped 4/27/25 1811)   iohexoL (OMNIPAQUE 350) injection 100 mL (100 mLs Intravenous Given 4/27/25 1728)   morphine injection 4 mg (4 mg Intravenous Given 4/27/25 1758)   potassium bicarbonate disintegrating tablet 50 mEq (50 mEq Oral Given 4/27/25 1845)     Medical Decision Making  EKG: NSR, rate 97, NAD, no QTc prolongation or ST segment elevation  See ED course for personal interpretation of workup/ differential.       79-year-old female with history of peripheral vascular disease, hypertension, coronary artery disease, presenting for repeat evaluation of abdominal pain.  On arrival, vital signs are notable for tachycardia but she is afebrile and saturating well on room air.  Physical exam with significant epigastric and right upper quadrant abdominal tenderness to palpation.  She appears to be uncomfortable.  She was given LR, morphine, Zofran.  Initial lactate elevated at 3.2 concerning for sepsis versus tissue ischemia.  CTA abdomen pelvis ordered to further evaluate for mesenteric ischemia.  Sepsis protocol followed, she was started on IV Zosyn.  Patient has mild leukocytosis and mild hypomagnesemia, both her replaced.  Urine consistent with urinary tract infection, 19 WBCs, 1+ leuk esterase.  No BRAXTON.  Elevated lactate only improved to 2.4, still elevated after 1 L IV fluids and IV Zosyn.  Patient had continued pain and nausea requirements, required multiple doses of IV morphine.  ED would like to admit for continued symptomatic pyelonephritis.    Amount and/or Complexity of Data Reviewed  External Data Reviewed: radiology.     Details: CT  abdomen/pelvis on 04/16 reviewed-no acute abnormality  Labs: ordered. Decision-making details documented in ED Course.  Radiology: ordered. Decision-making details documented in ED Course.  ECG/medicine tests: ordered and independent interpretation performed.    Risk  Prescription drug management.  Decision regarding hospitalization.              Attending Attestation:   Physician Attestation Statement for Resident:  As the supervising MD   Physician Attestation Statement: I have personally seen and examined this patient.   I agree with the above history.  -: 79-year-old female presenting with ongoing/worsening abdominal pain.  The pain is in her epigastrium and radiates around to her right flank.  Denies fevers.  She states the pain is worsened with eating.     Offered , patient speaks some English and preferred that her daughter translate.   As the supervising MD I agree with the above PE.   -: Appears uncomfortable  Tenderness to palpation right upper quadrant, right side of upper abdomen  RRR, lungs clear      As the supervising MD I agree with the above treatment, course, plan, and disposition.   -: Differential including, but not limited to: Pyelonephritis, nephrolithiasis, mesenteric ischemia, pancreatitis    Plan to extend workup with a CT angio to assess for vascular etiology.  Lactate returned elevated.  Initiating infectious workup, IV fluids, starting antibiotics.  Will need to trend lactate.  Lactate is less than 4 and she is not hypotensive, 30 cc/kilos bolus not indicated.    Signed out to oncoming attending at 5:00 p.m. with remaining workup and imaging pending.  Disposition will be pending remaining results and reassessment.   I have reviewed and agree with the residents interpretation of the following: lab data, EKG, CT scans and x-rays.  I have reviewed the following: old records at this facility.                ED Course as of 04/27/25 1942   [place] Apr 27, 2025   1615 Pulse: 110  [AB]   1615 BP(!): 182/81 [AB]   1629 POC Lactate: 3.2 [AB]   1632 POC Lactate: 3.2 [KB]   1729 Lipase  Pancreatitis less likely    [KB]   1846 Albumin(!): 3.0 [KB]   1928 SpO2: 97 % [KB]   1940 Lipase  Pancreatitis less likely    [KB]   1940 CBC W/ AUTO DIFFERENTIAL  No evidence of leukocytosis, acute anemia requiring transfusion or thrombocytopenia.   [KB]   1940 CBC W/ AUTO DIFFERENTIAL  No evidence of leukocytosis, acute anemia requiring transfusion or thrombocytopenia.   [KB]   1940 POC Lactate: 3.2 [KB]   1941 Troponin I High Sensitivity  ACS less likely [KB]   1941 Comp. Metabolic Panel(!)  BRAXTON less likely  [KB]   1941 X-Ray Chest AP Portable [KB]   1941 X-Ray Chest AP Portable  On my personal interpretation of this CXR, there is no evidence of acute pneumonia, pneumothorax, pleural effusion, or pulmonary edema.    [KB]      ED Course User Index  [AB] Azar Longo MD  [KB] Taryn Jay MD                             This patient does have evidence of infective focus  My overall impression is sepsis.  Source: Abdominal  Antibiotics given-   Antibiotics (72h ago, onward)      None          Latest lactate reviewed-  Recent Labs   Lab 04/27/25 1904   POCLAC 2.4*     Organ dysfunction indicated by  elevated lactate    Fluid challenge Other- Patient to receive 1L volume other than 30cc/kg due to no recent echo      Post- resuscitation assessment Yes - I attest a sepsis perfusion exam was performed within 6 hours of sepsis, severe sepsis, or septic shock presentation, following fluid resuscitation.      Will Not start Pressors- Levophed for MAP of 65  Source control achieved by: abx       Clinical Impression:  Final diagnoses:  [R10.9] Abdominal pain  [K86.2] Pancreatic cyst  [N12] Pyelonephritis (Primary)  [R11.0] Nausea  [E87.6] Hypokalemia          ED Disposition Condition    Observation                   Taryn Jay MD  Resident  04/27/25 1943         [1]   Social History  Tobacco Use    Smoking  status: Former     Passive exposure: Never    Smokeless tobacco: Never   Substance Use Topics    Alcohol use: Not Currently     Comment: social    Drug use: No        Azar Longo MD  04/27/25 2001

## 2025-04-28 ENCOUNTER — ANESTHESIA EVENT (OUTPATIENT)
Dept: ENDOSCOPY | Facility: HOSPITAL | Age: 80
End: 2025-04-28
Payer: MEDICARE

## 2025-04-28 PROBLEM — K55.1 STENOSIS OF INFERIOR MESENTERIC ARTERY: Status: ACTIVE | Noted: 2025-04-28

## 2025-04-28 LAB
ABSOLUTE EOSINOPHIL (OHS): 0.01 K/UL
ABSOLUTE MONOCYTE (OHS): 0.49 K/UL (ref 0.3–1)
ABSOLUTE NEUTROPHIL COUNT (OHS): 7.68 K/UL (ref 1.8–7.7)
ALBUMIN SERPL BCP-MCNC: 3.5 G/DL (ref 3.5–5.2)
ALP SERPL-CCNC: 71 UNIT/L (ref 40–150)
ALT SERPL W/O P-5'-P-CCNC: 18 UNIT/L (ref 10–44)
ANION GAP (OHS): 12 MMOL/L (ref 8–16)
AST SERPL-CCNC: 19 UNIT/L (ref 11–45)
BASOPHILS # BLD AUTO: 0.04 K/UL
BASOPHILS NFR BLD AUTO: 0.4 %
BILIRUB SERPL-MCNC: 0.3 MG/DL (ref 0.1–1)
BUN SERPL-MCNC: 12 MG/DL (ref 8–23)
CALCIUM SERPL-MCNC: 8.4 MG/DL (ref 8.7–10.5)
CHLORIDE SERPL-SCNC: 103 MMOL/L (ref 95–110)
CHOLEST SERPL-MCNC: 191 MG/DL (ref 120–199)
CHOLEST/HDLC SERPL: 5.5 {RATIO} (ref 2–5)
CO2 SERPL-SCNC: 20 MMOL/L (ref 23–29)
CREAT SERPL-MCNC: 0.7 MG/DL (ref 0.5–1.4)
ERYTHROCYTE [DISTWIDTH] IN BLOOD BY AUTOMATED COUNT: 12.8 % (ref 11.5–14.5)
GFR SERPLBLD CREATININE-BSD FMLA CKD-EPI: >60 ML/MIN/1.73/M2
GLUCOSE SERPL-MCNC: 170 MG/DL (ref 70–110)
HCT VFR BLD AUTO: 35 % (ref 37–48.5)
HDLC SERPL-MCNC: 35 MG/DL (ref 40–75)
HDLC SERPL: 18.3 % (ref 20–50)
HGB BLD-MCNC: 11.9 GM/DL (ref 12–16)
IMM GRANULOCYTES # BLD AUTO: 0.06 K/UL (ref 0–0.04)
IMM GRANULOCYTES NFR BLD AUTO: 0.6 % (ref 0–0.5)
LACTATE SERPL-SCNC: 2.6 MMOL/L (ref 0.5–2.2)
LACTATE SERPL-SCNC: 2.9 MMOL/L (ref 0.5–2.2)
LACTATE SERPL-SCNC: 4.6 MMOL/L (ref 0.5–2.2)
LDLC SERPL CALC-MCNC: 130.8 MG/DL (ref 63–159)
LYMPHOCYTES # BLD AUTO: 1.36 K/UL (ref 1–4.8)
MAGNESIUM SERPL-MCNC: 2.2 MG/DL (ref 1.6–2.6)
MCH RBC QN AUTO: 29.3 PG (ref 27–31)
MCHC RBC AUTO-ENTMCNC: 34 G/DL (ref 32–36)
MCV RBC AUTO: 86 FL (ref 82–98)
NONHDLC SERPL-MCNC: 156 MG/DL
NUCLEATED RBC (/100WBC) (OHS): 0 /100 WBC
OHS QRS DURATION: 70 MS
OHS QTC CALCULATION: 467 MS
PLATELET # BLD AUTO: 201 K/UL (ref 150–450)
PLATELET BLD QL SMEAR: NORMAL
PMV BLD AUTO: 10 FL (ref 9.2–12.9)
POCT GLUCOSE: 163 MG/DL (ref 70–110)
POCT GLUCOSE: 78 MG/DL (ref 70–110)
POTASSIUM SERPL-SCNC: 4.1 MMOL/L (ref 3.5–5.1)
PROT SERPL-MCNC: 6.8 GM/DL (ref 6–8.4)
RBC # BLD AUTO: 4.06 M/UL (ref 4–5.4)
RELATIVE EOSINOPHIL (OHS): 0.1 %
RELATIVE LYMPHOCYTE (OHS): 14.1 % (ref 18–48)
RELATIVE MONOCYTE (OHS): 5.1 % (ref 4–15)
RELATIVE NEUTROPHIL (OHS): 79.7 % (ref 38–73)
SODIUM SERPL-SCNC: 135 MMOL/L (ref 136–145)
TRIGL SERPL-MCNC: 126 MG/DL (ref 30–150)
WBC # BLD AUTO: 9.64 K/UL (ref 3.9–12.7)

## 2025-04-28 PROCEDURE — 83605 ASSAY OF LACTIC ACID: CPT | Mod: 91,HCNC,NTX | Performed by: NURSE PRACTITIONER

## 2025-04-28 PROCEDURE — 83605 ASSAY OF LACTIC ACID: CPT | Mod: HCNC,NTX | Performed by: PHYSICIAN ASSISTANT

## 2025-04-28 PROCEDURE — 21400001 HC TELEMETRY ROOM: Mod: HCNC,NTX

## 2025-04-28 PROCEDURE — 25000003 PHARM REV CODE 250: Mod: HCNC,NTX | Performed by: NURSE PRACTITIONER

## 2025-04-28 PROCEDURE — 83735 ASSAY OF MAGNESIUM: CPT | Mod: HCNC,NTX | Performed by: NURSE PRACTITIONER

## 2025-04-28 PROCEDURE — 96361 HYDRATE IV INFUSION ADD-ON: CPT | Mod: HCNC,NTX

## 2025-04-28 PROCEDURE — 80053 COMPREHEN METABOLIC PANEL: CPT | Mod: HCNC,NTX | Performed by: NURSE PRACTITIONER

## 2025-04-28 PROCEDURE — 83605 ASSAY OF LACTIC ACID: CPT | Mod: HCNC,NTX | Performed by: NURSE PRACTITIONER

## 2025-04-28 PROCEDURE — 25000242 PHARM REV CODE 250 ALT 637 W/ HCPCS: Mod: HCNC,NTX | Performed by: NURSE PRACTITIONER

## 2025-04-28 PROCEDURE — 63600175 PHARM REV CODE 636 W HCPCS: Mod: HCNC,NTX | Performed by: NURSE PRACTITIONER

## 2025-04-28 PROCEDURE — 99222 1ST HOSP IP/OBS MODERATE 55: CPT | Mod: HCNC,GC,NTX, | Performed by: STUDENT IN AN ORGANIZED HEALTH CARE EDUCATION/TRAINING PROGRAM

## 2025-04-28 PROCEDURE — 85025 COMPLETE CBC W/AUTO DIFF WBC: CPT | Mod: HCNC,NTX | Performed by: NURSE PRACTITIONER

## 2025-04-28 PROCEDURE — 80061 LIPID PANEL: CPT | Mod: HCNC,NTX | Performed by: NURSE PRACTITIONER

## 2025-04-28 RX ORDER — VALSARTAN 160 MG/1
160 TABLET ORAL DAILY
Status: DISCONTINUED | OUTPATIENT
Start: 2025-04-28 | End: 2025-04-30 | Stop reason: HOSPADM

## 2025-04-28 RX ORDER — MORPHINE SULFATE 2 MG/ML
2 INJECTION, SOLUTION INTRAMUSCULAR; INTRAVENOUS EVERY 4 HOURS PRN
Status: DISCONTINUED | OUTPATIENT
Start: 2025-04-28 | End: 2025-04-30 | Stop reason: HOSPADM

## 2025-04-28 RX ADMIN — VALSARTAN 160 MG: 160 TABLET, FILM COATED ORAL at 08:04

## 2025-04-28 RX ADMIN — MORPHINE SULFATE 2 MG: 2 INJECTION, SOLUTION INTRAMUSCULAR; INTRAVENOUS at 05:04

## 2025-04-28 RX ADMIN — SODIUM CHLORIDE, POTASSIUM CHLORIDE, SODIUM LACTATE AND CALCIUM CHLORIDE 1000 ML: 600; 310; 30; 20 INJECTION, SOLUTION INTRAVENOUS at 02:04

## 2025-04-28 RX ADMIN — ASPIRIN 81 MG: 81 TABLET, COATED ORAL at 08:04

## 2025-04-28 RX ADMIN — AZELASTINE 137 MCG: 1 SPRAY, METERED NASAL at 09:04

## 2025-04-28 RX ADMIN — AZELASTINE 137 MCG: 1 SPRAY, METERED NASAL at 08:04

## 2025-04-28 RX ADMIN — MONTELUKAST 10 MG: 10 TABLET, FILM COATED ORAL at 09:04

## 2025-04-28 RX ADMIN — PANTOPRAZOLE SODIUM 40 MG: 40 TABLET, DELAYED RELEASE ORAL at 08:04

## 2025-04-28 RX ADMIN — FLUTICASONE PROPIONATE 100 MCG: 50 SPRAY, METERED NASAL at 10:04

## 2025-04-28 RX ADMIN — CETIRIZINE HYDROCHLORIDE 10 MG: 10 TABLET, FILM COATED ORAL at 08:04

## 2025-04-28 NOTE — ASSESSMENT & PLAN NOTE
78 y/o F with a medical hx of vertigo, DM2, CAD, HTN, urinary urgency, nephrolithiasis, GERD, ILD, and IBS who [place] [facility] [place] for worsening upper abdominal pain over the past month. Reports RUQ and epigastric abdominal pain beginning 1 month ago that acutely worsened over the past week. Describes the pain as stabbing/throbbing pain that radiates around her right side into her back. Endorses associated abdominal bloating, decreased appetite, nausea, vomiting, and fear to eat. Reports PO intake seems to make symptoms worse. Has been taking tylenol, antacids, and NSAIDs along with her daily protonix without much relief.      CTA A/P with no acute abdominopelvic abnormality identified. Moderate atherosclerotic disease of the abdominal aorta. Moderate narrowing at the origin of the ARPITA, which is otherwise patent. No evidence of acute abdominal vascular occlusion. Pancreatic cystic lesions measuring up to 1.3 cm.     In the ED, she has been given 2L LR bolus, 2g mag sulfate, IV Zosyn, hyoscyamine, zofran x2, 4mg IV morphine, 5mg IV morphine, Pantoprazole, Famotidine, and IV Metoclopramide 10 mg. GI consulted for acute on chronic abdominal pain.     Last EGD 6/8/21 for epigastric abdominal pain: normal except biopsy showing reactive gastropathy.  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 Biopsy negative for malignancy. Inflamed granulation tissue with reactive atypia.     In the setting of chronic epigastric pain, worse with eating, and elevated lactate, it is reasonable to rule out mesenteric ischemia. Agree with Mesenteric U/S. Mesenteric U/S showed: No evidence of hemodynamically significant stenosis in the celiac or SMA.  Significant stenosis in the ARPITA.    Pt's case also c/b hx of poorly controlled DM2 (A1c 12.1 before most recent value) and taking Ozempic for the better part of the past year.     Plan:  - plan to perform EGD  tomorrow, 4/29/25  - clear liquids today, NPO at midnight   - advise her to follow up at GI clinic in May to evaluate need for gastric emptying study if imaging obtained inpatient is still negative

## 2025-04-28 NOTE — ASSESSMENT & PLAN NOTE
Patient's most recent potassium results are listed below.   Recent Labs     04/27/25  1753 04/28/25  0325   K 3.4* 4.1     Plan  - Replete potassium per protocol  - Monitor potassium Daily  - Patient's hypokalemia is stable

## 2025-04-28 NOTE — HPI
80 y/o F with a medical hx of vertigo, DM2, CAD, HTN, urinary urgency, nephrolithiasis, GERD, ILD, and IBS who presented to [facility] [place]27/25 for worsening upper abdominal pain over the past month. Reports RUQ and epigastric abdominal pain beginning 1 month ago that acutely worsened over the past week. Describes the pain as stabbing/throbbing pain that radiates around her right side into her back. Endorses associated abdominal bloating, decreased appetite, nausea, vomiting, and fear to eat. Reports PO intake seems to make symptoms worse. Has been taking tylenol, antacids, and NSAIDs along with her daily protonix without much relief. Was previously seen in the emergency department on 4/16/25, CT abdomen and pelvis at the time without evidence of acute intra-abdominal findings. Was discharged on Bentyl and Keflex for UTI and abdominal cramping. Was seen by her PCP 4/22/25 who discontinued the abx and referred her to GI, but not available till late May. Pain remained persistent, prompting her to come back to ED.      On initial presentation, pt mildly tachycardic and intermittently hypertensive otherwise vitals stable. CBC unremarkable. Lactate 3.2>>2.4. Blood cxs collected. UA with 1+leukocytes, 19 WBCs, rare bacteria, and 3 squams. CTA A/P with no acute abdominopelvic abnormality identified. Moderate atherosclerotic disease of the abdominal aorta. Moderate narrowing at the origin of the ARPITA, which is otherwise patent. No evidence of acute abdominal vascular occlusion. Pancreatic cystic lesions measuring up to 1.3 cm.     In the ED, she has been given 2L LR bolus, 2g mag sulfate, IV Zosyn, hyoscyamine, zofran x2, 4mg IV morphine, 5mg IV morphine, Pantoprazole, Famotidine, and IV Metoclopramide 10 mg. GI consulted for acute on chronic abdominal pain.

## 2025-04-28 NOTE — ANESTHESIA PREPROCEDURE EVALUATION
[facility]  Anesthesia Pre-Operative Evaluation         Patient Name: Juliette Seo  YOB: 1945  MRN: [id number]    SUBJECTIVE:     Pre-operative evaluation for Procedure(s) (LRB):  EGD (ESOPHAGOGASTRODUODENOSCOPY) (N/A)     04/28/2025    Juliette Seo is a 79 y.o. female w/ a significant PMHx of vertigo, DM2 (Ozempic A1c 12.1 -> 7.2 ), CAD, HTN, urinary urgency, nephrolithiasis, GERD, ILD, and IBS who presented to [facility] on 4/27/25 for worsening upper abdominal pain.    Patient now presents for the above procedure(s).    Results for orders placed during the hospital encounter of 07/20/23    Echo    Interpretation Summary  · The estimated ejection fraction is 60%.  · Normal right ventricular size with normal right ventricular systolic function.  · Normal left ventricular diastolic function.  · The left ventricle is normal in size with normal systolic function.  · Normal central venous pressure (3 mmHg).      LDA:        Peripheral IV - Single Lumen 04/27/25 1635 20 G Posterior;Right Hand (Active)   Site Assessment Clean;Dry;Intact 04/27/25 1635   Line Status Blood return noted;Saline locked;Flushed 04/27/25 1635   Dressing Status Clean;Dry;Intact 04/27/25 1635   Dressing Intervention First dressing 04/27/25 1635   Number of days: 0            Peripheral IV - Single Lumen 04/27/25 1636 20 G Left;Posterior Hand (Active)   Site Assessment Clean;Dry;Intact 04/27/25 1636   Line Status Blood return noted;Saline locked;Flushed 04/27/25 1636   Dressing Status Clean;Dry;Intact 04/27/25 1636   Dressing Intervention First dressing 04/27/25 1636   Number of days: 0       Prev airway: Easy Mask, Santi #3 with Grade 1 View    Drips: None documented.      Problem List[1]    Review of patient's allergies indicates:   Allergen Reactions    Actos [pioglitazone] Nausea Only    Darvocet a500 [propoxyphene n-acetaminophen] Nausea And Vomiting    Dilaudid [hydromorphone (bulk)] Nausea And Vomiting        Current Inpatient Medications:   aspirin  81 mg Oral Daily    azelastine  1 spray Nasal BID    cetirizine  10 mg Oral Daily    fluticasone propionate  2 spray Each Nostril Daily    montelukast  10 mg Oral QHS    pantoprazole  40 mg Oral Daily    valsartan  160 mg Oral Daily       Medications Ordered Prior to Encounter[2]    Past Surgical History:   Procedure Laterality Date    CHOLECYSTECTOMY      COLONOSCOPY N/A 6/8/2021    Procedure: COLONOSCOPY;  Surgeon: Rain Pop MD;  Location: [facility] ENDO;  Service: Endoscopy;  Laterality: N/A;    COLONOSCOPY N/A 7/8/2021    Procedure: colonoscopy with single balloon scope;  Surgeon: Steffen Dueñas MD;  Location: [facility] ENDO;  Service: Endoscopy;  Laterality: N/A;    ESOPHAGOGASTRODUODENOSCOPY N/A 6/8/2021    Procedure: EGD (ESOPHAGOGASTRODUODENOSCOPY);  Surgeon: Rain Pop MD;  Location: [facility];  Service: Endoscopy;  Laterality: N/A;    EYE SURGERY      HYSTERECTOMY      INCISIONAL HERNIA REPAIR  2003    ROTATOR CUFF REPAIR Right 11/07/2017    TRIGGER FINGER RELEASE Right 6/3/2022    Procedure: RELEASE, TRIGGER FINGER; right index, middle, ring;  Surgeon: Sarmad Wesley Jr., MD;  Location: [facility] OR;  Service: Orthopedics;  Laterality: Right;       Social History[3]    OBJECTIVE:     Vital Signs Range (Last 24H):  Temp:  [36.3 °C (97.4 °F)-37 °C (98.6 °F)]   Pulse:  []   Resp:  [16-20]   BP: (126-182)/(61-84)   SpO2:  [97 %-100 %]       Significant Labs:  Lab Results   Component Value Date    WBC 9.64 04/28/2025    HGB 11.9 (L) 04/28/2025    HCT 35.0 (L) 04/28/2025     04/28/2025    CHOL 191 04/28/2025    TRIG 126 04/28/2025    HDL 35 (L) 04/28/2025    ALT 18 04/28/2025    AST 19 04/28/2025     (L) 04/28/2025    K 4.1 04/28/2025     04/28/2025    CREATININE 0.7 04/28/2025    BUN 12 04/28/2025    CO2 20 (L) 04/28/2025    TSH 1.961 04/27/2025    INR 0.97 03/10/2016    HGBA1C 7.2 (H) 04/27/2025       Diagnostic Studies: No  "relevant studies.    EKG:   Results for orders placed or performed during the hospital encounter of 04/27/25   EKG 12-lead    Collection Time: 04/27/25  4:53 PM   Result Value Ref Range    QRS Duration 70 ms    [facility] QTC Calculation 467 ms    Narrative    Test Reason : R10.9,    Vent. Rate :  97 BPM     Atrial Rate :  97 BPM     P-R Int : 164 ms          QRS Dur :  70 ms      QT Int : 368 ms       P-R-T Axes :  55  -6  38 degrees    QTcB Int : 467 ms    Normal sinus rhythm  Normal ECG  When compared with ECG of 16-Apr-2025 19:09,  No significant change was found  Confirmed by Afshan Cifuentes ([id number]) on 4/28/2025 9:58:51 AM    Referred By: AAAREFERRAL SELF           Confirmed By: Afshan Cifuentes       2D ECHO:  TTE:  Results for orders placed or performed during the hospital encounter of 07/20/23   Echo   Result Value Ref Range    Ascending aorta 3.11 cm    STJ 2.15 cm    AV mean gradient 7 mmHg    Ao peak endy 1.84 m/s    Ao VTI 27.54 cm    IVS 0.80 0.6 - 1.1 cm    LA size 3.36 cm    Left Atrium Major Axis 3.69 cm    Left Atrium Minor Axis 4.41 cm    LVIDd 4.00 3.5 - 6.0 cm    LVIDs 2.52 2.1 - 4.0 cm    LVOT diameter 1.71 cm    LVOT peak VTI 21.19 cm    PW 0.81 0.6 - 1.1 cm    MV Peak A Endy 1.19 m/s    E wave deceleration time 180.00 msec    MV Peak E Endy 0.66 m/s    PV Peak D Endy 0.48 m/s    PV Peak S Endy 0.61 m/s    RA Major Axis 3.39 cm    RA Width 3.12 cm    RVDD 3.08 cm    Sinus 2.58 cm    TAPSE 1.76 cm    LA WIDTH 3.09 cm    MV stenosis pressure 1/2 time 52.20 ms    LV Diastolic Volume 63.87 mL    LV Systolic Volume 22.76 mL    LVOT peak endy 1.19 m/s    TDI LATERAL 0.06 m/s    TDI SEPTAL 0.06 m/s    LA Vol (MOD) 20.82 cm3    MV "A" wave duration 7.04 msec    LV LATERAL E/E' RATIO 11.00 m/s    LV SEPTAL E/E' RATIO 11.00 m/s    FS 37 %    LA Vol 35.46 cm3    LV mass 94.25 g    Left Ventricle Relative Wall Thickness 0.41 cm    AV valve area 1.77 cm2    AV Velocity Ratio 0.65     AV index (prosthetic) 0.77     MV " valve area p 1/2 method 4.21 cm2    E/A ratio 0.55     Mean e' 0.06 m/s    Pulm vein S/D ratio 1.27     LVOT area 2.3 cm2    LVOT stroke volume 48.64 cm3    AV peak gradient 14 mmHg    E/E' ratio 11.00 m/s    LV Systolic Volume Index 17.0 mL/m2    LV Diastolic Volume Index 47.66 mL/m2    SABA 26.5 mL/m2    LV Mass Index 70 g/m2    SABA (MOD) 15.5 mL/m2    BSA 1.36 m2    Right Atrial Pressure (from IVC) 3 mmHg    EF 60 %    Narrative    · The estimated ejection fraction is 60%.  · Normal right ventricular size with normal right ventricular systolic   function.  · Normal left ventricular diastolic function.  · The left ventricle is normal in size with normal systolic function.  · Normal central venous pressure (3 mmHg).          CHINTAN:  No results found. However, due to the size of the patient record, not all encounters were searched. Please check Results Review for a complete set of results.    ASSESSMENT/PLAN:           Pre-op Assessment    I have reviewed the Patient Summary Reports.     I have reviewed the Nursing Notes. I have reviewed the NPO Status.   I have reviewed the Medications.     Review of Systems  Anesthesia Hx:  No problems with previous Anesthesia                Cardiovascular:     Hypertension   CAD                                          Pulmonary:    Asthma                    Renal/:  Chronic Renal Disease                Hepatic/GI:     GERD   Significant stenosis in the ARPITA. Taking GLP-1 Agonists            Musculoskeletal:  Arthritis               Neurological:    Neuromuscular Disease,                                   Endocrine:  Diabetes, type 2           Psych:  Psychiatric History                  Physical Exam  General: Well nourished, Cooperative, Alert and Oriented    Airway:  Mallampati: III / II    Dental:  Dentures        Anesthesia Plan  Type of Anesthesia, risks & benefits discussed:    Anesthesia Type: MAC, Gen Natural Airway  Intra-op Monitoring Plan: Standard ASA Monitors  Post  Op Pain Control Plan: multimodal analgesia  Induction:  IV  Informed Consent: Informed consent signed with the Patient and all parties understand the risks and agree with anesthesia plan.  All questions answered.   ASA Score: 3    Ready For Surgery From Anesthesia Perspective.     .           [1]   Patient Active Problem List  Diagnosis    Primary osteoarthritis of both knees    Vertigo    Type 2 diabetes mellitus with hyperglycemia, with long-term current use of insulin    Coronary artery calcification seen on computed tomography    Irritable bowel syndrome with diarrhea    Lumbar facet arthropathy    Cervical radiculopathy    Pure hypercholesterolemia    Uncontrolled hypertension    Spinal stenosis of lumbar region    Chronic right shoulder pain    Stress incontinence, female    Aortic atherosclerosis    Pulmonary nodule    Allergic rhinitis    Recurrent major depressive disorder, in full remission    Shoulder impingement syndrome, unspecified laterality    Bronchiectasis without complication    Sciatica of right side    Pain    Pneumonia due to COVID-19 virus    Mixed hyperlipidemia    Epigastric pain    Personal history of colonic polyps    Kidney stone on left side    Urinary urgency    Urinary incontinence, urge    Chronic right-sided low back pain without sciatica    Gastroesophageal reflux disease without esophagitis    History of colon cancer    Polyp of colon    Trigger finger, right index finger    Left hip pain    Abnormal CT of the chest    Noncompliance with medication regimen    ILD (interstitial lung disease)    BMI 23.0-23.9, adult    Left wrist fracture, closed, initial encounter    Impaired mobility and ADLs    Abdominal pain    Hypokalemia   [2]   Current Facility-Administered Medications on File Prior to Encounter   Medication Dose Route Frequency Provider Last Rate Last Admin    triamcinolone acetonide injection 40 mg  40 mg Intra-articular  Jayne Cain PA-C   40 mg at 07/12/22 2599      Current Outpatient Medications on File Prior to Encounter   Medication Sig Dispense Refill    aspirin (ECOTRIN) 81 MG EC tablet Take 81 mg by mouth once daily.      azelastine (ASTELIN) 137 mcg (0.1 %) nasal spray 1 spray (137 mcg total) by Nasal route 2 (two) times daily. 30 mL 3    DEXCOM G7  Misc Use to check glucose with sensors 1 each 0    montelukast (SINGULAIR) 10 mg tablet Take 1 tablet (10 mg total) by mouth every evening. 90 tablet 1    NOVOLOG U-100 INSULIN ASPART 100 unit/mL injection To use with insulin pump. Max daily dose 130 units. 40 mL 11    OMNIPOD 5 G6-G7 PODS, GEN 5, Crtg Inject 1 each into the skin every 72 hours. 10 each 11    OZEMPIC 0.25 mg or 0.5 mg (2 mg/3 mL) pen injector Inject 0.5 mg into the skin every 7 days. 3 mL 11    pantoprazole (PROTONIX) 40 MG tablet Take 1 tablet (40 mg total) by mouth once daily. 30 tablet 11    valsartan (DIOVAN) 80 MG tablet Take 1 tablet (80 mg total) by mouth once daily. 90 tablet 3    valsartan-hydrochlorothiazide (DIOVAN-HCT) 160-12.5 mg per tablet Take 1 tablet by mouth once daily. 30 tablet 1    albuterol (PROVENTIL) 2.5 mg /3 mL (0.083 %) nebulizer solution Take 3 mLs (2.5 mg total) by nebulization every 6 (six) hours as needed for Wheezing. Rescue 60 mL 2    cetirizine (ZYRTEC) 10 MG tablet Take 1 tablet (10 mg total) by mouth once daily. 30 tablet 11    empagliflozin (JARDIANCE) 25 mg tablet Take 1 tablet (25 mg total) by mouth once daily. (Patient not taking: Reported on 4/8/2025) 30 tablet 11    fluticasone propionate (FLONASE) 50 mcg/actuation nasal spray 2 sprays (100 mcg total) by Each Nostril route once daily. 9.9 mL 11    HYDROcodone-acetaminophen (NORCO) 5-325 mg per tablet Take 1 tablet by mouth every 8 (eight) hours as needed for Pain. (Patient not taking: Reported on 4/8/2025) 20 tablet 0    insulin glargine U-100, Lantus, (LANTUS SOLOSTAR U-100 INSULIN) 100 unit/mL (3 mL) InPn pen Inject 40 Units into the skin once daily.  "(Patient not taking: Reported on 4/8/2025) 4 each 11    metFORMIN (GLUCOPHAGE) 500 MG tablet Take 1 tablet (500 mg total) by mouth 2 (two) times daily with meals. 180 tablet 3    nystatin (MYCOSTATIN) cream Apply topically 2 (two) times daily. (Patient not taking: Reported on 4/22/2025) 30 g 0    pantoprazole (PROTONIX) 40 MG tablet Take 1 tablet (40 mg total) by mouth once daily. 30 tablet 5    QUEtiapine (SEROQUEL) 25 MG Tab Take 1 tablet (25 mg total) by mouth every evening. (Patient not taking: Reported on 4/22/2025) 30 tablet 5    TRUE METRIX GLUCOSE TEST STRIP Strp To use to inject insulin twice daily. 100 each 9    TRUEPLUS PEN NEEDLE 32 gauge x 5/32" Ndle To inject insulin twice daily 100 each 3   [3]   Social History  Socioeconomic History    Marital status:    Tobacco Use    Smoking status: Former     Passive exposure: Never    Smokeless tobacco: Never   Substance and Sexual Activity    Alcohol use: Not Currently     Comment: social    Drug use: No    Sexual activity: Yes     Partners: Male     Birth control/protection: None     Social Drivers of Health     Financial Resource Strain: Low Risk  (4/8/2025)    Overall Financial Resource Strain (CARDIA)     Difficulty of Paying Living Expenses: Not hard at all   Food Insecurity: No Food Insecurity (4/8/2025)    Hunger Vital Sign     Worried About Running Out of Food in the Last Year: Never true     Ran Out of Food in the Last Year: Never true   Transportation Needs: No Transportation Needs (4/8/2025)    PRAPARE - Transportation     Lack of Transportation (Medical): No     Lack of Transportation (Non-Medical): No   Physical Activity: Insufficiently Active (4/8/2025)    Exercise Vital Sign     Days of Exercise per Week: 7 days     Minutes of Exercise per Session: 10 min   Stress: Stress Concern Present (4/8/2025)    [employer] - Occupational Stress Questionnaire     Feeling of Stress : To some extent   Housing Stability: Low " Risk  (4/8/2025)    Housing Stability Vital Sign     Unable to Pay for Housing in the Last Year: No     Homeless in the Last Year: No

## 2025-04-28 NOTE — ASSESSMENT & PLAN NOTE
Abdominal exam benign. Patient with no pain currently but is actively vomiting.  -Afebrile, no leukocytosis.  -LA 2.9, trending  -CRP 2.9 and procal 0.02.  -CTA AP with no acute abdominopelvic abnormality identified. Moderate atherosclerotic disease of the abdominal aorta.  Moderate narrowing at the origin of the ARPITA, which is otherwise patent.  No evidence of acute abdominal vascular occlusion. Pancreatic cystic lesions measuring up to 1.3 cm.     -IVF overnight.  -CLD, npo at mn  -Continue PPI.  -PRN antiemetics and IV pain meds  -GI consulted, appreciate assistance.  - plan to perform EGD tomorrow, 4/29/25  - clear liquids today, NPO at midnight   - advise her to follow up at GI clinic in May to evaluate need for gastric emptying study if imaging obtained inpatient is still negative

## 2025-04-28 NOTE — CONSULTS
[facility]  Gastroenterology  Consult Note    Patient Name: Juliette Seo  MRN: [id number]  Admission Date: 4/27/2025  Hospital Length of Stay: 0 days  Code Status: Full Code   Attending Provider: Delorse Cat MD   Consulting Provider: Doyle Low DO  Primary Care Physician: Suman Rubi MD  Principal Problem:Abdominal pain    Inpatient consult to Gastroenterology  Consult performed by: Doyle Low DO  Consult ordered by: Yasmeen Holland NP        Subjective:     HPI:  78 y/o F with a medical hx of vertigo, DM2, CAD, HTN, urinary urgency, nephrolithiasis, GERD, ILD, and IBS who [place] [facility] for worsening upper abdominal pain over the past month. Reports RUQ and epigastric abdominal pain beginning 1 month ago that acutely worsened over the past week. Describes the pain as stabbing/throbbing pain that radiates around her right side into her back. Endorses associated abdominal bloating, decreased appetite, nausea, vomiting, and fear to eat. Reports PO intake seems to make symptoms worse. Has been taking tylenol, antacids, and NSAIDs along with her daily protonix without much relief. Was previously seen in the emergency department on 4/16/25, CT abdomen and pelvis at the time without evidence of acute intra-abdominal findings. Was discharged on Bentyl and Keflex for UTI and abdominal cramping. Was seen by her PCP 4/22/25 who discontinued the abx and referred her to GI, but not available till late May. Pain remained persistent, prompting her to come back to ED.      On initial presentation, pt mildly tachycardic and intermittently hypertensive otherwise vitals stable. CBC unremarkable. Lactate 3.2>>2.4. Blood cxs collected. UA with 1+leukocytes, 19 WBCs, rare bacteria, and 3 squams. CTA A/P with no acute abdominopelvic abnormality identified. Moderate atherosclerotic disease of the abdominal aorta. Moderate narrowing at the origin of the ARPITA, which is otherwise patent. No  evidence of acute abdominal vascular occlusion. Pancreatic cystic lesions measuring up to 1.3 cm.     In the ED, she has been given 2L LR bolus, 2g mag sulfate, IV Zosyn, hyoscyamine, zofran x2, 4mg IV morphine, 5mg IV morphine, Pantoprazole, Famotidine, and IV Metoclopramide 10 mg. GI consulted for acute on chronic abdominal pain.       Past Medical History:   Diagnosis Date    Allergy     Anxiety     Arthritis     osteo    Asthma     Diabetic retinopathy     GERD (gastroesophageal reflux disease)     High cholesterol     Hypertension     Kidney stone     Type 2 diabetes mellitus, uncontrolled, with neuropathy     Urinary tract infection     Vaginal infection        Past Surgical History:   Procedure Laterality Date    CHOLECYSTECTOMY      COLONOSCOPY N/A 6/8/2021    Procedure: COLONOSCOPY;  Surgeon: Rain Pop MD;  Location: [facility] ENDO;  Service: Endoscopy;  Laterality: N/A;    COLONOSCOPY N/A 7/8/2021    Procedure: colonoscopy with single balloon scope;  Surgeon: Steffen Dueñas MD;  Location: [facility] ENDO;  Service: Endoscopy;  Laterality: N/A;    ESOPHAGOGASTRODUODENOSCOPY N/A 6/8/2021    Procedure: EGD (ESOPHAGOGASTRODUODENOSCOPY);  Surgeon: Rain Pop MD;  Location: [facility];  Service: Endoscopy;  Laterality: N/A;    EYE SURGERY      HYSTERECTOMY      INCISIONAL HERNIA REPAIR  2003    ROTATOR CUFF REPAIR Right 11/07/2017    TRIGGER FINGER RELEASE Right 6/3/2022    Procedure: RELEASE, TRIGGER FINGER; right index, middle, ring;  Surgeon: Sarmad Wesley Jr., MD;  Location: [facility] OR;  Service: Orthopedics;  Laterality: Right;       Review of patient's allergies indicates:   Allergen Reactions    Actos [pioglitazone] Nausea Only    Darvocet a500 [propoxyphene n-acetaminophen] Nausea And Vomiting    Dilaudid [hydromorphone (bulk)] Nausea And Vomiting     Family History       Problem Relation (Age of Onset)    Diabetes Sister, Brother    Stroke Mother          Tobacco Use    Smoking status: Former      Passive exposure: Never    Smokeless tobacco: Never   Substance and Sexual Activity    Alcohol use: Not Currently     Comment: social    Drug use: No    Sexual activity: Yes     Partners: Male     Birth control/protection: None     Review of Systems   Constitutional:  Positive for activity change, appetite change, fatigue and unexpected weight change.   Respiratory:  Negative for cough and shortness of breath.    Cardiovascular:  Negative for chest pain.   Gastrointestinal:  Positive for abdominal distention, abdominal pain, diarrhea, nausea and vomiting.   Neurological:  Positive for dizziness, weakness, light-headedness and headaches. Negative for syncope, speech difficulty and numbness.   Psychiatric/Behavioral:  The patient is nervous/anxious.      Objective:     Vital Signs (Most Recent):  Temp: 98.6 °F (37 °C) (04/28/25 0711)  Pulse: 81 (04/28/25 0711)  Resp: 18 (04/28/25 0711)  BP: 126/61 (04/28/25 0711)  SpO2: 98 % (04/28/25 0711) Vital Signs (24h Range):  Temp:  [97.4 °F (36.3 °C)-98.6 °F (37 °C)] 98.6 °F (37 °C)  Pulse:  [] 81  Resp:  [16-20] 18  SpO2:  [97 %-100 %] 98 %  BP: (126-182)/(61-84) 126/61     Weight: 50.8 kg (112 lb) (04/27/25 1509)  Body mass index is 23.41 kg/m².      Intake/Output Summary (Last 24 hours) at 4/28/2025 0923  Last data filed at 4/28/2025 0705  Gross per 24 hour   Intake 1049 ml   Output --   Net 1049 ml       Lines/Drains/Airways       Peripheral Intravenous Line  Duration                  Peripheral IV - Single Lumen 04/27/25 1635 20 G Posterior;Right Hand <1 day         Peripheral IV - Single Lumen 04/27/25 1636 20 G Left;Posterior Hand <1 day                     Physical Exam  Vitals and nursing note reviewed.   Constitutional:       General: She is in acute distress.      Appearance: Normal appearance. She is not ill-appearing.   HENT:      Head: Normocephalic and atraumatic.   Eyes:      Extraocular Movements: Extraocular movements intact.       Conjunctiva/sclera: Conjunctivae normal.   Cardiovascular:      Rate and Rhythm: Normal rate and regular rhythm.      Pulses: Normal pulses.      Heart sounds: Normal heart sounds. No murmur heard.  Pulmonary:      Effort: Pulmonary effort is normal. No respiratory distress.      Breath sounds: Normal breath sounds. No wheezing, rhonchi or rales.   Abdominal:      General: Bowel sounds are decreased.      Palpations: Abdomen is soft.      Tenderness: There is abdominal tenderness in the right upper quadrant and epigastric area.   Musculoskeletal:      Right lower leg: No edema.      Left lower leg: No edema.   Neurological:      Mental Status: She is alert and oriented to person, place, and time. Mental status is at baseline.   Psychiatric:         Mood and Affect: Mood normal.         Behavior: Behavior normal.          Significant Labs:  All pertinent lab results from the last 24 hours have been reviewed.    Significant Imaging:  Imaging results within the past 24 hours have been reviewed.  Assessment/Plan:     GI  * Abdominal pain  78 y/o F with a medical hx of vertigo, DM2, CAD, HTN, urinary urgency, nephrolithiasis, GERD, ILD, and IBS who presented to [facility] [place] on [place]27/25 for worsening upper abdominal pain over the past month. Reports RUQ and epigastric abdominal pain beginning 1 month ago that acutely worsened over the past week. Describes the pain as stabbing/throbbing pain that radiates around her right side into her back. Endorses associated abdominal bloating, decreased appetite, nausea, vomiting, and fear to eat. Reports PO intake seems to make symptoms worse. Has been taking tylenol, antacids, and NSAIDs along with her daily protonix without much relief.      CTA A/P with no acute abdominopelvic abnormality identified. Moderate atherosclerotic disease of the abdominal aorta. Moderate narrowing at the origin of the ARPITA, which is otherwise patent. No evidence of acute abdominal vascular occlusion. Pancreatic  cystic lesions measuring up to 1.3 cm.     In the ED, she has been given 2L LR bolus, 2g mag sulfate, IV Zosyn, hyoscyamine, zofran x2, 4mg IV morphine, 5mg IV morphine, Pantoprazole, Famotidine, and IV Metoclopramide 10 mg. GI consulted for acute on chronic abdominal pain.     Last EGD 6/8/21 for epigastric abdominal pain: normal except biopsy showing reactive gastropathy.  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 Biopsy negative for malignancy. Inflamed granulation tissue with reactive atypia.     In the setting of chronic epigastric pain, worse with eating, and elevated lactate, it is reasonable to rule out mesenteric ischemia. Agree with Mesenteric U/S. Mesenteric U/S showed: No evidence of hemodynamically significant stenosis in the celiac or SMA.  Significant stenosis in the ARPITA.    Pt's case also c/b hx of poorly controlled DM2 (A1c 12.1 before most recent value) and taking Ozempic for the better part of the past year.     Plan:  - plan to perform EGD tomorrow, 4/29/25  - clear liquids today, NPO at midnight   - advise her to follow up at GI clinic in May to evaluate need for gastric emptying study if imaging obtained inpatient is still negative          Thank you for your consult. I will follow-up with patient. Please contact us if you have any additional questions.    Doyle Low, DO  Gastroenterology  [facility]

## 2025-04-28 NOTE — H&P
"  [facility] - Emergency [facility] Medicine  History & Physical    Patient Name: Juliette Seo  MRN: [id number]  Patient Class: OP- Observation  Admission Date: 4/27/2025  Attending Physician: Delores Cat MD   Primary Care Provider: Suman Rubi MD         Patient information was obtained from patient, relative(s), past medical records, and ER records.     Subjective:     Principal Problem:Abdominal pain    Chief Complaint:   Chief Complaint   Patient presents with    Abdominal Pain     Abd pain x 2 weeks; saw pcp; "told to take Tylenol and see gi'. Reports sand or 'granules' when she wiped with bm today. Pain radiates from her back around to abd        HPI: Juliette Seo is a 79 y.o. female with a PMHx of vertigo, type 2 diabetes, coronary artery disease, hypertension, urinary urgency, nephrolithiasis, GERD, interstitial lung disease, and IBS who presents to the ED for evaluation of upper abdominal pain x1 month. Pt reports RUQ and epigastric abdominal pain beginning 1 month ago that acutely worsened over the past week. Describes the pain as stabbing/throbbing pain that also radiates around her right side into her back. Endorses associated abdominal bloating, decreased appetite, nausea, chills, mild SOB, and fatigue. Notes PO intake seems to make her symptoms worse. She has been taking tylenol and antacids along with her daily protonix without relief. She was previously seen in the emergency department on 04/16/2025, CT abdomen and pelvis without evidence of acute intra-abdominal findings. She was discharged on Bentyl and Keflex for UTI and abdominal cramping. She was seen by her PCP who discontinued the abx and referred her to GI. Follow up with Gastroenterology is not until May. She denies fever, cough, congestion, diarrhea, or constipation. She states she had no vomiting previously but had several episodes of emesis since arriving to the ED today. She reports her abdominal pain has resolved since " being treated in the ED, but she remains significantly nauseated. She denies CP, lightheadedness, syncope, or diaphoresis.    In the ED, pt mildly tachycardic and intermittently hypertensive otherwise vitals stable, afebrile. CBC unremarkable. K+3.4. Bicarb 16. Glucose 208. Calcium 7.3. Mag 1.7. Total protein 5.9. Albumin 3.0. POC lactate 3.2>>2.4. Blood cxs in process. UA with 1+leukocytes, 19 WBCs, rare bacteria, and 3 squams. CXR with interstitial findings are somewhat more conspicuous on current exam, may reflect accentuation from overlying habitus. Interval edema or other pneumonitis are considerations. No large focal consolidation. CTA AP with no acute abdominopelvic abnormality identified. Moderate atherosclerotic disease of the abdominal aorta.  Moderate narrowing at the origin of the ARPITA, which is otherwise patent. No evidence of acute abdominal vascular occlusion. Pancreatic cystic lesions measuring up to 1.3 cm. The patient received 1L LR bolus, 2g mag sulfate, zosyn, zofran x2, 50meq potassium bicarbonate, 4mg IV morphine, and 5mg IV morphine.    Past Medical History:   Diagnosis Date    Allergy     Anxiety     Arthritis     osteo    Asthma     Diabetic retinopathy     GERD (gastroesophageal reflux disease)     High cholesterol     Hypertension     Kidney stone     Type 2 diabetes mellitus, uncontrolled, with neuropathy     Urinary tract infection     Vaginal infection        Past Surgical History:   Procedure Laterality Date    CHOLECYSTECTOMY      COLONOSCOPY N/A 6/8/2021    Procedure: COLONOSCOPY;  Surgeon: Rain Pop MD;  Location: [facility];  Service: Endoscopy;  Laterality: N/A;    COLONOSCOPY N/A 7/8/2021    Procedure: colonoscopy with single balloon scope;  Surgeon: Steffen Dueñas MD;  Location: [facility];  Service: Endoscopy;  Laterality: N/A;    ESOPHAGOGASTRODUODENOSCOPY N/A 6/8/2021    Procedure: EGD (ESOPHAGOGASTRODUODENOSCOPY);  Surgeon: Rain Pop MD;  Location: [facility]  ENDO;  Service: Endoscopy;  Laterality: N/A;    EYE SURGERY      HYSTERECTOMY      INCISIONAL HERNIA REPAIR  2003    ROTATOR CUFF REPAIR Right 11/07/2017    TRIGGER FINGER RELEASE Right 6/3/2022    Procedure: RELEASE, TRIGGER FINGER; right index, middle, ring;  Surgeon: Sarmad Wesley Jr., MD;  Location: [facility] OR;  Service: Orthopedics;  Laterality: Right;       Review of patient's allergies indicates:   Allergen Reactions    Actos [pioglitazone] Nausea Only    Darvocet a500 [propoxyphene n-acetaminophen] Nausea And Vomiting    Dilaudid [hydromorphone (bulk)] Nausea And Vomiting       Current Facility-Administered Medications on File Prior to Encounter   Medication    triamcinolone acetonide injection 40 mg     Current Outpatient Medications on File Prior to Encounter   Medication Sig    aspirin (ECOTRIN) 81 MG EC tablet Take 81 mg by mouth once daily.    azelastine (ASTELIN) 137 mcg (0.1 %) nasal spray 1 spray (137 mcg total) by Nasal route 2 (two) times daily.    DEXCOM G7  Misc Use to check glucose with sensors    montelukast (SINGULAIR) 10 mg tablet Take 1 tablet (10 mg total) by mouth every evening.    NOVOLOG U-100 INSULIN ASPART 100 unit/mL injection To use with insulin pump. Max daily dose 130 units.    OMNIPOD 5 G6-G7 PODS, GEN 5, Crtg Inject 1 each into the skin every 72 hours.    OZEMPIC 0.25 mg or 0.5 mg (2 mg/3 mL) pen injector Inject 0.5 mg into the skin every 7 days.    pantoprazole (PROTONIX) 40 MG tablet Take 1 tablet (40 mg total) by mouth once daily.    valsartan (DIOVAN) 80 MG tablet Take 1 tablet (80 mg total) by mouth once daily.    valsartan-hydrochlorothiazide (DIOVAN-HCT) 160-12.5 mg per tablet Take 1 tablet by mouth once daily.    albuterol (PROVENTIL) 2.5 mg /3 mL (0.083 %) nebulizer solution Take 3 mLs (2.5 mg total) by nebulization every 6 (six) hours as needed for Wheezing. Rescue    cetirizine (ZYRTEC) 10 MG tablet Take 1 tablet (10 mg total) by mouth once daily.     "empagliflozin (JARDIANCE) 25 mg tablet Take 1 tablet (25 mg total) by mouth once daily. (Patient not taking: Reported on 4/8/2025)    fluticasone propionate (FLONASE) 50 mcg/actuation nasal spray 2 sprays (100 mcg total) by Each Nostril route once daily.    HYDROcodone-acetaminophen (NORCO) 5-325 mg per tablet Take 1 tablet by mouth every 8 (eight) hours as needed for Pain. (Patient not taking: Reported on 4/8/2025)    insulin glargine U-100, Lantus, (LANTUS SOLOSTAR U-100 INSULIN) 100 unit/mL (3 mL) InPn pen Inject 40 Units into the skin once daily. (Patient not taking: Reported on 4/8/2025)    metFORMIN (GLUCOPHAGE) 500 MG tablet Take 1 tablet (500 mg total) by mouth 2 (two) times daily with meals.    nystatin (MYCOSTATIN) cream Apply topically 2 (two) times daily. (Patient not taking: Reported on 4/22/2025)    pantoprazole (PROTONIX) 40 MG tablet Take 1 tablet (40 mg total) by mouth once daily.    QUEtiapine (SEROQUEL) 25 MG Tab Take 1 tablet (25 mg total) by mouth every evening. (Patient not taking: Reported on 4/22/2025)    TRUE METRIX GLUCOSE TEST STRIP Strp To use to inject insulin twice daily.    TRUEPLUS PEN NEEDLE 32 gauge x 5/32" Ndle To inject insulin twice daily     Family History       Problem Relation (Age of Onset)    Diabetes Sister, Brother    Stroke Mother          Tobacco Use    Smoking status: Former     Passive exposure: Never    Smokeless tobacco: Never   Substance and Sexual Activity    Alcohol use: Not Currently     Comment: social    Drug use: No    Sexual activity: Yes     Partners: Male     Birth control/protection: None     Review of Systems   Constitutional:  Positive for appetite change (decreased), chills and fatigue. Negative for diaphoresis and fever.   HENT:  Negative for congestion, rhinorrhea and sore throat.    Eyes:  Negative for photophobia and visual disturbance.   Respiratory:  Positive for shortness of breath (mild, intermittent; associated with severe pain). Negative for " cough and wheezing.    Cardiovascular:  Negative for chest pain, palpitations and leg swelling.   Gastrointestinal:  Positive for abdominal distention, abdominal pain, nausea and vomiting. Negative for constipation and diarrhea.   Genitourinary:  Positive for dysuria. Negative for frequency and hematuria.   Musculoskeletal:  Positive for back pain. Negative for gait problem, myalgias and neck pain.   Skin:  Negative for color change, pallor, rash and wound.   Neurological:  Negative for syncope, weakness, light-headedness and numbness.   Psychiatric/Behavioral:  Negative for confusion and hallucinations. The patient is not nervous/anxious.      Objective:     Vital Signs (Most Recent):  Temp: 97.4 °F (36.3 °C) (04/27/25 1903)  Pulse: 92 (04/27/25 1903)  Resp: 18 (04/27/25 1903)  BP: (!) 170/84 (04/27/25 1903)  SpO2: 97 % (04/27/25 1903) Vital Signs (24h Range):  Temp:  [97.4 °F (36.3 °C)-98.6 °F (37 °C)] 97.4 °F (36.3 °C)  Pulse:  [] 92  Resp:  [16-20] 18  SpO2:  [97 %-100 %] 97 %  BP: (138-182)/(67-84) 170/84     Weight: 50.8 kg (112 lb)  Body mass index is 23.41 kg/m².     Physical Exam  Vitals and nursing note reviewed.   Constitutional:       General: She is in acute distress (appears uncomfortable, actively vomiting).      Appearance: She is not toxic-appearing or diaphoretic.   HENT:      Head: Normocephalic and atraumatic.      Mouth/Throat:      Mouth: Mucous membranes are dry.   Eyes:      Pupils: Pupils are equal, round, and reactive to light.   Cardiovascular:      Rate and Rhythm: Regular rhythm. Tachycardia present.      Pulses: Normal pulses.   Pulmonary:      Effort: Pulmonary effort is normal. No respiratory distress.      Breath sounds: No wheezing, rhonchi or rales.   Abdominal:      General: Bowel sounds are normal. There is no distension.      Palpations: Abdomen is soft.      Tenderness: There is abdominal tenderness (mild TTP) in the right upper quadrant and epigastric area. There is no  right CVA tenderness, left CVA tenderness or guarding.   Musculoskeletal:         General: Normal range of motion.      Cervical back: Normal range of motion.      Right lower leg: No edema.      Left lower leg: No edema.   Skin:     General: Skin is warm and dry.      Capillary Refill: Capillary refill takes less than 2 seconds.   Neurological:      General: No focal deficit present.      Mental Status: She is alert and oriented to person, place, and time.      Motor: No weakness.   Psychiatric:         Mood and Affect: Mood normal.         Behavior: Behavior normal.              CRANIAL NERVES     CN III, IV, VI   Pupils are equal, round, and reactive to light.       Significant Labs: All pertinent labs within the past 24 hours have been reviewed.  VBGs:   Recent Labs   Lab 04/27/25  1904   PH 7.400   PCO2 41.8   HCO3 25.0   PO2 54.8     CBC:   Recent Labs   Lab 04/27/25  1623 04/27/25  1648 04/27/25  1904   WBC 8.33  --   --    HGB 13.4  --   --    HCT 40.1 40 41.1     --   --      CMP:   Recent Labs   Lab 04/27/25  1753      K 3.4*      CO2 16*   BUN 15   CREATININE 0.6   CALCIUM 7.3*   ALBUMIN 3.0*   BILITOT 0.2   ALKPHOS 63   AST 15   ALT 15   ANIONGAP 13     Lipase:   Recent Labs   Lab 04/27/25  1623   LIPASE 23     Magnesium:   Recent Labs   Lab 04/27/25  1753   MG 1.7     Urine Studies:   Recent Labs   Lab 04/27/25  1734   COLORU Colorless*   APPEARANCEUA Clear   PHUR 6.0   SPECGRAV 1.015   PROTEINUA Trace*   GLUCUA 3+*   BILIRUBINUA Negative   OCCULTUA Negative   NITRITE Negative   UROBILINOGEN Negative   LEUKOCYTESUR 1+*   RBCUA 1   WBCUA 19*   BACTERIA Rare   HYALINECASTS 0       Significant Imaging: I have reviewed all pertinent imaging results/findings within the past 24 hours.  Imaging Results              X-Ray Chest AP Portable (Final result)  Result time 04/27/25 18:00:38      Final result by Zachariah Brooks MD (04/27/25 18:00:38)                   Impression:      1.  Interstitial findings are somewhat more conspicuous on current exam, may reflect accentuation from overlying habitus.  Interval edema or other pneumonitis are considerations.  No large focal consolidation.      Electronically signed by: Zachariah Brooks MD  Date:    04/27/2025  Time:    18:00               Narrative:    EXAMINATION:  XR CHEST AP PORTABLE    CLINICAL HISTORY:  Sepsis;    TECHNIQUE:  Single frontal view of the chest was performed.    COMPARISON:  04/16/2025    FINDINGS:  The cardiomediastinal silhouette is not enlarged..  There is no pleural effusion.  The trachea is midline.  The lungs are symmetrically expanded bilaterally with coarse interstitial attenuation bilaterally, similar to the previous examination to somewhat more prominent..  No large focal consolidation seen.  There is no pneumothorax.  The osseous structures are remarkable for degenerative change and osteopenia..                                       CTA Abdomen and Pelvis (Final result)  Result time 04/27/25 18:29:41      Final result by Tylor Salinas MD (04/27/25 18:29:41)                   Impression:      No acute abdominopelvic abnormality identified.    Moderate atherosclerotic disease of the abdominal aorta.  Moderate narrowing at the origin of the ARPITA, which is otherwise patent.  No evidence of acute abdominal vascular occlusion.    Pancreatic cystic lesions measuring up to 1.3 cm.  Follow-up in 2 years time with contrast enhanced MRI or pancreas protocol CT recommended by the ACR.    Additional findings as described.    Electronically signed by resident: Dinah Cagle  Date:    04/27/2025  Time:    17:34    Electronically signed by: Tylor Salinas MD  Date:    04/27/2025  Time:    18:29               Narrative:    EXAMINATION:  CTA ABDOMEN AND PELVIS    CLINICAL HISTORY:  Mesenteric ischemia, acute;    TECHNIQUE:  Low dose axial images, sagittal and coronal reformations were obtained from the lung bases to the pubic symphysis  following the IV administration of 100 mL of Omnipaque 350 in the arterial and venous phases .  Oral contrast was not administered.    COMPARISON:  CT abdomen pelvis 04/16/2025 and CT dated 06/09/2021.    FINDINGS:  Heart: Normal in size. No pericardial effusion.    Lung Bases: Stable 5 mm nodule in the right lower lobe.  Chronic changes in the lungs involving particularly nodular subpleural opacities.  No confluent consolidation.  No pleural effusion.    Liver: Peripheral area of hyperattenuation at the inferior most aspect of the right hepatic lobe (series 601, image 24) the common isointense on venous phase likely reflecting transient hepatic intensity differences.  Hepatic and portal veins are patent.    Gallbladder: Surgically absent.    Bile Ducts: No evidence of dilated ducts.    Pancreas: 1.3 cm low-density lesion in the pancreatic body (series 2, image 212).  Additional smaller hypodensity in the pancreatic tail (series 2, image 23).  No pancreatic ductal dilatation.    Spleen: Normal size and attenuation.    Adrenals: No focal lesions.    Kidneys/ Ureters: Focal areas of cortical thinning in the superior pole the right kidney, superior pole of the left kidney, in interpolar region of the left kidney likely reflecting areas of scarring.  6 mm nonobstructing stone in the inferior pole of the left kidney.  No enhancing mass.  Kidneys concentrate contrast normally.  No hydroureteronephrosis.    Bladder: Not fully distended which limits evaluation.    Reproductive organs: Unremarkable.    GI Tract/Mesentery: Colonic diverticulosis involving the ascending, descending, and sigmoid colon.  Evidence of diverticulitis.  No bowel obstruction.  The appendix is within normal limits.    Peritoneal Space: No ascites. No free air.    Retroperitoneum: No significant adenopathy.    Abdominal wall: Probable injection granulomas in the bilateral gluteal soft tissues.    Vasculature: Moderate atherosclerotic disease of the  abdominal aorta.  Noncalcified plaque in the infrarenal abdominal aorta distal to the origin of the ARPITA contributes to moderate stenosis.  The celiac artery has minimal calcifications at its origin without significant narrowing.  The celiac artery branches are patent.  Mild calcification at the origin of the SMA without significant stenosis.  Minimal calcifications at origin of left renal artery.  Renal arteries are otherwise patent.  Moderate calcification of the origin of the ARPITA with associated moderate narrowing.  The ARPITA is otherwise patent.  The portal vein, SMV, and splenic veins are patent.    Bones: Degenerative changes lumbar spine.  No acute fracture.  No osseous destructive lesions.                                      Assessment/Plan:     Assessment & Plan  Abdominal pain  Abdominal exam benign. Patient with no pain currently but is actively vomiting.  -Afebrile, no leukocytosis.  -POC lactic 3.2>>2.4, serum lactic pending.  -CRP 2.9 and procal 0.02.  -CTA AP with no acute abdominopelvic abnormality identified. Moderate atherosclerotic disease of the abdominal aorta.  Moderate narrowing at the origin of the ARPITA, which is otherwise patent.  No evidence of acute abdominal vascular occlusion. Pancreatic cystic lesions measuring up to 1.3 cm.   -US mesenteric ischemia study pending.   -IVF overnight.  -NPO for now.  -Continue PPI.  -PRN antiemetics and IV pain meds  -GI consulted, appreciate assistance.  Hypokalemia  Patient's most recent potassium results are listed below.   Recent Labs     04/27/25  1753   K 3.4*     Plan  - Replete potassium per protocol  - Monitor potassium Daily  - Patient's hypokalemia is stable  Primary osteoarthritis of both knees  -History noted.  -PRN tylenol.  Type 2 diabetes mellitus with hyperglycemia, with long-term current use of insulin  Patient's FSGs are uncontrolled due to hyperglycemia on current medication regimen.  Last A1c reviewed-   Lab Results   Component Value  "Date    HGBA1C 12.1 (H) 06/12/2024     Most recent fingerstick glucose reviewed- No results for input(s): "POCTGLUCOSE" in the last 24 hours.  Current correctional scale  Low  Maintain anti-hyperglycemic dose as follows-   Antihyperglycemics (From admission, onward)      Start     Stop Route Frequency Ordered    04/27/25 2101  insulin aspart U-100 pen 0-5 Units         -- SubQ Before meals & nightly PRN 04/27/25 2001          Hold Oral hypoglycemics while patient is in the hospital.   -Pt currently has omnipod in place, agreed to have patient keep it since it was just changed, but discussed we may need to remove it if she continues to have N/V or develops hypoglycemia.  -Accuchecks AC/HS  Coronary artery calcification seen on computed tomography  -History noted.  -Continue ASA. Unclear why atorvastatin was stopped, discussed restarting.  Uncontrolled hypertension  Patient's blood pressure range in the last 24 hours was: BP  Min: 138/67  Max: 182/81.The patient's inpatient anti-hypertensive regimen is listed below:  Current Antihypertensives  valsartan tablet 160 mg, Daily, Oral    Plan  - BP is controlled, no changes needed to their regimen  Mixed hyperlipidemia   Patient is not chronically on statin. Monitor clinically. Last LDL was   Lab Results   Component Value Date    LDLCALC 153.4 01/27/2024    -Restart atorvastatin.  Gastroesophageal reflux disease without esophagitis  -Chronic issue.  -Continue PPI.  ILD (interstitial lung disease)  -Chronic, stable.   -Continue singulair, astelin, and flonase.  -PRN duo nebs.  VTE Risk Mitigation (From admission, onward)           Ordered     IP VTE LOW RISK PATIENT  Once         04/27/25 2001     Place sequential compression device  Until discontinued         04/27/25 2001                     Language barrier identified, Interpretive services offered   (VAZQUEZ) at no charge to patient but patient declined at this time. Patient's daughter assisted with translating.    On " 04/27/2025, patient should be placed in hospital observation services under my care in collaboration with Ernst Andujar MD.         Yasmeen Holland NP  Department of Hospital Medicine  [facility] - Emergency Dept

## 2025-04-28 NOTE — ASSESSMENT & PLAN NOTE
Abdominal exam benign. Patient with no pain currently but is actively vomiting.  -Afebrile, no leukocytosis.  -POC lactic 3.2>>2.4, serum lactic pending.  -CRP 2.9 and procal 0.02.  -CTA AP with no acute abdominopelvic abnormality identified. Moderate atherosclerotic disease of the abdominal aorta.  Moderate narrowing at the origin of the ARPITA, which is otherwise patent.  No evidence of acute abdominal vascular occlusion. Pancreatic cystic lesions measuring up to 1.3 cm.   -US mesenteric ischemia study pending.   -IVF overnight.  -NPO for now.  -Continue PPI.  -PRN antiemetics and IV pain meds  -GI consulted, appreciate assistance.

## 2025-04-28 NOTE — SUBJECTIVE & OBJECTIVE
Past Medical History:   Diagnosis Date    Allergy     Anxiety     Arthritis     osteo    Asthma     Diabetic retinopathy     GERD (gastroesophageal reflux disease)     High cholesterol     Hypertension     Kidney stone     Type 2 diabetes mellitus, uncontrolled, with neuropathy     Urinary tract infection     Vaginal infection        Past Surgical History:   Procedure Laterality Date    CHOLECYSTECTOMY      COLONOSCOPY N/A 6/8/2021    Procedure: COLONOSCOPY;  Surgeon: Rain Pop MD;  Location: [facility] ENDO;  Service: Endoscopy;  Laterality: N/A;    COLONOSCOPY N/A 7/8/2021    Procedure: colonoscopy with single balloon scope;  Surgeon: Steffen Dueñas MD;  Location: [facility] ENDO;  Service: Endoscopy;  Laterality: N/A;    ESOPHAGOGASTRODUODENOSCOPY N/A 6/8/2021    Procedure: EGD (ESOPHAGOGASTRODUODENOSCOPY);  Surgeon: Rain Pop MD;  Location: [facility];  Service: Endoscopy;  Laterality: N/A;    EYE SURGERY      HYSTERECTOMY      INCISIONAL HERNIA REPAIR  2003    ROTATOR CUFF REPAIR Right 11/07/2017    TRIGGER FINGER RELEASE Right 6/3/2022    Procedure: RELEASE, TRIGGER FINGER; right index, middle, ring;  Surgeon: Sarmad Wesley Jr., MD;  Location: [facility] OR;  Service: Orthopedics;  Laterality: Right;       Review of patient's allergies indicates:   Allergen Reactions    Actos [pioglitazone] Nausea Only    Darvocet a500 [propoxyphene n-acetaminophen] Nausea And Vomiting    Dilaudid [hydromorphone (bulk)] Nausea And Vomiting     Family History       Problem Relation (Age of Onset)    Diabetes Sister, Brother    Stroke Mother          Tobacco Use    Smoking status: Former     Passive exposure: Never    Smokeless tobacco: Never   Substance and Sexual Activity    Alcohol use: Not Currently     Comment: social    Drug use: No    Sexual activity: Yes     Partners: Male     Birth control/protection: None     Review of Systems   Constitutional:  Positive for activity change, appetite change, fatigue and  unexpected weight change.   Respiratory:  Negative for cough and shortness of breath.    Cardiovascular:  Negative for chest pain.   Gastrointestinal:  Positive for abdominal distention, abdominal pain, diarrhea, nausea and vomiting.   Neurological:  Positive for dizziness, weakness, light-headedness and headaches. Negative for syncope, speech difficulty and numbness.   Psychiatric/Behavioral:  The patient is nervous/anxious.      Objective:     Vital Signs (Most Recent):  Temp: 98.6 °F (37 °C) (04/28/25 0711)  Pulse: 81 (04/28/25 0711)  Resp: 18 (04/28/25 0711)  BP: 126/61 (04/28/25 0711)  SpO2: 98 % (04/28/25 0711) Vital Signs (24h Range):  Temp:  [97.4 °F (36.3 °C)-98.6 °F (37 °C)] 98.6 °F (37 °C)  Pulse:  [] 81  Resp:  [16-20] 18  SpO2:  [97 %-100 %] 98 %  BP: (126-182)/(61-84) 126/61     Weight: 50.8 kg (112 lb) (04/27/25 1509)  Body mass index is 23.41 kg/m².      Intake/Output Summary (Last 24 hours) at 4/28/2025 0923  Last data filed at 4/28/2025 0705  Gross per 24 hour   Intake 1049 ml   Output --   Net 1049 ml       Lines/Drains/Airways       Peripheral Intravenous Line  Duration                  Peripheral IV - Single Lumen 04/27/25 1635 20 G Posterior;Right Hand <1 day         Peripheral IV - Single Lumen 04/27/25 1636 20 G Left;Posterior Hand <1 day                     Physical Exam  Vitals and nursing note reviewed.   Constitutional:       General: She is in acute distress.      Appearance: Normal appearance. She is not ill-appearing.   HENT:      Head: Normocephalic and atraumatic.   Eyes:      Extraocular Movements: Extraocular movements intact.      Conjunctiva/sclera: Conjunctivae normal.   Cardiovascular:      Rate and Rhythm: Normal rate and regular rhythm.      Pulses: Normal pulses.      Heart sounds: Normal heart sounds. No murmur heard.  Pulmonary:      Effort: Pulmonary effort is normal. No respiratory distress.      Breath sounds: Normal breath sounds. No wheezing, rhonchi or rales.    Abdominal:      General: Bowel sounds are decreased.      Palpations: Abdomen is soft.      Tenderness: There is abdominal tenderness in the right upper quadrant and epigastric area.   Musculoskeletal:      Right lower leg: No edema.      Left lower leg: No edema.   Neurological:      Mental Status: She is alert and oriented to person, place, and time. Mental status is at baseline.   Psychiatric:         Mood and Affect: Mood normal.         Behavior: Behavior normal.          Significant Labs:  All pertinent lab results from the last 24 hours have been reviewed.    Significant Imaging:  Imaging results within the past 24 hours have been reviewed.

## 2025-04-28 NOTE — ASSESSMENT & PLAN NOTE
- US mesenteric ischemia study>> No evidence of hemodynamically significant stenosis in the celiac or SMA. 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   - vascular surgery consulted

## 2025-04-28 NOTE — ASSESSMENT & PLAN NOTE
Patient's blood pressure range in the last 24 hours was: BP  Min: 138/67  Max: 182/81.The patient's inpatient anti-hypertensive regimen is listed below:  Current Antihypertensives  valsartan tablet 160 mg, Daily, Oral    Plan  - BP is controlled, no changes needed to their regimen

## 2025-04-28 NOTE — SUBJECTIVE & OBJECTIVE
Past Medical History:   Diagnosis Date    Allergy     Anxiety     Arthritis     osteo    Asthma     Diabetic retinopathy     GERD (gastroesophageal reflux disease)     High cholesterol     Hypertension     Kidney stone     Type 2 diabetes mellitus, uncontrolled, with neuropathy     Urinary tract infection     Vaginal infection        Past Surgical History:   Procedure Laterality Date    CHOLECYSTECTOMY      COLONOSCOPY N/A 6/8/2021    Procedure: COLONOSCOPY;  Surgeon: Rain Pop MD;  Location: [facility] ENDO;  Service: Endoscopy;  Laterality: N/A;    COLONOSCOPY N/A 7/8/2021    Procedure: colonoscopy with single balloon scope;  Surgeon: Steffen Dueñas MD;  Location: [facility] ENDO;  Service: Endoscopy;  Laterality: N/A;    ESOPHAGOGASTRODUODENOSCOPY N/A 6/8/2021    Procedure: EGD (ESOPHAGOGASTRODUODENOSCOPY);  Surgeon: Rain Pop MD;  Location: [facility];  Service: Endoscopy;  Laterality: N/A;    EYE SURGERY      HYSTERECTOMY      INCISIONAL HERNIA REPAIR  2003    ROTATOR CUFF REPAIR Right 11/07/2017    TRIGGER FINGER RELEASE Right 6/3/2022    Procedure: RELEASE, TRIGGER FINGER; right index, middle, ring;  Surgeon: Sarmad Wesley Jr., MD;  Location: [facility] OR;  Service: Orthopedics;  Laterality: Right;       Review of patient's allergies indicates:   Allergen Reactions    Actos [pioglitazone] Nausea Only    Darvocet a500 [propoxyphene n-acetaminophen] Nausea And Vomiting    Dilaudid [hydromorphone (bulk)] Nausea And Vomiting       Current Facility-Administered Medications on File Prior to Encounter   Medication    triamcinolone acetonide injection 40 mg     Current Outpatient Medications on File Prior to Encounter   Medication Sig    aspirin (ECOTRIN) 81 MG EC tablet Take 81 mg by mouth once daily.    azelastine (ASTELIN) 137 mcg (0.1 %) nasal spray 1 spray (137 mcg total) by Nasal route 2 (two) times daily.    DEXCOM G7  Misc Use to check glucose with sensors    montelukast (SINGULAIR) 10 mg  tablet Take 1 tablet (10 mg total) by mouth every evening.    NOVOLOG U-100 INSULIN ASPART 100 unit/mL injection To use with insulin pump. Max daily dose 130 units.    OMNIPOD 5 G6-G7 PODS, GEN 5, Crtg Inject 1 each into the skin every 72 hours.    OZEMPIC 0.25 mg or 0.5 mg (2 mg/3 mL) pen injector Inject 0.5 mg into the skin every 7 days.    pantoprazole (PROTONIX) 40 MG tablet Take 1 tablet (40 mg total) by mouth once daily.    valsartan (DIOVAN) 80 MG tablet Take 1 tablet (80 mg total) by mouth once daily.    valsartan-hydrochlorothiazide (DIOVAN-HCT) 160-12.5 mg per tablet Take 1 tablet by mouth once daily.    albuterol (PROVENTIL) 2.5 mg /3 mL (0.083 %) nebulizer solution Take 3 mLs (2.5 mg total) by nebulization every 6 (six) hours as needed for Wheezing. Rescue    cetirizine (ZYRTEC) 10 MG tablet Take 1 tablet (10 mg total) by mouth once daily.    empagliflozin (JARDIANCE) 25 mg tablet Take 1 tablet (25 mg total) by mouth once daily. (Patient not taking: Reported on 4/8/2025)    fluticasone propionate (FLONASE) 50 mcg/actuation nasal spray 2 sprays (100 mcg total) by Each Nostril route once daily.    HYDROcodone-acetaminophen (NORCO) 5-325 mg per tablet Take 1 tablet by mouth every 8 (eight) hours as needed for Pain. (Patient not taking: Reported on 4/8/2025)    insulin glargine U-100, Lantus, (LANTUS SOLOSTAR U-100 INSULIN) 100 unit/mL (3 mL) InPn pen Inject 40 Units into the skin once daily. (Patient not taking: Reported on 4/8/2025)    metFORMIN (GLUCOPHAGE) 500 MG tablet Take 1 tablet (500 mg total) by mouth 2 (two) times daily with meals.    nystatin (MYCOSTATIN) cream Apply topically 2 (two) times daily. (Patient not taking: Reported on 4/22/2025)    pantoprazole (PROTONIX) 40 MG tablet Take 1 tablet (40 mg total) by mouth once daily.    QUEtiapine (SEROQUEL) 25 MG Tab Take 1 tablet (25 mg total) by mouth every evening. (Patient not taking: Reported on 4/22/2025)    TRUE METRIX GLUCOSE TEST STRIP Strp  "To use to inject insulin twice daily.    TRUEPLUS PEN NEEDLE 32 gauge x 5/32" Ndle To inject insulin twice daily     Family History       Problem Relation (Age of Onset)    Diabetes Sister, Brother    Stroke Mother          Tobacco Use    Smoking status: Former     Passive exposure: Never    Smokeless tobacco: Never   Substance and Sexual Activity    Alcohol use: Not Currently     Comment: social    Drug use: No    Sexual activity: Yes     Partners: Male     Birth control/protection: None     Review of Systems   Constitutional:  Positive for appetite change (decreased), chills and fatigue. Negative for diaphoresis and fever.   HENT:  Negative for congestion, rhinorrhea and sore throat.    Eyes:  Negative for photophobia and visual disturbance.   Respiratory:  Positive for shortness of breath (mild, intermittent; associated with severe pain). Negative for cough and wheezing.    Cardiovascular:  Negative for chest pain, palpitations and leg swelling.   Gastrointestinal:  Positive for abdominal distention, abdominal pain, nausea and vomiting. Negative for constipation and diarrhea.   Genitourinary:  Positive for dysuria. Negative for frequency and hematuria.   Musculoskeletal:  Positive for back pain. Negative for gait problem, myalgias and neck pain.   Skin:  Negative for color change, pallor, rash and wound.   Neurological:  Negative for syncope, weakness, light-headedness and numbness.   Psychiatric/Behavioral:  Negative for confusion and hallucinations. The patient is not nervous/anxious.      Objective:     Vital Signs (Most Recent):  Temp: 97.4 °F (36.3 °C) (04/27/25 1903)  Pulse: 92 (04/27/25 1903)  Resp: 18 (04/27/25 1903)  BP: (!) 170/84 (04/27/25 1903)  SpO2: 97 % (04/27/25 1903) Vital Signs (24h Range):  Temp:  [97.4 °F (36.3 °C)-98.6 °F (37 °C)] 97.4 °F (36.3 °C)  Pulse:  [] 92  Resp:  [16-20] 18  SpO2:  [97 %-100 %] 97 %  BP: (138-182)/(67-84) 170/84     Weight: 50.8 kg (112 lb)  Body mass index is " 23.41 kg/m².     Physical Exam  Vitals and nursing note reviewed.   Constitutional:       General: She is in acute distress (appears uncomfortable, actively vomiting).      Appearance: She is not toxic-appearing or diaphoretic.   HENT:      Head: Normocephalic and atraumatic.      Mouth/Throat:      Mouth: Mucous membranes are dry.   Eyes:      Pupils: Pupils are equal, round, and reactive to light.   Cardiovascular:      Rate and Rhythm: Regular rhythm. Tachycardia present.      Pulses: Normal pulses.   Pulmonary:      Effort: Pulmonary effort is normal. No respiratory distress.      Breath sounds: No wheezing, rhonchi or rales.   Abdominal:      General: Bowel sounds are normal. There is no distension.      Palpations: Abdomen is soft.      Tenderness: There is abdominal tenderness (mild TTP) in the right upper quadrant and epigastric area. There is no right CVA tenderness, left CVA tenderness or guarding.   Musculoskeletal:         General: Normal range of motion.      Cervical back: Normal range of motion.      Right lower leg: No edema.      Left lower leg: No edema.   Skin:     General: Skin is warm and dry.      Capillary Refill: Capillary refill takes less than 2 seconds.   Neurological:      General: No focal deficit present.      Mental Status: She is alert and oriented to person, place, and time.      Motor: No weakness.   Psychiatric:         Mood and Affect: Mood normal.         Behavior: Behavior normal.              CRANIAL NERVES     CN III, IV, VI   Pupils are equal, round, and reactive to light.       Significant Labs: All pertinent labs within the past 24 hours have been reviewed.  VBGs:   Recent Labs   Lab 04/27/25 1904   PH 7.400   PCO2 41.8   HCO3 25.0   PO2 54.8     CBC:   Recent Labs   Lab 04/27/25  1623 04/27/25  1648 04/27/25  1904   WBC 8.33  --   --    HGB 13.4  --   --    HCT 40.1 40 41.1     --   --      CMP:   Recent Labs   Lab 04/27/25  1753      K 3.4*      CO2  16*   BUN 15   CREATININE 0.6   CALCIUM 7.3*   ALBUMIN 3.0*   BILITOT 0.2   ALKPHOS 63   AST 15   ALT 15   ANIONGAP 13     Lipase:   Recent Labs   Lab 04/27/25  1623   LIPASE 23     Magnesium:   Recent Labs   Lab 04/27/25  1753   MG 1.7     Urine Studies:   Recent Labs   Lab 04/27/25  1734   COLORU Colorless*   APPEARANCEUA Clear   PHUR 6.0   SPECGRAV 1.015   PROTEINUA Trace*   GLUCUA 3+*   BILIRUBINUA Negative   OCCULTUA Negative   NITRITE Negative   UROBILINOGEN Negative   LEUKOCYTESUR 1+*   RBCUA 1   WBCUA 19*   BACTERIA Rare   HYALINECASTS 0       Significant Imaging: I have reviewed all pertinent imaging results/findings within the past 24 hours.  Imaging Results              X-Ray Chest AP Portable (Final result)  Result time 04/27/25 18:00:38      Final result by Zachariah Brooks MD (04/27/25 18:00:38)                   Impression:      1. Interstitial findings are somewhat more conspicuous on current exam, may reflect accentuation from overlying habitus.  Interval edema or other pneumonitis are considerations.  No large focal consolidation.      Electronically signed by: Zachariah Brooks MD  Date:    04/27/2025  Time:    18:00               Narrative:    EXAMINATION:  XR CHEST AP PORTABLE    CLINICAL HISTORY:  Sepsis;    TECHNIQUE:  Single frontal view of the chest was performed.    COMPARISON:  04/16/2025    FINDINGS:  The cardiomediastinal silhouette is not enlarged..  There is no pleural effusion.  The trachea is midline.  The lungs are symmetrically expanded bilaterally with coarse interstitial attenuation bilaterally, similar to the previous examination to somewhat more prominent..  No large focal consolidation seen.  There is no pneumothorax.  The osseous structures are remarkable for degenerative change and osteopenia..                                       CTA Abdomen and Pelvis (Final result)  Result time 04/27/25 18:29:41      Final result by Tylor Salinas MD (04/27/25 18:29:41)                    Impression:      No acute abdominopelvic abnormality identified.    Moderate atherosclerotic disease of the abdominal aorta.  Moderate narrowing at the origin of the ARPITA, which is otherwise patent.  No evidence of acute abdominal vascular occlusion.    Pancreatic cystic lesions measuring up to 1.3 cm.  Follow-up in 2 years time with contrast enhanced MRI or pancreas protocol CT recommended by the ACR.    Additional findings as described.    Electronically signed by resident: Dinah Cagle  Date:    04/27/2025  Time:    17:34    Electronically signed by: Tylor Salinas MD  Date:    04/27/2025  Time:    18:29               Narrative:    EXAMINATION:  CTA ABDOMEN AND PELVIS    CLINICAL HISTORY:  Mesenteric ischemia, acute;    TECHNIQUE:  Low dose axial images, sagittal and coronal reformations were obtained from the lung bases to the pubic symphysis following the IV administration of 100 mL of Omnipaque 350 in the arterial and venous phases .  Oral contrast was not administered.    COMPARISON:  CT abdomen pelvis 04/16/2025 and CT dated 06/09/2021.    FINDINGS:  Heart: Normal in size. No pericardial effusion.    Lung Bases: Stable 5 mm nodule in the right lower lobe.  Chronic changes in the lungs involving particularly nodular subpleural opacities.  No confluent consolidation.  No pleural effusion.    Liver: Peripheral area of hyperattenuation at the inferior most aspect of the right hepatic lobe (series 601, image 24) the common isointense on venous phase likely reflecting transient hepatic intensity differences.  Hepatic and portal veins are patent.    Gallbladder: Surgically absent.    Bile Ducts: No evidence of dilated ducts.    Pancreas: 1.3 cm low-density lesion in the pancreatic body (series 2, image 212).  Additional smaller hypodensity in the pancreatic tail (series 2, image 23).  No pancreatic ductal dilatation.    Spleen: Normal size and attenuation.    Adrenals: No focal lesions.    Kidneys/ Ureters: Focal  areas of cortical thinning in the superior pole the right kidney, superior pole of the left kidney, in interpolar region of the left kidney likely reflecting areas of scarring.  6 mm nonobstructing stone in the inferior pole of the left kidney.  No enhancing mass.  Kidneys concentrate contrast normally.  No hydroureteronephrosis.    Bladder: Not fully distended which limits evaluation.    Reproductive organs: Unremarkable.    GI Tract/Mesentery: Colonic diverticulosis involving the ascending, descending, and sigmoid colon.  Evidence of diverticulitis.  No bowel obstruction.  The appendix is within normal limits.    Peritoneal Space: No ascites. No free air.    Retroperitoneum: No significant adenopathy.    Abdominal wall: Probable injection granulomas in the bilateral gluteal soft tissues.    Vasculature: Moderate atherosclerotic disease of the abdominal aorta.  Noncalcified plaque in the infrarenal abdominal aorta distal to the origin of the ARPITA contributes to moderate stenosis.  The celiac artery has minimal calcifications at its origin without significant narrowing.  The celiac artery branches are patent.  Mild calcification at the origin of the SMA without significant stenosis.  Minimal calcifications at origin of left renal artery.  Renal arteries are otherwise patent.  Moderate calcification of the origin of the ARPITA with associated moderate narrowing.  The ARPITA is otherwise patent.  The portal vein, SMV, and splenic veins are patent.    Bones: Degenerative changes lumbar spine.  No acute fracture.  No osseous destructive lesions.

## 2025-04-28 NOTE — ASSESSMENT & PLAN NOTE
Patient's most recent potassium results are listed below.   Recent Labs     04/27/25  1753   K 3.4*     Plan  - Replete potassium per protocol  - Monitor potassium Daily  - Patient's hypokalemia is stable

## 2025-04-28 NOTE — SUBJECTIVE & OBJECTIVE
Interval History: Pain is well controlled at this time, she is on a CLD and will be NPO at [place] for GI intervention. US c/f ARPITA stenosis. Vascular surgery consulted, appreciate recs.     Review of Systems   Constitutional:  Positive for activity change and appetite change. Negative for fatigue.   Respiratory:  Negative for cough and shortness of breath.    Cardiovascular:  Negative for chest pain.   Gastrointestinal:  Positive for abdominal distention, abdominal pain, diarrhea, nausea and vomiting.   Neurological:  Negative for dizziness, syncope, speech difficulty, weakness, light-headedness, numbness and headaches.   Psychiatric/Behavioral:  The patient is not nervous/anxious.      Objective:     Vital Signs (Most Recent):  Temp: 98.6 °F (37 °C) (04/28/25 0711)  Pulse: 81 (04/28/25 0711)  Resp: 18 (04/28/25 0711)  BP: 126/61 (04/28/25 0711)  SpO2: 98 % (04/28/25 0711) Vital Signs (24h Range):  Temp:  [97.4 °F (36.3 °C)-98.6 °F (37 °C)] 98.6 °F (37 °C)  Pulse:  [] 81  Resp:  [16-20] 18  SpO2:  [97 %-100 %] 98 %  BP: (126-182)/(61-84) 126/61     Weight: 50.8 kg (112 lb)  Body mass index is 23.41 kg/m².    Intake/Output Summary (Last 24 hours) at 4/28/2025 1056  Last data filed at 4/28/2025 0705  Gross per 24 hour   Intake 1049 ml   Output --   Net 1049 ml         Physical Exam  Vitals and nursing note reviewed.   Constitutional:       General: She is not in acute distress.     Appearance: Normal appearance. She is not ill-appearing.   HENT:      Head: Normocephalic and atraumatic.   Eyes:      Extraocular Movements: Extraocular movements intact.      Conjunctiva/sclera: Conjunctivae normal.   Cardiovascular:      Rate and Rhythm: Normal rate and regular rhythm.      Pulses: Normal pulses.      Heart sounds: Normal heart sounds. No murmur heard.  Pulmonary:      Effort: Pulmonary effort is normal. No respiratory distress.      Breath sounds: Normal breath sounds. No wheezing, rhonchi or rales.   Abdominal:       General: Bowel sounds are decreased.      Palpations: Abdomen is soft.      Tenderness: There is abdominal tenderness in the right upper quadrant and epigastric area.   Musculoskeletal:      Right lower leg: No edema.      Left lower leg: No edema.   Neurological:      Mental Status: She is alert and oriented to person, place, and time. Mental status is at baseline.   Psychiatric:         Mood and Affect: Mood normal.         Behavior: Behavior normal.               Significant Labs: All pertinent labs within the past 24 hours have been reviewed.    Significant Imaging: I have reviewed all pertinent imaging results/findings within the past 24 hours.

## 2025-04-28 NOTE — ASSESSMENT & PLAN NOTE
Patient is not chronically on statin. Monitor clinically. Last LDL was   Lab Results   Component Value Date    LDLCALC 153.4 01/27/2024    -Restart atorvastatin.

## 2025-04-28 NOTE — ASSESSMENT & PLAN NOTE
"Patient's FSGs are uncontrolled due to hyperglycemia on current medication regimen.  Last A1c reviewed-   Lab Results   Component Value Date    HGBA1C 7.2 (H) 04/27/2025     Most recent fingerstick glucose reviewed- No results for input(s): "POCTGLUCOSE" in the last 24 hours.  Current correctional scale  Low  Maintain anti-hyperglycemic dose as follows-   Antihyperglycemics (From admission, onward)      Start     Stop Route Frequency Ordered    04/27/25 2101  insulin aspart U-100 pen 0-5 Units         -- SubQ Before meals & nightly PRN 04/27/25 2001          Hold Oral hypoglycemics while patient is in the hospital.   -Pt currently has omnipod in place, agreed to have patient keep it since it was just changed, but discussed we may need to remove it if she continues to have N/V or develops hypoglycemia.  -Accuchecks AC/HS  "

## 2025-04-28 NOTE — ED NOTES
Pt had episode of pressure incontinence while vomiting. Pt provided with wet wipes and paper scrubs to change into.

## 2025-04-28 NOTE — PLAN OF CARE
[facility] - Emergency Dept  Initial Discharge Assessment       Primary Care Provider: Suman Rubi MD    Admission Diagnosis: Pancreatic cyst [K86.2]  Pyelonephritis [N12]    Admission Date: 4/27/2025    Pt is independent wit their ADL's and ambulation, does not require assistance. Post acute was discussed with pt. Pt d/c home with no needs at the moment. Pt grand daughter Saidee [phone], who's at bedside to provide transportation home or other family members.  Expected Discharge Date: 4/29/2025    Transition of Care Barriers: (P) None    Payor: [employer] MEDICARE / Plan: [employer] H[employer] PPO SPECIAL NEEDS / Product Type: Medicare Advantage /     Extended Emergency Contact Information  Primary Emergency Contact: Ashley Seo   [facility]  Home Phone: [phone]  Work Phone: [phone]  Mobile Phone: [phone]  Relation: Daughter  Secondary Emergency Contact: Valerie Seo   [place]  Mobile Phone: [phone]  Relation: Daughter    Discharge Plan A: (P) Home with family, Home  Discharge Plan B: (P) Home, Home with family      [email or web address] (New Address) - [address] AT Previously: Sera Gastel[address] 4[address] [place] [address]  Phone: [phone] Fax: [phone]    [address] [address]  Phone: [phone] Fax: [phone]    [facility] [address] [place] [address], [place] - [address]  [address]  [place] [address]  Phone: [phone] Fax: [phone]      Initial Assessment (most recent)       Adult Discharge Assessment - 04/28/25 1209          Discharge Assessment    Assessment Type Discharge Planning Assessment (P)      Confirmed/corrected address, phone number and insurance Yes (P)      Confirmed Demographics Correct on Facesheet (P)      Source of  Information patient (P)      Reason For Admission Abdominal pain (P)      People in Home child(opal), adult;grandchild(opal) (P)      Do you expect to return to your current living situation? Yes (P)      Do you have help at home or someone to help you manage your care at home? No (P)      Prior to hospitilization cognitive status: Alert/Oriented (P)      Current cognitive status: Alert/Oriented (P)      Walking or Climbing Stairs Difficulty no (P)      Dressing/Bathing Difficulty no (P)      Home Accessibility stairs to enter home (P)      Number of Stairs, Main Entrance four (P)      Home Layout Able to live on 1st floor (P)      Equipment Currently Used at Home none (P)      Readmission within 30 days? No (P)      Patient currently being followed by outpatient case management? No (P)      Do you currently have service(s) that help you manage your care at home? No (P)      Do you take prescription medications? Yes (P)      Do you have prescription coverage? Yes (P)      Coverage Payor: [employer]MANA [facility]IC[facility]S - CAPITATED (P)      Do you have any problems affording any of your prescribed medications? No (P)      Is the patient taking medications as prescribed? yes (P)      Who is going to help you get home at discharge? family/friend (P)      How do you get to doctors appointments? family or friend will provide (P)      Are you on dialysis? No (P)      Do you take coumadin? No (P)      Discharge Plan A Home with family;Home (P)      Discharge Plan B Home;Home with family (P)      DME Needed Upon Discharge  none (P)      Discharge Plan discussed with: Patient (P)      Transition of Care Barriers None (P)         Physical Activity    On average, how many days per week do you engage in moderate to strenuous exercise (like a brisk walk)? 0 days (P)      On average, how many minutes do you engage in exercise at this level? 0 min (P)         Financial Resource Strain    How hard is it for  you to pay for the very basics like food, housing, medical care, and heating? Not very hard (P)         Housing Stability    In the last 12 months, was there a time when you were not able to pay the mortgage or rent on time? No (P)      At any time in the past 12 months, were you homeless or living in a shelter (including now)? No (P)         Transportation Needs    In the past 12 months, has lack of transportation kept you from medical appointments or from getting medications? No (P)      In the past 12 months, has lack of transportation kept you from meetings, work, or from getting things needed for daily living? No (P)         Food Insecurity    Within the past 12 months, you worried that your food would run out before you got the money to buy more. Never true (P)      Within the past 12 months, the food you bought just didn't last and you didn't have money to get more. Never true (P)         Stress    Do you feel stress - tense, restless, nervous, or anxious, or unable to sleep at night because your mind is troubled all the time - these days? Only a little (P)         Social Isolation    How often do you feel lonely or isolated from those around you?  Never (P)         Alcohol Use    Q1: How often do you have a drink containing alcohol? Never (P)      Q2: How many drinks containing alcohol do you have on a typical day when you are drinking? Patient does not drink (P)      Q3: How often do you have six or more drinks on one occasion? Never (P)         Utilities    In the past 12 months has the electric, gas, oil, or water company threatened to shut off services in your home? No (P)         Health Literacy    How often do you need to have someone help you when you read instructions, pamphlets, or other written material from your doctor or pharmacy? Never (P)         OTHER    Name(s) of People in Home daughter, grand-daughter. (P)                    VAISHNAVI Rodriguez, CSW.   Case Management  [facility]  [place]  Email: [email or web address]

## 2025-04-28 NOTE — HPI
Juliette Seo is a 79 y.o. female with a PMHx of vertigo, type 2 diabetes, coronary artery disease, hypertension, urinary urgency, nephrolithiasis, GERD, interstitial lung disease, and IBS who presents to the ED for evaluation of upper abdominal pain x1 month. Pt reports RUQ and epigastric abdominal pain beginning 1 month ago that acutely worsened over the past week. Describes the pain as stabbing/throbbing pain that also radiates around her right side into her back. Endorses associated abdominal bloating, decreased appetite, nausea, chills, mild SOB, and fatigue. Notes PO intake seems to make her symptoms worse. She has been taking tylenol and antacids along with her daily protonix without relief. She was previously seen in the emergency department on 04/16/2025, CT abdomen and pelvis without evidence of acute intra-abdominal findings. She was discharged on Bentyl and Keflex for UTI and abdominal cramping. She was seen by her PCP who discontinued the abx and referred her to GI. Follow up with Gastroenterology is not until May. She denies fever, cough, congestion, diarrhea, or constipation. She states she had no vomiting previously but had several episodes of emesis since arriving to the ED today. She reports her abdominal pain has resolved since being treated in the ED, but she remains significantly nauseated. She denies CP, lightheadedness, syncope, or diaphoresis.    In the ED, pt mildly tachycardic and intermittently hypertensive otherwise vitals stable, afebrile. CBC unremarkable. K+3.4. Bicarb 16. Glucose 208. Calcium 7.3. Mag 1.7. Total protein 5.9. Albumin 3.0. POC lactate 3.2>>2.4. Blood cxs in process. UA with 1+leukocytes, 19 WBCs, rare bacteria, and 3 squams. CXR with interstitial findings are somewhat more conspicuous on current exam, may reflect accentuation from overlying habitus. Interval edema or other pneumonitis are considerations. No large focal consolidation. CTA AP with no acute abdominopelvic  abnormality identified. Moderate atherosclerotic disease of the abdominal aorta.  Moderate narrowing at the origin of the ARPITA, which is otherwise patent. No evidence of acute abdominal vascular occlusion. Pancreatic cystic lesions measuring up to 1.3 cm. The patient received 1L LR bolus, 2g mag sulfate, zosyn, zofran x2, 50meq potassium bicarbonate, 4mg IV morphine, and 5mg IV morphine.

## 2025-04-28 NOTE — ASSESSMENT & PLAN NOTE
Patient's blood pressure range in the last 24 hours was: BP  Min: 126/61  Max: 182/81.The patient's inpatient anti-hypertensive regimen is listed below:  Current Antihypertensives  valsartan tablet 160 mg, Daily, Oral    Plan  - BP is controlled, no changes needed to their regimen

## 2025-04-28 NOTE — ED NOTES
Patient placed on portable telemetry box #[id number], rate and rhythm confirmed by RN 96, NSR.  97% on RA

## 2025-04-28 NOTE — PROGRESS NOTES
[facility] - Emergency Dept  [facility]  Progress Note    Patient Name: Juliette Seo  MRN: [id number]  Patient Class: IP- Inpatient   Admission Date: 4/27/2025  Length of Stay: 0 days  Attending Physician: Delores Cat MD  Primary Care Provider: Suman Rubi MD        Subjective     Principal Problem:Abdominal pain        HPI:  Juliette Seo is a 79 y.o. female with a PMHx of vertigo, type 2 diabetes, coronary artery disease, hypertension, urinary urgency, nephrolithiasis, GERD, interstitial lung disease, and IBS who presents to the ED for evaluation of upper abdominal pain x1 month. Pt reports RUQ and epigastric abdominal pain beginning 1 month ago that acutely worsened over the past week. Describes the pain as stabbing/throbbing pain that also radiates around her right side into her back. Endorses associated abdominal bloating, decreased appetite, nausea, chills, mild SOB, and fatigue. Notes PO intake seems to make her symptoms worse. She has been taking tylenol and antacids along with her daily protonix without relief. She was previously seen in the emergency department on 04/16/2025, CT abdomen and pelvis without evidence of acute intra-abdominal findings. She was discharged on Bentyl and Keflex for UTI and abdominal cramping. She was seen by her PCP who discontinued the abx and referred her to GI. Follow up with Gastroenterology is not until May. She denies fever, cough, congestion, diarrhea, or constipation. She states she had no vomiting previously but had several episodes of emesis since arriving to the ED today. She reports her abdominal pain has resolved since being treated in the ED, but she remains significantly nauseated. She denies CP, lightheadedness, syncope, or diaphoresis.    In the ED, pt mildly tachycardic and intermittently hypertensive otherwise vitals stable, afebrile. CBC unremarkable. K+3.4. Bicarb 16. Glucose 208. Calcium 7.3. Mag 1.7. Total protein 5.9. Albumin 3.0. POC  lactate 3.2>>2.4. Blood cxs in process. UA with 1+leukocytes, 19 WBCs, rare bacteria, and 3 squams. CXR with interstitial findings are somewhat more conspicuous on current exam, may reflect accentuation from overlying habitus. Interval edema or other pneumonitis are considerations. No large focal consolidation. CTA AP with no acute abdominopelvic abnormality identified. Moderate atherosclerotic disease of the abdominal aorta.  Moderate narrowing at the origin of the ARPITA, which is otherwise patent. No evidence of acute abdominal vascular occlusion. Pancreatic cystic lesions measuring up to 1.3 cm. The patient received 1L LR bolus, 2g mag sulfate, zosyn, zofran x2, 50meq potassium bicarbonate, 4mg IV morphine, and 5mg IV morphine.    Overview/Hospital Course:  Pt admitted for evaluation of RUQ and epigastric pain. Pt HDS and afebrile. UA concerning for UTI, UC pending. GI consulted for persistent RUQ and epigastric abdominal pain. CT a/p c/f ARPITA stenosis and confirmed on mesenteric ischemia US>>No evidence of hemodynamically significant stenosis in the celiac or SMA.  Significant stenosis in the ARPITA. GI planning for EDG 4/29. Vascular surgery consulted for ARPITA stenosis.     Interval History: Pain is well controlled at this time, she is on a CLD and will be NPO at [place] for GI intervention. US c/f ARPITA stenosis. Vascular surgery consulted, appreciate recs.     Review of Systems   Constitutional:  Positive for activity change and appetite change. Negative for fatigue.   Respiratory:  Negative for cough and shortness of breath.    Cardiovascular:  Negative for chest pain.   Gastrointestinal:  Positive for abdominal distention, abdominal pain, diarrhea, nausea and vomiting.   Neurological:  Negative for dizziness, syncope, speech difficulty, weakness, light-headedness, numbness and headaches.   Psychiatric/Behavioral:  The patient is not nervous/anxious.      Objective:     Vital Signs (Most Recent):  Temp: 98.6 °F (37 °C)  (04/28/25 0711)  Pulse: 81 (04/28/25 0711)  Resp: 18 (04/28/25 0711)  BP: 126/61 (04/28/25 0711)  SpO2: 98 % (04/28/25 0711) Vital Signs (24h Range):  Temp:  [97.4 °F (36.3 °C)-98.6 °F (37 °C)] 98.6 °F (37 °C)  Pulse:  [] 81  Resp:  [16-20] 18  SpO2:  [97 %-100 %] 98 %  BP: (126-182)/(61-84) 126/61     Weight: 50.8 kg (112 lb)  Body mass index is 23.41 kg/m².    Intake/Output Summary (Last 24 hours) at 4/28/2025 1056  Last data filed at 4/28/2025 0705  Gross per 24 hour   Intake 1049 ml   Output --   Net 1049 ml         Physical Exam  Vitals and nursing note reviewed.   Constitutional:       General: She is not in acute distress.     Appearance: Normal appearance. She is not ill-appearing.   HENT:      Head: Normocephalic and atraumatic.   Eyes:      Extraocular Movements: Extraocular movements intact.      Conjunctiva/sclera: Conjunctivae normal.   Cardiovascular:      Rate and Rhythm: Normal rate and regular rhythm.      Pulses: Normal pulses.      Heart sounds: Normal heart sounds. No murmur heard.  Pulmonary:      Effort: Pulmonary effort is normal. No respiratory distress.      Breath sounds: Normal breath sounds. No wheezing, rhonchi or rales.   Abdominal:      General: Bowel sounds are decreased.      Palpations: Abdomen is soft.      Tenderness: There is abdominal tenderness in the right upper quadrant and epigastric area.   Musculoskeletal:      Right lower leg: No edema.      Left lower leg: No edema.   Neurological:      Mental Status: She is alert and oriented to person, place, and time. Mental status is at baseline.   Psychiatric:         Mood and Affect: Mood normal.         Behavior: Behavior normal.               Significant Labs: All pertinent labs within the past 24 hours have been reviewed.    Significant Imaging: I have reviewed all pertinent imaging results/findings within the past 24 hours.      Assessment & Plan  Abdominal pain  Abdominal exam benign. Patient with no pain currently but  "is actively vomiting.  -Afebrile, no leukocytosis.  -LA 2.9, trending  -CRP 2.9 and procal 0.02.  -CTA AP with no acute abdominopelvic abnormality identified. Moderate atherosclerotic disease of the abdominal aorta.  Moderate narrowing at the origin of the ARPITA, which is otherwise patent.  No evidence of acute abdominal vascular occlusion. Pancreatic cystic lesions measuring up to 1.3 cm.     -IVF overnight.  -CLD, npo at [place]  -Continue PPI.  -PRN antiemetics and IV pain meds  -GI consulted, appreciate assistance.  - plan to perform EGD tomorrow, 4/29/25  - clear liquids today, NPO at midnight   - advise her to follow up at GI clinic in May to evaluate need for gastric emptying study if imaging obtained inpatient is still negative  Stenosis of inferior mesenteric artery  - US mesenteric ischemia study>> No evidence of hemodynamically significant stenosis in the celiac or SMA. 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   - vascular surgery consulted  Hypokalemia  Patient's most recent potassium results are listed below.   Recent Labs     04/27/25  1753 04/28/25  0325   K 3.4* 4.1     Plan  - Replete potassium per protocol  - Monitor potassium Daily  - Patient's hypokalemia is stable  Primary osteoarthritis of both knees  -History noted.  -PRN tylenol.  Type 2 diabetes mellitus with hyperglycemia, with long-term current use of insulin  Patient's FSGs are uncontrolled due to hyperglycemia on current medication regimen.  Last A1c reviewed-   Lab Results   Component Value Date    HGBA1C 7.2 (H) 04/27/2025     Most recent fingerstick glucose reviewed- No results for input(s): "POCTGLUCOSE" in the last 24 hours.  Current correctional scale  Low  Maintain anti-hyperglycemic dose as follows-   Antihyperglycemics (From admission, onward)      Start     Stop Route Frequency Ordered    04/27/25 2101  insulin aspart U-100 pen 0-5 Units         -- SubQ Before meals & nightly PRN 04/27/25 2001          Hold Oral hypoglycemics while patient " is in the hospital.   -Pt currently has omnipod in place, agreed to have patient keep it since it was just changed, but discussed we may need to remove it if she continues to have N/V or develops hypoglycemia.  -Accuchecks AC/HS  Coronary artery calcification seen on computed tomography  -History noted.  -Continue ASA. Unclear why atorvastatin was stopped, discussed restarting.  Uncontrolled hypertension  Patient's blood pressure range in the last 24 hours was: BP  Min: 126/61  Max: 182/81.The patient's inpatient anti-hypertensive regimen is listed below:  Current Antihypertensives  valsartan tablet 160 mg, Daily, Oral    Plan  - BP is controlled, no changes needed to their regimen  Mixed hyperlipidemia   Patient is not chronically on statin. Monitor clinically. Last LDL was   Lab Results   Component Value Date    LDLCALC 153.4 01/27/2024    -Restart atorvastatin.  Gastroesophageal reflux disease without esophagitis  -Chronic issue.  -Continue PPI.  ILD (interstitial lung disease)  -Chronic, stable.   -Continue singulair, astelin, and flonase.  -PRN duo nebs.  Recurrent major depressive disorder, in full remission  Patient has recurrent depression which is moderate and is currently controlled. Will Continue anti-depressant medications. We will not consult psychiatry at this time. Patient does not display psychosis at this time. Continue to monitor closely and adjust plan of care as needed.      VTE Risk Mitigation (From admission, onward)           Ordered     IP VTE LOW RISK PATIENT  Once         04/27/25 2001     Place sequential compression device  Until discontinued         04/27/25 2001                    Discharge Planning   JAIRO: 4/29/2025     Code Status: Full Code   Medical Readiness for Discharge Date:                            Lynda Goodwin PA-C  Department of Hospital Medicine   [facility] - [facility] Dept

## 2025-04-28 NOTE — ASSESSMENT & PLAN NOTE
"Patient's FSGs are uncontrolled due to hyperglycemia on current medication regimen.  Last A1c reviewed-   Lab Results   Component Value Date    HGBA1C 12.1 (H) 06/12/2024     Most recent fingerstick glucose reviewed- No results for input(s): "POCTGLUCOSE" in the last 24 hours.  Current correctional scale  Low  Maintain anti-hyperglycemic dose as follows-   Antihyperglycemics (From admission, onward)      Start     Stop Route Frequency Ordered    04/27/25 2101  insulin aspart U-100 pen 0-5 Units         -- SubQ Before meals & nightly PRN 04/27/25 2001          Hold Oral hypoglycemics while patient is in the hospital.   -Pt currently has omnipod in place, agreed to have patient keep it since it was just changed, but discussed we may need to remove it if she continues to have N/V or develops hypoglycemia.  -Accuchecks AC/HS  "

## 2025-04-29 ENCOUNTER — ANESTHESIA (OUTPATIENT)
Dept: ENDOSCOPY | Facility: HOSPITAL | Age: 80
End: 2025-04-29
Payer: MEDICARE

## 2025-04-29 LAB
ABSOLUTE EOSINOPHIL (OHS): 0.21 K/UL
ABSOLUTE MONOCYTE (OHS): 0.87 K/UL (ref 0.3–1)
ABSOLUTE NEUTROPHIL COUNT (OHS): 4.79 K/UL (ref 1.8–7.7)
ALBUMIN SERPL BCP-MCNC: 3.5 G/DL (ref 3.5–5.2)
ALP SERPL-CCNC: 74 UNIT/L (ref 40–150)
ALT SERPL W/O P-5'-P-CCNC: 20 UNIT/L (ref 10–44)
ANION GAP (OHS): 10 MMOL/L (ref 8–16)
AST SERPL-CCNC: 20 UNIT/L (ref 11–45)
BACTERIA UR CULT: NORMAL
BASOPHILS # BLD AUTO: 0.06 K/UL
BASOPHILS NFR BLD AUTO: 0.7 %
BILIRUB SERPL-MCNC: 0.4 MG/DL (ref 0.1–1)
BUN SERPL-MCNC: 11 MG/DL (ref 8–23)
CALCIUM SERPL-MCNC: 8.5 MG/DL (ref 8.7–10.5)
CHLORIDE SERPL-SCNC: 108 MMOL/L (ref 95–110)
CO2 SERPL-SCNC: 25 MMOL/L (ref 23–29)
CREAT SERPL-MCNC: 0.7 MG/DL (ref 0.5–1.4)
ERYTHROCYTE [DISTWIDTH] IN BLOOD BY AUTOMATED COUNT: 13.2 % (ref 11.5–14.5)
GFR SERPLBLD CREATININE-BSD FMLA CKD-EPI: >60 ML/MIN/1.73/M2
GLUCOSE SERPL-MCNC: 86 MG/DL (ref 70–110)
HCT VFR BLD AUTO: 39.5 % (ref 37–48.5)
HGB BLD-MCNC: 12.6 GM/DL (ref 12–16)
IMM GRANULOCYTES # BLD AUTO: 0.05 K/UL (ref 0–0.04)
IMM GRANULOCYTES NFR BLD AUTO: 0.6 % (ref 0–0.5)
LYMPHOCYTES # BLD AUTO: 2.9 K/UL (ref 1–4.8)
MAGNESIUM SERPL-MCNC: 2.1 MG/DL (ref 1.6–2.6)
MCH RBC QN AUTO: 28.4 PG (ref 27–31)
MCHC RBC AUTO-ENTMCNC: 31.9 G/DL (ref 32–36)
MCV RBC AUTO: 89 FL (ref 82–98)
NUCLEATED RBC (/100WBC) (OHS): 0 /100 WBC
PLATELET # BLD AUTO: 237 K/UL (ref 150–450)
PMV BLD AUTO: 10.1 FL (ref 9.2–12.9)
POCT GLUCOSE: 103 MG/DL (ref 70–110)
POCT GLUCOSE: 160 MG/DL (ref 70–110)
POCT GLUCOSE: 209 MG/DL (ref 70–110)
POCT GLUCOSE: 95 MG/DL (ref 70–110)
POTASSIUM SERPL-SCNC: 4.1 MMOL/L (ref 3.5–5.1)
PROT SERPL-MCNC: 6.8 GM/DL (ref 6–8.4)
RBC # BLD AUTO: 4.44 M/UL (ref 4–5.4)
RELATIVE EOSINOPHIL (OHS): 2.4 %
RELATIVE LYMPHOCYTE (OHS): 32.7 % (ref 18–48)
RELATIVE MONOCYTE (OHS): 9.8 % (ref 4–15)
RELATIVE NEUTROPHIL (OHS): 53.8 % (ref 38–73)
SODIUM SERPL-SCNC: 143 MMOL/L (ref 136–145)
WBC # BLD AUTO: 8.88 K/UL (ref 3.9–12.7)

## 2025-04-29 PROCEDURE — 63600175 PHARM REV CODE 636 W HCPCS: Mod: HCNC,NTX | Performed by: NURSE ANESTHETIST, CERTIFIED REGISTERED

## 2025-04-29 PROCEDURE — 82962 GLUCOSE BLOOD TEST: CPT | Mod: HCNC,NTX | Performed by: STUDENT IN AN ORGANIZED HEALTH CARE EDUCATION/TRAINING PROGRAM

## 2025-04-29 PROCEDURE — 43239 EGD BIOPSY SINGLE/MULTIPLE: CPT | Mod: HCNC,NTX | Performed by: STUDENT IN AN ORGANIZED HEALTH CARE EDUCATION/TRAINING PROGRAM

## 2025-04-29 PROCEDURE — 85025 COMPLETE CBC W/AUTO DIFF WBC: CPT | Mod: HCNC,NTX | Performed by: NURSE PRACTITIONER

## 2025-04-29 PROCEDURE — 63600175 PHARM REV CODE 636 W HCPCS: Mod: HCNC,NTX | Performed by: NURSE PRACTITIONER

## 2025-04-29 PROCEDURE — 25000003 PHARM REV CODE 250: Mod: HCNC,NTX | Performed by: PHYSICIAN ASSISTANT

## 2025-04-29 PROCEDURE — 36415 COLL VENOUS BLD VENIPUNCTURE: CPT | Mod: HCNC,NTX | Performed by: NURSE PRACTITIONER

## 2025-04-29 PROCEDURE — 94761 N-INVAS EAR/PLS OXIMETRY MLT: CPT | Mod: HCNC,NTX

## 2025-04-29 PROCEDURE — 80053 COMPREHEN METABOLIC PANEL: CPT | Mod: HCNC,NTX | Performed by: NURSE PRACTITIONER

## 2025-04-29 PROCEDURE — 27201012 HC FORCEPS, HOT/COLD, DISP: Mod: HCNC,NTX | Performed by: STUDENT IN AN ORGANIZED HEALTH CARE EDUCATION/TRAINING PROGRAM

## 2025-04-29 PROCEDURE — 25000003 PHARM REV CODE 250: Mod: HCNC,NTX | Performed by: NURSE ANESTHETIST, CERTIFIED REGISTERED

## 2025-04-29 PROCEDURE — 37000009 HC ANESTHESIA EA ADD 15 MINS: Mod: HCNC,NTX | Performed by: STUDENT IN AN ORGANIZED HEALTH CARE EDUCATION/TRAINING PROGRAM

## 2025-04-29 PROCEDURE — 25000003 PHARM REV CODE 250: Mod: HCNC,NTX | Performed by: NURSE PRACTITIONER

## 2025-04-29 PROCEDURE — 37000008 HC ANESTHESIA 1ST 15 MINUTES: Mod: HCNC,NTX | Performed by: STUDENT IN AN ORGANIZED HEALTH CARE EDUCATION/TRAINING PROGRAM

## 2025-04-29 PROCEDURE — 21400001 HC TELEMETRY ROOM: Mod: HCNC,NTX

## 2025-04-29 PROCEDURE — 88305 TISSUE EXAM BY PATHOLOGIST: CPT | Mod: TC,HCNC,NTX | Performed by: STUDENT IN AN ORGANIZED HEALTH CARE EDUCATION/TRAINING PROGRAM

## 2025-04-29 PROCEDURE — 0DB68ZX EXCISION OF STOMACH, VIA NATURAL OR ARTIFICIAL OPENING ENDOSCOPIC, DIAGNOSTIC: ICD-10-PCS | Performed by: STUDENT IN AN ORGANIZED HEALTH CARE EDUCATION/TRAINING PROGRAM

## 2025-04-29 PROCEDURE — 83735 ASSAY OF MAGNESIUM: CPT | Mod: HCNC,NTX | Performed by: NURSE PRACTITIONER

## 2025-04-29 PROCEDURE — 43239 EGD BIOPSY SINGLE/MULTIPLE: CPT | Mod: HCNC,GC,NTX, | Performed by: STUDENT IN AN ORGANIZED HEALTH CARE EDUCATION/TRAINING PROGRAM

## 2025-04-29 RX ORDER — LIDOCAINE HYDROCHLORIDE 20 MG/ML
INJECTION, SOLUTION EPIDURAL; INFILTRATION; INTRACAUDAL; PERINEURAL
Status: DISCONTINUED | OUTPATIENT
Start: 2025-04-29 | End: 2025-04-29

## 2025-04-29 RX ORDER — FENTANYL CITRATE 50 UG/ML
25 INJECTION, SOLUTION INTRAMUSCULAR; INTRAVENOUS EVERY 5 MIN PRN
Refills: 0 | OUTPATIENT
Start: 2025-04-29

## 2025-04-29 RX ORDER — PHENYLEPHRINE HCL IN 0.9% NACL 1 MG/10 ML
SYRINGE (ML) INTRAVENOUS
Status: DISCONTINUED | OUTPATIENT
Start: 2025-04-29 | End: 2025-04-29

## 2025-04-29 RX ORDER — PROPOFOL 10 MG/ML
VIAL (ML) INTRAVENOUS
Status: DISCONTINUED | OUTPATIENT
Start: 2025-04-29 | End: 2025-04-29

## 2025-04-29 RX ORDER — LOPERAMIDE HYDROCHLORIDE 2 MG/1
2 CAPSULE ORAL 4 TIMES DAILY PRN
Status: DISCONTINUED | OUTPATIENT
Start: 2025-04-29 | End: 2025-04-30 | Stop reason: HOSPADM

## 2025-04-29 RX ORDER — GLUCAGON 1 MG
1 KIT INJECTION
OUTPATIENT
Start: 2025-04-29

## 2025-04-29 RX ORDER — DICYCLOMINE HYDROCHLORIDE 10 MG/1
10 CAPSULE ORAL 4 TIMES DAILY
Status: DISCONTINUED | OUTPATIENT
Start: 2025-04-29 | End: 2025-04-30 | Stop reason: HOSPADM

## 2025-04-29 RX ORDER — SODIUM CHLORIDE 0.9 % (FLUSH) 0.9 %
3 SYRINGE (ML) INJECTION
OUTPATIENT
Start: 2025-04-29

## 2025-04-29 RX ORDER — ONDANSETRON HYDROCHLORIDE 2 MG/ML
4 INJECTION, SOLUTION INTRAVENOUS DAILY PRN
OUTPATIENT
Start: 2025-04-29

## 2025-04-29 RX ADMIN — MORPHINE SULFATE 2 MG: 2 INJECTION, SOLUTION INTRAMUSCULAR; INTRAVENOUS at 12:04

## 2025-04-29 RX ADMIN — Medication 100 MCG: at 10:04

## 2025-04-29 RX ADMIN — PROPOFOL 200 MCG/KG/MIN: 10 INJECTION, EMULSION INTRAVENOUS at 10:04

## 2025-04-29 RX ADMIN — CETIRIZINE HYDROCHLORIDE 10 MG: 10 TABLET, FILM COATED ORAL at 11:04

## 2025-04-29 RX ADMIN — AZELASTINE 137 MCG: 1 SPRAY, METERED NASAL at 08:04

## 2025-04-29 RX ADMIN — LOPERAMIDE HYDROCHLORIDE 2 MG: 2 CAPSULE ORAL at 08:04

## 2025-04-29 RX ADMIN — ASPIRIN 81 MG: 81 TABLET, COATED ORAL at 11:04

## 2025-04-29 RX ADMIN — Medication 6 MG: at 08:04

## 2025-04-29 RX ADMIN — DICYCLOMINE HYDROCHLORIDE 10 MG: 10 CAPSULE ORAL at 02:04

## 2025-04-29 RX ADMIN — DICYCLOMINE HYDROCHLORIDE 10 MG: 10 CAPSULE ORAL at 08:04

## 2025-04-29 RX ADMIN — MONTELUKAST 10 MG: 10 TABLET, FILM COATED ORAL at 08:04

## 2025-04-29 RX ADMIN — LOPERAMIDE HYDROCHLORIDE 2 MG: 2 CAPSULE ORAL at 02:04

## 2025-04-29 RX ADMIN — VALSARTAN 160 MG: 160 TABLET, FILM COATED ORAL at 11:04

## 2025-04-29 RX ADMIN — PROCHLORPERAZINE EDISYLATE 5 MG: 5 INJECTION INTRAMUSCULAR; INTRAVENOUS at 02:04

## 2025-04-29 RX ADMIN — PANTOPRAZOLE SODIUM 40 MG: 40 TABLET, DELAYED RELEASE ORAL at 11:04

## 2025-04-29 RX ADMIN — LIDOCAINE HYDROCHLORIDE 80 MG: 20 INJECTION, SOLUTION EPIDURAL; INFILTRATION; INTRACAUDAL; PERINEURAL at 10:04

## 2025-04-29 RX ADMIN — PROPOFOL 60 MG: 10 INJECTION, EMULSION INTRAVENOUS at 10:04

## 2025-04-29 NOTE — TREATMENT PLAN
GI Post Procedure Treatment Plan  Procedure Performed: EGD    Impression:              - Normal esophagus.   - Gastritis. Biopsied.   - Normal examined duodenum.     Recommendation:          - Return patient to [facility] for ongoing care.   - Resume previous diet.   - Await pathology results.   - We will sign-off.       Cynthia Cristobal MD  Gastroenterology, PGY-V

## 2025-04-29 NOTE — NURSING TRANSFER
Nursing Transfer Note      4/29/2025   11:03 AM    Reason patient is being transferred: post procedure    Transfer To: 642    Transfer via stretcher    Transfer with cardiac monitoring    Transported by pct    Any special needs or follow-up needed: routine    Patient belongings transferred with patient: No    Chart send with patient: Yes    Notified: no one on text    Patient reassessed at: 1047 4/29/2025

## 2025-04-29 NOTE — SUBJECTIVE & OBJECTIVE
Interval History: NAEON. Pt had normal EGD w/ GI today. Resumed diet but has continued abdominal pain, nausea and diarrhea today following the Egd. Also reports substantial nosebleed  that was quickly controlled w/ pressure. Will continue to address GI symptoms overnight, trend lytes in setting of diarrhea, and h/h.     Review of Systems   Constitutional:  Negative for activity change, appetite change and fatigue.   Respiratory:  Negative for cough and shortness of breath.    Cardiovascular:  Negative for chest pain.   Gastrointestinal:  Positive for abdominal pain, diarrhea and nausea. Negative for abdominal distention and vomiting.   Neurological:  Negative for dizziness, syncope, speech difficulty, weakness, light-headedness, numbness and headaches.   Psychiatric/Behavioral:  The patient is not nervous/anxious.      Objective:     Vital Signs (Most Recent):  Temp: 97.2 °F (36.2 °C) (04/29/25 1204)  Pulse: 100 (04/29/25 1251)  Resp: 16 (04/29/25 1204)  BP: (!) 172/83 (04/29/25 1204)  SpO2: 100 % (04/29/25 1204) Vital Signs (24h Range):  Temp:  [97.2 °F (36.2 °C)-98.2 °F (36.8 °C)] 97.2 °F (36.2 °C)  Pulse:  [] 100  Resp:  [15-20] 16  SpO2:  [98 %-100 %] 100 %  BP: (130-199)/(63-88) 172/83     Weight: 50.8 kg (112 lb)  Body mass index is 23.41 kg/m².  No intake or output data in the 24 hours ending 04/29/25 1431      Physical Exam  Vitals and nursing note reviewed.   Constitutional:       General: She is not in acute distress.     Appearance: Normal appearance. She is not ill-appearing.   HENT:      Head: Normocephalic and atraumatic.   Eyes:      Extraocular Movements: Extraocular movements intact.      Conjunctiva/sclera: Conjunctivae normal.   Cardiovascular:      Rate and Rhythm: Normal rate and regular rhythm.      Pulses: Normal pulses.      Heart sounds: Normal heart sounds. No murmur heard.  Pulmonary:      Effort: Pulmonary effort is normal. No respiratory distress.      Breath sounds: Normal breath  sounds. No wheezing, rhonchi or rales.   Abdominal:      General: Bowel sounds are normal.      Palpations: Abdomen is soft.      Tenderness: There is abdominal tenderness in the right upper quadrant and epigastric area.   Musculoskeletal:      Right lower leg: No edema.      Left lower leg: No edema.   Neurological:      Mental Status: She is alert and oriented to person, place, and time. Mental status is at baseline.   Psychiatric:         Mood and Affect: Mood normal.         Behavior: Behavior normal.               Significant Labs: All pertinent labs within the past 24 hours have been reviewed.    Significant Imaging: I have reviewed all pertinent imaging results/findings within the past 24 hours.

## 2025-04-29 NOTE — TRANSFER OF CARE
"Anesthesia Transfer of Care Note    Patient: Juliette Seo    Procedure(s) Performed: Procedure(s) (LRB):  EGD (ESOPHAGOGASTRODUODENOSCOPY) (N/A)    Patient location: PACU    Anesthesia Type: general    Transport from OR: Transported from OR on room air with adequate spontaneous ventilation    Post pain: adequate analgesia    Post assessment: no apparent anesthetic complications and tolerated procedure well    Post vital signs: stable    Level of consciousness: awake, alert and oriented    Nausea/Vomiting: no nausea/vomiting    Complications: none    Transfer of care protocol was followed    Last vitals: Visit Vitals  BP (!) 199/88 (Patient Position: Lying)   Pulse 100   Temp 36.7 °C (98.1 °F) (Temporal)   Resp 16   Ht 4' 10" (1.473 m)   Wt 50.8 kg (112 lb)   SpO2 98%   Breastfeeding No   BMI 23.41 kg/m²     "

## 2025-04-29 NOTE — PROVATION PATIENT INSTRUCTIONS
Discharge Summary/Instructions after an Endoscopic Procedure  Patient Name: Juliette Seo  Patient MRN: [id number]  Patient YOB: 1945  Tuesday, April 29, 2025  Radu Lei MD  Dear patient,  As a result of recent federal legislation (The Federal Cures Act), you may   receive lab or pathology results from your procedure in your [facility]   account before your physician is able to contact you. Your physician or   their representative will relay the results to you with their   recommendations at their soonest availability.  Thank you,  RESTRICTIONS:  During your procedure today, you received medications for sedation.  These   medications may affect your judgment, balance and coordination.  Therefore,   for 24 hours, you have the following restrictions:   - DO NOT drive a car, operate machinery, make legal/financial decisions,   sign important papers or drink alcohol.    ACTIVITY:  Today: no heavy lifting, straining or running due to procedural   sedation/anesthesia.  The following day: return to full activity including work.  DIET:  Eat and drink normally unless instructed otherwise.     TREATMENT FOR COMMON SIDE EFFECTS:  - Mild abdominal pain, nausea, belching, bloating or excessive gas:  rest,   eat lightly and use a heating pad.  - Sore Throat: treat with throat lozenges and/or gargle with warm salt   water.  - Because air was used during the procedure, expelling large amounts of air   from your rectum or belching is normal.  - If a bowel prep was taken, you may not have a bowel movement for 1-3 days.    This is normal.  SYMPTOMS TO WATCH FOR AND REPORT TO YOUR PHYSICIAN:  1. Abdominal pain or bloating, other than gas cramps.  2. Chest pain.  3. Back pain.  4. Signs of infection such as: chills or fever occurring within 24 hours   after the procedure.  5. Rectal bleeding, which would show as bright red, maroon, or black stools.   (A tablespoon of blood from the rectum is not serious, especially if    hemorrhoids are present.)  6. Vomiting.  7. Weakness or dizziness.  GO DIRECTLY TO THE NEAREST EMERGENCY ROOM IF YOU HAVE ANY OF THE FOLLOWING:      Difficulty breathing              Chills and/or fever over 101 F   Persistent vomiting and/or vomiting blood   Severe abdominal pain   Severe chest pain   Black, tarry stools   Bleeding- more than one tablespoon   Any other symptom or condition that you feel may need urgent attention  Your doctor recommends these additional instructions:  If any biopsies were taken, your doctors clinic will contact you in 1 to 2   weeks with any results.  - [facility].   - Resume previous diet.   - Await pathology results.  For questions, problems or results please call your physician - Radu Lei MD at Work:  [phone].  [facility] PHONE NUMBER: [phone]  IF A COMPLICATION OR EMERGENCY SITUATION ARISES AND YOU ARE UNABLE TO REACH   YOUR PHYSICIAN - GO DIRECTLY TO THE EMERGENCY ROOM.  MD Radu Partida MD  4/29/2025 12:31:14 PM  This report has been verified and signed electronically.  Dear patient,  As a result of recent federal legislation (The Federal Cures Act), you may   receive lab or pathology results from your procedure in your [facility]   account before your physician is able to contact you. Your physician or   their representative will relay the results to you with their   recommendations at their soonest availability.  Thank you,  PROVATION

## 2025-04-29 NOTE — ANESTHESIA POSTPROCEDURE EVALUATION
Anesthesia Post Evaluation    Patient: Juliette Seo    Procedure(s) Performed: Procedure(s) (LRB):  EGD (ESOPHAGOGASTRODUODENOSCOPY) (N/A)    Final Anesthesia Type: general      Patient location during evaluation: PACU  Patient participation: Yes- Able to Participate  Level of consciousness: awake and alert  Post-procedure vital signs: reviewed and stable  Pain management: adequate  Airway patency: patent    PONV status at discharge: No PONV  Anesthetic complications: no      Cardiovascular status: stable  Respiratory status: spontaneous ventilation  Hydration status: euvolemic  Follow-up not needed.              Vitals Value Taken Time   /67 04/29/25 11:02   Temp 36.5 °C (97.7 °F) 04/29/25 10:47   Pulse 89 04/29/25 11:02   Resp 20 04/29/25 11:02   SpO2 99 % 04/29/25 11:02   Vitals shown include unfiled device data.      Event Time   Out of Recovery 11:03:00         Pain/Shira Score: Pain Rating Prior to Med Admin: 8 (4/29/2025 12:09 AM)  Pain Rating Post Med Admin: 2 (4/28/2025  6:36 PM)  Shira Score: 10 (4/29/2025 11:00 AM)

## 2025-04-30 VITALS
WEIGHT: 112 LBS | DIASTOLIC BLOOD PRESSURE: 60 MMHG | SYSTOLIC BLOOD PRESSURE: 98 MMHG | BODY MASS INDEX: 23.51 KG/M2 | RESPIRATION RATE: 16 BRPM | TEMPERATURE: 98 F | OXYGEN SATURATION: 97 % | HEIGHT: 58 IN | HEART RATE: 71 BPM

## 2025-04-30 LAB
ABSOLUTE EOSINOPHIL (OHS): 0.13 K/UL
ABSOLUTE MONOCYTE (OHS): 0.78 K/UL (ref 0.3–1)
ABSOLUTE NEUTROPHIL COUNT (OHS): 6.09 K/UL (ref 1.8–7.7)
ALBUMIN SERPL BCP-MCNC: 3.3 G/DL (ref 3.5–5.2)
ALP SERPL-CCNC: 73 UNIT/L (ref 40–150)
ALT SERPL W/O P-5'-P-CCNC: 18 UNIT/L (ref 10–44)
ANION GAP (OHS): 13 MMOL/L (ref 8–16)
AST SERPL-CCNC: 23 UNIT/L (ref 11–45)
BASOPHILS # BLD AUTO: 0.05 K/UL
BASOPHILS NFR BLD AUTO: 0.6 %
BILIRUB SERPL-MCNC: 0.5 MG/DL (ref 0.1–1)
BUN SERPL-MCNC: 19 MG/DL (ref 8–23)
CALCIUM SERPL-MCNC: 8.1 MG/DL (ref 8.7–10.5)
CHLORIDE SERPL-SCNC: 107 MMOL/L (ref 95–110)
CO2 SERPL-SCNC: 14 MMOL/L (ref 23–29)
CREAT SERPL-MCNC: 0.8 MG/DL (ref 0.5–1.4)
ERYTHROCYTE [DISTWIDTH] IN BLOOD BY AUTOMATED COUNT: 13.2 % (ref 11.5–14.5)
GFR SERPLBLD CREATININE-BSD FMLA CKD-EPI: >60 ML/MIN/1.73/M2
GLUCOSE SERPL-MCNC: 136 MG/DL (ref 70–110)
HCT VFR BLD AUTO: 39.3 % (ref 37–48.5)
HGB BLD-MCNC: 11.9 GM/DL (ref 12–16)
IMM GRANULOCYTES # BLD AUTO: 0.03 K/UL (ref 0–0.04)
IMM GRANULOCYTES NFR BLD AUTO: 0.3 % (ref 0–0.5)
LYMPHOCYTES # BLD AUTO: 1.81 K/UL (ref 1–4.8)
MAGNESIUM SERPL-MCNC: 2.1 MG/DL (ref 1.6–2.6)
MCH RBC QN AUTO: 28.7 PG (ref 27–31)
MCHC RBC AUTO-ENTMCNC: 30.3 G/DL (ref 32–36)
MCV RBC AUTO: 95 FL (ref 82–98)
NUCLEATED RBC (/100WBC) (OHS): 0 /100 WBC
PLATELET # BLD AUTO: 163 K/UL (ref 150–450)
PMV BLD AUTO: 10.5 FL (ref 9.2–12.9)
POCT GLUCOSE: 198 MG/DL (ref 70–110)
POTASSIUM SERPL-SCNC: 4.4 MMOL/L (ref 3.5–5.1)
PROT SERPL-MCNC: 6.4 GM/DL (ref 6–8.4)
RBC # BLD AUTO: 4.15 M/UL (ref 4–5.4)
RELATIVE EOSINOPHIL (OHS): 1.5 %
RELATIVE LYMPHOCYTE (OHS): 20.4 % (ref 18–48)
RELATIVE MONOCYTE (OHS): 8.8 % (ref 4–15)
RELATIVE NEUTROPHIL (OHS): 68.4 % (ref 38–73)
SODIUM SERPL-SCNC: 134 MMOL/L (ref 136–145)
WBC # BLD AUTO: 8.89 K/UL (ref 3.9–12.7)

## 2025-04-30 PROCEDURE — 85025 COMPLETE CBC W/AUTO DIFF WBC: CPT | Mod: HCNC,NTX | Performed by: NURSE PRACTITIONER

## 2025-04-30 PROCEDURE — 80053 COMPREHEN METABOLIC PANEL: CPT | Mod: HCNC,NTX | Performed by: NURSE PRACTITIONER

## 2025-04-30 PROCEDURE — 25000003 PHARM REV CODE 250: Mod: HCNC,NTX | Performed by: PHYSICIAN ASSISTANT

## 2025-04-30 PROCEDURE — 83735 ASSAY OF MAGNESIUM: CPT | Mod: HCNC,NTX | Performed by: NURSE PRACTITIONER

## 2025-04-30 PROCEDURE — 25000242 PHARM REV CODE 250 ALT 637 W/ HCPCS: Mod: HCNC,NTX | Performed by: NURSE PRACTITIONER

## 2025-04-30 PROCEDURE — 36415 COLL VENOUS BLD VENIPUNCTURE: CPT | Mod: HCNC,NTX | Performed by: NURSE PRACTITIONER

## 2025-04-30 PROCEDURE — 25000003 PHARM REV CODE 250: Mod: HCNC,NTX | Performed by: NURSE PRACTITIONER

## 2025-04-30 RX ORDER — VALSARTAN 80 MG/1
160 TABLET ORAL DAILY
Qty: 180 TABLET | Refills: 3 | Status: SHIPPED | OUTPATIENT
Start: 2025-04-30 | End: 2026-04-30

## 2025-04-30 RX ORDER — DICYCLOMINE HYDROCHLORIDE 10 MG/1
10 CAPSULE ORAL 4 TIMES DAILY
Qty: 12 CAPSULE | Refills: 0 | Status: SHIPPED | OUTPATIENT
Start: 2025-04-30 | End: 2025-05-03

## 2025-04-30 RX ORDER — LOPERAMIDE HYDROCHLORIDE 2 MG/1
2 CAPSULE ORAL 4 TIMES DAILY PRN
Qty: 20 CAPSULE | Refills: 0 | Status: SHIPPED | OUTPATIENT
Start: 2025-04-30

## 2025-04-30 RX ORDER — ACETAMINOPHEN 500 MG
1000 TABLET ORAL EVERY 8 HOURS PRN
COMMUNITY
Start: 2025-04-30

## 2025-04-30 RX ADMIN — DICYCLOMINE HYDROCHLORIDE 10 MG: 10 CAPSULE ORAL at 08:04

## 2025-04-30 RX ADMIN — FLUTICASONE PROPIONATE 100 MCG: 50 SPRAY, METERED NASAL at 08:04

## 2025-04-30 RX ADMIN — AZELASTINE 137 MCG: 1 SPRAY, METERED NASAL at 08:04

## 2025-04-30 RX ADMIN — ASPIRIN 81 MG: 81 TABLET, COATED ORAL at 08:04

## 2025-04-30 RX ADMIN — DIPHENHYDRAMINE HYDROCHLORIDE, ZINC ACETATE: 2; .1 CREAM TOPICAL at 10:04

## 2025-04-30 RX ADMIN — CETIRIZINE HYDROCHLORIDE 10 MG: 10 TABLET, FILM COATED ORAL at 08:04

## 2025-04-30 RX ADMIN — PANTOPRAZOLE SODIUM 40 MG: 40 TABLET, DELAYED RELEASE ORAL at 08:04

## 2025-04-30 NOTE — ASSESSMENT & PLAN NOTE
Patient is not chronically on statin. Monitor clinically. Last LDL was   Lab Results   Component Value Date    LDLCALC 130.8 04/28/2025    -Restart atorvastatin.

## 2025-04-30 NOTE — ASSESSMENT & PLAN NOTE
Patient's FSGs are uncontrolled due to hyperglycemia on current medication regimen.  Last A1c reviewed-   Lab Results   Component Value Date    HGBA1C 7.2 (H) 04/27/2025     Most recent fingerstick glucose reviewed-   Recent Labs   Lab 04/29/25  1205 04/29/25  1644 04/29/25 2007 04/30/25  0808   POCTGLUCOSE 103 160* 209* 198*     Current correctional scale  Low  Maintain anti-hyperglycemic dose as follows-   Antihyperglycemics (From admission, onward)      Start     Stop Route Frequency Ordered    04/27/25 2101  insulin aspart U-100 pen 0-5 Units         -- SubQ Before meals & nightly PRN 04/27/25 2001          Hold Oral hypoglycemics while patient is in the hospital.   -Pt currently has omnipod in place, agreed to have patient keep it since it was just changed, but discussed we may need to remove it if she continues to have N/V or develops hypoglycemia.  -Accuchecks AC/HS

## 2025-04-30 NOTE — NURSING
patient said she wiped and it was a streak of blood in it. She did not let me see it but she told her daughter whom was at the bedside. She had a EGD today and afterwards she had a nose bleed. They both stated that it was just a smear of blood just wanted to make you aware. Plus she is having loose stool I gave the imodium but it could be from irritation she denied having issues with hemorrhoids - sent to MD on call

## 2025-04-30 NOTE — ASSESSMENT & PLAN NOTE
Abdominal exam benign. Patient with no pain currently but is actively vomiting.  -Afebrile, no leukocytosis.  -LA 2.9, trending  -CRP 2.9 and procal 0.02.  -CTA AP with no acute abdominopelvic abnormality identified. Moderate atherosclerotic disease of the abdominal aorta.  Moderate narrowing at the origin of the ARPITA, which is otherwise patent.  No evidence of acute abdominal vascular occlusion. Pancreatic cystic lesions measuring up to 1.3 cm.     -IVF overnight.  - regular diet  -Continue PPI.  -PRN antiemetics and IV pain meds  -GI consulted, appreciate assistance.  - Normal esophagus.   - Gastritis. Biopsied.   - Normal examined duodenum.   - Return patient to [facility] for [facility].   - Resume previous diet.   - Await pathology results.

## 2025-04-30 NOTE — PROGRESS NOTES
[facility] Medicine  Progress Note    Patient Name: Juliette Seo  MRN: [id number]  Patient Class: IP- Inpatient   Admission Date: 4/27/2025  Length of Stay: 2 days  Attending Physician: Delores Cat MD  Primary Care Provider: Suman Rubi MD        Subjective     Principal Problem:Abdominal pain        HPI:  Juliette Seo is a 79 y.o. female with a PMHx of vertigo, type 2 diabetes, coronary artery disease, hypertension, urinary urgency, nephrolithiasis, GERD, interstitial lung disease, and IBS who presents to the ED for evaluation of upper abdominal pain x1 month. Pt reports RUQ and epigastric abdominal pain beginning 1 month ago that acutely worsened over the past week. Describes the pain as stabbing/throbbing pain that also radiates around her right side into her back. Endorses associated abdominal bloating, decreased appetite, nausea, chills, mild SOB, and fatigue. Notes PO intake seems to make her symptoms worse. She has been taking tylenol and antacids along with her daily protonix without relief. She was previously seen in the emergency department on 04/16/2025, CT abdomen and pelvis without evidence of acute intra-abdominal findings. She was discharged on Bentyl and Keflex for UTI and abdominal cramping. She was seen by her PCP who discontinued the abx and referred her to GI. Follow up with Gastroenterology is not until May. She denies fever, cough, congestion, diarrhea, or constipation. She states she had no vomiting previously but had several episodes of emesis since arriving to the ED today. She reports her abdominal pain has resolved since being treated in the ED, but she remains significantly nauseated. She denies CP, lightheadedness, syncope, or diaphoresis.    In the ED, pt mildly tachycardic and intermittently hypertensive otherwise vitals stable, afebrile. CBC unremarkable. K+3.4. Bicarb 16. Glucose 208. Calcium 7.3. Mag 1.7. Total protein 5.9. Albumin 3.0. POC lactate  3.2>>2.4. Blood cxs in process. UA with 1+leukocytes, 19 WBCs, rare bacteria, and 3 squams. CXR with interstitial findings are somewhat more conspicuous on current exam, may reflect accentuation from overlying habitus. Interval edema or other pneumonitis are considerations. No large focal consolidation. CTA AP with no acute abdominopelvic abnormality identified. Moderate atherosclerotic disease of the abdominal aorta.  Moderate narrowing at the origin of the ARPITA, which is otherwise patent. No evidence of acute abdominal vascular occlusion. Pancreatic cystic lesions measuring up to 1.3 cm. The patient received 1L LR bolus, 2g mag sulfate, zosyn, zofran x2, 50meq potassium bicarbonate, 4mg IV morphine, and 5mg IV morphine.    Overview/Hospital Course:  Pt admitted for evaluation of RUQ and epigastric pain. Pt HDS and afebrile. UA concerning for UTI, UC pending. GI consulted for persistent RUQ and epigastric abdominal pain. CT a/p c/f ARPITA stenosis and confirmed on mesenteric ischemia US>>No evidence of hemodynamically significant stenosis in the celiac or SMA.  Significant stenosis in the ARPITA. GI planning for EDG 4/29. Vascular surgery consulted for ARPITA stenosis.     Interval History: NAEON. Pt had normal EGD w/ GI today. Resumed diet but has continued abdominal pain, nausea and diarrhea today following the Egd. Also reports substantial nosebleed  that was quickly controlled w/ pressure. Will continue to address GI symptoms overnight, trend lytes in setting of diarrhea, and h/h.     Review of Systems   Constitutional:  Negative for activity change, appetite change and fatigue.   Respiratory:  Negative for cough and shortness of breath.    Cardiovascular:  Negative for chest pain.   Gastrointestinal:  Positive for abdominal pain, diarrhea and nausea. Negative for abdominal distention and vomiting.   Neurological:  Negative for dizziness, syncope, speech difficulty, weakness, light-headedness, numbness and headaches.    Psychiatric/Behavioral:  The patient is not nervous/anxious.      Objective:     Vital Signs (Most Recent):  Temp: 97.2 °F (36.2 °C) (04/29/25 1204)  Pulse: 100 (04/29/25 1251)  Resp: 16 (04/29/25 1204)  BP: (!) 172/83 (04/29/25 1204)  SpO2: 100 % (04/29/25 1204) Vital Signs (24h Range):  Temp:  [97.2 °F (36.2 °C)-98.2 °F (36.8 °C)] 97.2 °F (36.2 °C)  Pulse:  [] 100  Resp:  [15-20] 16  SpO2:  [98 %-100 %] 100 %  BP: (130-199)/(63-88) 172/83     Weight: 50.8 kg (112 lb)  Body mass index is 23.41 kg/m².  No intake or output data in the 24 hours ending 04/29/25 1431      Physical Exam  Vitals and nursing note reviewed.   Constitutional:       General: She is not in acute distress.     Appearance: Normal appearance. She is not ill-appearing.   HENT:      Head: Normocephalic and atraumatic.   Eyes:      Extraocular Movements: Extraocular movements intact.      Conjunctiva/sclera: Conjunctivae normal.   Cardiovascular:      Rate and Rhythm: Normal rate and regular rhythm.      Pulses: Normal pulses.      Heart sounds: Normal heart sounds. No murmur heard.  Pulmonary:      Effort: Pulmonary effort is normal. No respiratory distress.      Breath sounds: Normal breath sounds. No wheezing, rhonchi or rales.   Abdominal:      General: Bowel sounds are normal.      Palpations: Abdomen is soft.      Tenderness: There is abdominal tenderness in the right upper quadrant and epigastric area.   Musculoskeletal:      Right lower leg: No edema.      Left lower leg: No edema.   Neurological:      Mental Status: She is alert and oriented to person, place, and time. Mental status is at baseline.   Psychiatric:         Mood and Affect: Mood normal.         Behavior: Behavior normal.               Significant Labs: All pertinent labs within the past 24 hours have been reviewed.    Significant Imaging: I have reviewed all pertinent imaging results/findings within the past 24 hours.      Assessment & Plan  Abdominal pain  Abdominal  exam benign. Patient with no pain currently but is actively vomiting.  -Afebrile, no leukocytosis.  -LA 2.9, trending  -CRP 2.9 and procal 0.02.  -CTA AP with no acute abdominopelvic abnormality identified. Moderate atherosclerotic disease of the abdominal aorta.  Moderate narrowing at the origin of the ARPITA, which is otherwise patent.  No evidence of acute abdominal vascular occlusion. Pancreatic cystic lesions measuring up to 1.3 cm.     -IVF overnight.  - regular diet  -Continue PPI.  -PRN antiemetics and IV pain meds  -GI consulted, appreciate assistance.  - Normal esophagus.   - Gastritis. Biopsied.   - Normal examined duodenum.   - Return patient to [facility] for [facility] care.   - Resume previous diet.   - Await pathology results.   Stenosis of inferior mesenteric artery  - US mesenteric ischemia study>> No evidence of hemodynamically significant stenosis in the celiac or SMA. 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   - discussed w/ Dr. Bui w/ vascular surgery, no intervention at this time. No needed surveillance  Hypokalemia  Patient's most recent potassium results are listed below.   Recent Labs     04/28/25  0325 04/29/25  0255 04/30/25  0451   K 4.1 4.1 4.4     Plan  - Replete potassium per protocol  - Monitor potassium Daily  - Patient's hypokalemia is stable  Primary osteoarthritis of both knees  -History noted.  -PRN tylenol.  Type 2 diabetes mellitus with hyperglycemia, with long-term current use of insulin  Patient's FSGs are uncontrolled due to hyperglycemia on current medication regimen.  Last A1c reviewed-   Lab Results   Component Value Date    HGBA1C 7.2 (H) 04/27/2025     Most recent fingerstick glucose reviewed-   Recent Labs   Lab 04/29/25  1205 04/29/25  1644 04/29/25 2007 04/30/25  0808   POCTGLUCOSE 103 160* 209* 198*     Current correctional scale  Low  Maintain anti-hyperglycemic dose as follows-   Antihyperglycemics (From admission, onward)      Start     Stop Route Frequency Ordered     04/27/25 2101  insulin aspart U-100 pen 0-5 Units         -- SubQ Before meals & nightly PRN 04/27/25 2001          Hold Oral hypoglycemics while patient is in the hospital.   -Pt currently has omnipod in place, agreed to have patient keep it since it was just changed, but discussed we may need to remove it if she continues to have N/V or develops hypoglycemia.  -Accuchecks AC/HS  Coronary artery calcification seen on computed tomography  -History noted.  -Continue ASA. Unclear why atorvastatin was stopped, discussed restarting.  Uncontrolled hypertension  Patient's blood pressure range in the last 24 hours was: BP  Min: 98/60  Max: 172/83.The patient's inpatient anti-hypertensive regimen is listed below:  Current Antihypertensives  valsartan tablet 160 mg, Daily, Oral    Plan  - BP is controlled, no changes needed to their regimen  Mixed hyperlipidemia   Patient is not chronically on statin. Monitor clinically. Last LDL was   Lab Results   Component Value Date    LDLCALC 130.8 04/28/2025    -Restart atorvastatin.  Gastroesophageal reflux disease without esophagitis  -Chronic issue.  -Continue PPI.  ILD (interstitial lung disease)  -Chronic, stable.   -Continue singulair, astelin, and flonase.  -PRN duo nebs.  Recurrent major depressive disorder, in full remission  Patient has recurrent depression which is moderate and is currently controlled. Will Continue anti-depressant medications. We will not consult psychiatry at this time. Patient does not display psychosis at this time. Continue to monitor closely and adjust plan of care as needed.      VTE Risk Mitigation (From admission, onward)           Ordered     IP VTE LOW RISK PATIENT  Once         04/27/25 2001     Place sequential compression device  Until discontinued         04/27/25 2001                    Discharge Planning   JAIRO: 4/30/2025     Code Status: Full Code   Medical Readiness for Discharge Date:   Discharge Plan A: Home with family, Home                         Lynda Goodwin PA-C  Department of Hospital Medicine   [facility]

## 2025-04-30 NOTE — NURSING
AVS virtually reviewed with patient and her granddaughter in its entirety with emphasis on diet, medications, follow-up appointments and reasons to return to the ED. Education was initiated with  [facility] #[id number], but pt declined to continue with  because she speaks Serbian and her granddaughter was in the room. Patient also encouraged to utilize their patient portal. Ease and convenience of use reiterated. Education complete and patient voiced understanding. All questions answered. Discharge teaching complete.

## 2025-04-30 NOTE — DISCHARGE SUMMARY
[facility] Medicine  Discharge Summary      Patient Name: Juliette Seo  MRN: [id number]  [place]: [id number]  Patient Class: IP- Inpatient  Admission Date: 4/27/2025  Hospital Length of Stay: 2 days  Discharge Date and Time: 4/30/2025 12:37 PM  Attending Physician: Delores Cat MD  Discharging Provider: Lynda Goodwin PA-C  Primary Care Provider: Suman Rubi MD  [facility] Medicine Team: [facility] E Lynda Goodwin PA-C  Primary Care Team: [facility]    HPI:   Juliette Seo is a 79 y.o. female with a PMHx of vertigo, type 2 diabetes, coronary artery disease, hypertension, urinary urgency, nephrolithiasis, GERD, interstitial lung disease, and IBS who presents to the ED for evaluation of upper abdominal pain x1 month. Pt reports RUQ and epigastric abdominal pain beginning 1 month ago that acutely worsened over the past week. Describes the pain as stabbing/throbbing pain that also radiates around her right side into her back. Endorses associated abdominal bloating, decreased appetite, nausea, chills, mild SOB, and fatigue. Notes PO intake seems to make her symptoms worse. She has been taking tylenol and antacids along with her daily protonix without relief. She was previously seen in the emergency department on 04/16/2025, CT abdomen and pelvis without evidence of acute intra-abdominal findings. She was discharged on Bentyl and Keflex for UTI and abdominal cramping. She was seen by her PCP who discontinued the abx and referred her to GI. Follow up with Gastroenterology is not until May. She denies fever, cough, congestion, diarrhea, or constipation. She states she had no vomiting previously but had several episodes of emesis since arriving to the ED today. She reports her abdominal pain has resolved since being treated in the ED, but she remains significantly nauseated. She denies CP, lightheadedness, syncope, or diaphoresis.    In the ED, pt mildly tachycardic and  intermittently hypertensive otherwise vitals stable, afebrile. CBC unremarkable. K+3.4. Bicarb 16. Glucose 208. Calcium 7.3. Mag 1.7. Total protein 5.9. Albumin 3.0. POC lactate 3.2>>2.4. Blood cxs in process. UA with 1+leukocytes, 19 WBCs, rare bacteria, and 3 squams. CXR with interstitial findings are somewhat more conspicuous on current exam, may reflect accentuation from overlying habitus. Interval edema or other pneumonitis are considerations. No large focal consolidation. CTA AP with no acute abdominopelvic abnormality identified. Moderate atherosclerotic disease of the abdominal aorta.  Moderate narrowing at the origin of the ARPITA, which is otherwise patent. No evidence of acute abdominal vascular occlusion. Pancreatic cystic lesions measuring up to 1.3 cm. The patient received 1L LR bolus, 2g mag sulfate, zosyn, zofran x2, 50meq potassium bicarbonate, 4mg IV morphine, and 5mg IV morphine.    Procedure(s) (LRB):  EGD (ESOPHAGOGASTRODUODENOSCOPY) (N/A)      Hospital Course:   Pt admitted for evaluation of RUQ and epigastric pain. Pt HDS and afebrile. UA concerning for UTI, UC pending. GI consulted for persistent RUQ and epigastric abdominal pain. CT a/p c/f ARPITA stenosis and confirmed on mesenteric ischemia US>>No evidence of hemodynamically significant stenosis in the celiac or SMA.  Significant stenosis in the ARPITA, discussed w/ Dr. Bui w/ vascular surgery who does not recommend any intervention at this time and no needed surveillance. Pt had EGD per GI that was unremarkable. Pt tolerated oral diet following scope. Abd pain improved w/ bentyl. Given analgesics at discharge. Pt med rec'd and medications were delivered to bedside prior to discharge. Pt medically ready for discharge home. Pt educated on hospital course and plan, verbally agrees and understands. All questions answered.     Physical Exam  Gen: in NAD, appears stated age  Neuro: AAOx3, motor, sensory, and strength grossly intact BL  HEENT: EOMI,  PERRLA; no JVD appreciated  CVS: RRR, no m/r/g  Resp: lungs CTAB, no w/r/r; no belabored breathing or accessory muscle use appreciated   Abd: NTND, soft to palpation  Extrem: no UE or LE edema BL      Goals of Care Treatment Preferences:  Code Status: Full Code      SDOH Screening:  The patient was screened for utility difficulties, food insecurity, transport difficulties, housing insecurity, and interpersonal safety and there were no concerns identified this admission.     Consults:   Consults (From admission, onward)          Status Ordering Provider     Inpatient consult to Gastroenterology  Once        Provider:  (Not yet assigned)    Completed MAZIN BONILLA            Assessment & Plan  Abdominal pain  Abdominal exam benign. Patient with no pain currently but is actively vomiting.  -Afebrile, no leukocytosis.  -LA 2.9, trending  -CRP 2.9 and procal 0.02.  -CTA AP with no acute abdominopelvic abnormality identified. Moderate atherosclerotic disease of the abdominal aorta.  Moderate narrowing at the origin of the ARPITA, which is otherwise patent.  No evidence of acute abdominal vascular occlusion. Pancreatic cystic lesions measuring up to 1.3 cm.     -IVF overnight.  - regular diet  -Continue PPI.  -PRN antiemetics and IV pain meds  -GI consulted, appreciate assistance.  - Normal esophagus.   - Gastritis. Biopsied.   - Normal examined duodenum.   - Return patient to [facility] for ongoing care.   - Resume previous diet.   - Await pathology results.   Stenosis of inferior mesenteric artery  - US mesenteric ischemia study>> No evidence of hemodynamically significant stenosis in the celiac or SMA. 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   - discussed w/ Dr. Bui w/ vascular surgery, no intervention at this time. No needed surveillance  Hypokalemia  Patient's most recent potassium results are listed below.   Recent Labs     04/28/25  0325 04/29/25  0255 04/30/25  0451   K 4.1 4.1 4.4     Plan  - Replete potassium  per protocol  - Monitor potassium Daily  - Patient's hypokalemia is stable  Primary osteoarthritis of both knees  -History noted.  -PRN tylenol.  Type 2 diabetes mellitus with hyperglycemia, with long-term current use of insulin  Patient's FSGs are uncontrolled due to hyperglycemia on current medication regimen.  Last A1c reviewed-   Lab Results   Component Value Date    HGBA1C 7.2 (H) 04/27/2025     Most recent fingerstick glucose reviewed-   Recent Labs   Lab 04/29/25  1644 04/29/25 2007 04/30/25  0808   POCTGLUCOSE 160* 209* 198*     Current correctional scale  Low  Maintain anti-hyperglycemic dose as follows-   Antihyperglycemics (From admission, onward)      Start     Stop Route Frequency Ordered    04/27/25 2101  insulin aspart U-100 pen 0-5 Units         -- SubQ Before meals & nightly PRN 04/27/25 2001          Hold Oral hypoglycemics while patient is in the hospital.   -Pt currently has omnipod in place, agreed to have patient keep it since it was just changed, but discussed we may need to remove it if she continues to have N/V or develops hypoglycemia.  -Accuchecks AC/HS  Coronary artery calcification seen on computed tomography  -History noted.  -Continue ASA. Unclear why atorvastatin was stopped, discussed restarting.  Uncontrolled hypertension  Patient's blood pressure range in the last 24 hours was: BP  Min: 98/60  Max: 133/60.The patient's inpatient anti-hypertensive regimen is listed below:  Current Antihypertensives  valsartan tablet 160 mg, Daily, Oral  valsartan (DIOVAN) tablet, Daily, Oral    Plan  - BP is controlled, no changes needed to their regimen  Mixed hyperlipidemia   Patient is not chronically on statin. Monitor clinically. Last LDL was   Lab Results   Component Value Date    LDLCALC 130.8 04/28/2025    -Restart atorvastatin.  Gastroesophageal reflux disease without esophagitis  -Chronic issue.  -Continue PPI.  ILD (interstitial lung disease)  -Chronic, stable.   -Continue singulair,  astelin, and flonase.  -PRN duo nebs.  Recurrent major depressive disorder, in full remission  Patient has recurrent depression which is moderate and is currently controlled. Will Continue anti-depressant medications. We will not consult psychiatry at this time. Patient does not display psychosis at this time. Continue to monitor closely and adjust plan of care as needed.      Final Active Diagnoses:    Diagnosis Date Noted POA    PRINCIPAL PROBLEM:  Abdominal pain [R10.9] 04/27/2025 Yes    Stenosis of inferior mesenteric artery [K55.1] 04/28/2025 Yes    Hypokalemia [E87.6] 04/27/2025 Yes    ILD (interstitial lung disease) [J84.9] 07/20/2023 Yes    Gastroesophageal reflux disease without esophagitis [K21.9] 06/08/2021 Yes    Mixed hyperlipidemia [E78.2] 03/02/2021 Yes    Recurrent major depressive disorder, in full remission [F33.42] 10/18/2018 Yes    Uncontrolled hypertension [I10] 03/01/2017 Yes    Coronary artery calcification seen on computed tomography [I25.10] 10/29/2015 Yes    Type 2 diabetes mellitus with hyperglycemia, with long-term current use of insulin [E11.65, Z79.4] 10/19/2015 Not Applicable    Primary osteoarthritis of both knees [M17.0] 07/20/2015 Yes      Problems Resolved During this Admission:       Discharged Condition: good    Disposition: Home or Self Care    Follow Up:   Follow-up Information       Suman Rubi MD. Schedule an appointment as soon as possible for a visit .    Specialty: Family Medicine  Contact information:  [address]  [facility] [place] [address]  [phone]               Suman Rubi MD. Schedule an appointment as soon as possible for a visit in 1 week(s).    Specialty: Family Medicine  Why: As needed, If symptoms worsen  Contact information:  [address]  [facility] [place] [address]  [phone]                           Patient Instructions:      Ambulatory referral/consult to  Gastroenterology   Standing Status: Future   Referral Priority: Urgent Referral Type: Consultation   Referral Reason: Specialty Services Required   Requested Specialty: Gastroenterology   Number of Visits Requested: 1     Notify your health care provider if you experience any of the following:  temperature >100.4     Notify your health care provider if you experience any of the following:  persistent nausea and vomiting or diarrhea     Notify your health care provider if you experience any of the following:  severe uncontrolled pain     Notify your health care provider if you experience any of the following:  redness, tenderness, or signs of infection (pain, swelling, redness, odor or green/yellow discharge around incision site)     Notify your health care provider if you experience any of the following:  persistent dizziness, light-headedness, or visual disturbances     Notify your health care provider if you experience any of the following:  increased confusion or weakness     Activity as tolerated       Significant Diagnostic Studies: Labs: All labs within the past 24 hours have been reviewed    Pending Diagnostic Studies:       Procedure Component Value Units Date/Time    HCV Virus Hold Specimen [[id number]] Collected: 04/27/25 1623    Order Status: Sent Lab Status: In process Updated: 04/27/25 1653    Specimen: Blood            Medications:  Reconciled Home Medications:      Medication List        START taking these medications      acetaminophen 500 MG tablet  Commonly known as: TYLENOL  Take 2 tablets (1,000 mg total) by mouth every 8 (eight) hours as needed for Pain.     dicyclomine 10 MG capsule  Commonly known as: BENTYL  Wai 1 capsula (10mg en total) por via oral 4 (cuatro) veces al damien por 3 gordon  (Take 1 capsule (10 mg total) by mouth 4 (four) times daily. for 3 days)     loperamide 2 mg capsule  Commonly known as: IMODIUM  [place] 1 cápsula (2 mg en total) por vía oral 4 (cuatro) veces al día según sea  necesario para la diarrea.  (Take 1 capsule (2 mg total) by mouth 4 (four) times daily as needed for Diarrhea.)            CHANGE how you take these medications      pantoprazole 40 MG tablet  Commonly known as: PROTONIX  Take 1 tablet (40 mg total) by mouth once daily.  What changed: Another medication with the same name was removed. Continue taking this medication, and follow the directions you see here.     valsartan 80 MG tablet  Commonly known as: DIOVAN  Wai 2 tabletas (160mg en total) por via oral shahid vez al damien  (Take 2 tablets (160 mg total) by mouth once daily.)  What changed: how much to take            CONTINUE taking these medications      albuterol 2.5 mg /3 mL (0.083 %) nebulizer solution  Commonly known as: PROVENTIL  Take 3 mLs (2.5 mg total) by nebulization every 6 (six) hours as needed for Wheezing. Rescue     aspirin 81 MG EC tablet  Commonly known as: ECOTRIN  Take 81 mg by mouth once daily.     azelastine 137 mcg (0.1 %) nasal spray  Commonly known as: ASTELIN  1 spray (137 mcg total) by Nasal route 2 (two) times daily.     cetirizine 10 MG tablet  Commonly known as: ZYRTEC  Take 1 tablet (10 mg total) by mouth once daily.     DEXCOM G7  Misc  Generic drug: blood-glucose,,cont  Use to check glucose with sensors     fluticasone propionate 50 mcg/actuation nasal spray  Commonly known as: FLONASE  2 sprays (100 mcg total) by Each Nostril route once daily.     metFORMIN 500 MG tablet  Commonly known as: GLUCOPHAGE  Take 1 tablet (500 mg total) by mouth 2 (two) times daily with meals.     montelukast 10 mg tablet  Commonly known as: SINGULAIR  Take 1 tablet (10 mg total) by mouth every evening.     NovoLOG U-100 Insulin aspart 100 unit/mL injection  Generic drug: insulin aspart U-100  To use with insulin pump. Max daily dose 130 units.     OMNIPOD 5 G6-G7 PODS (GEN 5) Crtg  Generic drug: insulin pump cart,auto,BT,G6/7  Inject 1 each into the skin every 72 hours.     OZEMPIC 0.25 mg  "or 0.5 mg (2 mg/3 mL) pen injector  Generic drug: semaglutide  Inject 0.5 mg into the skin every 7 days.     QUEtiapine 25 MG Tab  Commonly known as: SEROQUEL  Take 1 tablet (25 mg total) by mouth every evening.     TRUE METRIX GLUCOSE TEST STRIP Strp  Generic drug: blood sugar diagnostic  To use to inject insulin twice daily.     TRUEPLUS PEN NEEDLE 32 gauge x 5/32" Ndle  Generic drug: pen needle, diabetic  To inject insulin twice daily            STOP taking these medications      HYDROcodone-acetaminophen 5-325 mg per tablet  Commonly known as: NORCO     valsartan-hydrochlorothiazide 160-12.5 mg per tablet  Commonly known as: DIOVAN-HCT            ASK your doctor about these medications      empagliflozin 25 mg tablet  Commonly known as: JARDIANCE  Take 1 tablet (25 mg total) by mouth once daily.     LANTUS SOLOSTAR U-100 INSULIN 100 unit/mL (3 mL) Inpn pen  Generic drug: insulin glargine U-100 (Lantus)  Inject 40 Units into the skin once daily.     nystatin cream  Commonly known as: MYCOSTATIN  Apply topically 2 (two) times daily.              Indwelling Lines/Drains at time of discharge:   Lines/Drains/Airways       Line  Duration                  Subcutaneous Infusion Pump -- days                    Time spent on the discharge of patient: 36 minutes         Lynda Goodwin PA-C  Department of Hospital Medicine  [facility] - Med Surg  "

## 2025-04-30 NOTE — ASSESSMENT & PLAN NOTE
Patient's FSGs are uncontrolled due to hyperglycemia on current medication regimen.  Last A1c reviewed-   Lab Results   Component Value Date    HGBA1C 7.2 (H) 04/27/2025     Most recent fingerstick glucose reviewed-   Recent Labs   Lab 04/29/25  1644 04/29/25 2007 04/30/25  0808   POCTGLUCOSE 160* 209* 198*     Current correctional scale  Low  Maintain anti-hyperglycemic dose as follows-   Antihyperglycemics (From admission, onward)      Start     Stop Route Frequency Ordered    04/27/25 2101  insulin aspart U-100 pen 0-5 Units         -- SubQ Before meals & nightly PRN 04/27/25 2001          Hold Oral hypoglycemics while patient is in the hospital.   -Pt currently has omnipod in place, agreed to have patient keep it since it was just changed, but discussed we may need to remove it if she continues to have N/V or develops hypoglycemia.  -Accuchecks AC/HS

## 2025-04-30 NOTE — ASSESSMENT & PLAN NOTE
Patient's most recent potassium results are listed below.   Recent Labs     04/28/25  0325 04/29/25  0255 04/30/25  0451   K 4.1 4.1 4.4     Plan  - Replete potassium per protocol  - Monitor potassium Daily  - Patient's hypokalemia is stable

## 2025-04-30 NOTE — ASSESSMENT & PLAN NOTE
- US mesenteric ischemia study>> No evidence of hemodynamically significant stenosis in the celiac or SMA. 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   - discussed w/ Dr. Bui w/ vascular surgery, no intervention at this time. No needed surveillance

## 2025-04-30 NOTE — ASSESSMENT & PLAN NOTE
Patient's blood pressure range in the last 24 hours was: BP  Min: 98/60  Max: 172/83.The patient's inpatient anti-hypertensive regimen is listed below:  Current Antihypertensives  valsartan tablet 160 mg, Daily, Oral    Plan  - BP is controlled, no changes needed to their regimen

## 2025-04-30 NOTE — ASSESSMENT & PLAN NOTE
Patient's blood pressure range in the last 24 hours was: BP  Min: 98/60  Max: 133/60.The patient's inpatient anti-hypertensive regimen is listed below:  Current Antihypertensives  valsartan tablet 160 mg, Daily, Oral  valsartan (DIOVAN) tablet, Daily, Oral    Plan  - BP is controlled, no changes needed to their regimen

## 2025-05-01 ENCOUNTER — PATIENT OUTREACH (OUTPATIENT)
Dept: ADMINISTRATIVE | Facility: CLINIC | Age: 80
End: 2025-05-01
Payer: MEDICARE

## 2025-05-01 ENCOUNTER — PATIENT MESSAGE (OUTPATIENT)
Dept: ADMINISTRATIVE | Facility: CLINIC | Age: 80
End: 2025-05-01
Payer: MEDICARE

## 2025-05-01 NOTE — PLAN OF CARE
[facility] Med S[facility]  Discharge Final Note    Primary Care Provider: Suman Rubi MD    Expected Discharge Date: 4/30/2025        Patient discharged home with family.    Discharge Plan A and Plan B have been determined by review of patient's clinical status, future medical and therapeutic needs, and coverage/benefits for post-acute care in coordination with multidisciplinary team members.    Future Appointments   Date Time Provider Department Center   5/5/2025  2:00 PM Tova Lamar NP [facility] MED [place]   5/6/2025  9:30 AM Nadine Pompa PAKelvinC [facility] IM [facility]   5/7/2025 10:30 AM [facility] DEXA1 [facility] BMD [facility]   5/12/2025  3:00 PM [facility] [place] [facility]   5/28/2025 10:40 AM Lilo Epstein NP [facility] GASTRO [facility] [facility]   6/16/2025 10:15 AM [facility] OIC-CT2 500 LB LIMIT [facility] IC [facility]   6/16/2025 11:00 AM ECHO, [facility] ECHOSTR [facility]   6/16/2025 12:00 PM SIX, MINUTE WALK NOM PUL WLK [facility]   6/16/2025 12:30 PM PULMONARY FUNCTION [facility] PULMLAB [facility]   6/16/2025 12:45 PM PULMONARY FUNCTION [facility] PULMLAB [facility]   6/16/2025  1:00 PM PULMONARY FUNCTION [facility] PULMLAB [facility]   6/16/2025  1:30 PM Yancy Maldonado, DO [facility] LUNGTX [facility]       Final Discharge Note (most recent)       Final Note - 05/01/25 1232          Final Note    Assessment Type Final Discharge Note (P)      Anticipated Discharge Disposition Home or Self Care (P)      What phone number can be called within the next 1-3 days to see how you are doing after discharge? [phone] (P)         Post-Acute Status    Post-Acute Authorization Other (P)      Coverage [employer] MANAGED MEDICARE - [employer] \[facility] [place] [facility]\"" HMO PPO SPECIAL NEEDS - CAPITATED (P)      Discharge Delays None known at this time (P)                      Important Message from Medicare             Contact Info       Suman Rubi MD   Specialty: Family Medicine   Relationship: PCP - General    [address]  [facility] [address]   Phone: [phone]       Next Steps: Schedule an appointment as soon as possible for a visit    Suman Rubi MD   Specialty: Family Medicine   Relationship: PCP - General    [address]  [facility] [address]   Phone: [phone]       Next Steps: Schedule an appointment as soon as possible for a visit in 1 week(s)    Instructions: As needed, If symptoms worsen          ROBERTO Dimas  Case Management  [phone]

## 2025-05-01 NOTE — PROGRESS NOTES
C3 nurse spoke with Juliette Seo's daughter, Ashley, who is unable to complete the call at this time and requested a callback at 1600. Patient has a HOSFU with Tova Lamar NP ([facility]) on 5/5/25 at 1400. No messages routed at this time.

## 2025-05-02 ENCOUNTER — TELEPHONE (OUTPATIENT)
Dept: FAMILY MEDICINE | Facility: CLINIC | Age: 80
End: 2025-05-02
Payer: MEDICARE

## 2025-05-02 LAB
BACTERIA BLD CULT: NORMAL
BACTERIA BLD CULT: NORMAL
ESTROGEN SERPL-MCNC: NORMAL PG/ML
INSULIN SERPL-ACNC: NORMAL U[IU]/ML
LAB AP CLINICAL INFORMATION: NORMAL
LAB AP GROSS DESCRIPTION: NORMAL
LAB AP PERFORMING LOCATION(S): NORMAL
LAB AP REPORT FOOTNOTES: NORMAL
T3RU NFR SERPL: NORMAL %

## 2025-05-02 NOTE — PROGRESS NOTES
C3 nurse attempted to contact Juliette Seo for a TCC post hospital discharge follow up call. No answer, left a voicemail with callback information with . The patient has a scheduled HOSFU with Tova Lamar NP ([facility]) on 5/5/25 at 1400. No messages routed at this time.

## 2025-05-08 ENCOUNTER — HOSPITAL ENCOUNTER (OUTPATIENT)
Dept: RADIOLOGY | Facility: HOSPITAL | Age: 80
Discharge: HOME | End: 2025-05-08
Attending: FAMILY MEDICINE
Payer: MEDICARE

## 2025-05-08 DIAGNOSIS — Z78.0 POST-MENOPAUSAL: ICD-10-CM

## 2025-05-08 PROCEDURE — 77080 DXA BONE DENSITY AXIAL: CPT | Mod: TC,HCNC,TXP

## 2025-05-09 ENCOUNTER — RESULTS FOLLOW-UP (OUTPATIENT)
Dept: GASTROENTEROLOGY | Facility: HOSPITAL | Age: 80
End: 2025-05-09

## 2025-05-17 ENCOUNTER — RESULTS FOLLOW-UP (OUTPATIENT)
Dept: FAMILY MEDICINE | Facility: CLINIC | Age: 80
End: 2025-05-17

## 2025-05-17 DIAGNOSIS — E55.9 VITAMIN D DEFICIENCY DISEASE: Primary | ICD-10-CM

## 2025-05-17 NOTE — PROGRESS NOTES
No osteoporosis (brittle bones) but she does have some soft bones. Tell her to take Vit D 1000 IU daily OTC   In the meantime I want to check a Vit D level on her. Order is in thanks

## 2025-05-21 ENCOUNTER — RESULTS FOLLOW-UP (OUTPATIENT)
Dept: FAMILY MEDICINE | Facility: CLINIC | Age: 80
End: 2025-05-21

## 2025-05-21 DIAGNOSIS — E55.9 VITAMIN D DEFICIENCY DISEASE: Primary | ICD-10-CM

## 2025-05-21 RX ORDER — ERGOCALCIFEROL 1.25 MG/1
50000 CAPSULE ORAL
Qty: 4 CAPSULE | Refills: 5 | Status: SHIPPED | OUTPATIENT
Start: 2025-05-21 | End: 2025-05-23 | Stop reason: SDUPTHER

## 2025-05-23 DIAGNOSIS — E55.9 VITAMIN D DEFICIENCY DISEASE: ICD-10-CM

## 2025-05-23 RX ORDER — ERGOCALCIFEROL 1.25 MG/1
50000 CAPSULE ORAL
Qty: 4 CAPSULE | Refills: 5 | Status: SHIPPED | OUTPATIENT
Start: 2025-05-23 | End: 2025-11-19

## 2025-05-28 ENCOUNTER — OFFICE VISIT (OUTPATIENT)
Dept: GASTROENTEROLOGY | Facility: CLINIC | Age: 80
End: 2025-05-28
Payer: MEDICARE

## 2025-05-28 VITALS — BODY MASS INDEX: 24.33 KG/M2 | WEIGHT: 116.38 LBS

## 2025-05-28 DIAGNOSIS — D12.8 TUBULOVILLOUS ADENOMA OF RECTUM: ICD-10-CM

## 2025-05-28 DIAGNOSIS — K86.2 PANCREATIC CYST: ICD-10-CM

## 2025-05-28 DIAGNOSIS — Z09 HOSPITAL DISCHARGE FOLLOW-UP: ICD-10-CM

## 2025-05-28 DIAGNOSIS — Z12.11 SCREENING FOR COLON CANCER: Primary | ICD-10-CM

## 2025-05-28 DIAGNOSIS — K21.9 GASTROESOPHAGEAL REFLUX DISEASE, UNSPECIFIED WHETHER ESOPHAGITIS PRESENT: ICD-10-CM

## 2025-05-28 DIAGNOSIS — R10.84 GENERALIZED ABDOMINAL PAIN: ICD-10-CM

## 2025-05-28 PROCEDURE — 99999 PR PBB SHADOW E&M-EST. PATIENT-LVL IV: CPT | Mod: PBBFAC,TXP,, | Performed by: NURSE PRACTITIONER

## 2025-05-28 PROCEDURE — 1101F PT FALLS ASSESS-DOCD LE1/YR: CPT | Mod: CPTII,NTX,S$GLB, | Performed by: NURSE PRACTITIONER

## 2025-05-28 PROCEDURE — 1159F MED LIST DOCD IN RCRD: CPT | Mod: CPTII,NTX,S$GLB, | Performed by: NURSE PRACTITIONER

## 2025-05-28 PROCEDURE — 99215 OFFICE O/P EST HI 40 MIN: CPT | Mod: NTX,S$GLB,, | Performed by: NURSE PRACTITIONER

## 2025-05-28 PROCEDURE — 1125F AMNT PAIN NOTED PAIN PRSNT: CPT | Mod: CPTII,NTX,S$GLB, | Performed by: NURSE PRACTITIONER

## 2025-05-28 PROCEDURE — 1111F DSCHRG MED/CURRENT MED MERGE: CPT | Mod: CPTII,NTX,S$GLB, | Performed by: NURSE PRACTITIONER

## 2025-05-28 PROCEDURE — 3288F FALL RISK ASSESSMENT DOCD: CPT | Mod: CPTII,NTX,S$GLB, | Performed by: NURSE PRACTITIONER

## 2025-05-28 RX ORDER — DICYCLOMINE HYDROCHLORIDE 10 MG/1
10 CAPSULE ORAL 3 TIMES DAILY PRN
Qty: 30 CAPSULE | Refills: 0 | Status: SHIPPED | OUTPATIENT
Start: 2025-05-28 | End: 2025-06-27

## 2025-05-28 RX ORDER — SUCRALFATE 1 G/1
1 TABLET ORAL 3 TIMES DAILY
Qty: 90 TABLET | Refills: 0 | Status: SHIPPED | OUTPATIENT
Start: 2025-05-28 | End: 2025-06-27

## 2025-05-28 RX ORDER — PANTOPRAZOLE SODIUM 40 MG/1
40 TABLET, DELAYED RELEASE ORAL 2 TIMES DAILY
Qty: 60 TABLET | Refills: 11 | Status: SHIPPED | OUTPATIENT
Start: 2025-05-28 | End: 2026-05-28

## 2025-06-05 ENCOUNTER — TELEPHONE (OUTPATIENT)
Dept: GASTROENTEROLOGY | Facility: CLINIC | Age: 80
End: 2025-06-05
Payer: MEDICARE

## 2025-06-12 ENCOUNTER — HOSPITAL ENCOUNTER (OUTPATIENT)
Dept: RADIOLOGY | Facility: HOSPITAL | Age: 80
Discharge: HOME | End: 2025-06-12
Attending: NURSE PRACTITIONER
Payer: MEDICARE

## 2025-06-12 DIAGNOSIS — R10.84 GENERALIZED ABDOMINAL PAIN: ICD-10-CM

## 2025-06-12 PROCEDURE — 78264 GASTRIC EMPTYING IMG STUDY: CPT | Mod: 26,HCNC,NTX, | Performed by: RADIOLOGY

## 2025-06-12 PROCEDURE — 78264 GASTRIC EMPTYING IMG STUDY: CPT | Mod: TC,HCNC,TXP

## 2025-06-12 PROCEDURE — A9541 TC99M SULFUR COLLOID: HCPCS | Mod: HCNC,TXP | Performed by: NURSE PRACTITIONER

## 2025-06-12 RX ORDER — TECHNETIUM TC 99M SULFUR COLLOID 2 MG
1 KIT MISCELLANEOUS
Status: COMPLETED | OUTPATIENT
Start: 2025-06-12 | End: 2025-06-12

## 2025-06-12 RX ADMIN — TECHNETIUM TC 99M SULFUR COLLOID KIT 1 MILLICURIE: KIT at 07:06

## 2025-06-13 ENCOUNTER — TELEPHONE (OUTPATIENT)
Dept: TRANSPLANT | Facility: CLINIC | Age: 80
End: 2025-06-13
Payer: MEDICARE

## 2025-06-14 ENCOUNTER — RESULTS FOLLOW-UP (OUTPATIENT)
Dept: GASTROENTEROLOGY | Facility: CLINIC | Age: 80
End: 2025-06-14
Payer: MEDICARE

## 2025-06-16 ENCOUNTER — HOSPITAL ENCOUNTER (OUTPATIENT)
Dept: PULMONOLOGY | Facility: CLINIC | Age: 80
Discharge: HOME | End: 2025-06-16
Attending: INTERNAL MEDICINE
Payer: MEDICARE

## 2025-06-16 ENCOUNTER — HOSPITAL ENCOUNTER (OUTPATIENT)
Dept: RADIOLOGY | Facility: HOSPITAL | Age: 80
Discharge: HOME | End: 2025-06-16
Attending: INTERNAL MEDICINE
Payer: MEDICARE

## 2025-06-16 ENCOUNTER — HOSPITAL ENCOUNTER (OUTPATIENT)
Dept: CARDIOLOGY | Facility: HOSPITAL | Age: 80
Discharge: HOME | End: 2025-06-16
Attending: INTERNAL MEDICINE
Payer: MEDICARE

## 2025-06-16 ENCOUNTER — OFFICE VISIT (OUTPATIENT)
Dept: TRANSPLANT | Facility: CLINIC | Age: 80
End: 2025-06-16
Attending: INTERNAL MEDICINE
Payer: MEDICARE

## 2025-06-16 VITALS
RESPIRATION RATE: 16 BRPM | BODY MASS INDEX: 24.35 KG/M2 | DIASTOLIC BLOOD PRESSURE: 79 MMHG | OXYGEN SATURATION: 100 % | HEIGHT: 58 IN | WEIGHT: 116 LBS | HEART RATE: 85 BPM | TEMPERATURE: 98 F | SYSTOLIC BLOOD PRESSURE: 179 MMHG

## 2025-06-16 VITALS
WEIGHT: 116 LBS | HEIGHT: 58 IN | HEART RATE: 70 BPM | BODY MASS INDEX: 24.35 KG/M2 | HEIGHT: 58 IN | BODY MASS INDEX: 24.35 KG/M2 | DIASTOLIC BLOOD PRESSURE: 60 MMHG | WEIGHT: 116 LBS | SYSTOLIC BLOOD PRESSURE: 98 MMHG

## 2025-06-16 DIAGNOSIS — K21.9 GASTROESOPHAGEAL REFLUX DISEASE, UNSPECIFIED WHETHER ESOPHAGITIS PRESENT: ICD-10-CM

## 2025-06-16 DIAGNOSIS — J84.9 ILD (INTERSTITIAL LUNG DISEASE): ICD-10-CM

## 2025-06-16 DIAGNOSIS — J84.9 ILD (INTERSTITIAL LUNG DISEASE): Primary | ICD-10-CM

## 2025-06-16 DIAGNOSIS — J84.9 INTERSTITIAL PULMONARY DISEASE, UNSPECIFIED: ICD-10-CM

## 2025-06-16 DIAGNOSIS — J30.2 SEASONAL ALLERGIC RHINITIS, UNSPECIFIED TRIGGER: ICD-10-CM

## 2025-06-16 LAB
AORTIC SIZE INDEX (SOV): 1.9 CM/M2
AORTIC SIZE INDEX: 2.2 CM/M2
ASCENDING AORTA: 3.2 CM
AV AREA BY CONTINUOUS VTI: 2 CM2
AV INDEX (PROSTH): 0.72
AV LVOT MEAN GRADIENT: 3 MMHG
AV LVOT PEAK GRADIENT: 5 MMHG
AV MEAN GRADIENT: 5 MMHG
AV PEAK GRADIENT: 9 MMHG
AV VALVE AREA BY VELOCITY RATIO: 2.1 CM²
AV VALVE AREA: 2 CM2
AV VELOCITY RATIO: 0.73
BSA FOR ECHO PROCEDURE: 1.47 M2
CV ECHO LV RWT: 0.49 CM
DOP CALC AO PEAK VEL: 1.5 M/S
DOP CALC AO VTI: 34 CM
DOP CALC LVOT AREA: 2.8 CM2
DOP CALC LVOT DIAMETER: 1.9 CM
DOP CALC LVOT PEAK VEL: 1.1 M/S
DOP CALC LVOT STROKE VOLUME: 69.4 CM3
DOP CALCLVOT PEAK VEL VTI: 24.5 CM
E WAVE DECELERATION TIME: 177 MS
E/A RATIO: 0.61
E/E' RATIO: 12 M/S
ECHO EF ESTIMATED: 62 %
ECHO LV POSTERIOR WALL: 0.9 CM (ref 0.6–1.1)
FRACTIONAL SHORTENING: 32.4 % (ref 28–44)
INTERVENTRICULAR SEPTUM: 0.8 CM (ref 0.6–1.1)
IVC DIAMETER: 1.14 CM
IVRT: 80 MS
LA MAJOR: 4 CM
LA MINOR: 3.9 CM
LA WIDTH: 3.4 CM
LEFT ATRIUM SIZE: 3.1 CM
LEFT ATRIUM VOLUME INDEX MOD: 24 ML/M2
LEFT ATRIUM VOLUME INDEX: 25 ML/M2
LEFT ATRIUM VOLUME MOD: 34 ML
LEFT ATRIUM VOLUME: 35 CM3
LEFT INTERNAL DIMENSION IN SYSTOLE: 2.5 CM (ref 2.1–4)
LEFT VENTRICLE DIASTOLIC VOLUME INDEX: 41.67 ML/M2
LEFT VENTRICLE DIASTOLIC VOLUME: 60 ML
LEFT VENTRICLE MASS INDEX: 62.1 G/M2
LEFT VENTRICLE SYSTOLIC VOLUME INDEX: 16 ML/M2
LEFT VENTRICLE SYSTOLIC VOLUME: 23 ML
LEFT VENTRICULAR INTERNAL DIMENSION IN DIASTOLE: 3.7 CM (ref 3.5–6)
LEFT VENTRICULAR MASS: 89.5 G
LV LATERAL E/E' RATIO: 9.1
LV SEPTAL E/E' RATIO: 16
MV A" WAVE DURATION": 168.41 MS
MV PEAK A VEL: 1.05 M/S
MV PEAK E VEL: 0.64 M/S
OHS CV RV/LV RATIO: 0.7 CM
PISA TR MAX VEL: 2.5 M/S
PULM VEIN A" WAVE DURATION": 168.41 MS
PULM VEIN S/D RATIO: 1.77
PULMONIC VEIN PEAK A VELOCITY: 0.3 M/S
PV PEAK D VEL: 0.26 M/S
PV PEAK S VEL: 0.46 M/S
RA MAJOR: 3.59 CM
RA PRESSURE ESTIMATED: 3 MMHG
RA WIDTH: 2.89 CM
RIGHT ATRIAL AREA: 10.8 CM2
RIGHT VENTRICLE DIASTOLIC BASEL DIMENSION: 2.6 CM
RV TB RVSP: 6 MMHG
RV TISSUE DOPPLER FREE WALL SYSTOLIC VELOCITY 1 (APICAL 4 CHAMBER VIEW): 12.39 CM/S
SINUS: 2.75 CM
STJ: 2.5 CM
TDI LATERAL: 0.07 M/S
TDI SEPTAL: 0.04 M/S
TDI: 0.06 M/S
TRICUSPID ANNULAR PLANE SYSTOLIC EXCURSION: 2.1 CM
TV PEAK GRADIENT: 31 MMHG
TV REST PULMONARY ARTERY PRESSURE: 28 MMHG
Z-SCORE OF LEFT VENTRICULAR DIMENSION IN END DIASTOLE: -1.68
Z-SCORE OF LEFT VENTRICULAR DIMENSION IN END SYSTOLE: -0.66

## 2025-06-16 PROCEDURE — 99999 PR PBB SHADOW E&M-EST. PATIENT-LVL V: CPT | Mod: PBBFAC,HCNC,TXP, | Performed by: NURSE PRACTITIONER

## 2025-06-16 PROCEDURE — 93306 TTE W/DOPPLER COMPLETE: CPT | Mod: HCNC,TXP

## 2025-06-16 PROCEDURE — 93306 TTE W/DOPPLER COMPLETE: CPT | Mod: 26,HCNC,NTX, | Performed by: INTERNAL MEDICINE

## 2025-06-16 PROCEDURE — 71250 CT THORAX DX C-: CPT | Mod: TC,HCNC,TXP

## 2025-06-16 NOTE — LETTER
June 16, 2025        Clarence Brush  [address]  [facility] [address]  Phone: [phone]  Fax: [phone]             [facility] 1st Fl  [address]  [facility] [address]  Phone: [phone]   Patient: Juliette Seo   MR Number: [id number]   YOB: 1945   Date of Visit: 6/16/2025       Dear Dr. Clarence Brush    Thank you for referring Juliette Seo to me for evaluation. Attached you will find relevant portions of my assessment and plan of care.    If you have questions, please do not hesitate to call me. I look forward to following Juliette Seo along with you.    Sincerely,    Shaggy Collado NP    Enclosure    If you would like to receive this communication electronically, please contact [email or web address] or [phone] to request [employer] access.    [employer] is a tool which provides read-only access to select patient information with whom you have a relationship. Its easy to use and provides real time access to review your patients record including encounter summaries, notes, results, and demographic information.    If you feel you have received this communication in error or would no longer like to receive these types of communications, please e-mail [email or web address]   
3

## 2025-06-16 NOTE — PROGRESS NOTES
ADVANCED LUNG DISEASE CLINIC FOLLOW UP VISIT                                                                                                                                             Reason for Visit:  ALD    Referring Physician: Yancy Maldonado, *    History of Present Illness: Juliette Seo is a 79 y.o. female who is on 0L of oxygen.  She is on no assisted ventilation.  Her New York Heart Association Class is I and a Karnofsky score of 90% - Able to carry on normal activity: minor symptoms of disease. She is diabetic insulin dependent    Patient presents today for routine follow up. Kyleigh new respiratory complaints.  Stable cough and dyspnea with exertion. Symptoms fluctuate and she notices a difference with seasonal changes; takes antihistamine and nasal sprays. Reflux very well controlled on PPI therapy.  Has been having some back and abdominal pain recently which has been thoroughly worked up.  All tests have been negative.  No recent illnesses or hospitalizations. No limitations in her ADLs from respiratory standpoint. No recent hospitalizations.     PER INITIAL HPI:    Patient presents today for evaluation of ILD. Previously followed for bronchiectasis in 2018 by Dr. Edmondson, and most recently seen by Dr. Brush two weeks prior to today's visit. Patient states symptoms began to progress about three months ago. She noticed increased dry cough and dyspnea with exertion. At that time, she had discontinued her PPI per her PCP's recommendation. She notes increased reflux and cough since stopping her PPI. She has a history of COVID x 2. Required hospitalization in 2021. She briefly required oxygen during her admission, but was discharged on room air. She is not vaccinated for COVID. Received bamlanivimab, remdesivir, and dexamethasone. She tested positive again in 12/2022. She states she has been prescribed albuterol and is awaiting nebulizer supplies. She states she takes tessalon pearls for her cough, which  provides some relief. Cough is intermittent in duration with no alleviating/exacerbation factors.     Patient was started on a prednisone taper after her visit with Dr. Brush on 7/5. She completed one week of prednisone 20 mg daily and is on prednisone 10 mg daily for two weeks. Per patient's daughter, she notes some improvement in patient's dyspnea and cough since starting steroids. Patient's daughter also reports patient has had poor appetite and early satiety for the last few months. She required hospitalization for left sided chest pain 6/17.     Patient has minimal smoking history and quit over 20 years ago. No history of frequent infections during childhood/adolescence. Notes some increased rhinorrhea/post-nasal drip which occurs with seasonal changes. Grew up in [place] and has lived in [place] since 1960. She has multiple occupational exposures to chemicals/metal/dust, absbestos, cleaning supplies, construction. 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 No birds for pets.    Patient denies history of rashes, myalgias, arthralgias, muscular weakness. Autoimmune workup thus far unrevealing. No symptoms of dysphagia, throat clearing, globus sensation.        Past Medical History:   Diagnosis Date    Allergy     Anxiety     Arthritis     osteo    Asthma     Diabetic retinopathy     GERD (gastroesophageal reflux disease)     High cholesterol     Hypertension     Kidney stone     Type 2 diabetes mellitus, uncontrolled, with neuropathy     Urinary tract infection     Vaginal infection        Past Surgical History:   Procedure Laterality Date    CHOLECYSTECTOMY      COLONOSCOPY N/A 6/8/2021    Procedure: COLONOSCOPY;  Surgeon: Rain Pop MD;  Location: [facility];  Service: Endoscopy;  Laterality: N/A;    COLONOSCOPY N/A 7/8/2021    Procedure: colonoscopy with single balloon scope;  Surgeon: Steffen Dueñas MD;  Location: [facility];  Service: Endoscopy;  Laterality: N/A;     ESOPHAGOGASTRODUODENOSCOPY N/A 6/8/2021    Procedure: EGD (ESOPHAGOGASTRODUODENOSCOPY);  Surgeon: Rain Pop MD;  Location: [facility] ENDO;  Service: Endoscopy;  Laterality: N/A;    ESOPHAGOGASTRODUODENOSCOPY N/A 4/29/2025    Procedure: EGD (ESOPHAGOGASTRODUODENOSCOPY);  Surgeon: Radu Lei MD;  Location: [facility] ENDO ([facility] FLR);  Service: Gastroenterology;  Laterality: N/A;    EYE SURGERY      HYSTERECTOMY      INCISIONAL HERNIA REPAIR  2003    ROTATOR CUFF REPAIR Right 11/07/2017    TRIGGER FINGER RELEASE Right 6/3/2022    Procedure: RELEASE, TRIGGER FINGER; right index, middle, ring;  Surgeon: Sarmad Wesley Jr., MD;  Location: [facility] OR;  Service: Orthopedics;  Laterality: Right;       Allergies: Actos [pioglitazone], Darvocet a500 [propoxyphene n-acetaminophen], and Dilaudid [hydromorphone (bulk)]    Current Outpatient Medications   Medication Sig    acetaminophen (TYLENOL) 500 MG tablet Take 2 tablets (1,000 mg total) by mouth every 8 (eight) hours as needed for Pain.    aspirin (ECOTRIN) 81 MG EC tablet Take 81 mg by mouth once daily.    azelastine (ASTELIN) 137 mcg (0.1 %) nasal spray 1 spray (137 mcg total) by Nasal route 2 (two) times daily.    DEXCOM G7  Misc Use to check glucose with sensors    dicyclomine (BENTYL) 10 MG capsule Take 1 capsule (10 mg total) by mouth 3 (three) times daily as needed (abdominal pain).    empagliflozin (JARDIANCE) 25 mg tablet Take 1 tablet (25 mg total) by mouth once daily.    ergocalciferol (VITAMIN D2) 50,000 unit Cap Take 1 capsule (50,000 Units total) by mouth every 7 days.    fluticasone propionate (FLONASE) 50 mcg/actuation nasal spray 2 sprays (100 mcg total) by Each Nostril route once daily.    insulin glargine U-100, Lantus, (LANTUS SOLOSTAR U-100 INSULIN) 100 unit/mL (3 mL) InPn pen Inject 40 Units into the skin once daily.    loperamide (IMODIUM) 2 mg capsule Take 1 capsule (2 mg total) by mouth 4 (four) times daily as needed for Diarrhea.    metFORMIN  "(GLUCOPHAGE) 500 MG tablet Take 1 tablet (500 mg total) by mouth 2 (two) times daily with meals.    montelukast (SINGULAIR) 10 mg tablet Take 1 tablet (10 mg total) by mouth every evening.    NOVOLOG U-100 INSULIN ASPART 100 unit/mL injection To use with insulin pump. Max daily dose 130 units.    OMNIPOD 5 G6-G7 PODS, GEN 5, Crtg Inject 1 each into the skin every 72 hours.    pantoprazole (PROTONIX) 40 MG tablet Take 1 tablet (40 mg total) by mouth 2 (two) times daily.    TRUE METRIX GLUCOSE TEST STRIP Strp To use to inject insulin twice daily.    TRUEPLUS PEN NEEDLE 32 gauge x 5/32" Ndle To inject insulin twice daily    valsartan (DIOVAN) 80 MG tablet Take 2 tablets (160 mg total) by mouth once daily.    albuterol (PROVENTIL) 2.5 mg /3 mL (0.083 %) nebulizer solution Take 3 mLs (2.5 mg total) by nebulization every 6 (six) hours as needed for Wheezing. Rescue    cetirizine (ZYRTEC) 10 MG tablet Take 1 tablet (10 mg total) by mouth once daily.    OZEMPIC 0.25 mg or 0.5 mg (2 mg/3 mL) pen injector Inject 0.5 mg into the skin every 7 days. (Patient not taking: Reported on 6/16/2025)    sucralfate (CARAFATE) 1 gram tablet Take 1 tablet (1 g total) by mouth 3 (three) times daily. (Patient not taking: Reported on 6/16/2025)     No current facility-administered medications for this visit.     Facility-Administered Medications Ordered in Other Visits   Medication    triamcinolone acetonide injection 40 mg       Immunization History   Administered Date(s) Administered    Influenza 10/31/2012    Influenza (FLUAD) - Quadrivalent - Adjuvanted - PF *Preferred* (65+) 11/13/2020, 10/02/2023    Influenza - Quadrivalent - High Dose - PF (65 years and older) 11/07/2021    Influenza - Quadrivalent - PF *Preferred* (6 months and older) 10/19/2015    Influenza - Trivalent - Afluria, Fluzone MDV 10/31/2012    Influenza - Trivalent - Fluzone High Dose - PF (65 years and older) 09/01/2016, 12/01/2017, 10/04/2018, 10/24/2019    Influenza " Split 10/08/2014    Pneumococcal Conjugate - 13 Valent 10/19/2015    Pneumococcal Polysaccharide - 23 Valent 04/18/2019    RSVpreF (Arexvy) 04/30/2024    Zoster Recombinant 12/26/2019, 09/12/2020     Family History:    Family History   Problem Relation Name Age of Onset    Stroke Mother      Diabetes Sister      Diabetes Brother      Kidney disease Neg Hx       Social History     Substance and Sexual Activity   Alcohol Use Not Currently    Comment: social      Social History     Substance and Sexual Activity   Drug Use No      Social History     Socioeconomic History    Marital status:    Tobacco Use    Smoking status: Former     Passive exposure: Never    Smokeless tobacco: Never   Vaping Use    Vaping status: Never Used   Substance and Sexual Activity    Alcohol use: Not Currently     Comment: social    Drug use: No    Sexual activity: Yes     Partners: Male     Birth control/protection: None     Social Drivers of Health     Financial Resource Strain: Low Risk  (4/29/2025)    Overall Financial Resource Strain (CARDIA)     Difficulty of Paying Living Expenses: Not hard at all   Food Insecurity: No Food Insecurity (4/29/2025)    Hunger Vital Sign     Worried About Running Out of Food in the Last Year: Never true     Ran Out of Food in the Last Year: Never true   Transportation Needs: No Transportation Needs (4/28/2025)    PRAPARE - Transportation     Lack of Transportation (Medical): No     Lack of Transportation (Non-Medical): No   Physical Activity: Inactive (4/28/2025)    Exercise Vital Sign     Days of Exercise per Week: 0 days     Minutes of Exercise per Session: 0 min   Stress: No Stress Concern Present (4/29/2025)    Citizen of the [place] of Occupational Health - Occupational Stress Questionnaire     Feeling of Stress : Not at all   Recent Concern: Stress - Stress Concern Present (4/8/2025)    Citizen of [place] of Occupational Health - Occupational Stress Questionnaire     Feeling of Stress : To some  "extent   Housing Stability: Low Risk  (4/29/2025)    Housing Stability Vital Sign     Unable to Pay for Housing in the Last Year: No     Homeless in the Last Year: No     Review of Systems   Constitutional:  Negative for chills, diaphoresis, fever, malaise/fatigue and weight loss.   HENT:  Negative for congestion, ear discharge, ear pain, hearing loss, nosebleeds, sinus pain, sore throat and tinnitus.    Eyes:  Negative for blurred vision, double vision, photophobia, pain, discharge and redness.   Respiratory:  Positive for cough (chronic, stable) and shortness of breath (stable). Negative for hemoptysis, sputum production, wheezing and stridor.    Cardiovascular:  Negative for chest pain, palpitations, orthopnea, claudication, leg swelling and PND.   Gastrointestinal:  Positive for abdominal pain. Negative for blood in stool, constipation, diarrhea, heartburn, melena, nausea and vomiting.   Genitourinary:  Negative for dysuria, flank pain, frequency, hematuria and urgency.   Musculoskeletal:  Positive for back pain. Negative for falls, joint pain, myalgias and neck pain.   Skin:  Negative for itching and rash.   Neurological:  Negative for dizziness, tingling, tremors, sensory change, speech change, focal weakness, seizures, loss of consciousness, weakness and headaches.   Endo/Heme/Allergies:  Negative for environmental allergies and polydipsia. Does not bruise/bleed easily.   Psychiatric/Behavioral:  Negative for depression, hallucinations, memory loss, substance abuse and suicidal ideas. The patient is not nervous/anxious and does not have insomnia.      Vitals  BP (!) 179/79 (BP Location: Right arm, Patient Position: Sitting)   Pulse 85   Temp 98.2 °F (36.8 °C) (Oral)   Resp 16   Ht 4' 10" (1.473 m)   Wt 52.6 kg (116 lb)   SpO2 100%   BMI 24.24 kg/m²   Physical Exam  Vitals and nursing note reviewed.   Constitutional:       General: She is not in acute distress.     Appearance: Normal appearance.   HENT: "      Head: Normocephalic and atraumatic.   Eyes:      General: No scleral icterus.     Conjunctiva/sclera: Conjunctivae normal.   Cardiovascular:      Rate and Rhythm: Normal rate.      Heart sounds: No murmur heard.     No friction rub.   Pulmonary:      Effort: No respiratory distress.      Breath sounds: No wheezing, rhonchi or rales.   Abdominal:      General: Bowel sounds are normal. There is no distension.      Palpations: Abdomen is soft.      Tenderness: There is no abdominal tenderness.   Musculoskeletal:      Right lower leg: No edema.      Left lower leg: No edema.   Skin:     General: Skin is warm and dry.   Neurological:      General: No focal deficit present.      Mental Status: She is alert and oriented to person, place, and time.   Psychiatric:         Mood and Affect: Mood normal.         Behavior: Behavior normal.       Labs:  Hospital Outpatient Visit on 06/16/2025   Component Date Value    Pre FVC 06/16/2025 1.75     PRE FEV5 06/16/2025 1.27     Pre FEV1 06/16/2025 1.43     Pre FEV1 FVC 06/16/2025 81.65     Pre FEF 25 75 06/16/2025 1.83     Pre PEF 06/16/2025 3.74     Pre  06/16/2025 6.93     Pre DLCO 06/16/2025 10.21 (L)     DLCOVA PRE 06/16/2025 4.02     VA PRE 06/16/2025 2.54 (L)     IVC PRE 06/16/2025 1.74     TLCN2 PRE 06/16/2025 2.51 (L)     VCMAXN2 PRE 06/16/2025 1.78     PRE IC N2 06/16/2025 1.10     Pre FRC N2 06/16/2025 1.41 (L)     ERVN2 PRE 06/16/2025 0.67     RVN2 PRE 06/16/2025 0.73 (L)     [id number] PRE 06/16/2025 29.22 (L)     FVC Ref 06/16/2025 2.04     FVC LLN 06/16/2025 1.43     FVC Pre Ref 06/16/2025 85.8     FEV05 REF 06/16/2025 1.29     FEV05 LLN 06/16/2025 0.43     PRE FEV05 REF 06/16/2025 98.6     FEV1 Ref 06/16/2025 1.58     FEV1 LLN 06/16/2025 1.10     FEV1 Pre Ref 06/16/2025 90.9     FEV1 FVC Ref 06/16/2025 78     FEV1 FVC LLN 06/16/2025 63     FEV1 FVC Pre Ref 06/16/2025 104.9     FEF 25 75 Ref 06/16/2025 2.08     FEF 25 75 LLN 06/16/2025 0.68     FEF 25 75 Pre  Ref 06/16/2025 88.0     PEF Ref 06/16/2025 3.55     PEF LLN 06/16/2025 1.90     PEF Pre Ref 06/16/2025 105.3     TLCN2 Ref 06/16/2025 3.93     TLCN2 LLN 06/16/2025 2.95     TLC N2 ULN 06/16/2025 4.92     TLCN2 Pre Ref 06/16/2025 63.9     VCMAXN2 Ref 06/16/2025 2.04     VCMAXN2 LLN 06/16/2025 1.43     VCMAX N2 ULN 06/16/2025 2.69     VCMAXN2 Pre Ref 06/16/2025 87.1     HAU5OAP 06/16/2025 1.24     LLN IC N2 06/16/2025 -47347.76     ULN IC N2 06/16/2025 16264.24     PRE REF IC N2 06/16/2025 89.0     FRCN2 Ref 06/16/2025 2.38     FRCN2 LLN 06/16/2025 1.56     FRC N2 ULN 06/16/2025 3.20     FRCN2 Pre Ref 06/16/2025 59.2     ERVN2 Ref 06/16/2025 0.45     ERVN2 LLN 06/16/2025 -89291.55     ERV N2 ULN 06/16/2025 51021.45     ERVN2 Pre Ref 06/16/2025 150.1     RVN2 Ref 06/16/2025 1.93     RVN2 LLN 06/16/2025 1.35     RV N2 ULN 06/16/2025 2.51     RVN2 Pre Ref 06/16/2025 38.0     [id number] Ref 06/16/2025 45.82     [id number] LLN 06/16/2025 36.23     RV N2 TLC N2 ULN 06/16/2025 55.41     [id number] Pre Ref 06/16/2025 63.8     DLCO Single Breath Ref 06/16/2025 16.24     DLCO Single Breath LLN 06/16/2025 10.51     DLCO SINGLEBREATH ULN 06/16/2025 21.98     DLCO Single Breath Pre R* 06/16/2025 62.9     DLCOc Single Breath Ref 06/16/2025 16.24     DLCOc Single Breath LLN 06/16/2025 10.51     DLCOC SINGLEBREATH ULN 06/16/2025 21.98     DLCOVA Ref 06/16/2025 4.13     DLCOVA LLN 06/16/2025 2.34     DLCOVA ULN 06/16/2025 5.92     DLCOVA Pre Ref 06/16/2025 97.4     DLCOc SBVA Ref 06/16/2025 4.13     DLCOc SBVA LLN 06/16/2025 2.34     DLCOCSBVA ULN 06/16/2025 5.92     VA Single Breath Ref 06/16/2025 3.78     VA Single Breath LLN 06/16/2025 3.78     VA SINGLEBREATH ULN 06/16/2025 3.78     VA Single Breath Pre Ref 06/16/2025 67.1     IVC Single Breath Ref 06/16/2025 2.04     IVC Single Breath LLN 06/16/2025 1.43     IVC SINGLEBREATH ULN 06/16/2025 2.69     IVC Single Breath Pre Ref 06/16/2025 85.3     DLCOSINGLEBREATHZSCORE 06/16/2025 -1.73  "   Hospital Outpatient Visit on 06/16/2025   Component Date Value    LV Diastolic Volume 06/16/2025 60     Echo EF Estimated 06/16/2025 62     LV Systolic Volume 06/16/2025 23     IVS 06/16/2025 0.8     LVIDd 06/16/2025 3.7     LVIDs 06/16/2025 2.5     LVOT diameter 06/16/2025 1.9     PW 06/16/2025 0.9     IVRT 06/16/2025 80     AV LVOT peak gradient 06/16/2025 5     LVOT mn grad 06/16/2025 3     LVOT peak endy 06/16/2025 1.1     LVOT peak VTI 06/16/2025 24.5     RV- gordon basal diam 06/16/2025 2.6     RV S' 06/16/2025 12.39     LA size 06/16/2025 3.1     Left Atrium Major Axis 06/16/2025 4.0     Left Atrium Minor Axis 06/16/2025 3.9     LA Vol (MOD) 06/16/2025 34     RA Major Axis 06/16/2025 3.59     RA Area 06/16/2025 10.8     AV valve area 06/16/2025 2.0     AV area by cont VTI 06/16/2025 2.0     AV peak gradient 06/16/2025 9     AV mean gradient 06/16/2025 5     Ao peak endy 06/16/2025 1.5     Ao VTI 06/16/2025 34.0     MV Peak A Endy 06/16/2025 1.05     E wave deceleration time 06/16/2025 177     MV Peak E Endy 06/16/2025 0.64     E/A ratio 06/16/2025 0.61     LV LATERAL E/E' RATIO 06/16/2025 9.1     LV SEPTAL E/E' RATIO 06/16/2025 16.0     TDI LATERAL 06/16/2025 0.07     TDI SEPTAL 06/16/2025 0.04     TV peak gradient 06/16/2025 31     TR Max Endy 06/16/2025 2.5     Ascending aorta 06/16/2025 3.2     STJ 06/16/2025 2.5     P vein A 06/16/2025 0.3     MV "A" wave duration 06/16/2025 168.41     Pulm vein "A" wave 06/16/2025 168.41     PV Peak D Endy 06/16/2025 0.26     PV Peak S Endy 06/16/2025 0.46     IVC diameter 06/16/2025 1.14     Sinus 06/16/2025 2.75     LA WIDTH 06/16/2025 3.4     RA Width 06/16/2025 2.89     TAPSE 06/16/2025 2.1     BSA 06/16/2025 1.47     LVOT stroke volume 06/16/2025 69.4     LV Systolic Volume Index 06/16/2025 16.0     LV Diastolic Volume Index 06/16/2025 41.67     LVOT area 06/16/2025 2.8     FS 06/16/2025 32.4     Left Ventricle Relative * 06/16/2025 0.49     LV mass 06/16/2025 89.5     LV " Mass Index 06/16/2025 62.1     E/E' ratio 06/16/2025 12     SABA 06/16/2025 25     LA Vol 06/16/2025 35     Pulm vein S/D ratio 06/16/2025 1.77     RV/LV Ratio 06/16/2025 0.70     SABA (MOD) 06/16/2025 24     AV Velocity Ratio 06/16/2025 0.73     AV index (prosthetic) 06/16/2025 0.72     DORIE by Velocity Ratio 06/16/2025 2.1     ASI 06/16/2025 1.9     ASI 06/16/2025 2.2     Mean e' 06/16/2025 0.06     ZLVIDS 06/16/2025 -0.66     ZLVIDD 06/16/2025 -1.68     TV resting pulmonary art* 06/16/2025 28     RV TB RVSP 06/16/2025 6     Est. RA pres 06/16/2025 3            6/16/2025     2:15 PM 12/9/2024    11:29 AM 6/17/2024    12:09 PM 11/14/2023    11:36 AM 8/24/2023    11:22 AM 7/20/2023     9:51 AM   Pulmonary Function Tests   FVC 1.75 liters 1.88 liters 1.79 liters 1.78 liters 1.83 liters 1.91 liters   FEV1 1.43 liters 1.55 liters 1.49 liters 1.48 liters 1.57 liters 1.59 liters   TLC (liters) 2.51 liters 3.05 liters 3.04 liters 2.84 liters 3.13 liters 2.89 liters   DLCO (ml/mmHg sec) 10.21 ml/mmHg sec 12.39 ml/mmHg sec 11.26 ml/mmHg sec 10.84 ml/mmHg sec 10.2 ml/mmHg sec 12.47 ml/mmHg sec   FVC% 85.8 98 93.7 92.5 95.2 98.9   FEV1% 90.9 105 100.5 99.1 104.6 106.3   FEF 25-75 1.83 2.13 1.88 1.95 2.22 2.02   FEF 25-75% 88 105 142.4 146.1 165.9 150.6   TLC% 63.9 84 84.5 78.9 87 80.3   RV 0.73 1.17 1.24 1.06 1.3 0.97   RV% 38 64 68.1 58.3 71.7 53.7   DLCO% 62.9 82 74.3 71.6 66.7 81.5         6/16/2025    11:39 AM 6/17/2024    11:08 AM 7/20/2023     9:28 AM   6MW   6MWT Status completed without stopping completed without stopping completed without stopping   Patient Reported Other (Comment) No complaints  Dyspnea;Dizziness;Lightheadedness    Was O2 used? No No No   6MW Distance walked (feet) 900 feet 1100 feet 1000 feet   Distance walked (meters) 274.32 meters 335.28 meters 304.8 meters   Did patient stop? No No No   Oxygen Saturation 99 % 99 % 98 %   Supplemental Oxygen Room Air Room Air  Room Air    Heart Rate 102 bpm 104  bpm 91 bpm   Blood Pressure 141/70 159/88 163/71   Jas Dyspnea Rating  very heavy very, very light (just noticeable) nothing at all   Oxygen Saturation 98 % 98 % 98 %   Supplemental Oxygen Room Air Room Air  Room Air    Heart Rate 108 bpm 124 bpm 105 bpm   Blood Pressure 147/77 190/76 148/72   Jas Dyspnea Rating  very heavy light moderate   Recovery Time (seconds) 45 seconds 170 seconds 75 seconds   Oxygen Saturation 99 % 98 % 98 %   Supplemental Oxygen  Room Air  Room Air    Heart Rate 91 bpm 100 bpm 93 bpm       Data saved with a previous flowsheet row definition       Imaging:  Results for orders placed during the hospital encounter of 03/31/23    X-Ray Chest PA And Lateral    Narrative  EXAMINATION:  XR CHEST PA AND LATERAL    CLINICAL HISTORY:  Disorder of bone, unspecified    TECHNIQUE:  PA and lateral views of the chest were performed.    COMPARISON:  10/19/2022    FINDINGS:  Scattered interstitial thickening, similar to the prior exam.  No lung consolidation.The cardiomediastinal contour is within normal limits.  No pneumothorax.  No pleural effusions.    Impression  No acute findings.      Electronically signed by: Vince Tavares MD  Date:    03/31/2023  Time:    13:38    Results for orders placed during the hospital encounter of 10/08/18    DXA Bone Density Spine And Hip    Narrative  EXAMINATION:  DEXA BONE DENSITY SPINE HIP    CLINICAL HISTORY:  Other specified personal risk factors, not elsewhere classified    COMPARISON:  07/23/2015    FINDINGS:  The T score associated with the lumbar spine is -1.1 on the current examination.  It was -1.0 on the prior examination.  The T score associated with the left femoral neck is -1.5 on the current examination.  It was -1.2 on the prior examination.  The T score associated with the right femoral neck is -1.2 on the current examination.  It was -0.9 on the prior examination.    IMPRESSION:    Osteopenia      Electronically signed by: David Santillan  MD  Date:    10/08/2018  Time:    09:31    No valid procedures specified.  No results found for this or any previous visit.    No results found for this or any previous visit.    Results for orders placed during the hospital encounter of 10/08/19    CT Abdomen Pelvis With Contrast    Narrative  EXAMINATION:  CT ABDOMEN PELVIS WITH CONTRAST    CLINICAL HISTORY:  LLQ pain, suspect diverticulitis;    TECHNIQUE:  Low dose axial images, sagittal and coronal reformations were obtained from the lung bases to the pubic symphysis following the IV administration of 75 mL of Omnipaque 350    FINDINGS:  The visualized portion of the base of the lungs is significant for bilateral basilar atelectatic versus fibrotic change.  The visualized portion of the heart, stomach, and spleen are unremarkable.  There is fatty atrophy of the pancreas.  The gallbladder is absent.  There is diffuse fatty infiltration of the liver.  The adrenal glands and right kidney have a normal appearance.  There is a 2-3 mm left-sided renal stone.  There is no hydronephrosis or hydroureter.  The bladder is distended but otherwise unremarkable.  The uterus is possibly absent or atrophic.  The bowel is significant for diverticulosis coli.  There is wall thickening of the sigmoid colon adjacent to multiple diverticula with surrounding inflammation consistent with diverticulitis.  There is no significant surrounding inflammatory change.  There is no evidence of perforation or abscess formation.  The appendix has a normal appearance.  The visualized portion of the aorta is significant for moderate scattered plaque.  The osseous structures demonstrate degenerative change.    Impression  Findings consistent with acute diverticulitis of the sigmoid colon without perforation or abscess formation.    Left-sided renal stone.      Electronically signed by: Florencio Padilla MD  Date:    10/09/2019  Time:    09:08    No results found for this or any previous visit.    No  results found for this or any previous visit.  IMAGING:     CT chest 6/17/23:  There are subpleural reticular opacities compatible with fibrotic changes/ILD, stable prior.  There is a small focal consolidation or atelectasis in the medial left upper lobe (series 3, image 226).     CT chest 5/1/21:  There are patchy bilateral peripheral bibasilar ground-glass opacities and peripheral reticulations.  Overall extent of findings appear improved from prior study of 03/04/2021.  No new large confluent airspace consolidation identified.  There is no significant volume of pleural fluid.     CT Chest 3/4/21:  There are bilateral symmetric peripheral and lower lobe predominant patchy ground-glass opacities, likely sequela of the patient's known history of COVID-19 pneumonia.  No pneumothorax or large pleural effusion.     CT of chest performed on 4/24/2018 without contrast revealed No parenchymal lung disease, perhaps mild bronchiectatic airways in the right middle lobe.       Cardiodiagnostics:  Results for orders placed during the hospital encounter of 07/20/23    Echo    Interpretation Summary  · The estimated ejection fraction is 60%.  · Normal right ventricular size with normal right ventricular systolic function.  · Normal left ventricular diastolic function.  · The left ventricle is normal in size with normal systolic function.  · Normal central venous pressure (3 mmHg).      Results for orders placed during the hospital encounter of 06/16/25    Echo Saline Bubble? No; Ultrasound enhancing contrast? No    Interpretation Summary    Left Ventricle: The left ventricle is normal in size. Normal wall thickness. There is concentric remodeling. There is normal systolic function with a visually estimated ejection fraction of 60 - 65%. There is indeterminate diastolic function. Average E/e' ratio is 12.    Right Ventricle: The right ventricle is normal in size Wall thickness is normal. Systolic function is normal.    Tricuspid  Valve: There is mild regurgitation.    Pulmonary Artery: The estimated pulmonary artery systolic pressure is 28 mmHg.    IVC/SVC: Normal venous pressure at 3 mmHg.      Assessment:  1. ILD (interstitial lung disease)    2. Gastroesophageal reflux disease, unspecified whether esophagitis present    3. Seasonal allergic rhinitis, unspecified trigger      Plan:     FVC/DLCO down from previous, but stable overall.  Patient is currently experiencing some back pain related to her arthritis which might have affected today's PFTs.  Her PFTs have remained stable since 2018. Prior CT chest with subpleural reticular opacities, bronchiectasis, and patchy areas of GGOs, improved from previous. Does not have evidence of exertional hypoxemia. Autoimmune serologies negative.  Differential includes likely post COVID fibrosis following multiple COVID infections and prolonged hospitalization for hypoxemic RF vs occupational/environmental exposures.  Previously discussed that more definitive diagnosis would require bronchoscopy and biopsy but given her history and stability, would recommend to continue to monitor lung function over time.  She is agreeable.  Will defer further therapy for now and can consider further diagnostics and antifibrotics should she progress.  Will follow up on chest CT done today.    Continue PPI.      Continue astelin and flonase for allergic rhinitis.    Continue follow up with PCP for current back and abdominal pain.     RTC in 6 months or sooner if needed. Repeat PFTs at that time.  6MWT annually.  TTE annually.  CT chest every 2 years.      Shaggy Collado NP  Advanced Lung Disease

## 2025-06-19 LAB
DLCO SINGLE BREATH LLN: 10.51
DLCO SINGLE BREATH PRE REF: 62.9 %
DLCO SINGLE BREATH REF: 16.24
DLCOC SBVA LLN: 2.34
DLCOC SBVA REF: 4.13
DLCOC SINGLE BREATH LLN: 10.51
DLCOC SINGLE BREATH REF: 16.24
DLCOCSBVAULN: 5.92
DLCOCSINGLEBREATHULN: 21.98
DLCOSINGLEBREATHULN: 21.98
DLCOSINGLEBREATHZSCORE: -1.73
DLCOVA LLN: 2.34
DLCOVA PRE REF: 97.4 %
DLCOVA PRE: 4.02 ML/(MIN*MMHG*L) (ref 2.34–5.92)
DLCOVA REF: 4.13
DLCOVAULN: 5.92
ERVN2 LLN: -16449.55
ERVN2 PRE REF: 150.1 %
ERVN2 PRE: 0.67 L (ref -16449.55–16450.45)
ERVN2 REF: 0.45
ERVN2ULN: ABNORMAL
FEF 25 75 LLN: 0.68
FEF 25 75 PRE REF: 88 %
FEF 25 75 REF: 2.08
FEV05 LLN: 0.43
FEV05 REF: 1.29
FEV1 FVC LLN: 63
FEV1 FVC PRE REF: 104.9 %
FEV1 FVC REF: 78
FEV1 LLN: 1.1
FEV1 PRE REF: 90.9 %
FEV1 REF: 1.58
FRCN2 LLN: 1.56
FRCN2 PRE REF: 59.2 %
FRCN2 REF: 2.38
FRCN2ULN: 3.2
FVC LLN: 1.43
FVC PRE REF: 85.8 %
FVC REF: 2.04
ICN2REF: 1.24
IVC PRE: 1.74 L (ref 1.43–2.69)
IVC SINGLE BREATH LLN: 1.43
IVC SINGLE BREATH PRE REF: 85.3 %
IVC SINGLE BREATH REF: 2.04
IVCSINGLEBREATHULN: 2.69
LLN IC N2: -16448.76
PEF LLN: 1.9
PEF PRE REF: 105.3 %
PEF REF: 3.55
PHYSICIAN COMMENT: ABNORMAL
PRE DLCO: 10.21 ML/(MIN*MMHG) (ref 10.51–21.98)
PRE FEF 25 75: 1.83 L/S (ref 0.68–3.47)
PRE FET 100: 6.93 SEC
PRE FEV05 REF: 98.6 %
PRE FEV1 FVC: 81.65 % (ref 63.1–90.66)
PRE FEV1: 1.43 L (ref 1.1–2.03)
PRE FEV5: 1.27 L (ref 0.43–2.14)
PRE FRC N2: 1.41 L (ref 1.56–3.2)
PRE FVC: 1.75 L (ref 1.43–2.69)
PRE IC N2: 1.1 L (ref -16448.76–16451.24)
PRE PEF: 3.74 L/S (ref 1.9–5.2)
PRE REF IC N2: 89 %
RVN2 LLN: 1.35
RVN2 PRE REF: 38 %
RVN2 PRE: 0.73 L (ref 1.35–2.51)
RVN2 REF: 1.93
RVN2TLCN2 LLN: 36.23
RVN2TLCN2 PRE REF: 63.8 %
RVN2TLCN2 PRE: 29.22 % (ref 36.23–55.41)
RVN2TLCN2 REF: 45.82
RVN2TLCN2ULN: 55.41
RVN2ULN: 2.51
TLCN2 LLN: 2.95
TLCN2 PRE REF: 63.9 %
TLCN2 PRE: 2.51 L (ref 2.95–4.92)
TLCN2 REF: 3.93
TLCN2ULN: 4.92
ULN IC N2: ABNORMAL
VA PRE: 2.54 L (ref 3.78–3.78)
VA SINGLE BREATH LLN: 3.78
VA SINGLE BREATH PRE REF: 67.1 %
VA SINGLE BREATH REF: 3.78
VASINGLEBREATHULN: 3.78
VCMAXN2 LLN: 1.43
VCMAXN2 PRE REF: 87.1 %
VCMAXN2 PRE: 1.78 L (ref 1.43–2.69)
VCMAXN2 REF: 2.04
VCMAXN2ULN: 2.69

## 2025-06-19 NOTE — PROCEDURES
Juliette Seo is a 79 y.o.   female patient, who presents for a 6 minute walk test ordered by DO Joel.  The diagnosis is Interstitial Lung Disease.  The patient's BMI is 24.3 kg/m2.  Predicted distance (lower limit of normal) is 265.8 meters.      Test Results:    The test was completed without stopping.  The total time walked was 360 seconds.  During walking, the patient reported:  Other (Comment) (stomach pain). The patient used no assistive devices  during testing.     06/19/2025---------Distance: 274.32 meters (900 feet)    Lap Walk Time  sec O2 Sat % Supplemental Oxygen  lpm Heart Rate  bpm Blood Pressure  mmHg Jas Scale   Pre-exercise  (Resting) 0  99  141/70 7-8   During Exercise 1 63 98      During Exercise 2 140 98      During Exercise 3 220 98      During Exercise 4 300 98        End of Exercise  360 98  147/77 7-8   Post-exercise  (Recovery)   99 RA 91         Recovery Time: 45 seconds    Performing nurse/tech: Geo EVANS      PREVIOUS STUDY:   The patient had a previous study.  06/17/2024---------Distance: 335.28 meters (1100 feet)       Lap Walk Time O2 Sat % Supplemental Oxygen Heart Rate Blood Pressure Jas Scale Comment   Pre-exercise  (Resting) 0 0 99 % Room Air 104 bpm 159/88 mmHg 0.5     During Exercise 1 62 sec 98 % Room Air 115 bpm         During Exercise 2 137 sec 96 % Room Air 125 bpm         During Exercise 3 192 sec 98 % Room Air 124 bpm         During Exercise 4 250 sec 98 % Room Air 126 bpm         During Exercise 5 307 sec 98 % Room Air 124 bpm         End of Exercise   360 sec 98 % Room Air 124 bpm 190/76 mmHg 2     Post-exercise  (Recovery)     98 % Room Air  100 bpm 177/83 mmHg            CLINICAL INTERPRETATION:  Six minute walk distance is 274.32 meters (900 feet) with very, very heavy dyspnea.  During exercise, there was no significant desaturation while breathing room air.  Both blood pressure and heart rate remained stable with  walking.  The patient did not report non-pulmonary symptoms during exercise.  The patient did complete the study, walking 360 seconds of the 360 second test.  Since the previous study in 6/2024, exercise capacity may be somewhat worse.  Based upon age and body mass index, exercise capacity is normal.

## 2025-06-20 DIAGNOSIS — J84.9 ILD (INTERSTITIAL LUNG DISEASE): Primary | ICD-10-CM

## 2025-06-26 ENCOUNTER — RESULTS FOLLOW-UP (OUTPATIENT)
Dept: TRANSPLANT | Facility: HOSPITAL | Age: 80
End: 2025-06-26

## 2025-07-01 ENCOUNTER — TELEPHONE (OUTPATIENT)
Dept: TRANSPLANT | Facility: CLINIC | Age: 80
End: 2025-07-01
Payer: MEDICARE

## 2025-07-01 ENCOUNTER — HOSPITAL ENCOUNTER (EMERGENCY)
Facility: HOSPITAL | Age: 80
Discharge: HOME | End: 2025-07-01
Attending: EMERGENCY MEDICINE
Payer: MEDICARE

## 2025-07-01 VITALS
RESPIRATION RATE: 18 BRPM | HEIGHT: 59 IN | DIASTOLIC BLOOD PRESSURE: 79 MMHG | WEIGHT: 120 LBS | OXYGEN SATURATION: 98 % | HEART RATE: 96 BPM | TEMPERATURE: 98 F | SYSTOLIC BLOOD PRESSURE: 181 MMHG | BODY MASS INDEX: 24.19 KG/M2

## 2025-07-01 DIAGNOSIS — R10.13 EPIGASTRIC ABDOMINAL PAIN: Primary | ICD-10-CM

## 2025-07-01 LAB
ABSOLUTE EOSINOPHIL (OHS): 0.11 K/UL
ABSOLUTE MONOCYTE (OHS): 0.53 K/UL (ref 0.3–1)
ABSOLUTE NEUTROPHIL COUNT (OHS): 4.77 K/UL (ref 1.8–7.7)
ALBUMIN SERPL BCP-MCNC: 4.2 G/DL (ref 3.5–5.2)
ALP SERPL-CCNC: 77 UNIT/L (ref 40–150)
ALT SERPL W/O P-5'-P-CCNC: 24 UNIT/L (ref 10–44)
ANION GAP (OHS): 11 MMOL/L (ref 8–16)
AST SERPL-CCNC: 18 UNIT/L (ref 11–45)
BACTERIA #/AREA URNS AUTO: ABNORMAL /HPF
BASOPHILS # BLD AUTO: 0.05 K/UL
BASOPHILS NFR BLD AUTO: 0.7 %
BILIRUB SERPL-MCNC: 0.3 MG/DL (ref 0.1–1)
BILIRUB UR QL STRIP.AUTO: NEGATIVE
BUN SERPL-MCNC: 21 MG/DL (ref 8–23)
CALCIUM SERPL-MCNC: 9.7 MG/DL (ref 8.7–10.5)
CHLORIDE SERPL-SCNC: 104 MMOL/L (ref 95–110)
CLARITY UR: CLEAR
CO2 SERPL-SCNC: 22 MMOL/L (ref 23–29)
COLOR UR AUTO: COLORLESS
CREAT SERPL-MCNC: 0.9 MG/DL (ref 0.5–1.4)
ERYTHROCYTE [DISTWIDTH] IN BLOOD BY AUTOMATED COUNT: 12.6 % (ref 11.5–14.5)
GFR SERPLBLD CREATININE-BSD FMLA CKD-EPI: >60 ML/MIN/1.73/M2
GLUCOSE SERPL-MCNC: 232 MG/DL (ref 70–110)
GLUCOSE UR QL STRIP: NEGATIVE
HCT VFR BLD AUTO: 38.5 % (ref 37–48.5)
HGB BLD-MCNC: 13.5 GM/DL (ref 12–16)
HGB UR QL STRIP: NEGATIVE
IMM GRANULOCYTES # BLD AUTO: 0.04 K/UL (ref 0–0.04)
IMM GRANULOCYTES NFR BLD AUTO: 0.6 % (ref 0–0.5)
KETONES UR QL STRIP: NEGATIVE
LACTATE SERPL-SCNC: 1.5 MMOL/L (ref 0.5–2.2)
LEUKOCYTE ESTERASE UR QL STRIP: ABNORMAL
LIPASE SERPL-CCNC: 25 U/L (ref 4–60)
LYMPHOCYTES # BLD AUTO: 1.71 K/UL (ref 1–4.8)
MCH RBC QN AUTO: 29.9 PG (ref 27–31)
MCHC RBC AUTO-ENTMCNC: 35.1 G/DL (ref 32–36)
MCV RBC AUTO: 85 FL (ref 82–98)
MICROSCOPIC COMMENT: ABNORMAL
NITRITE UR QL STRIP: NEGATIVE
NUCLEATED RBC (/100WBC) (OHS): 0 /100 WBC
PH UR STRIP: 6 [PH]
PLATELET # BLD AUTO: 226 K/UL (ref 150–450)
PMV BLD AUTO: 10 FL (ref 9.2–12.9)
POTASSIUM SERPL-SCNC: 4.2 MMOL/L (ref 3.5–5.1)
PROT SERPL-MCNC: 8.2 GM/DL (ref 6–8.4)
PROT UR QL STRIP: NEGATIVE
RBC # BLD AUTO: 4.51 M/UL (ref 4–5.4)
RBC #/AREA URNS AUTO: 1 /HPF (ref 0–4)
RELATIVE EOSINOPHIL (OHS): 1.5 %
RELATIVE LYMPHOCYTE (OHS): 23.7 % (ref 18–48)
RELATIVE MONOCYTE (OHS): 7.4 % (ref 4–15)
RELATIVE NEUTROPHIL (OHS): 66.1 % (ref 38–73)
SODIUM SERPL-SCNC: 137 MMOL/L (ref 136–145)
SP GR UR STRIP: 1.01
SQUAMOUS #/AREA URNS AUTO: 5 /HPF
TROPONIN I SERPL DL<=0.01 NG/ML-MCNC: 0.01 NG/ML
UROBILINOGEN UR STRIP-ACNC: NEGATIVE EU/DL
WBC # BLD AUTO: 7.21 K/UL (ref 3.9–12.7)
WBC #/AREA URNS AUTO: 24 /HPF (ref 0–5)

## 2025-07-01 PROCEDURE — 83690 ASSAY OF LIPASE: CPT | Mod: HCNC,NTX | Performed by: EMERGENCY MEDICINE

## 2025-07-01 PROCEDURE — 63600175 PHARM REV CODE 636 W HCPCS: Mod: HCNC,NTX | Performed by: EMERGENCY MEDICINE

## 2025-07-01 PROCEDURE — 87086 URINE CULTURE/COLONY COUNT: CPT | Mod: HCNC,NTX | Performed by: EMERGENCY MEDICINE

## 2025-07-01 PROCEDURE — 96361 HYDRATE IV INFUSION ADD-ON: CPT | Mod: HCNC,NTX

## 2025-07-01 PROCEDURE — 81003 URINALYSIS AUTO W/O SCOPE: CPT | Mod: HCNC,NTX | Performed by: EMERGENCY MEDICINE

## 2025-07-01 PROCEDURE — 85025 COMPLETE CBC W/AUTO DIFF WBC: CPT | Mod: HCNC,NTX | Performed by: EMERGENCY MEDICINE

## 2025-07-01 PROCEDURE — 93005 ELECTROCARDIOGRAM TRACING: CPT | Mod: HCNC,NTX

## 2025-07-01 PROCEDURE — 96375 TX/PRO/DX INJ NEW DRUG ADDON: CPT | Mod: HCNC,NTX

## 2025-07-01 PROCEDURE — 84484 ASSAY OF TROPONIN QUANT: CPT | Mod: HCNC,NTX | Performed by: EMERGENCY MEDICINE

## 2025-07-01 PROCEDURE — 83605 ASSAY OF LACTIC ACID: CPT | Mod: HCNC,NTX | Performed by: EMERGENCY MEDICINE

## 2025-07-01 PROCEDURE — 93010 ELECTROCARDIOGRAM REPORT: CPT | Mod: HCNC,NTX,, | Performed by: INTERNAL MEDICINE

## 2025-07-01 PROCEDURE — 99284 EMERGENCY DEPT VISIT MOD MDM: CPT | Mod: 25,HCNC,NTX

## 2025-07-01 PROCEDURE — 96374 THER/PROPH/DIAG INJ IV PUSH: CPT | Mod: HCNC,NTX

## 2025-07-01 PROCEDURE — 80053 COMPREHEN METABOLIC PANEL: CPT | Mod: HCNC,NTX | Performed by: EMERGENCY MEDICINE

## 2025-07-01 RX ORDER — HYDROCODONE BITARTRATE AND ACETAMINOPHEN 5; 325 MG/1; MG/1
1 TABLET ORAL EVERY 6 HOURS PRN
Qty: 12 TABLET | Refills: 0 | Status: SHIPPED | OUTPATIENT
Start: 2025-07-01

## 2025-07-01 RX ORDER — MORPHINE SULFATE 4 MG/ML
4 INJECTION, SOLUTION INTRAMUSCULAR; INTRAVENOUS
Refills: 0 | Status: COMPLETED | OUTPATIENT
Start: 2025-07-01 | End: 2025-07-01

## 2025-07-01 RX ORDER — ONDANSETRON 4 MG/1
4 TABLET, FILM COATED ORAL EVERY 6 HOURS PRN
Qty: 20 TABLET | Refills: 0 | Status: SHIPPED | OUTPATIENT
Start: 2025-07-01

## 2025-07-01 RX ORDER — ONDANSETRON HYDROCHLORIDE 2 MG/ML
4 INJECTION, SOLUTION INTRAVENOUS
Status: COMPLETED | OUTPATIENT
Start: 2025-07-01 | End: 2025-07-01

## 2025-07-01 RX ADMIN — MORPHINE SULFATE 4 MG: 4 INJECTION INTRAVENOUS at 06:07

## 2025-07-01 RX ADMIN — ONDANSETRON 4 MG: 2 INJECTION INTRAMUSCULAR; INTRAVENOUS at 06:07

## 2025-07-01 RX ADMIN — SODIUM CHLORIDE, POTASSIUM CHLORIDE, SODIUM LACTATE AND CALCIUM CHLORIDE 1000 ML: 600; 310; 30; 20 INJECTION, SOLUTION INTRAVENOUS at 06:07

## 2025-07-01 NOTE — TELEPHONE ENCOUNTER
7/1/25 - Contacted Ashley, patient's daughter. Notified her of the CT of the chest results and Dr. Maldonado's instructions for a follow-up appointment in 2 months instead of 6 months to discuss initiation of OFEV. Ashley verbalized her understanding of all discussed.    Yancy Maldonado, DO to Me  Shaggy Collado NP (Selected Message)    7/1/25 12:47 PM  Should see back in 2 months.  PFTs at visit.  Will need to discuss OFEV.    Me to Yancy Maldonado DO      6/26/25  5:04 PM  Any changes to her treatment plan? The plan was for a follow-up appointment in 6 months with PFTs.    Yancy Maldonado, DO to Me  Shaggy Collado NP  6/26/25  4:20 PM  Result Note  CT chest with slight progression of ILD  CT Chest Without Contrast

## 2025-07-01 NOTE — TELEPHONE ENCOUNTER
Tried to call Mrs Seo and her daughter Ashley in order to schedule ALD appts in August - mailbox is full - unable to leave a message.

## 2025-07-01 NOTE — ED PROVIDER NOTES
Encounter Date: 7/1/2025       History     Chief Complaint   Patient presents with    Abdominal Pain     Patient reports bilateral upper and lower quadrant abdominal pain, abdominal bloating, decreased appetite, upper back pain, and back itching x 2 months. She reports body aches and chills x 1 week. She reports taking Tylenol with some relief. Patient seen at [facility] for same symptoms approx. 1 month ago.      HPI    79-year-old female with a past medical history of anxiety, arthritis, GERD, hyperlipidemia, hypertension, diabetes presents with worsening abdominal pain.  Family reports she was previously seen and admitted at [facility] with an EGD performed after additional imaging which was unrevealing for source.  She has subsequently seen Gastroenterology in the clinic and been started on multiple different medications without significant improvement.  She reports every day this week her pain has significantly increased.  She does report eating makes the pain worse.  She has some pain in the top part of her abdomen as well as on the lower right flank.  She reports no dysuria, hematuria, she denies any nausea or vomiting.  She reports having a normal bowel movement earlier today which helped out a little bit.  She attempted to take some of the medications earlier today but they did not help.  She has been avoiding over-the-counter medications as directed.  She denies any additional complaints.    Review of patient's allergies indicates:   Allergen Reactions    Actos [pioglitazone] Nausea Only    Darvocet a500 [propoxyphene n-acetaminophen] Nausea And Vomiting    Dilaudid [hydromorphone (bulk)] Nausea And Vomiting     Past Medical History:   Diagnosis Date    Allergy     Anxiety     Arthritis     osteo    Asthma     Diabetic retinopathy     GERD (gastroesophageal reflux disease)     High cholesterol     Hypertension     Kidney stone     Type 2 diabetes mellitus, uncontrolled, with neuropathy      Urinary tract infection     Vaginal infection      Past Surgical History:   Procedure Laterality Date    CHOLECYSTECTOMY      COLONOSCOPY N/A 6/8/2021    Procedure: COLONOSCOPY;  Surgeon: Rain Pop MD;  Location: [facility];  Service: Endoscopy;  Laterality: N/A;    COLONOSCOPY N/A 7/8/2021    Procedure: colonoscopy with single balloon scope;  Surgeon: Steffen Dueñas MD;  Location: [facility];  Service: Endoscopy;  Laterality: N/A;    ESOPHAGOGASTRODUODENOSCOPY N/A 6/8/2021    Procedure: EGD (ESOPHAGOGASTRODUODENOSCOPY);  Surgeon: Rain Pop MD;  Location: [facility];  Service: Endoscopy;  Laterality: N/A;    ESOPHAGOGASTRODUODENOSCOPY N/A 4/29/2025    Procedure: EGD (ESOPHAGOGASTRODUODENOSCOPY);  Surgeon: Radu Lei MD;  Location: [facility] ([address] [place] [address]);  Service: Gastroenterology;  Laterality: N/A;    EYE SURGERY      HYSTERECTOMY      INCISIONAL HERNIA REPAIR  2003    ROTATOR CUFF REPAIR Right 11/07/2017    TRIGGER FINGER RELEASE Right 6/3/2022    Procedure: RELEASE, TRIGGER FINGER; right index, middle, ring;  Surgeon: Sarmad Wesley Jr., MD;  Location: [facility] OR;  Service: Orthopedics;  Laterality: Right;     Family History   Problem Relation Name Age of Onset    Stroke Mother      Diabetes Sister      Diabetes Brother      Kidney disease Neg Hx       Social History[1]  Review of Systems   Constitutional: Negative.    HENT: Negative.     Eyes: Negative.    Respiratory: Negative.     Cardiovascular: Negative.    Gastrointestinal:  Positive for abdominal pain.   Genitourinary: Negative.    Musculoskeletal: Negative.    Skin: Negative.    Neurological: Negative.        Physical Exam     Initial Vitals [07/01/25 1656]   BP Pulse Resp Temp SpO2   (!) 144/65 (!) 114 18 97.8 °F (36.6 °C) 98 %      MAP       --         Physical Exam    Nursing note and vitals reviewed.  Constitutional: She appears well-developed and well-nourished. She is not diaphoretic. She appears distressed (mildly).   HENT:    Head: Normocephalic and atraumatic.   Nose: Nose normal.   Eyes: EOM are normal. Pupils are equal, round, and reactive to light.   Neck: Neck supple. No JVD present.   Normal range of motion.  Cardiovascular:            Tachycardic   Pulmonary/Chest: No stridor. No respiratory distress.   Abdominal: Abdomen is soft. Bowel sounds are normal. She exhibits no distension. There is abdominal tenderness (Tender to palpation over left upper quadrant and right flank). There is no rebound and no guarding.   Musculoskeletal:         General: No tenderness or edema. Normal range of motion.      Cervical back: Normal range of motion and neck supple.     Neurological: She is alert and oriented to person, place, and time. She has normal strength.   Skin: Skin is warm and dry. Capillary refill takes less than 2 seconds. No rash noted. No erythema.         ED Course   Procedures  Labs Reviewed   COMPREHENSIVE METABOLIC PANEL - Abnormal       Result Value    Sodium 137      Potassium 4.2      Chloride 104      CO2 22 (*)     Glucose 232 (*)     BUN 21      Creatinine 0.9      Calcium 9.7      Protein Total 8.2      Albumin 4.2      Bilirubin Total 0.3      ALP 77      AST 18      ALT 24      Anion Gap 11      eGFR >60     URINALYSIS, REFLEX TO URINE CULTURE - Abnormal    Color, UA Colorless (*)     Appearance, UA Clear      pH, UA 6.0      Spec Grav UA 1.010      Protein, UA Negative      Glucose, UA Negative      Ketones, UA Negative      Bilirubin, UA Negative      Blood, UA Negative      Nitrites, UA Negative      Urobilinogen, UA Negative      Leukocyte Esterase, UA 2+ (*)    CBC WITH DIFFERENTIAL - Abnormal    WBC 7.21      RBC 4.51      HGB 13.5      HCT 38.5      MCV 85      MCH 29.9      MCHC 35.1      RDW 12.6      Platelet Count 226      MPV 10.0      Nucleated RBC 0      Neut % 66.1      Lymph % 23.7      Mono % 7.4      Eos % 1.5      Basophil % 0.7      Imm Grans % 0.6 (*)     Neut # 4.77      Lymph # 1.71      Mono #  0.53      Eos # 0.11      Baso # 0.05      Imm Grans # 0.04     URINALYSIS MICROSCOPIC - Abnormal    RBC, UA 1      WBC, UA 24 (*)     Bacteria, UA Rare      Squamous Epithelial Cells, UA 5      Microscopic Comment       LIPASE - Normal    Lipase Level 25     LACTIC ACID, PLASMA - Normal    Lactic Acid Level 1.5      Narrative:     Falsely low lactic acid results can be found in samples containing >=13.0 mg/dL total bilirubin and/or >=3.5 mg/dL direct bilirubin.    TROPONIN I - Normal    Troponin-I 0.014     CULTURE, URINE    Urine Culture        Value: Multiple organisms isolated. None in predominance. Repeat if clinically necessary.   CBC W/ AUTO DIFFERENTIAL    Narrative:     The following orders were created for panel order CBC auto differential.  Procedure                               Abnormality         Status                     ---------                               -----------         ------                     CBC with Differential[[id number]]       Abnormal            Final result                 Please view results for these tests on the individual orders.        ECG Results              EKG 12-lead (Final result)        Collection Time Result Time QRS Duration OHS QTC Calculation    07/01/25 19:13:31 07/03/25 20:41:28 68 462                     Final result by Interface, Lab In [facility] (07/03/25 20:41:32)                   Narrative:    Test Reason : R10.13,    Vent. Rate :  99 BPM     Atrial Rate :  99 BPM     P-R Int : 156 ms          QRS Dur :  68 ms      QT Int : 360 ms       P-R-T Axes :  43  -9  37 degrees    QTcB Int : 462 ms    Normal sinus rhythm  Minimal voltage criteria for LVH, may be normal variant ( R in aVL )  Inferior infarct ,age undetermined  Abnormal ECG  When compared with ECG of 27-Apr-2025 16:53,  Inferior infarct is now Present  Confirmed by Inocencio Gonzalez ([id number]) on 7/3/2025 8:41:25 PM    Referred By: AAAREFERRAL SELF           Confirmed By: Inocencio Gonzalez                                   Imaging Results    None          Medications   lactated ringers bolus 1,000 mL (0 mLs Intravenous Stopped 7/1/25 1933)   morphine injection 4 mg (4 mg Intravenous Given 7/1/25 1832)   ondansetron injection 4 mg (4 mg Intravenous Given 7/1/25 1832)     Medical Decision Making  Amount and/or Complexity of Data Reviewed  Labs: ordered.    Risk  Prescription drug management.      MDM:    79-year-old female with a past medical history of anxiety, arthritis, GERD, hyperlipidemia, hypertension, diabetes presents with worsening abdominal pain. Differential Diagnosis includes:  Gastritis, pancreatitis, cholecystitis, acute mi/ACS.  Physical exam as noted above.  ED workup pending.  Pending clinical improvement will disposition appropriately.  ED lab work reviewed shows a urinalysis with 2+ leukocytes, 5 squamous epithelial cells, likely contaminated, CMP with glucose 232, BUN 21, creatinine 0.9, lipase 25, lactate 1.5, troponin 0.014, EKG as noted below, CBC white blood cell count 7.21, hemoglobin 13.5.  Patient presentation appears more consistent with acute on chronic abdominal pain with extensive workup previously, unremarkable lab work and evaluation today.  Will continue conservative management outpatient, discussed extensively with patient and with daughter at bedside regarding plan for further care.\ Discussed diagnosis and further treatment with patient and daughter at bedside, including f/u with PCP in the next week.  Return precautions given and all questions answered.  Patient and daughter in understanding of plan.  Pt discharged to home improved and stable.          Note was created using voice recognition software. Note may have occasional typographical or grammatical errors, garbled syntax, and other bizarre constructions that may not have been identified and edited despite good blake initial review prior to signing.                ED Course as of 07/04/25 0924   Tue Jul 01, 2025 1939 EKG shows  normal sinus rhythm rate of 99 beats per minute, nonspecific ST and T-wave changes noted throughout,  MS, no STEMI. [BB]      ED Course User Index  [BB] Cullen Vazquez MD                           Clinical Impression:  Final diagnoses:  [R10.13] Epigastric abdominal pain (Primary)          ED Disposition Condition    Discharge Stable          ED Prescriptions       Medication Sig Dispense Start Date End Date Auth. Provider    HYDROcodone-acetaminophen (NORCO) 5-325 mg per tablet Take 1 tablet by mouth every 6 (six) hours as needed for Pain. 12 tablet 7/1/2025 -- Cullen Vazquez MD    ondansetron (ZOFRAN) 4 MG tablet Take 1 tablet (4 mg total) by mouth every 6 (six) hours as needed for Nausea. 20 tablet 7/1/2025 -- Cullen Vazquez MD          Follow-up Information       Follow up With Specialties Details Why Contact Info    [facility] Emergency Dept Emergency Medicine Go to  If symptoms worsen [address] [facility] [address] [phone]    Steffen Dueñas MD Gastroenterology Schedule an appointment as soon as possible for a visit   [address]  [facility] [address]  [phone]                     [1]   Social History  Tobacco Use    Smoking status: Former     Passive exposure: Never    Smokeless tobacco: Never   Vaping Use    Vaping status: Never Used   Substance Use Topics    Alcohol use: Not Currently     Comment: social    Drug use: No        Cullen Vazquez MD  07/04/25 0924

## 2025-07-02 ENCOUNTER — OFFICE VISIT (OUTPATIENT)
Dept: ENDOCRINOLOGY | Facility: CLINIC | Age: 80
End: 2025-07-02
Payer: MEDICARE

## 2025-07-02 VITALS
SYSTOLIC BLOOD PRESSURE: 180 MMHG | WEIGHT: 120 LBS | DIASTOLIC BLOOD PRESSURE: 82 MMHG | HEIGHT: 59 IN | BODY MASS INDEX: 24.19 KG/M2

## 2025-07-02 DIAGNOSIS — I10 ESSENTIAL HYPERTENSION: ICD-10-CM

## 2025-07-02 DIAGNOSIS — M85.80 OSTEOPENIA, UNSPECIFIED LOCATION: ICD-10-CM

## 2025-07-02 DIAGNOSIS — Z79.4 TYPE 2 DIABETES MELLITUS WITH HYPERGLYCEMIA, WITH LONG-TERM CURRENT USE OF INSULIN: Primary | ICD-10-CM

## 2025-07-02 DIAGNOSIS — E11.65 TYPE 2 DIABETES MELLITUS WITH HYPERGLYCEMIA, WITH LONG-TERM CURRENT USE OF INSULIN: Primary | ICD-10-CM

## 2025-07-02 PROCEDURE — 99999 PR PBB SHADOW E&M-EST. PATIENT-LVL IV: CPT | Mod: PBBFAC,HCNC,TXP, | Performed by: STUDENT IN AN ORGANIZED HEALTH CARE EDUCATION/TRAINING PROGRAM

## 2025-07-02 RX ORDER — TIRZEPATIDE 2.5 MG/.5ML
2.5 INJECTION, SOLUTION SUBCUTANEOUS
Qty: 2 ML | Refills: 0 | Status: SHIPPED | OUTPATIENT
Start: 2025-07-02

## 2025-07-02 NOTE — ED TRIAGE NOTES
Patient arrives to the ED w/ complaints of abdominal pain, back aches x 1 mo. Reports being seen by GE in clinic and starting several meds w/o relief. Reports pain worsened and spread to R flank x 1 wk w/ itching. Reports 8/10 aching, cramping pain. Reports abdominal bloating, decreased appetite. Denies diarrhea. Reports worsening pain w/ eating. Reports mild relief w/ BM. Reports mild relief w/ Tylenol.

## 2025-07-02 NOTE — PROGRESS NOTES
Subjective:      Patient ID: Juliette Seo is a 79 y.o. female.    Chief Complaint:  Type 2 diabetes mellitus    History of Present Illness  This is a 79 y.o. female. with a past medical history of Type 2 diabetes mellitus, HTN, HLD here for evaluation.      Type 2 diabetes mellitus  Diagnosed around age 40    Current diabetes medications:  - Jardiance 25 mg daily - not taking  - Metformin 500 mg daily  - Novolog via Omnipod 5  Basal rate: 0.55 U/h  ICR: 6  ISF: 40  Target: 110-120  IAT: 3.5h        Past diabetes medications:  - Farxiga   - Trulicity - intolerance   - Ozempic - stopped due to abdominal pain about 2 months ago - pain persists      Lab Results   Component Value Date    CREATININE 0.9 07/01/2025    EGFRNORACEVR >60 07/01/2025       Known diabetic complications: none    Weight trend:  Wt Readings from Last 8 Encounters:   07/02/25 54.4 kg (120 lb)   07/01/25 54.4 kg (120 lb)   06/16/25 52.6 kg (116 lb)   06/16/25 52.6 kg (116 lb)   06/16/25 52.6 kg (116 lb)   05/28/25 52.8 kg (116 lb 6.5 oz)   04/27/25 50.8 kg (112 lb)   04/22/25 54.1 kg (119 lb 4.3 oz)       Family history of diabetes:  Yes    Prior visit with diabetes education: Yes    Current diet: 3 meals per day    Blood glucose monitoring at home:             Diabetes Management Status  Statin: Taking  ACE/ARB: Not taking    Screening or Prevention Patient's value   HgA1C Testing and Control   Lab Results   Component Value Date    HGBA1C 7.2 (H) 04/27/2025        LDL control Lab Results   Component Value Date    LDLCALC 130.8 04/28/2025      Nephropathy screening Lab Results   Component Value Date    MICALBCREAT 649.4 (H) 06/12/2024        Lab Results   Component Value Date    TSH 1.961 04/27/2025           Osteopenia  Lab Results   Component Value Date    DVDLQEKS37HK 14 (L) 05/21/2025       DXA (May 2025)  The L1 to L4 vertebral bone mineral density is equal to 1.073 g/cm squared with a T score of -1.0.  The right femoral neck bone mineral  "density is equal to 0.784 g/cm squared with a T score of -1.8.  There is a 8.5% risk of a major osteoporotic fracture and a 2.3% risk of hip fracture in the next 10 years (FRAX).  Impression: Osteopenia       Review of Systems  As above    Social and family history reviewed  Current medications and allergies reviewed    Objective:   BP (!) 180/82 (BP Location: Left arm, Patient Position: Sitting)   Ht 4' 11" (1.499 m)   Wt 54.4 kg (120 lb)   BMI 24.24 kg/m²   Physical Exam  Alert, oriented    BP Readings from Last 1 Encounters:   07/02/25 (!) 180/82      Wt Readings from Last 1 Encounters:   07/02/25 1009 54.4 kg (120 lb)     Body mass index is 24.24 kg/m².    Lab Review:   Lab Results   Component Value Date    HGBA1C 7.2 (H) 04/27/2025     Lab Results   Component Value Date    CHOL 191 04/28/2025    HDL 35 (L) 04/28/2025    LDLCALC 130.8 04/28/2025    TRIG 126 04/28/2025    CHOLHDL 18.3 (L) 04/28/2025     Lab Results   Component Value Date     07/01/2025    K 4.2 07/01/2025     07/01/2025    CO2 22 (L) 07/01/2025     (H) 07/01/2025    BUN 21 07/01/2025    CREATININE 0.9 07/01/2025    CALCIUM 9.7 07/01/2025    PROT 8.2 07/01/2025    ALBUMIN 4.2 07/01/2025    BILITOT 0.3 07/01/2025    ALKPHOS 77 07/01/2025    AST 18 07/01/2025    ALT 24 07/01/2025    ANIONGAP 11 07/01/2025    ESTGFRAFRICA >60.0 07/13/2022    EGFRNONAA >60.0 07/13/2022    TSH 1.961 04/27/2025       All pertinent labs reviewed    Assessment and Plan     Type 2 diabetes mellitus with hyperglycemia, with long-term current use of insulin  Glucose above goal with TIR 55% and average glucose 197 on CGM. Hyperglycemia present with most meals, will adjust ICR and ISF. No hypoglycemia.    Start GLP-1/GIP RA for better glycemic control. Her gastric emptying was normal and she is still has abdominal pain which seems to be more neuropathic from her description. Noted normal lipase and no signs of pancreatitis on imaging.    Ok to hold SGLT-2 " inhibitor and metformin for now pending response to GLP-1/GIP RA    Plan  - Start Mounjaro 2.5 mg weekly  - Stop metformin and Jardiance  - Continue Novolog via Omnipod 5  - Novolog via Omnipod 5  Basal rate: 0.55 U/h  ICR: 6 -> 5  ISF: 40->35  Target: 110-120  IAT: 3.5h    F/u 3 months     Osteopenia  Recent BMD with osteopenia  FRAX score does not suggest pharmacological treatment  I explained this should not lead to abdominal pain  Noted vitamin D deficiency, treated by PCP    Essential hypertension  Continue antihypertensive regimen including ARB/ACEi  Managed by PCP    BMI 23.0-23.9, adult  Monitor weight while on GLP-1/GIP RA      Adis Pedraza MD   Endocrinology

## 2025-07-02 NOTE — ASSESSMENT & PLAN NOTE
Recent BMD with osteopenia  FRAX score does not suggest pharmacological treatment  I explained this should not lead to abdominal pain  Noted vitamin D deficiency, treated by PCP

## 2025-07-02 NOTE — ASSESSMENT & PLAN NOTE
Glucose above goal with TIR 55% and average glucose 197 on CGM. Hyperglycemia present with most meals, will adjust ICR and ISF. No hypoglycemia.    Start GLP-1/GIP RA for better glycemic control. Her gastric emptying was normal and she is still has abdominal pain which seems to be more neuropathic from her description. Noted normal lipase and no signs of pancreatitis on imaging.    Ok to hold SGLT-2 inhibitor and metformin for now pending response to GLP-1/GIP RA    Plan  - Start Mounjaro 2.5 mg weekly  - Stop metformin and Jardiance  - Continue Novolog via Omnipod 5  - Novolog via Omnipod 5  Basal rate: 0.55 U/h  ICR: 6 -> 5  ISF: 40->35  Target: 110-120  IAT: 3.5h    F/u 3 months

## 2025-07-03 ENCOUNTER — TELEPHONE (OUTPATIENT)
Dept: TRANSPLANT | Facility: CLINIC | Age: 80
End: 2025-07-03
Payer: MEDICARE

## 2025-07-03 LAB
OHS QRS DURATION: 68 MS
OHS QTC CALCULATION: 462 MS

## 2025-07-04 LAB — BACTERIA UR CULT: NORMAL

## 2025-07-08 ENCOUNTER — TELEPHONE (OUTPATIENT)
Dept: FAMILY MEDICINE | Facility: CLINIC | Age: 80
End: 2025-07-08
Payer: MEDICARE

## 2025-07-08 ENCOUNTER — OFFICE VISIT (OUTPATIENT)
Dept: FAMILY MEDICINE | Facility: CLINIC | Age: 80
End: 2025-07-08
Payer: MEDICARE

## 2025-07-08 VITALS
TEMPERATURE: 98 F | HEART RATE: 102 BPM | OXYGEN SATURATION: 98 % | SYSTOLIC BLOOD PRESSURE: 137 MMHG | HEIGHT: 59 IN | BODY MASS INDEX: 23.66 KG/M2 | WEIGHT: 117.38 LBS | DIASTOLIC BLOOD PRESSURE: 66 MMHG

## 2025-07-08 DIAGNOSIS — G89.29 CHRONIC MUSCULOSKELETAL PAIN: ICD-10-CM

## 2025-07-08 DIAGNOSIS — E11.69 HYPERLIPIDEMIA ASSOCIATED WITH TYPE 2 DIABETES MELLITUS: ICD-10-CM

## 2025-07-08 DIAGNOSIS — Z76.89 ENCOUNTER TO ESTABLISH CARE WITH NEW PROVIDER: ICD-10-CM

## 2025-07-08 DIAGNOSIS — E55.9 VITAMIN D DEFICIENCY: ICD-10-CM

## 2025-07-08 DIAGNOSIS — G63 NEUROPATHY ASSOCIATED WITH ENDOCRINE DISORDER: ICD-10-CM

## 2025-07-08 DIAGNOSIS — M79.18 CHRONIC MUSCULOSKELETAL PAIN: ICD-10-CM

## 2025-07-08 DIAGNOSIS — E11.65 TYPE 2 DIABETES MELLITUS WITH HYPERGLYCEMIA, WITH LONG-TERM CURRENT USE OF INSULIN: ICD-10-CM

## 2025-07-08 DIAGNOSIS — Z79.4 TYPE 2 DIABETES MELLITUS WITH HYPERGLYCEMIA, WITH LONG-TERM CURRENT USE OF INSULIN: ICD-10-CM

## 2025-07-08 DIAGNOSIS — G89.29 CHRONIC ABDOMINAL PAIN: Primary | ICD-10-CM

## 2025-07-08 DIAGNOSIS — E78.5 HYPERLIPIDEMIA ASSOCIATED WITH TYPE 2 DIABETES MELLITUS: ICD-10-CM

## 2025-07-08 DIAGNOSIS — E11.59 HYPERTENSION ASSOCIATED WITH TYPE 2 DIABETES MELLITUS: ICD-10-CM

## 2025-07-08 DIAGNOSIS — R10.9 CHRONIC ABDOMINAL PAIN: Primary | ICD-10-CM

## 2025-07-08 DIAGNOSIS — E34.9 NEUROPATHY ASSOCIATED WITH ENDOCRINE DISORDER: ICD-10-CM

## 2025-07-08 DIAGNOSIS — I15.2 HYPERTENSION ASSOCIATED WITH TYPE 2 DIABETES MELLITUS: ICD-10-CM

## 2025-07-08 PROBLEM — Z78.9 IMPAIRED MOBILITY AND ADLS: Status: RESOLVED | Noted: 2023-10-24 | Resolved: 2025-07-08

## 2025-07-08 PROBLEM — S62.102A LEFT WRIST FRACTURE, CLOSED, INITIAL ENCOUNTER: Status: RESOLVED | Noted: 2023-09-26 | Resolved: 2025-07-08

## 2025-07-08 PROBLEM — Z74.09 IMPAIRED MOBILITY AND ADLS: Status: RESOLVED | Noted: 2023-10-24 | Resolved: 2025-07-08

## 2025-07-08 PROBLEM — M65.321 TRIGGER FINGER, RIGHT INDEX FINGER: Status: RESOLVED | Noted: 2022-06-03 | Resolved: 2025-07-08

## 2025-07-08 PROCEDURE — 99999 PR PBB SHADOW E&M-EST. PATIENT-LVL V: CPT | Mod: PBBFAC,HCNC,TXP, | Performed by: NURSE PRACTITIONER

## 2025-07-08 RX ORDER — DULOXETIN HYDROCHLORIDE 30 MG/1
30 CAPSULE, DELAYED RELEASE ORAL DAILY
Qty: 90 CAPSULE | Refills: 0 | Status: SHIPPED | OUTPATIENT
Start: 2025-07-08 | End: 2026-07-08

## 2025-07-08 NOTE — TELEPHONE ENCOUNTER
----- Message from Martha Taylor sent at 7/8/2025  1:37 PM CDT -----  Regarding: RE: pulm. follow up  Originally we were going to follow-up with her in 6 months, but her recent CT of the chest showed worsening. Therefore, she has been rescheduled for a 2 month follow-up appointment. We need to discuss treatment options to possibly prevent further lung scarring.    I spoke with patient's daughter. I will reach out to the patient directly to discuss.    Martha  ----- Message -----  From: Shaggy Collado NP  Sent: 7/8/2025  12:41 PM CDT  To: Marhta Taylor RN  Subject: FW: pulm. follow up                                ----- Message -----  From: Nga Baires NP  Sent: 7/8/2025  12:05 PM CDT  To: Shaggy Collado NP  Subject: pulm. follow up                                  Arnold Collado    Patient of yours - you just seen on 6/16/2025 - in your notes, you said to follow up in 6 months.    They have your patient scheduled for spirometry and follow-up in August - ONLY 2 months later -    Patient is in my office with family and they would like testing and follow up pushed back to December - please have your staff set up.    Favian PELLETIER

## 2025-07-08 NOTE — Clinical Note
Call patient's daughter, LUDY, and set patient up for fasting labs and urine before my visit in August.

## 2025-07-08 NOTE — TELEPHONE ENCOUNTER
I spoke with Patient's Daughter Valerie and advise them that Pulmonologist wanted to see them back in AUGUST due to worsening CT scan - make sure to keep August appts - daughter verbalized understanding.

## 2025-07-09 ENCOUNTER — TELEPHONE (OUTPATIENT)
Dept: FAMILY MEDICINE | Facility: CLINIC | Age: 80
End: 2025-07-09
Payer: MEDICARE

## 2025-07-09 ENCOUNTER — OFFICE VISIT (OUTPATIENT)
Dept: GASTROENTEROLOGY | Facility: CLINIC | Age: 80
End: 2025-07-09
Payer: MEDICARE

## 2025-07-09 VITALS — BODY MASS INDEX: 23.69 KG/M2 | HEIGHT: 59 IN | WEIGHT: 117.5 LBS

## 2025-07-09 DIAGNOSIS — Z86.0100 HISTORY OF COLON POLYPS: Primary | ICD-10-CM

## 2025-07-09 PROCEDURE — 99999 PR PBB SHADOW E&M-EST. PATIENT-LVL IV: CPT | Mod: PBBFAC,HCNC,TXP, | Performed by: INTERNAL MEDICINE

## 2025-07-09 RX ORDER — SODIUM, POTASSIUM,MAG SULFATES 17.5-3.13G
1 SOLUTION, RECONSTITUTED, ORAL ORAL DAILY
Qty: 1 KIT | Refills: 0 | Status: SHIPPED | OUTPATIENT
Start: 2025-07-09 | End: 2025-07-11

## 2025-07-09 NOTE — TELEPHONE ENCOUNTER
----- Message from Nga Baires NP sent at 7/8/2025  8:11 PM CDT -----  Call patient's daughter, LUDY, and set patient up for fasting labs and urine before my visit in August.

## 2025-07-09 NOTE — PROGRESS NOTES
"[facility]    CC: History of colon polyps     HPI 79 y.o. female with history of 3cm TVA with HGD and CIS  that was resected (6/8/21) but did not have  complete exam due to extensive diverticulosis, sigmoid narrowing and looping so underwent balloon-assisted colonoscopy that was complete to the cecum and showed residual granulation tissue at post-polypectomy scar base in the rectum that was removed with cold snare. Otherwise, exam showed pandiverticulosis. Currently feels well with no complaints.     Past Medical History  Past Medical History:   Diagnosis Date    Allergy     Anxiety     Arthritis     osteo    Asthma     Diabetic retinopathy     GERD (gastroesophageal reflux disease)     High cholesterol     Hypertension     Kidney stone     Type 2 diabetes mellitus, uncontrolled, with neuropathy     Urinary tract infection     Vaginal infection      Physical Examination  Ht 4' 11" (1.499 m)   Wt 53.3 kg (117 lb 8.1 oz)   BMI 23.73 kg/m²   General appearance: alert, cooperative, no distress  Lungs: clear to auscultation bilaterally, no dullness to percussion bilaterally  Heart: regular rate and rhythm without rub; no displacement of the PMI   Abdomen: soft, non-tender; bowel sounds normoactive; no organomegaly    Labs:   Hgb 13.5     Assessment:   79 y.o. female with history of 3cm TVA with HGD and CIS that was resected (6/8/21) but incomplete exam due to looping so underwent balloon-assisted colonoscopy with residual granulation tissue at post-polypectomy scar base in the rectum that was removed with cold   snare. Otherwise, exam showed pan-diverticulosis.    Plan:  - Device assisted colonoscopy scheduled for 8/21/25  - Suprep ordered, no medications to hold for anesthesia     Steffen Dueñas MD   [address] [place] [address], [address]  [place] [address] [phone]    "

## 2025-07-09 NOTE — PROGRESS NOTES
"Subjective:       Patient ID: Juliette Seo is a 79 y.o. female.    Chief Complaint: Establish Care (Patient report having stomach pains that goes towards to her back- feels like its pinching and itching)        HPI WITH ASSESSMENT AND PLAN OF CARE:      ####NEW PATIENT TO ME - Previous patient of Dr. Rubi#####    Patient is a 79 year old female with Interstitial Lung Disease, Aortic Atherosclerosis, Inferior Mesenteric Artery Stenosis, Type 2 Diabetes on Insulin Pump, Essential Hypertension, Hyperlipidemia, Vitamin D Deficiency, Osteopenia, Chronic Abdominal Pain with GERD, Pancreatic Mass, Diverticulosis, + history of colon polyp, history of kidney stones, and Recurrent Depression here today to establish care and discuss chronic abdominal pains.      Chronic Abdominal Pains  Followed by [facility] - last appt 5/28/2025  Has a follow up appt with Dr. Dueñas on 7/9/2025 - to follow up abdominal pain as well as due for colonoscopy due to + colon polyp with dysplastic cells in past  Multiple studies done that were unrevealing   Her Endocrinologist feels like the abdominal pain is NEUROPATHIC in nature; patient also with history of Depression and Chronic musculoskeletal pains -  WILL START CYMBALTA 30 mg daily  Follow up in 6 weeks.      Patient will return in 6 week for FULL EVALUATION OF ALL CHRONIC PROBLEMS with fasting lab results.    Vitals:    07/08/25 1129 07/08/25 1146   BP: (!) 148/60 137/66   BP Location: Left arm    Patient Position: Sitting    Pulse: 102    Temp: 98.1 °F (36.7 °C)    TempSrc: Temporal    SpO2: 98%    Weight: 53.3 kg (117 lb 6.3 oz)    Height: 4' 11" (1.499 m)          Diagnoses this Encounter:         ICD-10-CM ICD-9-CM   1. Chronic abdominal pain  R10.9 789.00    G89.29 338.29   2. Chronic musculoskeletal pain  M79.18 729.1    G89.29 338.29   3. Neuropathy associated with endocrine disorder  E34.9 355.9    G63 259.9   4. Type 2 diabetes mellitus with hyperglycemia, with long-term " current use of insulin  E11.65 250.00    Z79.4 790.29     V58.67   5. Hyperlipidemia associated with type 2 diabetes mellitus  E11.69 250.80    E78.5 272.4   6. Hypertension associated with type 2 diabetes mellitus  E11.59 250.80    I15.2 401.9   7. Vitamin D deficiency  E55.9 268.9   8. Encounter to establish care with new provider  Z76.89 V65.8       Orders Placed This Encounter    Comprehensive Metabolic Panel    Hemoglobin A1C    Lipid Panel    TSH    Vitamin D 25 hydroxy    Microalbumin/Creatinine Ratio, Urine    DULoxetine (CYMBALTA) 30 MG capsule        Follow up in about 6 weeks (around 8/19/2025) for follow up on medication and on all chronic problems.     Patient's Medications   New Prescriptions    DULOXETINE (CYMBALTA) 30 MG CAPSULE    Take 1 capsule (30 mg total) by mouth once daily.   Previous Medications    ACETAMINOPHEN (TYLENOL) 500 MG TABLET    Take 2 tablets (1,000 mg total) by mouth every 8 (eight) hours as needed for Pain.    ALBUTEROL (PROVENTIL) 2.5 MG /3 ML (0.083 %) NEBULIZER SOLUTION    Take 3 mLs (2.5 mg total) by nebulization every 6 (six) hours as needed for Wheezing. Rescue    ASPIRIN (ECOTRIN) 81 MG EC TABLET    Take 81 mg by mouth once daily.    AZELASTINE (ASTELIN) 137 MCG (0.1 %) NASAL SPRAY    1 spray (137 mcg total) by Nasal route 2 (two) times daily.    CETIRIZINE (ZYRTEC) 10 MG TABLET    Take 1 tablet (10 mg total) by mouth once daily.    DEXCOM G7  MISC    Use to check glucose with sensors    ERGOCALCIFEROL (VITAMIN D2) 50,000 UNIT CAP    Take 1 capsule (50,000 Units total) by mouth every 7 days.    FLUTICASONE PROPIONATE (FLONASE) 50 MCG/ACTUATION NASAL SPRAY    2 sprays (100 mcg total) by Each Nostril route once daily.    HYDROCODONE-ACETAMINOPHEN (NORCO) 5-325 MG PER TABLET    Take 1 tablet by mouth every 6 (six) hours as needed for Pain.    INSULIN GLARGINE U-100, LANTUS, (LANTUS SOLOSTAR U-100 INSULIN) 100 UNIT/ML (3 ML) INPN PEN    Inject 40 Units into the skin  "once daily.    MONTELUKAST (SINGULAIR) 10 MG TABLET    Take 1 tablet (10 mg total) by mouth every evening.    MOUNJARO 2.5 MG/0.5 ML PNIJ    Inject 2.5 mg into the skin every 7 days.    NOVOLOG U-100 INSULIN ASPART 100 UNIT/ML INJECTION    To use with insulin pump. Max daily dose 130 units.    OMNIPOD 5 G6-G7 PODS, GEN 5, CRTG    Inject 1 each into the skin every 72 hours.    ONDANSETRON (ZOFRAN) 4 MG TABLET    Take 1 tablet (4 mg total) by mouth every 6 (six) hours as needed for Nausea.    PANTOPRAZOLE (PROTONIX) 40 MG TABLET    Take 1 tablet (40 mg total) by mouth 2 (two) times daily.    TRUE METRIX GLUCOSE TEST STRIP STRP    To use to inject insulin twice daily.    TRUEPLUS PEN NEEDLE 32 GAUGE X 5/32" NDLE    To inject insulin twice daily    VALSARTAN (DIOVAN) 80 MG TABLET    Take 2 tablets (160 mg total) by mouth once daily.   Modified Medications    No medications on file   Discontinued Medications    LOPERAMIDE (IMODIUM) 2 MG CAPSULE    Take 1 capsule (2 mg total) by mouth 4 (four) times daily as needed for Diarrhea.         Review of Systems   Gastrointestinal:  Positive for abdominal pain and nausea.   Musculoskeletal:  Positive for back pain and myalgias.         Objective:        Physical Exam  Constitutional:       General: She is not in acute distress.     Appearance: Normal appearance. She is normal weight. She is not toxic-appearing or diaphoretic.      Comments: Body mass index is 23.71 kg/m².     Cardiovascular:      Rate and Rhythm: Normal rate and regular rhythm.      Heart sounds: Normal heart sounds.   Pulmonary:      Effort: Pulmonary effort is normal. No respiratory distress.      Breath sounds: Normal breath sounds.   Abdominal:      General: Bowel sounds are normal. There is no distension.      Tenderness: There is generalized abdominal tenderness.      Hernia: No hernia is present.   Musculoskeletal:         General: Normal range of motion.   Skin:     General: Skin is warm and dry.      " Findings: No rash.   Neurological:      Mental Status: She is alert and oriented to person, place, and time.   Psychiatric:         Mood and Affect: Mood normal.         Behavior: Behavior normal.             Past Medical History:   Diagnosis Date    Allergy     Anxiety     Arthritis     osteo    Asthma     Diabetic retinopathy     GERD (gastroesophageal reflux disease)     High cholesterol     Hypertension     Kidney stone     Type 2 diabetes mellitus, uncontrolled, with neuropathy     Urinary tract infection     Vaginal infection        Past Surgical History:   Procedure Laterality Date    CHOLECYSTECTOMY      COLONOSCOPY N/A 06/08/2021    Procedure: COLONOSCOPY;  Surgeon: Rain Pop MD;  Location: [facility];  Service: Endoscopy;  Laterality: N/A;    COLONOSCOPY N/A 07/08/2021    Procedure: colonoscopy with single balloon scope;  Surgeon: Steffen Dueñas MD;  Location: [facility];  Service: Endoscopy;  Laterality: N/A;    ESOPHAGOGASTRODUODENOSCOPY N/A 06/08/2021    Procedure: EGD (ESOPHAGOGASTRODUODENOSCOPY);  Surgeon: Rain Pop MD;  Location: [facility];  Service: Endoscopy;  Laterality: N/A;    ESOPHAGOGASTRODUODENOSCOPY N/A 04/29/2025    Procedure: EGD (ESOPHAGOGASTRODUODENOSCOPY);  Surgeon: Radu Lei MD;  Location: [facility] ([address] [place] [address]);  Service: Gastroenterology;  Laterality: N/A;    EYE SURGERY      HYSTERECTOMY      total hysterectomy    INCISIONAL HERNIA REPAIR  2003    ROTATOR CUFF REPAIR Right 11/07/2017    TRIGGER FINGER RELEASE Right 06/03/2022    Procedure: RELEASE, TRIGGER FINGER; right index, middle, ring;  Surgeon: Sarmad Wesley Jr., MD;  Location: [facility];  Service: Orthopedics;  Laterality: Right;       Family History   Problem Relation Name Age of Onset    Stroke Mother      Diabetes Sister      Diabetes Brother      Kidney disease Neg Hx         Social History     Socioeconomic History    Marital status:    Tobacco Use    Smoking status: Former     Current  packs/day: 0.00     Average packs/day: 2.0 packs/day for 19.4 years (38.8 ttl pk-yrs)     Types: Cigarettes     Start date: 1977     Quit date: 1996     Years since quittin.7     Passive exposure: Never    Smokeless tobacco: Never   Vaping Use    Vaping status: Never Used   Substance and Sexual Activity    Alcohol use: Not Currently     Comment: once a month - holidays - 1 to 2 drinks per occasion    Drug use: No    Sexual activity: Yes     Partners: Male     Birth control/protection: None     Social Drivers of Health     Financial Resource Strain: Low Risk  (2025)    Overall Financial Resource Strain (CARDIA)     Difficulty of Paying Living Expenses: Not hard at all   Food Insecurity: No Food Insecurity (2025)    Hunger Vital Sign     Worried About Running Out of Food in the Last Year: Never true     Ran Out of Food in the Last Year: Never true   Transportation Needs: No Transportation Needs (2025)    PRAPARE - Transportation     Lack of Transportation (Medical): No     Lack of Transportation (Non-Medical): No   Physical Activity: Inactive (2025)    Exercise Vital Sign     Days of Exercise per Week: 0 days     Minutes of Exercise per Session: 0 min   Stress: No Stress Concern Present (2025)    [employer] - Occupational Stress Questionnaire     Feeling of Stress : Not at all   Recent Concern: Stress - Stress Concern Present (2025)    [employer] - Occupational Stress Questionnaire     Feeling of Stress : To some extent   Housing Stability: Low Risk  (2025)    Housing Stability Vital Sign     Unable to Pay for Housing in the Last Year: No     Homeless in the Last Year: No

## 2025-07-09 NOTE — PATIENT INSTRUCTIONS
Moviprep/Suprep Colonoscopy Instructions  [facility]  [address] [facility] [place] [address]      PATIENT NAME:Juliette eSo PROCEDURE DAY/TIME:08/21/2025  CLEAR LIQUID DIET (START THE DAY BEFORE PROCEDURE):  Clear Liquid Diet means any liquid from the list below   Gatorade, Feliz-Aid, Lemonade -Gatorade is the preferred liquid  Tea (no milk or dairy)  Carbonated beverages (soft drink), regular or diet  Apple juice, white grape juice, white cranberry juice  Clear, fat-free, beef or chicken broth  Jell-O, Popsicles   * No Solid Food or Alcohol   ITEMS TO BE PURCHASED FOR PREP (prescription required):         MoviPrep or Suprep for your local pharmacy   BOWEL PREP INSTRUCTIONS THE DAY BEFORE THE EXAM:  Drink only clear liquids (see the above diet) all day. Gatorade is the best liquid.   Drink an extra 8 ounces of clear liquid every hour from 2pm to 5pm.   At 6pm, empty one Pouch A and one Pouch B into the disposable container   provided then add water or Gatorade to the top line (fill line). Mix the solution to dissolve. You may wish to mix the solution earlier in the day to refrigerate prior to drinking but the solution must be used within 24 hours of preparation.   At 7pm, start drinking the solution. Drink 8 ounces of solution every 15 minutes until the solution is finished. You should drink an extra 16 ounces of any clear liquid with the solution to help the prep work.   THE DAY OF THE EXAM:        1.  Take your morning medications, if any, with a small sip of water.         2.  Beginning 6 hours before your procedure, empty one Pouch A and one Pouch B   into the disposable container then add water or Gatorade to the top line. Mix the solution and drink 8 ounces every 15 minutes until the solution is finished. Drink an extra 16 ounces of any clear liquid with the solution.              *If your procedure is scheduled for the early morning, you will need to get up in               the middle of  the night to take this dose of preparation.         3.  Have nothing to eat or drink for 4 hours before the procedure.         4.  Bring someone to drive you home (you should not drive for 12 hrs after the exam)        5.  Report to Admitting, on the 1st floor of the hospital, at your assigned arrival time            If you are diabetic, do not take insulin or oral medications the morning of the procedure. Take only a half dose of insulin the day before your procedure. Do not take your diabetic pills the day before your procedure

## 2025-07-10 ENCOUNTER — TELEPHONE (OUTPATIENT)
Dept: FAMILY MEDICINE | Facility: CLINIC | Age: 80
End: 2025-07-10
Payer: MEDICARE

## 2025-07-23 ENCOUNTER — TELEPHONE (OUTPATIENT)
Dept: ENDOCRINOLOGY | Facility: CLINIC | Age: 80
End: 2025-07-23
Payer: MEDICARE

## 2025-07-23 ENCOUNTER — HOSPITAL ENCOUNTER (EMERGENCY)
Facility: HOSPITAL | Age: 80
Discharge: HOME | End: 2025-07-23
Attending: FAMILY MEDICINE
Payer: MEDICARE

## 2025-07-23 VITALS
DIASTOLIC BLOOD PRESSURE: 69 MMHG | WEIGHT: 116.5 LBS | OXYGEN SATURATION: 99 % | BODY MASS INDEX: 23.48 KG/M2 | SYSTOLIC BLOOD PRESSURE: 151 MMHG | RESPIRATION RATE: 20 BRPM | TEMPERATURE: 99 F | HEIGHT: 59 IN | HEART RATE: 96 BPM

## 2025-07-23 DIAGNOSIS — E11.65 TYPE 2 DIABETES MELLITUS WITH HYPERGLYCEMIA, WITH LONG-TERM CURRENT USE OF INSULIN: ICD-10-CM

## 2025-07-23 DIAGNOSIS — R73.9 HYPERGLYCEMIA: ICD-10-CM

## 2025-07-23 DIAGNOSIS — Z79.4 TYPE 2 DIABETES MELLITUS WITH HYPERGLYCEMIA, WITH LONG-TERM CURRENT USE OF INSULIN: ICD-10-CM

## 2025-07-23 LAB
ABSOLUTE EOSINOPHIL (OHS): 0.08 K/UL
ABSOLUTE MONOCYTE (OHS): 0.56 K/UL (ref 0.3–1)
ABSOLUTE NEUTROPHIL COUNT (OHS): 4.51 K/UL (ref 1.8–7.7)
ALBUMIN SERPL BCP-MCNC: 3.7 G/DL (ref 3.5–5.2)
ALP SERPL-CCNC: 72 UNIT/L (ref 40–150)
ALT SERPL W/O P-5'-P-CCNC: 24 UNIT/L (ref 10–44)
ANION GAP (OHS): 12 MMOL/L (ref 8–16)
AST SERPL-CCNC: 20 UNIT/L (ref 11–45)
B-OH-BUTYR BLD STRIP-SCNC: 0.1 MMOL/L
BACTERIA #/AREA URNS HPF: ABNORMAL /HPF
BASOPHILS # BLD AUTO: 0.05 K/UL
BASOPHILS NFR BLD AUTO: 0.7 %
BILIRUB SERPL-MCNC: 0.3 MG/DL (ref 0.1–1)
BILIRUB UR QL STRIP.AUTO: NEGATIVE
BUN SERPL-MCNC: 15 MG/DL (ref 8–23)
CALCIUM SERPL-MCNC: 9 MG/DL (ref 8.7–10.5)
CHLORIDE SERPL-SCNC: 102 MMOL/L (ref 95–110)
CLARITY UR: CLEAR
CO2 SERPL-SCNC: 20 MMOL/L (ref 23–29)
COLOR UR AUTO: YELLOW
CREAT SERPL-MCNC: 0.8 MG/DL (ref 0.5–1.4)
ERYTHROCYTE [DISTWIDTH] IN BLOOD BY AUTOMATED COUNT: 12.3 % (ref 11.5–14.5)
GFR SERPLBLD CREATININE-BSD FMLA CKD-EPI: >60 ML/MIN/1.73/M2
GLUCOSE SERPL-MCNC: 299 MG/DL (ref 70–110)
GLUCOSE UR QL STRIP: ABNORMAL
HCT VFR BLD AUTO: 35.4 % (ref 37–48.5)
HGB BLD-MCNC: 11.9 GM/DL (ref 12–16)
HGB UR QL STRIP: NEGATIVE
IMM GRANULOCYTES # BLD AUTO: 0.08 K/UL (ref 0–0.04)
IMM GRANULOCYTES NFR BLD AUTO: 1.1 % (ref 0–0.5)
KETONES UR QL STRIP: NEGATIVE
LEUKOCYTE ESTERASE UR QL STRIP: ABNORMAL
LYMPHOCYTES # BLD AUTO: 1.74 K/UL (ref 1–4.8)
MCH RBC QN AUTO: 29.1 PG (ref 27–31)
MCHC RBC AUTO-ENTMCNC: 33.6 G/DL (ref 32–36)
MCV RBC AUTO: 87 FL (ref 82–98)
MICROSCOPIC COMMENT: ABNORMAL
NITRITE UR QL STRIP: NEGATIVE
NUCLEATED RBC (/100WBC) (OHS): 0 /100 WBC
PH UR STRIP: 7 [PH]
PLATELET # BLD AUTO: 197 K/UL (ref 150–450)
PMV BLD AUTO: 9.6 FL (ref 9.2–12.9)
POC PH VENOUS: 7.44 (ref 7.32–7.42)
POCT GLUCOSE: 178 MG/DL (ref 70–110)
POCT GLUCOSE: 369 MG/DL (ref 70–110)
POCT GLUCOSE: 68 MG/DL (ref 70–110)
POCT GLUCOSE: 89 MG/DL (ref 70–110)
POCT GLUCOSE: 92 MG/DL (ref 70–110)
POTASSIUM SERPL-SCNC: 3.9 MMOL/L (ref 3.5–5.1)
PROT SERPL-MCNC: 7.5 GM/DL (ref 6–8.4)
PROT UR QL STRIP: NEGATIVE
RBC # BLD AUTO: 4.09 M/UL (ref 4–5.4)
RBC #/AREA URNS HPF: 0 /HPF (ref 0–4)
RELATIVE EOSINOPHIL (OHS): 1.1 %
RELATIVE LYMPHOCYTE (OHS): 24.8 % (ref 18–48)
RELATIVE MONOCYTE (OHS): 8 % (ref 4–15)
RELATIVE NEUTROPHIL (OHS): 64.3 % (ref 38–73)
SODIUM SERPL-SCNC: 134 MMOL/L (ref 136–145)
SP GR UR STRIP: 1.01
UROBILINOGEN UR STRIP-ACNC: NEGATIVE EU/DL
WBC # BLD AUTO: 7.02 K/UL (ref 3.9–12.7)
WBC #/AREA URNS HPF: 7 /HPF (ref 0–5)
YEAST URNS QL MICRO: ABNORMAL /HPF

## 2025-07-23 PROCEDURE — 80053 COMPREHEN METABOLIC PANEL: CPT | Mod: HCNC,NTX | Performed by: NURSE PRACTITIONER

## 2025-07-23 PROCEDURE — 93010 ELECTROCARDIOGRAM REPORT: CPT | Mod: HCNC,NTX,, | Performed by: INTERNAL MEDICINE

## 2025-07-23 PROCEDURE — 94760 N-INVAS EAR/PLS OXIMETRY 1: CPT | Mod: HCNC,NTX

## 2025-07-23 PROCEDURE — 82962 GLUCOSE BLOOD TEST: CPT | Mod: HCNC,NTX

## 2025-07-23 PROCEDURE — 85025 COMPLETE CBC W/AUTO DIFF WBC: CPT | Mod: HCNC,NTX | Performed by: NURSE PRACTITIONER

## 2025-07-23 PROCEDURE — 25000003 PHARM REV CODE 250: Mod: HCNC,NTX | Performed by: NURSE PRACTITIONER

## 2025-07-23 PROCEDURE — 96374 THER/PROPH/DIAG INJ IV PUSH: CPT | Mod: HCNC,NTX

## 2025-07-23 PROCEDURE — 99285 EMERGENCY DEPT VISIT HI MDM: CPT | Mod: 25,HCNC,NTX

## 2025-07-23 PROCEDURE — 93005 ELECTROCARDIOGRAM TRACING: CPT | Mod: HCNC,NTX

## 2025-07-23 PROCEDURE — 96361 HYDRATE IV INFUSION ADD-ON: CPT | Mod: HCNC,NTX

## 2025-07-23 PROCEDURE — 63600175 PHARM REV CODE 636 W HCPCS: Mod: HCNC,NTX | Performed by: NURSE PRACTITIONER

## 2025-07-23 PROCEDURE — 82010 KETONE BODYS QUAN: CPT | Mod: HCNC,NTX | Performed by: NURSE PRACTITIONER

## 2025-07-23 PROCEDURE — 81003 URINALYSIS AUTO W/O SCOPE: CPT | Mod: HCNC,NTX | Performed by: NURSE PRACTITIONER

## 2025-07-23 PROCEDURE — 99900035 HC TECH TIME PER 15 MIN (STAT): Mod: HCNC,NTX

## 2025-07-23 PROCEDURE — 82800 BLOOD PH: CPT | Mod: HCNC,NTX | Performed by: NURSE PRACTITIONER

## 2025-07-23 RX ORDER — KETOROLAC TROMETHAMINE 10 MG/1
10 TABLET, FILM COATED ORAL
Status: COMPLETED | OUTPATIENT
Start: 2025-07-23 | End: 2025-07-23

## 2025-07-23 RX ORDER — TIRZEPATIDE 2.5 MG/.5ML
2.5 INJECTION, SOLUTION SUBCUTANEOUS
Qty: 2 ML | Refills: 0 | Status: SHIPPED | OUTPATIENT
Start: 2025-07-23

## 2025-07-23 RX ORDER — METHOCARBAMOL 500 MG/1
1000 TABLET, FILM COATED ORAL 3 TIMES DAILY
Qty: 30 TABLET | Refills: 0 | Status: SHIPPED | OUTPATIENT
Start: 2025-07-23 | End: 2025-07-28

## 2025-07-23 RX ADMIN — KETOROLAC TROMETHAMINE 10 MG: 10 TABLET, FILM COATED ORAL at 04:07

## 2025-07-23 RX ADMIN — HUMAN INSULIN 4 UNITS: 100 INJECTION, SOLUTION SUBCUTANEOUS at 02:07

## 2025-07-23 RX ADMIN — SODIUM CHLORIDE 1000 ML: 9 INJECTION, SOLUTION INTRAVENOUS at 01:07

## 2025-07-23 NOTE — ED NOTES
Notified NP pt stated she is feeling a little shaky. Patient is sitting up in bed talking to her daughter .

## 2025-07-23 NOTE — ED NOTES
NP notified pt glucose 68 pt received meal tray and given some orange juice . Pt denies feeling bad. Pt has no complaints . Pt is not cool & clammy .

## 2025-07-23 NOTE — ED NOTES
Notified NP KIANA Leon patient bs [phone] . Per NP order pt meal tray called dietary for diabetic meal tray

## 2025-07-23 NOTE — TELEPHONE ENCOUNTER
Patients daughter called in reagards of patients sugars being in the 400's. Spoke with patient she checked again once being on the phone with her she was in the high 500's. Advised that she needs to go to the ER due to the high  sugar readings and unable to bring them down with insulin. Patient was denial of going in to ED.    Daughter stated she is needing someone to check on the MOUNJARO since she is currently not taking anything to treat the DM2. Will resend prior auth for MOUNJARO.

## 2025-07-23 NOTE — ED PROVIDER NOTES
"Encounter Date: 7/23/2025       History     Chief Complaint   Patient presents with    Hyperglycemia     Patient to ED with family complaining of high blood sugar since 0930 even after 3 insulin bolus through her insulin pump. 369 at finger in triage. Patient complains of right sided mid back pain (x3 months). NADN in triage.     Chief complaint: High blood sugar   [age] year female presents to be evaluated for elevated blood sugar readings per her personal Dexcom.  She reports that several weeks ago she was taken off her metformin and was initially supposed to start Mounjaro.  Reports she has been waiting for prior authorization to be approved so she has been able to obtain the Mounjaro.  She reports she is currently only taking her basal insulin.  Reports from a she had been reading "high ".  She denies any symptoms of hyperglycemia.      Review of patient's allergies indicates:   Allergen Reactions    Actos [pioglitazone] Nausea Only    Darvocet a500 [propoxyphene n-acetaminophen] Nausea And Vomiting    Dilaudid [hydromorphone (bulk)] Nausea And Vomiting     Past Medical History:   Diagnosis Date    Allergy     Anxiety     Arthritis     osteo    Asthma     Diabetic retinopathy     GERD (gastroesophageal reflux disease)     High cholesterol     Hypertension     Kidney stone     Type 2 diabetes mellitus, uncontrolled, with neuropathy     Urinary tract infection     Vaginal infection      Past Surgical History:   Procedure Laterality Date    CHOLECYSTECTOMY      COLONOSCOPY N/A 06/08/2021    Procedure: COLONOSCOPY;  Surgeon: Rain Pop MD;  Location: [facility];  Service: Endoscopy;  Laterality: N/A;    COLONOSCOPY N/A 07/08/2021    Procedure: colonoscopy with single balloon scope;  Surgeon: Steffen Dueñas MD;  Location: [facility];  Service: Endoscopy;  Laterality: N/A;    ESOPHAGOGASTRODUODENOSCOPY N/A 06/08/2021    Procedure: EGD (ESOPHAGOGASTRODUODENOSCOPY);  Surgeon: Rain Pop MD;  " Location: [facility] ENDO;  Service: Endoscopy;  Laterality: N/A;    ESOPHAGOGASTRODUODENOSCOPY N/A 04/29/2025    Procedure: EGD (ESOPHAGOGASTRODUODENOSCOPY);  Surgeon: Radu Lei MD;  Location: [facility] (2ND FLR);  Service: Gastroenterology;  Laterality: N/A;    EYE SURGERY      HYSTERECTOMY      total hysterectomy    INCISIONAL HERNIA REPAIR  2003    ROTATOR CUFF REPAIR Right 11/07/2017    TRIGGER FINGER RELEASE Right 06/03/2022    Procedure: RELEASE, TRIGGER FINGER; right index, middle, ring;  Surgeon: Sarmad Wesley Jr., MD;  Location: [facility] OR;  Service: Orthopedics;  Laterality: Right;     Family History   Problem Relation Name Age of Onset    Stroke Mother      Diabetes Sister      Diabetes Brother      Kidney disease Neg Hx       Social History[1]  Review of Systems   Constitutional:  Negative for fatigue and fever.   Respiratory:  Negative for chest tightness and shortness of breath.    Cardiovascular:  Negative for leg swelling.   Gastrointestinal:  Negative for abdominal pain.   Endocrine: Negative for polydipsia, polyphagia and polyuria.   Neurological:  Negative for dizziness, weakness and headaches.       Physical Exam     Initial Vitals [07/23/25 1217]   BP Pulse Resp Temp SpO2   (!) 185/80 (!) 122 18 99.1 °F (37.3 °C) 97 %      MAP       --         Physical Exam    Nursing note and vitals reviewed.  Constitutional: She appears well-developed and well-nourished.   HENT:   Head: Normocephalic and atraumatic.   Eyes: EOM are normal. Pupils are equal, round, and reactive to light.   Cardiovascular:  Normal rate, regular rhythm and normal heart sounds.           Pulmonary/Chest: Breath sounds normal. She has no wheezes. She has no rhonchi. She has no rales.     Neurological: She is alert and oriented to person, place, and time.   Skin: Skin is warm and dry.         ED Course   Procedures  Labs Reviewed   COMPREHENSIVE METABOLIC PANEL - Abnormal       Result Value    Sodium 134 (*)     Potassium 3.9       Chloride 102      CO2 20 (*)     Glucose 299 (*)     BUN 15      Creatinine 0.8      Calcium 9.0      Protein Total 7.5      Albumin 3.7      Bilirubin Total 0.3      ALP 72      AST 20      ALT 24      Anion Gap 12      eGFR >60     URINALYSIS, REFLEX TO URINE CULTURE - Abnormal    Color, UA Yellow      Appearance, UA Clear      pH, UA 7.0      Spec Grav UA 1.010      Protein, UA Negative      Glucose, UA 3+ (*)     Ketones, UA Negative      Bilirubin, UA Negative      Blood, UA Negative      Nitrites, UA Negative      Urobilinogen, UA Negative      Leukocyte Esterase, UA Trace (*)    CBC WITH DIFFERENTIAL - Abnormal    WBC 7.02      RBC 4.09      HGB 11.9 (*)     HCT 35.4 (*)     MCV 87      MCH 29.1      MCHC 33.6      RDW 12.3      Platelet Count 197      MPV 9.6      Nucleated RBC 0      Neut % 64.3      Lymph % 24.8      Mono % 8.0      Eos % 1.1      Basophil % 0.7      Imm Grans % 1.1 (*)     Neut # 4.51      Lymph # 1.74      Mono # 0.56      Eos # 0.08      Baso # 0.05      Imm Grans # 0.08 (*)    URINALYSIS MICROSCOPIC - Abnormal    RBC, UA 0      WBC, UA 7 (*)     Bacteria, UA Occasional      Yeast, UA Rare (*)     Microscopic Comment       POCT GLUCOSE - Abnormal    POCT Glucose 369 (*)    POCT GLUCOSE - Abnormal    POCT Glucose 68 (*)    POCT GLUCOSE - Abnormal    POCT Glucose 178 (*)    BETA - HYDROXYBUTYRATE, SERUM - Normal    Beta-Hydroxybutyrate 0.1     CBC W/ AUTO DIFFERENTIAL    Narrative:     The following orders were created for panel order Complete Blood Count (CBC).  Procedure                               Abnormality         Status                     ---------                               -----------         ------                     CBC with Differential[[id number]]       Abnormal            Final result                 Please view results for these tests on the individual orders.   GREY TOP URINE HOLD   POCT GLUCOSE    POCT Glucose 89     POCT GLUCOSE    POCT Glucose 92     POCT GLUCOSE  MONITORING CONTINUOUS   POCT GLUCOSE MONITORING CONTINUOUS     EKG Readings: (Independently Interpreted)   Initial Reading: No STEMI. Previous EKG: Compared with most recent EKG Heart Rate: 119. Ectopy: No Ectopy. Conduction: Normal.       Imaging Results              X-Ray Chest AP Portable (Final result)  Result time 07/23/25 13:22:45      Final result by Nadine Harper MD (07/23/25 13:22:45)                   Impression:      No acute abnormality.      Electronically signed by: Nadine Harper MD  Date:    07/23/2025  Time:    13:22               Narrative:    EXAMINATION:  XR CHEST AP PORTABLE    CLINICAL HISTORY:  hyperglycemia;    TECHNIQUE:  Single frontal view of the chest was performed.    COMPARISON:  04/27/2025    FINDINGS:  Coarse interstitial markings throughout the lungs suggesting chronic lung change.No consolidation.  No pleural effusion. No evident pneumothorax.    The cardiac silhouette is normal in size. The hilar and mediastinal contours are unremarkable.    Bones are intact.                                       Medications   sodium chloride 0.9% bolus 1,000 mL 1,000 mL (0 mLs Intravenous Stopped 7/23/25 1432)   insulin regular injection 4 Units 0.04 mL (4 Units Intravenous Given 7/23/25 1401)   ketorolac tablet 10 mg (10 mg Oral Given 7/23/25 1646)     Medical Decision Making  [age] year female with high blood sugar readings on her home Dexcom  Diagnoses include hyperglycemia, DKA, dehydration    Amount and/or Complexity of Data Reviewed  Labs: ordered. Decision-making details documented in ED Course.  Radiology: ordered.  Discussion of management or test interpretation with external provider(s): Patient with elevated blood glucose readings   Patient did not have signs to be in DKA   Normal beta hydroxy   Given fluids and insulin with good improvement patient feeling well   Stable for DC with close follow-up with her endocrinologist   Given return precautions    Risk  OTC  drugs.  Prescription drug management.               ED Course as of 25   1520 Glucose(!): 299 [CB]      ED Course User Index  [CB] Marina Musa NP                               Clinical Impression:  Final diagnoses:  [R73.9] Hyperglycemia          ED Disposition Condition    Discharge Stable          ED Prescriptions       Medication Sig Dispense Start Date End Date Auth. Provider    MOUNJARO 2.5 mg/0.5 mL PnIj Inject 2.5 mg into the skin every 7 days. 2 mL 2025 -- Marina Musa NP    methocarbamoL (ROBAXIN) 500 MG Tab Take 2 tablets (1,000 mg total) by mouth 3 (three) times daily. for 5 days 30 tablet 2025 Marina Musa NP          Follow-up Information    None                [1]   Social History  Tobacco Use    Smoking status: Former     Current packs/day: 0.00     Average packs/day: 2.0 packs/day for 19.4 years (38.8 ttl pk-yrs)     Types: Cigarettes     Start date: 1977     Quit date: 1996     Years since quittin.7     Passive exposure: Never    Smokeless tobacco: Never   Vaping Use    Vaping status: Never Used   Substance Use Topics    Alcohol use: Not Currently     Comment: once a month - holidays - 1 to 2 drinks per occasion    Drug use: No        Marina Musa NP  25 [id number]

## 2025-07-23 NOTE — ED NOTES
"DAUGHTER REPORTS DUE TO HIGH READINGS ON IMPLANTED DEVICE AND FINGER CHECK @1046AM A 7.8U BOLUS WAS ADMINISTERED, @1110 A 1.1U BOLUS WAS ADMINISTERED AND AS OF 1219PM 0.45U WAS BEING ADMINISTERED "EVERY MINUTE".   "

## 2025-07-24 ENCOUNTER — PATIENT OUTREACH (OUTPATIENT)
Facility: OTHER | Age: 80
End: 2025-07-24
Payer: MEDICARE

## 2025-07-24 LAB
OHS QRS DURATION: 68 MS
OHS QTC CALCULATION: 464 MS

## 2025-07-25 NOTE — PROGRESS NOTES
Patient was seen in the ED on 7/23/25. Phoned patient on 2 separate occasions. Patient was unavailable. Message left on voice mail. Encounter closed.  [place] Bravo

## 2025-08-04 ENCOUNTER — TELEPHONE (OUTPATIENT)
Dept: TRANSPLANT | Facility: CLINIC | Age: 80
End: 2025-08-04
Payer: MEDICARE

## 2025-08-04 NOTE — TELEPHONE ENCOUNTER
Attempted to contact patient via the  to discuss appointment next week in [facility]. No answer. Left a message requesting a return call.

## 2025-08-06 DIAGNOSIS — M25.551 BILATERAL HIP PAIN: Primary | ICD-10-CM

## 2025-08-06 DIAGNOSIS — M25.552 BILATERAL HIP PAIN: Primary | ICD-10-CM

## 2025-08-12 ENCOUNTER — PATIENT MESSAGE (OUTPATIENT)
Dept: ENDOCRINOLOGY | Facility: CLINIC | Age: 80
End: 2025-08-12
Payer: MEDICARE

## 2025-08-12 ENCOUNTER — HOSPITAL ENCOUNTER (OUTPATIENT)
Dept: PULMONOLOGY | Facility: CLINIC | Age: 80
Discharge: HOME | End: 2025-08-12
Payer: MEDICARE

## 2025-08-12 ENCOUNTER — TELEPHONE (OUTPATIENT)
Dept: TRANSPLANT | Facility: CLINIC | Age: 80
End: 2025-08-12

## 2025-08-12 ENCOUNTER — OFFICE VISIT (OUTPATIENT)
Dept: ORTHOPEDICS | Facility: CLINIC | Age: 80
End: 2025-08-12
Payer: MEDICARE

## 2025-08-12 ENCOUNTER — OFFICE VISIT (OUTPATIENT)
Dept: TRANSPLANT | Facility: CLINIC | Age: 80
End: 2025-08-12
Payer: MEDICARE

## 2025-08-12 VITALS
BODY MASS INDEX: 24.56 KG/M2 | OXYGEN SATURATION: 100 % | HEART RATE: 100 BPM | HEIGHT: 58 IN | TEMPERATURE: 98 F | DIASTOLIC BLOOD PRESSURE: 74 MMHG | SYSTOLIC BLOOD PRESSURE: 161 MMHG | WEIGHT: 117 LBS

## 2025-08-12 VITALS — HEIGHT: 58 IN | BODY MASS INDEX: 24.45 KG/M2

## 2025-08-12 DIAGNOSIS — J84.170 INTERSTITIAL LUNG DISEASE WITH PROGRESSIVE FIBROTIC PHENOTYPE IN DISEASES CLASSIFIED ELSEWHERE: Primary | ICD-10-CM

## 2025-08-12 DIAGNOSIS — J84.9 ILD (INTERSTITIAL LUNG DISEASE): ICD-10-CM

## 2025-08-12 DIAGNOSIS — M25.552 BILATERAL HIP PAIN: Primary | ICD-10-CM

## 2025-08-12 DIAGNOSIS — J30.2 SEASONAL ALLERGIC RHINITIS, UNSPECIFIED TRIGGER: ICD-10-CM

## 2025-08-12 DIAGNOSIS — J84.9 ILD (INTERSTITIAL LUNG DISEASE): Primary | ICD-10-CM

## 2025-08-12 DIAGNOSIS — M25.551 BILATERAL HIP PAIN: Primary | ICD-10-CM

## 2025-08-12 DIAGNOSIS — K21.9 GASTROESOPHAGEAL REFLUX DISEASE, UNSPECIFIED WHETHER ESOPHAGITIS PRESENT: ICD-10-CM

## 2025-08-12 PROCEDURE — 1160F RVW MEDS BY RX/DR IN RCRD: CPT | Mod: CPTII,HCNC,S$GLB, | Performed by: ORTHOPAEDIC SURGERY

## 2025-08-12 PROCEDURE — 3288F FALL RISK ASSESSMENT DOCD: CPT | Mod: CPTII,HCNC,S$GLB, | Performed by: ORTHOPAEDIC SURGERY

## 2025-08-12 PROCEDURE — 1125F AMNT PAIN NOTED PAIN PRSNT: CPT | Mod: CPTII,HCNC,S$GLB, | Performed by: ORTHOPAEDIC SURGERY

## 2025-08-12 PROCEDURE — 99999 PR PBB SHADOW E&M-EST. PATIENT-LVL IV: CPT | Mod: PBBFAC,HCNC,TXP, | Performed by: NURSE PRACTITIONER

## 2025-08-12 PROCEDURE — 1101F PT FALLS ASSESS-DOCD LE1/YR: CPT | Mod: CPTII,HCNC,S$GLB, | Performed by: ORTHOPAEDIC SURGERY

## 2025-08-12 PROCEDURE — 99213 OFFICE O/P EST LOW 20 MIN: CPT | Mod: HCNC,S$GLB,, | Performed by: ORTHOPAEDIC SURGERY

## 2025-08-12 PROCEDURE — 99999 PR PBB SHADOW E&M-EST. PATIENT-LVL III: CPT | Mod: PBBFAC,HCNC,, | Performed by: ORTHOPAEDIC SURGERY

## 2025-08-12 PROCEDURE — 1159F MED LIST DOCD IN RCRD: CPT | Mod: CPTII,HCNC,S$GLB, | Performed by: ORTHOPAEDIC SURGERY

## 2025-08-13 RX ORDER — NINTEDANIB 150 MG/1
150 CAPSULE ORAL 2 TIMES DAILY
Qty: 60 CAPSULE | Refills: 11 | Status: ACTIVE | OUTPATIENT
Start: 2025-08-13

## 2025-08-14 ENCOUNTER — OFFICE VISIT (OUTPATIENT)
Dept: PODIATRY | Facility: CLINIC | Age: 80
End: 2025-08-14
Payer: MEDICARE

## 2025-08-14 VITALS
DIASTOLIC BLOOD PRESSURE: 102 MMHG | WEIGHT: 117.06 LBS | HEIGHT: 58 IN | SYSTOLIC BLOOD PRESSURE: 160 MMHG | HEART RATE: 120 BPM | BODY MASS INDEX: 24.57 KG/M2

## 2025-08-14 DIAGNOSIS — E11.9 ENCOUNTER FOR DIABETIC FOOT EXAM: Primary | ICD-10-CM

## 2025-08-14 DIAGNOSIS — L97.511 SKIN ULCER OF TOE OF RIGHT FOOT, LIMITED TO BREAKDOWN OF SKIN: ICD-10-CM

## 2025-08-14 DIAGNOSIS — E11.51 TYPE II DIABETES MELLITUS WITH PERIPHERAL CIRCULATORY DISORDER: ICD-10-CM

## 2025-08-14 LAB
DLCO ADJ PRE: 11.89 ML/(MIN*MMHG) (ref 10.51–21.98)
DLCO SINGLE BREATH LLN: 10.51
DLCO SINGLE BREATH PRE REF: 69.6 %
DLCO SINGLE BREATH REF: 16.24
DLCOC SBVA LLN: 2.34
DLCOC SBVA PRE REF: 106.2 %
DLCOC SBVA REF: 4.13
DLCOC SINGLE BREATH LLN: 10.51
DLCOC SINGLE BREATH PRE REF: 73.2 %
DLCOC SINGLE BREATH REF: 16.24
DLCOCSBVAULN: 5.92
DLCOCSINGLEBREATHULN: 21.98
DLCOCSINGLEBREATHZSCORE: -1.25
DLCOSINGLEBREATHULN: 21.98
DLCOSINGLEBREATHZSCORE: -1.42
DLCOVA LLN: 2.34
DLCOVA PRE REF: 101 %
DLCOVA PRE: 4.17 ML/(MIN*MMHG*L) (ref 2.34–5.92)
DLCOVA REF: 4.13
DLCOVAULN: 5.92
DLVAADJ PRE: 4.39 ML/(MIN*MMHG*L) (ref 2.34–5.92)
ERV LLN: -16449.55
ERV PRE REF: 149.6 %
ERV REF: 0.45
ERVULN: ABNORMAL
FEF 25 75 LLN: 0.57
FEF 25 75 PRE REF: 113.8 %
FEF 25 75 REF: 1.36
FEV05 LLN: 0.43
FEV05 REF: 1.29
FEV1 FVC LLN: 63
FEV1 FVC PRE REF: 101.5 %
FEV1 FVC REF: 78
FEV1 LLN: 1.1
FEV1 PRE REF: 90 %
FEV1 REF: 1.57
FRCPLETH LLN: 1.56
FRCPLETH PREREF: 80.2 %
FRCPLETH REF: 2.38
FRCPLETHULN: 3.2
FVC LLN: 1.43
FVC PRE REF: 87.8 %
FVC REF: 2.04
IVC PRE: 1.79 L (ref 1.43–2.68)
IVC SINGLE BREATH LLN: 1.43
IVC SINGLE BREATH PRE REF: 87.6 %
IVC SINGLE BREATH REF: 2.04
IVCSINGLEBREATHULN: 2.68
LLN IC: -16448.76
PEF LLN: 1.9
PEF PRE REF: 96.4 %
PEF REF: 3.55
PHYSICIAN COMMENT: ABNORMAL
PRE DLCO: 11.3 ML/(MIN*MMHG) (ref 10.51–21.98)
PRE ERV: 0.67 L (ref -16449.55–16450.45)
PRE FEF 25 75: 1.54 L/S (ref 0.57–2.55)
PRE FET 100: 8.55 SEC
PRE FEV05 REF: 93.8 %
PRE FEV1 FVC: 79.02 % (ref 63.06–90.67)
PRE FEV1: 1.41 L (ref 1.1–2.02)
PRE FEV5: 1.21 L (ref 0.43–2.14)
PRE FRC PL: 1.91 L (ref 1.56–3.2)
PRE FVC: 1.79 L (ref 1.43–2.68)
PRE IC: 1.12 L (ref -16448.76–16451.24)
PRE PEF: 3.42 L/S (ref 1.9–5.2)
PRE REF IC: 90.2 %
PRE RV: 1.24 L (ref 1.35–2.51)
PRE TLC: 3.03 L (ref 2.95–4.92)
RAW PRE REF: 134.4 %
RAW PRE: 4.11 CMH2O*S/L (ref 3.06–3.06)
RAW REF: 3.06
REF IC: 1.24
RV LLN: 1.35
RV PRE REF: 64.1 %
RV REF: 1.93
RVTLC LLN: 36
RVTLC PRE REF: 89.2 %
RVTLC PRE: 40.86 % (ref 36.23–55.41)
RVTLC REF: 46
RVTLCULN: 55
RVULN: 2.51
SGAW PRE REF: 105 %
SGAW PRE: 0.11 1/(CMH2O*S) (ref 0.1–0.1)
SGAW REF: 0.1
TLC LLN: 2.95
TLC PRE REF: 76.9 %
TLC REF: 3.93
TLC ULN: 4.92
ULN IC: ABNORMAL
VA PRE: 2.71 L (ref 3.78–3.78)
VA SINGLE BREATH LLN: 3.78
VA SINGLE BREATH PRE REF: 71.7 %
VA SINGLE BREATH REF: 3.78
VASINGLEBREATHULN: 3.78
VC LLN: 1.43
VC PRE REF: 87.8 %
VC PRE: 1.79 L (ref 1.43–2.68)
VC REF: 2.04
VC ULN: 2.68

## 2025-08-14 PROCEDURE — 99999 PR PBB SHADOW E&M-EST. PATIENT-LVL IV: CPT | Mod: PBBFAC,HCNC,, | Performed by: STUDENT IN AN ORGANIZED HEALTH CARE EDUCATION/TRAINING PROGRAM

## 2025-08-14 RX ORDER — MUPIROCIN 20 MG/G
OINTMENT TOPICAL 2 TIMES DAILY
Qty: 30 G | Refills: 1 | Status: SHIPPED | OUTPATIENT
Start: 2025-08-14 | End: 2025-08-24

## 2025-08-18 ENCOUNTER — OFFICE VISIT (OUTPATIENT)
Dept: FAMILY MEDICINE | Facility: CLINIC | Age: 80
End: 2025-08-18
Payer: MEDICARE

## 2025-08-18 VITALS
DIASTOLIC BLOOD PRESSURE: 64 MMHG | BODY MASS INDEX: 24.82 KG/M2 | OXYGEN SATURATION: 98 % | HEIGHT: 58 IN | HEART RATE: 110 BPM | TEMPERATURE: 98 F | SYSTOLIC BLOOD PRESSURE: 137 MMHG | WEIGHT: 118.25 LBS

## 2025-08-18 DIAGNOSIS — K57.90 DIVERTICULOSIS: ICD-10-CM

## 2025-08-18 DIAGNOSIS — Z79.4 TYPE 2 DIABETES MELLITUS WITH HYPERGLYCEMIA, WITH LONG-TERM CURRENT USE OF INSULIN: ICD-10-CM

## 2025-08-18 DIAGNOSIS — K86.9 PANCREATIC LESION: ICD-10-CM

## 2025-08-18 DIAGNOSIS — G89.29 CHRONIC RIGHT-SIDED BACK PAIN, UNSPECIFIED BACK LOCATION: ICD-10-CM

## 2025-08-18 DIAGNOSIS — Z86.39 HISTORY OF VITAMIN D DEFICIENCY: ICD-10-CM

## 2025-08-18 DIAGNOSIS — E11.59 HYPERTENSION ASSOCIATED WITH TYPE 2 DIABETES MELLITUS: ICD-10-CM

## 2025-08-18 DIAGNOSIS — G63 NEUROPATHY ASSOCIATED WITH ENDOCRINE DISORDER: ICD-10-CM

## 2025-08-18 DIAGNOSIS — K55.1 STENOSIS OF INFERIOR MESENTERIC ARTERY: ICD-10-CM

## 2025-08-18 DIAGNOSIS — R80.9 MICROALBUMINURIA DUE TO TYPE 2 DIABETES MELLITUS: ICD-10-CM

## 2025-08-18 DIAGNOSIS — E34.9 NEUROPATHY ASSOCIATED WITH ENDOCRINE DISORDER: ICD-10-CM

## 2025-08-18 DIAGNOSIS — M85.80 OSTEOPENIA, UNSPECIFIED LOCATION: ICD-10-CM

## 2025-08-18 DIAGNOSIS — I15.2 HYPERTENSION ASSOCIATED WITH TYPE 2 DIABETES MELLITUS: ICD-10-CM

## 2025-08-18 DIAGNOSIS — G89.29 CHRONIC ABDOMINAL PAIN: ICD-10-CM

## 2025-08-18 DIAGNOSIS — F33.0 MILD EPISODE OF RECURRENT MAJOR DEPRESSIVE DISORDER: ICD-10-CM

## 2025-08-18 DIAGNOSIS — M54.9 CHRONIC RIGHT-SIDED BACK PAIN, UNSPECIFIED BACK LOCATION: ICD-10-CM

## 2025-08-18 DIAGNOSIS — K58.0 IRRITABLE BOWEL SYNDROME WITH DIARRHEA: ICD-10-CM

## 2025-08-18 DIAGNOSIS — I70.0 AORTIC ATHEROSCLEROSIS: ICD-10-CM

## 2025-08-18 DIAGNOSIS — E11.29 MICROALBUMINURIA DUE TO TYPE 2 DIABETES MELLITUS: ICD-10-CM

## 2025-08-18 DIAGNOSIS — E11.65 TYPE 2 DIABETES MELLITUS WITH HYPERGLYCEMIA, WITH LONG-TERM CURRENT USE OF INSULIN: ICD-10-CM

## 2025-08-18 DIAGNOSIS — J84.9 ILD (INTERSTITIAL LUNG DISEASE): Primary | ICD-10-CM

## 2025-08-18 DIAGNOSIS — Z86.0100 HISTORY OF COLONIC POLYPS: ICD-10-CM

## 2025-08-18 DIAGNOSIS — Z87.442 HISTORY OF KIDNEY STONES: ICD-10-CM

## 2025-08-18 DIAGNOSIS — R10.9 CHRONIC ABDOMINAL PAIN: ICD-10-CM

## 2025-08-18 DIAGNOSIS — R79.89 ABNORMAL TSH: ICD-10-CM

## 2025-08-18 DIAGNOSIS — E78.5 HYPERLIPIDEMIA ASSOCIATED WITH TYPE 2 DIABETES MELLITUS: ICD-10-CM

## 2025-08-18 DIAGNOSIS — R94.6 ABNORMAL RESULTS OF THYROID FUNCTION STUDIES: ICD-10-CM

## 2025-08-18 DIAGNOSIS — E11.69 HYPERLIPIDEMIA ASSOCIATED WITH TYPE 2 DIABETES MELLITUS: ICD-10-CM

## 2025-08-18 PROCEDURE — 1101F PT FALLS ASSESS-DOCD LE1/YR: CPT | Mod: CPTII,HCNC,NTX,S$GLB | Performed by: NURSE PRACTITIONER

## 2025-08-18 PROCEDURE — 99215 OFFICE O/P EST HI 40 MIN: CPT | Mod: HCNC,NTX,S$GLB, | Performed by: NURSE PRACTITIONER

## 2025-08-18 PROCEDURE — G2211 COMPLEX E/M VISIT ADD ON: HCPCS | Mod: HCNC,NTX,S$GLB, | Performed by: NURSE PRACTITIONER

## 2025-08-18 PROCEDURE — 3078F DIAST BP <80 MM HG: CPT | Mod: CPTII,HCNC,NTX,S$GLB | Performed by: NURSE PRACTITIONER

## 2025-08-18 PROCEDURE — 1126F AMNT PAIN NOTED NONE PRSNT: CPT | Mod: CPTII,HCNC,NTX,S$GLB | Performed by: NURSE PRACTITIONER

## 2025-08-18 PROCEDURE — 99999 PR PBB SHADOW E&M-EST. PATIENT-LVL V: CPT | Mod: PBBFAC,HCNC,TXP, | Performed by: NURSE PRACTITIONER

## 2025-08-18 PROCEDURE — 3075F SYST BP GE 130 - 139MM HG: CPT | Mod: CPTII,HCNC,NTX,S$GLB | Performed by: NURSE PRACTITIONER

## 2025-08-18 PROCEDURE — 3288F FALL RISK ASSESSMENT DOCD: CPT | Mod: CPTII,HCNC,NTX,S$GLB | Performed by: NURSE PRACTITIONER

## 2025-08-18 PROCEDURE — 1159F MED LIST DOCD IN RCRD: CPT | Mod: CPTII,HCNC,NTX,S$GLB | Performed by: NURSE PRACTITIONER

## 2025-08-18 PROCEDURE — 1160F RVW MEDS BY RX/DR IN RCRD: CPT | Mod: CPTII,HCNC,NTX,S$GLB | Performed by: NURSE PRACTITIONER

## 2025-08-18 RX ORDER — ROSUVASTATIN CALCIUM 10 MG/1
10 TABLET, COATED ORAL DAILY
Qty: 90 TABLET | Refills: 3 | Status: ACTIVE | OUTPATIENT
Start: 2025-08-18 | End: 2026-08-18

## 2025-08-18 RX ORDER — CYCLOBENZAPRINE HCL 5 MG
5 TABLET ORAL 2 TIMES DAILY PRN
Qty: 30 TABLET | Refills: 5 | Status: ACTIVE | OUTPATIENT
Start: 2025-08-18

## 2025-08-18 RX ORDER — TIRZEPATIDE 5 MG/.5ML
5 INJECTION, SOLUTION SUBCUTANEOUS
Qty: 2 ML | Refills: 2 | Status: SHIPPED | OUTPATIENT
Start: 2025-08-18

## 2025-08-18 RX ORDER — TIRZEPATIDE 5 MG/.5ML
5 INJECTION, SOLUTION SUBCUTANEOUS
Qty: 2 ML | Refills: 2 | Status: SHIPPED | OUTPATIENT
Start: 2025-08-18 | End: 2025-08-18

## 2025-08-18 RX ORDER — DULOXETIN HYDROCHLORIDE 60 MG/1
60 CAPSULE, DELAYED RELEASE ORAL DAILY
Qty: 90 CAPSULE | Refills: 1 | Status: ACTIVE | OUTPATIENT
Start: 2025-08-18 | End: 2026-08-18

## 2025-08-18 RX ORDER — DICYCLOMINE HYDROCHLORIDE 20 MG/1
20 TABLET ORAL 2 TIMES DAILY PRN
Qty: 60 TABLET | Refills: 2 | Status: ACTIVE | OUTPATIENT
Start: 2025-08-18

## 2025-08-18 RX ORDER — SEMAGLUTIDE 0.68 MG/ML
0.5 INJECTION, SOLUTION SUBCUTANEOUS
COMMUNITY
Start: 2025-07-19 | End: 2025-08-18 | Stop reason: ALTCHOICE

## 2025-08-19 ENCOUNTER — TELEPHONE (OUTPATIENT)
Dept: ENDOSCOPY | Facility: HOSPITAL | Age: 80
End: 2025-08-19
Payer: MEDICARE

## 2025-08-19 PROBLEM — R80.9 MICROALBUMINURIA DUE TO TYPE 2 DIABETES MELLITUS: Status: ACTIVE | Noted: 2025-08-19

## 2025-08-19 PROBLEM — Z87.442 HISTORY OF KIDNEY STONES: Status: ACTIVE | Noted: 2025-08-19

## 2025-08-19 PROBLEM — K57.90 DIVERTICULOSIS: Status: ACTIVE | Noted: 2025-08-19

## 2025-08-19 PROBLEM — K86.9 PANCREATIC LESION: Status: ACTIVE | Noted: 2025-08-19

## 2025-08-19 PROBLEM — F33.0 MILD EPISODE OF RECURRENT MAJOR DEPRESSIVE DISORDER: Status: ACTIVE | Noted: 2025-08-19

## 2025-08-19 PROBLEM — M54.9 CHRONIC RIGHT-SIDED BACK PAIN: Status: ACTIVE | Noted: 2025-08-19

## 2025-08-19 PROBLEM — R94.6 ABNORMAL RESULTS OF THYROID FUNCTION STUDIES: Status: ACTIVE | Noted: 2025-08-19

## 2025-08-19 PROBLEM — E11.29 MICROALBUMINURIA DUE TO TYPE 2 DIABETES MELLITUS: Status: ACTIVE | Noted: 2025-08-19

## 2025-08-19 PROBLEM — G89.29 CHRONIC RIGHT-SIDED BACK PAIN: Status: ACTIVE | Noted: 2025-08-19

## 2025-08-19 PROBLEM — R79.89 ABNORMAL TSH: Status: ACTIVE | Noted: 2025-08-19

## 2025-08-20 ENCOUNTER — TELEPHONE (OUTPATIENT)
Dept: ENDOSCOPY | Facility: HOSPITAL | Age: 80
End: 2025-08-20
Payer: MEDICARE

## 2025-08-21 ENCOUNTER — ANESTHESIA (OUTPATIENT)
Dept: ENDOSCOPY | Facility: HOSPITAL | Age: 80
End: 2025-08-21
Payer: MEDICARE

## 2025-08-21 ENCOUNTER — ANESTHESIA EVENT (OUTPATIENT)
Dept: ENDOSCOPY | Facility: HOSPITAL | Age: 80
End: 2025-08-21
Payer: MEDICARE

## 2025-08-21 ENCOUNTER — HOSPITAL ENCOUNTER (OUTPATIENT)
Facility: HOSPITAL | Age: 80
Discharge: HOME | End: 2025-08-21
Attending: INTERNAL MEDICINE | Admitting: INTERNAL MEDICINE
Payer: MEDICARE

## 2025-08-21 VITALS
RESPIRATION RATE: 14 BRPM | TEMPERATURE: 98 F | WEIGHT: 118 LBS | OXYGEN SATURATION: 98 % | BODY MASS INDEX: 24.77 KG/M2 | HEART RATE: 72 BPM | DIASTOLIC BLOOD PRESSURE: 54 MMHG | HEIGHT: 58 IN | SYSTOLIC BLOOD PRESSURE: 110 MMHG

## 2025-08-21 LAB — POCT GLUCOSE: 126 MG/DL (ref 70–110)

## 2025-08-21 PROCEDURE — 37000008 HC ANESTHESIA 1ST 15 MINUTES: Mod: HCNC,TXP | Performed by: INTERNAL MEDICINE

## 2025-08-21 PROCEDURE — G0105 COLORECTAL SCRN; HI RISK IND: HCPCS | Mod: 74,HCNC,TXP | Performed by: INTERNAL MEDICINE

## 2025-08-21 PROCEDURE — 63600175 PHARM REV CODE 636 W HCPCS: Mod: HCNC,TXP

## 2025-08-21 PROCEDURE — 37000009 HC ANESTHESIA EA ADD 15 MINS: Mod: HCNC,TXP | Performed by: INTERNAL MEDICINE

## 2025-08-21 PROCEDURE — 25000003 PHARM REV CODE 250: Mod: HCNC,TXP

## 2025-08-21 RX ORDER — PROPOFOL 10 MG/ML
VIAL (ML) INTRAVENOUS CONTINUOUS PRN
Status: DISCONTINUED | OUTPATIENT
Start: 2025-08-21 | End: 2025-08-21

## 2025-08-21 RX ORDER — LIDOCAINE HYDROCHLORIDE 20 MG/ML
INJECTION INTRAVENOUS
Status: DISCONTINUED | OUTPATIENT
Start: 2025-08-21 | End: 2025-08-21

## 2025-08-21 RX ORDER — SODIUM CHLORIDE 0.9 % (FLUSH) 0.9 %
10 SYRINGE (ML) INJECTION
Status: DISCONTINUED | OUTPATIENT
Start: 2025-08-21 | End: 2025-08-21 | Stop reason: HOSPADM

## 2025-08-21 RX ORDER — GLUCAGON 1 MG
1 KIT INJECTION
Status: DISCONTINUED | OUTPATIENT
Start: 2025-08-21 | End: 2025-08-21 | Stop reason: HOSPADM

## 2025-08-21 RX ORDER — PROPOFOL 10 MG/ML
VIAL (ML) INTRAVENOUS
Status: DISCONTINUED | OUTPATIENT
Start: 2025-08-21 | End: 2025-08-21

## 2025-08-21 RX ADMIN — SODIUM CHLORIDE: 0.9 INJECTION, SOLUTION INTRAVENOUS at 09:08

## 2025-08-21 RX ADMIN — PROPOFOL 150 MCG/KG/MIN: 10 INJECTION, EMULSION INTRAVENOUS at 10:08

## 2025-08-21 RX ADMIN — PROPOFOL 60 MG: 10 INJECTION, EMULSION INTRAVENOUS at 10:08

## 2025-08-21 RX ADMIN — LIDOCAINE HYDROCHLORIDE 75 MG: 20 INJECTION, SOLUTION INTRAVENOUS at 10:08

## (undated) DEVICE — DRAPE SHOULDER 160X102IN 10/CA

## (undated) DEVICE — DRESSING GAUZE 6PLY 4X4

## (undated) DEVICE — CONN SMARTSTITCH PERFECTPASSER

## (undated) DEVICE — MANIFOLD 4 PORT

## (undated) DEVICE — SEE MEDLINE ITEM 146292

## (undated) DEVICE — ALCOHOL 70% ISOP W/GREEN 16OZ

## (undated) DEVICE — SUT 3-0 ETHILON 18 FS-1

## (undated) DEVICE — NDL HYPO REG 25G X 1 1/2

## (undated) DEVICE — BLADE SURG #15 CARBON STEEL

## (undated) DEVICE — GLOVE SURG BIOGEL LATEX SZ 7.5

## (undated) DEVICE — SOL IRR NACL .9% 3000ML

## (undated) DEVICE — PAD CAST 2 IN X 4YDS STERILE

## (undated) DEVICE — STOCKINET 4INX48

## (undated) DEVICE — SUPPORT SLING SHOT II MEDIUM

## (undated) DEVICE — APPLICATOR CHLORAPREP ORN 26ML

## (undated) DEVICE — INSTRAMOD SHOULDER TRACTION

## (undated) DEVICE — SEE MEDLINE ITEM 157173

## (undated) DEVICE — PAD ABD 8X10 STERILE

## (undated) DEVICE — PAD PREP 50/CA

## (undated) DEVICE — BANDAGE ESMARK ELASTIC ST 4X9

## (undated) DEVICE — DRESSING XEROFORM FOIL PK 1X8

## (undated) DEVICE — GAUZE SPONGE 4X4 12PLY

## (undated) DEVICE — PACK BASIC

## (undated) DEVICE — SEE MEDLINE ITEM 146420

## (undated) DEVICE — TUBE SET INFLOW/OUTFLOW

## (undated) DEVICE — SEE MEDLINE ITEM 156955

## (undated) DEVICE — BLADE SHAVER 4.5 6/BX

## (undated) DEVICE — TRAY INST ROTATOR CUFF RENTAL

## (undated) DEVICE — MAT SUCTION PUDDLEVAC ORANGE

## (undated) DEVICE — SPLINT WRIST FOAM 8.5IN LG/RT

## (undated) DEVICE — SEE MEDLINE ITEM 157116

## (undated) DEVICE — COVER OVERHEAD SURG LT BLUE

## (undated) DEVICE — SEE MEDLINE ITEM 157117

## (undated) DEVICE — SEE L#120831

## (undated) DEVICE — NDL SPINAL 18GX3.5 SPINOCAN

## (undated) DEVICE — SEE MEDLINE ITEM 152622

## (undated) DEVICE — Device

## (undated) DEVICE — SEE MEDLINE ITEM 146313

## (undated) DEVICE — DRAPE PLASTIC U 60X72

## (undated) DEVICE — SEE MEDLINE ITEM 157125

## (undated) DEVICE — CARTRIDGE SUTURE SMARTSTITCH

## (undated) DEVICE — BLADE SCALP OPHTL BEVEL STR

## (undated) DEVICE — SUPPORT ULNA NERVE PROTECTOR

## (undated) DEVICE — SYR B-D DISP CONTROL 10CC100/C

## (undated) DEVICE — SUT ETHILON 5-0 PS-2 18IN

## (undated) DEVICE — BANDAGE MATRIX HK LOOP 2IN 5YD

## (undated) DEVICE — SEE MEDLINE ITEM 157131

## (undated) DEVICE — ELECTRODE REM PLYHSV RETURN 9

## (undated) DEVICE — SUT SMARTSTITCH PERFECTPASS

## (undated) DEVICE — WAND COBLATION TURBOVAC 90

## (undated) DEVICE — SHEET DRAPE MEDIUM